# Patient Record
Sex: FEMALE | NOT HISPANIC OR LATINO | Employment: OTHER | URBAN - METROPOLITAN AREA
[De-identification: names, ages, dates, MRNs, and addresses within clinical notes are randomized per-mention and may not be internally consistent; named-entity substitution may affect disease eponyms.]

---

## 2017-07-10 ENCOUNTER — TELEPHONE (OUTPATIENT)
Dept: FAMILY MEDICINE CLINIC | Facility: CLINIC | Age: 77
End: 2017-07-10

## 2017-07-10 NOTE — TELEPHONE ENCOUNTER
That will be fine as long as she's not had a history of ulcers or GI bleeds.  Make sure she takes 81 mg aspirin with food once daily

## 2017-07-10 NOTE — TELEPHONE ENCOUNTER
[place] [facility] [place] [facility] [place] [facility] She said she is going to wear her compression stockings but, wanted to know if you thought it was a good idea  for her to take a asprin also?

## 2017-08-07 ENCOUNTER — TELEPHONE (OUTPATIENT)
Dept: FAMILY MEDICINE CLINIC | Facility: CLINIC | Age: 77
End: 2017-08-07

## 2017-08-07 RX ORDER — CIPROFLOXACIN 250 MG/1
250 TABLET, FILM COATED ORAL 2 TIMES DAILY
Qty: 14 TABLET | Refills: 0 | Status: SHIPPED | OUTPATIENT
Start: 2017-08-07 | End: 2017-09-14

## 2017-09-13 ENCOUNTER — TELEPHONE (OUTPATIENT)
Dept: FAMILY MEDICINE CLINIC | Facility: CLINIC | Age: 77
End: 2017-09-13

## 2017-09-14 ENCOUNTER — DOCUMENTATION (OUTPATIENT)
Dept: FAMILY MEDICINE CLINIC | Facility: CLINIC | Age: 77
End: 2017-09-14

## 2017-09-14 ENCOUNTER — TELEPHONE (OUTPATIENT)
Dept: FAMILY MEDICINE CLINIC | Facility: CLINIC | Age: 77
End: 2017-09-14

## 2017-09-14 ENCOUNTER — APPOINTMENT (OUTPATIENT)
Dept: GENERAL RADIOLOGY | Facility: HOSPITAL | Age: 77
End: 2017-09-14

## 2017-09-14 ENCOUNTER — OFFICE VISIT (OUTPATIENT)
Dept: FAMILY MEDICINE CLINIC | Facility: CLINIC | Age: 77
End: 2017-09-14

## 2017-09-14 ENCOUNTER — HOSPITAL ENCOUNTER (OUTPATIENT)
Facility: HOSPITAL | Age: 77
Setting detail: OBSERVATION
Discharge: HOME | End: 2017-09-17
Attending: EMERGENCY MEDICINE | Admitting: HOSPITALIST

## 2017-09-14 ENCOUNTER — APPOINTMENT (OUTPATIENT)
Dept: CT IMAGING | Facility: HOSPITAL | Age: 77
End: 2017-09-14

## 2017-09-14 VITALS
BODY MASS INDEX: 27.2 KG/M2 | HEART RATE: 80 BPM | RESPIRATION RATE: 16 BRPM | WEIGHT: 159.3 LBS | TEMPERATURE: 97.9 F | DIASTOLIC BLOOD PRESSURE: 64 MMHG | OXYGEN SATURATION: 98 % | HEIGHT: 64 IN | SYSTOLIC BLOOD PRESSURE: 142 MMHG

## 2017-09-14 DIAGNOSIS — R10.9 STOMACH PAIN: Primary | ICD-10-CM

## 2017-09-14 DIAGNOSIS — R10.9 FLANK PAIN: Primary | ICD-10-CM

## 2017-09-14 DIAGNOSIS — I10 HYPERTENSION, ESSENTIAL: ICD-10-CM

## 2017-09-14 DIAGNOSIS — N30.00 ACUTE CYSTITIS WITHOUT HEMATURIA: Primary | ICD-10-CM

## 2017-09-14 DIAGNOSIS — D72.825 BANDEMIA: ICD-10-CM

## 2017-09-14 DIAGNOSIS — R82.81 PYURIA: ICD-10-CM

## 2017-09-14 DIAGNOSIS — M62.81 MUSCLE WEAKNESS (GENERALIZED): ICD-10-CM

## 2017-09-14 DIAGNOSIS — E55.9 VITAMIN D DEFICIENCY: ICD-10-CM

## 2017-09-14 DIAGNOSIS — Z00.00 HEALTH CARE MAINTENANCE: ICD-10-CM

## 2017-09-14 DIAGNOSIS — E78.00 PURE HYPERCHOLESTEROLEMIA: ICD-10-CM

## 2017-09-14 DIAGNOSIS — R40.4 TRANSIENT ALTERATION OF AWARENESS: Primary | ICD-10-CM

## 2017-09-14 PROBLEM — R41.82 ALTERED MENTAL STATUS: Status: ACTIVE | Noted: 2017-09-14

## 2017-09-14 LAB
25(OH)D3+25(OH)D2 SERPL-MCNC: 27.6 NG/ML (ref 30–100)
ABO GROUP BLD: NORMAL
ALBUMIN SERPL-MCNC: 4.1 G/DL (ref 3.5–5.2)
ALBUMIN/GLOB SERPL: 1.6 G/DL
ALP SERPL-CCNC: 56 U/L (ref 39–117)
ALT SERPL-CCNC: 17 U/L (ref 1–33)
APTT PPP: 29.2 SECONDS (ref 22.7–35.4)
AST SERPL-CCNC: 17 U/L (ref 1–32)
BACTERIA UR QL AUTO: ABNORMAL /HPF
BASOPHILS # BLD AUTO: 0.02 10*3/MM3 (ref 0–0.2)
BASOPHILS NFR BLD AUTO: 0.1 % (ref 0–1.5)
BILIRUB BLD-MCNC: ABNORMAL MG/DL
BILIRUB SERPL-MCNC: 0.6 MG/DL (ref 0.1–1.2)
BILIRUB UR QL STRIP: NEGATIVE
BLD GP AB SCN SERPL QL: NEGATIVE
BUN SERPL-MCNC: 34 MG/DL (ref 8–23)
BUN/CREAT SERPL: 30.9 (ref 7–25)
CALCIUM SERPL-MCNC: 10 MG/DL (ref 8.6–10.5)
CHLORIDE SERPL-SCNC: 99 MMOL/L (ref 98–107)
CHOLEST SERPL-MCNC: 146 MG/DL (ref 0–200)
CLARITY UR: ABNORMAL
CLARITY, POC: ABNORMAL
CLUMPED PLATELETS: PRESENT
CO2 SERPL-SCNC: 24.3 MMOL/L (ref 22–29)
COLOR UR: ABNORMAL
COLOR UR: YELLOW
CREAT SERPL-MCNC: 1.1 MG/DL (ref 0.57–1)
D-LACTATE SERPL-SCNC: 1.5 MMOL/L (ref 0.5–2)
DEPRECATED RDW RBC AUTO: 42.3 FL (ref 37–54)
DIFFERENTIAL COMMENT: NORMAL
EOSINOPHIL # BLD AUTO: 0 10*3/MM3 (ref 0–0.7)
EOSINOPHIL NFR BLD AUTO: 0 % (ref 0.3–6.2)
ERYTHROCYTE [DISTWIDTH] IN BLOOD BY AUTOMATED COUNT: 13.6 % (ref 11.7–13)
ERYTHROCYTE [DISTWIDTH] IN BLOOD BY AUTOMATED COUNT: 14 % (ref 11.7–13)
GLOBULIN SER CALC-MCNC: 2.5 GM/DL
GLUCOSE BLDC GLUCOMTR-MCNC: 111 MG/DL (ref 70–130)
GLUCOSE SERPL-MCNC: 82 MG/DL (ref 65–99)
GLUCOSE UR STRIP-MCNC: NEGATIVE MG/DL
GLUCOSE UR STRIP-MCNC: NEGATIVE MG/DL
HCT VFR BLD AUTO: 38.1 % (ref 35.6–45.5)
HCT VFR BLD AUTO: 41.7 % (ref 35.6–45.5)
HDLC SERPL-MCNC: 52 MG/DL (ref 40–60)
HGB BLD-MCNC: 12.7 G/DL (ref 11.9–15.5)
HGB BLD-MCNC: 14 G/DL (ref 11.9–15.5)
HGB UR QL STRIP.AUTO: ABNORMAL
HOLD SPECIMEN: NORMAL
HYALINE CASTS UR QL AUTO: ABNORMAL /LPF
IMM GRANULOCYTES # BLD: 0.43 10*3/MM3 (ref 0–0.03)
IMM GRANULOCYTES NFR BLD: 1.2 % (ref 0–0.5)
INR PPP: 1.24 (ref 0.9–1.1)
KETONES UR QL STRIP: ABNORMAL
KETONES UR QL: NEGATIVE
LDLC SERPL CALC-MCNC: 65 MG/DL (ref 0–100)
LEUKOCYTE EST, POC: ABNORMAL
LEUKOCYTE ESTERASE UR QL STRIP.AUTO: ABNORMAL
LYMPHOCYTES # BLD AUTO: 1.24 10*3/MM3 (ref 0.9–4.8)
LYMPHOCYTES # BLD MANUAL: 0.32 10*3/MM3 (ref 0.9–4.8)
LYMPHOCYTES NFR BLD AUTO: 3.5 % (ref 19.6–45.3)
LYMPHOCYTES NFR BLD MANUAL: 1 % (ref 19.6–45.3)
LYMPHOCYTES NFR BLD MANUAL: 5 % (ref 5–12)
MCH RBC QN AUTO: 28.5 PG (ref 26.9–32)
MCH RBC QN AUTO: 28.8 PG (ref 26.9–32)
MCHC RBC AUTO-ENTMCNC: 33.3 G/DL (ref 32.4–36.3)
MCHC RBC AUTO-ENTMCNC: 33.6 G/DL (ref 32.4–36.3)
MCV RBC AUTO: 84.8 FL (ref 80.5–98.2)
MCV RBC AUTO: 86.4 FL (ref 80.5–98.2)
METAMYELOCYTES NFR BLD MANUAL: 5 % (ref 0–0)
MONOCYTES # BLD AUTO: 1.61 10*3/MM3 (ref 0.2–1.2)
MONOCYTES # BLD AUTO: 1.73 10*3/MM3 (ref 0.2–1.2)
MONOCYTES NFR BLD AUTO: 5 % (ref 5–12)
NEUTROPHILS # BLD AUTO: 28.62 10*3/MM3 (ref 1.9–8.1)
NEUTROPHILS # BLD AUTO: 31.52 10*3/MM3 (ref 1.9–8.1)
NEUTROPHILS NFR BLD AUTO: 90.2 % (ref 42.7–76)
NEUTROPHILS NFR BLD MANUAL: 89 % (ref 42.7–76)
NEUTS VAC BLD QL SMEAR: ABNORMAL
NITRITE UR QL STRIP: NEGATIVE
NITRITE UR-MCNC: NEGATIVE MG/ML
PH UR STRIP.AUTO: 6 [PH] (ref 5–8)
PH UR: 5 [PH] (ref 5–8)
PLATELET # BLD AUTO: 146 10*3/MM3 (ref 140–500)
PLATELET # BLD AUTO: 147 10*3/MM3 (ref 140–500)
PLATELET BLD QL SMEAR: NORMAL
PMV BLD AUTO: 11.1 FL (ref 6–12)
POTASSIUM SERPL-SCNC: 4.4 MMOL/L (ref 3.5–5.2)
PROT SERPL-MCNC: 6.6 G/DL (ref 6–8.5)
PROT UR QL STRIP: ABNORMAL
PROT UR STRIP-MCNC: ABNORMAL MG/DL
PROTHROMBIN TIME: 15.2 SECONDS (ref 11.7–14.2)
RBC # BLD AUTO: 4.41 10*6/MM3 (ref 3.9–5.2)
RBC # BLD AUTO: 4.92 10*6/MM3 (ref 3.9–5.2)
RBC # UR STRIP: ABNORMAL /UL
RBC # UR: ABNORMAL /HPF
RBC MORPH BLD: NORMAL
RBC MORPH BLD: NORMAL
REF LAB TEST METHOD: ABNORMAL
RH BLD: POSITIVE
SCAN SLIDE: NORMAL
SODIUM SERPL-SCNC: 139 MMOL/L (ref 136–145)
SP GR UR STRIP: 1.02 (ref 1–1.03)
SP GR UR: 1.02 (ref 1–1.03)
SQUAMOUS #/AREA URNS HPF: ABNORMAL /HPF
T4 FREE SERPL-MCNC: 1.59 NG/DL (ref 0.93–1.7)
TRIGL SERPL-MCNC: 143 MG/DL (ref 0–150)
TROPONIN T SERPL-MCNC: <0.01 NG/ML (ref 0–0.03)
TSH SERPL DL<=0.005 MIU/L-ACNC: 4.05 MIU/ML (ref 0.27–4.2)
UROBILINOGEN UR QL STRIP: ABNORMAL
UROBILINOGEN UR QL: NORMAL
VLDLC SERPL CALC-MCNC: 28.6 MG/DL (ref 5–40)
WBC # BLD AUTO: 34.94 10*3/MM3 (ref 4.5–10.7)
WBC NRBC COR # BLD: 32.16 10*3/MM3 (ref 4.5–10.7)
WBC UR QL AUTO: ABNORMAL /HPF
WHOLE BLOOD HOLD SPECIMEN: NORMAL
WHOLE BLOOD HOLD SPECIMEN: NORMAL

## 2017-09-14 PROCEDURE — 85007 BL SMEAR W/DIFF WBC COUNT: CPT | Performed by: NURSE PRACTITIONER

## 2017-09-14 PROCEDURE — 0042T HC CT CEREBRAL PERFUSION W/WO CONTRAST: CPT

## 2017-09-14 PROCEDURE — 81003 URINALYSIS AUTO W/O SCOPE: CPT | Performed by: INTERNAL MEDICINE

## 2017-09-14 PROCEDURE — 70496 CT ANGIOGRAPHY HEAD: CPT

## 2017-09-14 PROCEDURE — 81001 URINALYSIS AUTO W/SCOPE: CPT | Performed by: NURSE PRACTITIONER

## 2017-09-14 PROCEDURE — 84484 ASSAY OF TROPONIN QUANT: CPT

## 2017-09-14 PROCEDURE — 85730 THROMBOPLASTIN TIME PARTIAL: CPT

## 2017-09-14 PROCEDURE — 87040 BLOOD CULTURE FOR BACTERIA: CPT | Performed by: EMERGENCY MEDICINE

## 2017-09-14 PROCEDURE — G0378 HOSPITAL OBSERVATION PER HR: HCPCS

## 2017-09-14 PROCEDURE — 85610 PROTHROMBIN TIME: CPT

## 2017-09-14 PROCEDURE — 70498 CT ANGIOGRAPHY NECK: CPT

## 2017-09-14 PROCEDURE — 0 IOPAMIDOL PER 1 ML: Performed by: EMERGENCY MEDICINE

## 2017-09-14 PROCEDURE — 85025 COMPLETE CBC W/AUTO DIFF WBC: CPT | Performed by: NURSE PRACTITIONER

## 2017-09-14 PROCEDURE — 86850 RBC ANTIBODY SCREEN: CPT

## 2017-09-14 PROCEDURE — 87086 URINE CULTURE/COLONY COUNT: CPT | Performed by: NURSE PRACTITIONER

## 2017-09-14 PROCEDURE — 93005 ELECTROCARDIOGRAM TRACING: CPT

## 2017-09-14 PROCEDURE — 83605 ASSAY OF LACTIC ACID: CPT | Performed by: EMERGENCY MEDICINE

## 2017-09-14 PROCEDURE — 99214 OFFICE O/P EST MOD 30 MIN: CPT | Performed by: INTERNAL MEDICINE

## 2017-09-14 PROCEDURE — 99284 EMERGENCY DEPT VISIT MOD MDM: CPT

## 2017-09-14 PROCEDURE — 86901 BLOOD TYPING SEROLOGIC RH(D): CPT

## 2017-09-14 PROCEDURE — 71010 HC CHEST PA OR AP: CPT

## 2017-09-14 PROCEDURE — 82962 GLUCOSE BLOOD TEST: CPT

## 2017-09-14 PROCEDURE — 25010000002 CEFTRIAXONE PER 250 MG: Performed by: EMERGENCY MEDICINE

## 2017-09-14 PROCEDURE — 96365 THER/PROPH/DIAG IV INF INIT: CPT

## 2017-09-14 PROCEDURE — 86900 BLOOD TYPING SEROLOGIC ABO: CPT

## 2017-09-14 PROCEDURE — 93010 ELECTROCARDIOGRAM REPORT: CPT | Performed by: INTERNAL MEDICINE

## 2017-09-14 RX ORDER — ATORVASTATIN CALCIUM 80 MG/1
80 TABLET, FILM COATED ORAL NIGHTLY
Status: DISCONTINUED | OUTPATIENT
Start: 2017-09-15 | End: 2017-09-17 | Stop reason: HOSPADM

## 2017-09-14 RX ORDER — ACETAMINOPHEN 650 MG/1
650 SUPPOSITORY RECTAL EVERY 4 HOURS PRN
Status: DISCONTINUED | OUTPATIENT
Start: 2017-09-14 | End: 2017-09-17 | Stop reason: HOSPADM

## 2017-09-14 RX ORDER — SODIUM CHLORIDE 0.9 % (FLUSH) 0.9 %
10 SYRINGE (ML) INJECTION AS NEEDED
Status: DISCONTINUED | OUTPATIENT
Start: 2017-09-14 | End: 2017-09-14

## 2017-09-14 RX ORDER — SODIUM CHLORIDE 0.9 % (FLUSH) 0.9 %
1-10 SYRINGE (ML) INJECTION AS NEEDED
Status: DISCONTINUED | OUTPATIENT
Start: 2017-09-14 | End: 2017-09-17 | Stop reason: HOSPADM

## 2017-09-14 RX ORDER — SODIUM CHLORIDE 0.9 % (FLUSH) 0.9 %
10 SYRINGE (ML) INJECTION AS NEEDED
Status: DISCONTINUED | OUTPATIENT
Start: 2017-09-14 | End: 2017-09-17 | Stop reason: HOSPADM

## 2017-09-14 RX ORDER — ONDANSETRON 2 MG/ML
4 INJECTION INTRAMUSCULAR; INTRAVENOUS EVERY 6 HOURS PRN
Status: DISCONTINUED | OUTPATIENT
Start: 2017-09-14 | End: 2017-09-17 | Stop reason: HOSPADM

## 2017-09-14 RX ORDER — SODIUM CHLORIDE 9 MG/ML
100 INJECTION, SOLUTION INTRAVENOUS CONTINUOUS
Status: DISCONTINUED | OUTPATIENT
Start: 2017-09-15 | End: 2017-09-16

## 2017-09-14 RX ORDER — ACETAMINOPHEN 325 MG/1
650 TABLET ORAL EVERY 4 HOURS PRN
Status: DISCONTINUED | OUTPATIENT
Start: 2017-09-14 | End: 2017-09-17 | Stop reason: HOSPADM

## 2017-09-14 RX ORDER — ASPIRIN 300 MG/1
300 SUPPOSITORY RECTAL DAILY
Status: DISCONTINUED | OUTPATIENT
Start: 2017-09-15 | End: 2017-09-17 | Stop reason: HOSPADM

## 2017-09-14 RX ORDER — CEFTRIAXONE SODIUM 1 G/50ML
1 INJECTION, SOLUTION INTRAVENOUS ONCE
Status: COMPLETED | OUTPATIENT
Start: 2017-09-14 | End: 2017-09-14

## 2017-09-14 RX ORDER — CEFTRIAXONE SODIUM 1 G/50ML
1 INJECTION, SOLUTION INTRAVENOUS EVERY 24 HOURS
Status: DISCONTINUED | OUTPATIENT
Start: 2017-09-15 | End: 2017-09-17 | Stop reason: HOSPADM

## 2017-09-14 RX ORDER — CIPROFLOXACIN 250 MG/1
250 TABLET, FILM COATED ORAL 2 TIMES DAILY
Qty: 14 TABLET | Refills: 0 | Status: SHIPPED | OUTPATIENT
Start: 2017-09-14 | End: 2017-09-27

## 2017-09-14 RX ORDER — ASPIRIN 325 MG
325 TABLET ORAL DAILY
Status: DISCONTINUED | OUTPATIENT
Start: 2017-09-15 | End: 2017-09-17 | Stop reason: HOSPADM

## 2017-09-14 RX ADMIN — CEFTRIAXONE SODIUM 1 G: 1 INJECTION, SOLUTION INTRAVENOUS at 21:37

## 2017-09-14 RX ADMIN — IOPAMIDOL 150 ML: 755 INJECTION, SOLUTION INTRAVENOUS at 20:31

## 2017-09-14 NOTE — ED PROVIDER NOTES
"  EMERGENCY DEPARTMENT ENCOUNTER    CHIEF COMPLAINT  Chief Complaint: altered mental status  History given by: EMS  History limited by: patient's altered mental status  Room Number: 25/25  PMD: Charbel Morrison MD      HPI:  History obtained from EMS. Pt is a 77 y.o. female who is alert and oriented x4 at baseline. Today, about 1.5 hours ago, when pt's friends went to pick her up at her home to take her to [employer], they noticed that she was confused and had abnormal speech (\"was not making any sense\"). While pt was in [facility], her altered mental status continued and so friends called EMS. EMS is unsure of when pt was last known well and reports that pt has been moving all her extremities. Pt is unable to provide any further history secondary to altered mental status. Unknown Past Medical History.     Duration: unknown- pt's friends noticed sx about 1.5 hours ago  Timing: constant  Location: unknown  Radiation: unknown  Quality: confused  Intensity/Severity: unknown  Progression: unknown  Associated Symptoms: confusion, abnormal speech (\"not making any sense\")  Aggravating Factors: unknown  Alleviating Factors: unknown  Previous Episodes: unknown  Treatment before arrival: unknown    PAST MEDICAL HISTORY  Active Ambulatory Problems     Diagnosis Date Noted   • Arthritis of knee, degenerative 02/24/2016   • Arthritis 02/24/2016   • HLD (hyperlipidemia) 02/24/2016   • Avitaminosis D 02/24/2016   • Hyperlipidemia 03/01/2016   • Hypertension, essential 03/01/2016   • Health care maintenance 03/01/2016   • Right knee pain 03/01/2016   • Knee pain, right 03/30/2016   • Hx of total knee arthroplasty 03/30/2016   • Seasonal allergies 05/02/2016   • Eustachian tube dysfunction 05/02/2016   • Acute cystitis without hematuria 09/14/2017   • Vitamin D deficiency 09/14/2017     Resolved Ambulatory Problems     Diagnosis Date Noted   • No Resolved Ambulatory Problems     Past Medical History:   Diagnosis Date   • Arthritis    • Cough  " "  • Diarrhea    • Hx of migraine headaches    • Hyperlipidemia    • Irregular heart beat    • Osteoarthritis    • Vitamin D deficiency        PAST SURGICAL HISTORY  Past Surgical History:   Procedure Laterality Date   • COLONOSCOPY     • REPLACEMENT TOTAL KNEE Right    • WISDOM TOOTH EXTRACTION         FAMILY HISTORY  Family History   Problem Relation Age of Onset   • Heart disease Mother    • No Known Problems Father    • Cancer Sister        SOCIAL HISTORY  Social History     Social History   • Marital status: Single     Spouse name: N/A   • Number of children: N/A   • Years of education: N/A     Occupational History   • Not on file.     Social History Main Topics   • Smoking status: Never Smoker   • Smokeless tobacco: Never Used   • Alcohol use No   • Drug use: No   • Sexual activity: Defer     Other Topics Concern   • Not on file     Social History Narrative         ALLERGIES  Penicillins and Sulfa antibiotics    REVIEW OF SYSTEMS  Review of Systems   Unable to perform ROS: Mental status change   Neurological: Positive for speech difficulty (\"not making any sense\").   Psychiatric/Behavioral: Positive for confusion.       PHYSICAL EXAM  ED Triage Vitals   Temp Heart Rate Resp BP SpO2   09/14/17 2230 09/14/17 2046 09/14/17 2011 09/14/17 2011 09/14/17 2011   97.6 °F (36.4 °C) 90 16 132/66 97 % WNL       Physical Exam   Constitutional: No distress.   HENT:   Head: Normocephalic and atraumatic.   Mouth/Throat: Mucous membranes are normal.   Eyes: EOM are normal. Pupils are equal, round, and reactive to light. No scleral icterus.   Neck: Normal range of motion. Neck supple.   Cardiovascular: Normal rate, regular rhythm and normal heart sounds.    Pulmonary/Chest: Effort normal and breath sounds normal. No respiratory distress.   Abdominal: Soft. There is no tenderness. There is no rebound and no guarding.   Musculoskeletal: She exhibits no edema.   Neurological: She is alert. She has normal sensation. She is " disoriented (disoriented to time). She displays abnormal speech (moderate expressive aphasia). She displays facial symmetry. She shows no pronator drift.   Tongue midline, able to move all extremities, has difficulty with left finger to nose and left heel to shin, speech is slow, no dysarthria, no visual field deficit, follows simple commands, appears confused, sensation intact to all extremities, see NIHSS for further details   Skin: Skin is warm and dry.   Nursing note and vitals reviewed.      LAB RESULTS  Recent Results (from the past 24 hour(s))   Lipid Panel    Collection Time: 09/14/17  9:29 AM   Result Value Ref Range    Total Cholesterol 146 0 - 200 mg/dL    Triglycerides 143 0 - 150 mg/dL    HDL Cholesterol 52 40 - 60 mg/dL    VLDL Cholesterol 28.6 5 - 40 mg/dL    LDL Cholesterol  65 0 - 100 mg/dL   CBC & Differential    Collection Time: 09/14/17  9:29 AM   Result Value Ref Range    WBC 34.94 (C) 4.50 - 10.70 10*3/mm3    RBC 4.41 3.90 - 5.20 10*6/mm3    Hemoglobin 12.7 11.9 - 15.5 g/dL    Hematocrit 38.1 35.6 - 45.5 %    MCV 86.4 80.5 - 98.2 fL    MCH 28.8 26.9 - 32.0 pg    MCHC 33.3 32.4 - 36.3 g/dL    RDW 14.0 (H) 11.7 - 13.0 %    Platelets 147 140 - 500 10*3/mm3    Neutrophil Rel % 90.2 (H) 42.7 - 76.0 %    Lymphocyte Rel % 3.5 (L) 19.6 - 45.3 %    Monocyte Rel % 5.0 5.0 - 12.0 %    Eosinophil Rel % 0.0 (L) 0.3 - 6.2 %    Basophil Rel % 0.1 0.0 - 1.5 %    Neutrophils Absolute 31.52 (H) 1.90 - 8.10 10*3/mm3    Lymphocytes Absolute 1.24 0.90 - 4.80 10*3/mm3    Monocytes Absolute 1.73 (H) 0.20 - 1.20 10*3/mm3    Eosinophils Absolute 0.00 0.00 - 0.70 10*3/mm3    Basophils Absolute 0.02 0.00 - 0.20 10*3/mm3    Immature Granulocyte Rel % 1.2 (H) 0.0 - 0.5 %    Immature Grans Absolute 0.43 (H) 0.00 - 0.03 10*3/mm3   Comprehensive Metabolic Panel    Collection Time: 09/14/17  9:29 AM   Result Value Ref Range    Glucose 82 65 - 99 mg/dL    BUN 34 (H) 8 - 23 mg/dL    Creatinine 1.10 (H) 0.57 - 1.00 mg/dL    eGFR  Non  Am 48 (L) >60 mL/min/1.73    eGFR African Am 58 (L) >60 mL/min/1.73    BUN/Creatinine Ratio 30.9 (H) 7.0 - 25.0    Sodium 139 136 - 145 mmol/L    Potassium 4.4 3.5 - 5.2 mmol/L    Chloride 99 98 - 107 mmol/L    Total CO2 24.3 22.0 - 29.0 mmol/L    Calcium 10.0 8.6 - 10.5 mg/dL    Total Protein 6.6 6.0 - 8.5 g/dL    Albumin 4.10 3.50 - 5.20 g/dL    Globulin 2.5 gm/dL    A/G Ratio 1.6 g/dL    Total Bilirubin 0.6 0.1 - 1.2 mg/dL    Alkaline Phosphatase 56 39 - 117 U/L    AST (SGOT) 17 1 - 32 U/L    ALT (SGPT) 17 1 - 33 U/L   TSH+Free T4    Collection Time: 09/14/17  9:29 AM   Result Value Ref Range    TSH 4.050 0.270 - 4.200 mIU/mL    Free T4 1.59 0.93 - 1.70 ng/dL   Manual Differential    Collection Time: 09/14/17  9:29 AM   Result Value Ref Range    Differential Comment Comment     Comment Comment     Plt Comment Comment    POCT urinalysis dipstick, automated    Collection Time: 09/14/17  9:33 AM   Result Value Ref Range    Color Dark Yellow Yellow, Straw, Dark Yellow, Amanda    Clarity, UA Cloudy (A) Clear    Glucose, UA Negative Negative, 1000 mg/dL (3+) mg/dL    Bilirubin Small (1+) (A) Negative    Ketones, UA Negative Negative    Specific Gravity  1.020 1.005 - 1.030    Blood, UA Moderate (A) Negative    pH, Urine 5.0 5.0 - 8.0    Protein,  mg/dL (A) Negative mg/dL    Urobilinogen, UA Normal Normal    Leukocytes Small (1+) (A) Negative    Nitrite, UA Negative Negative   Vitamin D 25 Hydroxy    Collection Time: 09/14/17  9:37 AM   Result Value Ref Range    25 Hydroxy, Vitamin D 27.6 (L) 30.0 - 100.0 ng/mL   POC Glucose Fingerstick    Collection Time: 09/14/17  8:00 PM   Result Value Ref Range    Glucose 111 70 - 130 mg/dL   Protime-INR    Collection Time: 09/14/17  8:18 PM   Result Value Ref Range    Protime 15.2 (H) 11.7 - 14.2 Seconds    INR 1.24 (H) 0.90 - 1.10   aPTT    Collection Time: 09/14/17  8:18 PM   Result Value Ref Range    PTT 29.2 22.7 - 35.4 seconds   Lavender Top    Collection  Time: 09/14/17  8:19 PM   Result Value Ref Range    Extra Tube hold for add-on    Gold Top - SST    Collection Time: 09/14/17  8:19 PM   Result Value Ref Range    Extra Tube Hold for add-ons.    CBC Auto Differential    Collection Time: 09/14/17  8:19 PM   Result Value Ref Range    WBC 32.16 (C) 4.50 - 10.70 10*3/mm3    RBC 4.92 3.90 - 5.20 10*6/mm3    Hemoglobin 14.0 11.9 - 15.5 g/dL    Hematocrit 41.7 35.6 - 45.5 %    MCV 84.8 80.5 - 98.2 fL    MCH 28.5 26.9 - 32.0 pg    MCHC 33.6 32.4 - 36.3 g/dL    RDW 13.6 (H) 11.7 - 13.0 %    RDW-SD 42.3 37.0 - 54.0 fl    MPV 11.1 6.0 - 12.0 fL    Platelets 146 140 - 500 10*3/mm3   Troponin    Collection Time: 09/14/17  8:19 PM   Result Value Ref Range    Troponin T <0.010 0.000 - 0.030 ng/mL   Light Blue Top    Collection Time: 09/14/17  8:19 PM   Result Value Ref Range    Extra Tube hold for add-on    Scan Slide    Collection Time: 09/14/17  8:19 PM   Result Value Ref Range    Scan Slide     Manual Differential    Collection Time: 09/14/17  8:19 PM   Result Value Ref Range    Neutrophil % 89.0 (H) 42.7 - 76.0 %    Lymphocyte % 1.0 (L) 19.6 - 45.3 %    Monocyte % 5.0 5.0 - 12.0 %    Metamyelocyte % 5.0 (H) 0.0 - 0.0 %    Neutrophils Absolute 28.62 (H) 1.90 - 8.10 10*3/mm3    Lymphocytes Absolute 0.32 (L) 0.90 - 4.80 10*3/mm3    Monocytes Absolute 1.61 (H) 0.20 - 1.20 10*3/mm3    RBC Morphology Normal Normal    Vacuolated Neutrophils Mod/2+ None Seen    Clumped Platelets Present None Seen   Type & Screen    Collection Time: 09/14/17  9:12 PM   Result Value Ref Range    ABO Type A     RH type Positive     Antibody Screen Negative    Lactic Acid, Plasma    Collection Time: 09/14/17  9:12 PM   Result Value Ref Range    Lactate 1.5 0.5 - 2.0 mmol/L   Urinalysis With / Culture If Indicated    Collection Time: 09/14/17  9:13 PM   Result Value Ref Range    Color, UA Yellow Yellow, Straw    Appearance, UA Cloudy (A) Clear    pH, UA 6.0 5.0 - 8.0    Specific Gravity, UA 1.019 1.005 -  1.030    Glucose, UA Negative Negative    Ketones, UA 15 mg/dL (1+) (A) Negative    Bilirubin, UA Negative Negative    Blood, UA Trace (A) Negative    Protein, UA 30 mg/dL (1+) (A) Negative    Leuk Esterase, UA Moderate (2+) (A) Negative    Nitrite, UA Negative Negative    Urobilinogen, UA 0.2 E.U./dL 0.2 - 1.0 E.U./dL   Urinalysis, Microscopic Only    Collection Time: 09/14/17  9:13 PM   Result Value Ref Range    RBC, UA 3-5 (A) None Seen, 0-2 /HPF    WBC, UA 21-30 (A) None Seen, 0-2 /HPF    Bacteria, UA None Seen None Seen /HPF    Squamous Epithelial Cells, UA 0-2 None Seen, 0-2 /HPF    Hyaline Casts, UA 3-6 None Seen /LPF    Methodology Automated Microscopy        I ordered the above labs and reviewed the results      RADIOLOGY            XR Chest 1 View (Final result) Result time: 09/14/17 21:26:29     Final result by Mark Powers MD (09/14/17 21:26:29)     Impression:        No active disease by portable imaging.     This report was finalized on 9/14/2017 9:26 PM by Mark Powers MD.        Narrative:     PORTABLE CHEST X-RAY     CLINICAL HISTORY: Acute Stroke Protocol (onset < 12 hrs)     COMPARISON:  03/09/2013.     FINDINGS:  Single portable view of the chest obtained.  There are  various overlying monitor leads/artifacts in place. The lungs are well  expanded and clear where they can be visualized. There are some  scattered fibrotic changes which are felt to be stable and chronic.  Cardiac size is within normal limits.  Vascularity is normal considering  technique.  No pleural fluid is demonstrated by portable imaging.                          CT Cerebral Perfusion With & Without Contrast (Final result) Result time: 09/14/17 21:03:53     Final result by Jeffry Mantilla MD (09/14/17 21:03:53)     Impression:           1. No perfusion abnormality to suggest acute or threatened infarct. If  further evaluation is indicated, MRI could be considered.  2. No arterial cutoff or hemodynamically significant  focal stenosis in  the cervical carotid or vertebral arteries, or in the arteries at the  base of the brain.  3. No acute hemorrhage or hydrocephalus. No enhancing lesion of brain.  4. Thyroid nodules.                    Discussed by telephone with Dr. Taylor at time of interpretation,  2030 and 2050, and Dr. Jacome at 2055, 09/14/2017.     This report was finalized on 9/14/2017 9:03 PM by Dr. Jeffry Mantilla MD.        Narrative:     CT ANGIOGRAM HEAD W WO CONTRAST-, CT CEREBRAL PERFUSION W WO CONTRAST-,  CT ANGIOGRAM NECK W WO CONTRAST-     CT ANGIOGRAM HEAD AND NECK AND CT PERFUSION STUDY     CLINICAL HISTORY: Headache, difficulty speaking.     Radiation dose reduction techniques were utilized, including automated  exposure control and exposure modulation based on body size. CT scan of  the head was obtained with 3 mm axial images without the use  of IV contrast. The patient underwent a CT  perfusion study with a dynamic bolus of IV contrast. Standard perfusion  maps were constructed. The patient then underwent a CT angiogram of the  head and neck with 1 mm axial images following the administration of IV  contrast. Sagittal, coronal, and 3-dimensional reconstructed images were  obtained.  Percent stenosis was assessed in accordance with NASCET  criteria.     FINDINGS:     NONCONTRAST HEAD CT: On the noncontrast head CT, gray-white matter  differentiation is preserved, and no acute hemorrhage or hydrocephalus  is identified. Minimal partial opacification of inferior left mastoid  air cells.           CT PERFUSION STUDY:  No CBF (under 30%) deficit or perfusion abnormality  is demonstrated where the T MAX is greater than 6 seconds. The  calculated mismatch volume was 0 cc.     CTA HEAD and neck: The CT angiogram of the head and neck demonstrates no  hemodynamically significant focal stenosis, aneurysm, or dissection in  the cervical carotid or vertebral arteries, or in the arteries at the  base of the  brain. Developmentally hypoplastic left A1 segment is noted.     No enhancing lesion of the brain postcontrast images.     Multilevel cervical spondyloarthropathy is present, predominantly at the  lower cervical levels.     Thyroid nodules bilaterally, right more than left, follow-up evaluation  thyroid ultrasound is suggested. Aortic calcification is seen.                   I ordered the above noted radiological studies and reviewed the images on the PACS system.  Spoke with Dr. Mantilla (radiologist) regarding CT scan results        EKG    EKG was interpreted by Dr. Parikh. See Dr Parikh's note for EKG interpretation.       MEDICAL RECORD REVIEW  Earlier today, at PMD's office, WBC count was 34.94. In 2014, WBC count was 7.91.       PROCEDURES    Performed NIHSS at 2007  Interval: baseline (at presentation)  1a. Level Of Consciousness: 0-->Alert: keenly responsive  1b. LOC Questions: 0-->Answers both questions correctly  1c. LOC Commands: 0-->Performs both tasks correctly  2. Best Gaze: 0-->Normal  3. Visual: 0-->No visual loss  4. Facial Palsy: 0-->Normal symmetrical movements  5a. Motor Arm, Left: 0-->No drift: limb holds 90 (or 45) degrees for full 10 secs  5b. Motor Arm, Right: 0-->No drift: limb holds 90 (or 45) degrees for full 10 secs  6a. Motor Leg, Left: 0-->No drift: leg holds 30 degree position for full 5 secs  6b. Motor Leg, Right: 0-->No drift: leg holds 30 degree position for full 5 secs  7. Limb Ataxia: 2-->Present in two limbs  8. Sensory: 0-->Normal: no sensory loss  9. Best Language: 1-->Mild-to-moderate aphasia: some obvious loss of fluency or facility of comprehension, without significant limitation on ideas expressed or form of expression. Reduction of speech and/or comprehension, however, makes conversation. . . (see row details)  10. Dysarthria: 0-->Normal  11. Extinction and Inattention (formerly Neglect): 0-->No abnormality    Total (NIH Stroke Scale): 3      PROGRESS AND CONSULTS  8:08  PM- Reviewed pt's history and workup with Dr. Parikh.  At bedside evaluation, they agree with the plan of care.  8:09 PM- NIHSS score is 3. Paged Team D.   8:11 PM- Dr Parikh discussed case with Dr Lepe (stroke neurologist) who agrees with plan to obtain CT head, CTA head, CTA neck, and CT cerebral perfusion. See Dr Parikh's note for further details.   8:14 PM- Reviewed records. Pt visited Dr Morrison (PMD) in the office today for acute cystitis. At the time, UA was concerning for UTI and WBC count was elevated at 34.94. At about 1600 today, staff from Dr Morirson' office called pt with these results and instructed her to go to the ED for further evaluation. Per staff note, pt told staff that she would ask her neighbor to take her to the ED. Based on this documentation, there is a possibility that pt was last known well at about 1600 today.   8:17 PM- Ordered blood work, EKG, troponin, PT with INR, UA, CT head, CTA head, CTA neck, and CT cerebral perfusion for further evaluation.   8:58 PM- WBC count is 32.16. Ordered lactic acid and blood cultures for further evaluation.   9:29 PM- Ordered rocephin for UTI. Sent call out to [facility] for admission.  9:40 PM- TPA and intervention are not indicated. NIHSS score is 3. Exact time of sx onset is unknown. CT head, CT cerebral perfusion, CTA head, and CTA neck all show unremarkable findings.   9:45 PM- Dr Parikh discussed case with Dr Cantu ([facility] hospitalist)  Dr Parikh reviewed history, exam, results and treatments. He also discussed concerns and plan of care. Dr Cantu accepts pt to be admitted to telemetry.  9:54 PM- Family is now at pt's bedside. Rechecked pt. O2 sat is 97% on room air. BP is 152/80. HR is in 80s. She is resting comfortably and is in no acute distress. Discussed with family about all pertinent results including elevated WBC count of 32.16 and UTI indicated on UA. Informed them that CT head, CT cerebral perfusion, CTA head, and CTA neck findings are  unremarkable. Discussed pt admission for further care, further evaluation, and observation. Family verbalizes understanding and agrees with plan. Pt is ready for admission.      ADMISSION    Discussed treatment plan and reason for admission with pt/family and admitting physician.  Pt/family voiced understanding of the plan for admission for further testing/treatment as needed.      DIAGNOSIS  Final diagnoses:   Transient alteration of awareness   Bandemia   Pyuria           COURSE & MEDICAL DECISION MAKING  Pertinent Labs and Imaging studies that were ordered and reviewed are noted above.  Results were reviewed/discussed with the patient's family and they were also made aware of online assess.   Pt's family also made aware that some labs, such as cultures, will not be resulted during ER visit and follow up with PMD is necessary.     MEDICATIONS GIVEN IN ER  Medications   sodium chloride 0.9 % flush 10 mL (not administered)   iopamidol (ISOVUE-370) 76 % injection 150 mL (150 mL Intravenous Given 9/14/17 2031)   cefTRIAXone (ROCEPHIN) IVPB 1 g (0 g Intravenous Stopped 9/14/17 2220)       /64  Pulse 86  Temp 97.6 °F (36.4 °C) (Oral)   Resp 16  SpO2 99%      I personally reviewed the past medical history, past surgical history, social history, family history, current medications and allergies as they appear in this chart.  The scribe's note accurately reflects the work and decisions made by me.     Documentation assistance provided by lily Osborn for MORIAH Pacheco on 9/14/2017 at 10:32 PM. Information recorded by the scribe was done at my direction and has been verified and validated by me.     Catrachita Osborn  09/15/17 0024       MICHELE Ortega  09/15/17 1507

## 2017-09-14 NOTE — TELEPHONE ENCOUNTER
GRANT RAMOS spoke to Ms. Kimberlee Urrutia and told her that she needed to go to the ER and seek treatment immediately. I asked her if she had anyone to drive her because I would call the ambulance for her if she did not have anyone. She states her neighbor would be off in a hour and she would get them to take her. She assured me she would go to the ER [facility].

## 2017-09-14 NOTE — PROGRESS NOTES
"Subjective   Patient ID: Kimberlee Urrutia is a 77 y.o. female presents with   Chief Complaint   Patient presents with   • Sore since getting her flu shot on the 12th all across the f       HPI - This patient presents today for complaints of body aches and fatigue on the 12th she got a flu shot and start feeling bad after that.  We did a urinalysis on her and it's, suspicious.  She has no cough shortness of breath or fever diarrhea she did vomit one time yesterday but that has not reoccurred.    Assessment plan    Likely UTI we'll culture the urine start her empirically on Cipro 250 twice daily for 5 days we'll get a CBC CMP have her follow-up on Monday if not feeling better.    Hypertension controlled without medicine    Hypercholesterolemia fasting lipid profile    Fatigue fatigue and health care maintenance- labs    Vitamin D deficiency check a vitamin D level.    Allergies   Allergen Reactions   • Penicillins    • Sulfa Antibiotics Rash       The following portions of the patient's history were reviewed and updated as appropriate: allergies, current medications, past family history, past medical history, past social history, past surgical history and problem list.      Review of Systems   Constitutional: Positive for fatigue.   HENT: Negative.    Eyes: Negative.    Respiratory: Negative.    Cardiovascular: Negative.    Gastrointestinal: Negative.    Endocrine: Negative.    Genitourinary: Negative.    Musculoskeletal: Negative.    Skin: Negative.    Allergic/Immunologic: Negative.    Neurological: Negative.    Hematological: Negative.    Psychiatric/Behavioral: Negative.        Objective     Vitals:    09/14/17 0848   BP: 142/64   Pulse: 80   Resp: 16   Temp: 97.9 °F (36.6 °C)   TempSrc: Oral   SpO2: 98%   Weight: 159 lb 4.8 oz (72.3 kg)   Height: 64\" (162.6 cm)         Physical Exam   Constitutional: She is oriented to person, place, and time. She appears well-developed and well-nourished. No distress.   She is " acting tired but not confused   HENT:   Head: Normocephalic and atraumatic.   Eyes: Conjunctivae and EOM are normal. Pupils are equal, round, and reactive to light. Right eye exhibits no discharge. Left eye exhibits no discharge. No scleral icterus.   Neck: Normal range of motion. Neck supple. No tracheal deviation present. No thyromegaly present.   Cardiovascular: Normal rate, regular rhythm, normal heart sounds and normal pulses.  Exam reveals no gallop.    No murmur heard.  Pulmonary/Chest: Effort normal and breath sounds normal. No respiratory distress. She has no wheezes. She has no rales.   Abdominal: Soft. Bowel sounds are normal. She exhibits no distension and no mass. There is no tenderness. There is no rebound and no guarding. No hernia.   Mild left sided flank pain to percussion   Musculoskeletal: Normal range of motion.   Neurological: She is alert and oriented to person, place, and time.   Skin: Skin is warm and dry. No rash noted. No erythema. No pallor.   Psychiatric: She has a normal mood and affect. Her behavior is normal. Judgment and thought content normal.   Nursing note and vitals reviewed.        Kimberlee was seen today for sore since getting her flu shot on the 12th all across the f.    Diagnoses and all orders for this visit:    Acute cystitis without hematuria  -     Lipid Panel  -     CBC & Differential  -     Comprehensive Metabolic Panel  -     TSH+Free T4    Pure hypercholesterolemia  -     Lipid Panel  -     CBC & Differential  -     Comprehensive Metabolic Panel  -     TSH+Free T4    Hypertension, essential  -     Lipid Panel  -     CBC & Differential  -     Comprehensive Metabolic Panel  -     TSH+Free T4    Health care maintenance  -     Lipid Panel  -     CBC & Differential  -     Comprehensive Metabolic Panel  -     TSH+Free T4    Vitamin D deficiency  -     Vitamin D 25 Hydroxy    Other orders  -     ciprofloxacin (CIPRO) 250 MG tablet; Take 1 tablet by mouth 2 (Two) Times a  Day.        Call or return to clinic prn if these symptoms worsen or fail to improve as anticipated.

## 2017-09-14 NOTE — PROGRESS NOTES
On call note. I received notification from lab that pts WBC is 34k. Dr Morrison is treating her for a UTI. I called patient. No answer. Left VM for her to call our office.

## 2017-09-15 ENCOUNTER — APPOINTMENT (OUTPATIENT)
Dept: MRI IMAGING | Facility: HOSPITAL | Age: 77
End: 2017-09-15
Attending: HOSPITALIST

## 2017-09-15 ENCOUNTER — APPOINTMENT (OUTPATIENT)
Dept: CT IMAGING | Facility: HOSPITAL | Age: 77
End: 2017-09-15
Attending: HOSPITALIST

## 2017-09-15 LAB
ALBUMIN SERPL-MCNC: 3 G/DL (ref 3.5–5.2)
ALBUMIN/GLOB SERPL: 1 G/DL
ALP SERPL-CCNC: 51 U/L (ref 39–117)
ALT SERPL W P-5'-P-CCNC: 13 U/L (ref 1–33)
ANION GAP SERPL CALCULATED.3IONS-SCNC: 14.2 MMOL/L
AST SERPL-CCNC: 11 U/L (ref 1–32)
BASOPHILS # BLD AUTO: 0.01 10*3/MM3 (ref 0–0.2)
BASOPHILS NFR BLD AUTO: 0 % (ref 0–1.5)
BILIRUB SERPL-MCNC: 0.3 MG/DL (ref 0.1–1.2)
BUN BLD-MCNC: 20 MG/DL (ref 8–23)
BUN/CREAT SERPL: 33.9 (ref 7–25)
CALCIUM SPEC-SCNC: 9.9 MG/DL (ref 8.6–10.5)
CHLORIDE SERPL-SCNC: 108 MMOL/L (ref 98–107)
CHOLEST SERPL-MCNC: 119 MG/DL (ref 0–200)
CO2 SERPL-SCNC: 19.8 MMOL/L (ref 22–29)
CREAT BLD-MCNC: 0.59 MG/DL (ref 0.57–1)
DEPRECATED RDW RBC AUTO: 42.8 FL (ref 37–54)
EOSINOPHIL # BLD AUTO: 0 10*3/MM3 (ref 0–0.7)
EOSINOPHIL NFR BLD AUTO: 0 % (ref 0.3–6.2)
ERYTHROCYTE [DISTWIDTH] IN BLOOD BY AUTOMATED COUNT: 13.6 % (ref 11.7–13)
GFR SERPL CREATININE-BSD FRML MDRD: 99 ML/MIN/1.73
GLOBULIN UR ELPH-MCNC: 3 GM/DL
GLUCOSE BLD-MCNC: 89 MG/DL (ref 65–99)
GLUCOSE BLDC GLUCOMTR-MCNC: 75 MG/DL (ref 70–130)
GLUCOSE BLDC GLUCOMTR-MCNC: 90 MG/DL (ref 70–130)
HBA1C MFR BLD: 5.35 % (ref 4.8–5.6)
HCT VFR BLD AUTO: 35.5 % (ref 35.6–45.5)
HDLC SERPL-MCNC: 30 MG/DL (ref 40–60)
HGB BLD-MCNC: 12 G/DL (ref 11.9–15.5)
IMM GRANULOCYTES # BLD: 0.39 10*3/MM3 (ref 0–0.03)
IMM GRANULOCYTES NFR BLD: 1.3 % (ref 0–0.5)
LDLC SERPL CALC-MCNC: 69 MG/DL (ref 0–100)
LDLC/HDLC SERPL: 2.3 {RATIO}
LYMPHOCYTES # BLD AUTO: 1.45 10*3/MM3 (ref 0.9–4.8)
LYMPHOCYTES NFR BLD AUTO: 5 % (ref 19.6–45.3)
MCH RBC QN AUTO: 29.1 PG (ref 26.9–32)
MCHC RBC AUTO-ENTMCNC: 33.8 G/DL (ref 32.4–36.3)
MCV RBC AUTO: 86.2 FL (ref 80.5–98.2)
MONOCYTES # BLD AUTO: 1.68 10*3/MM3 (ref 0.2–1.2)
MONOCYTES NFR BLD AUTO: 5.8 % (ref 5–12)
NEUTROPHILS # BLD AUTO: 25.56 10*3/MM3 (ref 1.9–8.1)
NEUTROPHILS NFR BLD AUTO: 87.9 % (ref 42.7–76)
PLATELET # BLD AUTO: 142 10*3/MM3 (ref 140–500)
PMV BLD AUTO: 11.2 FL (ref 6–12)
POTASSIUM BLD-SCNC: 3.9 MMOL/L (ref 3.5–5.2)
PROT SERPL-MCNC: 6 G/DL (ref 6–8.5)
RBC # BLD AUTO: 4.12 10*6/MM3 (ref 3.9–5.2)
SODIUM BLD-SCNC: 142 MMOL/L (ref 136–145)
TRIGL SERPL-MCNC: 100 MG/DL (ref 0–150)
TSH SERPL DL<=0.05 MIU/L-ACNC: 1.44 MIU/ML (ref 0.27–4.2)
VIT B12 BLD-MCNC: 712 PG/ML (ref 211–946)
VLDLC SERPL-MCNC: 20 MG/DL (ref 5–40)
WBC NRBC COR # BLD: 29.09 10*3/MM3 (ref 4.5–10.7)

## 2017-09-15 PROCEDURE — 97535 SELF CARE MNGMENT TRAINING: CPT

## 2017-09-15 PROCEDURE — G8988 SELF CARE GOAL STATUS: HCPCS

## 2017-09-15 PROCEDURE — 70551 MRI BRAIN STEM W/O DYE: CPT

## 2017-09-15 PROCEDURE — G0378 HOSPITAL OBSERVATION PER HR: HCPCS

## 2017-09-15 PROCEDURE — 82962 GLUCOSE BLOOD TEST: CPT

## 2017-09-15 PROCEDURE — G8987 SELF CARE CURRENT STATUS: HCPCS

## 2017-09-15 PROCEDURE — G8996 SWALLOW CURRENT STATUS: HCPCS

## 2017-09-15 PROCEDURE — G8997 SWALLOW GOAL STATUS: HCPCS

## 2017-09-15 PROCEDURE — 85025 COMPLETE CBC W/AUTO DIFF WBC: CPT | Performed by: HOSPITALIST

## 2017-09-15 PROCEDURE — 25010000002 CEFTRIAXONE PER 250 MG: Performed by: HOSPITALIST

## 2017-09-15 PROCEDURE — 82607 VITAMIN B-12: CPT | Performed by: HOSPITALIST

## 2017-09-15 PROCEDURE — 80061 LIPID PANEL: CPT | Performed by: HOSPITALIST

## 2017-09-15 PROCEDURE — 97162 PT EVAL MOD COMPLEX 30 MIN: CPT

## 2017-09-15 PROCEDURE — 96361 HYDRATE IV INFUSION ADD-ON: CPT

## 2017-09-15 PROCEDURE — G8989 SELF CARE D/C STATUS: HCPCS

## 2017-09-15 PROCEDURE — G8998 SWALLOW D/C STATUS: HCPCS

## 2017-09-15 PROCEDURE — G8979 MOBILITY GOAL STATUS: HCPCS

## 2017-09-15 PROCEDURE — 97110 THERAPEUTIC EXERCISES: CPT

## 2017-09-15 PROCEDURE — 80053 COMPREHEN METABOLIC PANEL: CPT | Performed by: HOSPITALIST

## 2017-09-15 PROCEDURE — 99204 OFFICE O/P NEW MOD 45 MIN: CPT | Performed by: PSYCHIATRY & NEUROLOGY

## 2017-09-15 PROCEDURE — 83036 HEMOGLOBIN GLYCOSYLATED A1C: CPT | Performed by: HOSPITALIST

## 2017-09-15 PROCEDURE — 97165 OT EVAL LOW COMPLEX 30 MIN: CPT

## 2017-09-15 PROCEDURE — 92610 EVALUATE SWALLOWING FUNCTION: CPT

## 2017-09-15 PROCEDURE — 74176 CT ABD & PELVIS W/O CONTRAST: CPT

## 2017-09-15 PROCEDURE — G8978 MOBILITY CURRENT STATUS: HCPCS

## 2017-09-15 PROCEDURE — 84443 ASSAY THYROID STIM HORMONE: CPT | Performed by: HOSPITALIST

## 2017-09-15 RX ADMIN — CEFTRIAXONE SODIUM 1 G: 1 INJECTION, SOLUTION INTRAVENOUS at 23:34

## 2017-09-15 RX ADMIN — ACETAMINOPHEN 650 MG: 325 TABLET ORAL at 10:20

## 2017-09-15 RX ADMIN — SODIUM CHLORIDE 100 ML/HR: 9 INJECTION, SOLUTION INTRAVENOUS at 01:07

## 2017-09-15 RX ADMIN — ATORVASTATIN CALCIUM 80 MG: 80 TABLET, FILM COATED ORAL at 20:46

## 2017-09-15 RX ADMIN — SODIUM CHLORIDE 100 ML/HR: 9 INJECTION, SOLUTION INTRAVENOUS at 14:30

## 2017-09-15 RX ADMIN — ACETAMINOPHEN 650 MG: 325 TABLET ORAL at 17:04

## 2017-09-15 RX ADMIN — ASPIRIN 325 MG: 325 TABLET ORAL at 10:20

## 2017-09-15 NOTE — SIGNIFICANT NOTE
09/15/17 1052   Rehab Treatment   Discipline occupational therapist   Rehab Evaluation   Evaluation Not Performed (attempt OT eval but pt unable to remain awake. Noted admitted overnight and then testing.  Will follow up later today or next service date to accurately assess function.  Discuss with RN who states negative for CVA.   )   Recommendation   OT - Next Appointment 09/16/17

## 2017-09-15 NOTE — PROGRESS NOTES
" LOS: 1 day     Name: Kimberlee Urrutia  Age: 77 y.o.  Sex: female  :  1940  MRN: [id number]         Primary Care Physician: Charbel Morrison MD    Subjective   Subjective  Feels tired and worn out this morning.  Denies confusion or weakness.  She is oriented ×3.  She denies chest pain, shortness of breath, abdominal pain.  No diarrhea or dysuria.    Objective   Vital Signs  Temp:  [97.6 °F (36.4 °C)-99 °F (37.2 °C)] 99 °F (37.2 °C)  Heart Rate:  [78-90] 78  Resp:  [16-20] 18  BP: (103-152)/(57-80) 128/57  Body mass index is 26.74 kg/(m^2).    Objective:  General Appearance:  Comfortable and in no acute distress (Weak and fatigued appearing.).    Vital signs: (most recent): Blood pressure 128/57, pulse 78, temperature 99 °F (37.2 °C), temperature source Oral, resp. rate 18, height 64\" (162.6 cm), weight 155 lb 12.8 oz (70.7 kg), SpO2 94 %.    Lungs:  Normal respiratory rate and normal effort.    Heart: Normal rate.  Regular rhythm.    Abdomen: Abdomen is soft.  Bowel sounds are normal.   There is no abdominal tenderness.     Extremities: There is no local swelling or dependent edema.    Neurological: Patient is alert and oriented to person, place and time.    Skin:  Warm and dry.              Results Review:       I reviewed the patient's new clinical results.      Results from last 7 days  Lab Units 09/15/17  0513 09/14/17  2019 17  0929   WBC 10*3/mm3 29.09* 32.16* 34.94*   HEMOGLOBIN g/dL 12.0 14.0 12.7   PLATELETS 10*3/mm3 142 146 147       Results from last 7 days  Lab Units 09/15/17  0513 09/14/17  0929   SODIUM mmol/L 142 139   POTASSIUM mmol/L 3.9 4.4   CHLORIDE mmol/L 108* 99   CO2 mmol/L 19.8*  --    TOTAL CO2, ARTERIAL mmol/L  --  24.3   BUN mg/dL 20 34*   CREATININE mg/dL 0.59 1.10*   CALCIUM mg/dL 9.9 10.0   GLUCOSE mg/dL 89  --        Results from last 7 days  Lab Units 17   INR  1.24*             Scheduled Meds:     aspirin 325 mg Oral Daily   Or      aspirin 300 mg Rectal Daily "   atorvastatin 80 mg Oral Nightly   ceftriaxone 1 g Intravenous Q24H     PRN Meds:   •  acetaminophen **OR** acetaminophen  •  acetaminophen  •  ondansetron  •  sodium chloride  •  sodium chloride  Continuous Infusions:    sodium chloride 100 mL/hr Last Rate: 100 mL/hr (09/15/17 0107)       Assessment/Plan   Principal Problem:    Altered mental status  Active Problems:    Arthritis    HLD (hyperlipidemia)    Hyperlipidemia    Hypertension, essential    Acute cystitis without hematuria    Pyuria    Bandemia    Transient alteration of awareness      Assessment & Plan    - Continue Rocephin and await urine culture.  CT scan of the abdomen and pelvis is unrevealing for any acute process that could be contributing to her elevated white blood cell count.  Leukocytosis is slightly improved today.  Continue to monitor.  Blood and urine cultures are pending  - Confusion appears to be resolved this morning.  MRI of the brain is pending along with neurology evaluation.  She was placed on aspirin and high-dose statin upon admission for the possibility of stroke or TIA  - Continue IV fluids today.  - She denies weakness this morning but will have therapy services see her.    Shabbir Archuleta MD  [facility]  09/15/17  9:02 AM

## 2017-09-15 NOTE — THERAPY DISCHARGE NOTE
Acute Care - Speech Language Pathology Initial Eval/Discharge  [facility]     Patient Name: Kimberlee Urrutia  : 1940  MRN: [id number]  Today's Date: 9/15/2017  Onset of Illness/Injury or Date of Surgery Date: 17            Admit Date: 2017     SPEECH-LANGUAGE PATHOLOGY EVALUATION - SWALLOW  Subjective: The patient was seen on this date for a Clinical Swallow evaluation.  Patient was alert and cooperative.    The patient's history is significant for transient alteration of awareness.   Objective: Textures given included thin liquid, puree consistency and regular consistency.  Assessment: Difficulties were noted with none of the above consistencies.  SLP Findings:  Patient presents with functional swallow, without esophageal component.   Recommendations: Diet Textures: thin liquid, regular consistency food.  Medications should be taken whole with thin liquids.   Recommended Strategies: Upright for PO and small bites and sips. Oral care before breakfast, after all meals and PRN.  Dysphagia therapy is not recommended. Rationale: Swallow WFL.    Visit Dx:    ICD-10-CM ICD-9-CM   1. Transient alteration of awareness R40.4 780.02   2. Bandemia D72.825 288.66   3. Pyuria N39.0 791.9   4. Muscle weakness (generalized) M62.81 728.87     Patient Active Problem List   Diagnosis   • Arthritis of knee, degenerative   • Arthritis   • HLD (hyperlipidemia)   • Avitaminosis D   • Hyperlipidemia   • Hypertension, essential   • Health care maintenance   • Right knee pain   • Knee pain, right   • Hx of total knee arthroplasty   • Seasonal allergies   • Eustachian tube dysfunction   • Acute cystitis without hematuria   • Vitamin D deficiency   • Altered mental status   • Pyuria   • Bandemia   • Transient alteration of awareness     Past Medical History:   Diagnosis Date   • Arthritis    • Cough    • Diarrhea    • Hx of migraine headaches    • Hyperlipidemia    • Irregular heart beat    • Osteoarthritis    • Vitamin  D deficiency      Past Surgical History:   Procedure Laterality Date   • COLONOSCOPY     • REPLACEMENT TOTAL KNEE Right    • WISDOM TOOTH EXTRACTION              EDUCATION  The patient has been educated in the following areas:   Dysphagia (Swallowing Impairment).    SLP Recommendation and Plan              Anticipated Discharge Disposition: (P) home                           IP SLP Goals       09/15/17 0915          Safely Consume Diet    Safely Consume Diet- SLP, Date Established (P)  09/15/17  -HR      Safely Consume Diet- SLP, Time to Achieve (P)  by discharge  -HR      Safely Consume Diet- SLP, Outcome (P)  goal met  -HR        User Key  (r) = Recorded By, (t) = Taken By, (c) = Cosigned By    Initials Name Provider Type    HR Yessy Croft Speech Therapy Student Speech Therapy Student             SLP Outcome Measures (last 72 hours)      SLP Outcome Measures       09/15/17 0915          SLP Outcome Measures    Outcome Measure Used? (P)  Adult NOMS  -HR      FCM Scores    FCM Chosen (P)  Swallowing  -HR      Swallowing FCM Score (P)  7  -HR        User Key  (r) = Recorded By, (t) = Taken By, (c) = Cosigned By    Initials Name Effective Dates    HR Jackelyn Mays Therapy Student 09/06/17 -           Time Calculation:         Time Calculation- SLP       09/15/17 0915          Time Calculation- SLP    SLP Start Time (P)  0915  -HR      SLP Stop Time (P)  1015  -HR      SLP Time Calculation (min) (P)  60 min  -HR        User Key  (r) = Recorded By, (t) = Taken By, (c) = Cosigned By    Initials Name Provider Type    HR Yessy Croft Speech Therapy Student Speech Therapy Student          Therapy Charges for Today     Code Description Service Date Service Provider Modifiers Qty    79605255917  ST EVAL ORAL PHARYNG SWALLOW 4 9/15/2017 Yessy Croft Speech Therapy Student GN 1             ADULT NOMS (last 72 hours)      Adult NOMS       09/15/17 0915                FCM Scores    FCM Chosen (P)  Swallowing  -HR         Swallowing FCM Score (P)  7  -HR          User Key  (r) = Recorded By, (t) = Taken By, (c) = Cosigned By    Initials Name Effective Dates    HR Yessy Croft Speech Therapy Student 09/06/17 -              SLP Discharge Summary  Anticipated Discharge Disposition: (P) home    Yessy Croft Speech Therapy Student  9/15/2017

## 2017-09-15 NOTE — NURSING NOTE
Referral received per [facility] TIA/Stroke order set.  Noted OT and PT anticipate patient will be able to d/c home.  Will sign off re: inpatient acute rehab.  Thank you--Alicia Lanier,  Rehab Adm Nurse

## 2017-09-15 NOTE — PROGRESS NOTES
On call note. Second attempt to contact patient with regards to high WBC... From CBC that Dr Morrison ordered today. No answer. Left message for her to go to ER if feeling very ill. Then I logged into computer to see that patient already at ER.

## 2017-09-15 NOTE — THERAPY EVALUATION
"Acute Care - Physical Therapy Initial Evaluation  [facility]     Patient Name: Kimberlee Urrutia  : 1940  MRN: [id number]  Today's Date: 9/15/2017   Onset of Illness/Injury or Date of Surgery Date: 17  Date of Referral to PT: 17  Referring Physician: Mauro Bejarano      Admit Date: 2017     Visit Dx:    ICD-10-CM ICD-9-CM   1. Transient alteration of awareness R40.4 780.02   2. Bandemia D72.825 288.66   3. Pyuria N39.0 791.9   4. Muscle weakness (generalized) M62.81 728.87     Patient Active Problem List   Diagnosis   • Arthritis of knee, degenerative   • Arthritis   • HLD (hyperlipidemia)   • Avitaminosis D   • Hyperlipidemia   • Hypertension, essential   • Health care maintenance   • Right knee pain   • Knee pain, right   • Hx of total knee arthroplasty   • Seasonal allergies   • Eustachian tube dysfunction   • Acute cystitis without hematuria   • Vitamin D deficiency   • Altered mental status   • Pyuria   • Bandemia   • Transient alteration of awareness     Past Medical History:   Diagnosis Date   • Arthritis    • Cough    • Diarrhea    • Hx of migraine headaches    • Hyperlipidemia    • Irregular heart beat    • Osteoarthritis    • Vitamin D deficiency      Past Surgical History:   Procedure Laterality Date   • COLONOSCOPY     • REPLACEMENT TOTAL KNEE Right    • WISDOM TOOTH EXTRACTION            PT ASSESSMENT (last 72 hours)      PT Evaluation       09/15/17 1011 09/15/17 0013    Rehab Evaluation    Document Type evaluation  -MS     Subjective Information agree to therapy;complains of;weakness;fatigue;pain  -MS     Patient Effort, Rehab Treatment adequate  -MS     Symptoms Noted Comment Pt. reports feeling \"very tired\" with a Headache (reporting 7/10 pain rating) this AM.  -MS     General Information    Onset of Illness/Injury or Date of Surgery Date 17  -MS     Referring Physician Mauro Bejarano  -MS     Pertinent History Of Current Problem Pt. admitted with AMS; UTI;  " Possible toxic metabolic encephalopathy  -MS     Precautions/Limitations fall precautions   Exit alarm  -MS     Prior Level of Function independent:  -MS     Equipment Currently Used at Home none  -MS none  -MH    Plans/Goals Discussed With patient;agreed upon  -MS     Risks Reviewed patient:  -MS     Benefits Reviewed patient:  -MS     Barriers to Rehab none identified  -MS     Living Environment    Lives With  alone  -    Living Arrangements  house  -    Home Accessibility  no concerns;stairs within home  -    Number of Stairs Within Home  12  -    Stair Railings at Home  inside, present at both sides  -    Type of Financial/Environmental Concern  none  -MH    Transportation Available  car  -    Clinical Impression    Date of Referral to PT 09/14/17  -MS     Criteria for Skilled Therapeutic Interventions Met treatment indicated  -MS     Rehab Potential good, to achieve stated therapy goals  -MS     Pain Assessment    Pain Assessment 0-10  -MS     Pain Score 7  -MS     Post Pain Score 7  -MS     Pain Type Acute pain  -MS     Pain Location Head  -MS     Pain Intervention(s) Repositioned;Rest   Nursing aware of pt.'s headache pain  -MS     Cognitive Assessment/Intervention    Current Cognitive/Communication Assessment functional  -MS     Follows Commands/Answers Questions 100% of the time;able to follow single-step instructions;needs cueing;needs increased time  -MS     Personal Safety Interventions fall prevention program maintained;gait belt;nonskid shoes/slippers when out of bed;supervised activity  -MS     ROM (Range of Motion)    General ROM no range of motion deficits identified  -MS     MMT (Manual Muscle Testing)    General MMT Assessment --   BUE/LE MMT (3+/5)  -MS     Bed Mobility, Assessment/Treatment    Bed Mob, Supine to Sit, [place] minimum assist (75% patient effort)  -MS     Bed Mob, Sit to Supine, [place] minimum assist (75% patient effort)  -MS     Transfer Assessment/Treatment     Transfers, Sit-Stand [place] minimum assist (75% patient effort);hand held assist  -MS     Transfers, Stand-Sit [place] minimum assist (75% patient effort);hand held assist  -MS     Gait Assessment/Treatment    Gait, [place] Level contact guard assist  -MS     Gait, Distance (Feet) 120  -MS     Gait, Gait Pattern Analysis swing-through gait  -MS     Gait, Gait Deviations arely decreased;forward flexed posture;narrow base  -MS     Gait, Safety Issues balance decreased during turns;step length decreased  -MS     Gait, Comment Decreased BUE arm swing and pelvic rotation.  Narrow LYNN with cues to increase LYNN.  -MS     Positioning and Restraints    Pre-Treatment Position in bed  -MS     Post Treatment Position bed  -MS     In Bed notified nsg;supine;call light within reach;encouraged to call for assist;exit alarm on;with family/caregiver   All lines intact.  -MS       User Key  (r) = Recorded By, (t) = Taken By, (c) = Cosigned By    Initials Name Provider Type    MS Mark Grier, PT Physical Therapist    SATISH Wing RN Registered Nurse          Physical Therapy Education     Title: PT OT SLP Therapies (Active)     Topic: Physical Therapy (Active)     Point: Mobility training (Done)    Learning Progress Summary    Learner Readiness Method Response Comment Documented by Status   Patient Acceptance KJ COLLADO NR  MS 09/15/17 1016 Done               Point: Body mechanics (Done)    Learning Progress Summary    Learner Readiness Method Response Comment Documented by Status   Patient Acceptance KJ COLLADO NR  MS 09/15/17 1016 Done               Point: Precautions (Done)    Learning Progress Summary    Learner Readiness Method Response Comment Documented by Status   Patient Acceptance KJ COLLADO NR  MS 09/15/17 1016 Done                      User Key     Initials Effective Dates Name Provider Type [facility] 12/01/15 -  Mark Grier PT Physical Therapist PT                PT Recommendation and  Plan  Anticipated Discharge Disposition: home with assist, home with home health  Planned Therapy Interventions: balance training, bed mobility training, gait training, home exercise program, patient/family education, postural re-education, strengthening, transfer training  PT Frequency: daily  Plan of Care Review  Plan Of Care Reviewed With: patient  Outcome Summary/Follow up Plan: Pt. will benefit from skilled inpt. P.T. to address her functional deficits and to assist pt. in regaining her independence with functional mobility.          IP PT Goals       09/15/17 1016          Bed Mobility PT LTG    Bed Mobility PT LTG, Date Established 09/15/17  -MS      Bed Mobility PT LTG, Time to Achieve 5 days  -MS      Bed Mobility PT LTG, Activity Type all bed mobility  -MS      Bed Mobility PT LTG, [place] Level independent  -MS      Transfer Training PT LTG    Transfer Training PT LTG, Date Established 09/15/17  -MS      Transfer Training PT LTG, Time to Achieve 5 days  -MS      Transfer Training PT LTG, Activity Type all transfers  -MS      Transfer Training PT LTG, [place] Level independent  -MS      Gait Training PT LTG    Gait Training Goal PT LTG, Date Established 09/15/17  -MS      Gait Training Goal PT LTG, Time to Achieve 5 days  -MS      Gait Training Goal PT LTG, [place] Level independent  -MS      Gait Training Goal PT LTG, Distance to Achieve 350 feet  -MS        User Key  (r) = Recorded By, (t) = Taken By, (c) = Cosigned By    Initials Name Provider Type    MS Flores ALONDRA Marvel, PT Physical Therapist                Outcome Measures       09/15/17 1000          How much help from another person do you currently need...    Turning from your back to your side while in flat bed without using bedrails? 3  -MS      Moving from lying on back to sitting on the side of a flat bed without bedrails? 3  -MS      Moving to and from a bed to a chair (including a wheelchair)? 3  -MS      Standing up from a  chair using your arms (e.g., wheelchair, bedside chair)? 3  -MS      Climbing 3-5 steps with a railing? 3  -MS      To walk in hospital room? 3  -MS      AM-PAC 6 Clicks Score 18  -MS      Functional Assessment    Outcome Measure Options AM-PAC 6 Clicks Basic Mobility (PT)  -MS        User Key  (r) = Recorded By, (t) = Taken By, (c) = Cosigned By    Initials Name Provider Type    MS Mark Grier, PT Physical Therapist           Time Calculation:         PT Charges       09/15/17 1017          Time Calculation    Start Time 0928  -MS      Stop Time 0942  -MS      Time Calculation (min) 14 min  -MS      PT Received On 09/15/17  -MS      PT - Next Appointment 09/16/17  -MS      PT Goal Re-Cert Due Date 09/20/17  -MS        User Key  (r) = Recorded By, (t) = Taken By, (c) = Cosigned By    Initials Name Provider Type    MS Mark Grier, PT Physical Therapist          Therapy Charges for Today     Code Description Service Date Service Provider Modifiers Qty    56595153383 HC PT EVAL MOD COMPLEXITY 2 9/15/2017 Mark Grier, PT GP 1    77209294481 HC PT THER PROC EA 15 MIN 9/15/2017 Mark Grier, PT GP 1          PT G-Codes  Outcome Measure Options: AM-PAC 6 Clicks Basic Mobility (PT)      Mark Grier, PT  9/15/2017

## 2017-09-15 NOTE — CONSULTS
Patient Identification:  NAME:  Kimberlee Urrutia  Age:  77 y.o.   Sex:  female   :  1940   MRN:  [id number]       Chief complaint: Does not have one, reason for consult confusion    History of present illness:  This patient is a 77-year-old right-handed white female with past history of migraine headaches hyperlipidemia osteoarthritis diarrhea cough arthritis who comes into the hospital after she had an episode he yesterday lasting about 1-1/2 hours she is amnestic for this event she tells me that people say she repeated herself over and over but I do not run into any one who can guarantee that.  It should be noted that she is amnestic for it but seemed to come around last night in the hospital better remembers things and then developed a headache whole head token location dull in quality context patient had a previous history of migraine headaches she has had a CT of her head and CTAs that are all within normal limits by my independent eyeball review  I have reviewed the MRI of the brain with and independent eyeball review and it really looks normal to me there is a questionable area of tiny hemosiderin deposit but clearly nothing acute in this patient's MRI.    Past medical history:  Past Medical History:   Diagnosis Date   • Arthritis    • Cough    • Diarrhea    • Hx of migraine headaches    • Hyperlipidemia    • Irregular heart beat    • Osteoarthritis    • Vitamin D deficiency        Past surgical history:  Past Surgical History:   Procedure Laterality Date   • COLONOSCOPY     • REPLACEMENT TOTAL KNEE Right    • WISDOM TOOTH EXTRACTION         Allergies:  Penicillins and Sulfa antibiotics    Home medications:  Prescriptions Prior to Admission   Medication Sig Dispense Refill Last Dose   • ciprofloxacin (CIPRO) 250 MG tablet Take 1 tablet by mouth 2 (Two) Times a Day. 14 tablet 0    • fluticasone (FLONASE) 50 MCG/ACT nasal spray USE 2 SPRAYS IN EACH NOSTRIL ONCE DAILY 1 bottle 5 Not Taking   •  loratadine (CLARITIN) 10 MG tablet Take  by mouth daily.   Not Taking   • Multiple Vitamins-Minerals (MULTI COMPLETE PO) Take  by mouth daily.   Not Taking        Hospital medications:    aspirin 325 mg Oral Daily   Or      aspirin 300 mg Rectal Daily   atorvastatin 80 mg Oral Nightly   ceftriaxone 1 g Intravenous Q24H       sodium chloride 100 mL/hr Last Rate: 100 mL/hr (09/15/17 1430)     •  acetaminophen **OR** acetaminophen  •  acetaminophen  •  ondansetron  •  sodium chloride  •  sodium chloride    Family history:  Family History   Problem Relation Age of Onset   • Heart disease Mother    • No Known Problems Father    • Cancer Sister        Social history:  Social History   Substance Use Topics   • Smoking status: Never Smoker   • Smokeless tobacco: Never Used   • Alcohol use No       Review of systems:    She remembers getting a headache last night but is amnestic for really what happened yesterday.  She denies hair eyes ears nose throat skin bone joint  lymphatic hematologic or oncologic complaints no neck pain chest pain abdominal pain bowel bladder incontinence fever chills or rash    Objective:  Vitals Ranges:   Temp:  [97.6 °F (36.4 °C)-99 °F (37.2 °C)] 98.1 °F (36.7 °C)  Heart Rate:  [71-90] 71  Resp:  [16-20] 18  BP: (103-152)/(57-80) 114/60      Physical Exam:He is awake alert and oriented ×3 she is a little amnestic for some of the things that went on yesterday fund of knowledge therefore okay are recent remote memory fair except for the event and of knowledge otherwise is okay attention span and concentration good language function normal well-developed well-nourished in no distress visual acuity normal at 3 feet pupils 1-1/2 minimal reaction normal disc retinas visual fields extraocular movements full without nystagmus nasolabial folds palate tongue symmetrical normal hearing facial sensation head turning shoulder shrug motor not the best effort but 5 minus out of 5 times four extremities no  atrophy fasciculations rigidity bradykinesia resting tremor reflexes trace throughout symmetrical toes downgoing bilaterally sensation normal light touch base both arms and both legs coordination normal in the upper externally station and gait was not tested heart regular without murmur neck supple without bruits    Results review:   I reviewed the patient's new clinical results.    Data review:  Lab Results (last 24 hours)     Procedure Component Value Units Date/Time    POC Glucose Fingerstick [[id number]]  (Normal) Collected:  09/14/17 2000    Specimen:  Blood Updated:  09/14/17 2013     Glucose 111 mg/dL     Narrative:       Meter: [id number] : [id number] Jena Rothman    Protime-INR [[id number]]  (Abnormal) Collected:  09/14/17 2018    Specimen:  Blood Updated:  09/14/17 2037     Protime 15.2 (H) Seconds      INR 1.24 (H)    aPTT [[id number]]  (Normal) Collected:  09/14/17 2018    Specimen:  Blood Updated:  09/14/17 2037     PTT 29.2 seconds     CBC & Differential [[id number]] Collected:  09/14/17 2019    Specimen:  Blood Updated:  09/14/17 2042    Narrative:       The following orders were created for panel order CBC & Differential.  Procedure                               Abnormality         Status                     ---------                               -----------         ------                     Manual Differential[[id number]]          Abnormal            Final result               Scan Slide[[id number]]                                       Final result               CBC Auto Differential[[id number]]        Abnormal            Final result                 Please view results for these tests on the individual orders.    CBC Auto Differential [[id number]]  (Abnormal) Collected:  09/14/17 2019    Specimen:  Blood Updated:  09/14/17 2042     WBC 32.16 (C) 10*3/mm3      RBC 4.92 10*6/mm3      Hemoglobin 14.0 g/dL      Hematocrit 41.7 %      MCV 84.8 fL      MCH 28.5 pg      MCHC 33.6 g/dL      RDW 13.6 (H)  %      RDW-SD 42.3 fl      MPV 11.1 fL      Platelets 146 10*3/mm3     Troponin [[id number]]  (Normal) Collected:  09/14/17 2019    Specimen:  Blood Updated:  09/14/17 2042     Troponin T <0.010 ng/mL     Narrative:       Troponin T Reference Ranges:  Less than 0.03 ng/mL:    Negative for AMI  0.03 to 0.09 ng/mL:      Indeterminant for AMI  Greater than 0.09 ng/mL: Positive for AMI    Scan Slide [[id number]] Collected:  09/14/17 2019    Specimen:  Blood Updated:  09/14/17 2042     Scan Slide --      See Manual Differential Results       Manual Differential [[id number]]  (Abnormal) Collected:  09/14/17 2019    Specimen:  Blood Updated:  09/14/17 2042     Neutrophil % 89.0 (H) %       3+ bands noted        Lymphocyte % 1.0 (L) %      Monocyte % 5.0 %      Metamyelocyte % 5.0 (H) %      Neutrophils Absolute 28.62 (H) 10*3/mm3      Lymphocytes Absolute 0.32 (L) 10*3/mm3      Monocytes Absolute 1.61 (H) 10*3/mm3      RBC Morphology Normal     Vacuolated Neutrophils Mod/2+     Clumped Platelets Present    Urinalysis With / Culture If Indicated [[id number]]  (Abnormal) Collected:  09/14/17 2113    Specimen:  Urine from Urine, Catheter Updated:  09/14/17 2129     Color, UA Yellow     Appearance, UA Cloudy (A)     pH, UA 6.0     Specific Gravity, UA 1.019     Glucose, UA Negative     Ketones, UA 15 mg/dL (1+) (A)     Bilirubin, UA Negative     Blood, UA Trace (A)     Protein, UA 30 mg/dL (1+) (A)     Leuk Esterase, UA Moderate (2+) (A)     Nitrite, UA Negative     Urobilinogen, UA 0.2 E.U./dL    Urinalysis, Microscopic Only [[id number]]  (Abnormal) Collected:  09/14/17 2113    Specimen:  Urine from Urine, Catheter Updated:  09/14/17 2133     RBC, UA 3-5 (A) /HPF      WBC, UA 21-30 (A) /HPF      Bacteria, UA None Seen /HPF      Squamous Epithelial Cells, UA 0-2 /HPF      Hyaline Casts, UA 3-6 /LPF      Methodology Automated Microscopy    Lactic Acid, Plasma [[id number]]  (Normal) Collected:  09/14/17 2112    Specimen:  Blood  Updated:  09/14/17 2139     Lactate 1.5 mmol/L     Lavender Top [[id number]] Collected:  09/14/17 2019    Specimen:  Blood Updated:  09/14/17 2201     Extra Tube hold for add-on      Auto resulted       Gold Top - SST [[id number]] Collected:  09/14/17 2019    Specimen:  Blood Updated:  09/14/17 2201     Extra Tube Hold for add-ons.      Auto resulted.       Light Blue Top [[id number]] Collected:  09/14/17 2019    Specimen:  Blood Updated:  09/14/17 2201     Extra Tube hold for add-on      Auto resulted       [place] Draw [[id number]] Collected:  09/14/17 2019    Specimen:  Blood Updated:  09/14/17 2253    Narrative:       The following orders were created for panel order [place] Draw.  Procedure                               Abnormality         Status                     ---------                               -----------         ------                     Light Blue Top[[id number]]                                                              Green Top (Gel)[[id number]]                                                             Lavender Top[[id number]]                                     Final result               Gold Top - SST[[id number]]                                   Final result                 Please view results for these tests on the individual orders.    [place] Draw [[id number]] Collected:  09/14/17 2019    Specimen:  Blood Updated:  09/14/17 2253    Narrative:       The following orders were created for panel order [place] Draw.  Procedure                               Abnormality         Status                     ---------                               -----------         ------                     Light Blue Top[[id number]]                                   Final result               Green Top (Gel)[[id number]]                                                             Lavender Top[[id number]]                                                                Gold Top - SST[[id number]]                                                                 Please view results for these tests on the individual orders.    POC Glucose Fingerstick [[id number]]  (Normal) Collected:  09/15/17 0021    Specimen:  Blood Updated:  09/15/17 0024     Glucose 90 mg/dL     Narrative:       Meter: [id number] : [id number] [employer]    POC Glucose Fingerstick [[id number]]  (Normal) Collected:  09/15/17 0535    Specimen:  Blood Updated:  09/15/17 0537     Glucose 75 mg/dL     Narrative:       Meter: [id number] : [id number] [employer]    Hemoglobin A1c [[id number]]  (Normal) Collected:  09/15/17 0513    Specimen:  Blood Updated:  09/15/17 0545     Hemoglobin A1C 5.35 %     Narrative:       Hemoglobin A1C Ranges:    Increased Risk for Diabetes  5.7% to 6.4%  Diabetes                     >= 6.5%  Diabetic Goal                < 7.0%    CBC Auto Differential [[id number]]  (Abnormal) Collected:  09/15/17 0513    Specimen:  Blood Updated:  09/15/17 0556     WBC 29.09 (H) 10*3/mm3      RBC 4.12 10*6/mm3      Hemoglobin 12.0 g/dL      Hematocrit 35.5 (L) %      MCV 86.2 fL      MCH 29.1 pg      MCHC 33.8 g/dL      RDW 13.6 (H) %      RDW-SD 42.8 fl      MPV 11.2 fL      Platelets 142 10*3/mm3      Neutrophil % 87.9 (H) %      Lymphocyte % 5.0 (L) %      Monocyte % 5.8 %      Eosinophil % 0.0 (L) %      Basophil % 0.0 %      Immature Grans % 1.3 (H) %      Neutrophils, Absolute 25.56 (H) 10*3/mm3      Lymphocytes, Absolute 1.45 10*3/mm3      Monocytes, Absolute 1.68 (H) 10*3/mm3      Eosinophils, Absolute 0.00 10*3/mm3      Basophils, Absolute 0.01 10*3/mm3      Immature Grans, Absolute 0.39 (H) 10*3/mm3     CBC & Differential [[id number]] Collected:  09/15/17 0513    Specimen:  Blood Updated:  09/15/17 0556    Narrative:       The following orders were created for panel order CBC & Differential.  Procedure                               Abnormality         Status                     ---------                               -----------         ------                      Scan Slide[[id number]]                                                                  CBC Auto Differential[[id number]]        Abnormal            Final result                 Please view results for these tests on the individual orders.    Comprehensive Metabolic Panel [[id number]]  (Abnormal) Collected:  09/15/17 0513    Specimen:  Blood Updated:  09/15/17 0603     Glucose 89 mg/dL      BUN 20 mg/dL      Creatinine 0.59 mg/dL      Sodium 142 mmol/L      Potassium 3.9 mmol/L      Chloride 108 (H) mmol/L      CO2 19.8 (L) mmol/L      Calcium 9.9 mg/dL      Total Protein 6.0 g/dL      Albumin 3.00 (L) g/dL      ALT (SGPT) 13 U/L      AST (SGOT) 11 U/L      Alkaline Phosphatase 51 U/L      Total Bilirubin 0.3 mg/dL      eGFR Non African Amer 99 mL/min/1.73      Globulin 3.0 gm/dL      A/G Ratio 1.0 g/dL      BUN/Creatinine Ratio 33.9 (H)     Anion Gap 14.2 mmol/L     Narrative:       The MDRD GFR formula is only valid for adults with stable renal function between ages 18 and 70.    Lipid Panel [[id number]]  (Abnormal) Collected:  09/15/17 0513    Specimen:  Blood Updated:  09/15/17 0603     Total Cholesterol 119 mg/dL      Triglycerides 100 mg/dL      HDL Cholesterol 30 (L) mg/dL      LDL Cholesterol  69 mg/dL      VLDL Cholesterol 20 mg/dL      LDL/HDL Ratio 2.30    Narrative:       Cholesterol Reference Ranges  (U.S. Department of Health and Human Services ATP III Classifications)    Desirable          <200 mg/dL  Borderline High    200-239 mg/dL  High Risk          >240 mg/dL      Triglyceride Reference Ranges  (U.S. Department of Health and Human Services ATP III Classifications)    Normal           <150 mg/dL  Borderline High  150-199 mg/dL  High             200-499 mg/dL  Very High        >500 mg/dL    HDL Reference Ranges  (U.S. Department of Health and Human Services ATP III Classifcations)    Low     <40 mg/dl (major risk factor for CHD)  High    >60 mg/dl ('negative' risk factor for  CHD)        LDL Reference Ranges  (U.S. Department of Health and Human Services ATP III Classifcations)    Optimal          <100 mg/dL  Near Optimal     100-129 mg/dL  Borderline High  130-159 mg/dL  High             160-189 mg/dL  Very High        >189 mg/dL    TSH [[id number]]  (Normal) Collected:  09/15/17 0513    Specimen:  Blood Updated:  09/15/17 0612     TSH 1.440 mIU/mL     Vitamin B12 [[id number]]  (Normal) Collected:  09/15/17 0513    Specimen:  Blood Updated:  09/15/17 0621     Vitamin B-12 712 pg/mL     Urine Culture [[id number]]  (Normal) Collected:  09/14/17 2113    Specimen:  Urine from Urine, Catheter Updated:  09/15/17 0951     Urine Culture Culture in progress    Blood Culture [[id number]]  (Normal) Collected:  09/14/17 2111    Specimen:  Blood from Arm, Left Updated:  09/15/17 1001     Blood Culture No growth at less than 24 hours    Blood Culture [[id number]]  (Normal) Collected:  09/14/17 2110    Specimen:  Blood from Arm, Right Updated:  09/15/17 1001     Blood Culture No growth at less than 24 hours           Imaging:  Imaging Results (last 24 hours)     Procedure Component Value Units Date/Time    CT Cerebral Perfusion With & Without Contrast [[id number]] Collected:  09/14/17 2044     Updated:  09/14/17 2106    Narrative:       CT ANGIOGRAM HEAD W WO CONTRAST-, CT CEREBRAL PERFUSION W WO CONTRAST-,  CT ANGIOGRAM NECK W WO CONTRAST-     CT ANGIOGRAM HEAD AND NECK AND CT PERFUSION STUDY     CLINICAL HISTORY: Headache, difficulty speaking.     Radiation dose reduction techniques were utilized, including automated  exposure control and exposure modulation based on body size. CT scan of  the head was obtained with 3 mm axial images without the use  of IV contrast. The patient underwent a CT  perfusion study with a dynamic bolus of IV contrast. Standard perfusion  maps were constructed. The patient then underwent a CT angiogram of the  head and neck with 1 mm axial images following the administration of  IV  contrast. Sagittal, coronal, and 3-dimensional reconstructed images were  obtained.  Percent stenosis was assessed in accordance with NASCET  criteria.     FINDINGS:     NONCONTRAST HEAD CT: On the noncontrast head CT, gray-white matter  differentiation is preserved, and no acute hemorrhage or hydrocephalus  is identified. Minimal partial opacification of inferior left mastoid  air cells.           CT PERFUSION STUDY:  No CBF (under 30%) deficit or perfusion abnormality  is demonstrated where the T MAX is greater than 6 seconds. The  calculated mismatch volume was 0 cc.     CTA HEAD and neck: The CT angiogram of the head and neck demonstrates no  hemodynamically significant focal stenosis, aneurysm, or dissection in  the cervical carotid or vertebral arteries, or in the arteries at the  base of the brain. Developmentally hypoplastic left A1 segment is noted.     No enhancing lesion of the brain postcontrast images.     Multilevel cervical spondyloarthropathy is present, predominantly at the  lower cervical levels.     Thyroid nodules bilaterally, right more than left, follow-up evaluation  thyroid ultrasound is suggested. Aortic calcification is seen.       Impression:             1. No perfusion abnormality to suggest acute or threatened infarct. If  further evaluation is indicated, MRI could be considered.  2. No arterial cutoff or hemodynamically significant focal stenosis in  the cervical carotid or vertebral arteries, or in the arteries at the  base of the brain.  3. No acute hemorrhage or hydrocephalus. No enhancing lesion of brain.  4. Thyroid nodules.                    Discussed by telephone with Dr. Taylor at time of interpretation,  2030 and 2050, and Dr. Jacome at 2055, 09/14/2017.     This report was finalized on 9/14/2017 9:03 PM by Dr. Jeffry Mantilla MD.       CT Angiogram Neck With & Without Contrast [[id number]] Collected:  09/14/17 2044     Updated:  09/14/17 2106    Narrative:        CT ANGIOGRAM HEAD W WO CONTRAST-, CT CEREBRAL PERFUSION W WO CONTRAST-,  CT ANGIOGRAM NECK W WO CONTRAST-     CT ANGIOGRAM HEAD AND NECK AND CT PERFUSION STUDY     CLINICAL HISTORY: Headache, difficulty speaking.     Radiation dose reduction techniques were utilized, including automated  exposure control and exposure modulation based on body size. CT scan of  the head was obtained with 3 mm axial images without the use  of IV contrast. The patient underwent a CT  perfusion study with a dynamic bolus of IV contrast. Standard perfusion  maps were constructed. The patient then underwent a CT angiogram of the  head and neck with 1 mm axial images following the administration of IV  contrast. Sagittal, coronal, and 3-dimensional reconstructed images were  obtained.  Percent stenosis was assessed in accordance with NASCET  criteria.     FINDINGS:     NONCONTRAST HEAD CT: On the noncontrast head CT, gray-white matter  differentiation is preserved, and no acute hemorrhage or hydrocephalus  is identified. Minimal partial opacification of inferior left mastoid  air cells.           CT PERFUSION STUDY:  No CBF (under 30%) deficit or perfusion abnormality  is demonstrated where the T MAX is greater than 6 seconds. The  calculated mismatch volume was 0 cc.     CTA HEAD and neck: The CT angiogram of the head and neck demonstrates no  hemodynamically significant focal stenosis, aneurysm, or dissection in  the cervical carotid or vertebral arteries, or in the arteries at the  base of the brain. Developmentally hypoplastic left A1 segment is noted.     No enhancing lesion of the brain postcontrast images.     Multilevel cervical spondyloarthropathy is present, predominantly at the  lower cervical levels.     Thyroid nodules bilaterally, right more than left, follow-up evaluation  thyroid ultrasound is suggested. Aortic calcification is seen.       Impression:             1. No perfusion abnormality to suggest acute or threatened  infarct. If  further evaluation is indicated, MRI could be considered.  2. No arterial cutoff or hemodynamically significant focal stenosis in  the cervical carotid or vertebral arteries, or in the arteries at the  base of the brain.  3. No acute hemorrhage or hydrocephalus. No enhancing lesion of brain.  4. Thyroid nodules.                    Discussed by telephone with Dr. Taylor at time of interpretation,  2030 and 2050, and Dr. Jacome at 2055, 09/14/2017.     This report was finalized on 9/14/2017 9:03 PM by Dr. Jeffry Mantilla MD.       CT Angiogram Head With & Without Contrast [[id number]] Collected:  09/14/17 2044     Updated:  09/14/17 2106    Narrative:       CT ANGIOGRAM HEAD W WO CONTRAST-, CT CEREBRAL PERFUSION W WO CONTRAST-,  CT ANGIOGRAM NECK W WO CONTRAST-     CT ANGIOGRAM HEAD AND NECK AND CT PERFUSION STUDY     CLINICAL HISTORY: Headache, difficulty speaking.     Radiation dose reduction techniques were utilized, including automated  exposure control and exposure modulation based on body size. CT scan of  the head was obtained with 3 mm axial images without the use  of IV contrast. The patient underwent a CT  perfusion study with a dynamic bolus of IV contrast. Standard perfusion  maps were constructed. The patient then underwent a CT angiogram of the  head and neck with 1 mm axial images following the administration of IV  contrast. Sagittal, coronal, and 3-dimensional reconstructed images were  obtained.  Percent stenosis was assessed in accordance with NASCET  criteria.     FINDINGS:     NONCONTRAST HEAD CT: On the noncontrast head CT, gray-white matter  differentiation is preserved, and no acute hemorrhage or hydrocephalus  is identified. Minimal partial opacification of inferior left mastoid  air cells.           CT PERFUSION STUDY:  No CBF (under 30%) deficit or perfusion abnormality  is demonstrated where the T MAX is greater than 6 seconds. The  calculated mismatch volume was 0  cc.     CTA HEAD and neck: The CT angiogram of the head and neck demonstrates no  hemodynamically significant focal stenosis, aneurysm, or dissection in  the cervical carotid or vertebral arteries, or in the arteries at the  base of the brain. Developmentally hypoplastic left A1 segment is noted.     No enhancing lesion of the brain postcontrast images.     Multilevel cervical spondyloarthropathy is present, predominantly at the  lower cervical levels.     Thyroid nodules bilaterally, right more than left, follow-up evaluation  thyroid ultrasound is suggested. Aortic calcification is seen.       Impression:             1. No perfusion abnormality to suggest acute or threatened infarct. If  further evaluation is indicated, MRI could be considered.  2. No arterial cutoff or hemodynamically significant focal stenosis in  the cervical carotid or vertebral arteries, or in the arteries at the  base of the brain.  3. No acute hemorrhage or hydrocephalus. No enhancing lesion of brain.  4. Thyroid nodules.                    Discussed by telephone with Dr. Taylor at time of interpretation,  2030 and 2050, and Dr. Jacome at 2055, 09/14/2017.     This report was finalized on 9/14/2017 9:03 PM by Dr. Jeffry Mantilla MD.       XR Chest 1 View [[id number]] Collected:  09/14/17 2125     Updated:  09/14/17 2129    Narrative:       PORTABLE CHEST X-RAY     CLINICAL HISTORY: Acute Stroke Protocol (onset < 12 hrs)     COMPARISON:  03/09/2013.     FINDINGS:  Single portable view of the chest obtained.  There are  various overlying monitor leads/artifacts in place. The lungs are well  expanded and clear where they can be visualized. There are some  scattered fibrotic changes which are felt to be stable and chronic.  Cardiac size is within normal limits.  Vascularity is normal considering  technique.  No pleural fluid is demonstrated by portable imaging.               Impression:          No active disease by portable  imaging.     This report was finalized on 9/14/2017 9:26 PM by Mark Powers MD.       MRI Brain Without Contrast [[id number]] Collected:  09/15/17 0906     Updated:  09/15/17 0906    Narrative:       MRI EXAMINATION OF THE BRAIN WITHOUT CONTRAST     HISTORY: Stroke, headache, confusion.     COMPARISON: CT angiogram 09/14/2017.     FINDINGS:     The lack of contrast reduces the sensitivity of the study.     There is no evidence of restricted diffusion to suggest acute  infarction. The brain ventricles are symmetrical. There is a mild degree  of increased signal intensity involving the white matter of the cerebral  hemispheres bilaterally on the axial T2 FLAIR sequence, nonspecific but  suggesting mild small vessel ischemic disease. There is no evidence of  mass or of mass effect on this noncontrasted MRI examination of the  brain. The gradient echo T2 sequence demonstrates a punctate area of  signal loss suggesting hemosiderin deposition related to the head of the  caudate nucleus on the right and there is T2 FLAIR hyperintensity  involving the head of the caudate nucleus suggesting a remote vascular  insult.     There is a small volume of fluid present within the mastoid air cells on  the left.       Impression:       The lack of contrast reduces the sensitivity of the study.  Mild small vessel ischemic disease. No evidence of acute infarction.  Small focus of hemosiderin deposition suggesting a punctate area of  remote hemorrhage involving the head of the caudate nucleus on the right  with adjacent T2 FLAIR hyperintensity involving the head of the caudate  nucleus, nonspecific but likely representing gliosis. Should prior MRI  examinations of the brain be made available for comparison, I would be  more than happy to compare the studies and to generate a supplemental  report.       CT Abdomen Pelvis Stone Protocol [[id number]] Collected:  09/15/17 0837     Updated:  09/15/17 1210    Narrative:       CT ABDOMEN AND  PELVIS WITHOUT CONTRAST     HISTORY: UTI and back pain.     TECHNIQUE: Axial CT images of the abdomen and pelvis were obtained  without administration of intravenous contrast. The patient was not  given oral contrast. Coronal and sagittal reformats were obtained.     COMPARISON: None.     FINDINGS: Contrast is seen within the bilateral renal collecting systems  and the urinary bladder secondary to the contrast enhanced CT exam  performed yesterday. Bilateral adrenal glands are normal. No definite  renal calculi or hydronephrosis is present. There is a partly exophytic  cyst at the superior pole of the left kidney measuring 2.8 cm. A small  amount of contrast is seen within the right lower pole cortex medially  which may be related to increased permeability. Bilateral ureters  demonstrate normal caliber.     The liver, spleen and pancreas are normal. Mild vicarious excretion of  contrast is seen within the gallbladder. The small and large bowel loops  demonstrate normal caliber. Diverticulosis is present. The appendix is  normal. The uterus is anteverted and mildly heterogeneous. No abnormal  adnexal mass is seen. No pathological retroperitoneal adenopathy.  Left  lung base atelectasis is seen.       Impression:       1. Contrast within the renal collecting systems from the CT performed  yesterday. No renal calculi or hydronephrosis is seen.  2. Colonic diverticulosis.     This report was finalized on 9/15/2017 12:07 PM by Dr. Nancy Hussein MD.                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  I do not believe this patient suffered a stroke TIA or seizure syndrome.      William García MD  09/15/17  2:37 PM

## 2017-09-15 NOTE — H&P
HISTORY AND PHYSICAL   [facility]        Patient Identification:  Name: Kimberlee Urrutia  Age: 77 y.o.  Sex: female  :  1940  MRN: [id number]                     Primary Care Physician: Charbel Morrison MD    Chief Complaint:  Weakness and confusion    History of Present Illness:         The patient is 69-year-old Afro-American female with history of arthritis, migraine headaches, hyperlipidemia, irregular heartbeats, and osteoarthritis who is admitted with history of having some confusion after she returned from her trip to [place].  She had a headache and some back pain.  She gone to her doctor and was noted to have elevated white count and urinalysis showing changes consistent with UTI.  She had had some nausea and vomiting.  The patient was brought to ER with possible concern of stroke with her mental status change.  CT angios of head was negative and patient did have changes of UTI and elevated white count around 30,000.  She was started on IV antibiotics and admitted for further evaluation.  She's not had any fever but has had some chills and sweats.  She's been very weak but did not fall down.    Past Medical History:  Past Medical History:   Diagnosis Date   • Arthritis    • Cough    • Diarrhea    • Hx of migraine headaches    • Hyperlipidemia    • Irregular heart beat    • Osteoarthritis    • Vitamin D deficiency      Past Surgical History:  Past Surgical History:   Procedure Laterality Date   • COLONOSCOPY     • REPLACEMENT TOTAL KNEE Right    • WISDOM TOOTH EXTRACTION        Home Meds:  Prescriptions Prior to Admission   Medication Sig Dispense Refill Last Dose   • ciprofloxacin (CIPRO) 250 MG tablet Take 1 tablet by mouth 2 (Two) Times a Day. 14 tablet 0    • fluticasone (FLONASE) 50 MCG/ACT nasal spray USE 2 SPRAYS IN EACH NOSTRIL ONCE DAILY 1 bottle 5 Not Taking   • loratadine (CLARITIN) 10 MG tablet Take  by mouth daily.   Not Taking   • Multiple Vitamins-Minerals (MULTI COMPLETE  "PO) Take  by mouth daily.   Not Taking       Allergies:  Allergies   Allergen Reactions   • Penicillins    • Sulfa Antibiotics Rash     Immunizations:  Immunization History   Administered Date(s) Administered   • Influenza, Quadrivalent 2016, 2017     Social History:   Social History     Social History Narrative     Social History     Social History   • Marital status: Single     Spouse name: N/A   • Number of children: N/A   • Years of education: N/A     Occupational History   • Not on file.     Social History Main Topics   • Smoking status: Never Smoker   • Smokeless tobacco: Never Used   • Alcohol use No   • Drug use: No   • Sexual activity: Defer     Other Topics Concern   • Not on file     Social History Narrative       Family History:  Family History   Problem Relation Age of Onset   • Heart disease Mother    • No Known Problems Father    • Cancer Sister         Review of Systems  See history of present illness and past medical history.  Patient denies  dizziness, syncope, falls, trauma, change in vision, change in hearing, change in taste, changes in weight, changes in appetite, focal weakness, numbness, or paresthesia.  Patient denies chest pain, palpitations, dyspnea, orthopnea, PND, cough, sinus pressure, rhinorrhea, epistaxis, hemoptysis, hematemesis, diarrhea, constipation or hematchezia.  Denies cold or heat intolerance, polydipsia, polyuria, polyphagia. Denies hematuria, pyuria, dysuria, hesitancy, frequency or urgency.   Denies fever,    Remainder of ROS is negative.    Objective:  tMax 24 hrs: Temp (24hrs), Av.9 °F (36.6 °C), Min:97.6 °F (36.4 °C), Max:98 °F (36.7 °C)    Vitals Ranges:   Temp:  [97.6 °F (36.4 °C)-98 °F (36.7 °C)] 98 °F (36.7 °C)  Heart Rate:  [80-90] 84  Resp:  [16-18] 18  BP: (103-152)/(64-80) 121/69      Exam:  /69 (BP Location: Right arm, Patient Position: Lying)  Pulse 84  Temp 98 °F (36.7 °C) (Oral)   Resp 18  Ht 64\" (162.6 cm)  Wt 155 lb 12.8 oz (70.7 " kg)  SpO2 97%  BMI 26.74 kg/m2    General Appearance:    Alert, cooperative, no distress, appears stated age, confused , confused    Head:    Normocephalic, without obvious abnormality, atraumatic   Eyes:    PERRL, conjunctiva/corneas clear, EOM's intact, both eyes   Ears:    Normal external ear canals, both ears   Nose:   Nares normal, septum midline, mucosa normal, no drainage    or sinus tenderness   Throat:   Lips, mucosa, and tongue normal   Neck:   Supple, symmetrical, trachea midline, no adenopathy;     thyroid:  no enlargement/tenderness/nodules; no carotid    bruit or JVD   Back:     Symmetric, no curvature, ROM normal, no CVA tenderness   Lungs:     Clear to auscultation bilaterally, respirations unlabored   Chest Wall:    No tenderness or deformity    Heart:    Regular rate and rhythm, S1 and S2 normal, no murmur, rub   or gallop   Abdomen:     Soft, non-tender, bowel sounds active all four quadrants,     no masses, no hepatomegaly, no splenomegaly   Extremities:   Extremities normal, atraumatic, no cyanosis or edema   Pulses:   2+ and symmetric all extremities   Skin:   Skin color, texture, turgor normal, no rashes or lesions   Lymph nodes:   Cervical, supraclavicular, and axillary nodes normal   Neurologic:   CNII-XII intact, normal strength, sensation intact throughout, confused       .    Data Review:  Lab Results (last 72 hours)     Procedure Component Value Units Date/Time    POC Glucose Fingerstick [[id number]]  (Normal) Collected:  09/14/17 2000    Specimen:  Blood Updated:  09/14/17 2013     Glucose 111 mg/dL     Narrative:       Meter: [id number] : [id number] Jena Rothman    Protime-INR [[id number]]  (Abnormal) Collected:  09/14/17 2018    Specimen:  Blood Updated:  09/14/17 2037     Protime 15.2 (H) Seconds      INR 1.24 (H)    aPTT [[id number]]  (Normal) Collected:  09/14/17 2018    Specimen:  Blood Updated:  09/14/17 2037     PTT 29.2 seconds     CBC & Differential [[id number]]  Collected:  09/14/17 2019    Specimen:  Blood Updated:  09/14/17 2042    Narrative:       The following orders were created for panel order CBC & Differential.  Procedure                               Abnormality         Status                     ---------                               -----------         ------                     Manual Differential[[id number]]          Abnormal            Final result               Scan Slide[[id number]]                                       Final result               CBC Auto Differential[[id number]]        Abnormal            Final result                 Please view results for these tests on the individual orders.    CBC Auto Differential [[id number]]  (Abnormal) Collected:  09/14/17 2019    Specimen:  Blood Updated:  09/14/17 2042     WBC 32.16 (C) 10*3/mm3      RBC 4.92 10*6/mm3      Hemoglobin 14.0 g/dL      Hematocrit 41.7 %      MCV 84.8 fL      MCH 28.5 pg      MCHC 33.6 g/dL      RDW 13.6 (H) %      RDW-SD 42.3 fl      MPV 11.1 fL      Platelets 146 10*3/mm3     Troponin [[id number]]  (Normal) Collected:  09/14/17 2019    Specimen:  Blood Updated:  09/14/17 2042     Troponin T <0.010 ng/mL     Narrative:       Troponin T Reference Ranges:  Less than 0.03 ng/mL:    Negative for AMI  0.03 to 0.09 ng/mL:      Indeterminant for AMI  Greater than 0.09 ng/mL: Positive for AMI    Scan Slide [[id number]] Collected:  09/14/17 2019    Specimen:  Blood Updated:  09/14/17 2042     Scan Slide --      See Manual Differential Results       Manual Differential [[id number]]  (Abnormal) Collected:  09/14/17 2019    Specimen:  Blood Updated:  09/14/17 2042     Neutrophil % 89.0 (H) %       3+ bands noted        Lymphocyte % 1.0 (L) %      Monocyte % 5.0 %      Metamyelocyte % 5.0 (H) %      Neutrophils Absolute 28.62 (H) 10*3/mm3      Lymphocytes Absolute 0.32 (L) 10*3/mm3      Monocytes Absolute 1.61 (H) 10*3/mm3      RBC Morphology Normal     Vacuolated Neutrophils Mod/2+     Clumped  Platelets Present    Blood Culture [[id number]] Collected:  09/14/17 2110    Specimen:  Blood from Arm, Right Updated:  09/14/17 2118    Blood Culture [[id number]] Collected:  09/14/17 2111    Specimen:  Blood from Arm, Left Updated:  09/14/17 2118    Urine Culture [[id number]] Collected:  09/14/17 2113    Specimen:  Urine from Urine, Catheter Updated:  09/14/17 2125    Urinalysis With / Culture If Indicated [[id number]]  (Abnormal) Collected:  09/14/17 2113    Specimen:  Urine from Urine, Catheter Updated:  09/14/17 2129     Color, UA Yellow     Appearance, UA Cloudy (A)     pH, UA 6.0     Specific Gravity, UA 1.019     Glucose, UA Negative     Ketones, UA 15 mg/dL (1+) (A)     Bilirubin, UA Negative     Blood, UA Trace (A)     Protein, UA 30 mg/dL (1+) (A)     Leuk Esterase, UA Moderate (2+) (A)     Nitrite, UA Negative     Urobilinogen, UA 0.2 E.U./dL    Urinalysis, Microscopic Only [[id number]]  (Abnormal) Collected:  09/14/17 2113    Specimen:  Urine from Urine, Catheter Updated:  09/14/17 2133     RBC, UA 3-5 (A) /HPF      WBC, UA 21-30 (A) /HPF      Bacteria, UA None Seen /HPF      Squamous Epithelial Cells, UA 0-2 /HPF      Hyaline Casts, UA 3-6 /LPF      Methodology Automated Microscopy    Lactic Acid, Plasma [[id number]]  (Normal) Collected:  09/14/17 2112    Specimen:  Blood Updated:  09/14/17 2139     Lactate 1.5 mmol/L     Lavender Top [[id number]] Collected:  09/14/17 2019    Specimen:  Blood Updated:  09/14/17 2201     Extra Tube hold for add-on      Auto resulted       Gold Top - SST [[id number]] Collected:  09/14/17 2019    Specimen:  Blood Updated:  09/14/17 2201     Extra Tube Hold for add-ons.      Auto resulted.       Light Blue Top [[id number]] Collected:  09/14/17 2019    Specimen:  Blood Updated:  09/14/17 2201     Extra Tube hold for add-on      Auto resulted       [place] Draw [[id number]] Collected:  09/14/17 2019    Specimen:  Blood Updated:  09/14/17 2253    Narrative:       The following orders  were created for panel order [place] Draw.  Procedure                               Abnormality         Status                     ---------                               -----------         ------                     Light Blue Top[[id number]]                                                              Green Top (Gel)[[id number]]                                                             Lavender Top[[id number]]                                     Final result               Gold Top - SST[[id number]]                                   Final result                 Please view results for these tests on the individual orders.    [place] Draw [[id number]] Collected:  09/14/17 2019    Specimen:  Blood Updated:  09/14/17 2253    Narrative:       The following orders were created for panel order [place] Draw.  Procedure                               Abnormality         Status                     ---------                               -----------         ------                     Light Blue Top[[id number]]                                   Final result               Green Top (Gel)[[id number]]                                                             Lavender Top[[id number]]                                                                Gold Top - SST[[id number]]                                                                Please view results for these tests on the individual orders.            Results from last 7 days  Lab Units 09/14/17  0929   TSH mIU/mL 4.050   FREE T4 ng/dL 1.59          Imaging Results (all)     Procedure Component Value Units Date/Time    CT Cerebral Perfusion With & Without Contrast [[id number]] Collected:  09/14/17 2044     Updated:  09/14/17 2106    Narrative:       CT ANGIOGRAM HEAD W WO CONTRAST-, CT CEREBRAL PERFUSION W WO CONTRAST-,  CT ANGIOGRAM NECK W WO CONTRAST-     CT ANGIOGRAM HEAD AND NECK AND CT PERFUSION STUDY     CLINICAL HISTORY: Headache, difficulty speaking.     Radiation dose  reduction techniques were utilized, including automated  exposure control and exposure modulation based on body size. CT scan of  the head was obtained with 3 mm axial images without the use  of IV contrast. The patient underwent a CT  perfusion study with a dynamic bolus of IV contrast. Standard perfusion  maps were constructed. The patient then underwent a CT angiogram of the  head and neck with 1 mm axial images following the administration of IV  contrast. Sagittal, coronal, and 3-dimensional reconstructed images were  obtained.  Percent stenosis was assessed in accordance with NASCET  criteria.     FINDINGS:     NONCONTRAST HEAD CT: On the noncontrast head CT, gray-white matter  differentiation is preserved, and no acute hemorrhage or hydrocephalus  is identified. Minimal partial opacification of inferior left mastoid  air cells.           CT PERFUSION STUDY:  No CBF (under 30%) deficit or perfusion abnormality  is demonstrated where the T MAX is greater than 6 seconds. The  calculated mismatch volume was 0 cc.     CTA HEAD and neck: The CT angiogram of the head and neck demonstrates no  hemodynamically significant focal stenosis, aneurysm, or dissection in  the cervical carotid or vertebral arteries, or in the arteries at the  base of the brain. Developmentally hypoplastic left A1 segment is noted.     No enhancing lesion of the brain postcontrast images.     Multilevel cervical spondyloarthropathy is present, predominantly at the  lower cervical levels.     Thyroid nodules bilaterally, right more than left, follow-up evaluation  thyroid ultrasound is suggested. Aortic calcification is seen.       Impression:             1. No perfusion abnormality to suggest acute or threatened infarct. If  further evaluation is indicated, MRI could be considered.  2. No arterial cutoff or hemodynamically significant focal stenosis in  the cervical carotid or vertebral arteries, or in the arteries at the  base of the  brain.  3. No acute hemorrhage or hydrocephalus. No enhancing lesion of brain.  4. Thyroid nodules.                    Discussed by telephone with Dr. Taylor at time of interpretation,  2030 and 2050, and Dr. Jacome at 2055, 09/14/2017.     This report was finalized on 9/14/2017 9:03 PM by Dr. Jeffry Mantilla MD.       CT Angiogram Neck With & Without Contrast [[id number]] Collected:  09/14/17 2044     Updated:  09/14/17 2106    Narrative:       CT ANGIOGRAM HEAD W WO CONTRAST-, CT CEREBRAL PERFUSION W WO CONTRAST-,  CT ANGIOGRAM NECK W WO CONTRAST-     CT ANGIOGRAM HEAD AND NECK AND CT PERFUSION STUDY     CLINICAL HISTORY: Headache, difficulty speaking.     Radiation dose reduction techniques were utilized, including automated  exposure control and exposure modulation based on body size. CT scan of  the head was obtained with 3 mm axial images without the use  of IV contrast. The patient underwent a CT  perfusion study with a dynamic bolus of IV contrast. Standard perfusion  maps were constructed. The patient then underwent a CT angiogram of the  head and neck with 1 mm axial images following the administration of IV  contrast. Sagittal, coronal, and 3-dimensional reconstructed images were  obtained.  Percent stenosis was assessed in accordance with NASCET  criteria.     FINDINGS:     NONCONTRAST HEAD CT: On the noncontrast head CT, gray-white matter  differentiation is preserved, and no acute hemorrhage or hydrocephalus  is identified. Minimal partial opacification of inferior left mastoid  air cells.           CT PERFUSION STUDY:  No CBF (under 30%) deficit or perfusion abnormality  is demonstrated where the T MAX is greater than 6 seconds. The  calculated mismatch volume was 0 cc.     CTA HEAD and neck: The CT angiogram of the head and neck demonstrates no  hemodynamically significant focal stenosis, aneurysm, or dissection in  the cervical carotid or vertebral arteries, or in the arteries at  the  base of the brain. Developmentally hypoplastic left A1 segment is noted.     No enhancing lesion of the brain postcontrast images.     Multilevel cervical spondyloarthropathy is present, predominantly at the  lower cervical levels.     Thyroid nodules bilaterally, right more than left, follow-up evaluation  thyroid ultrasound is suggested. Aortic calcification is seen.       Impression:             1. No perfusion abnormality to suggest acute or threatened infarct. If  further evaluation is indicated, MRI could be considered.  2. No arterial cutoff or hemodynamically significant focal stenosis in  the cervical carotid or vertebral arteries, or in the arteries at the  base of the brain.  3. No acute hemorrhage or hydrocephalus. No enhancing lesion of brain.  4. Thyroid nodules.                    Discussed by telephone with Dr. Taylor at time of interpretation,  2030 and 2050, and Dr. Jacome at 2055, 09/14/2017.     This report was finalized on 9/14/2017 9:03 PM by Dr. Jeffry Mantilla MD.       CT Angiogram Head With & Without Contrast [[id number]] Collected:  09/14/17 2044     Updated:  09/14/17 2106    Narrative:       CT ANGIOGRAM HEAD W WO CONTRAST-, CT CEREBRAL PERFUSION W WO CONTRAST-,  CT ANGIOGRAM NECK W WO CONTRAST-     CT ANGIOGRAM HEAD AND NECK AND CT PERFUSION STUDY     CLINICAL HISTORY: Headache, difficulty speaking.     Radiation dose reduction techniques were utilized, including automated  exposure control and exposure modulation based on body size. CT scan of  the head was obtained with 3 mm axial images without the use  of IV contrast. The patient underwent a CT  perfusion study with a dynamic bolus of IV contrast. Standard perfusion  maps were constructed. The patient then underwent a CT angiogram of the  head and neck with 1 mm axial images following the administration of IV  contrast. Sagittal, coronal, and 3-dimensional reconstructed images were  obtained.  Percent stenosis was  assessed in accordance with NASCET  criteria.     FINDINGS:     NONCONTRAST HEAD CT: On the noncontrast head CT, gray-white matter  differentiation is preserved, and no acute hemorrhage or hydrocephalus  is identified. Minimal partial opacification of inferior left mastoid  air cells.           CT PERFUSION STUDY:  No CBF (under 30%) deficit or perfusion abnormality  is demonstrated where the T MAX is greater than 6 seconds. The  calculated mismatch volume was 0 cc.     CTA HEAD and neck: The CT angiogram of the head and neck demonstrates no  hemodynamically significant focal stenosis, aneurysm, or dissection in  the cervical carotid or vertebral arteries, or in the arteries at the  base of the brain. Developmentally hypoplastic left A1 segment is noted.     No enhancing lesion of the brain postcontrast images.     Multilevel cervical spondyloarthropathy is present, predominantly at the  lower cervical levels.     Thyroid nodules bilaterally, right more than left, follow-up evaluation  thyroid ultrasound is suggested. Aortic calcification is seen.       Impression:             1. No perfusion abnormality to suggest acute or threatened infarct. If  further evaluation is indicated, MRI could be considered.  2. No arterial cutoff or hemodynamically significant focal stenosis in  the cervical carotid or vertebral arteries, or in the arteries at the  base of the brain.  3. No acute hemorrhage or hydrocephalus. No enhancing lesion of brain.  4. Thyroid nodules.                    Discussed by telephone with Dr. Taylor at time of interpretation,  2030 and 2050, and Dr. Jacome at 2055, 09/14/2017.     This report was finalized on 9/14/2017 9:03 PM by Dr. Jeffry Mantilla MD.       XR Chest 1 View [[id number]] Collected:  09/14/17 2125     Updated:  09/14/17 2129    Narrative:       PORTABLE CHEST X-RAY     CLINICAL HISTORY: Acute Stroke Protocol (onset < 12 hrs)     COMPARISON:  03/09/2013.     FINDINGS:  Single  portable view of the chest obtained.  There are  various overlying monitor leads/artifacts in place. The lungs are well  expanded and clear where they can be visualized. There are some  scattered fibrotic changes which are felt to be stable and chronic.  Cardiac size is within normal limits.  Vascularity is normal considering  technique.  No pleural fluid is demonstrated by portable imaging.               Impression:          No active disease by portable imaging.     This report was finalized on 9/14/2017 9:26 PM by Mark Powers MD.           Patient Active Problem List   Diagnosis Code   • Arthritis of knee, degenerative M17.9   • Arthritis M19.90   • HLD (hyperlipidemia) E78.5   • Avitaminosis D E55.9   • Hyperlipidemia E78.5   • Hypertension, essential I10   • Health care maintenance Z00.00   • Right knee pain M25.561   • Knee pain, right M25.561   • Hx of total knee arthroplasty Z96.659   • Seasonal allergies J30.2   • Eustachian tube dysfunction H69.80   • Acute cystitis without hematuria N30.00   • Vitamin D deficiency E55.9   • Altered mental status R41.82   • Pyuria N39.0   • Bandemia D72.825   • Transient alteration of awareness R40.4       Assessment:  Principal Problem:    Altered mental status  Active Problems:    Arthritis    HLD (hyperlipidemia)    Hyperlipidemia    Hypertension, essential    Acute cystitis without hematuria    Pyuria    Bandemia    Transient alteration of awareness      Plan:  The patient's admitted to hospital and started on broad-spectrum IV antibiotics and some IV fluid for hydration.  Will last for neurology to evaluate her confusion and check MRI brain.  I suspect she just has toxic metabolic encephalopathy due to having urinary infection.  We'll also get CT scan of abdomen stone protocol to rule out kidney stones.    Mauro Cantu MD  9/15/2017  1:19 AM

## 2017-09-15 NOTE — THERAPY EVALUATION
Acute Care - Occupational Therapy Initial Evaluation  [facility]     Patient Name: Kimberlee Urrutia  : 1940  MRN: [id number]  Today's Date: 9/15/2017  Onset of Illness/Injury or Date of Surgery Date: 17  Date of Referral to OT: 17  Referring Physician: Dr aCntu    Admit Date: 2017       ICD-10-CM ICD-9-CM   1. Transient alteration of awareness R40.4 780.02   2. Bandemia D72.825 288.66   3. Pyuria N39.0 791.9   4. Muscle weakness (generalized) M62.81 728.87     Patient Active Problem List   Diagnosis   • Arthritis of knee, degenerative   • Arthritis   • HLD (hyperlipidemia)   • Avitaminosis D   • Hyperlipidemia   • Hypertension, essential   • Health care maintenance   • Right knee pain   • Knee pain, right   • Hx of total knee arthroplasty   • Seasonal allergies   • Eustachian tube dysfunction   • Acute cystitis without hematuria   • Vitamin D deficiency   • Altered mental status   • Pyuria   • Bandemia   • Transient alteration of awareness     Past Medical History:   Diagnosis Date   • Arthritis    • Cough    • Diarrhea    • Hx of migraine headaches    • Hyperlipidemia    • Irregular heart beat    • Osteoarthritis    • Vitamin D deficiency      Past Surgical History:   Procedure Laterality Date   • COLONOSCOPY     • REPLACEMENT TOTAL KNEE Right    • WISDOM TOOTH EXTRACTION            OT ASSESSMENT FLOWSHEET (last 72 hours)      OT Evaluation       09/15/17 1339 09/15/17 1146 09/15/17 1144 09/15/17 1052 09/15/17 1023    Rehab Evaluation    Document Type evaluation;therapy note (daily note)  -LE    evaluation  -SA (r) HR (t) SA (c)    Subjective Information agree to therapy  -LE    agree to therapy;complains of   complains of headache  -SA (r) HR (t) SA (c)    Evaluation Not Performed    --   attempt OT eval but pt unable to remain awake. Noted admitted overnight and then testing.  Will follow up later today or next service date to accurately assess function.  Discuss with RN who states  "negative for CVA.     -LE     Patient Effort, Rehab Treatment fair  -LE        Symptoms Noted During/After Treatment --   slow moving, decrease attention, \"oh there is a chair\"  -LE    none  -SA (r) HR (t) SA (c)    General Information    Patient Profile Review yes  -LE        Onset of Illness/Injury or Date of Surgery Date 09/14/17  -LE        Referring Physician Dr Cantu  -LE        General Observations supine in bed. asleep but wakens more easily then attempt this am for OT eval.  -LE        Pertinent History Of Current Problem called by MD to go to ER due to WBC, instead pt drove to [place], was confused and sent to ER  -LE        Precautions/Limitations fall precautions  -LE        Prior Level of Function independent:;driving;ADL's;transfer   recent trip to [place] for pleasure  -LE        Equipment Currently Used at Home none  -LE none  -DR       Plans/Goals Discussed With patient;agreed upon  -LE        Living Environment    Lives With alone  -LE alone  -DR       Living Arrangements  house  -DR       Home Accessibility  no concerns;stairs within home  -DR       Stair Railings at Home  inside, present at both sides  -DR       Type of Financial/Environmental Concern  none  -DR       Transportation Available  car;family or friend will provide  -DR       Living Environment Comment --   neighbors check on pt, bring food. son is [place] [employer]   -LE        Clinical Impression    Date of Referral to OT 09/14/17  -LE        OT Diagnosis need for assist with personal care  -LE        Impairments Found (describe specific impairments) arousal, attention, and cognition;gait, locomotion, and balance  -LE        Patient/Family Goals Statement return home  -LE        Criteria for Skilled Therapeutic Interventions Met treatment indicated;yes  -LE        Rehab Potential good, to achieve stated therapy goals  -LE        Therapy Frequency 3-5 times/wk  -LE        Anticipated Equipment Needs At Discharge --   pending progresss  -LE  "       Anticipated Discharge Disposition home with assist   son plans for assist as needed  -LE        Functional Level Prior    Ambulation   0-->independent  -DR      Transferring   0-->independent  -DR      Toileting   0-->independent  -DR      Bathing   0-->independent  -DR      Dressing   0-->independent  -DR      Eating   0-->independent  -DR      Communication   0-->understands/communicates without difficulty  -DR      Swallowing   0-->swallows foods/liquids without difficulty  -DR      Vital Signs    Pre SpO2 (%) 95  -LE        O2 Delivery Pre Treatment room air  -LE        O2 Delivery Intra Treatment room air  -LE        O2 Delivery Post Treatment room air  -LE        Pre Patient Position Supine  -LE        Intra Patient Position Standing  -LE        Post Patient Position Supine  -LE        Activity Duration --   slow moving, no increase in short of air  -LE        Pain Assessment    Pain Assessment No/denies pain  -LE    No/denies pain  -SA (r) HR (t) SA (c)    Cognitive Assessment/Intervention    Current Cognitive/Communication Assessment impaired  -LE    functional  -SA (r) HR (t) SA (c)    Orientation Status oriented x 4  -LE        Follows Commands/Answers Questions needs cueing;needs increased time  -LE        Personal Safety decreased insight to deficits  -LE        Personal Safety Interventions fall prevention program maintained;gait belt;nonskid shoes/slippers when out of bed  -LE        Additional Documentation --   decrease awareness of surroundings   -LE        ROM (Range of Motion)    General ROM no range of motion deficits identified   jr hair while seated on toilet (leans to left)  -LE        MMT (Manual Muscle Testing)    General MMT Assessment --   denies weakness  -LE        Bed Mobility, Assessment/Treatment    Bed Mobility, Assistive Device bed rails;head of bed elevated  -LE        Bed Mobility, Roll Left, [place] supervision required   increase time and effort.  cues to  reposition in bed  -LE        Bed Mobility, Scoot/Bridge, [place] supervision required  -LE        Bed Mob, Supine to Sit, [place] supervision required  -LE        Bed Mob, Sit to Supine, [place] contact guard assist   assist to reposition in bed  -LE        Transfer Assessment/Treatment    Transfers, Sit-Stand [place] contact guard assist  -LE        Transfers, Stand-Sit [place] contact guard assist  -LE        Toilet Transfer, [place] contact guard assist  -LE        Transfer, Comment while turning to sit on bed pt sits before fully turned around.  ed pt to turn fully to avoid missing EOB when sitting.  -LE        Functional Mobility    Functional Mobility- Ind. Level contact guard assist  -LE        Functional Mobility-Distance (Feet) 20  -LE        Functional Mobility- Safety Issues balance decreased during turns  -LE        Functional Mobility- Comment --   decrease awareness of surroundings   -LE        Lower Body Dressing Assessment/Training    LB Dressing Assess/Train, Clothing Type doffing:;donning:;slipper socks  -LE        LB Dressing Assess/Train, Position edge of bed;sitting  -LE        LB Dressing Assess/Train, [place] verbal cues required;set up required;supervision required   close SBA  -LE        LB Dressing Assess/Train, Impairments impaired balance  -LE        Toileting Assessment/Training    Toileting Assess/Train, Position sitting   leans & holds to Left while braiding hair sitting on toilet  -LE        Toileting Assess/Train, Comment pt jr hair while sitting on toilet  -LE        General Therapy Interventions    Planned Therapy Interventions activity intolerance;ADL retraining;balance training;transfer training  -LE        Positioning and Restraints    Pre-Treatment Position in bed  -LE        Post Treatment Position bed  -LE        In Bed notified nsg;supine;call light within reach;encouraged to call for assist;exit alarm on;SCD pump applied;heels  "elevated   pt on phone  -LE          09/15/17 1011 09/15/17 0013             Rehab Evaluation    Document Type evaluation  -MS        Subjective Information agree to therapy;complains of;weakness;fatigue;pain  -MS        Patient Effort, Rehab Treatment adequate  -MS        Symptoms Noted Comment Pt. reports feeling \"very tired\" with a Headache (reporting 7/10 pain rating) this AM.  -MS        General Information    Onset of Illness/Injury or Date of Surgery Date 09/14/17  -MS        Referring Physician Mauro Bejarano  -MS        Pertinent History Of Current Problem Pt. admitted with AMS; UTI;  Possible toxic metabolic encephalopathy  -MS        Precautions/Limitations fall precautions   Exit alarm  -MS        Prior Level of Function independent:  -MS        Equipment Currently Used at Home none  -MS none  -       Plans/Goals Discussed With patient;agreed upon  -MS        Risks Reviewed patient:  -MS        Benefits Reviewed patient:  -MS        Barriers to Rehab none identified  -MS        Living Environment    Lives With  alone  -       Living Arrangements  house  -       Home Accessibility  no concerns;stairs within home  -       Number of Stairs Within Home  12  -       Stair Railings at Home  inside, present at both sides  -       Type of Financial/Environmental Concern  none  -       Transportation Available  car  -       Functional Level Prior    Ambulation  0-->independent  -       Transferring  0-->independent  -       Toileting  0-->independent  -       Bathing  0-->independent  -       Dressing  0-->independent  -       Eating  0-->independent  -       Communication  0-->understands/communicates without difficulty  -       Swallowing  0-->swallows foods/liquids without difficulty  -       Prior Functional Level Comment  patient independent at home  -       Pain Assessment    Pain Assessment 0-10  -MS        Pain Score 7  -MS        Post Pain Score 7  -MS        Pain Type " Acute pain  -MS        Pain Location Head  -MS        Pain Intervention(s) Repositioned;Rest   Nursing aware of pt.'s headache pain  -MS        Cognitive Assessment/Intervention    Current Cognitive/Communication Assessment functional  -MS        Follows Commands/Answers Questions 100% of the time;able to follow single-step instructions;needs cueing;needs increased time  -MS        Personal Safety Interventions fall prevention program maintained;gait belt;nonskid shoes/slippers when out of bed;supervised activity  -MS        ROM (Range of Motion)    General ROM no range of motion deficits identified  -MS        MMT (Manual Muscle Testing)    General MMT Assessment --   BUE/LE MMT (3+/5)  -MS        Bed Mobility, Assessment/Treatment    Bed Mob, Supine to Sit, [place] minimum assist (75% patient effort)  -MS        Bed Mob, Sit to Supine, [place] minimum assist (75% patient effort)  -MS        Transfer Assessment/Treatment    Transfers, Sit-Stand [place] minimum assist (75% patient effort);hand held assist  -MS        Transfers, Stand-Sit [place] minimum assist (75% patient effort);hand held assist  -MS        Positioning and Restraints    Pre-Treatment Position in bed  -MS        Post Treatment Position bed  -MS        In Bed notified nsg;supine;call light within reach;encouraged to call for assist;exit alarm on;with family/caregiver   All lines intact.  -MS          User Key  (r) = Recorded By, (t) = Taken By, (c) = Cosigned By    Initials Name Effective Dates    SA Kavya Coreas, MS [facility]-SLP 04/13/15 -     TAMARA Hayes, OTR 04/13/15 -     MS Mark Grier, PT 12/01/15 -     DR Krissy Manzanares, RN 02/18/16 -     SATISH Wing RN 09/23/16 -     HR Yessy Croft, Speech Therapy Student 09/06/17 -            Occupational Therapy Education     Title: PT OT SLP Therapies (Active)     Topic: Occupational Therapy (Done)     Point: ADL training (Done)    Description: Instruct learner(s) on  proper safety adaptation and remediation techniques during self care or transfers.   Instruct in proper use of assistive devices.    Learning Progress Summary    Learner Readiness Method Response Comment Documented by Status   Patient Acceptance E VU Ed pt on hand placement and positoining for safety with xfers (pt sits before fully turned).  Ed on role of OT in acute care to help determine safety for d/c home. Ed to call for assist when getting up and show how to use call light. LE 09/15/17 1353 Done               Point: Body mechanics (Done)    Description: Instruct learner(s) on proper positioning and spine alignment during self-care, functional mobility activities and/or exercises.    Learning Progress Summary    Learner Readiness Method Response Comment Documented by Status   Patient Acceptance E VU Ed pt on hand placement and positoining for safety with xfers (pt sits before fully turned).  Ed on role of OT in acute care to help determine safety for d/c home. Ed to call for assist when getting up and show how to use call light. LE 09/15/17 1353 Done                      User Key     Initials Effective Dates Name Provider Type Discipline    LE 04/13/15 -  Angelita Hayes OTR Occupational Therapist OT                  OT Recommendation and Plan  Anticipated Equipment Needs At Discharge:  (pending progresss)  Anticipated Discharge Disposition: home with assist (son plans for assist as needed)  Planned Therapy Interventions: activity intolerance, ADL retraining, balance training, transfer training  Therapy Frequency: 3-5 times/wk  Plan of Care Review  Plan Of Care Reviewed With: patient  Outcome Summary/Follow up Plan: Pt presents with impaired cognition, processing, balance, activity tolerance and awareness of surroundings.  Pt may benefit from skilled OT to increase safety and independence.          OT Goals       09/15/17 2478          Transfer Training OT LTG    Transfer Training OT LTG, Date Established 09/15/17   -LE      Transfer Training OT LTG, Time to Achieve 1 wk  -LE      Transfer Training OT LTG, Activity Type bed to chair /chair to bed;sit to stand/stand to sit;toilet  -LE      Transfer Training OT LTG, [place] Level conditional independence  -LE      ADL OT LTG    ADL OT LTG, Date Established 09/15/17  -LE      ADL OT LTG, Time to Achieve 1 wk  -LE      ADL OT LTG, Activity Type ADL skills  -LE      ADL OT LTG, [place] Level modified independent  -LE      Functional Mobility OT LTG    Functional Mobility Goal OT LTG, Date Established 09/15/17  -LE      Functional Mobility Goal OT LTG, Time to Achieve 1 wk  -LE      Functional Mobility Goal OT LTG, [place] Level modified independent  -LE      Functional Mobility Goal OT LTG, Distance to Achieve to the sink;to the bathroom  -LE        User Key  (r) = Recorded By, (t) = Taken By, (c) = Cosigned By    Initials Name Provider Type    TAMARA Hayes OTCHERELLE Occupational Therapist                Outcome Measures       09/15/17 1357 09/15/17 1300 09/15/17 1000    How much help from another person do you currently need...    Turning from your back to your side while in flat bed without using bedrails?   3  -MS    Moving from lying on back to sitting on the side of a flat bed without bedrails?   3  -MS    Moving to and from a bed to a chair (including a wheelchair)?   3  -MS    Standing up from a chair using your arms (e.g., wheelchair, bedside chair)?   3  -MS    Climbing 3-5 steps with a railing?   3  -MS    To walk in hospital room?   3  -MS    AM-PAC 6 Clicks Score   18  -MS    How much help from another is currently needed...    Putting on and taking off regular lower body clothing? 3  -LE      Bathing (including washing, rinsing, and drying) 3  -LE      Toileting (which includes using toilet bed pan or urinal) 3  -LE      Putting on and taking off regular upper body clothing 3  -LE      Taking care of personal grooming (such as brushing teeth) 3  -LE       Eating meals 3  -LE      Score 18  -LE      Functional Assessment    Outcome Measure Options  AM-PAC 6 Clicks Daily Activity (OT)  -LE AM-PAC 6 Clicks Basic Mobility (PT)  -MS      User Key  (r) = Recorded By, (t) = Taken By, (c) = Cosigned By    Initials Name Provider Type    TAMARA Hayes OTR Occupational Therapist    MS Mark Grier, PT Physical Therapist          Time Calculation:   OT Start Time: 1314  OT Stop Time: 1337  OT Time Calculation (min): 23 min    Therapy Charges for Today     Code Description Service Date Service Provider Modifiers Qty    [id number]  OT EVAL LOW COMPLEXITY 2 9/15/2017 Angelita Hayes OTR GO 1    [id number]  OT SELF CARE/MGMT/TRAIN EA 15 MIN 9/15/2017 SEDA Bhatt GO 1               SEDA Bhatt  9/15/2017

## 2017-09-15 NOTE — PROGRESS NOTES
Discharge Planning Assessment  [facility]     Patient Name: Kimberlee Urrutia  MRN: [id number]  Today's Date: 9/15/2017    Admit Date: 9/14/2017          Discharge Needs Assessment       09/15/17 1146    Living Environment    Lives With alone    Living Arrangements house    Home Accessibility no concerns;stairs within home    Stair Railings at Home inside, present at both sides    Type of Financial/Environmental Concern none    Transportation Available car;family or friend will provide    Living Environment    Provides Primary Care For no one    Quality Of Family Relationships supportive    Able to Return to Prior Living Arrangements yes    Discharge Needs Assessment    Concerns To Be Addressed no discharge needs identified    Readmission Within The Last 30 Days no previous admission in last 30 days    Anticipated Changes Related to Illness inability to care for self    Equipment Currently Used at Home none    Discharge Disposition home or self-care            Discharge Plan       09/15/17 1147    Case Management/Social Work Plan    Patient/Family In Agreement With Plan yes    Additional Comments Patient drowsy and unable to carrry on conversation.  Called and spoke with son Ezra Urrutia [phone].  Son is a [employer]  and lives in [place], [place].  Son states that his mother lives alone and was very active and indep prior to admission.  Patient had just returned home from a trip to [place] and [place].  Son states plan is for mother to go home at discharge and he will arrange someone to be with her at home if needed.  Discussed subacute options but son declined at this time.  Will follow for d/c needs.      09/15/17 1144    Case Management/Social Work Plan    Plan Home with assist of son    Patient/Family In Agreement With Plan yes        Discharge Placement     No information found                Demographic Summary       09/15/17 1148    Referral Information    Admission Type inpatient    Arrived From admitted as  an inpatient    Referral Source admission list    Reason For Consult discharge planning    Primary Care Physician Information    Name Dr. Charbel Morrison            Functional Status       09/15/17 1144    Functional Status Current    Ambulation 2-->assistive person    Transferring 2-->assistive person    Toileting 2-->assistive person    Bathing 2-->assistive person    Dressing 2-->assistive person    Eating 2-->assistive person    Communication 2-->difficulty understanding (not related to language barrier)    Swallowing (if score 2 or more for any item, consult Rehab Services) 2-->difficulty swallowing liquids/foods    Change in Functional Status Since Onset of Current Illness/Injury yes    Functional Status Prior    Ambulation 0-->independent    Transferring 0-->independent    Toileting 0-->independent    Bathing 0-->independent    Dressing 0-->independent    Eating 0-->independent    Communication 0-->understands/communicates without difficulty    Swallowing 0-->swallows foods/liquids without difficulty    IADL    Medications independent    Meal Preparation independent    Housekeeping independent    Laundry independent    Shopping independent    Oral Care independent    Activity Tolerance    Current Activity Limitations none    Usual Activity Tolerance excellent    Current Activity Tolerance fair    Cognitive/Perceptual/Developmental    Current Mental Status/Cognitive Functioning unable to assess    Recent Changes in Mental Status/Cognitive Functioning unable to assess            Psychosocial     None            Abuse/Neglect     None            Legal     None            Substance Abuse     None            Patient Forms     None          Krissy Manzanares RN

## 2017-09-15 NOTE — ED PROVIDER NOTES
The patient presents complaining reported of AMS that was first noticed by the pt's friends at 1800 today. Pt cannot provide a time for the start of her symptoms. Pt's family states that the pt returned from a trip to [place] and [place] on the 2nd of this month, and states that she hasn't been feeling well since.      Physical Exam:   Lungs/cardiovascular: CTAB, RRR  Abdomen: soft, nontender  Back/extremities: moves extremities x4  Neuro: moderate expressive aphasia    History:   Pt had labs drawn at Dr. Morrison office this afternoon, with a WBC of 35 with 400 bands and 32 neutrophils.  Based on computer documentation, the pt's likely last known normal is at 1600 when she spoke with Dr. debbi Morrison the phone.   Pt's computer records show a Hx of CML. In 2014, the pt's WBC was 7.11.     Radiology:  Pt has a normal perfusion study and CT head. CXR is negative.     Labs:  WBC 32.16, Neutrophil 28.62    EKG           EKG time: 2044  Rhythm/Rate: sinus rhythm 91, frequent PACs  P waves and TX: normal   QRS, axis: normal    ST and T waves: nonspecific ST changes      Interpreted Contemporaneously by me, independently viewed  changed compared to prior 1/2/14  Rate is faster, more pronounced ST changes, particularly in lateral leads.    Progress and Consults:  2009  Team D called.   2011  Received a call from Dr. Lepe and discussed pt's case. Dr. Lepe suggested getting a CTA if the pt's kidney function allows.  2058  Ordered labs for further evaluation.   2129  Ordered consult to A and rocephin for potential infection.   2145  Received a call from Dr. Cantu and discussed pt's case. Dr. Cantu agreed to admit the pt.  2158  Discussed the pt's radiology results with no signs of stroke, and lab findings of an elevated WBC. Plan to admit the pt. Pt understands and agrees with the plan, and all questions were answered.     Plan:  Will admit the pt for further evaluation.     I supervised care provided by the midlevel  provider.  We have discussed this patient's history, physical exam, and treatment plan.  I have reviewed the note and personally saw and examined the patient and agree with the plan of care.    Documentation assistance provided by lily Rdz.  Information recorded by the lily was done at my direction and has been verified and validated by me.     Stevo Rdz  09/14/17 9533       Venkata Parikh MD  09/15/17 6049

## 2017-09-16 LAB
ANION GAP SERPL CALCULATED.3IONS-SCNC: 14.2 MMOL/L
BACTERIA SPEC AEROBE CULT: NO GROWTH
BASOPHILS # BLD AUTO: 0.01 10*3/MM3 (ref 0–0.2)
BASOPHILS NFR BLD AUTO: 0 % (ref 0–1.5)
BUN BLD-MCNC: 13 MG/DL (ref 8–23)
BUN/CREAT SERPL: 23.2 (ref 7–25)
CALCIUM SPEC-SCNC: 9.4 MG/DL (ref 8.6–10.5)
CHLORIDE SERPL-SCNC: 108 MMOL/L (ref 98–107)
CO2 SERPL-SCNC: 19.8 MMOL/L (ref 22–29)
CREAT BLD-MCNC: 0.56 MG/DL (ref 0.57–1)
DEPRECATED RDW RBC AUTO: 43.5 FL (ref 37–54)
EOSINOPHIL # BLD AUTO: 0 10*3/MM3 (ref 0–0.7)
EOSINOPHIL NFR BLD AUTO: 0 % (ref 0.3–6.2)
ERYTHROCYTE [DISTWIDTH] IN BLOOD BY AUTOMATED COUNT: 13.9 % (ref 11.7–13)
GFR SERPL CREATININE-BSD FRML MDRD: 105 ML/MIN/1.73
GLUCOSE BLD-MCNC: 81 MG/DL (ref 65–99)
HCT VFR BLD AUTO: 32.3 % (ref 35.6–45.5)
HGB BLD-MCNC: 10.9 G/DL (ref 11.9–15.5)
IMM GRANULOCYTES # BLD: 0.19 10*3/MM3 (ref 0–0.03)
IMM GRANULOCYTES NFR BLD: 0.9 % (ref 0–0.5)
LYMPHOCYTES # BLD AUTO: 1.63 10*3/MM3 (ref 0.9–4.8)
LYMPHOCYTES NFR BLD AUTO: 8 % (ref 19.6–45.3)
MCH RBC QN AUTO: 28.8 PG (ref 26.9–32)
MCHC RBC AUTO-ENTMCNC: 33.7 G/DL (ref 32.4–36.3)
MCV RBC AUTO: 85.4 FL (ref 80.5–98.2)
MONOCYTES # BLD AUTO: 1.1 10*3/MM3 (ref 0.2–1.2)
MONOCYTES NFR BLD AUTO: 5.4 % (ref 5–12)
NEUTROPHILS # BLD AUTO: 17.57 10*3/MM3 (ref 1.9–8.1)
NEUTROPHILS NFR BLD AUTO: 85.7 % (ref 42.7–76)
NRBC BLD MANUAL-RTO: 0 /100 WBC (ref 0–0)
PLATELET # BLD AUTO: 148 10*3/MM3 (ref 140–500)
PMV BLD AUTO: 11.4 FL (ref 6–12)
POTASSIUM BLD-SCNC: 3.8 MMOL/L (ref 3.5–5.2)
RBC # BLD AUTO: 3.78 10*6/MM3 (ref 3.9–5.2)
SODIUM BLD-SCNC: 142 MMOL/L (ref 136–145)
WBC NRBC COR # BLD: 20.5 10*3/MM3 (ref 4.5–10.7)

## 2017-09-16 PROCEDURE — 85025 COMPLETE CBC W/AUTO DIFF WBC: CPT | Performed by: INTERNAL MEDICINE

## 2017-09-16 PROCEDURE — G0378 HOSPITAL OBSERVATION PER HR: HCPCS

## 2017-09-16 PROCEDURE — 80048 BASIC METABOLIC PNL TOTAL CA: CPT | Performed by: INTERNAL MEDICINE

## 2017-09-16 PROCEDURE — 96366 THER/PROPH/DIAG IV INF ADDON: CPT

## 2017-09-16 PROCEDURE — 85007 BL SMEAR W/DIFF WBC COUNT: CPT | Performed by: INTERNAL MEDICINE

## 2017-09-16 PROCEDURE — 25010000002 CEFTRIAXONE PER 250 MG: Performed by: HOSPITALIST

## 2017-09-16 PROCEDURE — 97110 THERAPEUTIC EXERCISES: CPT

## 2017-09-16 PROCEDURE — 96361 HYDRATE IV INFUSION ADD-ON: CPT

## 2017-09-16 RX ADMIN — CEFTRIAXONE SODIUM 1 G: 1 INJECTION, SOLUTION INTRAVENOUS at 21:17

## 2017-09-16 RX ADMIN — ATORVASTATIN CALCIUM 80 MG: 80 TABLET, FILM COATED ORAL at 20:15

## 2017-09-16 RX ADMIN — SODIUM CHLORIDE 100 ML/HR: 9 INJECTION, SOLUTION INTRAVENOUS at 10:02

## 2017-09-16 RX ADMIN — ASPIRIN 325 MG: 325 TABLET ORAL at 08:17

## 2017-09-16 NOTE — PROGRESS NOTES
" LOS: 1 day     Name: Kimberlee Urrutia  Age: 77 y.o.  Sex: female  :  1940  MRN: [id number]         Primary Care Physician: Charbel Morrison MD    Subjective   Subjective  Feeling better today.  No specific complaints.  She denies chest pain, shortness breath, cough, fevers or chills, abdominal pain, nausea, vomiting, diarrhea, skin rashes or lesions.  She does not feel as if she had any bug bites during her recent trip to [facility] and [place].  She felt very well for the first 8 days after her return to the .    Objective   Vital Signs  Temp:  [98.1 °F (36.7 °C)-99.8 °F (37.7 °C)] 98.5 °F (36.9 °C)  Heart Rate:  [71-78] 78  Resp:  [16-18] 17  BP: (114-174)/(57-67) 164/63  Body mass index is 26.74 kg/(m^2).    Objective:  General Appearance:  Comfortable and in no acute distress.    Vital signs: (most recent): Blood pressure 164/63, pulse 78, temperature 98.5 °F (36.9 °C), temperature source Oral, resp. rate 17, height 64\" (162.6 cm), weight 155 lb 12.8 oz (70.7 kg), SpO2 95 %.    Lungs:  Normal respiratory rate and normal effort.    Heart: Normal rate.  Regular rhythm.    Abdomen: Abdomen is soft.  Bowel sounds are normal.   There is no abdominal tenderness.     Extremities: There is no local swelling or dependent edema.    Neurological: Patient is alert and oriented to person, place and time.  (Drowsiness and confusion have resolved.).    Skin:  Warm and dry.              Results Review:       I reviewed the patient's new clinical results.      Results from last 7 days  Lab Units 09/16/17  0449 09/15/17  0513 09/14/17  2019 17  0929   WBC 10*3/mm3 20.50* 29.09* 32.16* 34.94*   HEMOGLOBIN g/dL 10.9* 12.0 14.0 12.7   PLATELETS 10*3/mm3 148 142 146 147       Results from last 7 days  Lab Units 09/16/17  0449 09/15/17  0513 09/14/17  0929   SODIUM mmol/L 142 142 139   POTASSIUM mmol/L 3.8 3.9 4.4   CHLORIDE mmol/L 108* 108* 99   CO2 mmol/L 19.8* 19.8*  --    TOTAL CO2, ARTERIAL mmol/L  --   --  24.3   BUN " mg/dL 13 20 34*   CREATININE mg/dL 0.56* 0.59 1.10*   CALCIUM mg/dL 9.4 9.9 10.0   GLUCOSE mg/dL 81 89  --        Results from last 7 days  Lab Units 09/14/17 2018   INR  1.24*     Scheduled Meds:     aspirin 325 mg Oral Daily   Or      aspirin 300 mg Rectal Daily   atorvastatin 80 mg Oral Nightly   ceftriaxone 1 g Intravenous Q24H     PRN Meds:   •  acetaminophen **OR** acetaminophen  •  acetaminophen  •  ondansetron  •  sodium chloride  •  sodium chloride  Continuous Infusions:    sodium chloride 100 mL/hr Last Rate: 100 mL/hr (09/16/17 1002)       Assessment/Plan   Principal Problem:    Altered mental status  Active Problems:    Arthritis    HLD (hyperlipidemia)    Hyperlipidemia    Hypertension, essential    Acute cystitis without hematuria    Pyuria    Bandemia    Transient alteration of awareness      Assessment & Plan    - Her confusion, drowsiness, alteration of awareness has resolved at this point.  Discussed with Dr. García yesterday who felt that it may been secondary to migraine headache.  She denies headache, neck pain, neck stiffness at this time.  Her white blood cell count remains elevated but is down trending.  I do not have a good source for the leukocytosis.  Thought it might be urinary tract infection but urine culture is negative.  I will go ahead and complete treatment for UTI with a third dose of Rocephin today and then stop.  There are no other focal signs or symptoms and she is afebrile.  Still awaiting 48 hour blood culture results.  She has the recent travel to [place] and [place] but given the fact that she is showing great improvement I do not think we need to start looking for unusual pathogens.  She is otherwise much improved and I think as long as her white blood cell count continues to trend down tomorrow then she will be okay for discharge.    Shabbir Archuleta MD  [facility]  09/16/17  1:15 PM

## 2017-09-16 NOTE — THERAPY TREATMENT NOTE
Acute Care - Physical Therapy Treatment Note  [facility]     Patient Name: Kimberlee Urrutia  : 1940  MRN: [id number]  Today's Date: 2017  Onset of Illness/Injury or Date of Surgery Date: 17  Date of Referral to PT: 17  Referring Physician: Dr Cantu    Admit Date: 2017    Visit Dx:    ICD-10-CM ICD-9-CM   1. Transient alteration of awareness R40.4 780.02   2. Bandemia D72.825 288.66   3. Pyuria N39.0 791.9   4. Muscle weakness (generalized) M62.81 728.87     Patient Active Problem List   Diagnosis   • Arthritis of knee, degenerative   • Arthritis   • HLD (hyperlipidemia)   • Avitaminosis D   • Hyperlipidemia   • Hypertension, essential   • Health care maintenance   • Right knee pain   • Knee pain, right   • Hx of total knee arthroplasty   • Seasonal allergies   • Eustachian tube dysfunction   • Acute cystitis without hematuria   • Vitamin D deficiency   • Altered mental status   • Pyuria   • Bandemia   • Transient alteration of awareness               Adult Rehabilitation Note       17 1530          Rehab Assessment/Intervention    Discipline physical therapy assistant  -SD      Document Type therapy note (daily note)  -SD      Subjective Information agree to therapy  -SD      Patient Effort, Rehab Treatment good  -SD      Precautions/Limitations fall precautions  -SD      Recorded by [SD] Amaya Wallace PTA      Pain Assessment    Pain Assessment No/denies pain  -SD      Recorded by [SD] Amaya Wallace PTA      Cognitive Assessment/Intervention    Current Cognitive/Communication Assessment functional  -SD      Orientation Status oriented x 4  -SD      Follows Commands/Answers Questions 100% of the time  -SD      Personal Safety WNL/WFL  -SD      Personal Safety Interventions fall prevention program maintained;gait belt;nonskid shoes/slippers when out of bed  -SD      Recorded by [SD] Amaya Wallace PTA      Bed Mobility, Assessment/Treatment    Bed Mobility,  Assistive Device bed rails;head of bed elevated  -SD      Bed Mob, Supine to Sit, [place] supervision required  -SD      Bed Mob, Sit to Supine, [place] supervision required  -SD      Recorded by [SD] Amaya Wallace PTA      Transfer Assessment/Treatment    Transfers, Sit-Stand [place] contact guard assist  -SD      Transfers, Stand-Sit [place] stand by assist  -SD      Transfer, Impairments strength decreased  -SD      Recorded by [SD] Amaya Wallace PTA      Gait Assessment/Treatment    Gait, [place] Level contact guard assist  -SD      Gait, Distance (Feet) 200  -SD      Gait, Gait Deviations arely decreased;forward flexed posture;step length decreased  -SD      Gait, Safety Issues balance decreased during turns;step length decreased  -SD      Gait, Impairments strength decreased;impaired balance  -SD      Recorded by [SD] Amaya Wallace PTA      Positioning and Restraints    Pre-Treatment Position in bed  -SD      Post Treatment Position bed  -SD      In Bed supine;call light within reach;encouraged to call for assist  -SD      Recorded by [SD] Amaya Wallace PTA        User Key  (r) = Recorded By, (t) = Taken By, (c) = Cosigned By    Initials Name Effective Dates    SD Amaya Wallace PTA 01/27/16 -                 IP PT Goals       09/15/17 1016          Bed Mobility PT LTG    Bed Mobility PT LTG, Date Established 09/15/17  -MS      Bed Mobility PT LTG, Time to Achieve 5 days  -MS      Bed Mobility PT LTG, Activity Type all bed mobility  -MS      Bed Mobility PT LTG, [place] Level independent  -MS      Transfer Training PT LTG    Transfer Training PT LTG, Date Established 09/15/17  -MS      Transfer Training PT LTG, Time to Achieve 5 days  -MS      Transfer Training PT LTG, Activity Type all transfers  -MS      Transfer Training PT LTG, [place] Level independent  -MS      Gait Training PT LTG    Gait Training Goal PT LTG, Date Established 09/15/17   -MS      Gait Training Goal PT LTG, Time to Achieve 5 days  -MS      Gait Training Goal PT LTG, [place] Level independent  -MS      Gait Training Goal PT LTG, Distance to Achieve 350 feet  -MS        User Key  (r) = Recorded By, (t) = Taken By, (c) = Cosigned By    Initials Name Provider Type    MS Mark Grier, PT Physical Therapist          Physical Therapy Education     Title: PT OT SLP Therapies (Done)     Topic: Physical Therapy (Done)     Point: Mobility training (Done)    Learning Progress Summary    Learner Readiness Method Response Comment Documented by Status   Patient Acceptance E VU  SD 09/16/17 1602 Done    Acceptance E,D VU,NR  MS 09/15/17 1016 Done               Point: Home exercise program (Done)    Learning Progress Summary    Learner Readiness Method Response Comment Documented by Status   Patient Acceptance E VU  SD 09/16/17 1602 Done               Point: Body mechanics (Done)    Learning Progress Summary    Learner Readiness Method Response Comment Documented by Status   Patient Acceptance E VU  SD 09/16/17 1602 Done    Acceptance E,D VU,NR  MS 09/15/17 1016 Done               Point: Precautions (Done)    Learning Progress Summary    Learner Readiness Method Response Comment Documented by Status   Patient Acceptance E VU  SD 09/16/17 1602 Done    Acceptance E,D VU,NR  MS 09/15/17 1016 Done                      User Key     Initials Effective Dates Name Provider Type Discipline    MS 12/01/15 -  Mark Grier, PT Physical Therapist PT    SD 01/27/16 -  Amaya Wallace, PTA Technician PT                    PT Recommendation and Plan  Anticipated Discharge Disposition: home with assist, home with home health  Planned Therapy Interventions: balance training, bed mobility training, gait training, home exercise program, patient/family education, postural re-education, strengthening, transfer training  PT Frequency: daily  Plan of Care Review  Plan Of Care Reviewed With:  patient  Progress: improving  Outcome Summary/Follow up Plan: Pt tolerated treatment well w/ no c/o pain. Increased gait distance to 200ft w/out AD. Occasional unsteadiness noted when turning, but no LOBs. Pt would benefit from continued skilled PT to address functional mobility deficits.          Outcome Measures       09/16/17 1600 09/15/17 1357 09/15/17 1300    How much help from another person do you currently need...    Turning from your back to your side while in flat bed without using bedrails? 3  -SD      Moving from lying on back to sitting on the side of a flat bed without bedrails? 3  -SD      Moving to and from a bed to a chair (including a wheelchair)? 3  -SD      Standing up from a chair using your arms (e.g., wheelchair, bedside chair)? 3  -SD      Climbing 3-5 steps with a railing? 3  -SD      To walk in hospital room? 3  -SD      AM-PAC 6 Clicks Score 18  -SD      How much help from another is currently needed...    Putting on and taking off regular lower body clothing?  3  -LE     Bathing (including washing, rinsing, and drying)  3  -LE     Toileting (which includes using toilet bed pan or urinal)  3  -LE     Putting on and taking off regular upper body clothing  3  -LE     Taking care of personal grooming (such as brushing teeth)  3  -LE     Eating meals  3  -LE     Score  18  -LE     Functional Assessment    Outcome Measure Options AM-PAC 6 Clicks Basic Mobility (PT)  -SD  AM-PAC 6 Clicks Daily Activity (OT)  -LE      09/15/17 1000          How much help from another person do you currently need...    Turning from your back to your side while in flat bed without using bedrails? 3  -MS      Moving from lying on back to sitting on the side of a flat bed without bedrails? 3  -MS      Moving to and from a bed to a chair (including a wheelchair)? 3  -MS      Standing up from a chair using your arms (e.g., wheelchair, bedside chair)? 3  -MS      Climbing 3-5 steps with a railing? 3  -MS      To walk in  hospital room? 3  -MS      AM-PAC 6 Clicks Score 18  -MS      Functional Assessment    Outcome Measure Options AM-PAC 6 Clicks Basic Mobility (PT)  -MS        User Key  (r) = Recorded By, (t) = Taken By, (c) = Cosigned By    Initials Name Provider Type    TAMARA Hayes, OTR Occupational Therapist    MS Mark CANTU Marvel, PT Physical Therapist    JEOVANY Wallace PTA Technician           Time Calculation:         PT Charges       09/16/17 1606          Time Calculation    Start Time 1530  -SD      Stop Time 1545  -SD      Time Calculation (min) 15 min  -SD      PT Received On 09/16/17  -SD      PT - Next Appointment 09/17/17  -SD        User Key  (r) = Recorded By, (t) = Taken By, (c) = Cosigned By    Initials Name Provider Type    JEOVANY Wallace PTA Technician          Therapy Charges for Today     Code Description Service Date Service Provider Modifiers Qty    [id number] HC PT THER PROC EA 15 MIN 9/16/2017 Amaya Wallace PTA GP 1          PT G-Codes  Outcome Measure Options: AM-PAC 6 Clicks Basic Mobility (PT)    Amaya Wallace PTA  9/16/2017

## 2017-09-17 VITALS
BODY MASS INDEX: 26.6 KG/M2 | DIASTOLIC BLOOD PRESSURE: 77 MMHG | HEIGHT: 64 IN | TEMPERATURE: 98 F | RESPIRATION RATE: 14 BRPM | OXYGEN SATURATION: 98 % | SYSTOLIC BLOOD PRESSURE: 147 MMHG | WEIGHT: 155.8 LBS | HEART RATE: 72 BPM

## 2017-09-17 PROBLEM — D72.829 LEUKOCYTOSIS: Status: ACTIVE | Noted: 2017-09-17

## 2017-09-17 PROBLEM — D72.825 BANDEMIA: Status: RESOLVED | Noted: 2017-09-14 | Resolved: 2017-09-17

## 2017-09-17 PROBLEM — R41.82 ALTERED MENTAL STATUS: Status: RESOLVED | Noted: 2017-09-14 | Resolved: 2017-09-17

## 2017-09-17 PROBLEM — G43.909 MIGRAINE WITHOUT STATUS MIGRAINOSUS, NOT INTRACTABLE: Status: ACTIVE | Noted: 2017-09-17

## 2017-09-17 LAB
BACTERIA UR CULT: ABNORMAL
BACTERIA UR CULT: ABNORMAL
BASOPHILS # BLD AUTO: 0.01 10*3/MM3 (ref 0–0.2)
BASOPHILS NFR BLD AUTO: 0.1 % (ref 0–1.5)
DEPRECATED RDW RBC AUTO: 42.5 FL (ref 37–54)
EOSINOPHIL # BLD AUTO: 0 10*3/MM3 (ref 0–0.7)
EOSINOPHIL NFR BLD AUTO: 0 % (ref 0.3–6.2)
ERYTHROCYTE [DISTWIDTH] IN BLOOD BY AUTOMATED COUNT: 13.5 % (ref 11.7–13)
HCT VFR BLD AUTO: 34.8 % (ref 35.6–45.5)
HGB BLD-MCNC: 11.7 G/DL (ref 11.9–15.5)
IMM GRANULOCYTES # BLD: 0.18 10*3/MM3 (ref 0–0.03)
IMM GRANULOCYTES NFR BLD: 1.3 % (ref 0–0.5)
LYMPHOCYTES # BLD AUTO: 1.39 10*3/MM3 (ref 0.9–4.8)
LYMPHOCYTES NFR BLD AUTO: 9.8 % (ref 19.6–45.3)
MCH RBC QN AUTO: 28.8 PG (ref 26.9–32)
MCHC RBC AUTO-ENTMCNC: 33.6 G/DL (ref 32.4–36.3)
MCV RBC AUTO: 85.7 FL (ref 80.5–98.2)
MONOCYTES # BLD AUTO: 1.04 10*3/MM3 (ref 0.2–1.2)
MONOCYTES NFR BLD AUTO: 7.4 % (ref 5–12)
NEUTROPHILS # BLD AUTO: 11.5 10*3/MM3 (ref 1.9–8.1)
NEUTROPHILS NFR BLD AUTO: 81.4 % (ref 42.7–76)
OTHER ANTIBIOTIC SUSC ISLT: ABNORMAL
PLATELET # BLD AUTO: 196 10*3/MM3 (ref 140–500)
PMV BLD AUTO: 11.2 FL (ref 6–12)
RBC # BLD AUTO: 4.06 10*6/MM3 (ref 3.9–5.2)
WBC NRBC COR # BLD: 14.12 10*3/MM3 (ref 4.5–10.7)

## 2017-09-17 PROCEDURE — G0378 HOSPITAL OBSERVATION PER HR: HCPCS

## 2017-09-17 PROCEDURE — 85025 COMPLETE CBC W/AUTO DIFF WBC: CPT | Performed by: INTERNAL MEDICINE

## 2017-09-17 RX ORDER — ACETAMINOPHEN 325 MG/1
650 TABLET ORAL EVERY 4 HOURS PRN
Start: 2017-09-17 | End: 2021-02-17 | Stop reason: SDDI

## 2017-09-17 RX ADMIN — ASPIRIN 325 MG: 325 TABLET ORAL at 09:28

## 2017-09-17 NOTE — SIGNIFICANT NOTE
09/17/17 1427   PT Discharge Summary   Anticipated Discharge Disposition home with home health   Reason for Discharge Discharge from facility

## 2017-09-17 NOTE — DISCHARGE SUMMARY
NAME: Kimberlee Urrutia ADMIT: 2017   : 1940  PCP: Charbel Morrison MD    MRN: [id number] LOS: 1 days   AGE/SEX: 77 y.o. female  ROOM: 83/1     Date of Admission:  2017  Date of Discharge:  2017    PCP: Charbel Morrison MD    CHIEF COMPLAINT  Neurologic Problem      DISCHARGE DIAGNOSIS  Active Hospital Problems (** Indicates Principal Problem)    Diagnosis Date Noted   • **Acute cystitis without hematuria [N30.00] 2017   • Migraine without status migrainosus, not intractable [G43.909] 2017   • Leukocytosis [D72.829] 2017   • Pyuria [N39.0] 2017   • Transient alteration of awareness [R40.4] 2017   • Hyperlipidemia [E78.5] 2016   • Hypertension, essential [I10] 2016   • Arthritis [M19.90] 2016   • HLD (hyperlipidemia) [E78.5] 2016      Resolved Hospital Problems    Diagnosis Date Noted Date Resolved   • Altered mental status [R41.82] 2017   • Bandemia [D72.825] 2017       SECONDARY DIAGNOSES  Past Medical History:   Diagnosis Date   • Arthritis    • Cough    • Diarrhea    • Hx of migraine headaches    • Hyperlipidemia    • Irregular heart beat    • Osteoarthritis    • Vitamin D deficiency        CONSULTS   Dr. García- Neurology    HOSPITAL COURSE  Patient is a 77 y.o. female with history of Migraine headaches and arthritis. She had recently returned from a triop to [place]. She had seen her PCP and was diagnosed with UTI and given RX for cipro. On lab workup she did have elevated wbc count to 34k with possible bands. She also had some confusion and transient alteration of awareness. No neck stiffness.     She was admitted and started on IV ceftriaxone. Her urine cx grew out Ecoli. She had improvement in her WBC count and no positive blood cultures. CT abdomen negative for stones or other complicating features. No fevers in the hospital. She feels well and wishes to be discharged. She will finish the Rx of  Cipro that she was given prior to admission and follow up with Charbel Morrison MD in 1-2 weeks to repeat labs and make sure no current symptoms.     Neurology Dr. 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 Her MRI was negative for stroke and he did not feel this was secondary to seizure.     DIAGNOSTICS    CBC Auto Differential [[id number]] (Abnormal) Collected: 09/17/17 0556       Lab Status: Final result Specimen: Blood Updated: 09/17/17 0656        WBC 14.12 (H) 10*3/mm3         RBC 4.06 10*6/mm3         Hemoglobin 11.7 (L) g/dL         Hematocrit 34.8 (L) %         MCV 85.7 fL         MCH 28.8 pg         MCHC 33.6 g/dL         RDW 13.5 (H) %         RDW-SD 42.5 fl         MPV 11.2 fL         Platelets 196 10*3/mm3         Neutrophil % 81.4 (H) %         Lymphocyte % 9.8 (L) %         Monocyte % 7.4 %         Eosinophil % 0.0 (L) %         Basophil % 0.1 %         Immature Grans % 1.3 (H) %         Neutrophils, Absolute 11.50 (H) 10*3/mm3         Lymphocytes, Absolute 1.39 10*3/mm3         Monocytes, Absolute 1.04 10*3/mm3         Eosinophils, Absolute 0.00 10*3/mm3         Basophils, Absolute 0.01 10*3/mm3         Immature Grans, Absolute 0.18 (H) 10*3/mm3        Basic Metabolic Panel [[id number]] (Abnormal) Collected: 09/16/17 0449       Lab Status: Final result Specimen: Blood Updated: 09/16/17 0627        Glucose 81 mg/dL         BUN 13 mg/dL         Creatinine 0.56 (L) mg/dL         Sodium 142 mmol/L         Potassium 3.8 mmol/L         Chloride 108 (H) mmol/L         CO2 19.8 (L) mmol/L         Calcium 9.4 mg/dL         eGFR Non African Amer 105 mL/min/1.73         BUN/Creatinine Ratio 23.2        Anion Gap 14.2 mmol/L         TSH [[id number]] (Normal) Collected: 09/15/17 0513       Lab Status: Final result Specimen: Blood Updated: 09/15/17 0612        TSH 1.440 mIU/mL        Vitamin B12 [[id number]] (Normal) Collected: 09/15/17 0513       Lab Status: Final result Specimen: Blood  Updated: 09/15/17 0621        Vitamin B-12 712 pg/mL        Hemoglobin A1c [[id number]] (Normal) Collected: 09/15/17 0513       Lab Status: Final result Specimen: Blood Updated: 09/15/17 0545        Hemoglobin A1C 5.35 %        Narrative:         Hemoglobin A1C Ranges:    Increased Risk for Diabetes  5.7% to 6.4%  Diabetes                     >= 6.5%  Diabetic Goal                < 7.0%       Lipid Panel [[id number]] (Abnormal) Collected: 09/15/17 0513       Lab Status: Final result Specimen: Blood Updated: 09/15/17 0603        Total Cholesterol 119 mg/dL         Triglycerides 100 mg/dL         HDL Cholesterol 30 (L) mg/dL         LDL Cholesterol  69 mg/dL         VLDL Cholesterol 20 mg/dL         LDL/HDL Ratio 2.30        CT Abdomen Pelvis Stone Protocol [[id number]] Not Reviewed        Order Status: Completed Collected: 09/15/17 0837        Updated: 09/15/17 1210       Narrative:         CT ABDOMEN AND PELVIS WITHOUT CONTRAST     HISTORY: UTI and back pain.     TECHNIQUE: Axial CT images of the abdomen and pelvis were obtained  without administration of intravenous contrast. The patient was not  given oral contrast. Coronal and sagittal reformats were obtained.     COMPARISON: None.     FINDINGS: Contrast is seen within the bilateral renal collecting systems  and the urinary bladder secondary to the contrast enhanced CT exam  performed yesterday. Bilateral adrenal glands are normal. No definite  renal calculi or hydronephrosis is present. There is a partly exophytic  cyst at the superior pole of the left kidney measuring 2.8 cm. A small  amount of contrast is seen within the right lower pole cortex medially  which may be related to increased permeability. Bilateral ureters  demonstrate normal caliber.     The liver, spleen and pancreas are normal. Mild vicarious excretion of  contrast is seen within the gallbladder. The small and large bowel loops  demonstrate normal caliber. Diverticulosis is present. The appendix  is  normal. The uterus is anteverted and mildly heterogeneous. No abnormal  adnexal mass is seen. No pathological retroperitoneal adenopathy.  Left  lung base atelectasis is seen.          Impression:         1. Contrast within the renal collecting systems from the CT performed  yesterday. No renal calculi or hydronephrosis is seen.  2. Colonic diverticulosis.     This report was finalized on 9/15/2017 12:07 PM by Dr. Nancy Hussein MD.          MRI Brain Without Contrast [[id number]] Not Reviewed       Order Status: Completed Collected: 09/15/17 0906        Updated: 09/15/17 1655       Narrative:         MRI EXAMINATION OF THE BRAIN WITHOUT CONTRAST     HISTORY: Stroke, headache, confusion.     COMPARISON: CT angiogram 09/14/2017.     FINDINGS:     The lack of contrast reduces the sensitivity of the study.     There is no evidence of restricted diffusion to suggest acute  infarction. The brain ventricles are symmetrical. There is a mild degree  of increased signal intensity involving the white matter of the cerebral  hemispheres bilaterally on the axial T2 FLAIR sequence, nonspecific but  suggesting mild small vessel ischemic disease. There is no evidence of  mass or of mass effect on this noncontrasted MRI examination of the  brain. The gradient echo T2 sequence demonstrates a punctate area of  signal loss suggesting hemosiderin deposition related to the head of the  caudate nucleus on the right and there is T2 FLAIR hyperintensity  involving the head of the caudate nucleus suggesting a remote vascular  insult.     There is a small volume of fluid present within the mastoid air cells on  the left.          Impression:         The lack of contrast reduces the sensitivity of the study.  Mild small vessel ischemic disease. No evidence of acute infarction.  Small focus of hemosiderin deposition suggesting a punctate area of  remote hemorrhage involving the head of the caudate nucleus on the right  with adjacent T2  FLAIR hyperintensity involving the head of the caudate  nucleus, nonspecific but likely representing gliosis. Should prior MRI  examinations of the brain be made available for comparison, I would be  more than happy to compare the studies and to generate a supplemental  report.     This report was finalized on 9/15/2017 4:52 PM by Dr. Josue Kuo MD.          XR Chest 1 View [[id number]] Not Reviewed       Order Status: Completed Collected: 09/14/17 2125        Updated: 09/14/17 2129       Narrative:         PORTABLE CHEST X-RAY     CLINICAL HISTORY: Acute Stroke Protocol (onset < 12 hrs)     COMPARISON:  03/09/2013.     FINDINGS:  Single portable view of the chest obtained.  There are  various overlying monitor leads/artifacts in place. The lungs are well  expanded and clear where they can be visualized. There are some  scattered fibrotic changes which are felt to be stable and chronic.  Cardiac size is within normal limits.  Vascularity is normal considering  technique.  No pleural fluid is demonstrated by portable imaging.                  Impression:            No active disease by portable imaging.     This report was finalized on 9/14/2017 9:26 PM by Mark Powers MD.          CT Cerebral Perfusion With & Without Contrast [[id number]] Not Reviewed       Order Status: Completed Collected: 09/14/17 2044        Updated: 09/14/17 2106       Narrative:         CT ANGIOGRAM HEAD W WO CONTRAST-, CT CEREBRAL PERFUSION W WO CONTRAST-,  CT ANGIOGRAM NECK W WO CONTRAST-     CT ANGIOGRAM HEAD AND NECK AND CT PERFUSION STUDY     CLINICAL HISTORY: Headache, difficulty speaking.     Radiation dose reduction techniques were utilized, including automated  exposure control and exposure modulation based on body size. CT scan of  the head was obtained with 3 mm axial images without the use  of IV contrast. The patient underwent a CT  perfusion study with a dynamic bolus of IV contrast. Standard perfusion  maps were constructed.  The patient then underwent a CT angiogram of the  head and neck with 1 mm axial images following the administration of IV  contrast. Sagittal, coronal, and 3-dimensional reconstructed images were  obtained.  Percent stenosis was assessed in accordance with NASCET  criteria.     FINDINGS:     NONCONTRAST HEAD CT: On the noncontrast head CT, gray-white matter  differentiation is preserved, and no acute hemorrhage or hydrocephalus  is identified. Minimal partial opacification of inferior left mastoid  air cells.           CT PERFUSION STUDY:  No CBF (under 30%) deficit or perfusion abnormality  is demonstrated where the T MAX is greater than 6 seconds. The  calculated mismatch volume was 0 cc.     CTA HEAD and neck: The CT angiogram of the head and neck demonstrates no  hemodynamically significant focal stenosis, aneurysm, or dissection in  the cervical carotid or vertebral arteries, or in the arteries at the  base of the brain. Developmentally hypoplastic left A1 segment is noted.     No enhancing lesion of the brain postcontrast images.     Multilevel cervical spondyloarthropathy is present, predominantly at the  lower cervical levels.     Thyroid nodules bilaterally, right more than left, follow-up evaluation  thyroid ultrasound is suggested. Aortic calcification is seen.          Impression:               1. No perfusion abnormality to suggest acute or threatened infarct. If  further evaluation is indicated, MRI could be considered.  2. No arterial cutoff or hemodynamically significant focal stenosis in  the cervical carotid or vertebral arteries, or in the arteries at the  base of the brain.  3. No acute hemorrhage or hydrocephalus. No enhancing lesion of brain.  4. Thyroid nodules.                     PHYSICAL EXAM  Objective     Alert  Oriented x 3  No resp distress  Slight vesicles on lips  No neck stiffness  Wants to leave    CONDITION ON DISCHARGE  Stable.      DISCHARGE DISPOSITION   Home or Self  Care      DISCHARGE MEDICATIONS   Kimberlee Urrutia   Home Medication Instructions FRANCI:[id number]    Printed on:09/17/17 2559   Medication Information                      acetaminophen (TYLENOL) 325 MG tablet  Take 2 tablets by mouth Every 4 (Four) Hours As Needed for Mild Pain  or Fever (Temperature greater than or equal to 37 C).             ciprofloxacin (CIPRO) 250 MG tablet  Take 1 tablet by mouth 2 (Two) Times a Day.             fluticasone (FLONASE) 50 MCG/ACT nasal spray  USE 2 SPRAYS IN EACH NOSTRIL ONCE DAILY             loratadine (CLARITIN) 10 MG tablet  Take  by mouth daily.             Multiple Vitamins-Minerals (MULTI COMPLETE PO)  Take  by mouth daily.                Future Appointments  Date Time Provider Department Center   9/18/2017 12:45 PM Charbel Morrison MD [facility] None     Additional Instructions for the Follow-ups that You Need to Schedule     Discharge Follow-up with PCP    As directed    Follow Up Details:  1-2 weeks             Follow-up Information     Follow up with Charbel Morrison MD .    Specialty:  Internal Medicine    Why:  1-2 weeks    Contact information:    [address]  [address] [address] [phone]            TEST  RESULTS PENDING AT DISCHARGE   Order Current Status    Blood Culture Preliminary result    Blood Culture Preliminary result      No growth to date       Dre Colunga MD  [facility]  09/17/17  1:08 PM      It was a pleasure taking care of this patient while in the hospital.

## 2017-09-18 ENCOUNTER — TELEPHONE (OUTPATIENT)
Dept: FAMILY MEDICINE CLINIC | Facility: CLINIC | Age: 77
End: 2017-09-18

## 2017-09-18 NOTE — PROGRESS NOTES
Case Management Discharge Note    Final Note: Home    Discharge Placement     No information found        Other: Other    Discharge Codes: 01  Discharge to home

## 2017-09-19 LAB
BACTERIA SPEC AEROBE CULT: NORMAL
BACTERIA SPEC AEROBE CULT: NORMAL

## 2017-09-20 ENCOUNTER — TELEPHONE (OUTPATIENT)
Dept: FAMILY MEDICINE CLINIC | Facility: CLINIC | Age: 77
End: 2017-09-20

## 2017-09-20 NOTE — TELEPHONE ENCOUNTER
Kimberlee said that she is having a bad fever blister outbreak and wants to see if you will call in some valtrex for her?

## 2017-09-21 RX ORDER — VALACYCLOVIR HYDROCHLORIDE 1 G/1
1000 TABLET, FILM COATED ORAL 2 TIMES DAILY
Qty: 10 TABLET | Refills: 0 | Status: SHIPPED | OUTPATIENT
Start: 2017-09-21 | End: 2018-08-07

## 2017-09-27 ENCOUNTER — OFFICE VISIT (OUTPATIENT)
Dept: FAMILY MEDICINE CLINIC | Facility: CLINIC | Age: 77
End: 2017-09-27

## 2017-09-27 VITALS
TEMPERATURE: 98.2 F | HEIGHT: 64 IN | WEIGHT: 159 LBS | SYSTOLIC BLOOD PRESSURE: 116 MMHG | RESPIRATION RATE: 16 BRPM | OXYGEN SATURATION: 98 % | DIASTOLIC BLOOD PRESSURE: 68 MMHG | HEART RATE: 58 BPM | BODY MASS INDEX: 27.14 KG/M2

## 2017-09-27 DIAGNOSIS — N30.00 ACUTE CYSTITIS WITHOUT HEMATURIA: ICD-10-CM

## 2017-09-27 DIAGNOSIS — Z00.00 HEALTH CARE MAINTENANCE: Primary | ICD-10-CM

## 2017-09-27 DIAGNOSIS — R39.9 UTI SYMPTOMS: Primary | ICD-10-CM

## 2017-09-27 DIAGNOSIS — E55.9 VITAMIN D DEFICIENCY: ICD-10-CM

## 2017-09-27 DIAGNOSIS — E78.00 PURE HYPERCHOLESTEROLEMIA: ICD-10-CM

## 2017-09-27 LAB
BILIRUB BLD-MCNC: NEGATIVE MG/DL
CLARITY, POC: CLEAR
COLOR UR: YELLOW
GLUCOSE UR STRIP-MCNC: NEGATIVE MG/DL
KETONES UR QL: NEGATIVE
LEUKOCYTE EST, POC: NEGATIVE
NITRITE UR-MCNC: NEGATIVE MG/ML
PH UR: 7 [PH] (ref 5–8)
PROT UR STRIP-MCNC: NEGATIVE MG/DL
RBC # UR STRIP: NEGATIVE /UL
SP GR UR: 1.02 (ref 1–1.03)
UROBILINOGEN UR QL: NORMAL

## 2017-09-27 PROCEDURE — G0438 PPPS, INITIAL VISIT: HCPCS | Performed by: INTERNAL MEDICINE

## 2017-09-27 PROCEDURE — 81003 URINALYSIS AUTO W/O SCOPE: CPT | Performed by: INTERNAL MEDICINE

## 2017-09-27 PROCEDURE — 99213 OFFICE O/P EST LOW 20 MIN: CPT | Performed by: INTERNAL MEDICINE

## 2017-09-27 RX ORDER — TRAZODONE HYDROCHLORIDE 50 MG/1
50 TABLET ORAL NIGHTLY
Qty: 30 TABLET | Refills: 5 | Status: SHIPPED | OUTPATIENT
Start: 2017-09-27 | End: 2018-05-03

## 2017-09-27 NOTE — PROGRESS NOTES
QUICK REFERENCE INFORMATION:  The ABCs of the Annual Wellness Visit    Initial Medicare Wellness Visit    HEALTH RISK ASSESSMENT    1940    Recent Hospitalizations:  Recently treated at the following:  [facility].        Current Medical Providers:  Patient Care Team:  Charbel Morrison MD as PCP - General (Internal Medicine)        Smoking Status:  History   Smoking Status   • Never Smoker   Smokeless Tobacco   • Never Used       Alcohol Consumption:  History   Alcohol Use No       Depression Screen:   PHQ-2/PHQ-9 Depression Screening 9/27/2017   Little interest or pleasure in doing things 0   Feeling down, depressed, or hopeless 0   Trouble falling or staying asleep, or sleeping too much -   Feeling tired or having little energy -   Poor appetite or overeating -   Feeling bad about yourself - or that you are a failure or have let yourself or your family down -   Trouble concentrating on things, such as reading the newspaper or watching television -   Moving or speaking so slowly that other people could have noticed. Or the opposite - being so fidgety or restless that you have been moving around a lot more than usual -   Thoughts that you would be better off dead, or of hurting yourself in some way -   Total Score 0   If you checked off any problems, how difficult have these problems made it for you to do your work, take care of things at home, or get along with other people? -       Health Habits and Functional and Cognitive Screening:  Functional & Cognitive Status 9/27/2017   Do you have difficulty preparing food and eating? No   Do you have difficulty bathing yourself? No   Do you have difficulty getting dressed? No   Do you have difficulty using the toilet? No   Do you have difficulty moving around from place to place? No   In the past year have you fallen or experienced a near fall? No   Do you need help using the phone?  No   Are you deaf or do you have serious difficulty hearing?  No   Do you  need help with transportation? No   Do you need help shopping? No   Do you need help preparing meals?  No   Do you need help with housework?  No   Do you need help with laundry? No   Do you need help taking your medications? No   Do you need help managing money? No   Do you have difficulty concentrating, remembering or making decisions? Yes       Health Habits  Current Diet: Well Balanced Diet  Dental Exam: Up to date  Eye Exam: Up to date  Exercise (times per week): 5 times per week  Current Exercise Activities Include: Yard Work          Does the patient have evidence of cognitive impairment? No    Asiprin use counseling: Does not need ASA (and currently is not on it)      Recent Lab Results:    Visual Acuity:  No exam data present    Age-appropriate Screening Schedule:  Refer to the list below for future screening recommendations based on patient's age, sex and/or medical conditions. Orders for these recommended tests are listed in the plan section. The patient has been provided with a written plan.    Health Maintenance   Topic Date Due   • PNEUMOCOCCAL VACCINES (65+ LOW/MEDIUM RISK) (2 of 2 - PPSV23) 09/14/2018   • LIPID PANEL  09/15/2018   • MAMMOGRAM  09/14/2019   • TDAP/TD VACCINES (2 - Td) 09/14/2027   • INFLUENZA VACCINE  Completed   • ZOSTER VACCINE  Addressed        Subjective   History of Present Illness- This patient presents today for an adult wellness exam plus a follow-up for recent hospitalization she had a urinary tract infection and likely a kidney infection with dehydration.  The bacteria was Escherichia coli.  Overall she has improved on Zoloft or having headache or fever.  We reviewed her labs and her cholesterols under good control she's has slight vitamin D deficiency.  She is caught up on her routine screenings and vaccines.    Assessment plan    Health care maintenance-continue healthy lifestyle recommend staying active with healthy diet and exercise.    Hypercholesterolemia-controlled on  diet    Vitamin D deficiency 1000 units of vitamin D daily    Acute UTI resolved we rechecked her urine is clear.    Kimberlee Urrutia is a 77 y.o. female who presents for an Annual Wellness Visit.    The following portions of the patient's history were reviewed and updated as appropriate: allergies, current medications, past family history, past medical history, past social history, past surgical history and problem list.    Outpatient Medications Prior to Visit   Medication Sig Dispense Refill   • acetaminophen (TYLENOL) 325 MG tablet Take 2 tablets by mouth Every 4 (Four) Hours As Needed for Mild Pain  or Fever (Temperature greater than or equal to 37 C).     • fluticasone (FLONASE) 50 MCG/ACT nasal spray USE 2 SPRAYS IN EACH NOSTRIL ONCE DAILY 1 bottle 5   • loratadine (CLARITIN) 10 MG tablet Take  by mouth daily.     • Multiple Vitamins-Minerals (MULTI COMPLETE PO) Take  by mouth daily.     • valACYclovir (VALTREX) 1000 MG tablet Take 1 tablet by mouth 2 (Two) Times a Day. 10 tablet 0   • ciprofloxacin (CIPRO) 250 MG tablet Take 1 tablet by mouth 2 (Two) Times a Day. 14 tablet 0     No facility-administered medications prior to visit.        Patient Active Problem List   Diagnosis   • Arthritis   • HLD (hyperlipidemia)   • Health care maintenance   • Knee pain, right   • Hx of total knee arthroplasty   • Seasonal allergies   • Acute cystitis without hematuria   • Vitamin D deficiency   • Pyuria   • Transient alteration of awareness   • Migraine without status migrainosus, not intractable   • Leukocytosis       Advance Care Planning:  has an advance directive - a copy HAS NOT been provided. Have asked the patient to send this to us to add to record.    Identification of Risk Factors:  Risk factors include: weight .    Review of Systems   Constitutional: Positive for fatigue. Negative for fever.   HENT: Negative.    Eyes: Negative.    Respiratory: Negative.    Cardiovascular: Negative.    Gastrointestinal:  "Negative.    Endocrine: Negative.    Genitourinary: Negative.    Musculoskeletal: Negative.    Skin: Negative.    Allergic/Immunologic: Negative.    Neurological: Negative.    Hematological: Negative.    Psychiatric/Behavioral: Negative.        Compared to one year ago, the patient feels her physical health is worse.  Compared to one year ago, the patient feels her mental health is worse.    Objective     Physical Exam   Constitutional: She is oriented to person, place, and time. She appears well-developed and well-nourished. No distress.   HENT:   Head: Normocephalic and atraumatic.   Eyes: Conjunctivae and EOM are normal. Pupils are equal, round, and reactive to light. Right eye exhibits no discharge. Left eye exhibits no discharge. No scleral icterus.   Neck: Normal range of motion. Neck supple. No tracheal deviation present. No thyromegaly present.   Cardiovascular: Normal rate, regular rhythm, normal heart sounds and normal pulses.  Exam reveals no gallop.    No murmur heard.  Pulmonary/Chest: Effort normal and breath sounds normal. No respiratory distress. She has no wheezes. She has no rales.   Abdominal: Soft. Bowel sounds are normal. There is no tenderness.   Musculoskeletal: Normal range of motion.   Neurological: She is alert and oriented to person, place, and time.   Skin: Skin is warm and dry. No rash noted. No erythema. No pallor.   Psychiatric: She has a normal mood and affect. Her behavior is normal. Judgment and thought content normal.   Nursing note and vitals reviewed.      Vitals:    09/27/17 1121   BP: 116/68   Pulse: 58   Resp: 16   Temp: 98.2 °F (36.8 °C)   TempSrc: Oral   SpO2: 98%   Weight: 159 lb (72.1 kg)   Height: 64\" (162.6 cm)       Body mass index is 27.29 kg/(m^2).  Discussed the patient's BMI with her. The BMI is in the acceptable range.    Assessment/Plan   Patient Self-Management and Personalized Health Advice  The patient has been provided with information about: designing advance " directives and preventive services including:   · Advance directive.    Visit Diagnoses:    ICD-10-CM ICD-9-CM   1. Health care maintenance Z00.00 V70.0   2. Pure hypercholesterolemia E78.00 272.0   3. Vitamin D deficiency E55.9 268.9   4. Acute cystitis without hematuria N30.00 595.0       No orders of the defined types were placed in this encounter.      Outpatient Encounter Prescriptions as of 9/27/2017   Medication Sig Dispense Refill   • acetaminophen (TYLENOL) 325 MG tablet Take 2 tablets by mouth Every 4 (Four) Hours As Needed for Mild Pain  or Fever (Temperature greater than or equal to 37 C).     • fluticasone (FLONASE) 50 MCG/ACT nasal spray USE 2 SPRAYS IN EACH NOSTRIL ONCE DAILY 1 bottle 5   • loratadine (CLARITIN) 10 MG tablet Take  by mouth daily.     • Multiple Vitamins-Minerals (MULTI COMPLETE PO) Take  by mouth daily.     • valACYclovir (VALTREX) 1000 MG tablet Take 1 tablet by mouth 2 (Two) Times a Day. 10 tablet 0   • traZODone (DESYREL) 50 MG tablet Take 1 tablet by mouth Every Night. 30 tablet 5   • [DISCONTINUED] ciprofloxacin (CIPRO) 250 MG tablet Take 1 tablet by mouth 2 (Two) Times a Day. 14 tablet 0     No facility-administered encounter medications on file as of 9/27/2017.        Reviewed use of high risk medication in the elderly: no  Reviewed for potential of harmful drug interactions in the elderly: no    Follow Up:  No Follow-up on file.     An After Visit Summary and PPPS with all of these plans were given to the patient.

## 2017-10-23 ENCOUNTER — OFFICE VISIT (OUTPATIENT)
Dept: FAMILY MEDICINE CLINIC | Facility: CLINIC | Age: 77
End: 2017-10-23

## 2017-10-23 VITALS
HEIGHT: 64 IN | BODY MASS INDEX: 27.45 KG/M2 | TEMPERATURE: 98 F | WEIGHT: 160.8 LBS | SYSTOLIC BLOOD PRESSURE: 132 MMHG | OXYGEN SATURATION: 93 % | DIASTOLIC BLOOD PRESSURE: 80 MMHG | HEART RATE: 69 BPM | RESPIRATION RATE: 16 BRPM

## 2017-10-23 DIAGNOSIS — J02.9 SORE THROAT: Primary | ICD-10-CM

## 2017-10-23 DIAGNOSIS — J02.9 VIRAL PHARYNGITIS: Primary | ICD-10-CM

## 2017-10-23 LAB
EXPIRATION DATE: NORMAL
INTERNAL CONTROL: NORMAL
Lab: NORMAL
S PYO AG THROAT QL: NEGATIVE

## 2017-10-23 PROCEDURE — 99213 OFFICE O/P EST LOW 20 MIN: CPT | Performed by: INTERNAL MEDICINE

## 2017-10-23 PROCEDURE — 87880 STREP A ASSAY W/OPTIC: CPT | Performed by: INTERNAL MEDICINE

## 2017-10-23 NOTE — PROGRESS NOTES
"Subjective   Patient ID: Kimberlee Urrutia is a 77 y.o. female presents with   Chief Complaint   Patient presents with   • Sore Throat     with drainage since friday       HPI - This patient presents today with complaints of a sore throat that started over the weekend and is worse in the afternoon she's had no fever symptoms are moderate.  No dysphagia.  We did a strep screen was negative    Assessment plan    Viral pharyngitis recommended Tylenol and Cepacol if not getting better she is to let us know.  She agreed.    Allergies   Allergen Reactions   • Penicillins Other (See Comments)     Tolerated Ceftriaxone during 09/2017 admission    • Sulfa Antibiotics Rash       The following portions of the patient's history were reviewed and updated as appropriate: allergies, current medications, past family history, past medical history, past social history, past surgical history and problem list.      Review of Systems   Constitutional: Negative for fatigue and fever.   HENT: Positive for sore throat. Negative for congestion.    Respiratory: Negative.    Cardiovascular: Negative.        Objective     Vitals:    10/23/17 0927   BP: 132/80   Pulse: 69   Resp: 16   Temp: 98 °F (36.7 °C)   TempSrc: Oral   SpO2: 93%   Weight: 160 lb 12.8 oz (72.9 kg)   Height: 64\" (162.6 cm)         Physical Exam   Constitutional: She appears well-developed and well-nourished.   HENT:   Head: Normocephalic and atraumatic.   Soft palate slightly erythematous no exudate no enlarged tonsils no lymphadenopathy airway is normally patent   Psychiatric: She has a normal mood and affect. Her behavior is normal.   Nursing note and vitals reviewed.        Kimberlee was seen today for sore throat.    Diagnoses and all orders for this visit:    Viral pharyngitis        Call or return to clinic prn if these symptoms worsen or fail to improve as anticipated.  "

## 2018-04-24 ENCOUNTER — TRANSCRIBE ORDERS (OUTPATIENT)
Dept: ADMINISTRATIVE | Facility: HOSPITAL | Age: 78
End: 2018-04-24

## 2018-04-24 DIAGNOSIS — Z12.31 VISIT FOR SCREENING MAMMOGRAM: Primary | ICD-10-CM

## 2018-05-03 ENCOUNTER — OFFICE VISIT (OUTPATIENT)
Dept: FAMILY MEDICINE CLINIC | Facility: CLINIC | Age: 78
End: 2018-05-03

## 2018-05-03 VITALS
SYSTOLIC BLOOD PRESSURE: 120 MMHG | WEIGHT: 169.1 LBS | HEART RATE: 58 BPM | BODY MASS INDEX: 28.87 KG/M2 | OXYGEN SATURATION: 98 % | TEMPERATURE: 98.3 F | DIASTOLIC BLOOD PRESSURE: 80 MMHG | HEIGHT: 64 IN

## 2018-05-03 DIAGNOSIS — G89.29 CHRONIC PAIN OF RIGHT KNEE: Primary | ICD-10-CM

## 2018-05-03 DIAGNOSIS — Z00.00 HEALTH CARE MAINTENANCE: ICD-10-CM

## 2018-05-03 DIAGNOSIS — M25.561 CHRONIC PAIN OF RIGHT KNEE: Primary | ICD-10-CM

## 2018-05-03 PROCEDURE — 99213 OFFICE O/P EST LOW 20 MIN: CPT | Performed by: INTERNAL MEDICINE

## 2018-05-03 NOTE — PROGRESS NOTES
"Subjective   Patient ID: Kimberlee Urrutia is a 78 y.o. female presents with   Chief Complaint   Patient presents with   • Joint Pain     in knees has been going on for a while        HPI - This patient presents today for complaints of arthritis in general but in the knees as well.  In the past she took Relafen but it was making her blood pressure go up.  Blood pressures really good today.  I recommend she try to lose some weight and exercise.    Assessment plan    Chronic arthritic pain in the right knee-recommend trying a low-dose NSAID such as 200 mg of ibuprofen with food monitor blood pressure with this.  Also recommend exercise diet weight loss.    Health care maintenance lifestyle modification.    Allergies   Allergen Reactions   • Penicillins Other (See Comments)     Tolerated Ceftriaxone during 09/2017 admission    • Sulfa Antibiotics Rash       The following portions of the patient's history were reviewed and updated as appropriate: allergies, current medications, past family history, past medical history, past social history, past surgical history and problem list.      Review of Systems   Constitutional: Negative.    Musculoskeletal: Positive for arthralgias and gait problem.   Psychiatric/Behavioral: Negative.        Objective     Vitals:    05/03/18 0921   BP: 120/80   BP Location: Left arm   Patient Position: Sitting   Cuff Size: Adult   Pulse: 58   Temp: 98.3 °F (36.8 °C)   TempSrc: Oral   SpO2: 98%   Weight: 76.7 kg (169 lb 1.6 oz)   Height: 162.6 cm (64.02\")         Physical Exam   Constitutional: She is oriented to person, place, and time. She appears well-developed and well-nourished.   Musculoskeletal: She exhibits no edema, tenderness or deformity.   Neurological: She is alert and oriented to person, place, and time.   Nursing note and vitals reviewed.        Kimberlee was seen today for joint pain.    Diagnoses and all orders for this visit:    Chronic pain of right knee    Health care " maintenance        Call or return to clinic prn if these symptoms worsen or fail to improve as anticipated.

## 2018-05-04 ENCOUNTER — HOSPITAL ENCOUNTER (OUTPATIENT)
Dept: MAMMOGRAPHY | Facility: HOSPITAL | Age: 78
Discharge: HOME | End: 2018-05-04
Admitting: INTERNAL MEDICINE

## 2018-05-04 DIAGNOSIS — Z12.31 VISIT FOR SCREENING MAMMOGRAM: ICD-10-CM

## 2018-05-04 PROCEDURE — 77063 BREAST TOMOSYNTHESIS BI: CPT

## 2018-05-04 PROCEDURE — 77067 SCR MAMMO BI INCL CAD: CPT

## 2018-06-29 ENCOUNTER — OFFICE VISIT (OUTPATIENT)
Dept: RETAIL CLINIC | Facility: CLINIC | Age: 78
End: 2018-06-29

## 2018-06-29 VITALS
HEART RATE: 72 BPM | DIASTOLIC BLOOD PRESSURE: 78 MMHG | RESPIRATION RATE: 18 BRPM | TEMPERATURE: 97.8 F | SYSTOLIC BLOOD PRESSURE: 128 MMHG | OXYGEN SATURATION: 98 %

## 2018-06-29 DIAGNOSIS — N39.0 URINARY TRACT INFECTION WITH HEMATURIA, SITE UNSPECIFIED: Primary | ICD-10-CM

## 2018-06-29 DIAGNOSIS — R31.9 URINARY TRACT INFECTION WITH HEMATURIA, SITE UNSPECIFIED: Primary | ICD-10-CM

## 2018-06-29 LAB
BILIRUB BLD-MCNC: NEGATIVE MG/DL
CLARITY, POC: ABNORMAL
COLOR UR: YELLOW
GLUCOSE UR STRIP-MCNC: NEGATIVE MG/DL
KETONES UR QL: NEGATIVE
LEUKOCYTE EST, POC: ABNORMAL
NITRITE UR-MCNC: POSITIVE MG/ML
PH UR: 7 [PH] (ref 5–8)
PROT UR STRIP-MCNC: ABNORMAL MG/DL
RBC # UR STRIP: ABNORMAL /UL
SP GR UR: 1.02 (ref 1–1.03)
UROBILINOGEN UR QL: ABNORMAL

## 2018-06-29 PROCEDURE — 99213 OFFICE O/P EST LOW 20 MIN: CPT | Performed by: NURSE PRACTITIONER

## 2018-06-29 PROCEDURE — 81003 URINALYSIS AUTO W/O SCOPE: CPT | Performed by: NURSE PRACTITIONER

## 2018-06-29 RX ORDER — NITROFURANTOIN 25; 75 MG/1; MG/1
100 CAPSULE ORAL 2 TIMES DAILY
Qty: 10 CAPSULE | Refills: 0 | Status: SHIPPED | OUTPATIENT
Start: 2018-06-29 | End: 2018-08-07

## 2018-06-29 NOTE — PROGRESS NOTES
Subjective:     Kimberlee Urrutia is a 78 y.o.     Urinary Tract Infection    This is a new (had one uti in pst and id not even know she had it until she was hospitalized) problem. The current episode started yesterday. The quality of the pain is described as burning. Associated symptoms include frequency. Pertinent negatives include no discharge, flank pain, hematuria, hesitancy, nausea, urgency or vomiting. She has tried nothing for the symptoms. Recurrent UTIs: second one.         The following portions of the patient's history were reviewed and updated as appropriate: allergies, current medications, past family history, past medical history, past social history, past surgical history and problem list.      Review of Systems   Respiratory: Negative.    Cardiovascular: Negative.    Gastrointestinal: Negative for diarrhea, nausea and vomiting.   Genitourinary: Positive for frequency. Negative for flank pain, hematuria, hesitancy and urgency.         Objective:      Physical Exam   Cardiovascular: Normal rate, regular rhythm and normal heart sounds.    Pulmonary/Chest: Effort normal and breath sounds normal.   Abdominal: There is no tenderness.   Vitals reviewed.          Diagnoses and all orders for this visit:    Urinary tract infection with hematuria, site unspecified  -     POC Urinalysis Dipstick, Automated  -     Urine Culture - Urine, Urine, Clean Catch    Culture pending. Keep appointment with new PCP.

## 2018-07-04 LAB
BACTERIA UR CULT: ABNORMAL
BACTERIA UR CULT: ABNORMAL
OTHER ANTIBIOTIC SUSC ISLT: ABNORMAL

## 2018-07-06 ENCOUNTER — TELEPHONE (OUTPATIENT)
Dept: RETAIL CLINIC | Facility: CLINIC | Age: 78
End: 2018-07-06

## 2018-07-06 NOTE — TELEPHONE ENCOUNTER
Notified patient of final urinary culture results.  She states symptoms are gone and she has completed her medication.  Encouraged to follow up with PCP if any new symptoms or concerns.

## 2018-08-07 ENCOUNTER — OFFICE VISIT (OUTPATIENT)
Dept: FAMILY MEDICINE CLINIC | Facility: CLINIC | Age: 78
End: 2018-08-07

## 2018-08-07 VITALS
HEART RATE: 59 BPM | DIASTOLIC BLOOD PRESSURE: 62 MMHG | OXYGEN SATURATION: 96 % | SYSTOLIC BLOOD PRESSURE: 128 MMHG | RESPIRATION RATE: 16 BRPM | TEMPERATURE: 97.7 F | HEIGHT: 64 IN | BODY MASS INDEX: 28.85 KG/M2 | WEIGHT: 169 LBS

## 2018-08-07 DIAGNOSIS — R31.21 ASYMPTOMATIC MICROSCOPIC HEMATURIA: ICD-10-CM

## 2018-08-07 DIAGNOSIS — E04.1 THYROID NODULE: ICD-10-CM

## 2018-08-07 DIAGNOSIS — D72.829 LEUKOCYTOSIS, UNSPECIFIED TYPE: Primary | ICD-10-CM

## 2018-08-07 DIAGNOSIS — E55.9 VITAMIN D DEFICIENCY: ICD-10-CM

## 2018-08-07 DIAGNOSIS — Z78.0 POST-MENOPAUSAL: ICD-10-CM

## 2018-08-07 DIAGNOSIS — D64.9 LOW HEMOGLOBIN: ICD-10-CM

## 2018-08-07 PROBLEM — N30.00 ACUTE CYSTITIS WITHOUT HEMATURIA: Status: RESOLVED | Noted: 2017-09-14 | Resolved: 2018-08-07

## 2018-08-07 PROCEDURE — 99214 OFFICE O/P EST MOD 30 MIN: CPT | Performed by: PHYSICIAN ASSISTANT

## 2018-08-07 RX ORDER — DOXYCYCLINE HYCLATE 100 MG/1
100 CAPSULE ORAL 2 TIMES DAILY
Qty: 20 CAPSULE | Refills: 0 | Status: SHIPPED | OUTPATIENT
Start: 2018-08-07 | End: 2018-10-22 | Stop reason: SDUPTHER

## 2018-08-07 NOTE — PATIENT INSTRUCTIONS
Low glycemic index diet  Exercise 30 minutes most days of the week  Make sure you get results on any labs or tests we ordered today  We discussed medications and how to take them as prescribed  Sleep 6-8 hours each night if possible  If you have not signed up for [employer], please activate your code ASAP from your After Visit Summary today    LDL goal <100  LDL goal if heart disease <70  HDL goal >60  Triglyceride goal <150  BP goal =<130/80  Fasting glucose <100

## 2018-08-07 NOTE — PROGRESS NOTES
Subjective   Kimberlee Urrutia is a 78 y.o. female.     History of Present Illness   Kimberlee Urrutia 78 y.o. female who presents today for a new patient appointment.    she has a history of   Patient Active Problem List   Diagnosis   • Arthritis   • HLD (hyperlipidemia)   • Health care maintenance   • Knee pain, right   • Hx of total knee arthroplasty   • Seasonal allergies   • Vitamin D deficiency   • Pyuria   • Transient alteration of awareness   • Migraine without status migrainosus, not intractable   • Leukocytosis   • Viral pharyngitis   .  she is here to establish care I reviewed the PFSH recorded today by my MA/LPN staff.   she   She has been feeling well.  She had UTI end of June and see cx; I want to make sure no RBC in urine  Hx low Vit D  Lab Results   Component Value Date    WBC 14.12 (H) 09/17/2017    HGB 11.7 (L) 09/17/2017    HCT 34.8 (L) 09/17/2017    MCV 85.7 09/17/2017     09/17/2017     I want WBC  Lab Results   Component Value Date    CHOL 119 09/15/2017    CHLPL 146 09/14/2017    TRIG 100 09/15/2017    HDL 30 (L) 09/15/2017    LDL 69 09/15/2017     I will update all labs  Saw DR García Sept while inpatient     Neurology Dr. 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 Her MRI was negative for stroke and he did not feel this was secondary to seizure.    I need to have see Dr Kearns  1. No perfusion abnormality to suggest acute or threatened infarct. If  further evaluation is indicated, MRI could be considered.  2. No arterial cutoff or hemodynamically significant focal stenosis in  the cervical carotid or vertebral arteries, or in the arteries at the  base of the brain.  3. No acute hemorrhage or hydrocephalus. No enhancing lesion of brain.  4. Thyroid nodules.      ?had pneumonia vaccine?    I do want Shingrix    She does not want a statin    Kimberlee Urrutia 78 y.o. female presents for evaluation of possible cellulitis involving the lower leg insect bite.  This started 5 days ago. Lesions are described as red and is spreading into surrounding area, pustular with drainage and sore. Associated symptoms: sore and spreading; warm to touch. The symptoms were are gradual in onset. Patient has been treating this with Neosporin.   Patient denies: fever. Patient has not had contacts with similar symptoms.    Kimberlee Urrutia 78 y.o. female who presents today for routine follow up check and medication refills.  she has a history of   Patient Active Problem List   Diagnosis   • Arthritis   • HLD (hyperlipidemia)   • Health care maintenance   • Knee pain, right   • Hx of total knee arthroplasty   • Seasonal allergies   • Vitamin D deficiency   • Pyuria   • Transient alteration of awareness   • Migraine without status migrainosus, not intractable   • Leukocytosis   • Viral pharyngitis   .   she has overall felt well.  She has Seasonal allergies and is doing well on their medication PRN and Vitamin D deficiency and well controlled on medication and labs at goal >30.  she has been compliant with current medications have reviewed them.  The patient denies medication side effects.    Results for orders placed or performed in visit on 06/29/18   Urine Culture - Urine, Urine, Clean Catch   Result Value Ref Range    Urine Culture Final report (A)     Result 1 Escherichia coli (A)     Susceptibility Testing Comment    POC Urinalysis Dipstick, Automated   Result Value Ref Range    Color Yellow Yellow, Straw, Dark Yellow, Amanda    Clarity, UA Cloudy (A) Clear    Specific Gravity  1.020 1.005 - 1.030    pH, Urine 7.0 5.0 - 8.0    Leukocytes Large (3+) (A) Negative    Nitrite, UA Positive (A) Negative    Protein,  mg/dL (A) Negative mg/dL    Glucose, UA Negative Negative, 1000 mg/dL (3+) mg/dL    Ketones, UA Negative Negative    Urobilinogen, UA 1 E.U./dL  (A) Normal    Bilirubin Negative Negative    Blood, UA Large (A) Negative         The following portions of the patient's history  were reviewed and updated as appropriate: allergies, current medications, past family history, past medical history, past social history, past surgical history and problem list.    Review of Systems   Constitutional: Negative for activity change, appetite change and unexpected weight change.   HENT: Negative for nosebleeds and trouble swallowing.    Eyes: Negative for pain and visual disturbance.   Respiratory: Negative for chest tightness, shortness of breath and wheezing.    Cardiovascular: Negative for chest pain and palpitations.   Gastrointestinal: Negative for abdominal pain and blood in stool.   Endocrine: Negative.    Genitourinary: Negative for difficulty urinating and hematuria.   Musculoskeletal: Negative for joint swelling.   Skin: Positive for wound. Negative for color change and rash.   Allergic/Immunologic: Negative.    Neurological: Negative for syncope and speech difficulty.   Hematological: Negative for adenopathy.   Psychiatric/Behavioral: Negative for agitation and confusion.   All other systems reviewed and are negative.      Objective   Physical Exam   Constitutional: She is oriented to person, place, and time. She appears well-developed and well-nourished. No distress.   HENT:   Head: Normocephalic and atraumatic.   Eyes: Pupils are equal, round, and reactive to light. Conjunctivae and EOM are normal. Right eye exhibits no discharge. Left eye exhibits no discharge. No scleral icterus.   Neck: Normal range of motion. Neck supple. No tracheal deviation present. No thyromegaly present.   Cardiovascular: Normal rate, regular rhythm, normal heart sounds, intact distal pulses and normal pulses.  Exam reveals no gallop.    No murmur heard.  Pulmonary/Chest: Effort normal and breath sounds normal. No respiratory distress. She has no wheezes. She has no rales.   Musculoskeletal: Normal range of motion.   Neurological: She is alert and oriented to person, place, and time. She exhibits normal muscle  tone. Coordination normal.   Skin: Skin is warm. No rash noted. There is erythema. No pallor.   Left lateral mid distal leg with open pustule and center open and about 3mm and surrounding erythema and edema out 2cm   Psychiatric: She has a normal mood and affect. Her behavior is normal. Judgment and thought content normal.   Nursing note and vitals reviewed.      Assessment/Plan   Problems Addressed this Visit        Digestive    Vitamin D deficiency    Relevant Orders    Comprehensive metabolic panel    CBC and Differential    TSH    T4, Free    T3, Free    Vitamin B12    Folate    Vitamin D 25 Hydroxy    Urinalysis With Microscopic - Urine, Clean Catch    Iron Profile    Ferritin       Immune and Lymphatic    Leukocytosis - Primary    Relevant Orders    Comprehensive metabolic panel    CBC and Differential    TSH    T4, Free    T3, Free    Vitamin B12    Folate    Vitamin D 25 Hydroxy    Urinalysis With Microscopic - Urine, Clean Catch    Iron Profile    Ferritin      Other Visit Diagnoses     Asymptomatic microscopic hematuria        Relevant Orders    Comprehensive metabolic panel    CBC and Differential    TSH    T4, Free    T3, Free    Vitamin B12    Folate    Vitamin D 25 Hydroxy    Urinalysis With Microscopic - Urine, Clean Catch    Iron Profile    Ferritin    Thyroid nodule        Relevant Orders    Ambulatory Referral to ENT (Otolaryngology) (Completed)    Comprehensive metabolic panel    CBC and Differential    TSH    T4, Free    T3, Free    Vitamin B12    Folate    Vitamin D 25 Hydroxy    Urinalysis With Microscopic - Urine, Clean Catch    Iron Profile    Ferritin    Low hemoglobin        Relevant Orders    Comprehensive metabolic panel    CBC and Differential    TSH    T4, Free    T3, Free    Vitamin B12    Folate    Vitamin D 25 Hydroxy    Urinalysis With Microscopic - Urine, Clean Catch    Iron Profile    Ferritin    Post-menopausal        Relevant Orders    DEXA Bone Density Axial

## 2018-08-17 ENCOUNTER — TRANSCRIBE ORDERS (OUTPATIENT)
Dept: ADMINISTRATIVE | Facility: HOSPITAL | Age: 78
End: 2018-08-17

## 2018-08-17 DIAGNOSIS — E04.2 MULTINODULAR GOITER: Primary | ICD-10-CM

## 2018-08-22 ENCOUNTER — HOSPITAL ENCOUNTER (OUTPATIENT)
Dept: ULTRASOUND IMAGING | Facility: HOSPITAL | Age: 78
Discharge: HOME | End: 2018-08-22
Attending: OTOLARYNGOLOGY | Admitting: OTOLARYNGOLOGY

## 2018-08-22 ENCOUNTER — LAB (OUTPATIENT)
Dept: LAB | Facility: HOSPITAL | Age: 78
End: 2018-08-22
Attending: OTOLARYNGOLOGY

## 2018-08-22 ENCOUNTER — APPOINTMENT (OUTPATIENT)
Dept: LAB | Facility: HOSPITAL | Age: 78
End: 2018-08-22

## 2018-08-22 ENCOUNTER — TRANSCRIBE ORDERS (OUTPATIENT)
Dept: ADMINISTRATIVE | Facility: HOSPITAL | Age: 78
End: 2018-08-22

## 2018-08-22 DIAGNOSIS — E04.2 MULTINODULAR GOITER: ICD-10-CM

## 2018-08-22 DIAGNOSIS — E04.2 MULTINODULAR GOITER: Primary | ICD-10-CM

## 2018-08-22 LAB
25(OH)D3 SERPL-MCNC: 23.2 NG/ML (ref 30–100)
ALBUMIN SERPL-MCNC: 4.4 G/DL (ref 3.5–5.2)
ALBUMIN/GLOB SERPL: 1.9 G/DL
ALP SERPL-CCNC: 59 U/L (ref 39–117)
ALT SERPL W P-5'-P-CCNC: 27 U/L (ref 1–33)
ANION GAP SERPL CALCULATED.3IONS-SCNC: 7.8 MMOL/L
AST SERPL-CCNC: 19 U/L (ref 1–32)
BACTERIA UR QL AUTO: NORMAL /HPF
BASOPHILS # BLD AUTO: 0.01 10*3/MM3 (ref 0–0.2)
BASOPHILS NFR BLD AUTO: 0.2 % (ref 0–1.5)
BILIRUB SERPL-MCNC: 0.4 MG/DL (ref 0.1–1.2)
BILIRUB UR QL STRIP: NEGATIVE
BUN BLD-MCNC: 11 MG/DL (ref 8–23)
BUN/CREAT SERPL: 14.5 (ref 7–25)
CALCIUM SPEC-SCNC: 9.5 MG/DL (ref 8.6–10.5)
CHLORIDE SERPL-SCNC: 105 MMOL/L (ref 98–107)
CLARITY UR: CLEAR
CO2 SERPL-SCNC: 28.2 MMOL/L (ref 22–29)
COLOR UR: YELLOW
CREAT BLD-MCNC: 0.76 MG/DL (ref 0.57–1)
DEPRECATED RDW RBC AUTO: 44.6 FL (ref 37–54)
EOSINOPHIL # BLD AUTO: 0 10*3/MM3 (ref 0–0.7)
EOSINOPHIL NFR BLD AUTO: 0 % (ref 0.3–6.2)
ERYTHROCYTE [DISTWIDTH] IN BLOOD BY AUTOMATED COUNT: 13.8 % (ref 11.7–13)
FERRITIN SERPL-MCNC: 162.6 NG/ML (ref 13–150)
FOLATE SERPL-MCNC: 14.19 NG/ML (ref 4.78–24.2)
GFR SERPL CREATININE-BSD FRML MDRD: 73 ML/MIN/1.73
GLOBULIN UR ELPH-MCNC: 2.3 GM/DL
GLUCOSE BLD-MCNC: 95 MG/DL (ref 65–99)
GLUCOSE UR STRIP-MCNC: NEGATIVE MG/DL
HCT VFR BLD AUTO: 41 % (ref 35.6–45.5)
HGB BLD-MCNC: 13.5 G/DL (ref 11.9–15.5)
HGB UR QL STRIP.AUTO: NEGATIVE
HYALINE CASTS UR QL AUTO: NORMAL /LPF
IMM GRANULOCYTES # BLD: 0.01 10*3/MM3 (ref 0–0.03)
IMM GRANULOCYTES NFR BLD: 0.2 % (ref 0–0.5)
IRON 24H UR-MRATE: 71 MCG/DL (ref 37–145)
IRON SATN MFR SERPL: 23 % (ref 20–50)
KETONES UR QL STRIP: NEGATIVE
LEUKOCYTE ESTERASE UR QL STRIP.AUTO: NEGATIVE
LYMPHOCYTES # BLD AUTO: 1.98 10*3/MM3 (ref 0.9–4.8)
LYMPHOCYTES NFR BLD AUTO: 36.3 % (ref 19.6–45.3)
MCH RBC QN AUTO: 29.1 PG (ref 26.9–32)
MCHC RBC AUTO-ENTMCNC: 32.9 G/DL (ref 32.4–36.3)
MCV RBC AUTO: 88.4 FL (ref 80.5–98.2)
MONOCYTES # BLD AUTO: 0.47 10*3/MM3 (ref 0.2–1.2)
MONOCYTES NFR BLD AUTO: 8.6 % (ref 5–12)
NEUTROPHILS # BLD AUTO: 2.99 10*3/MM3 (ref 1.9–8.1)
NEUTROPHILS NFR BLD AUTO: 54.9 % (ref 42.7–76)
NITRITE UR QL STRIP: NEGATIVE
PH UR STRIP.AUTO: 7 [PH] (ref 5–8)
PLATELET # BLD AUTO: 225 10*3/MM3 (ref 140–500)
PMV BLD AUTO: 10.7 FL (ref 6–12)
POTASSIUM BLD-SCNC: 4.3 MMOL/L (ref 3.5–5.2)
PROT SERPL-MCNC: 6.7 G/DL (ref 6–8.5)
PROT UR QL STRIP: NEGATIVE
RBC # BLD AUTO: 4.64 10*6/MM3 (ref 3.9–5.2)
RBC # UR: NORMAL /HPF
REF LAB TEST METHOD: NORMAL
SODIUM BLD-SCNC: 141 MMOL/L (ref 136–145)
SP GR UR STRIP: 1.01 (ref 1–1.03)
SQUAMOUS #/AREA URNS HPF: NORMAL /HPF
T3FREE SERPL-MCNC: 2.82 PG/ML (ref 2–4.4)
T4 FREE SERPL-MCNC: 1.28 NG/DL (ref 0.93–1.7)
TIBC SERPL-MCNC: 308 MCG/DL
TRANSFERRIN SERPL-MCNC: 207 MG/DL (ref 200–360)
TSH SERPL DL<=0.05 MIU/L-ACNC: 3.2 MIU/ML (ref 0.27–4.2)
UROBILINOGEN UR QL STRIP: NORMAL
VIT B12 BLD-MCNC: 395 PG/ML (ref 211–946)
WBC NRBC COR # BLD: 5.45 10*3/MM3 (ref 4.5–10.7)
WBC UR QL AUTO: NORMAL /HPF

## 2018-08-22 PROCEDURE — 82306 VITAMIN D 25 HYDROXY: CPT | Performed by: PHYSICIAN ASSISTANT

## 2018-08-22 PROCEDURE — 84466 ASSAY OF TRANSFERRIN: CPT | Performed by: PHYSICIAN ASSISTANT

## 2018-08-22 PROCEDURE — 85025 COMPLETE CBC W/AUTO DIFF WBC: CPT | Performed by: PHYSICIAN ASSISTANT

## 2018-08-22 PROCEDURE — 84439 ASSAY OF FREE THYROXINE: CPT | Performed by: PHYSICIAN ASSISTANT

## 2018-08-22 PROCEDURE — 80053 COMPREHEN METABOLIC PANEL: CPT | Performed by: PHYSICIAN ASSISTANT

## 2018-08-22 PROCEDURE — 83540 ASSAY OF IRON: CPT | Performed by: PHYSICIAN ASSISTANT

## 2018-08-22 PROCEDURE — 84481 FREE ASSAY (FT-3): CPT | Performed by: PHYSICIAN ASSISTANT

## 2018-08-22 PROCEDURE — 82607 VITAMIN B-12: CPT | Performed by: PHYSICIAN ASSISTANT

## 2018-08-22 PROCEDURE — 76536 US EXAM OF HEAD AND NECK: CPT

## 2018-08-22 PROCEDURE — 84443 ASSAY THYROID STIM HORMONE: CPT

## 2018-08-22 PROCEDURE — 81001 URINALYSIS AUTO W/SCOPE: CPT | Performed by: PHYSICIAN ASSISTANT

## 2018-08-22 PROCEDURE — 82746 ASSAY OF FOLIC ACID SERUM: CPT | Performed by: PHYSICIAN ASSISTANT

## 2018-08-22 PROCEDURE — 36415 COLL VENOUS BLD VENIPUNCTURE: CPT | Performed by: PHYSICIAN ASSISTANT

## 2018-08-22 PROCEDURE — 82728 ASSAY OF FERRITIN: CPT | Performed by: PHYSICIAN ASSISTANT

## 2018-09-25 ENCOUNTER — HOSPITAL ENCOUNTER (OUTPATIENT)
Dept: BONE DENSITY | Facility: HOSPITAL | Age: 78
Discharge: HOME | End: 2018-09-25
Admitting: PHYSICIAN ASSISTANT

## 2018-09-25 DIAGNOSIS — Z78.0 POST-MENOPAUSAL: ICD-10-CM

## 2018-09-25 PROCEDURE — 77080 DXA BONE DENSITY AXIAL: CPT

## 2018-10-10 ENCOUNTER — OFFICE VISIT (OUTPATIENT)
Dept: FAMILY MEDICINE CLINIC | Facility: CLINIC | Age: 78
End: 2018-10-10

## 2018-10-10 VITALS
HEART RATE: 76 BPM | OXYGEN SATURATION: 96 % | HEIGHT: 64 IN | SYSTOLIC BLOOD PRESSURE: 128 MMHG | DIASTOLIC BLOOD PRESSURE: 70 MMHG | BODY MASS INDEX: 28.85 KG/M2 | WEIGHT: 169 LBS | TEMPERATURE: 98.4 F | RESPIRATION RATE: 16 BRPM

## 2018-10-10 DIAGNOSIS — R82.90 ABNORMAL URINE ODOR: ICD-10-CM

## 2018-10-10 DIAGNOSIS — R31.9 HEMATURIA, UNSPECIFIED TYPE: ICD-10-CM

## 2018-10-10 DIAGNOSIS — M85.89 OSTEOPENIA OF MULTIPLE SITES: Primary | ICD-10-CM

## 2018-10-10 PROBLEM — E04.2 MULTINODULAR THYROID: Status: ACTIVE | Noted: 2018-10-10

## 2018-10-10 LAB
BILIRUB BLD-MCNC: NEGATIVE MG/DL
CLARITY, POC: CLEAR
COLOR UR: YELLOW
GLUCOSE UR STRIP-MCNC: NEGATIVE MG/DL
KETONES UR QL: ABNORMAL
LEUKOCYTE EST, POC: ABNORMAL
NITRITE UR-MCNC: NEGATIVE MG/ML
PH UR: 5.5 [PH] (ref 5–8)
PROT UR STRIP-MCNC: NEGATIVE MG/DL
RBC # UR STRIP: ABNORMAL /UL
SP GR UR: 1.02 (ref 1–1.03)
UROBILINOGEN UR QL: NORMAL

## 2018-10-10 PROCEDURE — 99213 OFFICE O/P EST LOW 20 MIN: CPT | Performed by: PHYSICIAN ASSISTANT

## 2018-10-10 PROCEDURE — G0009 ADMIN PNEUMOCOCCAL VACCINE: HCPCS | Performed by: PHYSICIAN ASSISTANT

## 2018-10-10 PROCEDURE — 81003 URINALYSIS AUTO W/O SCOPE: CPT | Performed by: PHYSICIAN ASSISTANT

## 2018-10-10 PROCEDURE — G0439 PPPS, SUBSEQ VISIT: HCPCS | Performed by: PHYSICIAN ASSISTANT

## 2018-10-10 PROCEDURE — 90670 PCV13 VACCINE IM: CPT | Performed by: PHYSICIAN ASSISTANT

## 2018-10-10 PROCEDURE — 96160 PT-FOCUSED HLTH RISK ASSMT: CPT | Performed by: PHYSICIAN ASSISTANT

## 2018-10-10 RX ORDER — ALENDRONATE SODIUM 70 MG/1
70 TABLET ORAL
Qty: 5 TABLET | Refills: 12 | Status: SHIPPED | OUTPATIENT
Start: 2018-10-10 | End: 2020-08-10

## 2018-10-10 NOTE — PROGRESS NOTES
Subjective   Kimberlee Urrutia is a 78 y.o. female.     History of Present Illness   Kimberlee Urrutia 78 y.o. female who presents today for Osteopenia.  THis was compared to prior DEXA and significant decrease.  she has a history of   Patient Active Problem List   Diagnosis   • Arthritis   • HLD (hyperlipidemia)   • Health care maintenance   • Knee pain, right   • Hx of total knee arthroplasty   • Seasonal allergies   • Vitamin D deficiency   • Pyuria   • Transient alteration of awareness   • Migraine without status migrainosus, not intractable   • Leukocytosis   • Viral pharyngitis   • Multinodular thyroid   • Osteopenia of multiple sites   .      Est CC 71  Also Vit D was low  Labs show low Vitamin D levels.  I want you to add OTC Vitamin D 2,000 I.U. Daily.  I went over DEXA and want to treat; no jaw issues; No GERD; I will start Fosamax; watch GERD  Instructions on Fosamax use and side effects - particularly esophageal adverse events - are carefully reviewed with her. This drug must be taken upon arising for the day on an empty stomach, with a large 6-8 ounce glass of water; she must remain NPO in the upright position for at least 30 minutes afterwards and until after the first food of the day. If esophageal irritation is noted, she will stop the drug and call my office.  Urine is irritable; ? Odor  Get urine; will need cx and micro d/t POCT    The following portions of the patient's history were reviewed and updated as appropriate: allergies, current medications, past family history, past medical history, past social history, past surgical history and problem list.    Review of Systems   Constitutional: Negative for activity change, appetite change and unexpected weight change.   HENT: Negative for nosebleeds and trouble swallowing.    Eyes: Negative for pain and visual disturbance.   Respiratory: Negative for chest tightness, shortness of breath and wheezing.    Cardiovascular: Negative for chest pain and  palpitations.   Gastrointestinal: Negative for abdominal pain and blood in stool.   Endocrine: Negative.    Genitourinary: Negative for difficulty urinating and hematuria.   Musculoskeletal: Negative for joint swelling.   Skin: Negative for color change and rash.   Allergic/Immunologic: Negative.    Neurological: Negative for syncope and speech difficulty.   Hematological: Negative for adenopathy.   Psychiatric/Behavioral: Negative for agitation and confusion.   All other systems reviewed and are negative.      Objective   Physical Exam   Constitutional: She is oriented to person, place, and time. She appears well-developed and well-nourished. No distress.   HENT:   Head: Normocephalic and atraumatic.   Eyes: Pupils are equal, round, and reactive to light. Conjunctivae and EOM are normal. Right eye exhibits no discharge. Left eye exhibits no discharge. No scleral icterus.   Neck: Normal range of motion. Neck supple. No tracheal deviation present.   Cardiovascular: Normal rate, regular rhythm, normal heart sounds, intact distal pulses and normal pulses.  Exam reveals no gallop.    No murmur heard.  Pulmonary/Chest: Effort normal and breath sounds normal. No respiratory distress. She has no wheezes. She has no rales.   Musculoskeletal: Normal range of motion.   Neurological: She is alert and oriented to person, place, and time. She exhibits normal muscle tone. Coordination normal.   Skin: Skin is warm. No rash noted. No pallor.   Left lateral mid distal leg with open pustule and center open and about 3mm and surrounding erythema and edema out 2cm   Psychiatric: She has a normal mood and affect. Her behavior is normal. Judgment and thought content normal.   Nursing note and vitals reviewed.      Assessment/Plan   Problems Addressed this Visit        Musculoskeletal and Integument    Osteopenia of multiple sites - Primary      Other Visit Diagnoses     Abnormal urine odor        Relevant Orders    POC Urinalysis  Dipstick, Automated (Completed)    Hematuria, unspecified type        Relevant Orders    Urinalysis With Microscopic - Urine, Clean Catch    Urine Culture - Urine, Urine, Clean Catch

## 2018-10-10 NOTE — PROGRESS NOTES
QUICK REFERENCE INFORMATION:  The ABCs of the Annual Wellness Visit    Subsequent Medicare Wellness Visit    HEALTH RISK ASSESSMENT    1940    Recent Hospitalizations:  No hospitalization(s) within the last year..        Current Medical Providers:  Patient Care Team:  Eden Medina PA-C as PCP - General (Family Medicine)  Josue Wilson MD as Consulting Physician (Orthopedic Surgery)  Yash Wilson MD as Consulting Physician (Gastroenterology)  Avi Ferris MD (Ophthalmology)        Smoking Status:  History   Smoking Status   • Never Smoker   Smokeless Tobacco   • Never Used       Alcohol Consumption:  History   Alcohol Use No       Depression Screen:   PHQ-2/PHQ-9 Depression Screening 10/10/2018   Little interest or pleasure in doing things 0   Feeling down, depressed, or hopeless 0   Trouble falling or staying asleep, or sleeping too much -   Feeling tired or having little energy -   Poor appetite or overeating -   Feeling bad about yourself - or that you are a failure or have let yourself or your family down -   Trouble concentrating on things, such as reading the newspaper or watching television -   Moving or speaking so slowly that other people could have noticed. Or the opposite - being so fidgety or restless that you have been moving around a lot more than usual -   Thoughts that you would be better off dead, or of hurting yourself in some way -   Total Score 0   If you checked off any problems, how difficult have these problems made it for you to do your work, take care of things at home, or get along with other people? -       Health Habits and Functional and Cognitive Screening:  Functional & Cognitive Status 10/10/2018   Do you have difficulty preparing food and eating? No   Do you have difficulty bathing yourself, getting dressed or grooming yourself? No   Do you have difficulty using the toilet? No   Do you have difficulty moving around from place to place? No   Do you have trouble with  steps or getting out of a bed or a chair? No   In the past year have you fallen or experienced a near fall? Yes   Current Diet Low Fat Diet   Dental Exam Up to date   Eye Exam Up to date   Exercise (times per week) 3 times per week   Current Exercise Activities Include Yard Work   Do you need help using the phone?  No   Are you deaf or do you have serious difficulty hearing?  No   Do you need help with transportation? No   Do you need help shopping? No   Do you need help preparing meals?  No   Do you need help with housework?  No   Do you need help with laundry? No   Do you need help taking your medications? No   Do you need help managing money? No   Do you ever drive or ride in a car without wearing a seat belt? No   Have you felt unusual stress, anger or loneliness in the last month? No   Who do you live with? Alone   If you need help, do you have trouble finding someone available to you? No   Have you been bothered in the last four weeks by sexual problems? No   Do you have difficulty concentrating, remembering or making decisions? Yes           Does the patient have evidence of cognitive impairment? No    Aspirin use counseling: not on this      Recent Lab Results:  CMP:  Lab Results   Component Value Date    GLU 82 09/14/2017    BUN 11 08/22/2018    CREATININE 0.76 08/22/2018    EGFRIFNONA 73 08/22/2018    EGFRIFAFRI 58 (L) 09/14/2017    BCR 14.5 08/22/2018     08/22/2018    K 4.3 08/22/2018    CO2 28.2 08/22/2018    CALCIUM 9.5 08/22/2018    PROTENTOTREF 6.6 09/14/2017    ALBUMIN 4.40 08/22/2018    LABGLOBREF 2.5 09/14/2017    LABIL2 1.6 09/14/2017    BILITOT 0.4 08/22/2018    ALKPHOS 59 08/22/2018    AST 19 08/22/2018    ALT 27 08/22/2018     Lipid Panel:  Lab Results   Component Value Date    CHOL 119 09/15/2017    TRIG 100 09/15/2017    HDL 30 (L) 09/15/2017    VLDL 20 09/15/2017    LDLHDL 2.30 09/15/2017     HbA1c:  Lab Results   Component Value Date    HGBA1C 5.35 09/15/2017       Visual Acuity:  No  exam data present    Age-appropriate Screening Schedule:  Refer to the list below for future screening recommendations based on patient's age, sex and/or medical conditions. Orders for these recommended tests are listed in the plan section. The patient has been provided with a written plan.    Health Maintenance   Topic Date Due   • ZOSTER VACCINE (2 of 2) 11/09/2017   • PNEUMOCOCCAL VACCINES (65+ LOW/MEDIUM RISK) (2 of 2 - PPSV23) 09/14/2018   • LIPID PANEL  09/15/2018   • INFLUENZA VACCINE  11/10/2018 (Originally 8/1/2018)   • MAMMOGRAM  05/04/2020   • DXA SCAN  09/25/2020   • TDAP/TD VACCINES (2 - Td) 09/14/2027        Subjective   History of Present Illness    Kimberlee Urrutia is a 78 y.o. female who presents for an Subsequent Wellness Visit.    The following portions of the patient's history were reviewed and updated as appropriate: allergies, current medications, past family history, past medical history, past social history, past surgical history and problem list.    Outpatient Medications Prior to Visit   Medication Sig Dispense Refill   • acetaminophen (TYLENOL) 325 MG tablet Take 2 tablets by mouth Every 4 (Four) Hours As Needed for Mild Pain  or Fever (Temperature greater than or equal to 37 C).     • CBD (cannabidiol) oral oil Take  by mouth.     • doxycycline (VIBRAMYCIN) 100 MG capsule Take 1 capsule by mouth 2 (Two) Times a Day. For infection 7-10 days 20 capsule 0   • fluticasone (FLONASE) 50 MCG/ACT nasal spray USE 2 SPRAYS IN EACH NOSTRIL ONCE DAILY 1 bottle 5   • loratadine (CLARITIN) 10 MG tablet Take  by mouth daily.     • Multiple Vitamins-Minerals (MULTI COMPLETE PO) Take  by mouth daily.       No facility-administered medications prior to visit.        Patient Active Problem List   Diagnosis   • Arthritis   • HLD (hyperlipidemia)   • Health care maintenance   • Knee pain, right   • Hx of total knee arthroplasty   • Seasonal allergies   • Vitamin D deficiency   • Pyuria   • Transient  "alteration of awareness   • Migraine without status migrainosus, not intractable   • Leukocytosis   • Viral pharyngitis       Advance Care Planning:  has an advance directive - a copy HAS NOT been provided. Have asked the patient to send this to us to add to record.    Identification of Risk Factors:  Risk factors include: weight .    Review of Systems    Compared to one year ago, the patient feels her physical health is the same.  Compared to one year ago, the patient feels her mental health is the same.    Objective     Physical Exam    Vitals:    10/10/18 0922   BP: 128/70   BP Location: Left arm   Patient Position: Sitting   Cuff Size: Large Adult   Pulse: 76   Resp: 16   Temp: 98.4 °F (36.9 °C)   TempSrc: Oral   SpO2: 96%   Weight: 76.7 kg (169 lb)   Height: 162.6 cm (64.02\")   PainSc: 0-No pain       Patient's Body mass index is 28.99 kg/m². BMI is above normal parameters. Recommendations include: exercise counseling.      Assessment/Plan   Patient Self-Management and Personalized Health Advice  The patient has been provided with information about: exercise and weight management and preventive services including:   · Pneumococcal vaccine , Shingrix.    Visit Diagnoses:  No diagnosis found.    No orders of the defined types were placed in this encounter.      Outpatient Encounter Prescriptions as of 10/10/2018   Medication Sig Dispense Refill   • acetaminophen (TYLENOL) 325 MG tablet Take 2 tablets by mouth Every 4 (Four) Hours As Needed for Mild Pain  or Fever (Temperature greater than or equal to 37 C).     • CBD (cannabidiol) oral oil Take  by mouth.     • doxycycline (VIBRAMYCIN) 100 MG capsule Take 1 capsule by mouth 2 (Two) Times a Day. For infection 7-10 days 20 capsule 0   • fluticasone (FLONASE) 50 MCG/ACT nasal spray USE 2 SPRAYS IN EACH NOSTRIL ONCE DAILY 1 bottle 5   • loratadine (CLARITIN) 10 MG tablet Take  by mouth daily.     • Multiple Vitamins-Minerals (MULTI COMPLETE PO) Take  by mouth daily.  "      No facility-administered encounter medications on file as of 10/10/2018.        Reviewed use of high risk medication in the elderly: yes  Reviewed for potential of harmful drug interactions in the elderly: yes    Follow Up:  No Follow-up on file.     An After Visit Summary and PPPS with all of these plans were given to the patient.

## 2018-10-10 NOTE — PATIENT INSTRUCTIONS
Low glycemic index diet  Exercise 30 minutes most days of the week  Make sure you get results on any labs or tests we ordered today  We discussed medications and how to take them as prescribed  Sleep 6-8 hours each night if possible  If you have not signed up for [employer], please activate your code ASAP from your After Visit Summary today    LDL goal <100  LDL goal if heart disease <70  HDL goal >60  Triglyceride goal <150  BP goal =<130/80  Fasting glucose <100    Instructions on Fosamax use and side effects - particularly esophageal adverse events - are carefully reviewed with her. This drug must be taken upon arising for the day on an empty stomach, with a large 6-8 ounce glass of water; she must remain NPO in the upright position for at least 30 minutes afterwards and until after the first food of the day. If esophageal irritation is noted, she will stop the drug and call my office.        Fall Prevention in the Home  Falls can cause injuries and can affect people from all age groups. There are many simple things that you can do to make your home safe and to help prevent falls.  What can I do on the outside of my home?  · Regularly repair the edges of walkways and driveways and fix any cracks.  · Remove high doorway thresholds.  · Trim any shrubbery on the main path into your home.  · Use bright outdoor lighting.  · Clear walkways of debris and clutter, including tools and rocks.  · Regularly check that handrails are securely fastened and in good repair. Both sides of any steps should have handrails.  · Install guardrails along the edges of any raised decks or porches.  · Have leaves, snow, and ice cleared regularly.  · Use sand or salt on walkways during winter months.  · In the garage, clean up any spills right away, including grease or oil spills.  What can I do in the bathroom?  · Use night lights.  · Install grab bars by the toilet and in the tub and shower. Do not use towel bars as grab bars.  · Use  non-skid mats or decals on the floor of the tub or shower.  · If you need to sit down while you are in the shower, use a plastic, non-slip stool.  · Keep the floor dry. Immediately clean up any water that spills on the floor.  · Remove soap buildup in the tub or shower on a regular basis.  · Attach bath mats securely with double-sided non-slip rug tape.  · Remove throw rugs and other tripping hazards from the floor.  What can I do in the bedroom?  · Use night lights.  · Make sure that a bedside light is easy to reach.  · Do not use oversized bedding that drapes onto the floor.  · Have a firm chair that has side arms to use for getting dressed.  · Remove throw rugs and other tripping hazards from the floor.  What can I do in the kitchen?  · Clean up any spills right away.  · Avoid walking on wet floors.  · Place frequently used items in easy-to-reach places.  · If you need to reach for something above you, use a sturdy step stool that has a grab bar.  · Keep electrical cables out of the way.  · Do not use floor polish or wax that makes floors slippery. If you have to use wax, make sure that it is non-skid floor wax.  · Remove throw rugs and other tripping hazards from the floor.  What can I do in the stairways?  · Do not leave any items on the stairs.  · Make sure that there are handrails on both sides of the stairs. Fix handrails that are broken or loose. Make sure that handrails are as long as the stairways.  · Check any carpeting to make sure that it is firmly attached to the stairs. Fix any carpet that is loose or worn.  · Avoid having throw rugs at the top or bottom of stairways, or secure the rugs with carpet tape to prevent them from moving.  · Make sure that you have a light switch at the top of the stairs and the bottom of the stairs. If you do not have them, have them installed.  What are some other fall prevention tips?  · Wear closed-toe shoes that fit well and support your feet. Wear shoes that have  rubber soles or low heels.  · When you use a stepladder, make sure that it is completely opened and that the sides are firmly locked. Have someone hold the ladder while you are using it. Do not climb a closed stepladder.  · Add color or contrast paint or tape to grab bars and handrails in your home. Place contrasting color strips on the first and last steps.  · Use mobility aids as needed, such as canes, walkers, scooters, and crutches.  · Turn on lights if it is dark. Replace any light bulbs that burn out.  · Set up furniture so that there are clear paths. Keep the furniture in the same spot.  · Fix any uneven floor surfaces.  · Choose a carpet design that does not hide the edge of steps of a stairway.  · Be aware of any and all pets.  · Review your medicines with your healthcare provider. Some medicines can cause dizziness or changes in blood pressure, which increase your risk of falling.  Talk with your health care provider about other ways that you can decrease your risk of falls. This may include working with a physical therapist or  to improve your strength, balance, and endurance.  This information is not intended to replace advice given to you by your health care provider. Make sure you discuss any questions you have with your health care provider.  Document Released: 12/08/2003 Document Revised: 05/16/2017 Document Reviewed: 01/22/2016  Elsevier Interactive Patient Education © 2018 [employer]      Fall Prevention in the Home  Falls can cause injuries. They can happen to people of all ages. There are many things you can do to make your home safe and to help prevent falls.  What can I do on the outside of my home?  · Regularly fix the edges of walkways and driveways and fix any cracks.  · Remove anything that might make you trip as you walk through a door, such as a raised step or threshold.  · Trim any bushes or trees on the path to your home.  · Use bright outdoor lighting.  · Clear any walking  paths of anything that might make someone trip, such as rocks or tools.  · Regularly check to see if handrails are loose or broken. Make sure that both sides of any steps have handrails.  · Any raised decks and porches should have guardrails on the edges.  · Have any leaves, snow, or ice cleared regularly.  · Use sand or salt on walking paths during winter.  · Clean up any spills in your garage right away. This includes oil or grease spills.  What can I do in the bathroom?  · Use night lights.  · Install grab bars by the toilet and in the tub and shower. Do not use towel bars as grab bars.  · Use non-skid mats or decals in the tub or shower.  · If you need to sit down in the shower, use a plastic, non-slip stool.  · Keep the floor dry. Clean up any water that spills on the floor as soon as it happens.  · Remove soap buildup in the tub or shower regularly.  · Attach bath mats securely with double-sided non-slip rug tape.  · Do not have throw rugs and other things on the floor that can make you trip.  What can I do in the bedroom?  · Use night lights.  · Make sure that you have a light by your bed that is easy to reach.  · Do not use any sheets or blankets that are too big for your bed. They should not hang down onto the floor.  · Have a firm chair that has side arms. You can use this for support while you get dressed.  · Do not have throw rugs and other things on the floor that can make you trip.  What can I do in the kitchen?  · Clean up any spills right away.  · Avoid walking on wet floors.  · Keep items that you use a lot in easy-to-reach places.  · If you need to reach something above you, use a strong step stool that has a grab bar.  · Keep electrical cords out of the way.  · Do not use floor polish or wax that makes floors slippery. If you must use wax, use non-skid floor wax.  · Do not have throw rugs and other things on the floor that can make you trip.  What can I do with my stairs?  · Do not leave any items  on the stairs.  · Make sure that there are handrails on both sides of the stairs and use them. Fix handrails that are broken or loose. Make sure that handrails are as long as the stairways.  · Check any carpeting to make sure that it is firmly attached to the stairs. Fix any carpet that is loose or worn.  · Avoid having throw rugs at the top or bottom of the stairs. If you do have throw rugs, attach them to the floor with carpet tape.  · Make sure that you have a light switch at the top of the stairs and the bottom of the stairs. If you do not have them, ask someone to add them for you.  What else can I do to help prevent falls?  · Wear shoes that:  ? Do not have high heels.  ? Have rubber bottoms.  ? Are comfortable and fit you well.  ? Are closed at the toe. Do not wear sandals.  · If you use a stepladder:  ? Make sure that it is fully opened. Do not climb a closed stepladder.  ? Make sure that both sides of the stepladder are locked into place.  ? Ask someone to hold it for you, if possible.  · Clearly kaitlin and make sure that you can see:  ? Any grab bars or handrails.  ? First and last steps.  ? Where the edge of each step is.  · Use tools that help you move around (mobility aids) if they are needed. These include:  ? Canes.  ? Walkers.  ? Scooters.  ? Crutches.  · Turn on the lights when you go into a dark area. Replace any light bulbs as soon as they burn out.  · Set up your furniture so you have a clear path. Avoid moving your furniture around.  · If any of your floors are uneven, fix them.  · If there are any pets around you, be aware of where they are.  · Review your medicines with your doctor. Some medicines can make you feel dizzy. This can increase your chance of falling.  Ask your doctor what other things that you can do to help prevent falls.  This information is not intended to replace advice given to you by your health care provider. Make sure you discuss any questions you have with your health care  provider.  Document Released: 10/14/2010 Document Revised: 05/25/2017 Document Reviewed: 01/22/2016  [employer] Interactive Patient Education © 2018 [employer]

## 2018-10-14 LAB
APPEARANCE UR: CLEAR
BACTERIA #/AREA URNS HPF: ABNORMAL /[HPF]
BACTERIA UR CULT: ABNORMAL
BACTERIA UR CULT: ABNORMAL
BILIRUB UR QL STRIP: NEGATIVE
COLOR UR: YELLOW
CRYSTALS URNS MICRO: ABNORMAL
EPI CELLS #/AREA URNS HPF: ABNORMAL /HPF
GLUCOSE UR QL: NEGATIVE
HGB UR QL STRIP: NEGATIVE
KETONES UR QL STRIP: (no result)
LEUKOCYTE ESTERASE UR QL STRIP: (no result)
MICRO URNS: (no result)
MUCOUS THREADS URNS QL MICRO: PRESENT
NITRITE UR QL STRIP: NEGATIVE
OTHER ANTIBIOTIC SUSC ISLT: ABNORMAL
PH UR STRIP: 5.5 [PH] (ref 5–7.5)
PROT UR QL STRIP: NEGATIVE
RBC #/AREA URNS HPF: ABNORMAL /HPF
SP GR UR: 1.03 (ref 1–1.03)
UNIDENT CRYS URNS QL MICRO: PRESENT
UROBILINOGEN UR STRIP-MCNC: 0.2 MG/DL (ref 0.2–1)
WBC #/AREA URNS HPF: ABNORMAL /HPF

## 2018-10-22 ENCOUNTER — TELEPHONE (OUTPATIENT)
Dept: FAMILY MEDICINE CLINIC | Facility: CLINIC | Age: 78
End: 2018-10-22

## 2018-10-22 DIAGNOSIS — E55.9 VITAMIN D DEFICIENCY: Primary | ICD-10-CM

## 2018-10-22 DIAGNOSIS — N30.00 ACUTE CYSTITIS WITHOUT HEMATURIA: ICD-10-CM

## 2018-10-22 RX ORDER — DOXYCYCLINE HYCLATE 100 MG/1
100 CAPSULE ORAL 2 TIMES DAILY
Qty: 20 CAPSULE | Refills: 0 | Status: SHIPPED | OUTPATIENT
Start: 2018-10-22 | End: 2019-07-31

## 2019-03-13 ENCOUNTER — OFFICE VISIT (OUTPATIENT)
Dept: RETAIL CLINIC | Facility: CLINIC | Age: 79
End: 2019-03-13

## 2019-03-13 VITALS
SYSTOLIC BLOOD PRESSURE: 120 MMHG | HEART RATE: 62 BPM | DIASTOLIC BLOOD PRESSURE: 77 MMHG | OXYGEN SATURATION: 99 % | TEMPERATURE: 97.9 F

## 2019-03-13 DIAGNOSIS — N39.0 URINARY TRACT INFECTION WITHOUT HEMATURIA, SITE UNSPECIFIED: Primary | ICD-10-CM

## 2019-03-13 LAB
BILIRUB BLD-MCNC: ABNORMAL MG/DL
CLARITY, POC: ABNORMAL
COLOR UR: ABNORMAL
GLUCOSE UR STRIP-MCNC: NEGATIVE MG/DL
KETONES UR QL: ABNORMAL
LEUKOCYTE EST, POC: ABNORMAL
NITRITE UR-MCNC: NEGATIVE MG/ML
PH UR: 6.5 [PH] (ref 5–8)
PROT UR STRIP-MCNC: ABNORMAL MG/DL
RBC # UR STRIP: ABNORMAL /UL
SP GR UR: 1.02 (ref 1–1.03)
UROBILINOGEN UR QL: ABNORMAL

## 2019-03-13 PROCEDURE — 81003 URINALYSIS AUTO W/O SCOPE: CPT | Performed by: NURSE PRACTITIONER

## 2019-03-13 PROCEDURE — 99213 OFFICE O/P EST LOW 20 MIN: CPT | Performed by: NURSE PRACTITIONER

## 2019-03-13 RX ORDER — NITROFURANTOIN 25; 75 MG/1; MG/1
100 CAPSULE ORAL 2 TIMES DAILY
Qty: 10 CAPSULE | Refills: 0 | Status: SHIPPED | OUTPATIENT
Start: 2019-03-13 | End: 2019-07-31

## 2019-03-13 NOTE — PROGRESS NOTES
Subjective:     Kimberlee Urrutia is a 78 y.o.     Urinary Tract Infection    The current episode started today. Associated symptoms include flank pain. Pertinent negatives include no chills, frequency, hematuria or nausea. She has tried nothing for the symptoms.         The following portions of the patient's history were reviewed and updated as appropriate: allergies, current medications, past family history, past medical history, past social history, past surgical history and problem list.      Review of Systems   Constitutional: Negative for chills.   Respiratory: Negative.    Cardiovascular: Negative.    Gastrointestinal: Negative for nausea.   Genitourinary: Positive for flank pain. Negative for dysuria, frequency and hematuria.         Objective:      Physical Exam   Cardiovascular: Normal rate, regular rhythm, S1 normal, S2 normal and normal heart sounds.   Pulmonary/Chest: Effort normal and breath sounds normal.   Abdominal: Soft. Normal appearance. There is no tenderness.   Lymphadenopathy:     She has no cervical adenopathy.   Vitals reviewed.          Kimberlee was seen today for urinary tract infection.    Diagnoses and all orders for this visit:    Urinary tract infection without hematuria, site unspecified  -     POC Urinalysis Dipstick, Automated  -     Urine Culture - Urine, Urine, Clean Catch    Other orders  -     nitrofurantoin, macrocrystal-monohydrate, (MACROBID) 100 MG capsule; Take 1 capsule by mouth 2 (Two) Times a Day.    Culture pending

## 2019-03-13 NOTE — PATIENT INSTRUCTIONS
Urinary Tract Infection, Adult  A urinary tract infection (UTI) is an infection of any part of the urinary tract, which includes the kidneys, ureters, bladder, and urethra. These organs make, store, and get rid of urine in the body. UTI can be a bladder infection (cystitis) or kidney infection (pyelonephritis).  What are the causes?  This infection may be caused by fungi, viruses, or bacteria. Bacteria are the most common cause of UTIs. This condition can also be caused by repeated incomplete emptying of the bladder during urination.  What increases the risk?  This condition is more likely to develop if:  · You ignore your need to urinate or hold urine for long periods of time.  · You do not empty your bladder completely during urination.  · You wipe back to front after urinating or having a bowel movement, if you are female.  · You are uncircumcised, if you are male.  · You are constipated.  · You have a urinary catheter that stays in place (indwelling).  · You have a weak defense (immune) system.  · You have a medical condition that affects your bowels, kidneys, or bladder.  · You have diabetes.  · You take antibiotic medicines frequently or for long periods of time, and the antibiotics no longer work well against certain types of infections (antibiotic resistance).  · You take medicines that irritate your urinary tract.  · You are exposed to chemicals that irritate your urinary tract.  · You are female.    What are the signs or symptoms?  Symptoms of this condition include:  · Fever.  · Frequent urination or passing small amounts of urine frequently.  · Needing to urinate urgently.  · Pain or burning with urination.  · Urine that smells bad or unusual.  · Cloudy urine.  · Pain in the lower abdomen or back.  · Trouble urinating.  · Blood in the urine.  · Vomiting or being less hungry than normal.  · Diarrhea or abdominal pain.  · Vaginal discharge, if you are female.    How is this diagnosed?  This condition is  diagnosed with a medical history and physical exam. You will also need to provide a urine sample to test your urine. Other tests may be done, including:  · Blood tests.  · Sexually transmitted disease (STD) testing.    If you have had more than one UTI, a cystoscopy or imaging studies may be done to determine the cause of the infections.  How is this treated?  Treatment for this condition often includes a combination of two or more of the following:  · Antibiotic medicine.  · Other medicines to treat less common causes of UTI.  · Over-the-counter medicines to treat pain.  · Drinking enough water to stay hydrated.    Follow these instructions at home:  · Take over-the-counter and prescription medicines only as told by your health care provider.  · If you were prescribed an antibiotic, take it as told by your health care provider. Do not stop taking the antibiotic even if you start to feel better.  · Avoid alcohol, caffeine, tea, and carbonated beverages. They can irritate your bladder.  · Drink enough fluid to keep your urine clear or pale yellow.  · Keep all follow-up visits as told by your health care provider. This is important.  · Make sure to:  ? Empty your bladder often and completely. Do not hold urine for long periods of time.  ? Empty your bladder before and after sex.  ? Wipe from front to back after a bowel movement if you are female. Use each tissue one time when you wipe.  Contact a health care provider if:  · You have back pain.  · You have a fever.  · You feel nauseous or vomit.  · Your symptoms do not get better after 3 days.  · Your symptoms go away and then return.  Get help right away if:  · You have severe back pain or lower abdominal pain.  · You are vomiting and cannot keep down any medicines or water.  This information is not intended to replace advice given to you by your health care provider. Make sure you discuss any questions you have with your health care provider.  Document Released:  09/27/2006 Document Revised: 06/12/2018 Document Reviewed: 11/07/2016  Elsevier Interactive Patient Education © 2019 [employer] Inc.

## 2019-03-17 LAB
BACTERIA UR CULT: ABNORMAL
BACTERIA UR CULT: ABNORMAL
OTHER ANTIBIOTIC SUSC ISLT: ABNORMAL

## 2019-03-19 ENCOUNTER — DOCUMENTATION (OUTPATIENT)
Dept: RETAIL CLINIC | Facility: CLINIC | Age: 79
End: 2019-03-19

## 2019-03-19 NOTE — PROGRESS NOTES
Called patient. Reviewed urine culture. Ecoli sensitive to macrobid. Pt reports she is feeling much better. Understands if symptoms return she can f/u with PCP or return to clinic.

## 2019-05-13 ENCOUNTER — OFFICE VISIT (OUTPATIENT)
Dept: RETAIL CLINIC | Facility: CLINIC | Age: 79
End: 2019-05-13

## 2019-05-13 VITALS
OXYGEN SATURATION: 98 % | SYSTOLIC BLOOD PRESSURE: 118 MMHG | RESPIRATION RATE: 18 BRPM | DIASTOLIC BLOOD PRESSURE: 78 MMHG | HEART RATE: 59 BPM | TEMPERATURE: 97.7 F

## 2019-05-13 DIAGNOSIS — M54.9 BACK PAIN, UNSPECIFIED BACK LOCATION, UNSPECIFIED BACK PAIN LATERALITY, UNSPECIFIED CHRONICITY: Primary | ICD-10-CM

## 2019-05-13 LAB
BILIRUB BLD-MCNC: NEGATIVE MG/DL
CLARITY, POC: CLEAR
COLOR UR: YELLOW
GLUCOSE UR STRIP-MCNC: NEGATIVE MG/DL
KETONES UR QL: NEGATIVE
LEUKOCYTE EST, POC: NEGATIVE
NITRITE UR-MCNC: NEGATIVE MG/ML
PH UR: 6 [PH] (ref 5–8)
PROT UR STRIP-MCNC: NEGATIVE MG/DL
RBC # UR STRIP: ABNORMAL /UL
SP GR UR: 1.02 (ref 1–1.03)
UROBILINOGEN UR QL: ABNORMAL

## 2019-05-13 PROCEDURE — 81003 URINALYSIS AUTO W/O SCOPE: CPT | Performed by: NURSE PRACTITIONER

## 2019-05-13 PROCEDURE — 99213 OFFICE O/P EST LOW 20 MIN: CPT | Performed by: NURSE PRACTITIONER

## 2019-05-13 NOTE — PATIENT INSTRUCTIONS
Acute Back Pain, Adult  Acute back pain is sudden and usually short-lived. It is often caused by an injury to the muscles and tissues in the back. The injury may result from:  · A muscle or ligament getting overstretched or torn (strained). Ligaments are tissues that connect bones to each other. Lifting something improperly can cause a back strain.  · Wear and tear (degeneration) of the spinal disks. Spinal disks are circular tissue that provides cushioning between the bones of the spine (vertebrae).  · Twisting motions, such as while playing sports or doing yard work.  · A hit to the back.  · Arthritis.    You may have a physical exam, lab tests, and imaging tests to find the cause of your pain. Acute back pain usually goes away with rest and home care.  Follow these instructions at home:  Managing pain, stiffness, and swelling  · Take over-the-counter and prescription medicines only as told by your health care provider.  · Your health care provider may recommend applying ice during the first 24-48 hours after your pain starts. To do this:  ? Put ice in a plastic bag.  ? Place a towel between your skin and the bag.  ? Leave the ice on for 20 minutes, 2-3 times a day.  · If directed, apply heat to the affected area as often as told by your health care provider. Use the heat source that your health care provider recommends, such as a moist heat pack or a heating pad.  ? Place a towel between your skin and the heat source.  ? Leave the heat on for 20-30 minutes.  ? Remove the heat if your skin turns bright red. This is especially important if you are unable to feel pain, heat, or cold. You have a greater risk of getting burned.  Activity  · Do not stay in bed. Staying in bed for more than 1-2 days can delay your recovery.  · Sit up and stand up straight. Avoid leaning forward when you sit, or hunching over when you stand.  ? If you work at a desk, sit close to it so you do not need to lean over. Keep your chin tucked  "in. Keep your neck drawn back, and keep your elbows bent at a right angle. Your arms should look like the letter \"L.\"  ? Sit high and close to the steering wheel when you drive. Add lower back (lumbar) support to your car seat, if needed.  · Take short walks on even surfaces as soon as you are able. Try to increase the length of time you walk each day.  · Do not sit, drive, or  one place for more than 30 minutes at a time. Sitting or standing for long periods of time can put stress on your back.  · Do not drive or use heavy machinery while taking prescription pain medicine.  · Use proper lifting techniques. When you bend and lift, use positions that put less stress on your back:  ? Bend your knees.  ? Keep the load close to your body.  ? Avoid twisting.  · Exercise regularly as told by your health care provider. Exercising helps your back heal faster and helps prevent back injuries by keeping muscles strong and flexible.  · Work with a physical therapist to make a safe exercise program, as recommended by your health care provider. Do any exercises as told by your physical therapist.  Lifestyle  · Maintain a healthy weight. Extra weight puts stress on your back and makes it difficult to have good posture.  · Avoid activities or situations that make you feel anxious or stressed. Stress and anxiety increase muscle tension and can make back pain worse. Learn ways to manage anxiety and stress, such as through exercise.  General instructions  · Sleep on a firm mattress in a comfortable position. Try lying on your side with your knees slightly bent. If you lie on your back, put a pillow under your knees.  · Follow your treatment plan as told by your health care provider. This may include:  ? Cognitive or behavioral therapy.  ? Acupuncture or massage therapy.  ? Meditation or yoga.  Contact a health care provider if:  · You have pain that is not relieved with rest or medicine.  · You have increasing pain going down " into your legs or buttocks.  · Your pain does not improve after 2 weeks.  · You have pain at night.  · You lose weight without trying.  · You have a fever or chills.  Get help right away if:  · You develop new bowel or bladder control problems.  · You have unusual weakness or numbness in your arms or legs.  · You develop nausea or vomiting.  · You develop abdominal pain.  · You feel faint.  Summary  · Acute back pain is sudden and usually short-lived.  · Use proper lifting techniques. When you bend and lift, use positions that put less stress on your back.  · Take over-the-counter and prescription medicines and apply heat or ice as directed by your health care provider.  This information is not intended to replace advice given to you by your health care provider. Make sure you discuss any questions you have with your health care provider.  Document Released: 12/18/2006 Document Revised: 08/01/2018 Document Reviewed: 08/01/2018  [employer] Interactive Patient Education © 2019 [employer]

## 2019-05-13 NOTE — PROGRESS NOTES
Subjective:     Kimberlee Urrutia is a 79 y.o.     Urinary Tract Infection    Episode onset: started one week ago, unsure if uti or hurt back cutting grass. There has been no fever. Associated symptoms include flank pain. Pertinent negatives include no frequency, hematuria, hesitancy, nausea or urgency. She has tried nothing for the symptoms.         The following portions of the patient's history were reviewed and updated as appropriate: allergies, current medications, past family history, past medical history, past social history, past surgical history and problem list.      Review of Systems   Respiratory: Negative.    Cardiovascular: Negative.    Gastrointestinal: Negative for nausea.   Genitourinary: Positive for flank pain. Negative for dysuria, frequency, hematuria, hesitancy, urgency and vaginal discharge.   Musculoskeletal: Positive for back pain. Negative for gait problem.         Objective:      Physical Exam   Constitutional: She appears well-developed and well-nourished.   Cardiovascular: Normal rate, regular rhythm, S1 normal, S2 normal and normal heart sounds.   Pulmonary/Chest: Effort normal and breath sounds normal.   Abdominal: Soft. Normal appearance. There is no tenderness.   Musculoskeletal:        Lumbar back: She exhibits tenderness (mild intermittent).   Vitals reviewed.          Diagnoses and all orders for this visit:    Back pain, unspecified back location, unspecified back pain laterality, unspecified chronicity  -     POC Urinalysis Dipstick, Automated  -     Urine Culture - Urine, Urine, Clean Catch

## 2019-05-15 LAB
BACTERIA UR CULT: NORMAL
BACTERIA UR CULT: NORMAL

## 2019-05-23 ENCOUNTER — TRANSCRIBE ORDERS (OUTPATIENT)
Dept: ADMINISTRATIVE | Facility: HOSPITAL | Age: 79
End: 2019-05-23

## 2019-05-23 DIAGNOSIS — Z12.31 SCREENING MAMMOGRAM, ENCOUNTER FOR: Primary | ICD-10-CM

## 2019-06-18 ENCOUNTER — HOSPITAL ENCOUNTER (OUTPATIENT)
Dept: MAMMOGRAPHY | Facility: HOSPITAL | Age: 79
Discharge: HOME | End: 2019-06-18
Admitting: PHYSICIAN ASSISTANT

## 2019-06-18 DIAGNOSIS — Z12.31 SCREENING MAMMOGRAM, ENCOUNTER FOR: ICD-10-CM

## 2019-06-18 PROCEDURE — 77063 BREAST TOMOSYNTHESIS BI: CPT

## 2019-06-18 PROCEDURE — 77067 SCR MAMMO BI INCL CAD: CPT

## 2019-07-31 ENCOUNTER — OFFICE VISIT (OUTPATIENT)
Dept: ORTHOPEDIC SURGERY | Facility: CLINIC | Age: 79
End: 2019-07-31

## 2019-07-31 VITALS — HEIGHT: 64 IN | WEIGHT: 163 LBS | BODY MASS INDEX: 27.83 KG/M2 | TEMPERATURE: 97.4 F

## 2019-07-31 DIAGNOSIS — M19.012 PRIMARY OSTEOARTHRITIS OF LEFT SHOULDER: Primary | ICD-10-CM

## 2019-07-31 PROCEDURE — 73030 X-RAY EXAM OF SHOULDER: CPT | Performed by: NURSE PRACTITIONER

## 2019-07-31 PROCEDURE — 99213 OFFICE O/P EST LOW 20 MIN: CPT | Performed by: NURSE PRACTITIONER

## 2019-07-31 NOTE — PROGRESS NOTES
Patient: Kimberlee Urrutia    YOB: 1940    Medical Record Number: [id number]    Chief Complaints:  Left shoulder pain    History of Present Illness:     79 y.o. female patient who presents for evaluation of left shoulder pain.  Onset of pain began 3 months ago.  Denies recent injury or precipitating event, however she tells me she fell at home in January or February landing on her left shoulder.  She adamantly denies any bruising or pain after this incident.  Describes her pain as moderate to severe, intermittent, aching.  Pain is worse with reaching out to the side, lifting her 6-month old great grandson, and quick movements.  She has tried icy hot, heat, and CBD oil without relief.  She denies associated symptoms.    Allergies:   Allergies   Allergen Reactions   • Penicillins Other (See Comments)     Tolerated Ceftriaxone during 09/2017 admission    • Sulfa Antibiotics Rash     Home Medications:    Current Outpatient Medications:   •  fluticasone (FLONASE) 50 MCG/ACT nasal spray, USE 2 SPRAYS IN EACH NOSTRIL ONCE DAILY, Disp: 1 bottle, Rfl: 5  •  loratadine (CLARITIN) 10 MG tablet, Take  by mouth daily., Disp: , Rfl:   •  acetaminophen (TYLENOL) 325 MG tablet, Take 2 tablets by mouth Every 4 (Four) Hours As Needed for Mild Pain  or Fever (Temperature greater than or equal to 37 C)., Disp: , Rfl:   •  alendronate (FOSAMAX) 70 MG tablet, Take 1 tablet by mouth Every 7 (Seven) Days. For Osteoporosis with full glass water; sit upright for 30 minutes, Disp: 5 tablet, Rfl: 12  •  CBD (cannabidiol) oral oil, Take  by mouth., Disp: , Rfl:   •  Multiple Vitamins-Minerals (MULTI COMPLETE PO), Take  by mouth daily., Disp: , Rfl:     Past Medical History:   Diagnosis Date   • Arthritis    • Cough    • Diarrhea    • Hx of migraine headaches    • Hyperlipidemia    • Irregular heart beat    • Osteoarthritis    • Vitamin D deficiency        Past Surgical History:   Procedure Laterality Date   • COLONOSCOPY     •  "REPLACEMENT TOTAL KNEE Right    • TUBAL ABDOMINAL LIGATION     • WISDOM TOOTH EXTRACTION         Social History     Occupational History   • Not on file   Tobacco Use   • Smoking status: Never Smoker   • Smokeless tobacco: Never Used   Substance and Sexual Activity   • Alcohol use: No   • Drug use: No   • Sexual activity: Defer      Social History     Social History Narrative   • Not on file       Family History   Problem Relation Age of Onset   • Heart disease Mother    • No Known Problems Father    • Cancer Sister    • Cancer Son        Review of Systems:      Constitutional: Denies fever, shaking or chills   Eyes: Denies change in visual acuity   HEENT: Denies nasal congestion or sore throat   Respiratory: Denies cough or shortness of breath   Cardiovascular: Denies chest pain or edema  Endocrine: Denies tremors, palpitations, intolerance of heat or cold, polyuria, polydipsia.  GI: Denies abdominal pain, nausea, vomiting, bloody stools or diarrhea  : Denies frequency, urgency, incontinence, retention, or nocturia.  Musculoskeletal: Denies numbness, tingling or loss of motor function   Integument: Denies rash, lesion or ulceration   Neurologic: Denies headache or focal weakness, deficits  Heme: Denies spontaneous or excessive bleeding, epistaxis, hematuria, melena, fatigue, enlarged or tender lymph nodes.      All other pertinent positives and negatives as noted above in HPI.    Physical Exam: 79 y.o. female  Vitals:    07/31/19 1025   Temp: 97.4 °F (36.3 °C)   Weight: 73.9 kg (163 lb)   Height: 162.6 cm (64\")       General:  Patient is awake and alert.  Appears in no acute distress or discomfort.    Psych:  Affect and demeanor are appropriate.    Eyes:  Conjunctiva and sclera appear grossly normal.  Eyes track well and EOM seem to be intact.    Ears:  No gross abnormalities.  Hearing adequate for the exam.    Cardiovascular:  Regular rate and rhythm.    Lungs:  Good chest expansion.  Breathing " unlabored.    Spine:  Neck appears grossly normal.  No palpable masses or adenopathy.  Good motion.  Spurling's maneuver is negative for any shoulder or arm symptoms.    Extremities:  Skin is benign.  No obvious gross abnormalities about left shoulder.  No palpable masses or adenopathy.  Moderate tenderness noted over anterior glenohumeral joint and rotator interval.  Motion is to 170° of forward elevation, 70° external rotation, internal rotation to T8.  No instability.  Rotator cuff strength seems to be well preserved, but the exam is limited due to patient discomfort with resisted active motion.  Good motor and sensory function in lower arm and hand.  Palpable pulses.         Radiology:  AP, scapular Y, and axillary views of the left shoulder are ordered by myself and reviewed to evaluate the patient's complaint.  No comparison films are immediately available.  The x-rays show mild glenohumeral and acromioclavicular joint arthritis with joint space narrowing and osteophyte formation.  The acromiohumeral interval measures normal.  Glenoid version appears normal.    Assessment/Plan:  Left shoulder osteoarthritis, possible rotator cuff tear    We discussed treatment options in detail including conservative treatment versus surgical options.  Regarding conservative treatment, we discussed appropriate activity modifications, anti-inflammatories, injections, and physical therapy.  I have recommended that we start with a conservative approach and the patient agrees.    The patient has acknowledged understanding of the information and elected to try oral Tylenol arthritis which she has at home and topical medications for now.  I offered to refer her to physical therapy, but she tells me she does not have time.  I have given her a prescription for a compound topical pain cream through [facility].  The risks of both medications were discussed.  She will follow up with me in 6 weeks.  If she is no better, she will  consider a corticosteroid injection at that time.        Shruthi Dobbs, APRN    07/31/2019    CC to Eden Medina PA-C

## 2019-08-05 ENCOUNTER — TRANSCRIBE ORDERS (OUTPATIENT)
Dept: ADMINISTRATIVE | Facility: HOSPITAL | Age: 79
End: 2019-08-05

## 2019-08-05 DIAGNOSIS — E04.9 NODULAR GOITER: Primary | ICD-10-CM

## 2019-09-09 ENCOUNTER — HOSPITAL ENCOUNTER (OUTPATIENT)
Dept: ULTRASOUND IMAGING | Facility: HOSPITAL | Age: 79
Discharge: HOME | End: 2019-09-09
Admitting: OTOLARYNGOLOGY

## 2019-09-09 DIAGNOSIS — E04.9 NODULAR GOITER: ICD-10-CM

## 2019-09-09 PROCEDURE — 76536 US EXAM OF HEAD AND NECK: CPT

## 2019-09-11 ENCOUNTER — OFFICE VISIT (OUTPATIENT)
Dept: ORTHOPEDIC SURGERY | Facility: CLINIC | Age: 79
End: 2019-09-11

## 2019-09-11 VITALS — TEMPERATURE: 97.2 F | BODY MASS INDEX: 27.35 KG/M2 | WEIGHT: 160.2 LBS | HEIGHT: 64 IN

## 2019-09-11 DIAGNOSIS — G89.29 CHRONIC LEFT SHOULDER PAIN: Primary | ICD-10-CM

## 2019-09-11 DIAGNOSIS — M25.512 CHRONIC LEFT SHOULDER PAIN: Primary | ICD-10-CM

## 2019-09-11 PROCEDURE — 20610 DRAIN/INJ JOINT/BURSA W/O US: CPT | Performed by: NURSE PRACTITIONER

## 2019-09-11 RX ORDER — METHYLPREDNISOLONE ACETATE 80 MG/ML
80 INJECTION, SUSPENSION INTRA-ARTICULAR; INTRALESIONAL; INTRAMUSCULAR; SOFT TISSUE
Status: COMPLETED | OUTPATIENT
Start: 2019-09-11 | End: 2019-09-11

## 2019-09-11 RX ADMIN — METHYLPREDNISOLONE ACETATE 80 MG: 80 INJECTION, SUSPENSION INTRA-ARTICULAR; INTRALESIONAL; INTRAMUSCULAR; SOFT TISSUE at 10:58

## 2019-09-11 NOTE — PROGRESS NOTES
Ms. Urrutia comes in today for follow-up.  Reports she continues to have mild to moderate, intermittent, aching.  Pain is worse with lifting and reaching out.  She tells me the compound topical pain cream prescription helped somewhat.  She would like to try an injection today as we discussed during her last visit.         The risks, benefits and alternatives were discussed and the patient consented.  Going forward, the patient will follow-up as needed.    Shruthi Dobbs, MICHELE    09/11/2019      Large Joint Arthrocentesis: L subacromial bursa  Date/Time: 9/11/2019 10:58 AM  Consent given by: patient  Site marked: site marked  Timeout: Immediately prior to procedure a time out was called to verify the correct patient, procedure, equipment, support staff and site/side marked as required   Supporting Documentation  Indications: pain and joint swelling   Procedure Details  Location: shoulder - L subacromial bursa  Preparation: Patient was prepped and draped in the usual sterile fashion  Needle gauge: 21g.  Approach: posterior  Medications administered: 80 mg methylPREDNISolone acetate 80 MG/ML; 2 mL lidocaine (cardiac)  Patient tolerance: patient tolerated the procedure well with no immediate complications

## 2019-11-06 ENCOUNTER — OFFICE VISIT (OUTPATIENT)
Dept: RETAIL CLINIC | Facility: CLINIC | Age: 79
End: 2019-11-06

## 2019-11-06 VITALS
OXYGEN SATURATION: 98 % | TEMPERATURE: 97.7 F | DIASTOLIC BLOOD PRESSURE: 72 MMHG | SYSTOLIC BLOOD PRESSURE: 158 MMHG | HEART RATE: 64 BPM

## 2019-11-06 DIAGNOSIS — R31.9 URINARY TRACT INFECTION WITH HEMATURIA, SITE UNSPECIFIED: Primary | ICD-10-CM

## 2019-11-06 DIAGNOSIS — N39.0 URINARY TRACT INFECTION WITH HEMATURIA, SITE UNSPECIFIED: Primary | ICD-10-CM

## 2019-11-06 LAB
BILIRUB BLD-MCNC: NEGATIVE MG/DL
CLARITY, POC: ABNORMAL
COLOR UR: ABNORMAL
GLUCOSE UR STRIP-MCNC: NEGATIVE MG/DL
KETONES UR QL: NEGATIVE
LEUKOCYTE EST, POC: ABNORMAL
NITRITE UR-MCNC: NEGATIVE MG/ML
PH UR: 6 [PH] (ref 5–8)
PROT UR STRIP-MCNC: ABNORMAL MG/DL
RBC # UR STRIP: ABNORMAL /UL
SP GR UR: 1.03 (ref 1–1.03)
UROBILINOGEN UR QL: ABNORMAL

## 2019-11-06 PROCEDURE — 81002 URINALYSIS NONAUTO W/O SCOPE: CPT | Performed by: NURSE PRACTITIONER

## 2019-11-06 PROCEDURE — 99213 OFFICE O/P EST LOW 20 MIN: CPT | Performed by: NURSE PRACTITIONER

## 2019-11-06 RX ORDER — NITROFURANTOIN 25; 75 MG/1; MG/1
100 CAPSULE ORAL 2 TIMES DAILY
Qty: 10 CAPSULE | Refills: 0 | Status: SHIPPED | OUTPATIENT
Start: 2019-11-06 | End: 2020-08-10

## 2019-11-06 NOTE — PATIENT INSTRUCTIONS
Urinary Tract Infection, Adult    A urinary tract infection (UTI) is an infection of any part of the urinary tract. The urinary tract includes the kidneys, ureters, bladder, and urethra. These organs make, store, and get rid of urine in the body.  Your health care provider may use other names to describe the infection. An upper UTI affects the ureters and kidneys (pyelonephritis). A lower UTI affects the bladder (cystitis) and urethra (urethritis).  What are the causes?  Most urinary tract infections are caused by bacteria in your genital area, around the entrance to your urinary tract (urethra). These bacteria grow and cause inflammation of your urinary tract.  What increases the risk?  You are more likely to develop this condition if:  · You have a urinary catheter that stays in place (indwelling).  · You are not able to control when you urinate or have a bowel movement (you have incontinence).  · You are female and you:  ? Use a spermicide or diaphragm for birth control.  ? Have low estrogen levels.  ? Are pregnant.  · You have certain genes that increase your risk (genetics).  · You are sexually active.  · You take antibiotic medicines.  · You have a condition that causes your flow of urine to slow down, such as:  ? An enlarged prostate, if you are male.  ? Blockage in your urethra (stricture).  ? A kidney stone.  ? A nerve condition that affects your bladder control (neurogenic bladder).  ? Not getting enough to drink, or not urinating often.  · You have certain medical conditions, such as:  ? Diabetes.  ? A weak disease-fighting system (immunesystem).  ? Sickle cell disease.  ? Gout.  ? Spinal cord injury.  What are the signs or symptoms?  Symptoms of this condition include:  · Needing to urinate right away (urgently).  · Frequent urination or passing small amounts of urine frequently.  · Pain or burning with urination.  · Blood in the urine.  · Urine that smells bad or unusual.  · Trouble urinating.  · Cloudy  urine.  · Vaginal discharge, if you are female.  · Pain in the abdomen or the lower back.  You may also have:  · Vomiting or a decreased appetite.  · Confusion.  · Irritability or tiredness.  · A fever.  · Diarrhea.  The first symptom in older adults may be confusion. In some cases, they may not have any symptoms until the infection has worsened.  How is this diagnosed?  This condition is diagnosed based on your medical history and a physical exam. You may also have other tests, including:  · Urine tests.  · Blood tests.  · Tests for sexually transmitted infections (STIs).  If you have had more than one UTI, a cystoscopy or imaging studies may be done to determine the cause of the infections.  How is this treated?  Treatment for this condition includes:  · Antibiotic medicine.  · Over-the-counter medicines to treat discomfort.  · Drinking enough water to stay hydrated.  If you have frequent infections or have other conditions such as a kidney stone, you may need to see a health care provider who specializes in the urinary tract (urologist).  In rare cases, urinary tract infections can cause sepsis. Sepsis is a life-threatening condition that occurs when the body responds to an infection. Sepsis is treated in the hospital with IV antibiotics, fluids, and other medicines.  Follow these instructions at home:    Medicines  · Take over-the-counter and prescription medicines only as told by your health care provider.  · If you were prescribed an antibiotic medicine, take it as told by your health care provider. Do not stop using the antibiotic even if you start to feel better.  General instructions  · Make sure you:  ? Empty your bladder often and completely. Do not hold urine for long periods of time.  ? Empty your bladder after sex.  ? Wipe from front to back after a bowel movement if you are female. Use each tissue one time when you wipe.  · Drink enough fluid to keep your urine pale yellow.  · Keep all follow-up  visits as told by your health care provider. This is important.  Contact a health care provider if:  · Your symptoms do not get better after 1-2 days.  · Your symptoms go away and then return.  Get help right away if you have:  · Severe pain in your back or your lower abdomen.  · A fever.  · Nausea or vomiting.  Summary  · A urinary tract infection (UTI) is an infection of any part of the urinary tract, which includes the kidneys, ureters, bladder, and urethra.  · Most urinary tract infections are caused by bacteria in your genital area, around the entrance to your urinary tract (urethra).  · Treatment for this condition often includes antibiotic medicines.  · If you were prescribed an antibiotic medicine, take it as told by your health care provider. Do not stop using the antibiotic even if you start to feel better.  · Keep all follow-up visits as told by your health care provider. This is important.  This information is not intended to replace advice given to you by your health care provider. Make sure you discuss any questions you have with your health care provider.  Document Released: 09/27/2006 Document Revised: 06/27/2019 Document Reviewed: 06/27/2019  [employer] Interactive Patient Education © 2019 [employer]

## 2019-11-06 NOTE — PROGRESS NOTES
Subjective:     Kimberlee Urrutia is a 79 y.o.     Urinary Tract Infection    Episode onset: back ache and [place]. Associated symptoms include flank pain, frequency and urgency. Pertinent negatives include no hematuria. She has tried nothing for the symptoms. Her past medical history is significant for recurrent UTIs.         The following portions of the patient's history were reviewed and updated as appropriate: allergies, current medications, past family history, past medical history, past social history, past surgical history and problem list.      Review of Systems   Constitutional: Negative for fatigue and fever.   Respiratory: Negative.    Cardiovascular: Negative.    Genitourinary: Positive for dysuria, flank pain, frequency and urgency. Negative for hematuria and vaginal discharge.         Objective:      Physical Exam   Constitutional: She appears well-developed.   Cardiovascular: Normal rate, regular rhythm, S1 normal, S2 normal and normal heart sounds.   Pulmonary/Chest: Effort normal and breath sounds normal.   Abdominal: Soft. Normal appearance. There is no tenderness.   Vitals reviewed.          Diagnoses and all orders for this visit:    Urinary tract infection with hematuria, site unspecified  -     POC Urinalysis Dipstick  -     Urine Culture - Urine, Urine, Catheter    Other orders  -     nitrofurantoin, macrocrystal-monohydrate, (MACROBID) 100 MG capsule; Take 1 capsule by mouth 2 (Two) Times a Day.    Culture pending

## 2019-11-09 LAB
BACTERIA UR CULT: ABNORMAL
BACTERIA UR CULT: ABNORMAL
OTHER ANTIBIOTIC SUSC ISLT: ABNORMAL

## 2019-11-10 ENCOUNTER — TELEPHONE (OUTPATIENT)
Dept: RETAIL CLINIC | Facility: CLINIC | Age: 79
End: 2019-11-10

## 2019-11-10 NOTE — TELEPHONE ENCOUNTER
Left message that urine culture showed she was on correct antitbiotic, if any questions or concerns please call clinic

## 2020-02-21 ENCOUNTER — TELEPHONE (OUTPATIENT)
Dept: ORTHOPEDIC SURGERY | Facility: CLINIC | Age: 80
End: 2020-02-21

## 2020-06-15 ENCOUNTER — TRANSCRIBE ORDERS (OUTPATIENT)
Dept: ADMINISTRATIVE | Facility: HOSPITAL | Age: 80
End: 2020-06-15

## 2020-06-15 DIAGNOSIS — Z12.31 SCREENING MAMMOGRAM, ENCOUNTER FOR: Primary | ICD-10-CM

## 2020-06-24 ENCOUNTER — HOSPITAL ENCOUNTER (OUTPATIENT)
Dept: MAMMOGRAPHY | Facility: HOSPITAL | Age: 80
Discharge: HOME | End: 2020-06-24
Admitting: PHYSICIAN ASSISTANT

## 2020-06-24 DIAGNOSIS — Z12.31 SCREENING MAMMOGRAM, ENCOUNTER FOR: ICD-10-CM

## 2020-06-24 PROCEDURE — 77067 SCR MAMMO BI INCL CAD: CPT

## 2020-06-24 PROCEDURE — 77063 BREAST TOMOSYNTHESIS BI: CPT

## 2020-07-10 ENCOUNTER — APPOINTMENT (OUTPATIENT)
Dept: MAMMOGRAPHY | Facility: HOSPITAL | Age: 80
End: 2020-07-10

## 2020-08-10 ENCOUNTER — OFFICE VISIT (OUTPATIENT)
Dept: FAMILY MEDICINE CLINIC | Facility: CLINIC | Age: 80
End: 2020-08-10

## 2020-08-10 VITALS
RESPIRATION RATE: 16 BRPM | HEIGHT: 64 IN | OXYGEN SATURATION: 97 % | BODY MASS INDEX: 28.51 KG/M2 | WEIGHT: 167 LBS | DIASTOLIC BLOOD PRESSURE: 78 MMHG | HEART RATE: 61 BPM | TEMPERATURE: 96.9 F | SYSTOLIC BLOOD PRESSURE: 118 MMHG

## 2020-08-10 DIAGNOSIS — M19.90 ARTHRITIS: ICD-10-CM

## 2020-08-10 DIAGNOSIS — M85.89 OSTEOPENIA OF MULTIPLE SITES: ICD-10-CM

## 2020-08-10 DIAGNOSIS — E55.9 VITAMIN D DEFICIENCY: ICD-10-CM

## 2020-08-10 DIAGNOSIS — E04.2 MULTINODULAR THYROID: Primary | ICD-10-CM

## 2020-08-10 DIAGNOSIS — Z78.0 POST-MENOPAUSAL: ICD-10-CM

## 2020-08-10 PROBLEM — Z12.11 COLON CANCER SCREENING: Status: ACTIVE | Noted: 2020-01-29

## 2020-08-10 PROCEDURE — G0439 PPPS, SUBSEQ VISIT: HCPCS | Performed by: PHYSICIAN ASSISTANT

## 2020-08-10 PROCEDURE — 96160 PT-FOCUSED HLTH RISK ASSMT: CPT | Performed by: PHYSICIAN ASSISTANT

## 2020-08-10 NOTE — PROGRESS NOTES
The ABCs of the Annual Wellness Visit  Subsequent Medicare Wellness Visit    Chief Complaint   Patient presents with   • Medicare Wellness-subsequent       Subjective   History of Present Illness:  Kimberlee Urrutia is a 80 y.o. female who presents for a Subsequent Medicare Wellness Visit.    HEALTH RISK ASSESSMENT    Recent Hospitalizations:  No hospitalization(s) within the last year.    Current Medical Providers:  Patient Care Team:  Eden Medina PA-C as PCP - General (Family Medicine)  Josue Wilson MD as Consulting Physician (Orthopedic Surgery)  Yash Wilson MD as Consulting Physician (Gastroenterology)  Avi Ferris MD (Ophthalmology)  Tommy French MD as Consulting Physician (Otolaryngology)    Smoking Status:  Social History     Tobacco Use   Smoking Status Former Smoker   Smokeless Tobacco Never Used       Alcohol Consumption:  Social History     Substance and Sexual Activity   Alcohol Use No       Depression Screen:   PHQ-2/PHQ-9 Depression Screening 8/10/2020   Little interest or pleasure in doing things 0   Feeling down, depressed, or hopeless 0   Trouble falling or staying asleep, or sleeping too much -   Feeling tired or having little energy -   Poor appetite or overeating -   Feeling bad about yourself - or that you are a failure or have let yourself or your family down -   Trouble concentrating on things, such as reading the newspaper or watching television -   Moving or speaking so slowly that other people could have noticed. Or the opposite - being so fidgety or restless that you have been moving around a lot more than usual -   Thoughts that you would be better off dead, or of hurting yourself in some way -   Total Score 0   If you checked off any problems, how difficult have these problems made it for you to do your work, take care of things at home, or get along with other people? -       Fall Risk Screen:  STEADI Fall Risk Assessment has not been completed.    Health Habits  and Functional and Cognitive Screening:  Functional & Cognitive Status 10/10/2018   Do you have difficulty preparing food and eating? No   Do you have difficulty bathing yourself, getting dressed or grooming yourself? No   Do you have difficulty using the toilet? No   Do you have difficulty moving around from place to place? No   Do you have trouble with steps or getting out of a bed or a chair? No   Current Diet Low Fat Diet   Dental Exam Up to date   Eye Exam Up to date   Exercise (times per week) 3 times per week   Current Exercise Activities Include Yard Work   Do you need help using the phone?  No   Are you deaf or do you have serious difficulty hearing?  No   Do you need help with transportation? No   Do you need help shopping? No   Do you need help preparing meals?  No   Do you need help with housework?  No   Do you need help with laundry? No   Do you need help taking your medications? No   Do you need help managing money? No   Do you ever drive or ride in a car without wearing a seat belt? No   Have you felt unusual stress, anger or loneliness in the last month? No   Who do you live with? Alone   If you need help, do you have trouble finding someone available to you? No   Have you been bothered in the last four weeks by sexual problems? No   Do you have difficulty concentrating, remembering or making decisions? Yes         Does the patient have evidence of cognitive impairment? No    Asprin use counseling:Does not need ASA (and currently is not on it)    Age-appropriate Screening Schedule:  Refer to the list below for future screening recommendations based on patient's age, sex and/or medical conditions. Orders for these recommended tests are listed in the plan section. The patient has been provided with a written plan.    Health Maintenance   Topic Date Due   • LIPID PANEL  09/15/2018   • INFLUENZA VACCINE  08/01/2020   • DXA SCAN  09/25/2020   • MAMMOGRAM  06/24/2022   • TDAP/TD VACCINES (2 - Td)  09/14/2027   • ZOSTER VACCINE  Addressed          The following portions of the patient's history were reviewed and updated as appropriate: allergies, current medications, past family history, past medical history, past social history, past surgical history and problem list.    Outpatient Medications Prior to Visit   Medication Sig Dispense Refill   • acetaminophen (TYLENOL) 325 MG tablet Take 2 tablets by mouth Every 4 (Four) Hours As Needed for Mild Pain  or Fever (Temperature greater than or equal to 37 C).     • fluticasone (FLONASE) 50 MCG/ACT nasal spray USE 2 SPRAYS IN EACH NOSTRIL ONCE DAILY 1 bottle 5   • loratadine (CLARITIN) 10 MG tablet Take  by mouth daily.     • Multiple Vitamins-Minerals (MULTI COMPLETE PO) Take  by mouth daily.     • alendronate (FOSAMAX) 70 MG tablet Take 1 tablet by mouth Every 7 (Seven) Days. For Osteoporosis with full glass water; sit upright for 30 minutes 5 tablet 12   • CBD (cannabidiol) oral oil Take  by mouth.     • nitrofurantoin, macrocrystal-monohydrate, (MACROBID) 100 MG capsule Take 1 capsule by mouth 2 (Two) Times a Day. 10 capsule 0     No facility-administered medications prior to visit.        Patient Active Problem List   Diagnosis   • Arthritis   • HLD (hyperlipidemia)   • Health care maintenance   • Knee pain, right   • Hx of total knee arthroplasty   • Seasonal allergies   • Vitamin D deficiency   • Pyuria   • Transient alteration of awareness   • Migraine without status migrainosus, not intractable   • Leukocytosis   • Viral pharyngitis   • Multinodular thyroid   • Osteopenia of multiple sites   • Colon cancer screening       Advanced Care Planning:  ACP discussion was held with the patient during this visit. Patient has an advance directive (not in EMR), copy requested.    Review of Systems    Compared to one year ago, the patient feels her physical health is the same.  Compared to one year ago, the patient feels her mental health is the same.    Reviewed  "chart for potential of high risk medication in the elderly: yes  Reviewed chart for potential of harmful drug interactions in the elderly:yes    Objective         Vitals:    08/10/20 0843   Resp: 16   Temp: 96.9 °F (36.1 °C)   TempSrc: Skin   Weight: 75.8 kg (167 lb)   Height: 162.6 cm (64\")   PainSc: 0-No pain       Body mass index is 28.67 kg/m².  Discussed the patient's BMI with her. The BMI is above average; BMI management plan is completed.    Physical Exam          Assessment/Plan   Medicare Risks and Personalized Health Plan  CMS Preventative Services Quick Reference  Osteoprorosis Risk    The above risks/problems have been discussed with the patient.  Pertinent information has been shared with the patient in the After Visit Summary.  Follow up plans and orders are seen below in the Assessment/Plan Section.    There are no diagnoses linked to this encounter.  Follow Up:  No follow-ups on file.     An After Visit Summary and PPPS were given to the patient.             "

## 2020-08-10 NOTE — PROGRESS NOTES
"The ABCs of the Annual Wellness Visit  Subsequent Medicare Wellness Visit    No chief complaint on file.      Subjective   History of Present Illness:  Kimberlee Urrutia is a 80 y.o. female who presents for a Subsequent Medicare Wellness Visit.    HEALTH RISK ASSESSMENT    Recent Hospitalizations:  {Hospitalization history:1438244406::\"No hospitalization(s) within the last year.\"}    Current Medical Providers:  Patient Care Team:  Eden Medina PA-C as PCP - General (Family Medicine)  Josue Wilson MD as Consulting Physician (Orthopedic Surgery)  Yash Wilson MD as Consulting Physician (Gastroenterology)  Avi Ferris MD (Ophthalmology)  Tommy French MD as Consulting Physician (Otolaryngology)    Smoking Status:  Social History     Tobacco Use   Smoking Status Former Smoker   Smokeless Tobacco Never Used       Alcohol Consumption:  Social History     Substance and Sexual Activity   Alcohol Use No       Depression Screen:   PHQ-2/PHQ-9 Depression Screening 10/10/2018   Little interest or pleasure in doing things 0   Feeling down, depressed, or hopeless 0   Trouble falling or staying asleep, or sleeping too much -   Feeling tired or having little energy -   Poor appetite or overeating -   Feeling bad about yourself - or that you are a failure or have let yourself or your family down -   Trouble concentrating on things, such as reading the newspaper or watching television -   Moving or speaking so slowly that other people could have noticed. Or the opposite - being so fidgety or restless that you have been moving around a lot more than usual -   Thoughts that you would be better off dead, or of hurting yourself in some way -   Total Score 0   If you checked off any problems, how difficult have these problems made it for you to do your work, take care of things at home, or get along with other people? -       Fall Risk Screen:  STEADI Fall Risk Assessment has not been completed.    Health Habits and " "Functional and Cognitive Screening:  Functional & Cognitive Status 10/10/2018   Do you have difficulty preparing food and eating? No   Do you have difficulty bathing yourself, getting dressed or grooming yourself? No   Do you have difficulty using the toilet? No   Do you have difficulty moving around from place to place? No   Do you have trouble with steps or getting out of a bed or a chair? No   Current Diet Low Fat Diet   Dental Exam Up to date   Eye Exam Up to date   Exercise (times per week) 3 times per week   Current Exercise Activities Include Yard Work   Do you need help using the phone?  No   Are you deaf or do you have serious difficulty hearing?  No   Do you need help with transportation? No   Do you need help shopping? No   Do you need help preparing meals?  No   Do you need help with housework?  No   Do you need help with laundry? No   Do you need help taking your medications? No   Do you need help managing money? No   Do you ever drive or ride in a car without wearing a seat belt? No   Have you felt unusual stress, anger or loneliness in the last month? No   Who do you live with? Alone   If you need help, do you have trouble finding someone available to you? No   Have you been bothered in the last four weeks by sexual problems? No   Do you have difficulty concentrating, remembering or making decisions? Yes         Does the patient have evidence of cognitive impairment? {Yes/No w/ pre-defaulted No:38671::\"No\"}    Asprin use counseling:{Aspirin :27981}    Age-appropriate Screening Schedule:  Refer to the list below for future screening recommendations based on patient's age, sex and/or medical conditions. Orders for these recommended tests are listed in the plan section. The patient has been provided with a written plan.    Health Maintenance   Topic Date Due   • LIPID PANEL  09/15/2018   • INFLUENZA VACCINE  08/01/2020   • DXA SCAN  09/25/2020   • MAMMOGRAM  06/24/2022   • TDAP/TD VACCINES (2 - " "Td) 09/14/2027   • ZOSTER VACCINE  Addressed          The following portions of the patient's history were reviewed and updated as appropriate: {history reviewed:20406::\"allergies\",\"current medications\",\"past family history\",\"past medical history\",\"past social history\",\"past surgical history\",\"problem list\"}.    Outpatient Medications Prior to Visit   Medication Sig Dispense Refill   • acetaminophen (TYLENOL) 325 MG tablet Take 2 tablets by mouth Every 4 (Four) Hours As Needed for Mild Pain  or Fever (Temperature greater than or equal to 37 C).     • alendronate (FOSAMAX) 70 MG tablet Take 1 tablet by mouth Every 7 (Seven) Days. For Osteoporosis with full glass water; sit upright for 30 minutes 5 tablet 12   • CBD (cannabidiol) oral oil Take  by mouth.     • fluticasone (FLONASE) 50 MCG/ACT nasal spray USE 2 SPRAYS IN EACH NOSTRIL ONCE DAILY 1 bottle 5   • loratadine (CLARITIN) 10 MG tablet Take  by mouth daily.     • Multiple Vitamins-Minerals (MULTI COMPLETE PO) Take  by mouth daily.     • nitrofurantoin, macrocrystal-monohydrate, (MACROBID) 100 MG capsule Take 1 capsule by mouth 2 (Two) Times a Day. 10 capsule 0     No facility-administered medications prior to visit.        Patient Active Problem List   Diagnosis   • Arthritis   • HLD (hyperlipidemia)   • Health care maintenance   • Knee pain, right   • Hx of total knee arthroplasty   • Seasonal allergies   • Vitamin D deficiency   • Pyuria   • Transient alteration of awareness   • Migraine without status migrainosus, not intractable   • Leukocytosis   • Viral pharyngitis   • Multinodular thyroid   • Osteopenia of multiple sites   • Colon cancer screening       Advanced Care Planning:  {Advanced Directive Status:49367}    Review of Systems    Compared to one year ago, the patient feels her physical health is {better worse same:09292}.  Compared to one year ago, the patient feels her mental health is {better worse same:24745}.    Reviewed chart for potential of " "high risk medication in the elderly: {Response;Yes/No/NA:[id number]::\"yes\"}  Reviewed chart for potential of harmful drug interactions in the elderly:{Response;Yes/No/NA:[id number]::\"yes\"}    Objective         Vitals:    08/10/20 0843   Height: 162.6 cm (64\")       Body mass index is 27.5 kg/m².  Discussed the patient's BMI with her. The BMI {BMI plan ([facility] measure 421):24960}.    Physical Exam          Assessment/Plan   Medicare Risks and Personalized Health Plan  CMS Preventative Services Quick Reference  {Medicare Wellness Risk Factors and Personalized Health Plan:45987}    The above risks/problems have been discussed with the patient.  Pertinent information has been shared with the patient in the After Visit Summary.  Follow up plans and orders are seen below in the Assessment/Plan Section.    There are no diagnoses linked to this encounter.  Follow Up:  No follow-ups on file.     An After Visit Summary and PPPS were given to the patient.             "

## 2020-08-10 NOTE — PROGRESS NOTES
"Luisa Urrutia is a 80 y.o. female.     History of Present Illness    Since the last visit, she has overall felt well.  She has Vitamin D deficiency and will update labs for continued management. . /78 (BP Location: Right arm, Patient Position: Sitting, Cuff Size: Adult)   Pulse 61   Temp 96.9 °F (36.1 °C) (Skin)   Resp 16   Ht 162.6 cm (64\")   Wt 75.8 kg (167 lb)   LMP  (LMP Unknown)   SpO2 97%   BMI 28.67 kg/m²     Results for orders placed or performed in visit on 11/06/19   Urine Culture - Urine, Urine, Catheter   Result Value Ref Range    Urine Culture Final report (A)     Result 1 Escherichia coli (A)     Susceptibility Testing Comment    POC Urinalysis Dipstick   Result Value Ref Range    Color Dark Yellow Yellow, Straw, Dark Yellow, Amanda    Clarity, UA Cloudy (A) Clear    Glucose, UA Negative Negative, 1000 mg/dL (3+) mg/dL    Bilirubin Negative Negative    Ketones, UA Negative Negative    Specific Gravity  1.030 1.005 - 1.030    Blood, UA Small (A) Negative    pH, Urine 6.0 5.0 - 8.0    Protein, POC Trace (A) Negative mg/dL    Urobilinogen, UA 1 E.U./dL  (A) Normal    Leukocytes Trace (A) Negative    Nitrite, UA Negative Negative     ;i  Sees Dr French for thyroid---she is due for thyroid u/s and she declines. She has been to ENT and had neg bx  She is due next DEXA----she was on Fosamax d/t last DEXA was 13% worse in left hip---she stopped Rx after ran out.  Exercises and walks.  She is up to date on vaccines      Neurology Dr. 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 Her MRI was negative for stroke and he did not feel this was secondary to seizure.    I need to have see Dr Kearns  1. No perfusion abnormality to suggest acute or threatened infarct. If  further evaluation is indicated, MRI could be considered.  2. No arterial cutoff or hemodynamically significant focal stenosis in  the cervical carotid or vertebral arteries, or in the arteries " at the  base of the brain.  3. No acute hemorrhage or hydrocephalus. No enhancing lesion of brain.  4. Thyroid nodules.    She does not want a statin  Hx migraine    I will sign for her handicap permit  Lab Results   Component Value Date    WBC 5.45 08/22/2018    HGB 13.5 08/22/2018    HCT 41.0 08/22/2018    MCV 88.4 08/22/2018     08/22/2018   no recent migraine  WBC in range    The following portions of the patient's history were reviewed and updated as appropriate: allergies, current medications, past family history, past medical history, past social history, past surgical history and problem list.    Review of Systems   Constitutional: Negative for activity change, appetite change and unexpected weight change.   HENT: Negative for nosebleeds and trouble swallowing.    Eyes: Negative for pain and visual disturbance.   Respiratory: Negative for chest tightness, shortness of breath and wheezing.    Cardiovascular: Negative for chest pain and palpitations.   Gastrointestinal: Negative for abdominal pain and blood in stool.   Endocrine: Negative.    Genitourinary: Negative for difficulty urinating and hematuria.   Musculoskeletal: Negative for joint swelling.   Skin: Negative for color change and rash.   Allergic/Immunologic: Negative.    Neurological: Negative for syncope and speech difficulty.   Hematological: Negative for adenopathy.   Psychiatric/Behavioral: Negative for agitation and confusion.   All other systems reviewed and are negative.      Objective   Physical Exam   Constitutional: She is oriented to person, place, and time. She appears well-developed and well-nourished. No distress.   HENT:   Head: Normocephalic and atraumatic.   Eyes: Pupils are equal, round, and reactive to light. Conjunctivae and EOM are normal. Right eye exhibits no discharge. Left eye exhibits no discharge. No scleral icterus.   Neck: Normal range of motion. Neck supple. No tracheal deviation present.   Cardiovascular: Normal  rate, regular rhythm, normal heart sounds, intact distal pulses and normal pulses. Exam reveals no gallop.   No murmur heard.  Pulmonary/Chest: Effort normal and breath sounds normal. No respiratory distress. She has no wheezes. She has no rales.   Musculoskeletal: Normal range of motion.   Neurological: She is alert and oriented to person, place, and time. She exhibits normal muscle tone. Coordination normal.   Skin: Skin is warm. No rash noted. No pallor.   Left lateral mid distal leg with open pustule and center open and about 3mm and surrounding erythema and edema out 2cm   Psychiatric: She has a normal mood and affect. Her behavior is normal. Judgment and thought content normal.   Nursing note and vitals reviewed.      Assessment/Plan   Kimberlee was seen today for medicare wellness-subsequent.    Diagnoses and all orders for this visit:    Multinodular thyroid    Vitamin D deficiency    Arthritis    Osteopenia of multiple sites    Post-menopausal  -     DEXA Bone Density Axial; Future      No meds  She wants to wait on labs  DEXA in fall or winter  Declines restart Fosamax for now

## 2020-08-10 NOTE — PATIENT INSTRUCTIONS
Medicare Wellness  Personal Prevention Plan of Service     Date of Office Visit:  08/10/2020  Encounter Provider:  Eden Medina PA-C  Place of Service:  [facility]  Patient Name: Kimberlee Urrutia  :  1940    As part of the Medicare Wellness portion of your visit today, we are providing you with this personalized preventive plan of services (PPPS). This plan is based upon recommendations of the [place] Preventive Services Task Force (USPSTF) and the Advisory Committee on Immunization Practices (ACIP).    This lists the preventive care services that should be considered, and provides dates of when you are due. Items listed as completed are up-to-date and do not require any further intervention.    Health Maintenance   Topic Date Due   • LIPID PANEL  09/15/2018   • INFLUENZA VACCINE  2020   • DXA SCAN  2020   • MEDICARE ANNUAL WELLNESS  08/10/2021   • MAMMOGRAM  2022   • TDAP/TD VACCINES (2 - Td) 2027   • Pneumococcal Vaccine Once at 65 Years Old  Completed   • ZOSTER VACCINE  Addressed       Orders Placed This Encounter   Procedures   • DEXA Bone Density Axial     Standing Status:   Future     Standing Expiration Date:   2021     Order Specific Question:   Reason for Exam:     Answer:   screen PM; ask her when       Return in about 1 year (around 8/10/2021), or if symptoms worsen or fail to improve.          Fall Prevention in the Home, Adult  Falls can cause injuries and can affect people from all age groups. There are many simple things that you can do to make your home safe and to help prevent falls. Ask for help when making these changes, if needed.  What actions can I take to prevent falls?  General instructions  · Use good lighting in all rooms. Replace any light bulbs that burn out.  · Turn on lights if it is dark. Use night-lights.  · Place frequently used items in easy-to-reach places. Lower the shelves around your home if  necessary.  · Set up furniture so that there are clear paths around it. Avoid moving your furniture around.  · Remove throw rugs and other tripping hazards from the floor.  · Avoid walking on wet floors.  · Fix any uneven floor surfaces.  · Add color or contrast paint or tape to grab bars and handrails in your home. Place contrasting color strips on the first and last steps of stairways.  · When you use a stepladder, make sure that it is completely opened and that the sides are firmly locked. Have someone hold the ladder while you are using it. Do not climb a closed stepladder.  · Be aware of any and all pets.  What can I do in the bathroom?         · Keep the floor dry. Immediately clean up any water that spills onto the floor.  · Remove soap buildup in the tub or shower on a regular basis.  · Use non-skid mats or decals on the floor of the tub or shower.  · Attach bath mats securely with double-sided, non-slip rug tape.  · If you need to sit down while you are in the shower, use a plastic, non-slip stool.  · Install grab bars by the toilet and in the tub and shower. Do not use towel bars as grab bars.  What can I do in the bedroom?  · Make sure that a bedside light is easy to reach.  · Do not use oversized bedding that drapes onto the floor.  · Have a firm chair that has side arms to use for getting dressed.  What can I do in the kitchen?  · Clean up any spills right away.  · If you need to reach for something above you, use a sturdy step stool that has a grab bar.  · Keep electrical cables out of the way.  · Do not use floor polish or wax that makes floors slippery. If you must use wax, make sure that it is non-skid floor wax.  What can I do in the stairways?  · Do not leave any items on the stairs.  · Make sure that you have a light switch at the top of the stairs and the bottom of the stairs. Have them installed if you do not have them.  · Make sure that there are handrails on both sides of the stairs. Fix  handrails that are broken or loose. Make sure that handrails are as long as the stairways.  · Install non-slip stair treads on all stairs in your home.  · Avoid having throw rugs at the top or bottom of stairways, or secure the rugs with carpet tape to prevent them from moving.  · Choose a carpet design that does not hide the edge of steps on the stairway.  · Check any carpeting to make sure that it is firmly attached to the stairs. Fix any carpet that is loose or worn.  What can I do on the outside of my home?  · Use bright outdoor lighting.  · Regularly repair the edges of walkways and driveways and fix any cracks.  · Remove high doorway thresholds.  · Trim any shrubbery on the main path into your home.  · Regularly check that handrails are securely fastened and in good repair. Both sides of any steps should have handrails.  · Install guardrails along the edges of any raised decks or porches.  · Clear walkways of debris and clutter, including tools and rocks.  · Have leaves, snow, and ice cleared regularly.  · Use sand or salt on walkways during winter months.  · In the garage, clean up any spills right away, including grease or oil spills.  What other actions can I take?  · Wear closed-toe shoes that fit well and support your feet. Wear shoes that have rubber soles or low heels.  · Use mobility aids as needed, such as canes, walkers, scooters, and crutches.  · Review your medicines with your health care provider. Some medicines can cause dizziness or changes in blood pressure, which increase your risk of falling.  Talk with your health care provider about other ways that you can decrease your risk of falls. This may include working with a physical therapist or  to improve your strength, balance, and endurance.  Where to find more information  · Centers for Disease Control and PreventionJARED: https://www.cdc.gov  · National [employer]ber on Aging: https://xw6byot.mandy.nih.gov  Contact a health care provider  if:  · You are afraid of falling at home.  · You feel weak, drowsy, or dizzy at home.  · You fall at home.  Summary  · There are many simple things that you can do to make your home safe and to help prevent falls.  · Ways to make your home safe include removing tripping hazards and installing grab bars in the bathroom.  · Ask for help when making these changes in your home.  This information is not intended to replace advice given to you by your health care provider. Make sure you discuss any questions you have with your health care provider.  Document Released: 12/08/2003 Document Revised: 11/30/2018 Document Reviewed: 08/02/2018  Elsevier Patient Education © 2020 Elsevier Inc.

## 2020-09-02 ENCOUNTER — OFFICE VISIT (OUTPATIENT)
Dept: FAMILY MEDICINE CLINIC | Facility: CLINIC | Age: 80
End: 2020-09-02

## 2020-09-02 VITALS
DIASTOLIC BLOOD PRESSURE: 72 MMHG | RESPIRATION RATE: 16 BRPM | HEIGHT: 64 IN | BODY MASS INDEX: 28 KG/M2 | TEMPERATURE: 96.9 F | OXYGEN SATURATION: 97 % | SYSTOLIC BLOOD PRESSURE: 118 MMHG | WEIGHT: 164 LBS | HEART RATE: 71 BPM

## 2020-09-02 DIAGNOSIS — M54.50 LOW BACK PAIN WITH RADIATION: Primary | ICD-10-CM

## 2020-09-02 DIAGNOSIS — M81.0 AGE-RELATED OSTEOPOROSIS WITHOUT CURRENT PATHOLOGICAL FRACTURE: ICD-10-CM

## 2020-09-02 DIAGNOSIS — M19.90 ARTHRITIS: ICD-10-CM

## 2020-09-02 DIAGNOSIS — Z78.0 POST-MENOPAUSAL: ICD-10-CM

## 2020-09-02 DIAGNOSIS — E78.00 PURE HYPERCHOLESTEROLEMIA: ICD-10-CM

## 2020-09-02 DIAGNOSIS — E55.9 VITAMIN D DEFICIENCY: ICD-10-CM

## 2020-09-02 PROCEDURE — 72100 X-RAY EXAM L-S SPINE 2/3 VWS: CPT | Performed by: PHYSICIAN ASSISTANT

## 2020-09-02 PROCEDURE — 99213 OFFICE O/P EST LOW 20 MIN: CPT | Performed by: PHYSICIAN ASSISTANT

## 2020-09-02 NOTE — PROGRESS NOTES
Subjective   Kimberlee Urrutia is a 80 y.o. female.     History of Present Illness   Kimberlee Urrutia 80 y.o. female presents for evaluation and treatment of  low back pain. The patient first noted symptoms a few months ago. It was not related to none recalled by the patient.  The pain intensity is moderate, severe and intermittent , and is located left side, lumbar, sacroiliac, mid buttock, posterior thigh and left leg. The pain is described as aching, sharp and variable intensity and occurs intermittently. The symptoms are not progressive. Symptoms are exacerbated by first getting up and better as day goes on. Factors which relieve the pain include heat, ice and sitting, rest. Other associated symptoms include aching pain in the left leg, denies pain radiating down right leg, denies motor loss, denies weakness in legs, denies loss of muscle tone, denies loss of bowel or bladder control and does have episodes N/T post thigh bilat at times. Previous history of symptoms: never. Treatment efforts have included heat, ice, back exercises and sitting , with and without relief. APAP PRN helps.   Onset April. Asked about seeing Dr Givens; need work up first.  Declines restart Fosamax for now    X-Ray  Interpretation report in house X-rays that I personally viewed    Relevant Clinical Issues/Diagnoses/Indications:  Lumbar pain left and radiates post left leg        Clinical Findings:  Mild scoliosis  retrolisthesis L5!!!;  DDD changes; no compression fx          Comparative Data:  none          Date of Previous X-ray:    Change on current X-ray:      The following portions of the patient's history were reviewed and updated as appropriate: allergies, current medications, past family history, past medical history, past social history, past surgical history and problem list.    Review of Systems   Constitutional: Negative for activity change, appetite change and unexpected weight change.   HENT: Negative for nosebleeds and  trouble swallowing.    Eyes: Negative for pain and visual disturbance.   Respiratory: Negative for chest tightness, shortness of breath and wheezing.    Cardiovascular: Negative for chest pain and palpitations.   Gastrointestinal: Negative for abdominal pain and blood in stool.   Endocrine: Negative.    Genitourinary: Negative for difficulty urinating and hematuria.   Musculoskeletal: Positive for back pain. Negative for joint swelling.   Skin: Negative for color change and rash.   Allergic/Immunologic: Negative.    Neurological: Negative for syncope and speech difficulty.   Hematological: Negative for adenopathy.   Psychiatric/Behavioral: Negative for agitation and confusion.   All other systems reviewed and are negative.      Objective   Physical Exam   Constitutional: She is oriented to person, place, and time. She appears well-developed and well-nourished. No distress.   HENT:   Head: Normocephalic.   Eyes: Pupils are equal, round, and reactive to light. Conjunctivae and EOM are normal.   Neck: Normal range of motion. Neck supple.   Cardiovascular:   No murmur heard.  Pulmonary/Chest: Effort normal.   Abdominal: Soft. There is no tenderness.   Musculoskeletal: Normal range of motion. She exhibits no edema, tenderness or deformity.   Motor and sensation intact  SLR to 90* bilateral  Dorsiflexion of great toes intact  No pain to palp  Does have pain deep in left lumbar area    No hip pain and no hip ROM pain   Neurological: She is alert and oriented to person, place, and time. She displays normal reflexes. No sensory deficit. She exhibits normal muscle tone. Coordination normal.   Skin: Skin is warm and dry. No rash noted. She is not diaphoretic.   Psychiatric: She has a normal mood and affect. Her behavior is normal. Judgment and thought content normal. Her affect is not inappropriate. She is not actively hallucinating. Cognition and memory are normal.   Nursing note and vitals reviewed.      Assessment/Plan    Kimberlee was seen today for back pain.    Diagnoses and all orders for this visit:    Low back pain with radiation  -     XR Spine Lumbar 2 or 3 View  -     Ambulatory Referral to Physical Therapy Evaluate and treat      Try PT  APAP PRN    Consider NSAID, but labs first to check renal---  Declines labs

## 2020-09-03 ENCOUNTER — TREATMENT (OUTPATIENT)
Dept: PHYSICAL THERAPY | Facility: CLINIC | Age: 80
End: 2020-09-03

## 2020-09-03 DIAGNOSIS — M54.42 LEFT-SIDED LOW BACK PAIN WITH LEFT-SIDED SCIATICA, UNSPECIFIED CHRONICITY: Primary | ICD-10-CM

## 2020-09-03 DIAGNOSIS — S39.012D STRAIN OF LUMBAR REGION, SUBSEQUENT ENCOUNTER: ICD-10-CM

## 2020-09-03 PROCEDURE — 97110 THERAPEUTIC EXERCISES: CPT | Performed by: PHYSICAL THERAPIST

## 2020-09-03 PROCEDURE — 97161 PT EVAL LOW COMPLEX 20 MIN: CPT | Performed by: PHYSICAL THERAPIST

## 2020-09-03 NOTE — PROGRESS NOTES
"Physical Therapy Initial Evaluation and Plan of Care    Patient: Kimberlee Urrutia   : 1940  Diagnosis/ICD-10 Code:  Left-sided low back pain with left-sided sciatica, unspecified chronicity [M54.42]  Referring practitioner: Eden Medina PA-C  PMHx Reviewed : 9/3/2020    Subjective Evaluation    History of Present Illness  Mechanism of injury: Pt presents to PT with LBP and radiating pain in (B) LE (into the (L) posterior thigh) and also notices (B) LE tingling.  The pain started with returning to cutting the grass.  \"The pain doesn't seem to be affected by the moving but it started about the same time I started to cut the grass.\"    Pt reports that it takes about 5 hrs total.      Pt denies any Hx of lumbar injury or back pain.      Occupation:  Retired - Medical techologist  Activities:  Yard Work, Run Errands.  PLOF: Independent  Medical Hx Reviewed.        Quality of life: excellent    Pain  Current pain ratin  At best pain ratin  At worst pain ratin  Location: (L) Side Lumbar, (L) posterior hip  Quality: sharp  Relieving factors: ice, heat and rest (Tylenol)  Exacerbated by: Unknown - Random.  Progression: no change    Social Support  Lives in: multiple-level home  Lives with: alone             Objective          Active Range of Motion     Lumbar   Flexion: 100 (%) degrees   Extension: 75 (%) degrees   Left lateral flexion: 100 (%) degrees   Right lateral flexion: 75 (%) degrees   Left rotation: 75 (%) degrees   Right rotation: 75 (%) degrees     Strength/Myotome Testing     Left Hip   Planes of Motion   Flexion: 4  External rotation: 5  Internal rotation: 4    Right Hip   Planes of Motion   Flexion: 5  External rotation: 5  Internal rotation: 5    Left Knee   Flexion: 5  Extension: 5    Right Knee   Flexion: 5  Extension: 5    Left Ankle/Foot   Dorsiflexion: 5  Eversion: 5  Great toe extension: 5    Right Ankle/Foot   Dorsiflexion: 5  Eversion: 5  Great toe extension: 5    Tests     Lumbar "     Left   Negative quadrant.     Right   Negative quadrant.           Assessment & Plan     Assessment  Impairments: abnormal or restricted ROM, activity intolerance, impaired physical strength, lacks appropriate home exercise program and pain with function  Assessment details: Pt presents to PT with symptoms consistent with Low Back Pain and (L) LE Radicular symptoms.  Pt would benefit from skilled PT intervention to address the deficits noted.     Prognosis: good  Functional Limitations: carrying objects, lifting, uncomfortable because of pain, moving in bed and unable to perform repetitive tasks  Goals  Plan Goals: SHORT TERM GOALS: Time for Goal Achievement: 4 weeks    1.  Patient to be compliant and progression of HEP                             2.  Pain level < 5/10 at worst with mentioned activities to improve function  3.  Increased thoracic, lumbar and SIJ mobility to allow for increased lumbar AROM with less pain.  4.  Increased lumbar AROM to by 25% in all planes to allow for increased ease with sit-stand transfers and functional activities    LONG TERM GOALS: Time for Goal Achievement: 16 visits  1.  Pt. to score < 16% on Back Index  2.  Pain level < 3/10 with all listed activities to return to normal.  3.  Lumbar AROM to WFL to allow for return to household & recreational activities w/o increased symptoms  4.  (B) LE and lower abdominal strength to 5/5 to allow for pushing, pulling and activities to occur without pain (driving, sitting, household  & yard work requirements)        Plan  Therapy options: will be seen for skilled physical therapy services  Planned modality interventions: cryotherapy, electrical stimulation/Russian stimulation, TENS, thermotherapy (hydrocollator packs) and ultrasound  Other planned modality interventions: Dry Needling  Planned therapy interventions: body mechanics training, flexibility, functional ROM exercises, home exercise program, manual therapy, neuromuscular  re-education, postural training, spinal/joint mobilization, stretching, strengthening, soft tissue mobilization and therapeutic activities  Frequency: 2x week  Duration in visits: 16  Treatment plan discussed with: patient        Manual Therapy:          mins  99506;  Therapeutic Exercise:     12     mins  82543;     Neuromuscular Marian:         mins  59298;    Therapeutic Activity:           mins  94641;     Gait Training:            mins  35083;     Ultrasound:           mins  89765;    Electrical Stimulation:          mins  24088 ( );  Dry Needling           mins self-pay  Traction           mins 92780  Canalith Repositioning         mins 78950      Timed Treatment:   12   mins   Total Treatment:     50   mins    PT SIGNATURE: Avi Emanuel PT   [facility] #[id number]    DATE TREATMENT INITIATED: 9/3/2020    Medicare Initial Certification   Certification Period: 12/2/2020  I certify that the therapy services are furnished while this patient is under my care.  The services outlined above are required by this patient, and will be reviewed every 90 days.     PHYSICIAN: Eden Medina, MAJO      DATE:     Please sign and return via fax to [phone]. Thank you, [facility].

## 2020-09-08 ENCOUNTER — TREATMENT (OUTPATIENT)
Dept: PHYSICAL THERAPY | Facility: CLINIC | Age: 80
End: 2020-09-08

## 2020-09-08 DIAGNOSIS — M54.42 LEFT-SIDED LOW BACK PAIN WITH LEFT-SIDED SCIATICA, UNSPECIFIED CHRONICITY: Primary | ICD-10-CM

## 2020-09-08 DIAGNOSIS — S39.012D STRAIN OF LUMBAR REGION, SUBSEQUENT ENCOUNTER: ICD-10-CM

## 2020-09-08 PROCEDURE — 97110 THERAPEUTIC EXERCISES: CPT | Performed by: PHYSICAL THERAPIST

## 2020-09-08 NOTE — PROGRESS NOTES
Physical Therapy Daily Progress Note  Visit # 2      Subjective   Kimberlee Urrutia reports:   Not noticing any pain in her back at the moment, feels like her overall pain is getting better, but not sure.  No issues with dressing/ADLs this morning.        Objective   See Exercise, Manual, and Modality Logs for complete treatment.   Added bridging to HEP, written instructions issued.       Assessment & Plan     Assessment  Assessment details: Progressed through today's session well with no increased pain or c/o during exercises.  Pt felt her HEP was sufficient at this time, did add one exercise as we will not have pt back into clinic for another week.            Progress per Plan of Care and Progress strengthening /stabilization /functional activity           Timed:         Manual Therapy:         mins  73617     Therapeutic Exercise:     48    mins  18337     Neuromuscular Marian:        mins  25678    Therapeutic Activity:          mins  29356     Gait Training:           mins  58556     Ultrasound:          mins  41719    Ionto                                   mins  12162  Self Care                            mins  22417    Un-Timed:  Electrical Stimulation:         mins 70516 ( )  Traction          mins 19867    Timed Treatment:   48   mins   Total Treatment:     48   mins    JOEY Mclean License #[id number]  Physical Therapist Assistant

## 2020-09-10 ENCOUNTER — TELEPHONE (OUTPATIENT)
Dept: FAMILY MEDICINE CLINIC | Facility: CLINIC | Age: 80
End: 2020-09-10

## 2020-09-10 NOTE — TELEPHONE ENCOUNTER
----- Message from Eden Medina PA-C sent at 9/3/2020  5:28 PM EDT -----  Significant Osteoarthritis and degenerative changes noted. Can refer to Dr Givens if wants to see him. Concern about osteoporosis and still suggest Rx like Fosamax or twice a year Prolia.  Can also see PT and see if helps pain

## 2020-09-16 ENCOUNTER — TELEPHONE (OUTPATIENT)
Dept: FAMILY MEDICINE CLINIC | Facility: CLINIC | Age: 80
End: 2020-09-16

## 2020-09-16 ENCOUNTER — TREATMENT (OUTPATIENT)
Dept: PHYSICAL THERAPY | Facility: CLINIC | Age: 80
End: 2020-09-16

## 2020-09-16 DIAGNOSIS — M54.42 LEFT-SIDED LOW BACK PAIN WITH LEFT-SIDED SCIATICA, UNSPECIFIED CHRONICITY: Primary | ICD-10-CM

## 2020-09-16 DIAGNOSIS — S39.012D STRAIN OF LUMBAR REGION, SUBSEQUENT ENCOUNTER: ICD-10-CM

## 2020-09-16 PROCEDURE — 97110 THERAPEUTIC EXERCISES: CPT | Performed by: PHYSICAL THERAPIST

## 2020-09-16 NOTE — PROGRESS NOTES
Physical Therapy Daily Progress Note  Visit: 3    Kimberlee Urrutia reports: I am feeling better today.  I still have pain in the mornings but less throughout the day.      Subjective     Objective   See Exercise, Manual, and Modality Logs for complete treatment.       Assessment & Plan     Assessment  Assessment details: The pt is progressing well with decreased lumbar pain.  Reviewed the HEP and TE in the clinic.      Plan  Plan details: Pt to bring in her HEP sheets to review upon next visit.          Manual Therapy:          mins  60712;  Therapeutic Exercise:     45     mins  57427;     Neuromuscular Marian:         mins  83013;    Therapeutic Activity:           mins  67766;     Gait Training:            mins  35223;     Ultrasound:           mins  25049;    Electrical Stimulation:          mins  83907 ( );  Dry Needling           mins self-pay  Traction           mins 48440  Canalith Repositioning         mins 04626      Timed Treatment:   45   mins   Total Treatment:     45   mins    Avi Emanuel PT  [place] License #: [id number]    Physical Therapist

## 2020-09-18 ENCOUNTER — TREATMENT (OUTPATIENT)
Dept: PHYSICAL THERAPY | Facility: CLINIC | Age: 80
End: 2020-09-18

## 2020-09-18 DIAGNOSIS — M54.42 LEFT-SIDED LOW BACK PAIN WITH LEFT-SIDED SCIATICA, UNSPECIFIED CHRONICITY: Primary | ICD-10-CM

## 2020-09-18 DIAGNOSIS — S39.012D STRAIN OF LUMBAR REGION, SUBSEQUENT ENCOUNTER: ICD-10-CM

## 2020-09-18 PROCEDURE — 97110 THERAPEUTIC EXERCISES: CPT | Performed by: PHYSICAL THERAPIST

## 2020-09-18 NOTE — PROGRESS NOTES
Physical Therapy Daily Progress Note  Visit: 4    Kimberlee Urrutia reports: I woke up this morning with increased back pain.  I didn't do anything that I am aware of that would irritate my back.  I have some questions about my HEP, I brought my HEP in to review.      Subjective     Objective   See Exercise, Manual, and Modality Logs for complete treatment.       Assessment & Plan     Assessment  Assessment details: Reviewed all HEP sheets, techniques and counts for clarity.   Tx was limited today because of the pt's lumbar soreness and irritation.  The pt matthew Rx well w/ MHP and exercises performed in the clinic.      Plan  Plan details: Progress ROM / strengthening / stabilization / functional activity as tolerated          Manual Therapy:          mins  52076;  Therapeutic Exercise:     45     mins  79174;     Neuromuscular Marian:         mins  77208;    Therapeutic Activity:           mins  53413;     Gait Training:            mins  48338;     Ultrasound:           mins  98417;    Electrical Stimulation:          mins  66443 ( );  Dry Needling           mins self-pay  Traction           mins 54193  Canalith Repositioning         mins 63770      Timed Treatment:   45   mins   Total Treatment:     45   mins    Avi Emanuel PT  [place] License #: [id number]    Physical Therapist

## 2020-09-19 PROBLEM — E83.52 SERUM CALCIUM ELEVATED: Status: ACTIVE | Noted: 2020-09-19

## 2020-09-19 LAB
25(OH)D3+25(OH)D2 SERPL-MCNC: 39.1 NG/ML (ref 30–100)
ALBUMIN SERPL-MCNC: 4.7 G/DL (ref 3.5–5.2)
ALBUMIN/GLOB SERPL: 2.5 G/DL
ALP SERPL-CCNC: 42 U/L (ref 39–117)
ALT SERPL-CCNC: 17 U/L (ref 1–33)
AST SERPL-CCNC: 19 U/L (ref 1–32)
BASOPHILS # BLD AUTO: 0.03 10*3/MM3 (ref 0–0.2)
BASOPHILS NFR BLD AUTO: 0.5 % (ref 0–1.5)
BILIRUB SERPL-MCNC: 0.5 MG/DL (ref 0–1.2)
BUN SERPL-MCNC: 13 MG/DL (ref 8–23)
BUN/CREAT SERPL: 14.6 (ref 7–25)
CALCIUM SERPL-MCNC: 10.9 MG/DL (ref 8.6–10.5)
CHLORIDE SERPL-SCNC: 95 MMOL/L (ref 98–107)
CHOLEST SERPL-MCNC: 218 MG/DL (ref 0–200)
CO2 SERPL-SCNC: 26.6 MMOL/L (ref 22–29)
CREAT SERPL-MCNC: 0.89 MG/DL (ref 0.57–1)
EOSINOPHIL # BLD AUTO: 0 10*3/MM3 (ref 0–0.4)
EOSINOPHIL NFR BLD AUTO: 0 % (ref 0.3–6.2)
ERYTHROCYTE [DISTWIDTH] IN BLOOD BY AUTOMATED COUNT: 13.4 % (ref 12.3–15.4)
FOLATE SERPL-MCNC: >20 NG/ML (ref 4.78–24.2)
GLOBULIN SER CALC-MCNC: 1.9 GM/DL
GLUCOSE SERPL-MCNC: 97 MG/DL (ref 65–99)
HCT VFR BLD AUTO: 41.1 % (ref 34–46.6)
HDLC SERPL-MCNC: 97 MG/DL (ref 40–60)
HGB BLD-MCNC: 13.7 G/DL (ref 12–15.9)
IMM GRANULOCYTES # BLD AUTO: 0.01 10*3/MM3 (ref 0–0.05)
IMM GRANULOCYTES NFR BLD AUTO: 0.2 % (ref 0–0.5)
LDLC SERPL CALC-MCNC: 109 MG/DL (ref 0–100)
LYMPHOCYTES # BLD AUTO: 1.99 10*3/MM3 (ref 0.7–3.1)
LYMPHOCYTES NFR BLD AUTO: 33.9 % (ref 19.6–45.3)
MCH RBC QN AUTO: 29.5 PG (ref 26.6–33)
MCHC RBC AUTO-ENTMCNC: 33.3 G/DL (ref 31.5–35.7)
MCV RBC AUTO: 88.6 FL (ref 79–97)
MONOCYTES # BLD AUTO: 0.54 10*3/MM3 (ref 0.1–0.9)
MONOCYTES NFR BLD AUTO: 9.2 % (ref 5–12)
NEUTROPHILS # BLD AUTO: 3.3 10*3/MM3 (ref 1.7–7)
NEUTROPHILS NFR BLD AUTO: 56.2 % (ref 42.7–76)
NRBC BLD AUTO-RTO: 0 /100 WBC (ref 0–0.2)
PLATELET # BLD AUTO: 247 10*3/MM3 (ref 140–450)
POTASSIUM SERPL-SCNC: 5 MMOL/L (ref 3.5–5.2)
PROT SERPL-MCNC: 6.6 G/DL (ref 6–8.5)
RBC # BLD AUTO: 4.64 10*6/MM3 (ref 3.77–5.28)
SODIUM SERPL-SCNC: 140 MMOL/L (ref 136–145)
T3FREE SERPL-MCNC: 2.5 PG/ML (ref 2–4.4)
T4 FREE SERPL-MCNC: 1.35 NG/DL (ref 0.93–1.7)
TRIGL SERPL-MCNC: 60 MG/DL (ref 0–150)
TSH SERPL DL<=0.005 MIU/L-ACNC: 1.97 UIU/ML (ref 0.27–4.2)
VIT B12 SERPL-MCNC: 790 PG/ML (ref 211–946)
VLDLC SERPL CALC-MCNC: 12 MG/DL
WBC # BLD AUTO: 5.87 10*3/MM3 (ref 3.4–10.8)

## 2020-09-19 RX ORDER — ALENDRONATE SODIUM 70 MG/1
70 TABLET ORAL
Qty: 5 TABLET | Refills: 12 | Status: SHIPPED | OUTPATIENT
Start: 2020-09-19 | End: 2020-12-24 | Stop reason: SDUPTHER

## 2020-09-21 ENCOUNTER — TELEPHONE (OUTPATIENT)
Dept: FAMILY MEDICINE CLINIC | Facility: CLINIC | Age: 80
End: 2020-09-21

## 2020-09-21 ENCOUNTER — TREATMENT (OUTPATIENT)
Dept: PHYSICAL THERAPY | Facility: CLINIC | Age: 80
End: 2020-09-21

## 2020-09-21 DIAGNOSIS — S39.012D STRAIN OF LUMBAR REGION, SUBSEQUENT ENCOUNTER: ICD-10-CM

## 2020-09-21 DIAGNOSIS — M54.42 LEFT-SIDED LOW BACK PAIN WITH LEFT-SIDED SCIATICA, UNSPECIFIED CHRONICITY: Primary | ICD-10-CM

## 2020-09-21 PROCEDURE — 97110 THERAPEUTIC EXERCISES: CPT | Performed by: PHYSICAL THERAPIST

## 2020-09-21 NOTE — PROGRESS NOTES
Physical Therapy Daily Progress Note  Visit: 5    Kimberlee Urrutia reports: I am feeling better overall, but I can't tell any difference today verses my last visit.      Subjective     Objective   See Exercise, Manual, and Modality Logs for complete treatment.       Assessment & Plan     Assessment  Assessment details: The pt matthew Rx well, but reported that she was done for the day when we got finished with her current exercises.  Discussed the pt's status of progress.      Plan  Plan details: Progress ROM / strengthening / stabilization / functional activity as tolerated          Manual Therapy:          mins  16449;  Therapeutic Exercise:     45     mins  28294;     Neuromuscular Marian:         mins  61957;    Therapeutic Activity:           mins  97597;     Gait Training:            mins  60627;     Ultrasound:           mins  59556;    Electrical Stimulation:          mins  31815 ( );  Dry Needling           mins self-pay  Traction           mins 70020  Canalith Repositioning         mins 02296      Timed Treatment:   45   mins   Total Treatment:     45   mins    KALEY Knowles License #: [id number]    Physical Therapist

## 2020-09-21 NOTE — TELEPHONE ENCOUNTER
----- Message from Eden Medina PA-C sent at 9/19/2020 11:44 AM EDT -----  Est CC 56 and thus able to start Fosamax for Osteoporosis.  Want to make sure you are not having any jaw bone issues?  Lipids slightly elevated but HDL amazing.  Assume you do not want to start a statin?  Vit D fine. Calcium likely elevated due to Ch-.  I still need to suggest follow up labs to do in next few weeks:  Ionized Ca+, intact PTH, and phosphorus.  I will make order for labs and send Rx Fosamax.

## 2020-09-24 ENCOUNTER — TREATMENT (OUTPATIENT)
Dept: PHYSICAL THERAPY | Facility: CLINIC | Age: 80
End: 2020-09-24

## 2020-09-24 DIAGNOSIS — M54.42 LEFT-SIDED LOW BACK PAIN WITH LEFT-SIDED SCIATICA, UNSPECIFIED CHRONICITY: Primary | ICD-10-CM

## 2020-09-24 DIAGNOSIS — S39.012D STRAIN OF LUMBAR REGION, SUBSEQUENT ENCOUNTER: ICD-10-CM

## 2020-09-24 PROCEDURE — 97110 THERAPEUTIC EXERCISES: CPT | Performed by: PHYSICAL THERAPIST

## 2020-09-24 NOTE — PROGRESS NOTES
Physical Therapy Daily Progress Note  Visit: 6    Kimberlee Urrutia reports:  I was able to pull up weeds in my yard and paint 1/2 of my deck over the last couple of days and my back was able to tolerate it.      Subjective     Objective   See Exercise, Manual, and Modality Logs for complete treatment.       Assessment & Plan     Assessment  Assessment details: The pt matthew Rx better then her previous 2 visits.  She also has been able to do more physical activity in the yard at home without issue.  Able to return to all of previous exercises, plan on progressing next visit.    Plan  Plan details: Progress ROM / strengthening / stabilization / functional activity as tolerated          Manual Therapy:          mins  73484;  Therapeutic Exercise:     53     mins  20344;     Neuromuscular Marian:         mins  70698;    Therapeutic Activity:           mins  85461;     Gait Training:            mins  56868;     Ultrasound:           mins  14157;    Electrical Stimulation:          mins  65232 ( );  Dry Needling           mins self-pay  Traction           mins 52214  Canalith Repositioning         mins 52866      Timed Treatment:   53   mins   Total Treatment:     53   mins    Avi Emanuel PT  [place] License #: [id number]    Physical Therapist

## 2020-09-28 ENCOUNTER — TREATMENT (OUTPATIENT)
Dept: PHYSICAL THERAPY | Facility: CLINIC | Age: 80
End: 2020-09-28

## 2020-09-28 DIAGNOSIS — M54.42 LEFT-SIDED LOW BACK PAIN WITH LEFT-SIDED SCIATICA, UNSPECIFIED CHRONICITY: Primary | ICD-10-CM

## 2020-09-28 DIAGNOSIS — S39.012D STRAIN OF LUMBAR REGION, SUBSEQUENT ENCOUNTER: ICD-10-CM

## 2020-09-28 PROCEDURE — 97110 THERAPEUTIC EXERCISES: CPT | Performed by: PHYSICAL THERAPIST

## 2020-09-28 NOTE — PROGRESS NOTES
Physical Therapy Daily Progress Note  Visit: 7    Kimberleeliam Urrutia reports: The pain has become more focused to one spot in my left low back now.  I am not cringing when I get up now.  I pulled up violets in my yard w/o pain.      Subjective     Objective   See Exercise, Manual, and Modality Logs for complete treatment.       Assessment & Plan     Assessment  Assessment details: The pt has been more active lately by report w/o lumbar pain.  We are continue to work restore lumbar strength and stability to return to PLOF.        Manual Therapy:          mins  91257;  Therapeutic Exercise:     55     mins  22618;     Neuromuscular Marian:         mins  58851;    Therapeutic Activity:           mins  49015;     Gait Training:            mins  01554;     Ultrasound:           mins  00130;    Electrical Stimulation:          mins  18656 ( );  Dry Needling           mins self-pay  Traction           mins 19725  Canalith Repositioning         mins 10178      Timed Treatment:   55   mins   Total Treatment:     55   mins    Avi Emanuel PT  [place] License #: [id number]    Physical Therapist

## 2020-10-01 ENCOUNTER — TREATMENT (OUTPATIENT)
Dept: PHYSICAL THERAPY | Facility: CLINIC | Age: 80
End: 2020-10-01

## 2020-10-01 DIAGNOSIS — S39.012D STRAIN OF LUMBAR REGION, SUBSEQUENT ENCOUNTER: ICD-10-CM

## 2020-10-01 DIAGNOSIS — M54.42 LEFT-SIDED LOW BACK PAIN WITH LEFT-SIDED SCIATICA, UNSPECIFIED CHRONICITY: Primary | ICD-10-CM

## 2020-10-01 PROCEDURE — 97110 THERAPEUTIC EXERCISES: CPT | Performed by: PHYSICAL THERAPIST

## 2020-10-01 NOTE — PROGRESS NOTES
Physical Therapy Daily Progress Note  Visit: 8    Kimberlee Urrutia reports: I am feeling about the same as last visit with some pain in my back.  Still able to do more in my yard without issue.      Subjective     Objective   See Exercise, Manual, and Modality Logs for complete treatment.       Assessment & Plan     Assessment  Assessment details: Kimberlee has continued to improve with her lumbar strength and functional ability to return life activities.      Plan  Plan details: Continue for 2 more visits before reassessment for DC.        Manual Therapy:          mins  79159;  Therapeutic Exercise:     55     mins  13427;     Neuromuscular Marian:         mins  01113;    Therapeutic Activity:           mins  77591;     Gait Training:            mins  09432;     Ultrasound:           mins  45343;    Electrical Stimulation:          mins  77382 ( );  Dry Needling           mins self-pay  Traction           mins 32316  Canalith Repositioning         mins 76920      Timed Treatment:   55   mins   Total Treatment:     55   mins    KALEY Knowles License #: [id number]    Physical Therapist

## 2020-10-05 ENCOUNTER — TREATMENT (OUTPATIENT)
Dept: PHYSICAL THERAPY | Facility: CLINIC | Age: 80
End: 2020-10-05

## 2020-10-05 DIAGNOSIS — M54.42 LEFT-SIDED LOW BACK PAIN WITH LEFT-SIDED SCIATICA, UNSPECIFIED CHRONICITY: Primary | ICD-10-CM

## 2020-10-05 DIAGNOSIS — S39.012D STRAIN OF LUMBAR REGION, SUBSEQUENT ENCOUNTER: ICD-10-CM

## 2020-10-05 PROCEDURE — 97110 THERAPEUTIC EXERCISES: CPT | Performed by: PHYSICAL THERAPIST

## 2020-10-05 NOTE — PROGRESS NOTES
Physical Therapy Daily Progress Note / Discharge Note    Referring practitioner: Eden Medina PA-C  Date of Initial Visit: Episode Type: THERAPY  Noted: 9/3/2020  Patient seen for 9 sessions.  ______________________________________________________________    Kimberlee Urrutia reports: I am doing much better overall with my back pain. I have been working in my yard and can do what I want.   I did take a Mobic yesterday and today and that seems to help a lot.  I did not sleep last night, because I am trying not to take my sleeping pills.      Subjective Evaluation    Pain  At worst pain ratin  Location: Lumbar  Exacerbated by: mornings.       Back Index Score: 0% perceived disability    Objective          Active Range of Motion     Lumbar   Flexion: 100 (%) degrees   Extension: 100 (%) degrees   Left lateral flexion: 100 (%) degrees   Right lateral flexion: 100 (%) degrees   Left rotation: 100 (%) degrees   Right rotation: 75 (%) degrees     Strength/Myotome Testing     Left Hip   Planes of Motion   Flexion: 4+  External rotation: 5  Internal rotation: 5    Right Hip   Planes of Motion   Flexion: 5  External rotation: 5  Internal rotation: 5    Left Knee   Flexion: 5  Extension: 5    Right Knee   Flexion: 5  Extension: 5    Left Ankle/Foot   Dorsiflexion: 5  Eversion: 5  Great toe extension: 5    Right Ankle/Foot   Dorsiflexion: 5  Eversion: 5  Great toe extension: 5      See Exercise, Manual, and Modality Logs for complete treatment.     Goals  Plan Goals: SHORT TERM GOALS: Time for Goal Achievement: 4 weeks    1.  Patient to be compliant and progression of HEP - MET                            2.  Pain level < 5/10 at worst with mentioned activities to improve function. - MET  3.  Increased thoracic, lumbar and SIJ mobility to allow for increased lumbar AROM with less pain. - MET  4.  Increased lumbar AROM to by 25% in all planes to allow for increased ease with sit-stand transfers and functional activities. -  MET    LONG TERM GOALS: Time for Goal Achievement: 16 visits  1.  Pt. to score < 16% on Back Index - MET  2.  Pain level < 3/10 with all listed activities to return to normal. - MET  3.  Lumbar AROM to WFL to allow for return to household & recreational activities w/o increased symptoms. - MET  4.  (B) LE and lower abdominal strength to 5/5 to allow for pushing, pulling and activities to occur without pain (driving, sitting, household  & yard work requirements) - MET    Assessment & Plan     Assessment  Assessment details: Kimberlee has met all set goals at this time.  She has been able to return to yard work and house work without issue of back pain.  Pt agreed with assessment and reports ready to continue on with her HEP.  Skilled care is no longer warranted at this time.      Plan  Plan details: DC to HEP.  Pt instructed to call with questions or issues related to their injury.                Manual Therapy:          mins  53963;  Therapeutic Exercise:     55     mins  02584;     Neuromuscular Marian:         mins  55331;    Therapeutic Activity:           mins  43668;     Gait Training:            mins  31815;     Ultrasound:           mins  23256;    Electrical Stimulation:          mins  45459 ( );  Dry Needling           mins self-pay  Traction           mins 05962  Canalith Repositioning         mins 65145      Timed Treatment:   55   mins   Total Treatment:     55   mins    Avi Emanuel PT  [place] License #: [id number]    Physical Therapist

## 2020-10-06 ENCOUNTER — HOSPITAL ENCOUNTER (OUTPATIENT)
Dept: BONE DENSITY | Facility: HOSPITAL | Age: 80
Discharge: HOME | End: 2020-10-06
Admitting: PHYSICIAN ASSISTANT

## 2020-10-06 DIAGNOSIS — Z78.0 POST-MENOPAUSAL: ICD-10-CM

## 2020-10-06 PROCEDURE — 77080 DXA BONE DENSITY AXIAL: CPT

## 2020-12-24 ENCOUNTER — OFFICE VISIT (OUTPATIENT)
Dept: FAMILY MEDICINE CLINIC | Facility: CLINIC | Age: 80
End: 2020-12-24

## 2020-12-24 VITALS
HEART RATE: 66 BPM | TEMPERATURE: 97.1 F | OXYGEN SATURATION: 98 % | RESPIRATION RATE: 16 BRPM | BODY MASS INDEX: 26.98 KG/M2 | SYSTOLIC BLOOD PRESSURE: 110 MMHG | HEIGHT: 64 IN | WEIGHT: 158 LBS | DIASTOLIC BLOOD PRESSURE: 50 MMHG

## 2020-12-24 DIAGNOSIS — M85.89 OSTEOPENIA OF MULTIPLE SITES: Primary | ICD-10-CM

## 2020-12-24 DIAGNOSIS — E83.52 SERUM CALCIUM ELEVATED: ICD-10-CM

## 2020-12-24 DIAGNOSIS — Z01.30 BLOOD PRESSURE CHECK: ICD-10-CM

## 2020-12-24 PROCEDURE — 99213 OFFICE O/P EST LOW 20 MIN: CPT | Performed by: PHYSICIAN ASSISTANT

## 2020-12-24 RX ORDER — ALENDRONATE SODIUM 70 MG/1
70 TABLET ORAL
Qty: 5 TABLET | Refills: 12 | Status: SHIPPED | OUTPATIENT
Start: 2020-12-24 | End: 2021-02-17 | Stop reason: SDDI

## 2020-12-24 NOTE — PATIENT INSTRUCTIONS
Alendronate tablets  What is this medicine?  ALENDRONATE (a ANDRES droe all) slows calcium loss from bones. It helps to make normal healthy bone and to slow bone loss in people with Paget's disease and osteoporosis. It may be used in others at risk for bone loss.  This medicine may be used for other purposes; ask your health care provider or pharmacist if you have questions.  COMMON BRAND NAME(S): Fosamax  What should I tell my health care provider before I take this medicine?  They need to know if you have any of these conditions:  · dental disease  · esophagus, stomach, or intestine problems, like acid reflux or GERD  · kidney disease  · low blood calcium  · low vitamin D  · problems sitting or standing 30 minutes  · trouble swallowing  · an unusual or allergic reaction to alendronate, other medicines, foods, dyes, or preservatives  · pregnant or trying to get pregnant  · breast-feeding  How should I use this medicine?  You must take this medicine exactly as directed or you will lower the amount of the medicine you absorb into your body or you may cause yourself harm. Take this medicine by mouth first thing in the morning, after you are up for the day. Do not eat or drink anything before you take your medicine. Swallow the tablet with a full glass (6 to 8 fluid ounces) of plain water. Do not take this medicine with any other drink. Do not chew or crush the tablet. After taking this medicine, do not eat breakfast, drink, or take any medicines or vitamins for at least 30 minutes. Sit or stand up for at least 30 minutes after you take this medicine; do not lie down. Do not take your medicine more often than directed.  Talk to your pediatrician regarding the use of this medicine in children. Special care may be needed.  Overdosage: If you think you have taken too much of this medicine contact a poison control center or emergency room at once.  NOTE: This medicine is only for you. Do not share this medicine with  others.  What if I miss a dose?  If you miss a dose, do not take it later in the day. Continue your normal schedule starting the next morning. Do not take double or extra doses.  What may interact with this medicine?  · aluminum hydroxide  · antacids  · aspirin  · calcium supplements  · drugs for inflammation like ibuprofen, naproxen, and others  · iron supplements  · magnesium supplements  · vitamins with minerals  This list may not describe all possible interactions. Give your health care provider a list of all the medicines, herbs, non-prescription drugs, or dietary supplements you use. Also tell them if you smoke, drink alcohol, or use illegal drugs. Some items may interact with your medicine.  What should I watch for while using this medicine?  Visit your doctor or health care professional for regular checks ups. It may be some time before you see benefit from this medicine. Do not stop taking your medicine except on your doctor's advice. Your doctor or health care professional may order blood tests and other tests to see how you are doing.  You should make sure you get enough calcium and vitamin D while you are taking this medicine, unless your doctor tells you not to. Discuss the foods you eat and the vitamins you take with your health care professional.  Some people who take this medicine have severe bone, joint, and/or muscle pain. This medicine may also increase your risk for a broken thigh bone. Tell your doctor right away if you have pain in your upper leg or groin. Tell your doctor if you have any pain that does not go away or that gets worse.  This medicine can make you more sensitive to the sun. If you get a rash while taking this medicine, sunlight may cause the rash to get worse. Keep out of the sun. If you cannot avoid being in the sun, wear protective clothing and use sunscreen. Do not use sun lamps or tanning beds/booths.  What side effects may I notice from receiving this medicine?  Side effects  that you should report to your doctor or health care professional as soon as possible:  · allergic reactions like skin rash, itching or hives, swelling of the face, lips, or tongue  · black or tarry stools  · bone, muscle or joint pain  · changes in vision  · chest pain  · heartburn or stomach pain  · jaw pain, especially after dental work  · pain or trouble when swallowing  · redness, blistering, peeling or loosening of the skin, including inside the mouth  Side effects that usually do not require medical attention (report to your doctor or health care professional if they continue or are bothersome):  · changes in taste  · diarrhea or constipation  · eye pain or itching  · headache  · nausea or vomiting  · stomach gas or fullness  This list may not describe all possible side effects. Call your doctor for medical advice about side effects. You may report side effects to FDA at 5-243-FDA-2400.  Where should I keep my medicine?  Keep out of the reach of children.  Store at room temperature of 15 and 30 degrees C (59 and 86 degrees F). Throw away any unused medicine after the expiration date.  NOTE: This sheet is a summary. It may not cover all possible information. If you have questions about this medicine, talk to your doctor, pharmacist, or health care provider.  © 2020 [employer]vier/Gold Standard (2012-06-15 08:56:09)

## 2020-12-24 NOTE — PROGRESS NOTES
"Subjective   Kimberlee Urrutia is a 80 y.o. female.     History of Present Illness   Kimberlee Urrutia 80 y.o. female /50   Pulse 66   Temp 97.1 °F (36.2 °C)   Resp 16   Ht 162.6 cm (64\")   Wt 71.7 kg (158 lb)   LMP  (LMP Unknown)   SpO2 98%   BMI 27.12 kg/m²  who presents today for possible elevated bp.  She feels like bp is up or she is anxious. Not having h/a's. She wanted bp checked. I still need her to get f/u on Ca+ and restart Fosamax for Osteopenia.   she has a history of   Patient Active Problem List   Diagnosis   • Arthritis   • HLD (hyperlipidemia)   • Health care maintenance   • Knee pain, right   • Hx of total knee arthroplasty   • Seasonal allergies   • Vitamin D deficiency   • Pyuria   • Transient alteration of awareness   • Migraine without status migrainosus, not intractable   • Leukocytosis   • Viral pharyngitis   • Multinodular thyroid   • Osteopenia of multiple sites   • Colon cancer screening   • Serum calcium elevated   .    She does not want statin  She feels just a little anxious and declines need for Rx. Overall doing well, no chest pain or SOA; eating and drinking fine. Just check bp. No confusion, not depressed.  The following portions of the patient's history were reviewed and updated as appropriate: allergies, current medications, past family history, past medical history, past social history, past surgical history and problem list.    Review of Systems   Constitutional: Negative for activity change, appetite change, fatigue, fever and unexpected weight change.   HENT: Negative for nosebleeds and trouble swallowing.    Eyes: Negative for pain and visual disturbance.   Respiratory: Negative for chest tightness, shortness of breath and wheezing.    Cardiovascular: Negative for chest pain and palpitations.   Gastrointestinal: Negative for abdominal pain and blood in stool.   Endocrine: Negative.    Genitourinary: Negative for difficulty urinating and hematuria. "   Musculoskeletal: Negative for joint swelling.   Skin: Negative for color change and rash.   Allergic/Immunologic: Negative.    Neurological: Negative for syncope and speech difficulty.   Hematological: Negative for adenopathy.   Psychiatric/Behavioral: Negative for agitation, confusion, dysphoric mood and sleep disturbance.   All other systems reviewed and are negative.      Objective   Physical Exam  Vitals signs and nursing note reviewed.   Constitutional:       General: She is not in acute distress.     Appearance: Normal appearance. She is well-developed. She is not ill-appearing, toxic-appearing or diaphoretic.   HENT:      Head: Normocephalic.      Right Ear: External ear normal.      Left Ear: External ear normal.      Nose: Nose normal.      Mouth/Throat:      Pharynx: Oropharynx is clear.   Eyes:      General: No scleral icterus.     Conjunctiva/sclera: Conjunctivae normal.      Pupils: Pupils are equal, round, and reactive to light.   Neck:      Musculoskeletal: Normal range of motion and neck supple.      Thyroid: No thyromegaly.   Cardiovascular:      Rate and Rhythm: Normal rate and regular rhythm.      Pulses: Normal pulses.      Heart sounds: Normal heart sounds. No murmur.   Pulmonary:      Effort: Pulmonary effort is normal. No respiratory distress.      Breath sounds: Normal breath sounds. No rhonchi or rales.   Musculoskeletal: Normal range of motion.         General: No deformity.      Right lower leg: No edema.      Left lower leg: No edema.   Skin:     General: Skin is warm and dry.      Coloration: Skin is not jaundiced.      Findings: No rash.   Neurological:      General: No focal deficit present.      Mental Status: She is alert and oriented to person, place, and time. Mental status is at baseline.      Motor: No weakness.      Coordination: Coordination normal.      Gait: Gait normal.   Psychiatric:         Mood and Affect: Mood normal.         Behavior: Behavior normal.         Thought  Content: Thought content normal.         Judgment: Judgment normal.         Assessment/Plan   Diagnoses and all orders for this visit:    1. Osteopenia of multiple sites (Primary)    2. Serum calcium elevated    3. Blood pressure check    Other orders  -     alendronate (FOSAMAX) 70 MG tablet; Take 1 tablet by mouth Every 7 (Seven) Days. For Osteoporosis with full glass water; sit upright for 30 minutes  Dispense: 5 tablet; Refill: 12        Cont Fosamax  Need f/u labs still for Ca

## 2021-02-17 ENCOUNTER — APPOINTMENT (OUTPATIENT)
Dept: CT IMAGING | Facility: HOSPITAL | Age: 81
End: 2021-02-17

## 2021-02-17 ENCOUNTER — TELEPHONE (OUTPATIENT)
Dept: FAMILY MEDICINE CLINIC | Facility: CLINIC | Age: 81
End: 2021-02-17

## 2021-02-17 ENCOUNTER — APPOINTMENT (OUTPATIENT)
Dept: GENERAL RADIOLOGY | Facility: HOSPITAL | Age: 81
End: 2021-02-17

## 2021-02-17 ENCOUNTER — OFFICE VISIT (OUTPATIENT)
Dept: FAMILY MEDICINE CLINIC | Facility: CLINIC | Age: 81
End: 2021-02-17

## 2021-02-17 ENCOUNTER — HOSPITAL ENCOUNTER (INPATIENT)
Facility: HOSPITAL | Age: 81
LOS: 9 days | Discharge: NURSING, REHAB OR LONG-TERM CARE | End: 2021-02-26
Attending: EMERGENCY MEDICINE | Admitting: HOSPITALIST

## 2021-02-17 DIAGNOSIS — Z20.822 SUSPECTED COVID-19 VIRUS INFECTION: ICD-10-CM

## 2021-02-17 DIAGNOSIS — M54.50 ACUTE BILATERAL LOW BACK PAIN WITHOUT SCIATICA: ICD-10-CM

## 2021-02-17 DIAGNOSIS — R79.89 ELEVATED PROCALCITONIN: ICD-10-CM

## 2021-02-17 DIAGNOSIS — R53.1 GENERALIZED WEAKNESS: ICD-10-CM

## 2021-02-17 DIAGNOSIS — M54.50 ACUTE BILATERAL LOW BACK PAIN WITHOUT SCIATICA: Primary | ICD-10-CM

## 2021-02-17 DIAGNOSIS — R50.9 FEVER, UNSPECIFIED FEVER CAUSE: Primary | ICD-10-CM

## 2021-02-17 DIAGNOSIS — N20.1 LEFT URETERAL STONE: ICD-10-CM

## 2021-02-17 PROBLEM — R65.20 SEPSIS WITH ACUTE ORGAN DYSFUNCTION (HCC): Status: ACTIVE | Noted: 2021-02-17

## 2021-02-17 PROBLEM — N17.9 AKI (ACUTE KIDNEY INJURY) (HCC): Status: ACTIVE | Noted: 2021-02-17

## 2021-02-17 PROBLEM — A41.9 SEPSIS WITH ACUTE ORGAN DYSFUNCTION: Status: ACTIVE | Noted: 2021-02-17

## 2021-02-17 LAB
ACANTHOCYTES BLD QL SMEAR: ABNORMAL
ALBUMIN SERPL-MCNC: 3.4 G/DL (ref 3.5–5.2)
ALBUMIN/GLOB SERPL: 1 G/DL
ALP SERPL-CCNC: 90 U/L (ref 39–117)
ALT SERPL W P-5'-P-CCNC: 15 U/L (ref 1–33)
ANION GAP SERPL CALCULATED.3IONS-SCNC: 12.7 MMOL/L (ref 5–15)
AST SERPL-CCNC: 16 U/L (ref 1–32)
B PARAPERT DNA SPEC QL NAA+PROBE: NOT DETECTED
B PERT DNA SPEC QL NAA+PROBE: NOT DETECTED
BACTERIA UR QL AUTO: ABNORMAL /HPF
BILIRUB SERPL-MCNC: 0.8 MG/DL (ref 0–1.2)
BILIRUB UR QL STRIP: NEGATIVE
BUN SERPL-MCNC: 48 MG/DL (ref 8–23)
BUN/CREAT SERPL: 38.1 (ref 7–25)
C PNEUM DNA NPH QL NAA+NON-PROBE: NOT DETECTED
CALCIUM SPEC-SCNC: 9.9 MG/DL (ref 8.6–10.5)
CHLORIDE SERPL-SCNC: 102 MMOL/L (ref 98–107)
CLARITY UR: CLEAR
CO2 SERPL-SCNC: 22.3 MMOL/L (ref 22–29)
COLOR UR: YELLOW
CREAT SERPL-MCNC: 1.26 MG/DL (ref 0.57–1)
CRP SERPL-MCNC: 26.07 MG/DL (ref 0–0.5)
D-LACTATE SERPL-SCNC: 1.4 MMOL/L (ref 0.5–2)
DEPRECATED RDW RBC AUTO: 40.4 FL (ref 37–54)
ERYTHROCYTE [DISTWIDTH] IN BLOOD BY AUTOMATED COUNT: 13.2 % (ref 12.3–15.4)
ERYTHROCYTE [SEDIMENTATION RATE] IN BLOOD: 23 MM/HR (ref 0–30)
FLUAV SUBTYP SPEC NAA+PROBE: NOT DETECTED
FLUBV RNA ISLT QL NAA+PROBE: NOT DETECTED
GFR SERPL CREATININE-BSD FRML MDRD: 41 ML/MIN/1.73
GFR SERPL CREATININE-BSD FRML MDRD: 49 ML/MIN/1.73
GLOBULIN UR ELPH-MCNC: 3.3 GM/DL
GLUCOSE SERPL-MCNC: 99 MG/DL (ref 65–99)
GLUCOSE UR STRIP-MCNC: NEGATIVE MG/DL
HADV DNA SPEC NAA+PROBE: NOT DETECTED
HCOV 229E RNA SPEC QL NAA+PROBE: NOT DETECTED
HCOV HKU1 RNA SPEC QL NAA+PROBE: NOT DETECTED
HCOV NL63 RNA SPEC QL NAA+PROBE: NOT DETECTED
HCOV OC43 RNA SPEC QL NAA+PROBE: NOT DETECTED
HCT VFR BLD AUTO: 36.6 % (ref 34–46.6)
HGB BLD-MCNC: 12.9 G/DL (ref 12–15.9)
HGB UR QL STRIP.AUTO: ABNORMAL
HMPV RNA NPH QL NAA+NON-PROBE: NOT DETECTED
HPIV1 RNA SPEC QL NAA+PROBE: NOT DETECTED
HPIV2 RNA SPEC QL NAA+PROBE: NOT DETECTED
HPIV3 RNA NPH QL NAA+PROBE: NOT DETECTED
HPIV4 P GENE NPH QL NAA+PROBE: NOT DETECTED
HYALINE CASTS UR QL AUTO: ABNORMAL /LPF
KETONES UR QL STRIP: ABNORMAL
LEUKOCYTE ESTERASE UR QL STRIP.AUTO: ABNORMAL
LYMPHOCYTES # BLD MANUAL: 0 10*3/MM3 (ref 0.7–3.1)
LYMPHOCYTES NFR BLD MANUAL: 0 % (ref 19.6–45.3)
LYMPHOCYTES NFR BLD MANUAL: 1 % (ref 5–12)
M PNEUMO IGG SER IA-ACNC: NOT DETECTED
MCH RBC QN AUTO: 29.7 PG (ref 26.6–33)
MCHC RBC AUTO-ENTMCNC: 35.2 G/DL (ref 31.5–35.7)
MCV RBC AUTO: 84.3 FL (ref 79–97)
MONOCYTES # BLD AUTO: 0.25 10*3/MM3 (ref 0.1–0.9)
NEUTROPHILS # BLD AUTO: 25.21 10*3/MM3 (ref 1.7–7)
NEUTROPHILS NFR BLD MANUAL: 99 % (ref 42.7–76)
NITRITE UR QL STRIP: NEGATIVE
PH UR STRIP.AUTO: 5.5 [PH] (ref 5–8)
PLAT MORPH BLD: NORMAL
PLATELET # BLD AUTO: 98 10*3/MM3 (ref 140–450)
PMV BLD AUTO: 11.4 FL (ref 6–12)
POIKILOCYTOSIS BLD QL SMEAR: ABNORMAL
POTASSIUM SERPL-SCNC: 3.8 MMOL/L (ref 3.5–5.2)
PROCALCITONIN SERPL-MCNC: 53.99 NG/ML (ref 0–0.25)
PROT SERPL-MCNC: 6.7 G/DL (ref 6–8.5)
PROT UR QL STRIP: ABNORMAL
RBC # BLD AUTO: 4.34 10*6/MM3 (ref 3.77–5.28)
RBC # UR: ABNORMAL /HPF
REF LAB TEST METHOD: ABNORMAL
RHINOVIRUS RNA SPEC NAA+PROBE: NOT DETECTED
RSV RNA NPH QL NAA+NON-PROBE: NOT DETECTED
SARS-COV-2 RNA NPH QL NAA+NON-PROBE: NOT DETECTED
SODIUM SERPL-SCNC: 137 MMOL/L (ref 136–145)
SP GR UR STRIP: 1.02 (ref 1–1.03)
SPHEROCYTES BLD QL SMEAR: ABNORMAL
SQUAMOUS #/AREA URNS HPF: ABNORMAL /HPF
UROBILINOGEN UR QL STRIP: ABNORMAL
WBC # BLD AUTO: 25.46 10*3/MM3 (ref 3.4–10.8)
WBC MORPH BLD: NORMAL
WBC UR QL AUTO: ABNORMAL /HPF

## 2021-02-17 PROCEDURE — 87186 SC STD MICRODIL/AGAR DIL: CPT | Performed by: HOSPITALIST

## 2021-02-17 PROCEDURE — 87077 CULTURE AEROBIC IDENTIFY: CPT | Performed by: HOSPITALIST

## 2021-02-17 PROCEDURE — 25010000002 CEFTRIAXONE PER 250 MG: Performed by: EMERGENCY MEDICINE

## 2021-02-17 PROCEDURE — 25010000002 VANCOMYCIN: Performed by: EMERGENCY MEDICINE

## 2021-02-17 PROCEDURE — 71045 X-RAY EXAM CHEST 1 VIEW: CPT

## 2021-02-17 PROCEDURE — 25010000002 HYDROMORPHONE PER 4 MG: Performed by: EMERGENCY MEDICINE

## 2021-02-17 PROCEDURE — 87086 URINE CULTURE/COLONY COUNT: CPT | Performed by: HOSPITALIST

## 2021-02-17 PROCEDURE — 99442 PR PHYS/QHP TELEPHONE EVALUATION 11-20 MIN: CPT | Performed by: PHYSICIAN ASSISTANT

## 2021-02-17 PROCEDURE — 80053 COMPREHEN METABOLIC PANEL: CPT | Performed by: EMERGENCY MEDICINE

## 2021-02-17 PROCEDURE — 0202U NFCT DS 22 TRGT SARS-COV-2: CPT | Performed by: EMERGENCY MEDICINE

## 2021-02-17 PROCEDURE — 84145 PROCALCITONIN (PCT): CPT | Performed by: EMERGENCY MEDICINE

## 2021-02-17 PROCEDURE — 74176 CT ABD & PELVIS W/O CONTRAST: CPT

## 2021-02-17 PROCEDURE — 85025 COMPLETE CBC W/AUTO DIFF WBC: CPT | Performed by: EMERGENCY MEDICINE

## 2021-02-17 PROCEDURE — 83605 ASSAY OF LACTIC ACID: CPT | Performed by: EMERGENCY MEDICINE

## 2021-02-17 PROCEDURE — 86140 C-REACTIVE PROTEIN: CPT | Performed by: EMERGENCY MEDICINE

## 2021-02-17 PROCEDURE — 85007 BL SMEAR W/DIFF WBC COUNT: CPT | Performed by: EMERGENCY MEDICINE

## 2021-02-17 PROCEDURE — 87186 SC STD MICRODIL/AGAR DIL: CPT | Performed by: EMERGENCY MEDICINE

## 2021-02-17 PROCEDURE — 85652 RBC SED RATE AUTOMATED: CPT | Performed by: EMERGENCY MEDICINE

## 2021-02-17 PROCEDURE — 36415 COLL VENOUS BLD VENIPUNCTURE: CPT

## 2021-02-17 PROCEDURE — 81001 URINALYSIS AUTO W/SCOPE: CPT | Performed by: EMERGENCY MEDICINE

## 2021-02-17 PROCEDURE — 87040 BLOOD CULTURE FOR BACTERIA: CPT | Performed by: EMERGENCY MEDICINE

## 2021-02-17 PROCEDURE — 99284 EMERGENCY DEPT VISIT MOD MDM: CPT

## 2021-02-17 PROCEDURE — 72110 X-RAY EXAM L-2 SPINE 4/>VWS: CPT

## 2021-02-17 PROCEDURE — 25010000002 ONDANSETRON PER 1 MG: Performed by: EMERGENCY MEDICINE

## 2021-02-17 RX ORDER — SODIUM CHLORIDE 0.9 % (FLUSH) 0.9 %
10 SYRINGE (ML) INJECTION AS NEEDED
Status: DISCONTINUED | OUTPATIENT
Start: 2021-02-17 | End: 2021-02-26 | Stop reason: HOSPADM

## 2021-02-17 RX ORDER — ONDANSETRON 2 MG/ML
4 INJECTION INTRAMUSCULAR; INTRAVENOUS EVERY 6 HOURS PRN
Status: DISCONTINUED | OUTPATIENT
Start: 2021-02-17 | End: 2021-02-26 | Stop reason: HOSPADM

## 2021-02-17 RX ORDER — CEFTRIAXONE SODIUM 1 G/50ML
1 INJECTION, SOLUTION INTRAVENOUS EVERY 24 HOURS
Status: DISCONTINUED | OUTPATIENT
Start: 2021-02-18 | End: 2021-02-17

## 2021-02-17 RX ORDER — SODIUM CHLORIDE 0.9 % (FLUSH) 0.9 %
10 SYRINGE (ML) INJECTION EVERY 12 HOURS SCHEDULED
Status: DISCONTINUED | OUTPATIENT
Start: 2021-02-17 | End: 2021-02-17

## 2021-02-17 RX ORDER — ONDANSETRON 2 MG/ML
4 INJECTION INTRAMUSCULAR; INTRAVENOUS ONCE
Status: COMPLETED | OUTPATIENT
Start: 2021-02-17 | End: 2021-02-17

## 2021-02-17 RX ORDER — CEFTRIAXONE SODIUM 2 G/50ML
2 INJECTION, SOLUTION INTRAVENOUS EVERY 24 HOURS
Status: DISCONTINUED | OUTPATIENT
Start: 2021-02-18 | End: 2021-02-19

## 2021-02-17 RX ORDER — HYDROCODONE BITARTRATE AND ACETAMINOPHEN 5; 325 MG/1; MG/1
1 TABLET ORAL EVERY 6 HOURS PRN
Status: DISCONTINUED | OUTPATIENT
Start: 2021-02-17 | End: 2021-02-19

## 2021-02-17 RX ORDER — ACETAMINOPHEN 650 MG/1
650 SUPPOSITORY RECTAL EVERY 4 HOURS PRN
Status: DISCONTINUED | OUTPATIENT
Start: 2021-02-17 | End: 2021-02-17

## 2021-02-17 RX ORDER — ACETAMINOPHEN 325 MG/1
650 TABLET ORAL EVERY 4 HOURS PRN
Status: DISCONTINUED | OUTPATIENT
Start: 2021-02-17 | End: 2021-02-26 | Stop reason: HOSPADM

## 2021-02-17 RX ORDER — HYDROMORPHONE HYDROCHLORIDE 1 MG/ML
0.5 INJECTION, SOLUTION INTRAMUSCULAR; INTRAVENOUS; SUBCUTANEOUS ONCE
Status: COMPLETED | OUTPATIENT
Start: 2021-02-17 | End: 2021-02-17

## 2021-02-17 RX ORDER — ACETAMINOPHEN 160 MG/5ML
650 SOLUTION ORAL EVERY 4 HOURS PRN
Status: DISCONTINUED | OUTPATIENT
Start: 2021-02-17 | End: 2021-02-17

## 2021-02-17 RX ORDER — MORPHINE SULFATE 2 MG/ML
2 INJECTION, SOLUTION INTRAMUSCULAR; INTRAVENOUS
Status: DISCONTINUED | OUTPATIENT
Start: 2021-02-17 | End: 2021-02-21

## 2021-02-17 RX ORDER — SODIUM CHLORIDE 9 MG/ML
125 INJECTION, SOLUTION INTRAVENOUS CONTINUOUS
Status: DISCONTINUED | OUTPATIENT
Start: 2021-02-17 | End: 2021-02-19

## 2021-02-17 RX ORDER — SODIUM CHLORIDE 0.9 % (FLUSH) 0.9 %
10 SYRINGE (ML) INJECTION AS NEEDED
Status: DISCONTINUED | OUTPATIENT
Start: 2021-02-17 | End: 2021-02-17

## 2021-02-17 RX ORDER — CEFTRIAXONE SODIUM 1 G/50ML
1 INJECTION, SOLUTION INTRAVENOUS EVERY 24 HOURS
Status: COMPLETED | OUTPATIENT
Start: 2021-02-17 | End: 2021-02-18

## 2021-02-17 RX ORDER — CEFTRIAXONE SODIUM 1 G/50ML
1 INJECTION, SOLUTION INTRAVENOUS ONCE
Status: COMPLETED | OUTPATIENT
Start: 2021-02-17 | End: 2021-02-17

## 2021-02-17 RX ADMIN — HYDROMORPHONE HYDROCHLORIDE 0.5 MG: 1 INJECTION, SOLUTION INTRAMUSCULAR; INTRAVENOUS; SUBCUTANEOUS at 19:18

## 2021-02-17 RX ADMIN — VANCOMYCIN HYDROCHLORIDE 1500 MG: 10 INJECTION, POWDER, LYOPHILIZED, FOR SOLUTION INTRAVENOUS at 23:02

## 2021-02-17 RX ADMIN — CEFTRIAXONE SODIUM 1 G: 1 INJECTION, SOLUTION INTRAVENOUS at 20:05

## 2021-02-17 RX ADMIN — ONDANSETRON 4 MG: 2 INJECTION INTRAMUSCULAR; INTRAVENOUS at 19:18

## 2021-02-17 RX ADMIN — SODIUM CHLORIDE 1000 ML: 9 INJECTION, SOLUTION INTRAVENOUS at 20:04

## 2021-02-17 NOTE — TELEPHONE ENCOUNTER
Diony, the patients son called in to let Eden know the patient had the covid test done and it was negative. He wants to know what the next steps are for the patient?    Best call back # [phone]

## 2021-02-17 NOTE — PROGRESS NOTES
Subjective   Kimberlee Urrutia is a 80 y.o. female.     History of Present Illness   Kimberlee Urrutia 80 y.o. female LMP  (LMP Unknown)  who presents today for weak, fever, back pain, loss taste, loss smell, not much cough; had one COVID vaccine; --2-5-21  she has a history of   Patient Active Problem List   Diagnosis   • Arthritis   • HLD (hyperlipidemia)   • Health care maintenance   • Knee pain, right   • Hx of total knee arthroplasty   • Seasonal allergies   • Vitamin D deficiency   • Pyuria   • Transient alteration of awareness   • Migraine without status migrainosus, not intractable   • Leukocytosis   • Viral pharyngitis   • Multinodular thyroid   • Osteopenia of multiple sites   • Colon cancer screening   • Serum calcium elevated   .      bp 125/63;  Pulse 103-104  O2 97;  Temp 99.8%  Pain level 5-6 with lumbar pain; no voiding C/o's and did ask  She is on C 1,000 BID----Zinc 15mg; she is on Vit D   Onset 2 days ago  Want her to take at least 50mg Zinc  Her last EGFR was 74  No HTN  BMI Readings from Last 1 Encounters:   12/24/20 27.12 kg/m²     O2 just dropped to 93%    The following portions of the patient's history were reviewed and updated as appropriate: allergies, current medications, past family history, past medical history, past social history, past surgical history and problem list.    Review of Systems   Constitutional: Positive for activity change, appetite change, chills and fever.   HENT: Negative for congestion.    Respiratory: Positive for cough. Negative for shortness of breath and wheezing.    Gastrointestinal: Negative for diarrhea.   Genitourinary: Negative for dysuria, frequency and urgency.   Neurological: Positive for weakness. Negative for headaches.       Objective   Physical Exam  Pulmonary:      Breath sounds: No stridor.   Neurological:      Mental Status: She is alert and oriented to person, place, and time.   Psychiatric:         Mood and Affect: Mood normal.         Behavior:  Behavior normal.         Thought Content: Thought content normal.         Judgment: Judgment normal.         Assessment/Plan   Diagnoses and all orders for this visit:    1. Fever, unspecified fever cause (Primary)    2. Suspected COVID-19 virus infection    3. Acute bilateral low back pain without sciatica  Comments:  ?UTI?        Talked about IMASK and use Ivermectin  Also talked about BAM and is monoclonal antibody. Her son on phone with us. If + want her to consider these options. Would also suggest ASA 325mg for 1 mo if +. Plan to get rapid test today at [facility] and call to let me know if + and what wants to do.  Her age is 80  Phone time 20 minutes    Had vaccine on 2-5-21; symptoms onset 1-15-21---could still have COVID    Note that her COVID test was negative---rapid test at [facility] today.  I just called son back and will send her to ER;  She is just getting worse---back pain worse and still fever; O2 sats dropped to 93%; She just does not complain and her pain is worse. ? If she could have UTI???? No voiding C/o's.  Need to check her urine. ? Pneumonia with her back pain and fever?  Considered starting Doxy.  Better to just go to ER and get work up.  She is 79 y/o. I called son and he will take to ER now

## 2021-02-17 NOTE — PATIENT INSTRUCTIONS

## 2021-02-18 ENCOUNTER — APPOINTMENT (OUTPATIENT)
Dept: MRI IMAGING | Facility: HOSPITAL | Age: 81
End: 2021-02-18

## 2021-02-18 PROBLEM — N17.9 SEPSIS DUE TO ESCHERICHIA COLI WITH ACUTE RENAL FAILURE WITHOUT SEPTIC SHOCK (HCC): Status: ACTIVE | Noted: 2021-02-18

## 2021-02-18 PROBLEM — R65.20 SEPSIS DUE TO ESCHERICHIA COLI WITH ACUTE RENAL FAILURE WITHOUT SEPTIC SHOCK (HCC): Status: ACTIVE | Noted: 2021-02-18

## 2021-02-18 PROBLEM — D69.6 THROMBOCYTOPENIA, ACQUIRED: Status: ACTIVE | Noted: 2021-02-18

## 2021-02-18 PROBLEM — A41.51 SEPSIS DUE TO ESCHERICHIA COLI WITH ACUTE RENAL FAILURE WITHOUT SEPTIC SHOCK (HCC): Status: ACTIVE | Noted: 2021-02-18

## 2021-02-18 LAB
ANION GAP SERPL CALCULATED.3IONS-SCNC: 13.7 MMOL/L (ref 5–15)
BUN SERPL-MCNC: 36 MG/DL (ref 8–23)
BUN/CREAT SERPL: 37.9 (ref 7–25)
CALCIUM SPEC-SCNC: 9 MG/DL (ref 8.6–10.5)
CHLORIDE SERPL-SCNC: 107 MMOL/L (ref 98–107)
CO2 SERPL-SCNC: 18.3 MMOL/L (ref 22–29)
CREAT SERPL-MCNC: 0.95 MG/DL (ref 0.57–1)
D-LACTATE SERPL-SCNC: 1.9 MMOL/L (ref 0.5–2)
DEPRECATED RDW RBC AUTO: 41.7 FL (ref 37–54)
ERYTHROCYTE [DISTWIDTH] IN BLOOD BY AUTOMATED COUNT: 13.2 % (ref 12.3–15.4)
GFR SERPL CREATININE-BSD FRML MDRD: 56 ML/MIN/1.73
GFR SERPL CREATININE-BSD FRML MDRD: 68 ML/MIN/1.73
GLUCOSE SERPL-MCNC: 74 MG/DL (ref 65–99)
HCT VFR BLD AUTO: 37.7 % (ref 34–46.6)
HGB BLD-MCNC: 12.9 G/DL (ref 12–15.9)
MCH RBC QN AUTO: 29.5 PG (ref 26.6–33)
MCHC RBC AUTO-ENTMCNC: 34.2 G/DL (ref 31.5–35.7)
MCV RBC AUTO: 86.1 FL (ref 79–97)
PLATELET # BLD AUTO: 85 10*3/MM3 (ref 140–450)
PMV BLD AUTO: 11.5 FL (ref 6–12)
POTASSIUM SERPL-SCNC: 3.8 MMOL/L (ref 3.5–5.2)
PROCALCITONIN SERPL-MCNC: 30.22 NG/ML (ref 0–0.25)
RBC # BLD AUTO: 4.38 10*6/MM3 (ref 3.77–5.28)
SODIUM SERPL-SCNC: 139 MMOL/L (ref 136–145)
VANCOMYCIN SERPL-MCNC: 8.4 MCG/ML (ref 5–40)
WBC # BLD AUTO: 23.53 10*3/MM3 (ref 3.4–10.8)

## 2021-02-18 PROCEDURE — 25010000002 CEFTRIAXONE PER 250 MG: Performed by: HOSPITALIST

## 2021-02-18 PROCEDURE — 25010000002 MORPHINE PER 10 MG: Performed by: NURSE PRACTITIONER

## 2021-02-18 PROCEDURE — 0 GADOBENATE DIMEGLUMINE 529 MG/ML SOLUTION: Performed by: HOSPITALIST

## 2021-02-18 PROCEDURE — 72158 MRI LUMBAR SPINE W/O & W/DYE: CPT

## 2021-02-18 PROCEDURE — 99223 1ST HOSP IP/OBS HIGH 75: CPT | Performed by: INTERNAL MEDICINE

## 2021-02-18 PROCEDURE — 85027 COMPLETE CBC AUTOMATED: CPT | Performed by: NURSE PRACTITIONER

## 2021-02-18 PROCEDURE — 25010000003 CEFTRIAXONE PER 250 MG: Performed by: HOSPITALIST

## 2021-02-18 PROCEDURE — A9577 INJ MULTIHANCE: HCPCS | Performed by: HOSPITALIST

## 2021-02-18 PROCEDURE — 84145 PROCALCITONIN (PCT): CPT | Performed by: HOSPITALIST

## 2021-02-18 PROCEDURE — 80048 BASIC METABOLIC PNL TOTAL CA: CPT | Performed by: NURSE PRACTITIONER

## 2021-02-18 PROCEDURE — 83605 ASSAY OF LACTIC ACID: CPT | Performed by: HOSPITALIST

## 2021-02-18 PROCEDURE — 80202 ASSAY OF VANCOMYCIN: CPT | Performed by: NURSE PRACTITIONER

## 2021-02-18 RX ORDER — VALACYCLOVIR HYDROCHLORIDE 500 MG/1
1000 TABLET, FILM COATED ORAL EVERY 12 HOURS SCHEDULED
Status: COMPLETED | OUTPATIENT
Start: 2021-02-18 | End: 2021-02-25

## 2021-02-18 RX ADMIN — SODIUM CHLORIDE 125 ML/HR: 9 INJECTION, SOLUTION INTRAVENOUS at 23:08

## 2021-02-18 RX ADMIN — HYDROCODONE BITARTRATE AND ACETAMINOPHEN 1 TABLET: 5; 325 TABLET ORAL at 15:52

## 2021-02-18 RX ADMIN — MORPHINE SULFATE 2 MG: 2 INJECTION, SOLUTION INTRAMUSCULAR; INTRAVENOUS at 20:43

## 2021-02-18 RX ADMIN — CEFTRIAXONE SODIUM 1 G: 1 INJECTION, SOLUTION INTRAVENOUS at 01:19

## 2021-02-18 RX ADMIN — HYDROCODONE BITARTRATE AND ACETAMINOPHEN 1 TABLET: 5; 325 TABLET ORAL at 23:07

## 2021-02-18 RX ADMIN — CEFTRIAXONE SODIUM 2 G: 2 INJECTION, SOLUTION INTRAVENOUS at 20:43

## 2021-02-18 RX ADMIN — SODIUM CHLORIDE 125 ML/HR: 9 INJECTION, SOLUTION INTRAVENOUS at 01:19

## 2021-02-18 RX ADMIN — VALACYCLOVIR 1000 MG: 500 TABLET, FILM COATED ORAL at 20:42

## 2021-02-18 RX ADMIN — GADOBENATE DIMEGLUMINE 15 ML: 529 INJECTION, SOLUTION INTRAVENOUS at 14:01

## 2021-02-19 PROBLEM — B00.2 RECURRENT ORAL HERPES SIMPLEX: Status: ACTIVE | Noted: 2021-02-19

## 2021-02-19 PROBLEM — N17.9 AKI (ACUTE KIDNEY INJURY) (HCC): Status: RESOLVED | Noted: 2021-02-17 | Resolved: 2021-02-19

## 2021-02-19 LAB
ALBUMIN SERPL-MCNC: 2.5 G/DL (ref 3.5–5.2)
ALBUMIN/GLOB SERPL: 0.9 G/DL
ALP SERPL-CCNC: 116 U/L (ref 39–117)
ALT SERPL W P-5'-P-CCNC: 13 U/L (ref 1–33)
ANION GAP SERPL CALCULATED.3IONS-SCNC: 7.6 MMOL/L (ref 5–15)
AST SERPL-CCNC: 17 U/L (ref 1–32)
BACTERIA SPEC AEROBE CULT: ABNORMAL
BILIRUB SERPL-MCNC: 0.7 MG/DL (ref 0–1.2)
BUN SERPL-MCNC: 25 MG/DL (ref 8–23)
BUN/CREAT SERPL: 31.3 (ref 7–25)
CALCIUM SPEC-SCNC: 9.2 MG/DL (ref 8.6–10.5)
CHLORIDE SERPL-SCNC: 114 MMOL/L (ref 98–107)
CO2 SERPL-SCNC: 18.4 MMOL/L (ref 22–29)
CREAT SERPL-MCNC: 0.8 MG/DL (ref 0.57–1)
DEPRECATED RDW RBC AUTO: 41.8 FL (ref 37–54)
ERYTHROCYTE [DISTWIDTH] IN BLOOD BY AUTOMATED COUNT: 13.7 % (ref 12.3–15.4)
GFR SERPL CREATININE-BSD FRML MDRD: 69 ML/MIN/1.73
GFR SERPL CREATININE-BSD FRML MDRD: 83 ML/MIN/1.73
GLOBULIN UR ELPH-MCNC: 2.8 GM/DL
GLUCOSE SERPL-MCNC: 88 MG/DL (ref 65–99)
GRAM STN SPEC: ABNORMAL
HCT VFR BLD AUTO: 35 % (ref 34–46.6)
HGB BLD-MCNC: 11.8 G/DL (ref 12–15.9)
ISOLATED FROM: ABNORMAL
MCH RBC QN AUTO: 28.4 PG (ref 26.6–33)
MCHC RBC AUTO-ENTMCNC: 33.7 G/DL (ref 31.5–35.7)
MCV RBC AUTO: 84.3 FL (ref 79–97)
PLATELET # BLD AUTO: 75 10*3/MM3 (ref 140–450)
PMV BLD AUTO: 11.8 FL (ref 6–12)
POTASSIUM SERPL-SCNC: 3.6 MMOL/L (ref 3.5–5.2)
PROT SERPL-MCNC: 5.3 G/DL (ref 6–8.5)
RBC # BLD AUTO: 4.15 10*6/MM3 (ref 3.77–5.28)
SODIUM SERPL-SCNC: 140 MMOL/L (ref 136–145)
WBC # BLD AUTO: 13.19 10*3/MM3 (ref 3.4–10.8)

## 2021-02-19 PROCEDURE — 97162 PT EVAL MOD COMPLEX 30 MIN: CPT

## 2021-02-19 PROCEDURE — 25010000002 MORPHINE PER 10 MG: Performed by: NURSE PRACTITIONER

## 2021-02-19 PROCEDURE — 87040 BLOOD CULTURE FOR BACTERIA: CPT | Performed by: INTERNAL MEDICINE

## 2021-02-19 PROCEDURE — 85027 COMPLETE CBC AUTOMATED: CPT | Performed by: INTERNAL MEDICINE

## 2021-02-19 PROCEDURE — 80053 COMPREHEN METABOLIC PANEL: CPT | Performed by: INTERNAL MEDICINE

## 2021-02-19 PROCEDURE — 99232 SBSQ HOSP IP/OBS MODERATE 35: CPT | Performed by: INTERNAL MEDICINE

## 2021-02-19 PROCEDURE — 25010000002 KETOROLAC TROMETHAMINE PER 15 MG: Performed by: HOSPITALIST

## 2021-02-19 PROCEDURE — 97110 THERAPEUTIC EXERCISES: CPT

## 2021-02-19 RX ORDER — HYDROCODONE BITARTRATE AND ACETAMINOPHEN 5; 325 MG/1; MG/1
1 TABLET ORAL EVERY 4 HOURS PRN
Status: DISCONTINUED | OUTPATIENT
Start: 2021-02-19 | End: 2021-02-26 | Stop reason: HOSPADM

## 2021-02-19 RX ORDER — LEVOFLOXACIN 750 MG/1
750 TABLET ORAL EVERY 24 HOURS
Status: COMPLETED | OUTPATIENT
Start: 2021-02-19 | End: 2021-02-23

## 2021-02-19 RX ORDER — KETOROLAC TROMETHAMINE 15 MG/ML
15 INJECTION, SOLUTION INTRAMUSCULAR; INTRAVENOUS EVERY 6 HOURS
Status: COMPLETED | OUTPATIENT
Start: 2021-02-19 | End: 2021-02-20

## 2021-02-19 RX ADMIN — KETOROLAC TROMETHAMINE 15 MG: 15 INJECTION, SOLUTION INTRAMUSCULAR; INTRAVENOUS at 18:45

## 2021-02-19 RX ADMIN — MORPHINE SULFATE 2 MG: 2 INJECTION, SOLUTION INTRAMUSCULAR; INTRAVENOUS at 04:26

## 2021-02-19 RX ADMIN — MORPHINE SULFATE 2 MG: 2 INJECTION, SOLUTION INTRAMUSCULAR; INTRAVENOUS at 00:51

## 2021-02-19 RX ADMIN — HYDROCODONE BITARTRATE AND ACETAMINOPHEN 1 TABLET: 5; 325 TABLET ORAL at 08:54

## 2021-02-19 RX ADMIN — VALACYCLOVIR 1000 MG: 500 TABLET, FILM COATED ORAL at 20:55

## 2021-02-19 RX ADMIN — VALACYCLOVIR 1000 MG: 500 TABLET, FILM COATED ORAL at 08:54

## 2021-02-19 RX ADMIN — KETOROLAC TROMETHAMINE 15 MG: 15 INJECTION, SOLUTION INTRAMUSCULAR; INTRAVENOUS at 12:57

## 2021-02-19 RX ADMIN — LEVOFLOXACIN 750 MG: 750 TABLET, FILM COATED ORAL at 18:45

## 2021-02-20 PROBLEM — G93.41 METABOLIC ENCEPHALOPATHY: Status: ACTIVE | Noted: 2021-02-20

## 2021-02-20 PROBLEM — M48.061 LUMBAR SPINAL STENOSIS: Status: ACTIVE | Noted: 2021-02-20

## 2021-02-20 LAB
AMMONIA BLD-SCNC: 27 UMOL/L (ref 11–51)
ANION GAP SERPL CALCULATED.3IONS-SCNC: 5.8 MMOL/L (ref 5–15)
ARTERIAL PATENCY WRIST A: POSITIVE
ATMOSPHERIC PRESS: 759.3 MMHG
BASE EXCESS BLDA CALC-SCNC: -2.9 MMOL/L (ref 0–2)
BDY SITE: ABNORMAL
BUN SERPL-MCNC: 30 MG/DL (ref 8–23)
BUN/CREAT SERPL: 39 (ref 7–25)
CALCIUM SPEC-SCNC: 8.9 MG/DL (ref 8.6–10.5)
CHLORIDE SERPL-SCNC: 112 MMOL/L (ref 98–107)
CO2 SERPL-SCNC: 19.2 MMOL/L (ref 22–29)
CREAT SERPL-MCNC: 0.77 MG/DL (ref 0.57–1)
DEPRECATED RDW RBC AUTO: 41.2 FL (ref 37–54)
ERYTHROCYTE [DISTWIDTH] IN BLOOD BY AUTOMATED COUNT: 13.5 % (ref 12.3–15.4)
GFR SERPL CREATININE-BSD FRML MDRD: 72 ML/MIN/1.73
GFR SERPL CREATININE-BSD FRML MDRD: 87 ML/MIN/1.73
GLUCOSE SERPL-MCNC: 72 MG/DL (ref 65–99)
HCO3 BLDA-SCNC: 20.3 MMOL/L (ref 22–28)
HCT VFR BLD AUTO: 33.1 % (ref 34–46.6)
HGB BLD-MCNC: 11.5 G/DL (ref 12–15.9)
MCH RBC QN AUTO: 29.3 PG (ref 26.6–33)
MCHC RBC AUTO-ENTMCNC: 34.7 G/DL (ref 31.5–35.7)
MCV RBC AUTO: 84.2 FL (ref 79–97)
MODALITY: ABNORMAL
PCO2 BLDA: 29.6 MM HG (ref 35–45)
PH BLDA: 7.44 PH UNITS (ref 7.35–7.45)
PLATELET # BLD AUTO: 71 10*3/MM3 (ref 140–450)
PMV BLD AUTO: 12.1 FL (ref 6–12)
PO2 BLDA: 72.9 MM HG (ref 80–100)
POTASSIUM SERPL-SCNC: 3.8 MMOL/L (ref 3.5–5.2)
RBC # BLD AUTO: 3.93 10*6/MM3 (ref 3.77–5.28)
SAO2 % BLDCOA: 95.3 % (ref 92–99)
SODIUM SERPL-SCNC: 137 MMOL/L (ref 136–145)
TOTAL RATE: 16 BREATHS/MINUTE
WBC # BLD AUTO: 12.55 10*3/MM3 (ref 3.4–10.8)

## 2021-02-20 PROCEDURE — 36600 WITHDRAWAL OF ARTERIAL BLOOD: CPT

## 2021-02-20 PROCEDURE — 97110 THERAPEUTIC EXERCISES: CPT

## 2021-02-20 PROCEDURE — 82140 ASSAY OF AMMONIA: CPT | Performed by: HOSPITALIST

## 2021-02-20 PROCEDURE — 80048 BASIC METABOLIC PNL TOTAL CA: CPT | Performed by: HOSPITALIST

## 2021-02-20 PROCEDURE — 85027 COMPLETE CBC AUTOMATED: CPT | Performed by: HOSPITALIST

## 2021-02-20 PROCEDURE — 82803 BLOOD GASES ANY COMBINATION: CPT

## 2021-02-20 PROCEDURE — 25010000002 KETOROLAC TROMETHAMINE PER 15 MG: Performed by: HOSPITALIST

## 2021-02-20 RX ORDER — LIDOCAINE 50 MG/G
2 PATCH TOPICAL
Status: DISCONTINUED | OUTPATIENT
Start: 2021-02-20 | End: 2021-02-26 | Stop reason: HOSPADM

## 2021-02-20 RX ADMIN — LIDOCAINE 2 PATCH: 50 PATCH TOPICAL at 13:51

## 2021-02-20 RX ADMIN — VALACYCLOVIR 1000 MG: 500 TABLET, FILM COATED ORAL at 09:55

## 2021-02-20 RX ADMIN — LEVOFLOXACIN 750 MG: 750 TABLET, FILM COATED ORAL at 13:54

## 2021-02-20 RX ADMIN — VALACYCLOVIR 1000 MG: 500 TABLET, FILM COATED ORAL at 21:30

## 2021-02-20 RX ADMIN — ACETAMINOPHEN 650 MG: 325 TABLET, FILM COATED ORAL at 10:16

## 2021-02-20 RX ADMIN — KETOROLAC TROMETHAMINE 15 MG: 15 INJECTION, SOLUTION INTRAMUSCULAR; INTRAVENOUS at 00:27

## 2021-02-21 ENCOUNTER — APPOINTMENT (OUTPATIENT)
Dept: CT IMAGING | Facility: HOSPITAL | Age: 81
End: 2021-02-21

## 2021-02-21 LAB
DEPRECATED RDW RBC AUTO: 40.5 FL (ref 37–54)
ERYTHROCYTE [DISTWIDTH] IN BLOOD BY AUTOMATED COUNT: 13.4 % (ref 12.3–15.4)
HCT VFR BLD AUTO: 35.4 % (ref 34–46.6)
HGB BLD-MCNC: 12.2 G/DL (ref 12–15.9)
MCH RBC QN AUTO: 28.8 PG (ref 26.6–33)
MCHC RBC AUTO-ENTMCNC: 34.5 G/DL (ref 31.5–35.7)
MCV RBC AUTO: 83.7 FL (ref 79–97)
PLATELET # BLD AUTO: 100 10*3/MM3 (ref 140–450)
PMV BLD AUTO: 11 FL (ref 6–12)
RBC # BLD AUTO: 4.23 10*6/MM3 (ref 3.77–5.28)
WBC # BLD AUTO: 18.16 10*3/MM3 (ref 3.4–10.8)

## 2021-02-21 PROCEDURE — 70450 CT HEAD/BRAIN W/O DYE: CPT

## 2021-02-21 PROCEDURE — 85027 COMPLETE CBC AUTOMATED: CPT | Performed by: HOSPITALIST

## 2021-02-21 RX ADMIN — HYDROCODONE BITARTRATE AND ACETAMINOPHEN 1 TABLET: 5; 325 TABLET ORAL at 21:36

## 2021-02-21 RX ADMIN — LIDOCAINE 2 PATCH: 50 PATCH TOPICAL at 08:19

## 2021-02-21 RX ADMIN — ACETAMINOPHEN 650 MG: 325 TABLET, FILM COATED ORAL at 15:40

## 2021-02-21 RX ADMIN — ACETAMINOPHEN 650 MG: 325 TABLET, FILM COATED ORAL at 08:19

## 2021-02-21 RX ADMIN — LEVOFLOXACIN 750 MG: 750 TABLET, FILM COATED ORAL at 12:28

## 2021-02-21 RX ADMIN — VALACYCLOVIR 1000 MG: 500 TABLET, FILM COATED ORAL at 21:36

## 2021-02-21 RX ADMIN — VALACYCLOVIR 1000 MG: 500 TABLET, FILM COATED ORAL at 08:19

## 2021-02-22 ENCOUNTER — APPOINTMENT (OUTPATIENT)
Dept: CT IMAGING | Facility: HOSPITAL | Age: 81
End: 2021-02-22

## 2021-02-22 PROBLEM — D69.6 THROMBOCYTOPENIA, ACQUIRED: Status: RESOLVED | Noted: 2021-02-18 | Resolved: 2021-02-22

## 2021-02-22 LAB
ANION GAP SERPL CALCULATED.3IONS-SCNC: 7.3 MMOL/L (ref 5–15)
BUN SERPL-MCNC: 16 MG/DL (ref 8–23)
BUN/CREAT SERPL: 28.1 (ref 7–25)
CALCIUM SPEC-SCNC: 9.3 MG/DL (ref 8.6–10.5)
CHLORIDE SERPL-SCNC: 112 MMOL/L (ref 98–107)
CO2 SERPL-SCNC: 21.7 MMOL/L (ref 22–29)
CREAT SERPL-MCNC: 0.57 MG/DL (ref 0.57–1)
CRP SERPL-MCNC: 12.9 MG/DL (ref 0–0.5)
DEPRECATED RDW RBC AUTO: 40.5 FL (ref 37–54)
ERYTHROCYTE [DISTWIDTH] IN BLOOD BY AUTOMATED COUNT: 13.3 % (ref 12.3–15.4)
GFR SERPL CREATININE-BSD FRML MDRD: 102 ML/MIN/1.73
GFR SERPL CREATININE-BSD FRML MDRD: 123 ML/MIN/1.73
GLUCOSE SERPL-MCNC: 103 MG/DL (ref 65–99)
HCT VFR BLD AUTO: 34.8 % (ref 34–46.6)
HGB BLD-MCNC: 11.9 G/DL (ref 12–15.9)
MCH RBC QN AUTO: 29.1 PG (ref 26.6–33)
MCHC RBC AUTO-ENTMCNC: 34.2 G/DL (ref 31.5–35.7)
MCV RBC AUTO: 85.1 FL (ref 79–97)
PLATELET # BLD AUTO: 146 10*3/MM3 (ref 140–450)
PMV BLD AUTO: 11.5 FL (ref 6–12)
POTASSIUM SERPL-SCNC: 3.8 MMOL/L (ref 3.5–5.2)
PROCALCITONIN SERPL-MCNC: 5.42 NG/ML (ref 0–0.25)
RBC # BLD AUTO: 4.09 10*6/MM3 (ref 3.77–5.28)
SODIUM SERPL-SCNC: 141 MMOL/L (ref 136–145)
WBC # BLD AUTO: 19.8 10*3/MM3 (ref 3.4–10.8)

## 2021-02-22 PROCEDURE — 97535 SELF CARE MNGMENT TRAINING: CPT

## 2021-02-22 PROCEDURE — 84145 PROCALCITONIN (PCT): CPT | Performed by: HOSPITALIST

## 2021-02-22 PROCEDURE — 25010000002 IOPAMIDOL 61 % SOLUTION: Performed by: HOSPITALIST

## 2021-02-22 PROCEDURE — 97530 THERAPEUTIC ACTIVITIES: CPT

## 2021-02-22 PROCEDURE — 74177 CT ABD & PELVIS W/CONTRAST: CPT

## 2021-02-22 PROCEDURE — 86140 C-REACTIVE PROTEIN: CPT | Performed by: HOSPITALIST

## 2021-02-22 PROCEDURE — 80048 BASIC METABOLIC PNL TOTAL CA: CPT | Performed by: HOSPITALIST

## 2021-02-22 PROCEDURE — 85027 COMPLETE CBC AUTOMATED: CPT | Performed by: HOSPITALIST

## 2021-02-22 PROCEDURE — 25010000002 ONDANSETRON PER 1 MG: Performed by: NURSE PRACTITIONER

## 2021-02-22 PROCEDURE — 97110 THERAPEUTIC EXERCISES: CPT

## 2021-02-22 PROCEDURE — 97166 OT EVAL MOD COMPLEX 45 MIN: CPT

## 2021-02-22 PROCEDURE — 99221 1ST HOSP IP/OBS SF/LOW 40: CPT | Performed by: NURSE PRACTITIONER

## 2021-02-22 RX ORDER — SODIUM CHLORIDE 9 MG/ML
75 INJECTION, SOLUTION INTRAVENOUS CONTINUOUS
Status: DISCONTINUED | OUTPATIENT
Start: 2021-02-22 | End: 2021-02-26 | Stop reason: HOSPADM

## 2021-02-22 RX ADMIN — HYDROCODONE BITARTRATE AND ACETAMINOPHEN 1 TABLET: 5; 325 TABLET ORAL at 17:02

## 2021-02-22 RX ADMIN — ONDANSETRON 4 MG: 2 INJECTION INTRAMUSCULAR; INTRAVENOUS at 11:51

## 2021-02-22 RX ADMIN — VALACYCLOVIR 1000 MG: 500 TABLET, FILM COATED ORAL at 21:09

## 2021-02-22 RX ADMIN — HYDROCODONE BITARTRATE AND ACETAMINOPHEN 1 TABLET: 5; 325 TABLET ORAL at 21:09

## 2021-02-22 RX ADMIN — ONDANSETRON 4 MG: 2 INJECTION INTRAMUSCULAR; INTRAVENOUS at 21:09

## 2021-02-22 RX ADMIN — LIDOCAINE 2 PATCH: 50 PATCH TOPICAL at 09:15

## 2021-02-22 RX ADMIN — HYDROCODONE BITARTRATE AND ACETAMINOPHEN 1 TABLET: 5; 325 TABLET ORAL at 07:32

## 2021-02-22 RX ADMIN — LEVOFLOXACIN 750 MG: 750 TABLET, FILM COATED ORAL at 11:50

## 2021-02-22 RX ADMIN — VALACYCLOVIR 1000 MG: 500 TABLET, FILM COATED ORAL at 09:16

## 2021-02-22 RX ADMIN — IOPAMIDOL 85 ML: 612 INJECTION, SOLUTION INTRAVENOUS at 14:42

## 2021-02-22 RX ADMIN — SODIUM CHLORIDE 75 ML/HR: 9 INJECTION, SOLUTION INTRAVENOUS at 10:54

## 2021-02-22 RX ADMIN — HYDROCODONE BITARTRATE AND ACETAMINOPHEN 1 TABLET: 5; 325 TABLET ORAL at 11:50

## 2021-02-23 ENCOUNTER — APPOINTMENT (OUTPATIENT)
Dept: GENERAL RADIOLOGY | Facility: HOSPITAL | Age: 81
End: 2021-02-23

## 2021-02-23 ENCOUNTER — ANESTHESIA (OUTPATIENT)
Dept: PERIOP | Facility: HOSPITAL | Age: 81
End: 2021-02-23

## 2021-02-23 ENCOUNTER — ANESTHESIA EVENT (OUTPATIENT)
Dept: PERIOP | Facility: HOSPITAL | Age: 81
End: 2021-02-23

## 2021-02-23 LAB
ANION GAP SERPL CALCULATED.3IONS-SCNC: 6.3 MMOL/L (ref 5–15)
BUN SERPL-MCNC: 11 MG/DL (ref 8–23)
BUN/CREAT SERPL: 18 (ref 7–25)
CALCIUM SPEC-SCNC: 9.3 MG/DL (ref 8.6–10.5)
CHLORIDE SERPL-SCNC: 110 MMOL/L (ref 98–107)
CO2 SERPL-SCNC: 23.7 MMOL/L (ref 22–29)
CREAT SERPL-MCNC: 0.61 MG/DL (ref 0.57–1)
DEPRECATED RDW RBC AUTO: 43 FL (ref 37–54)
ERYTHROCYTE [DISTWIDTH] IN BLOOD BY AUTOMATED COUNT: 13.5 % (ref 12.3–15.4)
GFR SERPL CREATININE-BSD FRML MDRD: 114 ML/MIN/1.73
GFR SERPL CREATININE-BSD FRML MDRD: 94 ML/MIN/1.73
GLUCOSE SERPL-MCNC: 106 MG/DL (ref 65–99)
HCT VFR BLD AUTO: 32.3 % (ref 34–46.6)
HGB BLD-MCNC: 10.7 G/DL (ref 12–15.9)
MCH RBC QN AUTO: 28.8 PG (ref 26.6–33)
MCHC RBC AUTO-ENTMCNC: 33.1 G/DL (ref 31.5–35.7)
MCV RBC AUTO: 86.8 FL (ref 79–97)
PLATELET # BLD AUTO: 189 10*3/MM3 (ref 140–450)
PMV BLD AUTO: 10.9 FL (ref 6–12)
POTASSIUM SERPL-SCNC: 3.7 MMOL/L (ref 3.5–5.2)
RBC # BLD AUTO: 3.72 10*6/MM3 (ref 3.77–5.28)
SARS-COV-2 ORF1AB RESP QL NAA+PROBE: NOT DETECTED
SODIUM SERPL-SCNC: 140 MMOL/L (ref 136–145)
WBC # BLD AUTO: 16.88 10*3/MM3 (ref 3.4–10.8)

## 2021-02-23 PROCEDURE — C2617 STENT, NON-COR, TEM W/O DEL: HCPCS | Performed by: UROLOGY

## 2021-02-23 PROCEDURE — 0TC78ZZ EXTIRPATION OF MATTER FROM LEFT URETER, VIA NATURAL OR ARTIFICIAL OPENING ENDOSCOPIC: ICD-10-PCS | Performed by: UROLOGY

## 2021-02-23 PROCEDURE — 74018 RADEX ABDOMEN 1 VIEW: CPT

## 2021-02-23 PROCEDURE — 25010000002 PROPOFOL 10 MG/ML EMULSION: Performed by: NURSE ANESTHETIST, CERTIFIED REGISTERED

## 2021-02-23 PROCEDURE — 76000 FLUOROSCOPY <1 HR PHYS/QHP: CPT

## 2021-02-23 PROCEDURE — 82365 CALCULUS SPECTROSCOPY: CPT | Performed by: UROLOGY

## 2021-02-23 PROCEDURE — 25010000002 FENTANYL CITRATE (PF) 100 MCG/2ML SOLUTION: Performed by: NURSE ANESTHETIST, CERTIFIED REGISTERED

## 2021-02-23 PROCEDURE — 85027 COMPLETE CBC AUTOMATED: CPT | Performed by: HOSPITALIST

## 2021-02-23 PROCEDURE — 25010000002 FENTANYL CITRATE (PF) 100 MCG/2ML SOLUTION: Performed by: ANESTHESIOLOGY

## 2021-02-23 PROCEDURE — 99231 SBSQ HOSP IP/OBS SF/LOW 25: CPT | Performed by: NURSE PRACTITIONER

## 2021-02-23 PROCEDURE — 25010000002 PHENYLEPHRINE PER 1 ML: Performed by: NURSE ANESTHETIST, CERTIFIED REGISTERED

## 2021-02-23 PROCEDURE — 25010000002 SUCCINYLCHOLINE PER 20 MG: Performed by: NURSE ANESTHETIST, CERTIFIED REGISTERED

## 2021-02-23 PROCEDURE — 25010000002 DEXAMETHASONE PER 1 MG: Performed by: NURSE ANESTHETIST, CERTIFIED REGISTERED

## 2021-02-23 PROCEDURE — C1769 GUIDE WIRE: HCPCS | Performed by: UROLOGY

## 2021-02-23 PROCEDURE — 0T778DZ DILATION OF LEFT URETER WITH INTRALUMINAL DEVICE, VIA NATURAL OR ARTIFICIAL OPENING ENDOSCOPIC: ICD-10-PCS | Performed by: UROLOGY

## 2021-02-23 PROCEDURE — 97110 THERAPEUTIC EXERCISES: CPT | Performed by: OCCUPATIONAL THERAPIST

## 2021-02-23 PROCEDURE — 80048 BASIC METABOLIC PNL TOTAL CA: CPT | Performed by: HOSPITALIST

## 2021-02-23 PROCEDURE — U0004 COV-19 TEST NON-CDC HGH THRU: HCPCS | Performed by: UROLOGY

## 2021-02-23 PROCEDURE — 25010000002 ONDANSETRON PER 1 MG: Performed by: NURSE PRACTITIONER

## 2021-02-23 DEVICE — URETERAL STENT
Type: IMPLANTABLE DEVICE | Site: URETER | Status: FUNCTIONAL
Brand: CONTOUR™

## 2021-02-23 RX ORDER — EPHEDRINE SULFATE 50 MG/ML
5 INJECTION, SOLUTION INTRAVENOUS ONCE AS NEEDED
Status: CANCELLED | OUTPATIENT
Start: 2021-02-23

## 2021-02-23 RX ORDER — SUCCINYLCHOLINE CHLORIDE 20 MG/ML
INJECTION INTRAMUSCULAR; INTRAVENOUS AS NEEDED
Status: DISCONTINUED | OUTPATIENT
Start: 2021-02-23 | End: 2021-02-23 | Stop reason: SURG

## 2021-02-23 RX ORDER — MAGNESIUM HYDROXIDE 1200 MG/15ML
LIQUID ORAL AS NEEDED
Status: DISCONTINUED | OUTPATIENT
Start: 2021-02-23 | End: 2021-02-23 | Stop reason: HOSPADM

## 2021-02-23 RX ORDER — HYDROCODONE BITARTRATE AND ACETAMINOPHEN 7.5; 325 MG/1; MG/1
1 TABLET ORAL ONCE AS NEEDED
Status: CANCELLED | OUTPATIENT
Start: 2021-02-23

## 2021-02-23 RX ORDER — NALOXONE HCL 0.4 MG/ML
0.2 VIAL (ML) INJECTION AS NEEDED
Status: CANCELLED | OUTPATIENT
Start: 2021-02-23

## 2021-02-23 RX ORDER — PROMETHAZINE HYDROCHLORIDE 25 MG/1
25 TABLET ORAL ONCE AS NEEDED
Status: CANCELLED | OUTPATIENT
Start: 2021-02-23

## 2021-02-23 RX ORDER — SODIUM CHLORIDE 0.9 % (FLUSH) 0.9 %
3 SYRINGE (ML) INJECTION EVERY 12 HOURS SCHEDULED
Status: DISCONTINUED | OUTPATIENT
Start: 2021-02-23 | End: 2021-02-23 | Stop reason: HOSPADM

## 2021-02-23 RX ORDER — HYDROMORPHONE HYDROCHLORIDE 1 MG/ML
0.5 INJECTION, SOLUTION INTRAMUSCULAR; INTRAVENOUS; SUBCUTANEOUS
Status: CANCELLED | OUTPATIENT
Start: 2021-02-23

## 2021-02-23 RX ORDER — MIDAZOLAM HYDROCHLORIDE 1 MG/ML
0.5 INJECTION INTRAMUSCULAR; INTRAVENOUS
Status: DISCONTINUED | OUTPATIENT
Start: 2021-02-23 | End: 2021-02-23 | Stop reason: HOSPADM

## 2021-02-23 RX ORDER — FAMOTIDINE 10 MG/ML
20 INJECTION, SOLUTION INTRAVENOUS ONCE
Status: COMPLETED | OUTPATIENT
Start: 2021-02-23 | End: 2021-02-23

## 2021-02-23 RX ORDER — DIPHENHYDRAMINE HCL 25 MG
25 CAPSULE ORAL
Status: CANCELLED | OUTPATIENT
Start: 2021-02-23

## 2021-02-23 RX ORDER — LIDOCAINE HYDROCHLORIDE 20 MG/ML
INJECTION, SOLUTION INFILTRATION; PERINEURAL AS NEEDED
Status: DISCONTINUED | OUTPATIENT
Start: 2021-02-23 | End: 2021-02-23 | Stop reason: SURG

## 2021-02-23 RX ORDER — PROMETHAZINE HYDROCHLORIDE 25 MG/1
25 SUPPOSITORY RECTAL ONCE AS NEEDED
Status: CANCELLED | OUTPATIENT
Start: 2021-02-23

## 2021-02-23 RX ORDER — SODIUM CHLORIDE 0.9 % (FLUSH) 0.9 %
3-10 SYRINGE (ML) INJECTION AS NEEDED
Status: DISCONTINUED | OUTPATIENT
Start: 2021-02-23 | End: 2021-02-23 | Stop reason: HOSPADM

## 2021-02-23 RX ORDER — ONDANSETRON 2 MG/ML
4 INJECTION INTRAMUSCULAR; INTRAVENOUS ONCE AS NEEDED
Status: CANCELLED | OUTPATIENT
Start: 2021-02-23

## 2021-02-23 RX ORDER — DEXAMETHASONE SODIUM PHOSPHATE 10 MG/ML
INJECTION INTRAMUSCULAR; INTRAVENOUS AS NEEDED
Status: DISCONTINUED | OUTPATIENT
Start: 2021-02-23 | End: 2021-02-23 | Stop reason: SURG

## 2021-02-23 RX ORDER — DIPHENHYDRAMINE HYDROCHLORIDE 50 MG/ML
12.5 INJECTION INTRAMUSCULAR; INTRAVENOUS
Status: CANCELLED | OUTPATIENT
Start: 2021-02-23

## 2021-02-23 RX ORDER — MIDAZOLAM HYDROCHLORIDE 1 MG/ML
1 INJECTION INTRAMUSCULAR; INTRAVENOUS
Status: DISCONTINUED | OUTPATIENT
Start: 2021-02-23 | End: 2021-02-23 | Stop reason: HOSPADM

## 2021-02-23 RX ORDER — OXYCODONE AND ACETAMINOPHEN 7.5; 325 MG/1; MG/1
1 TABLET ORAL ONCE AS NEEDED
Status: CANCELLED | OUTPATIENT
Start: 2021-02-23

## 2021-02-23 RX ORDER — SODIUM CHLORIDE, SODIUM LACTATE, POTASSIUM CHLORIDE, CALCIUM CHLORIDE 600; 310; 30; 20 MG/100ML; MG/100ML; MG/100ML; MG/100ML
9 INJECTION, SOLUTION INTRAVENOUS CONTINUOUS
Status: DISCONTINUED | OUTPATIENT
Start: 2021-02-23 | End: 2021-02-26 | Stop reason: HOSPADM

## 2021-02-23 RX ORDER — LIDOCAINE HYDROCHLORIDE 10 MG/ML
0.5 INJECTION, SOLUTION EPIDURAL; INFILTRATION; INTRACAUDAL; PERINEURAL ONCE AS NEEDED
Status: DISCONTINUED | OUTPATIENT
Start: 2021-02-23 | End: 2021-02-23 | Stop reason: HOSPADM

## 2021-02-23 RX ORDER — FENTANYL CITRATE 50 UG/ML
50 INJECTION, SOLUTION INTRAMUSCULAR; INTRAVENOUS
Status: DISCONTINUED | OUTPATIENT
Start: 2021-02-23 | End: 2021-02-23 | Stop reason: HOSPADM

## 2021-02-23 RX ORDER — FENTANYL CITRATE 50 UG/ML
INJECTION, SOLUTION INTRAMUSCULAR; INTRAVENOUS AS NEEDED
Status: DISCONTINUED | OUTPATIENT
Start: 2021-02-23 | End: 2021-02-23 | Stop reason: SURG

## 2021-02-23 RX ORDER — FLUMAZENIL 0.1 MG/ML
0.2 INJECTION INTRAVENOUS AS NEEDED
Status: CANCELLED | OUTPATIENT
Start: 2021-02-23

## 2021-02-23 RX ORDER — PROPOFOL 10 MG/ML
VIAL (ML) INTRAVENOUS AS NEEDED
Status: DISCONTINUED | OUTPATIENT
Start: 2021-02-23 | End: 2021-02-23 | Stop reason: SURG

## 2021-02-23 RX ORDER — FENTANYL CITRATE 50 UG/ML
25 INJECTION, SOLUTION INTRAMUSCULAR; INTRAVENOUS
Status: CANCELLED | OUTPATIENT
Start: 2021-02-23

## 2021-02-23 RX ORDER — LABETALOL HYDROCHLORIDE 5 MG/ML
5 INJECTION, SOLUTION INTRAVENOUS
Status: CANCELLED | OUTPATIENT
Start: 2021-02-23

## 2021-02-23 RX ADMIN — HYDROCODONE BITARTRATE AND ACETAMINOPHEN 1 TABLET: 5; 325 TABLET ORAL at 22:14

## 2021-02-23 RX ADMIN — VALACYCLOVIR 1000 MG: 500 TABLET, FILM COATED ORAL at 21:03

## 2021-02-23 RX ADMIN — LIDOCAINE 2 PATCH: 50 PATCH TOPICAL at 09:15

## 2021-02-23 RX ADMIN — DEXAMETHASONE SODIUM PHOSPHATE 10 MG: 10 INJECTION INTRAMUSCULAR; INTRAVENOUS at 15:20

## 2021-02-23 RX ADMIN — HYDROCODONE BITARTRATE AND ACETAMINOPHEN 1 TABLET: 5; 325 TABLET ORAL at 01:15

## 2021-02-23 RX ADMIN — PHENYLEPHRINE HYDROCHLORIDE 100 MCG: 10 INJECTION INTRAVENOUS at 15:17

## 2021-02-23 RX ADMIN — LIDOCAINE HYDROCHLORIDE 60 MG: 20 INJECTION, SOLUTION INFILTRATION; PERINEURAL at 15:07

## 2021-02-23 RX ADMIN — HYDROCODONE BITARTRATE AND ACETAMINOPHEN 1 TABLET: 5; 325 TABLET ORAL at 10:33

## 2021-02-23 RX ADMIN — HYDROCODONE BITARTRATE AND ACETAMINOPHEN 1 TABLET: 5; 325 TABLET ORAL at 06:30

## 2021-02-23 RX ADMIN — FENTANYL CITRATE 25 MCG: 50 INJECTION INTRAMUSCULAR; INTRAVENOUS at 15:04

## 2021-02-23 RX ADMIN — LEVOFLOXACIN 750 MG: 750 TABLET, FILM COATED ORAL at 13:10

## 2021-02-23 RX ADMIN — PROPOFOL 120 MG: 10 INJECTION, EMULSION INTRAVENOUS at 15:07

## 2021-02-23 RX ADMIN — SODIUM CHLORIDE, POTASSIUM CHLORIDE, SODIUM LACTATE AND CALCIUM CHLORIDE 9 ML/HR: 600; 310; 30; 20 INJECTION, SOLUTION INTRAVENOUS at 14:40

## 2021-02-23 RX ADMIN — VALACYCLOVIR 1000 MG: 500 TABLET, FILM COATED ORAL at 09:17

## 2021-02-23 RX ADMIN — PHENYLEPHRINE HYDROCHLORIDE 100 MCG: 10 INJECTION INTRAVENOUS at 15:30

## 2021-02-23 RX ADMIN — FENTANYL CITRATE 25 MCG: 50 INJECTION INTRAMUSCULAR; INTRAVENOUS at 15:16

## 2021-02-23 RX ADMIN — ONDANSETRON 4 MG: 2 INJECTION INTRAMUSCULAR; INTRAVENOUS at 09:15

## 2021-02-23 RX ADMIN — SUCCINYLCHOLINE CHLORIDE 100 MG: 20 INJECTION, SOLUTION INTRAMUSCULAR; INTRAVENOUS; PARENTERAL at 15:07

## 2021-02-23 RX ADMIN — FAMOTIDINE 20 MG: 10 INJECTION INTRAVENOUS at 14:24

## 2021-02-23 RX ADMIN — FENTANYL CITRATE 50 MCG: 50 INJECTION, SOLUTION INTRAMUSCULAR; INTRAVENOUS at 14:24

## 2021-02-23 RX ADMIN — SODIUM CHLORIDE, POTASSIUM CHLORIDE, SODIUM LACTATE AND CALCIUM CHLORIDE: 600; 310; 30; 20 INJECTION, SOLUTION INTRAVENOUS at 15:02

## 2021-02-23 RX ADMIN — SODIUM CHLORIDE 75 ML/HR: 9 INJECTION, SOLUTION INTRAVENOUS at 17:29

## 2021-02-23 RX ADMIN — FENTANYL CITRATE 50 MCG: 50 INJECTION INTRAMUSCULAR; INTRAVENOUS at 15:50

## 2021-02-23 NOTE — ANESTHESIA POSTPROCEDURE EVALUATION
"Patient: Kimberlee Urrutia    Procedure Summary     Date: 02/23/21 Room / Location: [facility] OR 01 / [facility]    Anesthesia Start: 1457 Anesthesia Stop: 1551    Procedure: LT.URETEROSCOPY LASER LITHOTRIPSY WITH STENT  FOR STONE (Left ) Diagnosis:     Surgeon: Arnaud Lu MD Provider: Joseph Tena MD    Anesthesia Type: general ASA Status: 3 - Emergent          Anesthesia Type: general    Vitals  Vitals Value Taken Time   /75 02/23/21 1700   Temp 37.3 °C (99.1 °F) 02/23/21 1550   Pulse 75 02/23/21 1703   Resp 16 02/23/21 1700   SpO2 100 % 02/23/21 1703   Vitals shown include unvalidated device data.        Post Anesthesia Care and Evaluation    Patient location during evaluation: bedside  Patient participation: complete - patient participated  Level of consciousness: awake  Pain score: 2  Pain management: adequate  Airway patency: patent  Anesthetic complications: No anesthetic complications  PONV Status: none  Cardiovascular status: acceptable  Respiratory status: acceptable  Hydration status: acceptable    Comments: /75   Pulse 73   Temp 37.3 °C (99.1 °F) (Oral)   Resp 16   Ht 163.8 cm (64.5\")   Wt 77.6 kg (171 lb 1.2 oz)   LMP  (LMP Unknown)   SpO2 100%   BMI 28.91 kg/m²         "

## 2021-02-23 NOTE — ANESTHESIA PREPROCEDURE EVALUATION
Anesthesia Evaluation     Patient summary reviewed and Nursing notes reviewed                Airway   Mallampati: II  TM distance: >3 FB  Neck ROM: full  No difficulty expected  Dental      Pulmonary    (+) a smoker Former,   Cardiovascular     ECG reviewed  Rhythm: irregular  Rate: normal    (+) dysrhythmias PAC, hyperlipidemia,       Neuro/Psych- negative ROS  GI/Hepatic/Renal/Endo    (+)   renal disease stones,     Musculoskeletal (-) negative ROS    Abdominal    Substance History - negative use     OB/GYN negative ob/gyn ROS         Other                        Anesthesia Plan    ASA 3 - emergent     general   (NPO after MN)  intravenous induction     Anesthetic plan, all risks, benefits, and alternatives have been provided, discussed and informed consent has been obtained with: patient.

## 2021-02-23 NOTE — ANESTHESIA PROCEDURE NOTES
Airway  Urgency: elective    Date/Time: 2/23/2021 3:09 PM    General Information and Staff    Patient location during procedure: OR  Anesthesiologist: Joseph Tena MD  CRNA: Daniela Swanson CRNA    Indications and Patient Condition  Indications for airway management: airway protection    Preoxygenated: yes  Mask difficulty assessment: 1 - vent by mask    Final Airway Details  Final airway type: endotracheal airway      Successful airway: ETT  Cuffed: yes   Successful intubation technique: direct laryngoscopy  Endotracheal tube insertion site: oral  Blade: Wilda  Blade size: 3  ETT size (mm): 7.0  Cormack-Lehane Classification: grade I - full view of glottis  Placement verified by: chest auscultation and capnometry   Measured from: lips  ETT/EBT  to lips (cm): 21  Number of attempts at approach: 1  Assessment: lips, teeth, and gum same as pre-op and atraumatic intubation

## 2021-02-24 ENCOUNTER — TELEPHONE (OUTPATIENT)
Dept: FAMILY MEDICINE CLINIC | Facility: CLINIC | Age: 81
End: 2021-02-24

## 2021-02-24 LAB
ANION GAP SERPL CALCULATED.3IONS-SCNC: 7.1 MMOL/L (ref 5–15)
BACTERIA SPEC AEROBE CULT: NORMAL
BACTERIA SPEC AEROBE CULT: NORMAL
BUN SERPL-MCNC: 17 MG/DL (ref 8–23)
BUN/CREAT SERPL: 28.3 (ref 7–25)
CALCIUM SPEC-SCNC: 9.2 MG/DL (ref 8.6–10.5)
CHLORIDE SERPL-SCNC: 109 MMOL/L (ref 98–107)
CO2 SERPL-SCNC: 23.9 MMOL/L (ref 22–29)
CREAT SERPL-MCNC: 0.6 MG/DL (ref 0.57–1)
DEPRECATED RDW RBC AUTO: 40.6 FL (ref 37–54)
ERYTHROCYTE [DISTWIDTH] IN BLOOD BY AUTOMATED COUNT: 13.2 % (ref 12.3–15.4)
GFR SERPL CREATININE-BSD FRML MDRD: 116 ML/MIN/1.73
GFR SERPL CREATININE-BSD FRML MDRD: 96 ML/MIN/1.73
GLUCOSE SERPL-MCNC: 111 MG/DL (ref 65–99)
HCT VFR BLD AUTO: 33 % (ref 34–46.6)
HGB BLD-MCNC: 11 G/DL (ref 12–15.9)
MCH RBC QN AUTO: 28.4 PG (ref 26.6–33)
MCHC RBC AUTO-ENTMCNC: 33.3 G/DL (ref 31.5–35.7)
MCV RBC AUTO: 85.1 FL (ref 79–97)
PLATELET # BLD AUTO: 268 10*3/MM3 (ref 140–450)
PMV BLD AUTO: 10.8 FL (ref 6–12)
POTASSIUM SERPL-SCNC: 4 MMOL/L (ref 3.5–5.2)
RBC # BLD AUTO: 3.88 10*6/MM3 (ref 3.77–5.28)
SODIUM SERPL-SCNC: 140 MMOL/L (ref 136–145)
WBC # BLD AUTO: 13.54 10*3/MM3 (ref 3.4–10.8)

## 2021-02-24 PROCEDURE — 97530 THERAPEUTIC ACTIVITIES: CPT

## 2021-02-24 PROCEDURE — 97110 THERAPEUTIC EXERCISES: CPT

## 2021-02-24 PROCEDURE — 80048 BASIC METABOLIC PNL TOTAL CA: CPT | Performed by: HOSPITALIST

## 2021-02-24 PROCEDURE — 85027 COMPLETE CBC AUTOMATED: CPT | Performed by: HOSPITALIST

## 2021-02-24 RX ADMIN — HYDROCODONE BITARTRATE AND ACETAMINOPHEN 1 TABLET: 5; 325 TABLET ORAL at 12:44

## 2021-02-24 RX ADMIN — VALACYCLOVIR 1000 MG: 500 TABLET, FILM COATED ORAL at 21:18

## 2021-02-24 RX ADMIN — HYDROCODONE BITARTRATE AND ACETAMINOPHEN 1 TABLET: 5; 325 TABLET ORAL at 18:19

## 2021-02-24 RX ADMIN — VALACYCLOVIR 1000 MG: 500 TABLET, FILM COATED ORAL at 08:07

## 2021-02-24 RX ADMIN — SODIUM CHLORIDE 75 ML/HR: 9 INJECTION, SOLUTION INTRAVENOUS at 05:16

## 2021-02-24 RX ADMIN — ACETAMINOPHEN 650 MG: 325 TABLET, FILM COATED ORAL at 15:33

## 2021-02-24 RX ADMIN — LIDOCAINE 2 PATCH: 50 PATCH TOPICAL at 08:07

## 2021-02-24 NOTE — TELEPHONE ENCOUNTER
[facility] IS NEEDING A VERBAL ORDER FOR NURSING FOR THIS PATIENT SHE HAS CALLED TWO OTHER TIMES THIS WEEK AND LEFT VOICEMAIL AND HER CALLS HAVE NOT BEEN RETURNED.   [phone]  [facility]

## 2021-02-25 LAB
ANION GAP SERPL CALCULATED.3IONS-SCNC: 7.6 MMOL/L (ref 5–15)
BUN SERPL-MCNC: 18 MG/DL (ref 8–23)
BUN/CREAT SERPL: 30 (ref 7–25)
CALCIUM SPEC-SCNC: 8.9 MG/DL (ref 8.6–10.5)
CHLORIDE SERPL-SCNC: 108 MMOL/L (ref 98–107)
CO2 SERPL-SCNC: 25.4 MMOL/L (ref 22–29)
CREAT SERPL-MCNC: 0.6 MG/DL (ref 0.57–1)
DEPRECATED RDW RBC AUTO: 42.5 FL (ref 37–54)
ERYTHROCYTE [DISTWIDTH] IN BLOOD BY AUTOMATED COUNT: 13.4 % (ref 12.3–15.4)
GFR SERPL CREATININE-BSD FRML MDRD: 116 ML/MIN/1.73
GFR SERPL CREATININE-BSD FRML MDRD: 96 ML/MIN/1.73
GLUCOSE SERPL-MCNC: 76 MG/DL (ref 65–99)
HCT VFR BLD AUTO: 31.1 % (ref 34–46.6)
HGB BLD-MCNC: 10.5 G/DL (ref 12–15.9)
MCH RBC QN AUTO: 29.5 PG (ref 26.6–33)
MCHC RBC AUTO-ENTMCNC: 33.8 G/DL (ref 31.5–35.7)
MCV RBC AUTO: 87.4 FL (ref 79–97)
PLATELET # BLD AUTO: 313 10*3/MM3 (ref 140–450)
PMV BLD AUTO: 10.5 FL (ref 6–12)
POTASSIUM SERPL-SCNC: 3.4 MMOL/L (ref 3.5–5.2)
RBC # BLD AUTO: 3.56 10*6/MM3 (ref 3.77–5.28)
SODIUM SERPL-SCNC: 141 MMOL/L (ref 136–145)
WBC # BLD AUTO: 15.15 10*3/MM3 (ref 3.4–10.8)

## 2021-02-25 PROCEDURE — 97530 THERAPEUTIC ACTIVITIES: CPT

## 2021-02-25 PROCEDURE — 97535 SELF CARE MNGMENT TRAINING: CPT

## 2021-02-25 PROCEDURE — 80048 BASIC METABOLIC PNL TOTAL CA: CPT | Performed by: STUDENT IN AN ORGANIZED HEALTH CARE EDUCATION/TRAINING PROGRAM

## 2021-02-25 PROCEDURE — 85027 COMPLETE CBC AUTOMATED: CPT | Performed by: STUDENT IN AN ORGANIZED HEALTH CARE EDUCATION/TRAINING PROGRAM

## 2021-02-25 PROCEDURE — 99231 SBSQ HOSP IP/OBS SF/LOW 25: CPT | Performed by: NURSE PRACTITIONER

## 2021-02-25 PROCEDURE — 97110 THERAPEUTIC EXERCISES: CPT

## 2021-02-25 RX ORDER — POTASSIUM CHLORIDE 750 MG/1
40 TABLET, FILM COATED, EXTENDED RELEASE ORAL ONCE
Status: COMPLETED | OUTPATIENT
Start: 2021-02-25 | End: 2021-02-25

## 2021-02-25 RX ADMIN — ACETAMINOPHEN 650 MG: 325 TABLET, FILM COATED ORAL at 20:42

## 2021-02-25 RX ADMIN — ACETAMINOPHEN 650 MG: 325 TABLET, FILM COATED ORAL at 05:56

## 2021-02-25 RX ADMIN — SODIUM CHLORIDE 75 ML/HR: 9 INJECTION, SOLUTION INTRAVENOUS at 19:57

## 2021-02-25 RX ADMIN — SODIUM CHLORIDE 75 ML/HR: 9 INJECTION, SOLUTION INTRAVENOUS at 04:14

## 2021-02-25 RX ADMIN — VALACYCLOVIR 1000 MG: 500 TABLET, FILM COATED ORAL at 08:08

## 2021-02-25 RX ADMIN — LIDOCAINE 2 PATCH: 50 PATCH TOPICAL at 08:08

## 2021-02-25 RX ADMIN — HYDROCODONE BITARTRATE AND ACETAMINOPHEN 1 TABLET: 5; 325 TABLET ORAL at 03:26

## 2021-02-25 RX ADMIN — HYDROCODONE BITARTRATE AND ACETAMINOPHEN 1 TABLET: 5; 325 TABLET ORAL at 13:31

## 2021-02-25 RX ADMIN — POTASSIUM CHLORIDE 40 MEQ: 750 TABLET, EXTENDED RELEASE ORAL at 13:23

## 2021-02-25 RX ADMIN — HYDROCODONE BITARTRATE AND ACETAMINOPHEN 1 TABLET: 5; 325 TABLET ORAL at 23:03

## 2021-02-26 VITALS
DIASTOLIC BLOOD PRESSURE: 85 MMHG | OXYGEN SATURATION: 94 % | HEART RATE: 84 BPM | TEMPERATURE: 98.3 F | SYSTOLIC BLOOD PRESSURE: 162 MMHG | WEIGHT: 171.08 LBS | HEIGHT: 65 IN | RESPIRATION RATE: 18 BRPM | BODY MASS INDEX: 28.5 KG/M2

## 2021-02-26 LAB
ANION GAP SERPL CALCULATED.3IONS-SCNC: 5.1 MMOL/L (ref 5–15)
BUN SERPL-MCNC: 10 MG/DL (ref 8–23)
BUN/CREAT SERPL: 22.2 (ref 7–25)
CALCIUM SPEC-SCNC: 8.7 MG/DL (ref 8.6–10.5)
CHLORIDE SERPL-SCNC: 110 MMOL/L (ref 98–107)
CO2 SERPL-SCNC: 25.9 MMOL/L (ref 22–29)
CREAT SERPL-MCNC: 0.45 MG/DL (ref 0.57–1)
DEPRECATED RDW RBC AUTO: 41.5 FL (ref 37–54)
ERYTHROCYTE [DISTWIDTH] IN BLOOD BY AUTOMATED COUNT: 13.1 % (ref 12.3–15.4)
GFR SERPL CREATININE-BSD FRML MDRD: 134 ML/MIN/1.73
GFR SERPL CREATININE-BSD FRML MDRD: >150 ML/MIN/1.73
GLUCOSE SERPL-MCNC: 84 MG/DL (ref 65–99)
HCT VFR BLD AUTO: 29.4 % (ref 34–46.6)
HGB BLD-MCNC: 9.6 G/DL (ref 12–15.9)
MCH RBC QN AUTO: 28.6 PG (ref 26.6–33)
MCHC RBC AUTO-ENTMCNC: 32.7 G/DL (ref 31.5–35.7)
MCV RBC AUTO: 87.5 FL (ref 79–97)
PLATELET # BLD AUTO: 323 10*3/MM3 (ref 140–450)
PMV BLD AUTO: 10.8 FL (ref 6–12)
POTASSIUM SERPL-SCNC: 3.7 MMOL/L (ref 3.5–5.2)
RBC # BLD AUTO: 3.36 10*6/MM3 (ref 3.77–5.28)
SODIUM SERPL-SCNC: 141 MMOL/L (ref 136–145)
WBC # BLD AUTO: 11.28 10*3/MM3 (ref 3.4–10.8)

## 2021-02-26 PROCEDURE — 97110 THERAPEUTIC EXERCISES: CPT

## 2021-02-26 PROCEDURE — 85027 COMPLETE CBC AUTOMATED: CPT | Performed by: STUDENT IN AN ORGANIZED HEALTH CARE EDUCATION/TRAINING PROGRAM

## 2021-02-26 PROCEDURE — 80048 BASIC METABOLIC PNL TOTAL CA: CPT | Performed by: STUDENT IN AN ORGANIZED HEALTH CARE EDUCATION/TRAINING PROGRAM

## 2021-02-26 RX ADMIN — HYDROCODONE BITARTRATE AND ACETAMINOPHEN 1 TABLET: 5; 325 TABLET ORAL at 04:27

## 2021-02-26 RX ADMIN — LIDOCAINE 2 PATCH: 50 PATCH TOPICAL at 08:40

## 2021-02-26 RX ADMIN — SODIUM CHLORIDE 75 ML/HR: 9 INJECTION, SOLUTION INTRAVENOUS at 06:45

## 2021-02-27 LAB
CALCIUM OXALATE DIHYDRATE MFR STONE IR: 5 %
COLOR STONE: NORMAL
COM MFR STONE: 90 %
COMPN STONE: NORMAL
HYDROXYAPATITE 24H ENGDIFF UR: 5 %
LABORATORY COMMENT REPORT: NORMAL
Lab: NORMAL
Lab: NORMAL
PHOTO: NORMAL
SIZE STONE: NORMAL MM
SPEC SOURCE SUBJ: NORMAL
WT STONE: 28 MG

## 2021-03-01 NOTE — PROGRESS NOTES
Case Management Discharge Note      Final Note: Pt was dc'd to a skilled bed @ [facility].         Selected Continued Care - Discharged on 2/26/2021 Admission date: 2/17/2021 - Discharge disposition: Skilled Nursing Facility (DC - External)    Destination Coordination complete    Service Provider Selected Services Address Phone Fax Patient Preferred    [facility]  Skilled Nursing [facility] [facility] [address] [phone] --          Durable Medical Equipment    No services have been selected for the patient.              Dialysis/Infusion    No services have been selected for the patient.              Home Medical Care    No services have been selected for the patient.              Therapy    No services have been selected for the patient.              Community Resources    No services have been selected for the patient.                  Transportation Services  W/C Van: [facility]    Final Discharge Disposition Code: 03 - skilled nursing facility (SNF)

## 2021-03-02 DIAGNOSIS — Z23 IMMUNIZATION DUE: ICD-10-CM

## 2021-03-09 ENCOUNTER — OFFICE VISIT (OUTPATIENT)
Dept: NEUROSURGERY | Facility: CLINIC | Age: 81
End: 2021-03-09

## 2021-03-09 VITALS
DIASTOLIC BLOOD PRESSURE: 81 MMHG | HEIGHT: 65 IN | TEMPERATURE: 97.7 F | BODY MASS INDEX: 28.5 KG/M2 | HEART RATE: 78 BPM | SYSTOLIC BLOOD PRESSURE: 135 MMHG | WEIGHT: 171.07 LBS

## 2021-03-09 DIAGNOSIS — M54.50 ACUTE BILATERAL LOW BACK PAIN WITHOUT SCIATICA: Primary | ICD-10-CM

## 2021-03-09 PROCEDURE — 99213 OFFICE O/P EST LOW 20 MIN: CPT | Performed by: NEUROLOGICAL SURGERY

## 2021-03-09 NOTE — PROGRESS NOTES
"Subjective   Patient ID: Kimberlee Urrutia is a 80 y.o. female is here today for follow-up for LBP with a new MRI Lumbar that was ordered on 02.17.2021.    Today patient is having low back pain. Patient denies N/T, bowel/bladder incontinence.      Patient, Provider, and MA are all wearing a mask in our office today.     History of Present Illness    This patient is feeling a lot better than she was when she was in the hospital.  She has not very little back pain.  She says that when she walks a long distance she does get some increased pain in her back but if she sits down or rests it goes away.  She has pretty much felt better since her UTI was cleared up.    The following portions of the patient's history were reviewed and updated as appropriate: allergies, current medications, past family history, past medical history, past social history, past surgical history and problem list.    Review of Systems   Constitutional: Positive for activity change. Negative for chills and fever.   HENT: Negative for congestion.    Genitourinary: Negative for difficulty urinating.   Musculoskeletal: Positive for back pain. Negative for neck pain and neck stiffness.   Neurological: Positive for weakness and numbness.       I have reviewed the review of systems as documented by my MA.      Objective     Vitals:    03/09/21 1354   BP: 135/81   Cuff Size: Adult   Pulse: 78   Temp: 97.7 °F (36.5 °C)   Weight: 77.6 kg (171 lb 1.2 oz)   Height: 163.8 cm (64.5\")     Body mass index is 28.91 kg/m².      Physical Exam  Neurological:      Mental Status: She is alert and oriented to person, place, and time.       Neurologic Exam     Mental Status   Oriented to person, place, and time.           Assessment/Plan   Independent Review of Radiographic Studies:      I personally reviewed the images from the following studies.    I reviewed her MRI of the lumbar spine done on 18 February.  This does show a grade 1 spondylolisthesis of L4 on L5 with " resultant stenosis.  The other levels mostly look okay.    Medical Decision Making:      I told the patient and her son that her spinal stenosis and spondylolisthesis do not seem to be all that severe at this point.  Consequently I would tend to just hold off on doing anything at all.  If her back gets a lot worse she can certainly call.  We will order a UA and a culture just to be sure her urinary tract infection is completely cleared up.  We will see her back on an as-needed basis.  We did put on the order that the PCP should be called with the results of the UA and culture.    Diagnoses and all orders for this visit:    1. Acute bilateral low back pain without sciatica (Primary)  -     Urinalysis With Culture If Indicated - Urine, Clean Catch; Future      Return if symptoms worsen or fail to improve.

## 2021-03-15 ENCOUNTER — OFFICE VISIT (OUTPATIENT)
Dept: FAMILY MEDICINE CLINIC | Facility: CLINIC | Age: 81
End: 2021-03-15

## 2021-03-15 VITALS
HEIGHT: 65 IN | WEIGHT: 171 LBS | HEART RATE: 78 BPM | BODY MASS INDEX: 28.49 KG/M2 | RESPIRATION RATE: 16 BRPM | SYSTOLIC BLOOD PRESSURE: 143 MMHG | DIASTOLIC BLOOD PRESSURE: 74 MMHG | TEMPERATURE: 97.1 F | OXYGEN SATURATION: 96 %

## 2021-03-15 DIAGNOSIS — N34.2 INFECTIVE URETHRITIS: Primary | ICD-10-CM

## 2021-03-15 LAB
BILIRUB BLD-MCNC: NEGATIVE MG/DL
CLARITY, POC: CLEAR
COLOR UR: YELLOW
GLUCOSE UR STRIP-MCNC: NEGATIVE MG/DL
KETONES UR QL: NEGATIVE
LEUKOCYTE EST, POC: ABNORMAL
NITRITE UR-MCNC: POSITIVE MG/ML
PH UR: 6.5 [PH] (ref 5–8)
PROT UR STRIP-MCNC: ABNORMAL MG/DL
RBC # UR STRIP: ABNORMAL /UL
SP GR UR: 1.02 (ref 1–1.03)
UROBILINOGEN UR QL: NORMAL

## 2021-03-15 PROCEDURE — 81003 URINALYSIS AUTO W/O SCOPE: CPT | Performed by: FAMILY MEDICINE

## 2021-03-15 PROCEDURE — 99213 OFFICE O/P EST LOW 20 MIN: CPT | Performed by: FAMILY MEDICINE

## 2021-03-15 NOTE — PROGRESS NOTES
Subjective   Chief Complaint:   Chief Complaint   Patient presents with   • UTI symptoms         History of Present Illness seen   With  uti symptoms  Labs ordered  Cbc, u/a with micro etc  On 3/15  Temp 97.1  Ordered urine c&s  See her allergies to pcn and allergies. Will await labs. I did not see labs until  3/17  I will talk to bj torres in am to make sure she got meds,  Urine grew out e coli 100,000        Past Medical History:   Diagnosis Date   • Allergic    • Arthritis    • Cough    • Diarrhea    • Hx of migraine headaches    • Hyperlipidemia    • Irregular heart beat    • Osteoarthritis    • Renal stones 02/2021   • Vitamin D deficiency         Kimberlee Urrutia 80 y.o. female who presents today for Medical Management of UTI symptoms.    ICD-10-CM ICD-9-CM   1. Infective urethritis  N34.2 597.80        she has a problem list of   Patient Active Problem List   Diagnosis   • Arthritis   • HLD (hyperlipidemia)   • Health care maintenance   • Knee pain, right   • Hx of total knee arthroplasty   • Seasonal allergies   • Vitamin D deficiency   • Pyuria   • Transient alteration of awareness   • Migraine without status migrainosus, not intractable   • Leukocytosis   • Viral pharyngitis   • Multinodular thyroid   • Osteopenia of multiple sites   • Colon cancer screening   • Serum calcium elevated   • Acute bilateral low back pain without sciatica   • Elevated procalcitonin   • Sepsis with acute organ dysfunction (CMS/HCC)   • Sepsis due to Escherichia coli with acute renal failure without septic shock (CMS/HCC)   • Recurrent oral herpes simplex   • Lumbar spinal stenosis   • Metabolic encephalopathy   • Renal stones   .    she has been compliant with   Current Outpatient Medications:   •  Acetaminophen (ACETAMIN PO), Take  by mouth., Disp: , Rfl:   •  loratadine (CLARITIN) 10 MG tablet, Take  by mouth daily., Disp: , Rfl:   •  Multiple Vitamins-Minerals (MULTI COMPLETE PO), Take  by mouth daily., Disp: , Rfl: .  she  "denies medication side effects.        /74   Pulse 78   Temp 97.1 °F (36.2 °C)   Resp 16   Ht 163.8 cm (64.5\")   Wt 77.6 kg (171 lb)   LMP  (LMP Unknown)   SpO2 96%   BMI 28.90 kg/m²     Results for orders placed or performed in visit on 03/15/21   Comprehensive metabolic panel    Specimen: Blood   Result Value Ref Range    Glucose 80 65 - 99 mg/dL    BUN 9 8 - 27 mg/dL    Creatinine 0.81 0.57 - 1.00 mg/dL    eGFR Non African Am 68 >59 mL/min/1.73    eGFR African Am 79 >59 mL/min/1.73    BUN/Creatinine Ratio 11 (L) 12 - 28    Sodium 142 134 - 144 mmol/L    Potassium 4.6 3.5 - 5.2 mmol/L    Chloride 102 96 - 106 mmol/L    Total CO2 20 20 - 29 mmol/L    Calcium 10.6 (H) 8.7 - 10.3 mg/dL    Total Protein 6.9 6.0 - 8.5 g/dL    Albumin 3.5 (L) 3.6 - 4.6 g/dL    Globulin 3.4 1.5 - 4.5 g/dL    A/G Ratio 1.0 (L) 1.2 - 2.2    Total Bilirubin 0.3 0.0 - 1.2 mg/dL    Alkaline Phosphatase 73 39 - 117 IU/L    AST (SGOT) 26 0 - 40 IU/L    ALT (SGPT) 10 0 - 32 IU/L   POCT urinalysis dipstick, automated    Specimen: Urine   Result Value Ref Range    Color Yellow Yellow, Straw, Dark Yellow, Amanda    Clarity, UA Clear Clear    Specific Gravity  1.020 1.005 - 1.030    pH, Urine 6.5 5.0 - 8.0    Leukocytes Large (3+) (A) Negative    Nitrite, UA Positive (A) Negative    Protein, POC 30 mg/dL (A) Negative mg/dL    Glucose, UA Negative Negative, 1000 mg/dL (3+) mg/dL    Ketones, UA Negative Negative    Urobilinogen, UA Normal Normal    Bilirubin Negative Negative    Blood, UA Trace (A) Negative   CBC w AUTO Differential    Specimen: Blood   Result Value Ref Range    WBC 11.3 (H) 3.4 - 10.8 x10E3/uL    RBC 3.85 3.77 - 5.28 x10E6/uL    Hemoglobin 11.1 11.1 - 15.9 g/dL    Hematocrit 33.0 (L) 34.0 - 46.6 %    MCV 86 79 - 97 fL    MCH 28.8 26.6 - 33.0 pg    MCHC 33.6 31.5 - 35.7 g/dL    RDW 13.0 11.7 - 15.4 %    Platelets 425 150 - 450 x10E3/uL    Neutrophil Rel % 76 Not Estab. %    Lymphocyte Rel % 15 Not Estab. %    Monocyte Rel " % 8 Not Estab. %    Eosinophil Rel % 0 Not Estab. %    Basophil Rel % 1 Not Estab. %    Neutrophils Absolute 8.7 (H) 1.4 - 7.0 x10E3/uL    Lymphocytes Absolute 1.7 0.7 - 3.1 x10E3/uL    Monocytes Absolute 0.9 0.1 - 0.9 x10E3/uL    Eosinophils Absolute 0.0 0.0 - 0.4 x10E3/uL    Basophils Absolute 0.1 0.0 - 0.2 x10E3/uL    Immature Granulocyte Rel % 0 Not Estab. %    Immature Grans Absolute 0.0 0.0 - 0.1 x10E3/uL       The following portions of the patient's history were reviewed and updated as appropriate: allergies, current medications, past family history, past medical history, past social history, past surgical history and problem list.      she has a history of   Patient Active Problem List   Diagnosis   • Arthritis   • HLD (hyperlipidemia)   • Health care maintenance   • Knee pain, right   • Hx of total knee arthroplasty   • Seasonal allergies   • Vitamin D deficiency   • Pyuria   • Transient alteration of awareness   • Migraine without status migrainosus, not intractable   • Leukocytosis   • Viral pharyngitis   • Multinodular thyroid   • Osteopenia of multiple sites   • Colon cancer screening   • Serum calcium elevated   • Acute bilateral low back pain without sciatica   • Elevated procalcitonin   • Sepsis with acute organ dysfunction (CMS/HCC)   • Sepsis due to Escherichia coli with acute renal failure without septic shock (CMS/HCC)   • Recurrent oral herpes simplex   • Lumbar spinal stenosis   • Metabolic encephalopathy   • Renal stones       Review of Systems   Constitutional: Negative for chills and fever.   HENT: Negative for congestion, sinus pressure and sinus pain.    Eyes: Negative for visual disturbance.   Respiratory: Negative for shortness of breath.    Cardiovascular: Negative for chest pain.   Gastrointestinal: Negative for abdominal pain.   Genitourinary: Positive for dysuria. Negative for difficulty urinating and flank pain.   Musculoskeletal: Negative for back pain.   Neurological: Negative for  dizziness.   Psychiatric/Behavioral: Negative for confusion.       Objective   Physical Exam  Eyes:      Pupils: Pupils are equal, round, and reactive to light.   Cardiovascular:      Rate and Rhythm: Normal rate.   Pulmonary:      Breath sounds: Normal breath sounds.   Abdominal:      General: Bowel sounds are normal.      Palpations: Abdomen is soft.      Tenderness: There is no abdominal tenderness.   Musculoskeletal:      Right lower leg: No edema.      Left lower leg: No edema.   Skin:     Findings: No rash.   Neurological:      General: No focal deficit present.      Mental Status: She is alert.   Psychiatric:         Mood and Affect: Mood normal.         Assessment/Plan   Diagnoses and all orders for this visit:    1. Infective urethritis (Primary)  -     Urinalysis With Microscopic - Urine, Clean Catch  -     Urine Culture - Urine, Urine, Clean Catch  -     Comprehensive metabolic panel  -     CBC w AUTO Differential  -     POCT urinalysis dipstick, automated            Watching for lab results     U/a       Will talk to bj torres in am

## 2021-03-16 LAB
ALBUMIN SERPL-MCNC: 3.5 G/DL (ref 3.6–4.6)
ALBUMIN/GLOB SERPL: 1 {RATIO} (ref 1.2–2.2)
ALP SERPL-CCNC: 73 IU/L (ref 39–117)
ALT SERPL-CCNC: 10 IU/L (ref 0–32)
AST SERPL-CCNC: 26 IU/L (ref 0–40)
BASOPHILS # BLD AUTO: 0.1 X10E3/UL (ref 0–0.2)
BASOPHILS NFR BLD AUTO: 1 %
BILIRUB SERPL-MCNC: 0.3 MG/DL (ref 0–1.2)
BUN SERPL-MCNC: 9 MG/DL (ref 8–27)
BUN/CREAT SERPL: 11 (ref 12–28)
CALCIUM SERPL-MCNC: 10.6 MG/DL (ref 8.7–10.3)
CHLORIDE SERPL-SCNC: 102 MMOL/L (ref 96–106)
CO2 SERPL-SCNC: 20 MMOL/L (ref 20–29)
CREAT SERPL-MCNC: 0.81 MG/DL (ref 0.57–1)
EOSINOPHIL # BLD AUTO: 0 X10E3/UL (ref 0–0.4)
EOSINOPHIL NFR BLD AUTO: 0 %
ERYTHROCYTE [DISTWIDTH] IN BLOOD BY AUTOMATED COUNT: 13 % (ref 11.7–15.4)
GLOBULIN SER CALC-MCNC: 3.4 G/DL (ref 1.5–4.5)
GLUCOSE SERPL-MCNC: 80 MG/DL (ref 65–99)
HCT VFR BLD AUTO: 33 % (ref 34–46.6)
HGB BLD-MCNC: 11.1 G/DL (ref 11.1–15.9)
IMM GRANULOCYTES # BLD AUTO: 0 X10E3/UL (ref 0–0.1)
IMM GRANULOCYTES NFR BLD AUTO: 0 %
LYMPHOCYTES # BLD AUTO: 1.7 X10E3/UL (ref 0.7–3.1)
LYMPHOCYTES NFR BLD AUTO: 15 %
MCH RBC QN AUTO: 28.8 PG (ref 26.6–33)
MCHC RBC AUTO-ENTMCNC: 33.6 G/DL (ref 31.5–35.7)
MCV RBC AUTO: 86 FL (ref 79–97)
MONOCYTES # BLD AUTO: 0.9 X10E3/UL (ref 0.1–0.9)
MONOCYTES NFR BLD AUTO: 8 %
NEUTROPHILS # BLD AUTO: 8.7 X10E3/UL (ref 1.4–7)
NEUTROPHILS NFR BLD AUTO: 76 %
PLATELET # BLD AUTO: 425 X10E3/UL (ref 150–450)
POTASSIUM SERPL-SCNC: 4.6 MMOL/L (ref 3.5–5.2)
PROT SERPL-MCNC: 6.9 G/DL (ref 6–8.5)
RBC # BLD AUTO: 3.85 X10E6/UL (ref 3.77–5.28)
SODIUM SERPL-SCNC: 142 MMOL/L (ref 134–144)
WBC # BLD AUTO: 11.3 X10E3/UL (ref 3.4–10.8)

## 2021-03-17 ENCOUNTER — TELEPHONE (OUTPATIENT)
Dept: FAMILY MEDICINE CLINIC | Facility: CLINIC | Age: 81
End: 2021-03-17

## 2021-03-17 NOTE — TELEPHONE ENCOUNTER
Latrice( [phone]) with [facility] calling requesting Nursing and PT for pt.  Wanting to know if that is ok?  Please advise.

## 2021-03-18 LAB
APPEARANCE UR: ABNORMAL
BACTERIA #/AREA URNS HPF: ABNORMAL /HPF
BACTERIA UR CULT: ABNORMAL
BACTERIA UR CULT: ABNORMAL
BILIRUB UR QL STRIP: NEGATIVE
COLOR UR: YELLOW
CRYSTALS URNS MICRO: ABNORMAL
EPI CELLS #/AREA URNS HPF: ABNORMAL /HPF
GLUCOSE UR QL: NEGATIVE
HGB UR QL STRIP: ABNORMAL
KETONES UR QL STRIP: NEGATIVE
LEUKOCYTE ESTERASE UR QL STRIP: ABNORMAL
NITRITE UR QL STRIP: POSITIVE
OTHER ANTIBIOTIC SUSC ISLT: ABNORMAL
PH UR STRIP: 6.5 [PH] (ref 5–8)
PROT UR QL STRIP: ABNORMAL
RBC #/AREA URNS HPF: ABNORMAL /HPF
SP GR UR: 1.01 (ref 1–1.03)
UROBILINOGEN UR STRIP-MCNC: ABNORMAL MG/DL
WBC #/AREA URNS HPF: ABNORMAL /HPF

## 2021-03-19 PROCEDURE — 99213 OFFICE O/P EST LOW 20 MIN: CPT | Performed by: FAMILY MEDICINE

## 2021-03-23 ENCOUNTER — OFFICE VISIT (OUTPATIENT)
Dept: FAMILY MEDICINE CLINIC | Facility: CLINIC | Age: 81
End: 2021-03-23

## 2021-03-23 VITALS
TEMPERATURE: 97.5 F | BODY MASS INDEX: 24.92 KG/M2 | SYSTOLIC BLOOD PRESSURE: 121 MMHG | WEIGHT: 146 LBS | DIASTOLIC BLOOD PRESSURE: 61 MMHG | HEIGHT: 64 IN | RESPIRATION RATE: 16 BRPM | HEART RATE: 78 BPM | OXYGEN SATURATION: 98 %

## 2021-03-23 DIAGNOSIS — N34.2 INFECTIVE URETHRITIS: ICD-10-CM

## 2021-03-23 DIAGNOSIS — N39.0 E. COLI UTI: Primary | ICD-10-CM

## 2021-03-23 DIAGNOSIS — B96.20 E. COLI UTI: Primary | ICD-10-CM

## 2021-03-23 DIAGNOSIS — E34.9 ELEVATED CALCITONIN LEVEL: ICD-10-CM

## 2021-03-23 DIAGNOSIS — M54.50 ACUTE BILATERAL LOW BACK PAIN WITHOUT SCIATICA: ICD-10-CM

## 2021-03-23 PROBLEM — Z12.11 COLON CANCER SCREENING: Status: ACTIVE | Noted: 2020-01-29

## 2021-03-23 PROCEDURE — 99214 OFFICE O/P EST MOD 30 MIN: CPT | Performed by: PHYSICIAN ASSISTANT

## 2021-03-23 RX ORDER — MELATONIN
1000 DAILY
COMMUNITY

## 2021-03-23 RX ORDER — DOXYCYCLINE HYCLATE 100 MG/1
100 CAPSULE ORAL 2 TIMES DAILY
Qty: 20 CAPSULE | Refills: 0 | Status: SHIPPED | OUTPATIENT
Start: 2021-03-23 | End: 2021-04-07

## 2021-03-23 RX ORDER — NAPROXEN 500 MG/1
500 TABLET ORAL 2 TIMES DAILY WITH MEALS
Qty: 60 TABLET | Refills: 2 | Status: SHIPPED | OUTPATIENT
Start: 2021-03-23 | End: 2021-07-22 | Stop reason: SDDI

## 2021-03-23 NOTE — PROGRESS NOTES
Transitional Care Follow Up Visit  Subjective     Kimberlee Urrutia is a 80 y.o. female who presents for a transitional care management visit.    Within 48 business hours after discharge our office contacted her via telephone to coordinate her care and needs.      I reviewed and discussed the details of that call along with the discharge summary, hospital problems, inpatient lab results, inpatient diagnostic studies, and consultation reports with Kimberlee.     Current outpatient and discharge medications have been reconciled for the patient.  Reviewed by: Eden Medina PA-C      No flowsheet data found.  Risk for Readmission (LACE) No data recorded    History of Present Illness   Course During Hospital Stay:  2-17 to 2-26-21    She is still having back ---stiffness----not pain;  Did have MRI lumbar spine last mo at [facility].  As part of work up.  Did have renal stone -----stent removed and f/u in April;  She did have lithotripsy Dr Lu.          Still a little STM off per son.   Had WBC 3-15-21 and still slightly elevated.  I want to repeat CBC and get urine studies.     I see labs were done last week and does have UTI 3-15-21.  I will put her on Doxy---cx was sensitive to Tetracycline.  Will do 10 days and then f/u labs after finish Gato      Has seen neurosurgery for f/u----Dr Luna did not want to do anything further.    She was inpt for UTI--sepsis  She was treated with Vancomycin and Rocephin----then had levaquin---was seen by infectious disease  Son wants G6Pd  Her weight is down-----was not eating when inpatient  Weight stable for now.    NSAID----Naprosyn has helped last few days. ---note her last renal labs were in range.     She is having trouble sleeping ----chronic; try Melatonin; consider Trazodone.    The following portions of the patient's history were reviewed and updated as appropriate: allergies, current medications, past family history, past medical history, past social history, past surgical  history and problem list.    Review of Systems   Constitutional: Negative for activity change, appetite change and unexpected weight change.   HENT: Negative for nosebleeds and trouble swallowing.    Eyes: Negative for pain and visual disturbance.   Respiratory: Negative for chest tightness, shortness of breath and wheezing.    Cardiovascular: Negative for chest pain and palpitations.   Gastrointestinal: Negative for abdominal pain and blood in stool.   Endocrine: Negative.    Genitourinary: Negative for difficulty urinating and hematuria.   Musculoskeletal: Positive for back pain. Negative for joint swelling.   Skin: Negative for color change and rash.   Allergic/Immunologic: Negative.    Neurological: Negative for syncope and speech difficulty.   Hematological: Negative for adenopathy.   Psychiatric/Behavioral: Negative for agitation and confusion.   All other systems reviewed and are negative.      Objective   Physical Exam  Vitals and nursing note reviewed.   Constitutional:       General: She is not in acute distress.     Appearance: She is well-developed. She is not ill-appearing or toxic-appearing.   HENT:      Head: Normocephalic.      Right Ear: External ear normal.      Left Ear: External ear normal.      Nose: Nose normal.      Mouth/Throat:      Pharynx: Oropharynx is clear.   Eyes:      General: No scleral icterus.     Conjunctiva/sclera: Conjunctivae normal.      Pupils: Pupils are equal, round, and reactive to light.   Neck:      Thyroid: No thyromegaly.   Cardiovascular:      Rate and Rhythm: Normal rate and regular rhythm.      Heart sounds: Normal heart sounds. No murmur heard.     Pulmonary:      Effort: Pulmonary effort is normal. No respiratory distress.      Breath sounds: Normal breath sounds.   Abdominal:      Palpations: There is no mass.      Tenderness: There is no abdominal tenderness.   Musculoskeletal:         General: No tenderness or deformity. Normal range of motion.      Cervical  back: Normal range of motion and neck supple.   Skin:     General: Skin is warm and dry.      Coloration: Skin is not jaundiced.      Findings: No rash.   Neurological:      General: No focal deficit present.      Mental Status: She is alert and oriented to person, place, and time. Mental status is at baseline.   Psychiatric:         Mood and Affect: Mood normal.         Behavior: Behavior normal.         Thought Content: Thought content normal.         Judgment: Judgment normal.         Assessment/Plan   Diagnoses and all orders for this visit:    1. E. coli UTI (Primary)  -     Comprehensive Metabolic Panel  -     CBC & Differential  -     Urinalysis With Microscopic - Urine, Clean Catch  -     Glucose 6 Phosphate Dehydrogenase    2. Infective urethritis    3. Acute bilateral low back pain without sciatica    Other orders  -     doxycycline (VIBRAMYCIN) 100 MG capsule; Take 1 capsule by mouth 2 (Two) Times a Day. For infection  Dispense: 20 capsule; Refill: 0    need her to treat UTI---3-15-21 did show infx  Start Doxy--plan 10 days  Ok Naprosyn 500mg BID----PRN pain---stop if GI upset

## 2021-03-23 NOTE — PATIENT INSTRUCTIONS
Urinary Tract Infection, Adult    A urinary tract infection (UTI) is an infection of any part of the urinary tract. The urinary tract includes the kidneys, ureters, bladder, and urethra. These organs make, store, and get rid of urine in the body.  Your health care provider may use other names to describe the infection. An upper UTI affects the ureters and kidneys (pyelonephritis). A lower UTI affects the bladder (cystitis) and urethra (urethritis).  What are the causes?  Most urinary tract infections are caused by bacteria in your genital area, around the entrance to your urinary tract (urethra). These bacteria grow and cause inflammation of your urinary tract.  What increases the risk?  You are more likely to develop this condition if:  · You have a urinary catheter that stays in place (indwelling).  · You are not able to control when you urinate or have a bowel movement (you have incontinence).  · You are female and you:  ? Use a spermicide or diaphragm for birth control.  ? Have low estrogen levels.  ? Are pregnant.  · You have certain genes that increase your risk (genetics).  · You are sexually active.  · You take antibiotic medicines.  · You have a condition that causes your flow of urine to slow down, such as:  ? An enlarged prostate, if you are male.  ? Blockage in your urethra (stricture).  ? A kidney stone.  ? A nerve condition that affects your bladder control (neurogenic bladder).  ? Not getting enough to drink, or not urinating often.  · You have certain medical conditions, such as:  ? Diabetes.  ? A weak disease-fighting system (immunesystem).  ? Sickle cell disease.  ? Gout.  ? Spinal cord injury.  What are the signs or symptoms?  Symptoms of this condition include:  · Needing to urinate right away (urgently).  · Frequent urination or passing small amounts of urine frequently.  · Pain or burning with urination.  · Blood in the urine.  · Urine that smells bad or unusual.  · Trouble urinating.  · Cloudy  urine.  · Vaginal discharge, if you are female.  · Pain in the abdomen or the lower back.  You may also have:  · Vomiting or a decreased appetite.  · Confusion.  · Irritability or tiredness.  · A fever.  · Diarrhea.  The first symptom in older adults may be confusion. In some cases, they may not have any symptoms until the infection has worsened.  How is this diagnosed?  This condition is diagnosed based on your medical history and a physical exam. You may also have other tests, including:  · Urine tests.  · Blood tests.  · Tests for sexually transmitted infections (STIs).  If you have had more than one UTI, a cystoscopy or imaging studies may be done to determine the cause of the infections.  How is this treated?  Treatment for this condition includes:  · Antibiotic medicine.  · Over-the-counter medicines to treat discomfort.  · Drinking enough water to stay hydrated.  If you have frequent infections or have other conditions such as a kidney stone, you may need to see a health care provider who specializes in the urinary tract (urologist).  In rare cases, urinary tract infections can cause sepsis. Sepsis is a life-threatening condition that occurs when the body responds to an infection. Sepsis is treated in the hospital with IV antibiotics, fluids, and other medicines.  Follow these instructions at home:    Medicines  · Take over-the-counter and prescription medicines only as told by your health care provider.  · If you were prescribed an antibiotic medicine, take it as told by your health care provider. Do not stop using the antibiotic even if you start to feel better.  General instructions  · Make sure you:  ? Empty your bladder often and completely. Do not hold urine for long periods of time.  ? Empty your bladder after sex.  ? Wipe from front to back after a bowel movement if you are female. Use each tissue one time when you wipe.  · Drink enough fluid to keep your urine pale yellow.  · Keep all follow-up  visits as told by your health care provider. This is important.  Contact a health care provider if:  · Your symptoms do not get better after 1-2 days.  · Your symptoms go away and then return.  Get help right away if you have:  · Severe pain in your back or your lower abdomen.  · A fever.  · Nausea or vomiting.  Summary  · A urinary tract infection (UTI) is an infection of any part of the urinary tract, which includes the kidneys, ureters, bladder, and urethra.  · Most urinary tract infections are caused by bacteria in your genital area, around the entrance to your urinary tract (urethra).  · Treatment for this condition often includes antibiotic medicines.  · If you were prescribed an antibiotic medicine, take it as told by your health care provider. Do not stop using the antibiotic even if you start to feel better.  · Keep all follow-up visits as told by your health care provider. This is important.  This information is not intended to replace advice given to you by your health care provider. Make sure you discuss any questions you have with your health care provider.  Document Revised: 12/05/2019 Document Reviewed: 06/27/2019  [employer] Patient Education © 2021 [employer]

## 2021-04-01 ENCOUNTER — OUTSIDE FACILITY SERVICE (OUTPATIENT)
Dept: FAMILY MEDICINE CLINIC | Facility: CLINIC | Age: 81
End: 2021-04-01

## 2021-04-01 PROCEDURE — OUTSIDEPOS PR OUTSIDE POS PLACEHOLDER: Performed by: FAMILY MEDICINE

## 2021-04-02 LAB
ALBUMIN SERPL-MCNC: 3.9 G/DL (ref 3.5–5.2)
ALBUMIN/GLOB SERPL: 1.5 G/DL
ALP SERPL-CCNC: 87 U/L (ref 39–117)
ALT SERPL-CCNC: 10 U/L (ref 1–33)
APPEARANCE UR: ABNORMAL
AST SERPL-CCNC: 14 U/L (ref 1–32)
BACTERIA #/AREA URNS HPF: ABNORMAL /HPF
BASOPHILS # BLD AUTO: 0.09 10*3/MM3 (ref 0–0.2)
BASOPHILS NFR BLD AUTO: 1.1 % (ref 0–1.5)
BILIRUB SERPL-MCNC: 0.4 MG/DL (ref 0–1.2)
BILIRUB UR QL STRIP: NEGATIVE
BUN SERPL-MCNC: 13 MG/DL (ref 8–23)
BUN/CREAT SERPL: 19.4 (ref 7–25)
CALCIUM SERPL-MCNC: 10.7 MG/DL (ref 8.6–10.5)
CASTS URNS MICRO: ABNORMAL
CHLORIDE SERPL-SCNC: 104 MMOL/L (ref 98–107)
CO2 SERPL-SCNC: 27.1 MMOL/L (ref 22–29)
COLOR UR: YELLOW
CREAT SERPL-MCNC: 0.67 MG/DL (ref 0.57–1)
CRYSTALS URNS MICRO: ABNORMAL
EOSINOPHIL # BLD AUTO: 0 10*3/MM3 (ref 0–0.4)
EOSINOPHIL NFR BLD AUTO: 0 % (ref 0.3–6.2)
EPI CELLS #/AREA URNS HPF: ABNORMAL /HPF
ERYTHROCYTE [DISTWIDTH] IN BLOOD BY AUTOMATED COUNT: 13.8 % (ref 12.3–15.4)
G6PD BLD QN: 261 U/10E12 RBC (ref 127–427)
GLOBULIN SER CALC-MCNC: 2.6 GM/DL
GLUCOSE SERPL-MCNC: 85 MG/DL (ref 65–99)
GLUCOSE UR QL: NEGATIVE
HCT VFR BLD AUTO: 34.3 % (ref 34–46.6)
HGB BLD-MCNC: 11.3 G/DL (ref 12–15.9)
HGB UR QL STRIP: NEGATIVE
IMM GRANULOCYTES # BLD AUTO: 0.03 10*3/MM3 (ref 0–0.05)
IMM GRANULOCYTES NFR BLD AUTO: 0.4 % (ref 0–0.5)
KETONES UR QL STRIP: NEGATIVE
LEUKOCYTE ESTERASE UR QL STRIP: ABNORMAL
LYMPHOCYTES # BLD AUTO: 1.94 10*3/MM3 (ref 0.7–3.1)
LYMPHOCYTES NFR BLD AUTO: 23.2 % (ref 19.6–45.3)
MCH RBC QN AUTO: 28.7 PG (ref 26.6–33)
MCHC RBC AUTO-ENTMCNC: 32.9 G/DL (ref 31.5–35.7)
MCV RBC AUTO: 87.1 FL (ref 79–97)
MONOCYTES # BLD AUTO: 0.63 10*3/MM3 (ref 0.1–0.9)
MONOCYTES NFR BLD AUTO: 7.5 % (ref 5–12)
NEUTROPHILS # BLD AUTO: 5.68 10*3/MM3 (ref 1.7–7)
NEUTROPHILS NFR BLD AUTO: 67.8 % (ref 42.7–76)
NITRITE UR QL STRIP: NEGATIVE
NRBC BLD AUTO-RTO: 0 /100 WBC (ref 0–0.2)
PH UR STRIP: 6.5 [PH] (ref 5–8)
PLATELET # BLD AUTO: 353 10*3/MM3 (ref 140–450)
POTASSIUM SERPL-SCNC: 4.7 MMOL/L (ref 3.5–5.2)
PROT SERPL-MCNC: 6.5 G/DL (ref 6–8.5)
PROT UR QL STRIP: NEGATIVE
RBC # BLD AUTO: 3.94 10*6/MM3 (ref 3.77–5.28)
RBC #/AREA URNS HPF: ABNORMAL /HPF
SODIUM SERPL-SCNC: 138 MMOL/L (ref 136–145)
SP GR UR: 1.02 (ref 1–1.03)
UROBILINOGEN UR STRIP-MCNC: ABNORMAL MG/DL
WBC # BLD AUTO: 8.37 10*3/MM3 (ref 3.4–10.8)
WBC #/AREA URNS HPF: ABNORMAL /HPF

## 2021-04-07 ENCOUNTER — OFFICE VISIT (OUTPATIENT)
Dept: FAMILY MEDICINE CLINIC | Facility: CLINIC | Age: 81
End: 2021-04-07

## 2021-04-07 VITALS
DIASTOLIC BLOOD PRESSURE: 70 MMHG | WEIGHT: 142.8 LBS | HEART RATE: 79 BPM | OXYGEN SATURATION: 98 % | SYSTOLIC BLOOD PRESSURE: 130 MMHG | BODY MASS INDEX: 24.38 KG/M2 | HEIGHT: 64 IN | TEMPERATURE: 97.5 F | RESPIRATION RATE: 16 BRPM

## 2021-04-07 DIAGNOSIS — E83.52 SERUM CALCIUM ELEVATED: ICD-10-CM

## 2021-04-07 DIAGNOSIS — Z79.1 NSAID LONG-TERM USE: ICD-10-CM

## 2021-04-07 DIAGNOSIS — M54.32 SCIATICA, LEFT SIDE: Primary | ICD-10-CM

## 2021-04-07 DIAGNOSIS — M54.50 LUMBAR PAIN: ICD-10-CM

## 2021-04-07 PROCEDURE — 99213 OFFICE O/P EST LOW 20 MIN: CPT | Performed by: PHYSICIAN ASSISTANT

## 2021-04-07 RX ORDER — ALENDRONATE SODIUM 70 MG/1
TABLET ORAL
Status: ON HOLD | COMMUNITY
Start: 2021-03-18 | End: 2021-08-02

## 2021-04-07 NOTE — PATIENT INSTRUCTIONS

## 2021-04-07 NOTE — PROGRESS NOTES
"Subjective   Kimberlee Urrutia is a 81 y.o. female.     History of Present Illness       Watching Ca+ level---I will get f/u labs; and ordered  F/u urine neg RBC and protein   Kimberlee Urrutia 81 y.o. female /70   Pulse 79   Temp 97.5 °F (36.4 °C)   Resp 16   Ht 163.8 cm (64.49\")   Wt 64.8 kg (142 lb 12.8 oz)   LMP  (LMP Unknown)   SpO2 98%   BMI 24.14 kg/m²  who presents today for f/u labs----watching WBC and neutrophils----now in normal range.  Urine micro neg; finished Doxy for infx and had + cx. Back pain resolved.   she has a history of   Patient Active Problem List   Diagnosis   • Arthritis   • HLD (hyperlipidemia)   • Health care maintenance   • Knee pain, right   • Hx of total knee arthroplasty   • Seasonal allergies   • Vitamin D deficiency   • Pyuria   • Transient alteration of awareness   • Migraine without status migrainosus, not intractable   • Leukocytosis   • Viral pharyngitis   • Multinodular thyroid   • Osteopenia of multiple sites   • Colon cancer screening   • Serum calcium elevated   • Acute bilateral low back pain without sciatica   • Elevated procalcitonin   • Sepsis with acute organ dysfunction (CMS/HCC)   • Sepsis due to Escherichia coli with acute renal failure without septic shock (CMS/HCC)   • Recurrent oral herpes simplex   • Lumbar spinal stenosis   • Metabolic encephalopathy   • Renal stones   • Colon cancer screening   .    Lab Results   Component Value Date    GLUCOSE 84 02/26/2021    BUN 13 04/01/2021    CREATININE 0.67 04/01/2021    EGFRIFNONA 84 04/01/2021    EGFRIFAFRI 102 04/01/2021    BCR 19.4 04/01/2021    K 4.7 04/01/2021    CO2 27.1 04/01/2021    CALCIUM 10.7 (H) 04/01/2021    PROTENTOTREF 6.5 04/01/2021    ALBUMIN 3.90 04/01/2021    LABIL2 1.5 04/01/2021    AST 14 04/01/2021    ALT 10 04/01/2021       Having sciatic pain left    The following portions of the patient's history were reviewed and updated as appropriate: allergies, current medications, past family " history, past medical history, past social history, past surgical history and problem list.    Review of Systems   Constitutional: Negative for activity change, appetite change and unexpected weight change.   HENT: Negative for nosebleeds and trouble swallowing.    Eyes: Negative for pain and visual disturbance.   Respiratory: Negative for chest tightness, shortness of breath and wheezing.    Cardiovascular: Negative for chest pain and palpitations.   Gastrointestinal: Negative for abdominal pain and blood in stool.   Endocrine: Negative.    Genitourinary: Negative for difficulty urinating and hematuria.   Musculoskeletal: Negative for joint swelling.   Skin: Negative for color change and rash.   Allergic/Immunologic: Negative.    Neurological: Negative for syncope and speech difficulty.   Hematological: Negative for adenopathy.   Psychiatric/Behavioral: Negative for agitation and confusion.   All other systems reviewed and are negative.      Objective   Physical Exam  Vitals and nursing note reviewed.   Constitutional:       General: She is not in acute distress.     Appearance: She is well-developed. She is not ill-appearing or toxic-appearing.   HENT:      Head: Normocephalic.      Right Ear: External ear normal.      Left Ear: External ear normal.      Nose: Nose normal.      Mouth/Throat:      Pharynx: Oropharynx is clear.   Eyes:      General: No scleral icterus.     Conjunctiva/sclera: Conjunctivae normal.      Pupils: Pupils are equal, round, and reactive to light.   Neck:      Thyroid: No thyromegaly.   Cardiovascular:      Rate and Rhythm: Normal rate and regular rhythm.      Heart sounds: Normal heart sounds. No murmur heard.     Pulmonary:      Effort: Pulmonary effort is normal. No respiratory distress.      Breath sounds: Normal breath sounds.   Musculoskeletal:         General: No deformity. Normal range of motion.      Cervical back: Normal range of motion and neck supple.   Skin:     General: Skin  is warm and dry.      Findings: No rash.   Neurological:      General: No focal deficit present.      Mental Status: She is alert and oriented to person, place, and time. Mental status is at baseline.   Psychiatric:         Mood and Affect: Mood normal.         Behavior: Behavior normal.         Thought Content: Thought content normal.         Judgment: Judgment normal.         Assessment/Plan   Diagnoses and all orders for this visit:    1. Sciatica, left side (Primary)  -     Ambulatory Referral to Physical Therapy Evaluate and treat  -     CBC & Differential  -     Basic Metabolic Panel  -     PTH, Intact  -     Calcium, Ionized    2. Serum calcium elevated  -     CBC & Differential  -     Basic Metabolic Panel  -     PTH, Intact  -     Calcium, Ionized    3. NSAID long-term use  -     CBC & Differential  -     Basic Metabolic Panel  -     PTH, Intact  -     Calcium, Ionized    sciatica--refer PT  Do get f/u labs on elevated Ca+----also on NSAID--watch renal and CBC  Long term use of NSAIDs can lead to heart disease and strokes, as well as GI bleeding/ulcers, and cause kidney disease. Advise labs to monitor renal functions to be done at least every 6 months.      5-13-21 attending PT; pain getting worse per PT; refer to pain management;  Dr Luna saw her 3/9/2021 and his note does state that if she gets worse with pain that he would see her back on a as needed basis.  I will arrange this if patient wants to see Dr. Luna

## 2021-04-27 ENCOUNTER — TREATMENT (OUTPATIENT)
Dept: PHYSICAL THERAPY | Facility: CLINIC | Age: 81
End: 2021-04-27

## 2021-04-27 DIAGNOSIS — R29.898 WEAKNESS OF BOTH LEGS: ICD-10-CM

## 2021-04-27 DIAGNOSIS — M54.42 LEFT-SIDED LOW BACK PAIN WITH LEFT-SIDED SCIATICA, UNSPECIFIED CHRONICITY: Primary | ICD-10-CM

## 2021-04-27 DIAGNOSIS — S39.012D STRAIN OF LUMBAR REGION, SUBSEQUENT ENCOUNTER: ICD-10-CM

## 2021-04-27 DIAGNOSIS — R41.3 MEMORY DEFICIT: ICD-10-CM

## 2021-04-27 PROCEDURE — 97530 THERAPEUTIC ACTIVITIES: CPT | Performed by: PHYSICAL THERAPIST

## 2021-04-27 PROCEDURE — 97162 PT EVAL MOD COMPLEX 30 MIN: CPT | Performed by: PHYSICAL THERAPIST

## 2021-04-27 PROCEDURE — 97110 THERAPEUTIC EXERCISES: CPT | Performed by: PHYSICAL THERAPIST

## 2021-04-27 NOTE — PROGRESS NOTES
Physical Therapy Initial Evaluation and Plan of Care    Patient: Kimberlee Urrutia   : 1940  Diagnosis/ICD-10 Code:  Left-sided low back pain with left-sided sciatica, unspecified chronicity [M54.42]  Referring practitioner: Eden Medina PA-C  PMx Reviewed : 2021    Subjective Evaluation    History of Present Illness  Mechanism of injury: Pt presents to PT with (L) buttock pain that     In 2021 had pain in the lumbar causing pain with walking.  Had a kidney stone found and they removed the stone & placed a stent in for several weeks.      Also found out the pt a UTI with a E. Coli involvement.  Pt's care giver says that she likely pick it up on a trip to [email or web address] 3 yrs ago.       Went to a chiropractor for 4-6x with no improvements.  Discontinued 2 weeks ago.        The pt is accompanied niece today because of memory issues.  Lopez performing some exercises her son gave her too do.        Occupation:  Retired - Medical techologist  Activities:  Sedentary Life Style  PLOF: Independent  Medical Hx Reviewed.      Quality of life: fair    Pain  Current pain ratin  At best pain ratin  At worst pain ratin  Location: (L) Buttock             Objective          Active Range of Motion     Additional Active Range of Motion Details  Lumbar AROM (% of normal)  Flexion: 100%  Extension: 50%  Lateral (L): 75%  Lateral (R): 75%  Rotation (L): 75%  Rotation (R): 7%        Strength/Myotome Testing     Left Hip   Planes of Motion   Flexion: 4  External rotation: 4  Internal rotation: 4+    Right Hip   Planes of Motion   Flexion: 4  External rotation: 4+  Internal rotation: 5    Left Knee   Flexion: 4+  Extension: 5    Right Knee   Flexion: 4+  Extension: 5    Left Ankle/Foot   Dorsiflexion: 4  Eversion: 5  Great toe extension: 5    Right Ankle/Foot   Dorsiflexion: 4  Eversion: 5  Great toe extension: 4+    Ambulation   Weight-Bearing Status   Assistive device used: two-wheeled walker    Additional  Weight-Bearing Status Details  Using the RWX outside the house, on a bad day.            Assessment & Plan     Assessment  Impairments: abnormal or restricted ROM, activity intolerance, impaired physical strength, lacks appropriate home exercise program and pain with function  Assessment details: Pt presents to PT with symptoms consistent with (L) Radicular Symptoms.  Pt would benefit from skilled PT intervention to address the deficits noted.        There seems to be a issue with memory, reported by niharriet that has been present since February.  Found transient alteration of awareness documented in Dr. Aguilar's notes.      The pt is living with her niece and her son at different times, because they don't want her alone.  Her niece lives in [place], [place].  So attendance to PT may be limited secondary.  Her son is a  and is in [place] for weeks at a time.  Will have the niece schedule appropriately.    Barriers to therapy: Memory issues  Prognosis: fair  Functional Limitations: carrying objects, lifting, sleeping, walking, uncomfortable because of pain, moving in bed, sitting and unable to perform repetitive tasks  Goals  Plan Goals: SHORT TERM GOALS: Time for Goal Achievement: 4 weeks    1.  Patient to be compliant and progression of HEP                             2.  Pain level < 6/10 at worst with mentioned activities to improve function  3.  Increased thoracic, lumbar and SIJ mobility to allow for increased lumbar AROM with less pain.  4.  Increased lumbar AROM to by 25% in all planes to allow for increased ease with sit-stand transfers and functional activities    LONG TERM GOALS: Time for Goal Achievement: 2 months  1.  Pt. to score <14% on Back Index  2.  Pain level < 4/10 with all listed activities to return to normal.  3.  Lumbar AROM to WFL to allow for return to household & recreational activities w/o increased symptoms  4.  (B) LE and lower abdominal strength to 5/5 to allow for pushing,  pulling and activities to occur without pain (driving, sitting, household & exercise requirements)        Plan  Therapy options: will be seen for skilled physical therapy services  Planned modality interventions: cryotherapy, electrical stimulation/Russian stimulation, TENS, thermotherapy (hydrocollator packs), ultrasound and dry needling  Planned therapy interventions: body mechanics training, flexibility, functional ROM exercises, home exercise program, manual therapy, neuromuscular re-education, postural training, spinal/joint mobilization, stretching, strengthening, soft tissue mobilization, therapeutic activities and gait training  Frequency: 2x week  Duration in visits: 16  Treatment plan discussed with: patient, caregiver and family        Manual Therapy:          mins  84742;  Therapeutic Exercise:     12     mins  91683;     Neuromuscular Marian:         mins  90993;    Therapeutic Activity:      13     mins  18300;     Gait Training:            mins  33780;     Ultrasound:           mins  97851;    Electrical Stimulation:          mins  99053 ( );  Dry Needling           mins self-pay  Traction           mins 31756  Canalith Repositioning         mins 15480      Timed Treatment:   25   mins   Total Treatment:     60   mins    PT SIGNATURE: Avi Emanuel PT   [facility] #[id number]    DATE TREATMENT INITIATED: 4/27/2021    Medicare Initial Certification    Certification Period: 7/26/2021  I certify that the therapy services are furnished while this patient is under my care.  The services outlined above are required by this patient, and will be reviewed every 90 days.     PHYSICIAN: Eden Medina PA-C      DATE:     Please sign and return via fax to [phone]. Thank you, [facility].

## 2021-05-07 ENCOUNTER — TREATMENT (OUTPATIENT)
Dept: PHYSICAL THERAPY | Facility: CLINIC | Age: 81
End: 2021-05-07

## 2021-05-07 DIAGNOSIS — R41.3 MEMORY DEFICIT: ICD-10-CM

## 2021-05-07 DIAGNOSIS — S39.012D STRAIN OF LUMBAR REGION, SUBSEQUENT ENCOUNTER: ICD-10-CM

## 2021-05-07 DIAGNOSIS — M54.42 LEFT-SIDED LOW BACK PAIN WITH LEFT-SIDED SCIATICA, UNSPECIFIED CHRONICITY: Primary | ICD-10-CM

## 2021-05-07 DIAGNOSIS — R29.898 WEAKNESS OF BOTH LEGS: ICD-10-CM

## 2021-05-07 PROCEDURE — 97110 THERAPEUTIC EXERCISES: CPT | Performed by: PHYSICAL THERAPIST

## 2021-05-07 PROCEDURE — 97530 THERAPEUTIC ACTIVITIES: CPT | Performed by: PHYSICAL THERAPIST

## 2021-05-07 NOTE — PROGRESS NOTES
Physical Therapy Daily Progress Note  Visit: 2    Kimberlee Urrutia reports: no real change in my symptoms.  We just drove in from [place].      Subjective     Objective   See Exercise, Manual, and Modality Logs for complete treatment.       Assessment & Plan     Assessment  Assessment details: The pt doesn't seem to have progressed much, but tolerated the exercises well performed in the clinic.  Will continue to progress steadily when in the clinic.      Plan  Plan details: Progress ROM / strengthening / stabilization / functional activity as tolerated          Manual Therapy:          mins  09020;  Therapeutic Exercise:     25     mins  45805;     Neuromuscular Marian:         mins  96090;    Therapeutic Activity:      15     mins  55642;     Gait Training:            mins  98254;     Ultrasound:           mins  46914;    Electrical Stimulation:          mins  47645 ( );  Dry Needling           mins self-pay  Traction           mins 95900  Canalith Repositioning         mins 22028      Timed Treatment:   40   mins   Total Treatment:     40   mins    Avi Emanuel PT  [place] License #: [id number]    Physical Therapist

## 2021-05-11 ENCOUNTER — TREATMENT (OUTPATIENT)
Dept: PHYSICAL THERAPY | Facility: CLINIC | Age: 81
End: 2021-05-11

## 2021-05-11 DIAGNOSIS — R29.898 WEAKNESS OF BOTH LEGS: ICD-10-CM

## 2021-05-11 DIAGNOSIS — R41.3 MEMORY DEFICIT: ICD-10-CM

## 2021-05-11 DIAGNOSIS — S39.012D STRAIN OF LUMBAR REGION, SUBSEQUENT ENCOUNTER: ICD-10-CM

## 2021-05-11 DIAGNOSIS — M54.42 LEFT-SIDED LOW BACK PAIN WITH LEFT-SIDED SCIATICA, UNSPECIFIED CHRONICITY: Primary | ICD-10-CM

## 2021-05-11 PROCEDURE — 97530 THERAPEUTIC ACTIVITIES: CPT | Performed by: PHYSICAL THERAPIST

## 2021-05-11 PROCEDURE — 97110 THERAPEUTIC EXERCISES: CPT | Performed by: PHYSICAL THERAPIST

## 2021-05-11 NOTE — PROGRESS NOTES
Physical Therapy Daily Progress Note  Visit: 3    Kimberlee Urrutia reports: I am doing ok.  I am having pain in the (R) leg today.        Subjective     Objective   See Exercise, Manual, and Modality Logs for complete treatment.       Assessment & Plan     Assessment  Assessment details: The pt seems to be hurting worse today.  I am concerned, because she seems more dependent on the RWX for amb and is amb.      Pt son brought her today to PT and attended the visit with her.  He seems eager to help w/ doing exercises.      Plan  Plan details: Progress ROM / strengthening / stabilization / functional activity as tolerated          Manual Therapy:          mins  33618;  Therapeutic Exercise:     25     mins  53495;     Neuromuscular Marian:         mins  78700;    Therapeutic Activity:      15     mins  40331;     Gait Training:            mins  14149;     Ultrasound:           mins  31832;    Electrical Stimulation:          mins  23853 ( );  Dry Needling           mins self-pay  Traction           mins 82703  Canalith Repositioning         mins 83698      Timed Treatment:   40   mins   Total Treatment:     40   mins    Avi Emanuel PT  [place] License #: [id number]    Physical Therapist

## 2021-05-13 ENCOUNTER — TREATMENT (OUTPATIENT)
Dept: PHYSICAL THERAPY | Facility: CLINIC | Age: 81
End: 2021-05-13

## 2021-05-13 DIAGNOSIS — R41.3 MEMORY DEFICIT: ICD-10-CM

## 2021-05-13 DIAGNOSIS — R29.898 WEAKNESS OF BOTH LEGS: ICD-10-CM

## 2021-05-13 DIAGNOSIS — M54.42 LEFT-SIDED LOW BACK PAIN WITH LEFT-SIDED SCIATICA, UNSPECIFIED CHRONICITY: Primary | ICD-10-CM

## 2021-05-13 DIAGNOSIS — S39.012D STRAIN OF LUMBAR REGION, SUBSEQUENT ENCOUNTER: ICD-10-CM

## 2021-05-13 PROCEDURE — 97110 THERAPEUTIC EXERCISES: CPT | Performed by: PHYSICAL THERAPIST

## 2021-05-13 PROCEDURE — 97530 THERAPEUTIC ACTIVITIES: CPT | Performed by: PHYSICAL THERAPIST

## 2021-05-13 NOTE — PROGRESS NOTES
Physical Therapy Daily Progress Note  Visit: 4    Kimberlee Urrutia reports: I am hurting today.  I hurt in both legs.      Subjective     Objective   See Exercise, Manual, and Modality Logs for complete treatment.       Assessment & Plan     Assessment  Assessment details: Kimberlee presents to PT today with worsening symptoms.  Her pain seems worse today with pain w/ transfer, pain with laying supine and the tolerance of the exercises was poor today.  The pt's pain is now in (B) legs.      When questioned the pt reports that she has not been doing any of the HEP and her son requested a copy of the HEP sheets again.  Review with the pt about the benefit of the HEP to help her back health and treat her symptoms.      I am concerned by the pt's worsening radicular symptoms of pain and poor tolerance for PT.  Pt may be a good canidate for a pain management consult or possibly neurosurgery consult for evaluation.        Plan  Plan details: Will reach out to Eden Medina PA-C her PCP about the current situation.  The pt to keep her schedule PT visits to see if we can progress and help reduce pain.          Manual Therapy:          mins  00410;  Therapeutic Exercise:     25     mins  40051;     Neuromuscular Marian:         mins  64489;    Therapeutic Activity:      15     mins  39507;     Gait Training:            mins  14142;     Ultrasound:           mins  41451;    Electrical Stimulation:          mins  99980 ( );  Dry Needling           mins self-pay  Traction           mins 13466  Canalith Repositioning         mins 46696      Timed Treatment:   40   mins   Total Treatment:     40   mins    Avi Emanuel PT  [place] License #: [id number]    Physical Therapist

## 2021-05-14 ENCOUNTER — TELEPHONE (OUTPATIENT)
Dept: FAMILY MEDICINE CLINIC | Facility: CLINIC | Age: 81
End: 2021-05-14

## 2021-05-14 NOTE — TELEPHONE ENCOUNTER
I was just updated from Avi Emanuel PT about her pain and the progression and want to refer her to pain management.  I could also refer her back to the neurosurgeon if she wants to revisit with

## 2021-05-14 NOTE — TELEPHONE ENCOUNTER
Pt was informed.  Pt states she is wanting to wait on Neuro. until after she see's pain management.  Thanks

## 2021-05-20 ENCOUNTER — TREATMENT (OUTPATIENT)
Dept: PHYSICAL THERAPY | Facility: CLINIC | Age: 81
End: 2021-05-20

## 2021-05-20 DIAGNOSIS — M54.42 LEFT-SIDED LOW BACK PAIN WITH LEFT-SIDED SCIATICA, UNSPECIFIED CHRONICITY: Primary | ICD-10-CM

## 2021-05-20 DIAGNOSIS — R41.3 MEMORY DEFICIT: ICD-10-CM

## 2021-05-20 DIAGNOSIS — S39.012D STRAIN OF LUMBAR REGION, SUBSEQUENT ENCOUNTER: ICD-10-CM

## 2021-05-20 DIAGNOSIS — R29.898 WEAKNESS OF BOTH LEGS: ICD-10-CM

## 2021-05-20 PROCEDURE — 97110 THERAPEUTIC EXERCISES: CPT | Performed by: PHYSICAL THERAPIST

## 2021-05-20 PROCEDURE — 97530 THERAPEUTIC ACTIVITIES: CPT | Performed by: PHYSICAL THERAPIST

## 2021-05-25 ENCOUNTER — TREATMENT (OUTPATIENT)
Dept: PHYSICAL THERAPY | Facility: CLINIC | Age: 81
End: 2021-05-25

## 2021-05-25 DIAGNOSIS — M54.42 LEFT-SIDED LOW BACK PAIN WITH LEFT-SIDED SCIATICA, UNSPECIFIED CHRONICITY: Primary | ICD-10-CM

## 2021-05-25 DIAGNOSIS — S39.012D STRAIN OF LUMBAR REGION, SUBSEQUENT ENCOUNTER: ICD-10-CM

## 2021-05-25 DIAGNOSIS — R41.3 MEMORY DEFICIT: ICD-10-CM

## 2021-05-25 DIAGNOSIS — R29.898 WEAKNESS OF BOTH LEGS: ICD-10-CM

## 2021-05-25 PROCEDURE — 97530 THERAPEUTIC ACTIVITIES: CPT | Performed by: PHYSICAL THERAPIST

## 2021-05-25 PROCEDURE — 97110 THERAPEUTIC EXERCISES: CPT | Performed by: PHYSICAL THERAPIST

## 2021-05-25 NOTE — PROGRESS NOTES
Physical Therapy Daily Progress Note  Visit: 6    Kimberlee Urrutia reports: My pain is worse since I have gone off the tylenol.  Went off the tylenol because increased BP (per son's report).      Subjective     Objective   See Exercise, Manual, and Modality Logs for complete treatment.       Assessment & Plan     Assessment  Assessment details: The pt did not matthew Tx well today.  The pt was limited with care today because of the pt's pain.  Will wait on further PT care until after pain management.  Reviewed with the pt and her son about symptoms and plan to wait on further PT visits until after pain management, but the pt to continue her HEP.      Plan  Plan details: Wait on further care until after the pain management visit, to see if we they have a suggestion to help the pain to help matthew light exercise and PT.          Manual Therapy:          mins  64245;  Therapeutic Exercise:     20     mins  19935;     Neuromuscular Marian:         mins  33663;    Therapeutic Activity:      20     mins  00317;     Gait Training:            mins  83202;     Ultrasound:           mins  46249;    Electrical Stimulation:          mins  09571 ( );  Dry Needling           mins self-pay  Traction           mins 14039  Canalith Repositioning         mins 82051      Timed Treatment:   40   mins   Total Treatment:     40   mins    Avi Emanuel, PT  [place] License #: [id number]    Physical Therapist

## 2021-05-26 NOTE — PROGRESS NOTES
The patient has a pain history of the following:  Lumbar stenosis  Lumbar spondylolisthesis     Previous interventions that the patient has received include:   None    Pain medications include:  Acetaminophen  Naproxen    Other conservative modalities which the patient reports using include:  Physical Therapy: yes - did not help   Chiropractor: yes - did not help   Massage Therapy: no  TENS: no  Neck or back surgery: no  Past pain management: no  Heat  Ice    Past Significant Surgical History:  Right knee replacement 2015    HPI:     CHIEF COMPLAINT: Back Pain     Kimberlee Urrutia is a 81 y.o. female referred here by Eden Medina PA-C. Kimberlee Urrutia presents to the office for evaluation and treatment of Back Pain      Onset:  1 year ago  Inciting Event:  None  Location:  Low back   Pain: Pain described as aching shooting and stabbing. Located in the low back and does not radiate into the lower extremity.  Severity:  Pain rated as a 7 /10.  Apportions pain as 100%  back pain and 0% extremity pain.  Symptoms have been constant.  Exacerbation:  Nothing in particular.   Alleviation:  Heating pad helps.  Associated Symptoms:   She denies any new onset of bowel or bladder weakness, significant leg weakness or saddle anesthesia. Denies balance problems or lower extremity incoordination.  Ambulates: With a walker  Limitations: This pain limits the patient from mowing the yard and being active like she used to be.        PEG Assessment   What number best describes your pain on average in the past week?8  What number best describes how, during the past week, pain has interfered with your enjoyment of life?7  What number best describes how, during the past week, pain has interfered with your general activity?  7        Current Outpatient Medications:   •  cholecalciferol (VITAMIN D3) 25 MCG (1000 UT) tablet, Take 1,000 Units by mouth Daily., Disp: , Rfl:   •  loratadine (CLARITIN) 10 MG tablet, Take  by mouth daily., Disp: ,  Rfl:   •  Multiple Vitamins-Minerals (MULTI COMPLETE PO), Take  by mouth daily., Disp: , Rfl:   •  Acetaminophen (ACETAMIN PO), Take  by mouth., Disp: , Rfl:   •  alendronate (FOSAMAX) 70 MG tablet, , Disp: , Rfl:   •  naproxen (Naprosyn) 500 MG tablet, Take 1 tablet by mouth 2 (Two) Times a Day With Meals. With food for back pain---stop if GI upset, Disp: 60 tablet, Rfl: 2    The following portions of the patient's history were reviewed and updated as appropriate: allergies, current medications, past family history, past medical history, past social history, past surgical history and problem list.      REVIEW OF PERTINENT MEDICAL DATA  2/18/21 MRI LUMBAR SPINE WITH AND WITHOUT CONTRAST     HISTORY: Low back pain, evaluate for spinal abscess.     COMPARISON: None.     FINDINGS: Note is made to transitional anatomy with partial  lumbarization of S1. Careful correlation prior to any intervention is  required given the presence of transitional anatomy.     The conus is slightly low-lying beyond the level of L2. The caudal  aspect of the spinal cord appears unremarkable.     There is moderate loss of disc height, disc desiccation and mild to  moderate endplate degenerative changes at L5-S1. There is an  anterolisthesis of L4 upon L5 estimated to be 5 mm.     L1-L2: Mild facet degenerative disease is present bilaterally.     L2-L3: Mild facet degenerative disease is present bilaterally.     L3-L4: Mild facet degenerative disease is present bilaterally.     L4-L5: There is severe central canal stenosis and lateral recess  narrowing bilaterally secondary to facet and ligamentum flavum  hypertrophy, the anterolisthesis of L4 upon L5 and broad-based disc  bulge. There is unroofing of the disc which also extends cephalad  slightly, terminating posterior to the inferior endplate of L4 at the  midline. Mild neural foraminal compromise is present bilaterally  secondary to extension of a disc osteophyte complex into the  "neural  foramen.     L5-S1: Mild facet degenerative disease is present bilaterally. Mild  neural foraminal compromise is present bilaterally secondary to  extension of a disc osteophyte complex into the neural foramen.     After contrast administration there was enhancement of the facets at  L4-L5 consistent with degenerative disease.     IMPRESSION:  No evidence of discitis, osteomyelitis or of epidural  abscess. Multilevel degenerative disease is noted as described in detail  above most prominent of which is at L4-L5 where there is severe canal  stenosis and lateral recess narrowing bilaterally secondary to posterior  element degenerative disease, anterolisthesis and a broad-based disc  osteophyte complex. There is no evidence of focal herniation. See above.     This report was finalized on 2/18/2021 3:07 PM by Dr. Josue Kuo M.D.    4/1/21 Platelets 353 (10*3), Creatinine 0.67      Review of Systems   Constitutional: Positive for activity change (decreased). Negative for chills, fatigue and fever.   HENT: Negative for congestion.    Eyes: Negative for visual disturbance.   Respiratory: Negative for chest tightness and shortness of breath.    Cardiovascular: Negative for chest pain.   Gastrointestinal: Negative for abdominal pain, constipation and diarrhea.   Genitourinary: Negative for difficulty urinating, dyspareunia and dysuria.   Musculoskeletal: Positive for back pain.   Neurological: Negative for dizziness, weakness, light-headedness, numbness and headaches.   Psychiatric/Behavioral: Positive for agitation and sleep disturbance. The patient is nervous/anxious.      I have reviewed and confirmed the accuracy of the ROS as documented by the MA/LPN/RN Nu Partida MD      Vitals:    05/27/21 0859   BP: 159/73   Pulse: 68   Resp: 16   Temp: 96.4 °F (35.8 °C)   SpO2: 96%   Weight: 62.3 kg (137 lb 6.4 oz)   Height: 163.8 cm (64.5\")   PainSc:   7   PainLoc: Buttocks         Objective   Physical " Exam  Vitals reviewed.   Constitutional:       General: She is not in acute distress.  Pulmonary:      Effort: Pulmonary effort is normal. No respiratory distress.   Musculoskeletal:      Comments: Ambulation: With a walker  Lumbar Exam:  Appearance: Scoliotic curve absent and scarring absent  Palpated over lumbosacral paravertebral regions and transverse processes with negative tenderness appreciated, Bilateral.   Painful under left sacroiliac joint/left buttock.  Trochanteric bursa are not tender, Bilateral.  Straight leg raise is negative radiculopathy, Bilateral.  Slump test is negative  radiculopathy, Bilateral.  Facet loading is negative for pain, Bilateral.  Paraspinal/adjacent lumbar musculature are not tender to palpation, Bilateral.  Mal Cristin's test is positive sacroiliac pain, Bilateral.   Skin:     General: Skin is warm and dry.   Neurological:      General: No focal deficit present.      Mental Status: She is alert.   Psychiatric:         Mood and Affect: Mood normal.         Thought Content: Thought content normal.         Assessment/Plan   Diagnoses and all orders for this visit:    1. Sacroiliac joint pain (Primary)  -     Case Request    2. Lumbar stenosis without neurogenic claudication    3. Chronic left-sided low back pain without sciatica        - Pertinent labs reviewed.   - Pertinent imaging reviewed.   - Will schedule for left sacroiliac joint injection. Risks discussed including but not limited to bleeding, bruising, infection, damage to surrounding structures, headache, and rare things such as being paralyzed, seizure, stroke, heart attack and death.  The risk of steroid medications include but are not limited to immunosuppression, which can increase the risk of denys an infectious disease as well as decrease the immune response to a vaccine.    - Explained that because her pain is the same whether seated, standing, walking etc, I would like to perform a diagnostic/therapeutic  sacroiliac joint injection.  If no results, may consider treating her for lumbar stenosis  - Kimberlee Urrutia reports a pain score of 8.  Given her pain assessment as noted, treatment options were discussed and the following options were decided upon as a follow-up plan to address the patient's pain: steroid injections.  - Briefly discussed medications, however we will hold on prescriptions at this time.   - History also obtained from her niece during the visit today.     --- Follow-up for left sacroiliac joint injection and office visit 4-6 weeks following.            ALYSSA REPORT    As the clinician, I personally reviewed the ALYSSA from 5/27/21 while the patient was in the office today.    While examining this patient, I wore protective equipment including a mask, eye shield and gloves.  I washed my hands before and after this patient encounter.  The patient wore a mask throughout the visit as well.     Nu Partida MD  Pain Management

## 2021-05-27 ENCOUNTER — OFFICE VISIT (OUTPATIENT)
Dept: PAIN MEDICINE | Facility: CLINIC | Age: 81
End: 2021-05-27

## 2021-05-27 VITALS
WEIGHT: 137.4 LBS | HEIGHT: 65 IN | RESPIRATION RATE: 16 BRPM | DIASTOLIC BLOOD PRESSURE: 73 MMHG | OXYGEN SATURATION: 96 % | TEMPERATURE: 96.4 F | HEART RATE: 68 BPM | SYSTOLIC BLOOD PRESSURE: 159 MMHG | BODY MASS INDEX: 22.89 KG/M2

## 2021-05-27 DIAGNOSIS — G89.29 CHRONIC LEFT-SIDED LOW BACK PAIN WITHOUT SCIATICA: ICD-10-CM

## 2021-05-27 DIAGNOSIS — M54.50 CHRONIC LEFT-SIDED LOW BACK PAIN WITHOUT SCIATICA: ICD-10-CM

## 2021-05-27 DIAGNOSIS — M53.3 SACROILIAC JOINT PAIN: Primary | ICD-10-CM

## 2021-05-27 DIAGNOSIS — M48.061 LUMBAR STENOSIS WITHOUT NEUROGENIC CLAUDICATION: ICD-10-CM

## 2021-05-27 PROCEDURE — 99204 OFFICE O/P NEW MOD 45 MIN: CPT | Performed by: ANESTHESIOLOGY

## 2021-05-27 NOTE — PATIENT INSTRUCTIONS
What to expect if we're setting up an injection/procedure    - I have placed the order today, we'll start speaking to your insurance for authorization (this can sometimes take a few weeks).   - Once we have approval from your insurance, we'll call you to get your procedure scheduled.   - LIGHT Intravenous (IV) sedation is offered for some procedures: you will NOT be put to sleep.  If you plan to have sedation, do not eat or drink anything on the day of your injection.   - If we're using steroid in your injection, it should be at least 2 weeks before or after your COVID vaccine(s).      Sacroiliac Joint Injection    A sacroiliac (SI) joint injection is a procedure to inject a numbing medicine (anesthetic block)--and sometimes a strong anti-inflammatory medicine (steroid)--into the SI joint. The SI joint is the joint between two bones of the pelvis called the sacrum and the ilium. The sacrum is the bone at the base of the spine. The ilium is the large bone that forms the hip.  You may need this procedure if you have pain because of an inflamed or diseased SI joint. Various conditions can cause pain in the SI joint, including rheumatoid arthritis, gout, psoriatic arthritis, infection, or injury. SI joint pain is a common cause of low back pain. It may also cause pain in your buttock or leg.  SI joint injection may be done to:  · Find out if an anesthetic block relieves pain. This can confirm that the SI joint is the cause of pain (diagnostic use).  · Treat a painful SI joint with steroids, anesthetic medicine, or both (therapeutic use).  Tell a health care provider about:  · Any allergies you have.  · All medicines you are taking, including vitamins, herbs, eye drops, creams, and over-the-counter medicines.  · Any problems you or family members have had with anesthetic medicines.  · Any blood disorders you have.  · Any surgeries you have had.  · Any medical conditions you have.  · Whether you are pregnant or may be  pregnant.  What are the risks?  Generally, this is a safe procedure. However, problems may occur, including:  · Infection.  · Bleeding.  · Nerve injury.  · Temporary increase in pain.  · Headache.  · Failure of the procedure to relieve pain.  · Bruising or soreness at the joint, in deep tissues, or at the injection site.  · Allergic reactions to medicines or dyes.  · Side effects from the steroid medicine. These may include facial flushing, increased appetite, diarrhea, and increased blood sugar.  What happens before the procedure?  · You may have a physical exam.  · You may have imaging tests, such as an X-ray, CT scan, or MRI.  · Ask your health care provider about:  ? Changing or stopping your regular medicines. This is especially important if you are taking diabetes medicines or blood thinners.  ? Taking medicines such as aspirin and ibuprofen. These medicines can thin your blood. Do not take these medicines unless your health care provider tells you to take them.  ? Taking over-the-counter medicines, vitamins, herbs, and supplements.  · Plan to have someone take you home from the hospital or clinic.  What happens during the procedure?  · To lower your risk of infection:  ? Your health care team will wash or sanitize their hands.  ? Your skin will be washed with a germ-killing (antiseptic) solution.  · You may be given one or more of the following:  ? A medicine to help you relax (sedative).  ? A medicine to numb the area (local anesthetic). Your health care provider will inject a local anesthetic into the skin above your SI joint.  · You will be placed in the proper position on a procedure table to give the health care team the best access to your SI joint.  · An X-ray machine that produces moving X-ray images (fluoroscopy) will be placed above the procedure table.  · A long, thin needle will be inserted through your skin and down to your SI joint.  · The position of the needle will be checked with fluoroscopy  imaging.  · An X-ray dye (contrast media) will be injected to make sure the needle enters the joint space. You may be asked if you feel any pain.  · Long-acting anesthetic medicine will be injected. Long-acting steroid medicine may also be injected.  · The needle will be removed, and a bandage will be placed over the injection site.  The procedure may vary among health care providers and hospitals.  What happens after the procedure?  · Your blood pressure, heart rate, breathing rate, and blood oxygen level will be monitored until the medicines you were given have worn off.  · If dye was used, you will be told to drink plenty of water to wash (flush) the dye out of your body.  · You may be asked if you have pain relief from the injection.  · You will likely be able to go home shortly after the procedure.  · Your health care provider will give you instructions for taking care of yourself after the procedure. These may include instructions for doing physical therapy exercises.  · Do not drive for 24 hours if you were given a sedative during the procedure.  Summary  · A sacroiliac (SI) joint injection is an injection of a numbing medicine (anesthetic block)--and sometimes a strong anti-inflammatory medicine (steroid)--into the SI joint.  · You will be awake during the procedure, but the injection area will be made numb.  · If you were given a medicine to help you relax (sedative during the procedure, do not drive for at least 24 hours.  This information is not intended to replace advice given to you by your health care provider. Make sure you discuss any questions you have with your health care provider.  Document Revised: 11/30/2018 Document Reviewed: 09/24/2018  [employer] Patient Education © 2021 [employer]

## 2021-06-07 ENCOUNTER — TRANSCRIBE ORDERS (OUTPATIENT)
Dept: SURGERY | Facility: SURGERY CENTER | Age: 81
End: 2021-06-07

## 2021-06-07 DIAGNOSIS — M53.3 DISORDER OF SACRUM: Primary | ICD-10-CM

## 2021-06-14 ENCOUNTER — TRANSCRIBE ORDERS (OUTPATIENT)
Dept: ADMINISTRATIVE | Facility: HOSPITAL | Age: 81
End: 2021-06-14

## 2021-06-14 DIAGNOSIS — Z12.31 VISIT FOR SCREENING MAMMOGRAM: Primary | ICD-10-CM

## 2021-06-15 ENCOUNTER — PREP FOR SURGERY (OUTPATIENT)
Dept: SURGERY | Facility: SURGERY CENTER | Age: 81
End: 2021-06-15

## 2021-06-15 DIAGNOSIS — M53.3 SACROILIAC JOINT PAIN: Primary | ICD-10-CM

## 2021-06-15 RX ORDER — SODIUM CHLORIDE 0.9 % (FLUSH) 0.9 %
10 SYRINGE (ML) INJECTION AS NEEDED
Status: CANCELLED | OUTPATIENT
Start: 2021-06-15

## 2021-06-15 RX ORDER — SODIUM CHLORIDE 0.9 % (FLUSH) 0.9 %
10 SYRINGE (ML) INJECTION EVERY 12 HOURS SCHEDULED
Status: CANCELLED | OUTPATIENT
Start: 2021-06-15

## 2021-06-15 NOTE — DISCHARGE INSTRUCTIONS
[facility] - Post-procedure Instructions          --  While there are no absolute restrictions, it is recommended that you do not perform strenuous activity today. In the morning, you may resume your level of activity as before your block.    --  If you have a band-aid at your injection site, please remove it later today. Observe the area for any redness, swelling, pus-like drainage, or a temperature over 101°. If any of these symptoms occur, please call your doctor at [phone]. If after office hours, leave a message and the on-call provider will return your call.    --  Ice may be applied to your injection site. It is recommended you avoid direct heat (heating pad; hot tub) for 1-2 days.    --  Call [facility] at [phone] if you experience persistent headache, persistent bleeding from the injection site, or severe pain not relieved by heat or oral medication.    --  Do not make important decisions today.    --  Due to the effects of the block and/or the I.V. Sedation, DO NOT drive or operate hazardous machinery for 12 hours.    --  Do not drink alcohol for 12 hours.    -- You may return to work tomorrow, or as directed by your referring doctor.    --  Occasionally you may notice a slight increase in your pain after the procedure. This should start to improve within the next 24-48 hours. Radiofrequency ablation procedure pain may last 3-4 weeks.    --  It may take as long as 3-4 days before you notice a gradual improvement in your pain and/or other symptoms.    -- You may continue to take your prescribed pain medication as needed.    --  Some normal possible side effects of steroid use could include fluid retention, increased blood sugar, dull headache, increased sweating, increased appetite, mood swings and flushing.    --  Diabetics are recommended to watch their blood glucose level closely for 24-48 hours after the injection.    --  Must stay in PACU for 20 min upon arrival and  prove no leg weakness before being discharged.    --  IN THE EVENT OF A LIFE THREATENING EMERGENCY, (CHEST PAIN, BREATHING DIFFICULTIES, PARALYSIS…) YOU SHOULD GO TO YOUR NEAREST EMERGENCY ROOM.    --  You should be contacted by our office within 2-3 days to schedule follow up or next appointment date.  If not contacted within 7 days, please call the office at [phone]

## 2021-06-16 ENCOUNTER — HOSPITAL ENCOUNTER (OUTPATIENT)
Facility: SURGERY CENTER | Age: 81
Setting detail: HOSPITAL OUTPATIENT SURGERY
Discharge: HOME | End: 2021-06-16
Attending: ANESTHESIOLOGY | Admitting: ANESTHESIOLOGY

## 2021-06-16 ENCOUNTER — HOSPITAL ENCOUNTER (OUTPATIENT)
Dept: GENERAL RADIOLOGY | Facility: SURGERY CENTER | Age: 81
Setting detail: HOSPITAL OUTPATIENT SURGERY
End: 2021-06-16

## 2021-06-16 VITALS
HEIGHT: 64 IN | TEMPERATURE: 97.7 F | OXYGEN SATURATION: 97 % | BODY MASS INDEX: 22.71 KG/M2 | RESPIRATION RATE: 16 BRPM | HEART RATE: 64 BPM | SYSTOLIC BLOOD PRESSURE: 142 MMHG | DIASTOLIC BLOOD PRESSURE: 83 MMHG | WEIGHT: 133 LBS

## 2021-06-16 DIAGNOSIS — M53.3 DISORDER OF SACRUM: ICD-10-CM

## 2021-06-16 PROCEDURE — 27096 INJECT SACROILIAC JOINT: CPT | Performed by: ANESTHESIOLOGY

## 2021-06-16 PROCEDURE — G0260 INJ FOR SACROILIAC JT ANESTH: HCPCS | Performed by: ANESTHESIOLOGY

## 2021-06-16 PROCEDURE — 3E0U3BZ INTRODUCTION OF ANESTHETIC AGENT INTO JOINTS, PERCUTANEOUS APPROACH: ICD-10-PCS | Performed by: ANESTHESIOLOGY

## 2021-06-16 PROCEDURE — 0 IOHEXOL 300 MG/ML SOLUTION 10 ML VIAL: Performed by: ANESTHESIOLOGY

## 2021-06-16 PROCEDURE — 25010000002 DEXAMETHASONE PER 1 MG: Performed by: ANESTHESIOLOGY

## 2021-06-16 PROCEDURE — 3E0U33Z INTRODUCTION OF ANTI-INFLAMMATORY INTO JOINTS, PERCUTANEOUS APPROACH: ICD-10-PCS | Performed by: ANESTHESIOLOGY

## 2021-06-16 NOTE — OP NOTE
Left Sacroiliac Joint Injection  [facility]      PREOPERATIVE DIAGNOSIS:   Sacroiliac joint dysfunction    POSTOPERATIVE DIAGNOSIS:  Sacroiliac joint dysfunction    PROCEDURE:  Sacroiliac Joint Injection, with fluoroscopic guidance [facility] [id number] -LT    PRE-PROCEDURE DISCUSSION WITH PATIENT:    Risks and complications were discussed with the patient prior to starting the procedure and informed consent was obtained.  We discussed various topics including but not limited to bleeding, infection, injury, postprocedural site soreness, painful flareup, worsening of clinical picture, paralysis, coma, and death.     SURGEON:  Nu Partida MD    REASON FOR PROCEDURE:    Patient has pain consistent with SI pathology on history and physical exam. Tender to palpation over the affected SI joint.    SEDATION:  Patient declined administration of moderate sedation    ANESTHETIC AGENT:  Lidocaine 1%  STEROID AGENT:  15mg Dexamethasone    DESCRIPTON OF PROCEDURE:  After obtaining informed consent, IV access was not obtained in the preoperative area.  The patient was transported to the operative suite and placed in the prone position with a pillow under the pelvic area. EKG, blood pressure, and pulse oximeter were monitored. The lumbosacral area was prepped with Chloraprep and draped in a sterile fashion. Under fluoroscopic guidance the inferior most portion of the Left SI joint was identified. The overlying skin and subcutaneous tissue was anesthetized with 1% lidocaine. A 22-gauge spinal needle was then advanced under fluoroscopic guidance in a coaxial view into the joint space.  After negative aspiration, 0.5 mL of Omnipaque was injected, and after seeing appropriate spread into the joint a volume of 3ml of the above medication was injected.    Needle was removed intact.      Vital signs remained stable.  The onset of analgesia was noted.      ESTIMATED BLOOD LOSS:  minimal  SPECIMENS:  None  COMPLICATIONS:  No  complications were noted. and There was no indication of vascular uptake on live injection of contrast dye.    TOLERANCE & DISCHARGE CONDITION:    The patient tolerated the procedure well.  The patient was transported to the recovery area without difficulties.  The patient was discharged to home under the care of family in stable and satisfactory condition.    PLAN OF CARE:  1. The patient was given our standard instruction sheet and will resume all medications as per the medication reconciliation sheet.  2. The patient will Return to clinic 4-6 wks.  3. The patient is instructed to keep an account of pain relief after the procedure.

## 2021-07-13 NOTE — PROGRESS NOTES
"The patient has a pain history of the following:  Lumbar stenosis  Lumbar spondylolisthesis   Sacroiliac joint pain     Previous interventions that the patient has received include:   Left sacroiliac joint injection 6/16/21     Pain medications include:  Acetaminophen  Naproxen     Other conservative modalities which the patient reports using include:  Physical Therapy: yes - did not help   Chiropractor: yes - did not help   Massage Therapy: no  TENS: no  Neck or back surgery: no  Past pain management: no  Heat  Ice     Past Significant Surgical History:  Right knee replacement 2015    HPI:     CHIEF COMPLAINT Back Pain  F/U back pain-SACROILIAC INJECTION left-  Patient states that she cant determine whether the procedure improved hr pain or not.     Subjective   Kimberlee Urrutia is a 81 y.o. female  who presents to the office for follow-up of procedure.  She completed a Left sacroiliac joint injection 6/16/21 for management of left sided low back pain. Patient reports significant relief from the procedure for her low back pain, however she hasn't noticed a lot of functional improvement in her walking - she wants to be able to walk faster but is unable to do so.  She has been able to stop using her walker since the injection.    At her previous visit, \"Explained that because her pain is the same whether seated, standing, walking etc, I would like to perform a diagnostic/therapeutic sacroiliac joint injection.  If no results, may consider treating her for lumbar stenosis\"    Today she says her pain isn't bothering her.  She's frustrated that she has to walk slowly.  She feels her gait is slow because of her balance.  She says it is difficult to determine whether her legs feel heavy.         PEG Assessment   What number best describes your pain on average in the past week?4  What number best describes how, during the past week, pain has interfered with your enjoyment of life?7  What number best describes how, during " the past week, pain has interfered with your general activity?  7    REVIEW OF PERTINENT MEDICAL DATA      2/18/21 MRI LUMBAR SPINE WITH AND WITHOUT CONTRAST     HISTORY: Low back pain, evaluate for spinal abscess.     COMPARISON: None.     FINDINGS: Note is made to transitional anatomy with partial  lumbarization of S1. Careful correlation prior to any intervention is  required given the presence of transitional anatomy.     The conus is slightly low-lying beyond the level of L2. The caudal  aspect of the spinal cord appears unremarkable.     There is moderate loss of disc height, disc desiccation and mild to  moderate endplate degenerative changes at L5-S1. There is an  anterolisthesis of L4 upon L5 estimated to be 5 mm.     L1-L2: Mild facet degenerative disease is present bilaterally.     L2-L3: Mild facet degenerative disease is present bilaterally.     L3-L4: Mild facet degenerative disease is present bilaterally.     L4-L5: There is severe central canal stenosis and lateral recess  narrowing bilaterally secondary to facet and ligamentum flavum  hypertrophy, the anterolisthesis of L4 upon L5 and broad-based disc  bulge. There is unroofing of the disc which also extends cephalad  slightly, terminating posterior to the inferior endplate of L4 at the  midline. Mild neural foraminal compromise is present bilaterally  secondary to extension of a disc osteophyte complex into the neural  foramen.     L5-S1: Mild facet degenerative disease is present bilaterally. Mild  neural foraminal compromise is present bilaterally secondary to  extension of a disc osteophyte complex into the neural foramen.     After contrast administration there was enhancement of the facets at  L4-L5 consistent with degenerative disease.     IMPRESSION:  No evidence of discitis, osteomyelitis or of epidural  abscess. Multilevel degenerative disease is noted as described in detail  above most prominent of which is at L4-L5 where there is severe  "canal  stenosis and lateral recess narrowing bilaterally secondary to posterior  element degenerative disease, anterolisthesis and a broad-based disc  osteophyte complex. There is no evidence of focal herniation. See above.     This report was finalized on 2/18/2021 3:07 PM by Dr. Josue Kuo M.D.    The following portions of the patient's history were reviewed and updated as appropriate: allergies, current medications, past family history, past medical history, past social history, past surgical history and problem list.    Review of Systems   Constitutional: Negative for activity change, chills, fatigue and fever.   HENT: Negative for congestion.    Eyes: Negative for visual disturbance.   Respiratory: Negative for chest tightness and shortness of breath.    Cardiovascular: Negative for chest pain.   Gastrointestinal: Negative for abdominal pain, constipation and diarrhea.   Genitourinary: Negative for difficulty urinating, dyspareunia and dysuria.   Musculoskeletal: Positive for back pain.   Neurological: Negative for dizziness, weakness, light-headedness, numbness and headaches.   Psychiatric/Behavioral: Negative for agitation and sleep disturbance. The patient is not nervous/anxious.        Vitals:    07/14/21 0923   BP: 146/80   Pulse: 70   Resp: 16   Temp: 96.4 °F (35.8 °C)   SpO2: 97%   Weight: 63.8 kg (140 lb 9.6 oz)   Height: 162.6 cm (64\")   PainSc:   5   PainLoc: Back         Objective   Physical Exam  Vitals reviewed.   Constitutional:       General: She is not in acute distress.  Pulmonary:      Effort: Pulmonary effort is normal. No respiratory distress.   Musculoskeletal:      Comments: Ambulation: Without assistive device  Lumbar Exam:  Appearance: Scoliotic curve absent and scarring absent  Palpated over lumbosacral paravertebral regions and transverse processes with negative tenderness appreciated, Bilateral.   Sacroiliac joints are not tender, Bilateral.  Trochanteric bursa are not tender, " Bilateral.    Skin:     General: Skin is warm and dry.   Neurological:      General: No focal deficit present.      Mental Status: She is alert.      Deep Tendon Reflexes:      Reflex Scores:       Patellar reflexes are 0 on the right side and 0 on the left side.     Comments: Negative clonus bilaterally   Psychiatric:         Mood and Affect: Mood normal.         Thought Content: Thought content normal.             Assessment/Plan   Diagnoses and all orders for this visit:    1. Lumbar stenosis without neurogenic claudication (Primary)  -     Case Request  -     Ambulatory Referral to Physical Therapy Evaluate and treat (Chronic low back pain, lumbar stenosis, slow, unsteady gait)        - Will schedule for L5-S1 Epidural steroid injection.  Risks discussed including but not limited to bleeding, bruising, infection, damage to surrounding structures, headache, and rare things such as being paralyzed, seizure, stroke, heart attack and death.  The risk of steroid medications include but are not limited to immunosuppression, which can increase the risk of denys an infectious disease as well as decrease the immune response to a vaccine.    - Referral placed to physical therapy to help improve gait/stability.     --- Follow-up for L5-S1 Epidural steroid injection and office visit 4-6 weeks following.                 ALYSSA REPORT    As the clinician, I personally reviewed the ALYSSA from 7/14/21 while the patient was in the office today.    While examining this patient, I wore protective equipment including a mask, eye sheild and gloves.  I washed my hands before and after this patient encounter.  The patient wore a mask throughout the visit as well.     Nu Partida MD  Pain Management   Accession #: [id number] Accession #: [id number]

## 2021-07-14 ENCOUNTER — OFFICE VISIT (OUTPATIENT)
Dept: PAIN MEDICINE | Facility: CLINIC | Age: 81
End: 2021-07-14

## 2021-07-14 VITALS
OXYGEN SATURATION: 97 % | SYSTOLIC BLOOD PRESSURE: 146 MMHG | BODY MASS INDEX: 24.01 KG/M2 | DIASTOLIC BLOOD PRESSURE: 80 MMHG | RESPIRATION RATE: 16 BRPM | TEMPERATURE: 96.4 F | WEIGHT: 140.6 LBS | HEIGHT: 64 IN | HEART RATE: 70 BPM

## 2021-07-14 DIAGNOSIS — M48.061 LUMBAR STENOSIS WITHOUT NEUROGENIC CLAUDICATION: Primary | ICD-10-CM

## 2021-07-14 PROCEDURE — 99214 OFFICE O/P EST MOD 30 MIN: CPT | Performed by: ANESTHESIOLOGY

## 2021-07-14 NOTE — PATIENT INSTRUCTIONS
What to expect if we're setting up an injection/procedure    - I have placed the order today, we'll start speaking to your insurance for authorization (this can sometimes take a few weeks).   - You should be scheduled for your procedure before you leave the office.  If you were not, please call our office to schedule.   - A COVID test is no longer required for procedures (except spinal cord stimulator procedures).   - LIGHT Intravenous (IV) sedation is offered for some procedures: you will NOT be put to sleep.  If you plan to have sedation, do not eat or drink anything on the day of your injection.   - If we're using steroid in your injection, it should be scheduled at least 2 weeks before or after your COVID vaccine(s).  - Most procedures require having someone drive you.  Please make sure you arrange a  unless told otherwise.       Epidural Steroid Injection    An epidural steroid injection is a shot of steroid medicine and numbing medicine that is given into the space between the spinal cord and the bones of the back (epidural space). The shot helps relieve pain caused by an irritated or swollen nerve root.  The amount of pain relief you get from the injection depends on what is causing the nerve to be swollen and irritated, and how long your pain lasts. You are more likely to benefit from this injection if your pain is strong and comes on suddenly rather than if you have had long-term (chronic) pain.  Tell a health care provider about:  · Any allergies you have.  · All medicines you are taking, including vitamins, herbs, eye drops, creams, and over-the-counter medicines.  · Any problems you or family members have had with anesthetic medicines.  · Any blood disorders you have.  · Any surgeries you have had.  · Any medical conditions you have.  · Whether you are pregnant or may be pregnant.  What are the risks?  Generally, this is a safe procedure. However, problems may occur,  including:  · Headache.  · Bleeding.  · Infection.  · Allergic reaction to medicines.  · Nerve damage.  What happens before the procedure?  Staying hydrated  Follow instructions from your health care provider about hydration, which may include:  · Up to 2 hours before the procedure - you may continue to drink clear liquids, such as water, clear fruit juice, black coffee, and plain tea.  Eating and drinking restrictions  Follow instructions from your health care provider about eating and drinking, which may include:  · 8 hours before the procedure - stop eating heavy meals or foods, such as meat, fried foods, or fatty foods.  · 6 hours before the procedure - stop eating light meals or foods, such as toast or cereal.  · 6 hours before the procedure - stop drinking milk or drinks that contain milk.  · 2 hours before the procedure - stop drinking clear liquids.  Medicines  · You may be given medicines to lower anxiety.  · Ask your health care provider about:  ? Changing or stopping your regular medicines. This is especially important if you are taking diabetes medicines or blood thinners.  ? Taking medicines such as aspirin and ibuprofen. These medicines can thin your blood. Do not take these medicines unless your health care provider tells you to take them.  ? Taking over-the-counter medicines, vitamins, herbs, and supplements.  · Ask your health care provider what steps will be taken to prevent infection.  General instructions  · Plan to have someone take you home from the hospital or clinic.  · If you will be going home right after the procedure, plan to have someone with you for 24 hours.  What happens during the procedure?  · An IV will be inserted into one of your veins.  · You will be given one or more of the following:  ? A medicine to help you relax (sedative).  ? A medicine to numb the area (local anesthetic).  · You will be asked to lie on your abdomen or sit.  · The injection site will be cleaned.  · A  needle will be inserted through your skin into the epidural space. This may cause you some discomfort. An X-ray machine will be used to guide the needle as close as possible to the affected nerve.  · A steroid medicine and a local anesthetic will be injected into the epidural space.  · The needle and IV will be removed.  · A bandage (dressing) will be put over the injection site.  The procedure may vary among health care providers and hospitals.  What can I expect after the procedure?  Follow these instructions at home:  Injection site care  · You may remove the bandage (dressing) after 24 hours.  · Check your injection site every day for signs of infection. Check for:  ? Redness, swelling, or pain.  ? Fluid or blood.  ? Warmth.  ? Pus or a bad smell.  Managing pain, stiffness, and swelling  · For 24 hours after the procedure:  ? Avoid using heat on the injection site.  ? Do not take baths, swim, or use a hot tub until your health care provider approves. Ask your health care provider if you may take a shower. You may only be allowed to take sponge baths.  · If directed, put ice on the injection site. To do this:  ? Put ice in a plastic bag.  ? Place a towel between your skin and the bag.  ? Leave the ice on for 20 minutes, 2-3 times a day.    Activity  · Do not drive for 24 hours if you were given a sedative during your procedure.  · Return to your normal activities as told by your health care provider. Ask your health care provider what activities are safe for you.  General instructions  · Your blood pressure, heart rate, breathing rate, and blood oxygen level will be monitored until you leave the hospital or clinic.  · Your arm or leg may feel weak or numb for a few hours.  · The injection site may feel sore.  · Take over-the-counter and prescription medicines only as told by your health care provider.  · Drink enough fluid to keep your urine pale yellow.  · Keep all follow-up visits as told by your health care  provider. This is important.  Contact a health care provider if:  · You have any of these signs of infection:  ? Redness, swelling, or pain around your injection site.  ? Fluid or blood coming from your injection site.  ? Warmth coming from your injection site.  ? Pus or a bad smell coming from your injection site.  ? A fever.  · You continue to have pain and soreness around the injection site, even after taking over-the-counter pain medicine.  · You have severe, sudden, or lasting nausea or vomiting.  Get help right away if:  · You have severe pain at the injection site that is not relieved by medicines.  · You develop a severe headache or a stiff neck.  · You become sensitive to light.  · You have any new numbness or weakness in your legs or arms.  · You lose control of your bladder or bowel movements.  · You have trouble breathing.  Summary  · An epidural steroid injection is a shot of steroid medicine and numbing medicine that is given into the epidural space.  · The shot helps relieve pain caused by an irritated or swollen nerve root.  · You are more likely to benefit from this injection if your pain is strong and comes on suddenly rather than if you have had chronic pain.  This information is not intended to replace advice given to you by your health care provider. Make sure you discuss any questions you have with your health care provider.  Document Revised: 06/29/2020 Document Reviewed: 06/29/2020  Elsevier Patient Education © 2021 [employer]

## 2021-07-15 ENCOUNTER — TELEPHONE (OUTPATIENT)
Dept: FAMILY MEDICINE CLINIC | Facility: CLINIC | Age: 81
End: 2021-07-15

## 2021-07-15 ENCOUNTER — TRANSCRIBE ORDERS (OUTPATIENT)
Dept: SURGERY | Facility: SURGERY CENTER | Age: 81
End: 2021-07-15

## 2021-07-15 DIAGNOSIS — Z41.9 SURGERY, ELECTIVE: Primary | ICD-10-CM

## 2021-07-15 PROBLEM — M48.061 LUMBAR STENOSIS WITHOUT NEUROGENIC CLAUDICATION: Status: ACTIVE | Noted: 2021-07-15

## 2021-07-15 NOTE — TELEPHONE ENCOUNTER
Caller: PADILLA TA    Relationship: Emergency Contact    Best call back number: [phone]     What is the best time to reach you: ANY    Who are you requesting to speak with (clinical staff, provider,  specific staff member): NICOL MAY    What was the call regarding: MOCA ASSESSMENT FOR PATIENT    Do you require a callback: YES

## 2021-07-20 ENCOUNTER — HOSPITAL ENCOUNTER (OUTPATIENT)
Dept: MAMMOGRAPHY | Facility: HOSPITAL | Age: 81
Discharge: HOME | End: 2021-07-20
Admitting: PHYSICIAN ASSISTANT

## 2021-07-20 DIAGNOSIS — Z12.31 VISIT FOR SCREENING MAMMOGRAM: ICD-10-CM

## 2021-07-20 PROCEDURE — 77063 BREAST TOMOSYNTHESIS BI: CPT

## 2021-07-20 PROCEDURE — 77067 SCR MAMMO BI INCL CAD: CPT

## 2021-07-22 ENCOUNTER — OFFICE VISIT (OUTPATIENT)
Dept: FAMILY MEDICINE CLINIC | Facility: CLINIC | Age: 81
End: 2021-07-22

## 2021-07-22 VITALS
OXYGEN SATURATION: 97 % | RESPIRATION RATE: 16 BRPM | TEMPERATURE: 97.5 F | BODY MASS INDEX: 24.34 KG/M2 | SYSTOLIC BLOOD PRESSURE: 121 MMHG | HEIGHT: 64 IN | DIASTOLIC BLOOD PRESSURE: 60 MMHG | HEART RATE: 72 BPM | WEIGHT: 142.6 LBS

## 2021-07-22 DIAGNOSIS — E55.9 VITAMIN D DEFICIENCY: ICD-10-CM

## 2021-07-22 DIAGNOSIS — I73.9 SMALL VESSEL DISEASE (HCC): ICD-10-CM

## 2021-07-22 DIAGNOSIS — E83.52 SERUM CALCIUM ELEVATED: ICD-10-CM

## 2021-07-22 DIAGNOSIS — R41.3 MEMORY CHANGES: Primary | ICD-10-CM

## 2021-07-22 DIAGNOSIS — G47.9 TROUBLE IN SLEEPING: ICD-10-CM

## 2021-07-22 DIAGNOSIS — E78.2 HYPERLIPIDEMIA, MIXED: ICD-10-CM

## 2021-07-22 DIAGNOSIS — E78.2 MIXED HYPERLIPIDEMIA: ICD-10-CM

## 2021-07-22 PROCEDURE — 99214 OFFICE O/P EST MOD 30 MIN: CPT | Performed by: PHYSICIAN ASSISTANT

## 2021-07-22 NOTE — PATIENT INSTRUCTIONS
Fat and Cholesterol Restricted Eating Plan  Eating a diet that limits fat and cholesterol may help lower your risk for heart disease and other conditions. Your body needs fat and cholesterol for basic functions, but eating too much of these things can be harmful to your health.  Your health care provider may order lab tests to check your blood fat (lipid) and cholesterol levels. This helps your health care provider understand your risk for certain conditions and whether you need to make diet changes. Work with your health care provider or dietitian to make an eating plan that is right for you.  Your plan includes:  · Limit your fat intake to ______% or less of your total calories a day.  · Limit your saturated fat intake to ______% or less of your total calories a day.  · Limit the amount of cholesterol in your diet to less than _________mg a day.  · Eat ___________ g of fiber a day.  What are tips for following this plan?  General guidelines    · If you are overweight, work with your health care provider to lose weight safely. Losing just 5-10% of your body weight can improve your overall health and help prevent diseases such as diabetes and heart disease.  · Avoid:  ? Foods with added sugar.  ? Fried foods.  ? Foods that contain partially hydrogenated oils, including stick margarine, some tub margarines, cookies, crackers, and other baked goods.  · Limit alcohol intake to no more than 1 drink a day for nonpregnant women and 2 drinks a day for men. One drink equals 12 oz of beer, 5 oz of wine, or 1½ oz of hard liquor.  Reading food labels  · Check food labels for:  ? Trans fats, partially hydrogenated oils, or high amounts of saturated fat. Avoid foods that contain saturated fat and trans fat.  ? The amount of cholesterol in each serving. Try to eat no more than 200 mg of cholesterol each day.  ? The amount of fiber in each serving. Try to eat at least 20-30 g of fiber each day.  · Choose foods with healthy fats,  "such as:  ? Monounsaturated and polyunsaturated fats. These include olive and canola oil, flaxseeds, walnuts, almonds, and seeds.  ? Omega-3 fats. These are found in foods such as salmon, mackerel, sardines, tuna, flaxseed oil, and ground flaxseeds.  · Choose grain products that have whole grains. Look for the word \"whole\" as the first word in the ingredient list.  Cooking  · Cook foods using methods other than frying. Baking, boiling, grilling, and broiling are some healthy options.  · Eat more home-cooked food and less restaurant, buffet, and fast food.  · Avoid cooking using saturated fats.  ? Animal sources of saturated fats include meats, butter, and cream.  ? Plant sources of saturated fats include palm oil, palm kernel oil, and coconut oil.  Meal planning    · At meals, imagine dividing your plate into fourths:  ? Fill one-half of your plate with vegetables and green salads.  ? Fill one-fourth of your plate with whole grains.  ? Fill one-fourth of your plate with lean protein foods.  · Eat fish that is high in omega-3 fats at least two times a week.  · Eat more foods that contain fiber, such as whole grains, beans, apples, broccoli, carrots, peas, and barley. These foods help promote healthy cholesterol levels in the blood.  Recommended foods  Grains  · Whole grains, such as whole wheat or whole grain breads, crackers, cereals, and pasta. Unsweetened oatmeal, bulgur, barley, quinoa, or brown rice. Corn or whole wheat flour tortillas.  Vegetables  · Fresh or frozen vegetables (raw, steamed, roasted, or grilled). Green salads.  Fruits  · All fresh, canned (in natural juice), or frozen fruits.  Meats and other protein foods  · Ground beef (85% or leaner), grass-fed beef, or beef trimmed of fat. Skinless chicken or turkey. Ground chicken or turkey. Pork trimmed of fat. All fish and seafood. Egg whites. Dried beans, peas, or lentils. Unsalted nuts or seeds. Unsalted canned beans. Natural nut butters without added " sugar and oil.  Dairy  · Low-fat or nonfat dairy products, such as skim or 1% milk, 2% or reduced-fat cheeses, low-fat and fat-free ricotta or cottage cheese, or plain low-fat and nonfat yogurt.  Fats and oils  · Tub margarine without trans fats. Light or reduced-fat mayonnaise and salad dressings. Avocado. Olive, canola, sesame, or safflower oils.  The items listed above may not be a complete list of recommended foods or beverages. Contact your dietitian for more options.  Foods to avoid  Grains  · White bread. White pasta. White rice. Cornbread. Bagels, pastries, and croissants. Crackers and snack foods that contain trans fat and hydrogenated oils.  Vegetables  · Vegetables cooked in cheese, cream, or butter sauce. Fried vegetables.  Fruits  · Canned fruit in heavy syrup. Fruit in cream or butter sauce. Fried fruit.  Meats and other protein foods  · Fatty cuts of meat. Ribs, chicken wings, العلي, sausage, bologna, salami, chitterlings, fatback, hot dogs, bratwurst, and packaged lunch meats. Liver and organ meats. Whole eggs and egg yolks. Chicken and turkey with skin. Fried meat.  Dairy  · Whole or 2% milk, cream, half-and-half, and cream cheese. Whole milk cheeses. Whole-fat or sweetened yogurt. Full-fat cheeses. Nondairy creamers and whipped toppings. Processed cheese, cheese spreads, and cheese curds.  Beverages  · Alcohol. Sugar-sweetened drinks such as sodas, lemonade, and fruit drinks.  Fats and oils  · Butter, stick margarine, lard, shortening, ghee, or العلي fat. Coconut, palm kernel, and palm oils.  Sweets and desserts  · Corn syrup, sugars, honey, and molasses. Candy. Jam and jelly. Syrup. Sweetened cereals. Cookies, pies, cakes, donuts, muffins, and ice cream.  The items listed above may not be a complete list of foods and beverages to avoid. Contact your dietitian for more information.  Summary  · Your body needs fat and cholesterol for basic functions. However, eating too much of these things can be  harmful to your health.  · Work with your health care provider and dietitian to follow a diet low in fat and cholesterol. Doing this may help lower your risk for heart disease and other conditions.  · Choose healthy fats, such as monounsaturated and polyunsaturated fats, and foods high in omega-3 fatty acids.  · Eat fiber-rich foods, such as whole grains, beans, peas, fruits, and vegetables.  · Limit or avoid alcohol, fried foods, and foods high in saturated fats, partially hydrogenated oils, and sugar.  This information is not intended to replace advice given to you by your health care provider. Make sure you discuss any questions you have with your health care provider.  Document Revised: 11/30/2018 Document Reviewed: 09/04/2018  Elsevier Patient Education © 2021 [employer]

## 2021-07-22 NOTE — PROGRESS NOTES
"Subjective   Kimberlee Urrutia is a 81 y.o. female.     History of Present Illness     Kimberlee Urrutia 81 y.o. female /60 (BP Location: Left arm, Patient Position: Sitting, Cuff Size: Adult)   Pulse 72   Temp 97.5 °F (36.4 °C)   Resp 16   Ht 162.6 cm (64.02\")   Wt 64.7 kg (142 lb 9.6 oz)   LMP  (LMP Unknown)   SpO2 97%   BMI 24.47 kg/m²  who presents today for some confusion at times. She is losing things more. She is taking sleep aids-----she is on Diphenhydramine and melatonin. She did see a spot on the wall that was moving.   she has a history of   Patient Active Problem List   Diagnosis   • Arthritis   • HLD (hyperlipidemia)   • Health care maintenance   • Knee pain, right   • Hx of total knee arthroplasty   • Seasonal allergies   • Vitamin D deficiency   • Pyuria   • Transient alteration of awareness   • Migraine without status migrainosus, not intractable   • Leukocytosis   • Viral pharyngitis   • Multinodular thyroid   • Osteopenia of multiple sites   • Colon cancer screening   • Serum calcium elevated   • Acute bilateral low back pain without sciatica   • Elevated procalcitonin   • Sepsis with acute organ dysfunction (CMS/HCC)   • Sepsis due to Escherichia coli with acute renal failure without septic shock (CMS/HCC)   • Recurrent oral herpes simplex   • Lumbar spinal stenosis   • Metabolic encephalopathy   • Renal stones   • Colon cancer screening   • Sacroiliac joint pain   • Lumbar stenosis without neurogenic claudication   .        She is seeing pain management for back pain. Having epidurals.  See notes 7-14-21 Dr Partida did place referral for PT    Some memory changes; loses things    Last MOCA score 28 was 2019       Since the last visit, she has overall felt fairly well.  She has Hyperlipidemia and working on this with diet and exercise and Vitamin D deficiency and will update labs for continued management.  she has been compliant with current medications have reviewed them.  The patient " "denies medication side effects.  Will refill medications. /60 (BP Location: Left arm, Patient Position: Sitting, Cuff Size: Adult)   Pulse 72   Temp 97.5 °F (36.4 °C)   Resp 16   Ht 162.6 cm (64.02\")   Wt 64.7 kg (142 lb 9.6 oz)   LMP  (LMP Unknown)   SpO2 97%   BMI 24.47 kg/m²     Results for orders placed or performed in visit on 03/23/21   Comprehensive Metabolic Panel    Specimen: Blood   Result Value Ref Range    Glucose 85 65 - 99 mg/dL    BUN 13 8 - 23 mg/dL    Creatinine 0.67 0.57 - 1.00 mg/dL    eGFR Non African Am 84 >60 mL/min/1.73    eGFR African Am 102 >60 mL/min/1.73    BUN/Creatinine Ratio 19.4 7.0 - 25.0    Sodium 138 136 - 145 mmol/L    Potassium 4.7 3.5 - 5.2 mmol/L    Chloride 104 98 - 107 mmol/L    Total CO2 27.1 22.0 - 29.0 mmol/L    Calcium 10.7 (H) 8.6 - 10.5 mg/dL    Total Protein 6.5 6.0 - 8.5 g/dL    Albumin 3.90 3.50 - 5.20 g/dL    Globulin 2.6 gm/dL    A/G Ratio 1.5 g/dL    Total Bilirubin 0.4 0.0 - 1.2 mg/dL    Alkaline Phosphatase 87 39 - 117 U/L    AST (SGOT) 14 1 - 32 U/L    ALT (SGPT) 10 1 - 33 U/L   Glucose 6 Phosphate Dehydrogenase    Specimen: Blood   Result Value Ref Range    G6PD, Quantitative 261 127 - 427 U/10E12 RBC   Microscopic Examination -   Result Value Ref Range    WBC, UA 3-5 (A) /HPF    RBC, UA Comment /HPF    Epithelial Cells (non renal) 3-6 (A) /HPF    Cast Type Comment     Crystal Type Comment     Bacteria, UA Trace (A) None Seen /HPF   CBC & Differential    Specimen: Blood   Result Value Ref Range    WBC 8.37 3.40 - 10.80 10*3/mm3    RBC 3.94 3.77 - 5.28 10*6/mm3    Hemoglobin 11.3 (L) 12.0 - 15.9 g/dL    Hematocrit 34.3 34.0 - 46.6 %    MCV 87.1 79.0 - 97.0 fL    MCH 28.7 26.6 - 33.0 pg    MCHC 32.9 31.5 - 35.7 g/dL    RDW 13.8 12.3 - 15.4 %    Platelets 353 140 - 450 10*3/mm3    Neutrophil Rel % 67.8 42.7 - 76.0 %    Lymphocyte Rel % 23.2 19.6 - 45.3 %    Monocyte Rel % 7.5 5.0 - 12.0 %    Eosinophil Rel % 0.0 (L) 0.3 - 6.2 %    Basophil Rel % 1.1 " 0.0 - 1.5 %    Neutrophils Absolute 5.68 1.70 - 7.00 10*3/mm3    Lymphocytes Absolute 1.94 0.70 - 3.10 10*3/mm3    Monocytes Absolute 0.63 0.10 - 0.90 10*3/mm3    Eosinophils Absolute 0.00 0.00 - 0.40 10*3/mm3    Basophils Absolute 0.09 0.00 - 0.20 10*3/mm3    Immature Granulocyte Rel % 0.4 0.0 - 0.5 %    Immature Grans Absolute 0.03 0.00 - 0.05 10*3/mm3    nRBC 0.0 0.0 - 0.2 /100 WBC   Urinalysis With Microscopic - Urine, Clean Catch    Specimen: Urine, Clean Catch   Result Value Ref Range    Specific Gravity, UA 1.020 1.005 - 1.030    pH, UA 6.5 5.0 - 8.0    Color, UA Yellow     Appearance, UA Cloudy (A) Clear    Leukocytes, UA Trace (A) Negative    Protein Negative Negative    Glucose, UA Negative Negative    Ketones Negative Negative    Blood, UA Negative Negative    Bilirubin, UA Negative Negative    Urobilinogen, UA Comment     Nitrite, UA Negative Negative         The following portions of the patient's history were reviewed and updated as appropriate: allergies, current medications, past family history, past medical history, past social history, past surgical history and problem list.    Review of Systems    Objective   Physical Exam    Assessment/Plan   Diagnoses and all orders for this visit:    1. Memory changes (Primary)  -     Comprehensive metabolic panel  -     Lipid panel  -     CBC and Differential  -     TSH  -     Vitamin B12  -     Folate  -     Vitamin D 25 Hydroxy  -     T4, Free  -     T3, Free  -     PTH, Intact  -     Calcium, Ionized  -     Ambulatory Referral to Psychology  -     MRI Brain With & Without Contrast; Future    2. Trouble in sleeping  -     Comprehensive metabolic panel  -     Lipid panel  -     CBC and Differential  -     TSH  -     Vitamin B12  -     Folate  -     Vitamin D 25 Hydroxy  -     T4, Free  -     T3, Free  -     PTH, Intact  -     Calcium, Ionized  -     Ambulatory Referral to Psychology  -     MRI Brain With & Without Contrast; Future    3. Vitamin D  deficiency  -     Comprehensive metabolic panel  -     Lipid panel  -     CBC and Differential  -     TSH  -     Vitamin B12  -     Folate  -     Vitamin D 25 Hydroxy  -     T4, Free  -     T3, Free  -     PTH, Intact  -     Calcium, Ionized  -     Ambulatory Referral to Psychology  -     MRI Brain With & Without Contrast; Future    4. Small vessel disease (CMS/HCC)  -     Comprehensive metabolic panel  -     Lipid panel  -     CBC and Differential  -     TSH  -     Vitamin B12  -     Folate  -     Vitamin D 25 Hydroxy  -     T4, Free  -     T3, Free  -     PTH, Intact  -     Calcium, Ionized  -     Ambulatory Referral to Psychology  -     MRI Brain With & Without Contrast; Future    5. Hyperlipidemia, mixed  -     Comprehensive metabolic panel  -     Lipid panel  -     CBC and Differential  -     TSH  -     Vitamin B12  -     Folate  -     Vitamin D 25 Hydroxy  -     T4, Free  -     T3, Free  -     PTH, Intact  -     Calcium, Ionized  -     Ambulatory Referral to Psychology  -     MRI Brain With & Without Contrast; Future    6. Serum calcium elevated  -     Comprehensive metabolic panel  -     Lipid panel  -     CBC and Differential  -     TSH  -     Vitamin B12  -     Folate  -     Vitamin D 25 Hydroxy  -     T4, Free  -     T3, Free  -     PTH, Intact  -     Calcium, Ionized  -     Ambulatory Referral to Psychology  -     MRI Brain With & Without Contrast; Future    7. Mixed hyperlipidemia  -     Comprehensive metabolic panel  -     Lipid panel  -     CBC and Differential  -     TSH  -     Vitamin B12  -     Folate  -     Vitamin D 25 Hydroxy  -     T4, Free  -     T3, Free  -     PTH, Intact  -     Calcium, Ionized  -     Ambulatory Referral to Psychology  -     MRI Brain With & Without Contrast; Future    Plan, Kimberlee Urrutia, was seen today.  she was seen for Hyperlipidemia and will continue current medication and Vitamin D deficiency and will update labs .  MRI brain with and without contrast---CT head did  show small vessel disease; memory changes---also get neuropsych eval  F/u pain doc  Labs today  Ok PT

## 2021-07-23 LAB
25(OH)D3+25(OH)D2 SERPL-MCNC: 32.7 NG/ML (ref 30–100)
ALBUMIN SERPL-MCNC: 4.2 G/DL (ref 3.5–5.2)
ALBUMIN/GLOB SERPL: 2 G/DL
ALP SERPL-CCNC: 58 U/L (ref 39–117)
ALT SERPL-CCNC: 9 U/L (ref 1–33)
AST SERPL-CCNC: 16 U/L (ref 1–32)
BASOPHILS # BLD AUTO: 0.05 10*3/MM3 (ref 0–0.2)
BASOPHILS NFR BLD AUTO: 0.8 % (ref 0–1.5)
BILIRUB SERPL-MCNC: 0.3 MG/DL (ref 0–1.2)
BUN SERPL-MCNC: 16 MG/DL (ref 8–23)
BUN/CREAT SERPL: 19 (ref 7–25)
CA-I SERPL ISE-MCNC: 6.1 MG/DL (ref 4.5–5.6)
CALCIUM SERPL-MCNC: 10.5 MG/DL (ref 8.6–10.5)
CHLORIDE SERPL-SCNC: 108 MMOL/L (ref 98–107)
CHOLEST SERPL-MCNC: 224 MG/DL (ref 0–200)
CO2 SERPL-SCNC: 27.2 MMOL/L (ref 22–29)
CREAT SERPL-MCNC: 0.84 MG/DL (ref 0.57–1)
EOSINOPHIL # BLD AUTO: 0 10*3/MM3 (ref 0–0.4)
EOSINOPHIL NFR BLD AUTO: 0 % (ref 0.3–6.2)
ERYTHROCYTE [DISTWIDTH] IN BLOOD BY AUTOMATED COUNT: 12.8 % (ref 12.3–15.4)
FOLATE SERPL-MCNC: 13.4 NG/ML (ref 4.78–24.2)
GLOBULIN SER CALC-MCNC: 2.1 GM/DL
GLUCOSE SERPL-MCNC: 89 MG/DL (ref 65–99)
HCT VFR BLD AUTO: 38.2 % (ref 34–46.6)
HDLC SERPL-MCNC: 82 MG/DL (ref 40–60)
HGB BLD-MCNC: 12.7 G/DL (ref 12–15.9)
IMM GRANULOCYTES # BLD AUTO: 0.02 10*3/MM3 (ref 0–0.05)
IMM GRANULOCYTES NFR BLD AUTO: 0.3 % (ref 0–0.5)
LDLC SERPL CALC-MCNC: 127 MG/DL (ref 0–100)
LYMPHOCYTES # BLD AUTO: 1.58 10*3/MM3 (ref 0.7–3.1)
LYMPHOCYTES NFR BLD AUTO: 24.1 % (ref 19.6–45.3)
MCH RBC QN AUTO: 28.8 PG (ref 26.6–33)
MCHC RBC AUTO-ENTMCNC: 33.2 G/DL (ref 31.5–35.7)
MCV RBC AUTO: 86.6 FL (ref 79–97)
MONOCYTES # BLD AUTO: 0.49 10*3/MM3 (ref 0.1–0.9)
MONOCYTES NFR BLD AUTO: 7.5 % (ref 5–12)
NEUTROPHILS # BLD AUTO: 4.41 10*3/MM3 (ref 1.7–7)
NEUTROPHILS NFR BLD AUTO: 67.3 % (ref 42.7–76)
NRBC BLD AUTO-RTO: 0 /100 WBC (ref 0–0.2)
PLATELET # BLD AUTO: 264 10*3/MM3 (ref 140–450)
POTASSIUM SERPL-SCNC: 4.8 MMOL/L (ref 3.5–5.2)
PROT SERPL-MCNC: 6.3 G/DL (ref 6–8.5)
PTH-INTACT SERPL-MCNC: 49 PG/ML (ref 15–65)
RBC # BLD AUTO: 4.41 10*6/MM3 (ref 3.77–5.28)
SODIUM SERPL-SCNC: 143 MMOL/L (ref 136–145)
T3FREE SERPL-MCNC: 2.7 PG/ML (ref 2–4.4)
T4 FREE SERPL-MCNC: 1.35 NG/DL (ref 0.93–1.7)
TRIGL SERPL-MCNC: 89 MG/DL (ref 0–150)
TSH SERPL DL<=0.005 MIU/L-ACNC: 1.57 UIU/ML (ref 0.27–4.2)
VIT B12 SERPL-MCNC: 358 PG/ML (ref 211–946)
VLDLC SERPL CALC-MCNC: 15 MG/DL (ref 5–40)
WBC # BLD AUTO: 6.55 10*3/MM3 (ref 3.4–10.8)

## 2021-08-02 ENCOUNTER — HOSPITAL ENCOUNTER (OUTPATIENT)
Dept: GENERAL RADIOLOGY | Facility: SURGERY CENTER | Age: 81
Setting detail: HOSPITAL OUTPATIENT SURGERY
End: 2021-08-02

## 2021-08-02 ENCOUNTER — HOSPITAL ENCOUNTER (OUTPATIENT)
Facility: SURGERY CENTER | Age: 81
Setting detail: HOSPITAL OUTPATIENT SURGERY
Discharge: HOME | End: 2021-08-02
Attending: ANESTHESIOLOGY | Admitting: ANESTHESIOLOGY

## 2021-08-02 VITALS
BODY MASS INDEX: 23.32 KG/M2 | HEART RATE: 57 BPM | OXYGEN SATURATION: 98 % | RESPIRATION RATE: 18 BRPM | HEIGHT: 65 IN | TEMPERATURE: 97.8 F | DIASTOLIC BLOOD PRESSURE: 75 MMHG | WEIGHT: 140 LBS | SYSTOLIC BLOOD PRESSURE: 149 MMHG

## 2021-08-02 DIAGNOSIS — Z41.9 SURGERY, ELECTIVE: ICD-10-CM

## 2021-08-02 PROCEDURE — 0 IOHEXOL 300 MG/ML SOLUTION 10 ML VIAL: Performed by: ANESTHESIOLOGY

## 2021-08-02 PROCEDURE — 62323 NJX INTERLAMINAR LMBR/SAC: CPT | Performed by: ANESTHESIOLOGY

## 2021-08-02 PROCEDURE — 3E0R3BZ INTRODUCTION OF ANESTHETIC AGENT INTO SPINAL CANAL, PERCUTANEOUS APPROACH: ICD-10-PCS | Performed by: ANESTHESIOLOGY

## 2021-08-02 PROCEDURE — B01BZZZ FLUOROSCOPY OF SPINAL CORD: ICD-10-PCS | Performed by: ANESTHESIOLOGY

## 2021-08-02 PROCEDURE — 3E0R33Z INTRODUCTION OF ANTI-INFLAMMATORY INTO SPINAL CANAL, PERCUTANEOUS APPROACH: ICD-10-PCS | Performed by: ANESTHESIOLOGY

## 2021-08-02 PROCEDURE — 25010000002 DEXAMETHASONE SODIUM PHOSPHATE 100 MG/10ML SOLUTION 10 ML VIAL: Performed by: ANESTHESIOLOGY

## 2021-08-02 RX ORDER — SODIUM CHLORIDE 0.9 % (FLUSH) 0.9 %
10 SYRINGE (ML) INJECTION AS NEEDED
Status: DISCONTINUED | OUTPATIENT
Start: 2021-08-02 | End: 2021-08-02 | Stop reason: HOSPADM

## 2021-08-02 RX ORDER — SODIUM CHLORIDE 0.9 % (FLUSH) 0.9 %
10 SYRINGE (ML) INJECTION EVERY 12 HOURS SCHEDULED
Status: DISCONTINUED | OUTPATIENT
Start: 2021-08-02 | End: 2021-08-02 | Stop reason: HOSPADM

## 2021-08-02 NOTE — OP NOTE
L5/S1 Interlaminar Lumbar Epidural Steroid Injection   [facility]    PREOPERATIVE DIAGNOSIS:   Lumbar Spinal Stenosis without Neurogenic Claudication  POSTOPERATIVE DIAGNOSIS:  Same as preop diagnosis    PROCEDURE:   Lumbar Epidural Steroid Injection, Therapeutic Interlaminar Injection, with epidurogram, at  L5/S1 level    PRE-PROCEDURE DISCUSSION WITH PATIENT:    Risks and complications were discussed with the patient prior to starting the procedure and informed consent was obtained.  We discussed various topics including but not limited to bleeding, infection, injury, paralysis, nerve injury, dural puncture, coma, death, worsening of clinical picture, lack of pain relief, and postprocedural soreness.    SURGEON:  Nu Partida MD    REASON FOR PROCEDURE:    Stenotic area is noted, and is likely contributing to this chronic &/or recurrent pain.     SEDATION:  Patient declined administration of moderate sedation    ANESTHETIC:  NONE  STEROID:   15mg dexamethasone    DESCRIPTON OF PROCEDURE:    After obtaining informed consent, I.V. was not started in the preop area.   The patient was taken to the operating room and placed in the prone position.  EKG, blood pressure, and pulse oximeter were monitored throughout, and sedation was provided as needed by the RN under my guidance. All pressure points were well padded.  The lumbar spine area was prepped with Chloraprep and draped in a sterile fashion.      AP fluoroscopic image was used to visualize the L5/S1 interspace.  The skin and subcutaneous tissue over the area was anesthetized with 1% Lidocaine.  An 18-Gauge Tuohy needle was then advanced through the anesthetized skin tract under fluoroscopic guidance in a coaxial view using a loss of resistance technique.  Lateral fluoroscopy was used to verify appropriate needle depth.  Once the needle tip was felt to be in the posterior epidural space, aspiration was noted to be negative for blood or CSF.   A volume of 2mL of Omnipaque 180 was then injected under live fluoroscopy in an AP view which produced good epidural spread with no evidence of loculation, vascular run-off or intrathecal spread.  Subsequently, a total volume of 5mL consisting of 15mg of dexamethasone and normal saline was injected without resistance.  The needle was removed intact.     ESTIMATED BLOOD LOSS:  <5 mL  SPECIMENS:  None    COMPLICATIONS:     There was no indication of vascular uptake on live injection of contrast dye., There was no indication of intrathecal uptake on live injection of contrast dye. and There was indication that a dural puncture occurred.    TOLERANCE & DISCHARGE CONDITION:    The patient tolerated the procedure well.  The patient was transported to the recovery area without difficulties.  The patient was discharged to home under the care of family in stable and satisfactory condition.    PLAN OF CARE:  1. The patient was given our standard instruction sheet.  2. The patient will Return to clinic 3-4 wks  3. The patient will resume all medications as per the medication reconciliation sheet.

## 2021-08-02 NOTE — DISCHARGE INSTRUCTIONS
[facility] - Post-procedure Instructions          --  While there are no absolute restrictions, it is recommended that you do not perform strenuous activity today. In the morning, you may resume your level of activity as before your block.    --  If you have a band-aid at your injection site, please remove it later today. Observe the area for any redness, swelling, pus-like drainage, or a temperature over 101°. If any of these symptoms occur, please call your doctor at [phone]. If after office hours, leave a message and the on-call provider will return your call.    --  Ice may be applied to your injection site. It is recommended you avoid direct heat (heating pad; hot tub) for 1-2 days.    --  Call [facility] at [phone] if you experience persistent headache, persistent bleeding from the injection site, or severe pain not relieved by heat or oral medication.    --  Do not make important decisions today.    --  Due to the effects of the block and/or the I.V. Sedation, DO NOT drive or operate hazardous machinery for 12 hours.  Local anesthetics may cause numbness after procedure and precautions must be taken with regards to operating equipment as well as with walking, even if ambulating with assistance of another person or with an assistive device.    --  Do not drink alcohol for 12 hours.    -- You may return to work tomorrow, or as directed by your referring doctor.    --  Occasionally you may notice a slight increase in your pain after the procedure. This should start to improve within the next 24-48 hours. Radiofrequency ablation procedure pain may last 3-4 weeks.    --  It may take as long as 3-4 days before you notice a gradual improvement in your pain and/or other symptoms.    -- You may continue to take your prescribed pain medication as needed.    --  Some normal possible side effects of steroid use could include fluid retention, increased blood sugar, dull headache,  increased sweating, increased appetite, mood swings and flushing.    --  Diabetics are recommended to watch their blood glucose level closely for 24-48 hours after the injection.    --  Must stay in PACU for 20 min upon arrival and prove no leg weakness before being discharged.    --  IN THE EVENT OF A LIFE THREATENING EMERGENCY, (CHEST PAIN, BREATHING DIFFICULTIES, PARALYSIS…) YOU SHOULD GO TO YOUR NEAREST EMERGENCY ROOM.    --  You should be contacted by our office within 2-3 days to schedule follow up or next appointment date.  If not contacted within 7 days, please call the office at [phone]    There is a chance you may develop a headache after your procedure today.  I recommend lying flat, drinking lots of fluids - water and caffeine, and avoiding bearing down (like for a bowel movement) to help decrease the headache.

## 2021-08-03 ENCOUNTER — TELEPHONE (OUTPATIENT)
Dept: FAMILY MEDICINE CLINIC | Facility: CLINIC | Age: 81
End: 2021-08-03

## 2021-08-05 ENCOUNTER — APPOINTMENT (OUTPATIENT)
Dept: GENERAL RADIOLOGY | Facility: SURGERY CENTER | Age: 81
End: 2021-08-05

## 2021-08-06 ENCOUNTER — TELEPHONE (OUTPATIENT)
Dept: PAIN MEDICINE | Facility: CLINIC | Age: 81
End: 2021-08-06

## 2021-08-06 NOTE — TELEPHONE ENCOUNTER
Patient states that after the procedure she has started having pain in her bilateral buttocks and her lower back. She has used a pain cream and that didn't help. Please advise.

## 2021-08-06 NOTE — TELEPHONE ENCOUNTER
Please confirm no red flags.  Make sure using ice, can try heat, tylenol, naproxen for a few days until it calms down.

## 2021-08-09 ENCOUNTER — TELEPHONE (OUTPATIENT)
Dept: PAIN MEDICINE | Facility: CLINIC | Age: 81
End: 2021-08-09

## 2021-08-11 ENCOUNTER — HOSPITAL ENCOUNTER (OUTPATIENT)
Dept: MRI IMAGING | Facility: HOSPITAL | Age: 81
Discharge: HOME | End: 2021-08-11
Admitting: PHYSICIAN ASSISTANT

## 2021-08-11 DIAGNOSIS — E78.2 HYPERLIPIDEMIA, MIXED: ICD-10-CM

## 2021-08-11 DIAGNOSIS — R41.3 MEMORY CHANGES: ICD-10-CM

## 2021-08-11 DIAGNOSIS — E55.9 VITAMIN D DEFICIENCY: ICD-10-CM

## 2021-08-11 DIAGNOSIS — E83.52 SERUM CALCIUM ELEVATED: ICD-10-CM

## 2021-08-11 DIAGNOSIS — G47.9 TROUBLE IN SLEEPING: ICD-10-CM

## 2021-08-11 DIAGNOSIS — I73.9 SMALL VESSEL DISEASE (HCC): ICD-10-CM

## 2021-08-11 DIAGNOSIS — E78.2 MIXED HYPERLIPIDEMIA: ICD-10-CM

## 2021-08-11 PROCEDURE — A9577 INJ MULTIHANCE: HCPCS | Performed by: PHYSICIAN ASSISTANT

## 2021-08-11 PROCEDURE — 0 GADOBENATE DIMEGLUMINE 529 MG/ML SOLUTION: Performed by: PHYSICIAN ASSISTANT

## 2021-08-11 PROCEDURE — 70553 MRI BRAIN STEM W/O & W/DYE: CPT

## 2021-08-11 RX ADMIN — GADOBENATE DIMEGLUMINE 13 ML: 529 INJECTION, SOLUTION INTRAVENOUS at 12:18

## 2021-08-11 NOTE — TELEPHONE ENCOUNTER
Caller: ABDIRASHID TA    Relationship to patient: SELF    Best call back number:     Patient is needing: THE PATIENT STATES SHE CALLED LAST WEEK ABOUT HER BILATERAL BUTTOCKS AND LOWER BACK PAIN AFTER PROCEDURE. SHE STATES THAT SHE HAS TRIED ICE AND HEAT AND HAS HAD NO RELIEF. PLEASE CALL THE PATIENT BACK AT THE NUMBER ABOVE.          
I'm not sure what to recommend over the phone.  She may need to come in for evaluation.  Can you set her up with an NP soon?
Left a message to return our call. 
Please see
Diet, NPO:   Except Medications (11-18-20 @ 08:49) [Active] and pt. on PN receiving nutrition support .

## 2021-08-13 ENCOUNTER — TELEPHONE (OUTPATIENT)
Dept: FAMILY MEDICINE CLINIC | Facility: CLINIC | Age: 81
End: 2021-08-13

## 2021-08-13 NOTE — TELEPHONE ENCOUNTER
Caller: Kimberlee Urrutia    Relationship: Self    Best call back number: [phone]     What is the best time to reach you: ANYTIME    Who are you requesting to speak with (clinical staff, provider,  specific staff member): PROVIDER    Do you know the name of the person who called:     What was the call regarding: PATIENT STATED IT WAS IN REGARDS TO SEEING A SPECIALIST     Do you require a callback: YES

## 2021-08-17 ENCOUNTER — TELEPHONE (OUTPATIENT)
Dept: FAMILY MEDICINE CLINIC | Facility: CLINIC | Age: 81
End: 2021-08-17

## 2021-08-17 NOTE — TELEPHONE ENCOUNTER
Caller: PADILLA TA    Relationship: Emergency Contact    Best call back number: [phone]       What was the call regarding: PATIENTS SON CALLED AND REQUESTED A CALL BACK FROM [facility]. SON STATED PATIENT WAS GIVEN RESULTS FROM MRI, BUT IS CONFUSED ON WHAT IT MEANT. SON WOULD LIKE A CALL TO CLARIFY WHAT WAS FOUND AND WHAT THE NEXT STEP WOULD BE     Do you require a callback: YES

## 2021-08-18 ENCOUNTER — TELEPHONE (OUTPATIENT)
Dept: FAMILY MEDICINE CLINIC | Facility: CLINIC | Age: 81
End: 2021-08-18

## 2021-08-18 NOTE — TELEPHONE ENCOUNTER
Caller: PADILLA TA    Relationship: Emergency Contact    Best call back number: [phone]     What is the best time to reach you: ANY TIME    Who are you requesting to speak with (clinical staff, provider,  specific staff member): CLINICAL     What was the call regarding: PATIENT'S SON CALLED TO CHECK ON THE STATUS OF HER PSYCH EVALUATION REFERRAL.    PLEASE CALL PATIENT'S SON BACK WITH STATUS.

## 2021-08-25 NOTE — TELEPHONE ENCOUNTER
PATIENT'S SON CALLED STATING THAT HE SPOKE WITH [facility] AND THEY DO NOT HAVE A REFERRAL FROM [facility].     REQUESTED REFERRAL AND INSURANCE INFORMATION BE SENT AGAIN.    PLEASE ADVISE  [phone]

## 2021-08-30 ENCOUNTER — OFFICE VISIT (OUTPATIENT)
Dept: PAIN MEDICINE | Facility: CLINIC | Age: 81
End: 2021-08-30

## 2021-08-30 VITALS
HEART RATE: 68 BPM | SYSTOLIC BLOOD PRESSURE: 146 MMHG | TEMPERATURE: 97.7 F | HEIGHT: 65 IN | WEIGHT: 146.2 LBS | RESPIRATION RATE: 16 BRPM | DIASTOLIC BLOOD PRESSURE: 71 MMHG | OXYGEN SATURATION: 98 % | BODY MASS INDEX: 24.36 KG/M2

## 2021-08-30 DIAGNOSIS — M53.3 SACROILIAC JOINT PAIN: ICD-10-CM

## 2021-08-30 DIAGNOSIS — M48.061 LUMBAR STENOSIS WITHOUT NEUROGENIC CLAUDICATION: Primary | ICD-10-CM

## 2021-08-30 PROCEDURE — 99213 OFFICE O/P EST LOW 20 MIN: CPT | Performed by: NURSE PRACTITIONER

## 2021-08-30 NOTE — PROGRESS NOTES
"CHIEF COMPLAINT  F/U procedure .-  LUMBAR EPIDURAL 1ST VISIT 5-1.- for back pain .  Pt states after the procedure her pain worsened, states she did not have any pain in the area until receiving the epidural      Subjective   Kimberlee Urrutia is a 81 y.o. female  who presents to the office for follow- up of procedure.  She completed a LESI L5-S1   on  8-2-21 performed by Dr. NESBITT for management of LOW BACK PAIN. Patient reports NO relief from the procedure.  Admits her activity is improved.  Also she is no longer using walker, which she was prior to SI joint injection.  Is upset because she can't straighten up as much as she would like.     She dates all her back pain and issues back to when she was in hospital for prolonged stay in February. Also had to go to rehab afterwards. Has been walking bent over since that time. However, after SI joint injection, she could walk straighter.  She denies today have any issues with her legs.  She does admit that overall, she is improved since first injection.    Was ordered PT on 7-14-21. She reports no memory of being referred to PT. She has a pending appointment with this scheduled 9-1-21. She reports not having made this appointment and no idea about this.     Complains of pain in her low back and buttocks. Her pain is mainly in her tailbone and can radiate to her bilateral buttocks. Today her pain is 4/10VAS.  Describes her pain as intermittent aching and throbbing. Pain increases with walking, standing, activity; pain decreases with rest and sitting. Has not been taking OTC tylenol or ibuprofen. Asked about pain medication. ADL's by self. Denies any bowel or bladder changes.     \"I don't understand any of this.\" She is upset she is not scheduled PT with [facility] but rather [facility]. Initially she says she won't go see someone else. Reviewed going through PT and work on strengthening and core muscle engagement.  She did finally agree to go.    Patient remained masked " during entire encounter. No cough present. I donned a mask and eye protection throughout entire visit. Prior to donning mask and eye protection, hand hygiene was performed, as well as when it was doffed.  I was closer than 6 feet, but not for an extended period of time. No obvious exposure to any bodily fluids.    Back Pain  This is a chronic problem. The current episode started more than 1 month ago. The problem occurs intermittently. The problem has been gradually improving since onset. The pain is present in the gluteal, sacro-iliac and lumbar spine. The quality of the pain is described as aching. Radiates to: bilateral buttocks. The pain is at a severity of 4/10. The pain is moderate. The pain is worse during the day. The symptoms are aggravated by bending, sitting, standing and twisting. Pertinent negatives include no abdominal pain, chest pain, dysuria, fever, headaches, numbness or weakness.      The patient has a pain history of the following:  Lumbar stenosis  Lumbar spondylolisthesis   Sacroiliac joint pain     Previous interventions that the patient has received include:   LESI L5-S1 8-2-21  Left sacroiliac joint injection 6/16/21-- significant back pain improvement-- legs still feel heavy.-- reports she did not need walker after this procedure.      Pain medications include:  Acetaminophen  Naproxen     Other conservative modalities which the patient reports using include:  Physical Therapy: yes - did not help   Chiropractor: yes - did not help   Massage Therapy: no  TENS: no  Neck or back surgery: no  Past pain management: no  Heat  Ice     Past Significant Surgical History:  Right knee replacement 2015    Narrative & Impression   MRI LUMBAR SPINE WITH AND WITHOUT CONTRAST     HISTORY: Low back pain, evaluate for spinal abscess.     COMPARISON: None.     FINDINGS: Note is made to transitional anatomy with partial  lumbarization of S1. Careful correlation prior to any intervention is  required given the  presence of transitional anatomy.     The conus is slightly low-lying beyond the level of L2. The caudal  aspect of the spinal cord appears unremarkable.     There is moderate loss of disc height, disc desiccation and mild to  moderate endplate degenerative changes at L5-S1. There is an  anterolisthesis of L4 upon L5 estimated to be 5 mm.     L1-L2: Mild facet degenerative disease is present bilaterally.     L2-L3: Mild facet degenerative disease is present bilaterally.     L3-L4: Mild facet degenerative disease is present bilaterally.     L4-L5: There is severe central canal stenosis and lateral recess  narrowing bilaterally secondary to facet and ligamentum flavum  hypertrophy, the anterolisthesis of L4 upon L5 and broad-based disc  bulge. There is unroofing of the disc which also extends cephalad  slightly, terminating posterior to the inferior endplate of L4 at the  midline. Mild neural foraminal compromise is present bilaterally  secondary to extension of a disc osteophyte complex into the neural  foramen.     L5-S1: Mild facet degenerative disease is present bilaterally. Mild  neural foraminal compromise is present bilaterally secondary to  extension of a disc osteophyte complex into the neural foramen.     After contrast administration there was enhancement of the facets at  L4-L5 consistent with degenerative disease.     IMPRESSION:  No evidence of discitis, osteomyelitis or of epidural  abscess. Multilevel degenerative disease is noted as described in detail  above most prominent of which is at L4-L5 where there is severe canal  stenosis and lateral recess narrowing bilaterally secondary to posterior  element degenerative disease, anterolisthesis and a broad-based disc  osteophyte complex. There is no evidence of focal herniation. See above.     This report was finalized on 2/18/2021 3:07 PM by Dr. Josue Kuo M.D.            PEG Assessment   What number best describes your pain on average in the past  "week?3  What number best describes how, during the past week, pain has interfered with your enjoyment of life?3  What number best describes how, during the past week, pain has interfered with your general activity?  3      The following portions of the patient's history were reviewed and updated as appropriate: allergies, current medications, past family history, past medical history, past social history, past surgical history and problem list.    Review of Systems   Constitutional: Negative for appetite change, fatigue and fever.   HENT: Negative for congestion.    Eyes: Negative for visual disturbance.   Respiratory: Negative for cough and chest tightness.    Cardiovascular: Negative for chest pain.   Gastrointestinal: Negative for abdominal pain, constipation and diarrhea.   Genitourinary: Negative for difficulty urinating and dysuria.   Musculoskeletal: Positive for back pain.   Neurological: Negative for dizziness, weakness, light-headedness, numbness and headaches.   Psychiatric/Behavioral: Positive for sleep disturbance. Negative for agitation and suicidal ideas. The patient is not nervous/anxious.      I have reviewed and confirmed the accuracy of the ROS as documented by the MA/LPN/RN MICHELE Davis       Vitals:    08/30/21 0932   BP: 146/71   Pulse: 68   Resp: 16   Temp: 97.7 °F (36.5 °C)   SpO2: 98%   Weight: 66.3 kg (146 lb 3.2 oz)   Height: 165.1 cm (65\")   PainSc:   4   PainLoc: Back         Objective   Physical Exam  Constitutional:       Appearance: She is well-developed.   HENT:      Head: Normocephalic and atraumatic.   Musculoskeletal:      Lumbar back: Tenderness present. No bony tenderness (negative lumbar facet tenderness). Decreased range of motion. Negative right straight leg raise test and negative left straight leg raise test.      Comments: Moderate tenderness to palpation of coccyx.  Negative SI joint tenderness   Neurological:      Mental Status: She is alert.      Gait: Gait " abnormal.   Psychiatric:         Speech: Speech normal.         Cognition and Memory: Memory is impaired.         Assessment/Plan   Diagnoses and all orders for this visit:    1. Lumbar stenosis without neurogenic claudication (Primary)    2. Sacroiliac joint pain      --- OTC Tylenol-- discussed with patient  --- Continue with PT as planned. Given PT number by  for patient to contact to straighten out issues.  --- Could consider caudal epidural, but reviewed with patient to try OTC Tylenol and PT as less aggressive methods at this [facility].  --- Follow-up 8- weeks with DR DELICIA SHAH REPORT    As the clinician, I personally reviewed the ALYSSA from 8-30-21 while the patient was in the office today.      I spent 27 minutes caring for patient on this date of service. This time includes time spent by me in the following activities:reviewing tests, obtaining and/or reviewing a separately obtained history, performing a medically appropriate examination and/or evaluation , counseling and educating the patient/family/caregiver, ordering medications, tests, or procedures and documenting information in the medical record         Dictated utilizing Dragon dictation.

## 2021-09-14 ENCOUNTER — TELEPHONE (OUTPATIENT)
Dept: FAMILY MEDICINE CLINIC | Facility: CLINIC | Age: 81
End: 2021-09-14

## 2021-09-14 DIAGNOSIS — R90.89 ABNORMAL BRAIN MRI: Primary | ICD-10-CM

## 2021-09-14 DIAGNOSIS — Z86.79 HISTORY OF SUBARACHNOID HEMORRHAGE: ICD-10-CM

## 2021-09-14 NOTE — TELEPHONE ENCOUNTER
Pt stopped by the office and is wanting to be referred to the neurosurgeon about MRI. . Please place referral.-Eden CHAVEZ

## 2021-09-21 DIAGNOSIS — M48.061 LUMBAR STENOSIS WITHOUT NEUROGENIC CLAUDICATION: Primary | ICD-10-CM

## 2021-09-21 DIAGNOSIS — G89.29 CHRONIC LEFT-SIDED LOW BACK PAIN WITHOUT SCIATICA: ICD-10-CM

## 2021-09-21 DIAGNOSIS — M53.3 SACROILIAC JOINT PAIN: ICD-10-CM

## 2021-09-21 DIAGNOSIS — M54.50 CHRONIC LEFT-SIDED LOW BACK PAIN WITHOUT SCIATICA: ICD-10-CM

## 2021-09-22 ENCOUNTER — TREATMENT (OUTPATIENT)
Dept: PHYSICAL THERAPY | Facility: CLINIC | Age: 81
End: 2021-09-22

## 2021-09-22 DIAGNOSIS — M53.3 SACROILIAC JOINT PAIN: ICD-10-CM

## 2021-09-22 DIAGNOSIS — M54.50 CHRONIC LEFT-SIDED LOW BACK PAIN WITHOUT SCIATICA: ICD-10-CM

## 2021-09-22 DIAGNOSIS — G89.29 CHRONIC LEFT-SIDED LOW BACK PAIN WITHOUT SCIATICA: ICD-10-CM

## 2021-09-22 DIAGNOSIS — M48.061 LUMBAR STENOSIS WITHOUT NEUROGENIC CLAUDICATION: Primary | ICD-10-CM

## 2021-09-22 PROCEDURE — 97162 PT EVAL MOD COMPLEX 30 MIN: CPT | Performed by: PHYSICAL THERAPIST

## 2021-09-22 PROCEDURE — 97530 THERAPEUTIC ACTIVITIES: CPT | Performed by: PHYSICAL THERAPIST

## 2021-09-22 PROCEDURE — 97110 THERAPEUTIC EXERCISES: CPT | Performed by: PHYSICAL THERAPIST

## 2021-09-22 NOTE — PROGRESS NOTES
"Physical Therapy Initial Evaluation and Plan of Care    Patient: Kimberlee Urrutia   : 1940  Diagnosis/ICD-10 Code:  Lumbar stenosis without neurogenic claudication [M48.061]  Referring practitioner: Nu Partida MD  PMx Reviewed : 2021    Subjective Evaluation    History of Present Illness  Mechanism of injury: The pt returns to PT after a a 2 shots in her low back.  The 1st injection on 21 in the SI Injection.  The reports that her pain was gone but reported stiffness.  She was increased her step count over the next month from 800 to about 1500.  She reports that she felt better until her next injection, which was a lumbar epidural.  After her 2nd injection, when the anaesthesia wore off she had a new pain in her spine and (B) buttock.  The buttock pain has eased up.      Pt requested to return to PT.      The pt is taking Tylenol Arthritidis PRN;  Pending on how much she has to move.  Will take it before a lot of activity.      The pt was able to stop using the RWX 2 days after her 1st injection.  Pt has stopped using the cane or RWX in the house or community.      Occupation:  Retired - Medical techologist  Activities:  Sedentary Life Style  PLOF: Independent  Medical Hx Reviewed.    Quality of life: fair    Pain  Current pain ratin  At best pain ratin  At worst pain ratin  Location: Pt reports \"end of her spine\"; Cocceyx  Quality: dull ache  Relieving factors: heat and medications  Aggravating factors: standing, ambulation and prolonged positioning  Progression: improved (Better since 1st injection; 2nd injection is calming down slightly)    Social Support  Lives in: multiple-level home  Lives with: alone             Objective          Active Range of Motion     Lumbar   Flexion: 75 (%) degrees with pain  Extension: 75 (%) degrees   Left lateral flexion: 100 (%) degrees   Right lateral flexion: 75 (%) degrees   Left rotation: 50 (%) degrees   Right rotation: 75 (%) degrees "     Strength/Myotome Testing     Left Hip   Planes of Motion   Flexion: 5  External rotation: 4+  Internal rotation: 5    Right Hip   Planes of Motion   Flexion: 4+  External rotation: 4  Internal rotation: 5    Left Knee   Flexion: 5  Extension: 5    Right Knee   Flexion: 5  Extension: 5    Left Ankle/Foot   Dorsiflexion: 5  Eversion: 5  Great toe extension: 5    Right Ankle/Foot   Dorsiflexion: 5  Eversion: 5  Great toe extension: 5    Ambulation   Weight-Bearing Status   Assistive device used: none    Additional Weight-Bearing Status Details  No Hx of falls            Assessment & Plan     Assessment  Impairments: abnormal or restricted ROM, activity intolerance, impaired physical strength, lacks appropriate home exercise program and pain with function  Assessment details: Pt presents to PT with symptoms consistent with Lumbar Stenosis.  Pt would benefit from skilled PT intervention to address the deficits noted.     Barriers to therapy: Chronic Hx of LBP  Prognosis: fair  Functional Limitations: carrying objects, lifting, sleeping, walking, uncomfortable because of pain, moving in bed, sitting, standing, stooping and unable to perform repetitive tasks  Goals  Plan Goals: SHORT TERM GOALS: Time for Goal Achievement: 4 weeks   1.  Patient to be compliant and progression of HEP                             2.  Pain level < 5/10 at worst with mentioned activities to improve function  3.  Increased thoracic, lumbar and SIJ mobility to allow for increased lumbar AROM with less pain.  4.  Increased lumbar AROM to by 25% in all planes to allow for increased ease with sit-stand transfers and functional activities    LONG TERM GOALS: Time for Goal Achievement: 8 weeks  1.  Pt. to score < 50 % on Back Index  2.  Pain level < 4/10 with all listed activities to return to normal.  3.  Lumbar AROM to WFL to allow for return to household & recreational activities w/o increased symptoms  4.  (B) LE and lower abdominal strength to  5/5 to allow for pushing, pulling and activities to occur without pain (driving, sitting, household  & exercise requirements)        Plan  Therapy options: will be seen for skilled physical therapy services  Planned modality interventions: cryotherapy, electrical stimulation/Russian stimulation, TENS, thermotherapy (hydrocollator packs), ultrasound and dry needling  Planned therapy interventions: body mechanics training, flexibility, functional ROM exercises, home exercise program, manual therapy, neuromuscular re-education, postural training, spinal/joint mobilization, stretching, strengthening, soft tissue mobilization, therapeutic activities and joint mobilization  Frequency: 2x week  Duration in weeks: 8  Treatment plan discussed with: patient        Manual Therapy:          mins  29459;  Therapeutic Exercise:     12     mins  93518;     Neuromuscular Marian:         mins  76623;    Therapeutic Activity:      15     mins  58750;     Gait Training:            mins  16560;     Ultrasound:           mins  12365;    Electrical Stimulation:          mins  68641 ( );  Dry Needling           mins self-pay  Traction           mins 83309  Canalith Repositioning         mins 35120      Timed Treatment:   27   mins   Total Treatment:     65   mins    PT SIGNATURE: [facility] Emanuel PT   [place] Lic #[id number]    DATE TREATMENT INITIATED: 9/22/2021    Medicare Initial Certification   Certification Period: 12/21/2021  I certify that the therapy services are furnished while this patient is under my care.  The services outlined above are required by this patient, and will be reviewed every 90 days.     PHYSICIAN: Nu Partida MD      DATE:     Please sign and return via fax to [phone]. Thank you, [facility].

## 2021-10-06 ENCOUNTER — TREATMENT (OUTPATIENT)
Dept: PHYSICAL THERAPY | Facility: CLINIC | Age: 81
End: 2021-10-06

## 2021-10-06 DIAGNOSIS — M48.061 LUMBAR STENOSIS WITHOUT NEUROGENIC CLAUDICATION: Primary | ICD-10-CM

## 2021-10-06 DIAGNOSIS — G89.29 CHRONIC LEFT-SIDED LOW BACK PAIN WITHOUT SCIATICA: ICD-10-CM

## 2021-10-06 DIAGNOSIS — M53.3 SACROILIAC JOINT PAIN: ICD-10-CM

## 2021-10-06 DIAGNOSIS — M54.50 CHRONIC LEFT-SIDED LOW BACK PAIN WITHOUT SCIATICA: ICD-10-CM

## 2021-10-06 PROCEDURE — 97530 THERAPEUTIC ACTIVITIES: CPT | Performed by: PHYSICAL THERAPIST

## 2021-10-06 PROCEDURE — 97110 THERAPEUTIC EXERCISES: CPT | Performed by: PHYSICAL THERAPIST
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
## 2021-10-11 ENCOUNTER — TREATMENT (OUTPATIENT)
Dept: PHYSICAL THERAPY | Facility: CLINIC | Age: 81
End: 2021-10-11

## 2021-10-11 PROCEDURE — 97530 THERAPEUTIC ACTIVITIES: CPT | Performed by: PHYSICAL THERAPIST

## 2021-10-11 PROCEDURE — 97110 THERAPEUTIC EXERCISES: CPT | Performed by: PHYSICAL THERAPIST

## 2021-10-11 NOTE — PROGRESS NOTES
Physical Therapy Daily Progress Note  Visit: 3    Kimberlee Urrutia reports:  Doing well overall today, no specific areas of pain noted at onset of today's visit.  (The pt is accompanied by her son today)    Subjective     Objective   See Exercise, Manual, and Modality Logs for complete treatment.     Assessment & Plan     Assessment  Assessment details:   Tolerated continued progression through TE/TA program well today, no increased pain reported during or after exercises. Pt reports still not consistent with HEP.        Plan  Plan details: Progress ROM / strengthening / stabilization / functional activity as tolerated          Manual Therapy:          mins  59786;  Therapeutic Exercise:     30     mins  24794;     Neuromuscular Marian:         mins  51012;    Therapeutic Activity:      10     mins  72113;     Gait Training:            mins  17846;     Ultrasound:           mins  63304;    Electrical Stimulation:          mins  11912 ( );  Dry Needling           mins self-pay  Traction           mins 97723  Canalith Repositioning         mins 69164      Timed Treatment:   40   mins   Total Treatment:     40   mins    JOEY Mclean License #[id number]  Physical Therapist Assistant

## 2021-10-13 ENCOUNTER — TREATMENT (OUTPATIENT)
Dept: PHYSICAL THERAPY | Facility: CLINIC | Age: 81
End: 2021-10-13

## 2021-10-13 DIAGNOSIS — M54.50 CHRONIC LEFT-SIDED LOW BACK PAIN WITHOUT SCIATICA: ICD-10-CM

## 2021-10-13 DIAGNOSIS — M48.061 LUMBAR STENOSIS WITHOUT NEUROGENIC CLAUDICATION: Primary | ICD-10-CM

## 2021-10-13 DIAGNOSIS — G89.29 CHRONIC LEFT-SIDED LOW BACK PAIN WITHOUT SCIATICA: ICD-10-CM

## 2021-10-13 DIAGNOSIS — M53.3 SACROILIAC JOINT PAIN: ICD-10-CM

## 2021-10-13 PROCEDURE — 97110 THERAPEUTIC EXERCISES: CPT | Performed by: PHYSICAL THERAPIST

## 2021-10-13 PROCEDURE — 97530 THERAPEUTIC ACTIVITIES: CPT | Performed by: PHYSICAL THERAPIST

## 2021-10-13 NOTE — PROGRESS NOTES
"Physical Therapy Daily Progress Note  Visit: 4    Kimberlee Urrutia reports: I am not seeing any change in my symptoms, I don't any benefit from PT.   I am upset because my son has made me live with him, in [place] and has control of my money.      When asked: \"I have not been doing any of my HEP.\"    Subjective     Objective   See Exercise, Manual, and Modality Logs for complete treatment.       Assessment & Plan     Assessment  Assessment details: The pt matthew Tx well with progression of the exercises.  Reviewed with the pt that she has to do her HEP if she expects to do any results, otherwise with 1-2x wk PT visits, she should expect little to no results.  The pt shrugged her shoulders, looking away, in response to that statement.  The pt's daughter in law was present.  Gave the pt a new T-Band and 4th copy of her HEP for reference at home.      If pt continues to be none compliant with her HEP and minimal attendance with discharge pt from my care, if pt is unwilling to do her part.  I will be patient with the pt because of her recent change with declining mental.      Plan  Plan details: Continue to review HEP and continue PT as appropriate.          Manual Therapy:          mins  51281;  Therapeutic Exercise:     40     mins  28268;     Neuromuscular Marian:         mins  63928;    Therapeutic Activity:      15     mins  40199;     Gait Training:            mins  27407;     Ultrasound:           mins  86757;    Electrical Stimulation:          mins  97080 ( );  Dry Needling           mins self-pay  Traction           mins 12778  Canalith Repositioning         mins 29367      Timed Treatment:   55   mins   Total Treatment:     55   mins    Avi Emanuel, PT  [place] License #: [id number]    Physical Therapist      "

## 2021-10-14 ENCOUNTER — TELEPHONE (OUTPATIENT)
Dept: FAMILY MEDICINE CLINIC | Facility: CLINIC | Age: 81
End: 2021-10-14

## 2021-10-14 NOTE — TELEPHONE ENCOUNTER
PATIENT STATES: SON WOULD LIKE TO HAVE A CALL BACK TO TALK ABOUT HIS MOTHER PLEASE ADVISE      PATIENT CAN BE REACHED ON: [phone]

## 2021-10-14 NOTE — TELEPHONE ENCOUNTER
I talked to son. He got guardianship of his mom on Monday. He wants to schedule an appt to talk about medications and other things

## 2021-10-18 ENCOUNTER — TREATMENT (OUTPATIENT)
Dept: PHYSICAL THERAPY | Facility: CLINIC | Age: 81
End: 2021-10-18

## 2021-10-18 DIAGNOSIS — M53.3 SACROILIAC JOINT PAIN: ICD-10-CM

## 2021-10-18 DIAGNOSIS — M48.061 LUMBAR STENOSIS WITHOUT NEUROGENIC CLAUDICATION: Primary | ICD-10-CM

## 2021-10-18 DIAGNOSIS — G89.29 CHRONIC LEFT-SIDED LOW BACK PAIN WITHOUT SCIATICA: ICD-10-CM

## 2021-10-18 DIAGNOSIS — M54.50 CHRONIC LEFT-SIDED LOW BACK PAIN WITHOUT SCIATICA: ICD-10-CM

## 2021-10-18 PROCEDURE — 97530 THERAPEUTIC ACTIVITIES: CPT | Performed by: PHYSICAL THERAPIST

## 2021-10-18 PROCEDURE — 97110 THERAPEUTIC EXERCISES: CPT | Performed by: PHYSICAL THERAPIST

## 2021-10-18 NOTE — PROGRESS NOTES
Physical Therapy Daily Progress Note  Visit: 5    Kimberlee Urrutia reports: I feel a little better than I did on my last visit.  I have been trying to do my HEP more.      Pt attends PT visits today with her son present.        Subjective     Objective   See Exercise, Manual, and Modality Logs for complete treatment.       Assessment & Plan     Assessment  Assessment details: The pt matthew all exercise today with no issue of pain reported.  Pt was much more quiet today than previous visits, seems to be the trend when her son brings and stays with her in PT.      Plan  Plan details: Progress ROM / strengthening / stabilization / functional activity as tolerated          Manual Therapy:          mins  66627;  Therapeutic Exercise:     40     mins  60239;     Neuromuscular Marian:         mins  83244;    Therapeutic Activity:      15     mins  51761;     Gait Training:            mins  54069;     Ultrasound:           mins  50100;    Electrical Stimulation:          mins  54161 ( );  Dry Needling           mins self-pay  Traction           mins 83047  Canalith Repositioning         mins 60064      Timed Treatment:   55   mins   Total Treatment:     55   mins    Avi Emanuel PT  [place] License #: [id number]    Physical Therapist

## 2021-10-19 ENCOUNTER — OFFICE VISIT (OUTPATIENT)
Dept: FAMILY MEDICINE CLINIC | Facility: CLINIC | Age: 81
End: 2021-10-19

## 2021-10-19 VITALS
SYSTOLIC BLOOD PRESSURE: 125 MMHG | HEIGHT: 65 IN | BODY MASS INDEX: 24.66 KG/M2 | HEART RATE: 68 BPM | OXYGEN SATURATION: 99 % | RESPIRATION RATE: 16 BRPM | DIASTOLIC BLOOD PRESSURE: 60 MMHG | TEMPERATURE: 97.5 F | WEIGHT: 148 LBS

## 2021-10-19 DIAGNOSIS — Z23 NEED FOR INFLUENZA VACCINATION: ICD-10-CM

## 2021-10-19 DIAGNOSIS — F03.91 DEMENTIA WITH BEHAVIORAL DISTURBANCE, UNSPECIFIED DEMENTIA TYPE: Primary | ICD-10-CM

## 2021-10-19 PROCEDURE — 99213 OFFICE O/P EST LOW 20 MIN: CPT | Performed by: PHYSICIAN ASSISTANT

## 2021-10-19 PROCEDURE — G0008 ADMIN INFLUENZA VIRUS VAC: HCPCS | Performed by: PHYSICIAN ASSISTANT

## 2021-10-19 PROCEDURE — 90662 IIV NO PRSV INCREASED AG IM: CPT | Performed by: PHYSICIAN ASSISTANT

## 2021-10-19 RX ORDER — ALENDRONATE SODIUM 70 MG/1
70 TABLET ORAL
COMMUNITY
End: 2022-02-10
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
## 2021-10-20 ENCOUNTER — TREATMENT (OUTPATIENT)
Dept: PHYSICAL THERAPY | Facility: CLINIC | Age: 81
End: 2021-10-20

## 2021-10-20 DIAGNOSIS — M48.061 LUMBAR STENOSIS WITHOUT NEUROGENIC CLAUDICATION: Primary | ICD-10-CM

## 2021-10-20 DIAGNOSIS — M53.3 SACROILIAC JOINT PAIN: ICD-10-CM

## 2021-10-20 PROCEDURE — 97110 THERAPEUTIC EXERCISES: CPT | Performed by: PHYSICAL THERAPIST

## 2021-10-20 PROCEDURE — 97530 THERAPEUTIC ACTIVITIES: CPT | Performed by: PHYSICAL THERAPIST

## 2021-10-20 NOTE — PROGRESS NOTES
Physical Therapy Daily Progress Note  Visit: 6    Kimberlee Urrutia reports: I am doing a little better.      Pt attended PT today w/ her son.  Going to Pain Management on 27th.      Subjective     Objective   See Exercise, Manual, and Modality Logs for complete treatment.       Assessment & Plan     Assessment  Assessment details: The pt demos improving matthew for exercise today vs previous visits.  Minimal progression today.      Plan  Plan details: Progress ROM / strengthening / stabilization / functional activity as tolerated          Manual Therapy:          mins  49935;  Therapeutic Exercise:     40     mins  15221;     Neuromuscular Marian:         mins  34166;    Therapeutic Activity:      15     mins  52342;     Gait Training:            mins  40718;     Ultrasound:           mins  35196;    Electrical Stimulation:          mins  10534 ( );  Dry Needling           mins self-pay  Traction           mins 80551  Canalith Repositioning         mins 16903      Timed Treatment:   55   mins   Total Treatment:     55   mins    Avi Emanuel PT  [place] License #: [id number]    Physical Therapist

## 2021-10-22 NOTE — PROGRESS NOTES
The patient has a pain history of the following:  Lumbar stenosis  Lumbar spondylolisthesis   Sacroiliac joint pain     Previous interventions that the patient has received include:   L5-S1 Epidural steroid injection 8/2/21  Left sacroiliac joint injection 6/16/21     Pain medications include:  Acetaminophen  Naproxen     Other conservative modalities which the patient reports using include:  Physical Therapy: yes - did not help   Chiropractor: yes - did not help   Massage Therapy: no  TENS: no  Neck or back surgery: no  Past pain management: no  Heat  Ice     Past Significant Surgical History:  Right knee replacement 2015    HPI:     CHIEF COMPLAINT Back Pain  F/U back pain- patient states that her pain has remained the same since her last visit.     Subjective   Kimberlee Urrutia is a 81 y.o. female  who presents for follow-up.  She has a history of chronic low back pain and difficulty walking.  She was upset because she cannot straighten up as much as she wants for her posture.    At her previous appointment, we recommended tylenol prn pain and continue PT as planned.  May consider caudal Epidural steroid injection.      She continues to have pain in the low back and slow walking.  She describes it as a stiffness in her low back (she points to her sacrum) and heaviness/slowness in her legs.  She had great results from the sacroiliac joint injection earlier this year but had worsening pain after the epidural injection, which makes her hesitant to move forward with any injections.  Recently she's been attending physical therapy and her son is exercising/stretching her at home and using an inversion table.  He asks about a natural remedy for inflammation and an anti-inflammatory diet.     Her son is with her today who is now her guardian.  He states that she's in the process of being worked up for dementia.  He's concerned about medications interfering with her memory.      They also ask whether she should continue  physical therapy.  Her son is happy that she can do weighted activities at PT such as leg-press to help with her osteopenia as well as some exercises for her cardiac health.  He is also grateful that her physical therapist can help guide her home exercises.  It sounds like Ms. Urrutia would prefer to do only home exercises.         PEG Assessment   What number best describes your pain on average in the past week?4  What number best describes how, during the past week, pain has interfered with your enjoyment of life?5  What number best describes how, during the past week, pain has interfered with your general activity?  5    REVIEW OF PERTINENT MEDICAL DATA  2/18/21 MRI LUMBAR SPINE WITH AND WITHOUT CONTRAST     HISTORY: Low back pain, evaluate for spinal abscess.     COMPARISON: None.     FINDINGS: Note is made to transitional anatomy with partial  lumbarization of S1. Careful correlation prior to any intervention is  required given the presence of transitional anatomy.     The conus is slightly low-lying beyond the level of L2. The caudal  aspect of the spinal cord appears unremarkable.     There is moderate loss of disc height, disc desiccation and mild to  moderate endplate degenerative changes at L5-S1. There is an  anterolisthesis of L4 upon L5 estimated to be 5 mm.     L1-L2: Mild facet degenerative disease is present bilaterally.     L2-L3: Mild facet degenerative disease is present bilaterally.     L3-L4: Mild facet degenerative disease is present bilaterally.     L4-L5: There is severe central canal stenosis and lateral recess  narrowing bilaterally secondary to facet and ligamentum flavum  hypertrophy, the anterolisthesis of L4 upon L5 and broad-based disc  bulge. There is unroofing of the disc which also extends cephalad  slightly, terminating posterior to the inferior endplate of L4 at the  midline. Mild neural foraminal compromise is present bilaterally  secondary to extension of a disc osteophyte complex  into the neural  foramen.     L5-S1: Mild facet degenerative disease is present bilaterally. Mild  neural foraminal compromise is present bilaterally secondary to  extension of a disc osteophyte complex into the neural foramen.     After contrast administration there was enhancement of the facets at  L4-L5 consistent with degenerative disease.     IMPRESSION:  No evidence of discitis, osteomyelitis or of epidural  abscess. Multilevel degenerative disease is noted as described in detail  above most prominent of which is at L4-L5 where there is severe canal  stenosis and lateral recess narrowing bilaterally secondary to posterior  element degenerative disease, anterolisthesis and a broad-based disc  osteophyte complex. There is no evidence of focal herniation. See above.     This report was finalized on 2/18/2021 3:07 PM by Dr. Josue Kuo M.D.    The following portions of the patient's history were reviewed and updated as appropriate: allergies, current medications, past family history, past medical history, past social history, past surgical history and problem list.    Review of Systems   Constitutional: Positive for activity change (decreased). Negative for chills, fatigue and fever.   HENT: Negative for congestion.    Eyes: Negative for visual disturbance.   Respiratory: Negative for chest tightness and shortness of breath.    Cardiovascular: Negative for chest pain.   Gastrointestinal: Negative for abdominal pain, constipation and diarrhea.   Genitourinary: Negative for difficulty urinating, dyspareunia and dysuria.   Musculoskeletal: Positive for back pain.   Neurological: Positive for light-headedness. Negative for dizziness, weakness, numbness and headaches.   Psychiatric/Behavioral: Positive for agitation. Negative for self-injury, sleep disturbance and suicidal ideas. The patient is not nervous/anxious.      I have reviewed and confirmed the accuracy of the ROS as documented by the MA/PONCHON/CIPRIANO YOUNGBLOOD  "MD Helga    Vitals:    10/26/21 0941   BP: 147/71   Pulse: 63   Temp: 96.2 °F (35.7 °C)   SpO2: 97%   Weight: 67.9 kg (149 lb 9.6 oz)   Height: 165.1 cm (65\")   PainSc:   4   PainLoc: Back         Objective   Physical Exam  Vitals reviewed.   Constitutional:       General: She is not in acute distress.  Pulmonary:      Effort: Pulmonary effort is normal. No respiratory distress.   Musculoskeletal:      Comments: Ambulation: Without assistive device  Lumbar Exam:  Appearance: Scoliotic curve absent and scarring absent  Palpated over lumbosacral paravertebral regions and transverse processes with negative tenderness appreciated, Bilateral.   Sacroiliac joints are not tender, Bilateral.  Trochanteric bursa are not tender, Bilateral.  Facet loading is negative for pain, Bilateral.   Mal Cristin's test is negative sacroiliac pain, Bilateral.   Skin:     General: Skin is warm and dry.   Neurological:      Mental Status: She is alert.   Psychiatric:         Mood and Affect: Mood normal.         Thought Content: Thought content normal.             Assessment/Plan   Diagnoses and all orders for this visit:    1. Chronic left-sided low back pain without sciatica (Primary)  -     meloxicam (MOBIC) 7.5 MG tablet; Take 1 tablet by mouth Daily As Needed for Moderate Pain .  Dispense: 30 tablet; Refill: 2    2. Lumbar stenosis without neurogenic claudication  -     meloxicam (MOBIC) 7.5 MG tablet; Take 1 tablet by mouth Daily As Needed for Moderate Pain .  Dispense: 30 tablet; Refill: 2    3. Sacroiliac joint pain  -     meloxicam (MOBIC) 7.5 MG tablet; Take 1 tablet by mouth Daily As Needed for Moderate Pain .  Dispense: 30 tablet; Refill: 2          - On exam today, she has no signs of sacroiliac joint pain.   - Explained if we were to try another injection, my focus would still be for her lumbar stenosis, only we would do a caudal Epidural steroid injection approach.  She would like to think about it before moving forward " "with this injection.   - Since she describes her pain as a \"stiffness,\"  I think it is reasonable to try a low dose meloxicam as needed for pain.  Explained to not take this in combination with any other anti-inflammatory medications/supplements.  Discussed medication with the patient.  Included in this discussion was the potential for side effects and adverse events.  Patient verbalized understanding and wished to proceed.  Prescription will be sent to pharmacy.   - As for physical therapy, I think it is reasonable to allow Ms. Urrutia to take a break from the PT visits as long as she does not decline.  It is also reasonable to have fewer sessions to allow guidance for home therapy.  I will defer the final decision to Avi Emanuel PT since he has seen her progress.       --- Follow-up prn             While examining this patient, I wore protective equipment including a mask, eye shield and gloves.  I washed my hands before and after this patient encounter.  The patient wore a mask throughout the visit as well.     Nu Partida MD  Pain Management  "

## 2021-10-26 ENCOUNTER — OFFICE VISIT (OUTPATIENT)
Dept: PAIN MEDICINE | Facility: CLINIC | Age: 81
End: 2021-10-26

## 2021-10-26 VITALS
HEIGHT: 65 IN | TEMPERATURE: 96.2 F | OXYGEN SATURATION: 97 % | HEART RATE: 63 BPM | DIASTOLIC BLOOD PRESSURE: 71 MMHG | BODY MASS INDEX: 24.93 KG/M2 | WEIGHT: 149.6 LBS | SYSTOLIC BLOOD PRESSURE: 147 MMHG

## 2021-10-26 DIAGNOSIS — G89.29 CHRONIC LEFT-SIDED LOW BACK PAIN WITHOUT SCIATICA: Primary | ICD-10-CM

## 2021-10-26 DIAGNOSIS — M53.3 SACROILIAC JOINT PAIN: ICD-10-CM

## 2021-10-26 DIAGNOSIS — M54.50 CHRONIC LEFT-SIDED LOW BACK PAIN WITHOUT SCIATICA: Primary | ICD-10-CM

## 2021-10-26 DIAGNOSIS — M48.061 LUMBAR STENOSIS WITHOUT NEUROGENIC CLAUDICATION: ICD-10-CM

## 2021-10-26 PROCEDURE — 99214 OFFICE O/P EST MOD 30 MIN: CPT | Performed by: ANESTHESIOLOGY

## 2021-10-26 RX ORDER — MELOXICAM 7.5 MG/1
7.5 TABLET ORAL DAILY PRN
Qty: 30 TABLET | Refills: 2 | Status: SHIPPED | OUTPATIENT
Start: 2021-10-26 | End: 2022-01-25

## 2021-10-27 ENCOUNTER — TREATMENT (OUTPATIENT)
Dept: PHYSICAL THERAPY | Facility: CLINIC | Age: 81
End: 2021-10-27

## 2021-10-27 DIAGNOSIS — G89.29 CHRONIC LEFT-SIDED LOW BACK PAIN WITHOUT SCIATICA: ICD-10-CM

## 2021-10-27 DIAGNOSIS — M48.061 LUMBAR STENOSIS WITHOUT NEUROGENIC CLAUDICATION: Primary | ICD-10-CM

## 2021-10-27 DIAGNOSIS — M54.50 CHRONIC LEFT-SIDED LOW BACK PAIN WITHOUT SCIATICA: ICD-10-CM

## 2021-10-27 DIAGNOSIS — M53.3 SACROILIAC JOINT PAIN: ICD-10-CM

## 2021-10-27 PROCEDURE — 97110 THERAPEUTIC EXERCISES: CPT | Performed by: PHYSICAL THERAPIST

## 2021-10-27 PROCEDURE — 97530 THERAPEUTIC ACTIVITIES: CPT | Performed by: PHYSICAL THERAPIST

## 2021-11-15 ENCOUNTER — TREATMENT (OUTPATIENT)
Dept: PHYSICAL THERAPY | Facility: CLINIC | Age: 81
End: 2021-11-15

## 2021-11-15 DIAGNOSIS — M48.061 LUMBAR STENOSIS WITHOUT NEUROGENIC CLAUDICATION: Primary | ICD-10-CM

## 2021-11-15 DIAGNOSIS — M53.3 SACROILIAC JOINT PAIN: ICD-10-CM

## 2021-11-15 PROCEDURE — 97530 THERAPEUTIC ACTIVITIES: CPT | Performed by: PHYSICAL THERAPIST

## 2021-11-15 PROCEDURE — 97110 THERAPEUTIC EXERCISES: CPT | Performed by: PHYSICAL THERAPIST

## 2021-11-15 NOTE — PROGRESS NOTES
"Physical Therapy Daily Progress Note  Visit: 8    Kimberlee Urrutia reports:  Feeling \"OK\" today, no new c/o at onset of visit.      Subjective     Objective   See Exercise, Manual, and Modality Logs for complete treatment.     Assessment & Plan     Assessment  Assessment details:   Tolerated continued progression through therapeutic exercise/activity program well today, no increased pain reported during or after exercises.     Plan  Plan details: Progress ROM / strengthening / stabilization / functional activity as tolerated for 1x week, per the pt's request.          Manual Therapy:          mins  76007;  Therapeutic Exercise:     30     mins  49208;     Neuromuscular Marian:         mins  73614;    Therapeutic Activity:      15     mins  28646;     Gait Training:            mins  62660;     Ultrasound:           mins  81535;    Electrical Stimulation:          mins  68715 ( );  Dry Needling           mins self-pay  Traction           mins 29701  Canalith Repositioning         mins 54939    Timed Treatment:   45   mins   Total Treatment:     55   mins      JOEY Mclean License #[id number]  Physical Therapist Assistant  "

## 2021-12-20 DIAGNOSIS — I73.9 SMALL VESSEL DISEASE (HCC): Primary | ICD-10-CM

## 2022-01-22 DIAGNOSIS — M54.50 CHRONIC LEFT-SIDED LOW BACK PAIN WITHOUT SCIATICA: ICD-10-CM

## 2022-01-22 DIAGNOSIS — M53.3 SACROILIAC JOINT PAIN: ICD-10-CM

## 2022-01-22 DIAGNOSIS — M48.061 LUMBAR STENOSIS WITHOUT NEUROGENIC CLAUDICATION: ICD-10-CM

## 2022-01-22 DIAGNOSIS — G89.29 CHRONIC LEFT-SIDED LOW BACK PAIN WITHOUT SCIATICA: ICD-10-CM

## 2022-01-25 RX ORDER — MELOXICAM 7.5 MG/1
7.5 TABLET ORAL DAILY PRN
Qty: 30 TABLET | Refills: 2 | Status: SHIPPED | OUTPATIENT
Start: 2022-01-25

## 2022-02-10 RX ORDER — ALENDRONATE SODIUM 70 MG/1
TABLET ORAL
Qty: 4 TABLET | Refills: 11 | Status: SHIPPED | OUTPATIENT
Start: 2022-02-10

## 2022-02-21 ENCOUNTER — OFFICE VISIT (OUTPATIENT)
Dept: FAMILY MEDICINE CLINIC | Facility: CLINIC | Age: 82
End: 2022-02-21

## 2022-02-21 VITALS
BODY MASS INDEX: 25.16 KG/M2 | TEMPERATURE: 97.5 F | HEIGHT: 65 IN | RESPIRATION RATE: 16 BRPM | DIASTOLIC BLOOD PRESSURE: 60 MMHG | HEART RATE: 67 BPM | OXYGEN SATURATION: 98 % | SYSTOLIC BLOOD PRESSURE: 122 MMHG | WEIGHT: 151 LBS

## 2022-02-21 DIAGNOSIS — R68.2 DRY MOUTH: Primary | ICD-10-CM

## 2022-02-21 PROBLEM — M53.3 SACROILIAC JOINT PAIN: Status: ACTIVE | Noted: 2021-06-07

## 2022-02-21 PROBLEM — E04.2 MULTINODULAR GOITER: Status: ACTIVE | Noted: 2018-10-10

## 2022-02-21 PROBLEM — M48.061 SPINAL STENOSIS OF LUMBAR REGION: Status: ACTIVE | Noted: 2021-02-20

## 2022-02-21 PROBLEM — M85.80 OSTEOPENIA: Status: ACTIVE | Noted: 2018-10-10

## 2022-02-21 PROBLEM — N20.0 RENAL STONE: Status: ACTIVE | Noted: 2022-01-20

## 2022-02-21 PROBLEM — E83.52 HYPERCALCEMIA: Status: ACTIVE | Noted: 2020-09-19

## 2022-02-21 PROCEDURE — 1159F MED LIST DOCD IN RCRD: CPT | Performed by: PHYSICIAN ASSISTANT

## 2022-02-21 PROCEDURE — 99213 OFFICE O/P EST LOW 20 MIN: CPT | Performed by: PHYSICIAN ASSISTANT

## 2022-02-21 PROCEDURE — G0439 PPPS, SUBSEQ VISIT: HCPCS | Performed by: PHYSICIAN ASSISTANT

## 2022-02-21 PROCEDURE — 1170F FXNL STATUS ASSESSED: CPT | Performed by: PHYSICIAN ASSISTANT

## 2022-02-21 PROCEDURE — 96160 PT-FOCUSED HLTH RISK ASSMT: CPT | Performed by: PHYSICIAN ASSISTANT

## 2022-02-21 NOTE — PROGRESS NOTES
The ABCs of the Annual Wellness Visit  Subsequent Medicare Wellness Visit    Chief Complaint   Patient presents with   • Dry Mouth      Subjective    History of Present Illness:  Kimberlee Urrutia is a 81 y.o. female who presents for a Subsequent Medicare Wellness Visit.    The following portions of the patient's history were reviewed and   updated as appropriate: allergies, current medications, past family history, past medical history, past social history, past surgical history and problem list.    Compared to one year ago, the patient feels her physical   health is the same.    Compared to one year ago, the patient feels her mental   health is the same.    Recent Hospitalizations:  She was not admitted to the hospital during the last year.       Current Medical Providers:  Patient Care Team:  Eden Medina PA-C as PCP - General (Family Medicine)  Josue Wilson MD as Consulting Physician (Orthopedic Surgery)  Yash Wilson MD as Consulting Physician (Gastroenterology)  Avi Ferris MD (Ophthalmology)  Tommy French MD (Inactive) as Consulting Physician (Otolaryngology)  Arnaud Lu MD as Consulting Physician (Urology)    Outpatient Medications Prior to Visit   Medication Sig Dispense Refill   • alendronate (FOSAMAX) 70 MG tablet TAKE 1 TABLET BY MOUTH EVERY 7 DAYS WITH FULL GLASS OF WATER, SIT UPRIGHT FOR 30 MINUTES 4 tablet 11   • cholecalciferol (VITAMIN D3) 25 MCG (1000 UT) tablet Take 1,000 Units by mouth Daily.     • loratadine (CLARITIN) 10 MG tablet Take  by mouth daily.     • meloxicam (MOBIC) 7.5 MG tablet TAKE 1 TABLET BY MOUTH DAILY AS NEEDED FOR MODERATE PAIN 30 tablet 2   • Multiple Vitamins-Minerals (MULTI COMPLETE PO) Take  by mouth daily.       No facility-administered medications prior to visit.       No opioid medication identified on active medication list. I have reviewed chart for other potential  high risk medication/s and harmful drug interactions in the  "elderly.          Aspirin is not on active medication list.  Aspirin use is not indicated based on review of current medical condition/s. Risk of harm outweighs potential benefits.  .    Patient Active Problem List   Diagnosis   • Arthritis   • HLD (hyperlipidemia)   • Health care maintenance   • Knee pain, right   • Hx of total knee arthroplasty   • Seasonal allergies   • Vitamin D deficiency   • Pyuria   • Transient alteration of awareness   • Migraine without status migrainosus, not intractable   • Leukocytosis   • Viral pharyngitis   • Multinodular thyroid   • Osteopenia of multiple sites   • Colon cancer screening   • Serum calcium elevated   • Acute low back pain   • Elevated procalcitonin   • Sepsis with acute organ dysfunction (HCC)   • Sepsis due to Escherichia coli with acute renal failure without septic shock (HCC)   • Recurrent oral herpes simplex   • Lumbar spinal stenosis   • Metabolic encephalopathy   • Renal stones   • Colon cancer screening   • Sacroiliac joint pain   • Lumbar stenosis without neurogenic claudication   • Renal stone   • Hyperlipidemia   • History of total knee arthroplasty   • Spinal stenosis of lumbar region   • Multinodular goiter   • Osteopenia   • Pain in right knee   • Sacroiliac joint pain   • Hypercalcemia     Advance Care Planning  Advance Directive is not on file.  ACP discussion was held with the patient during this visit. Patient has an advance directive (not in EMR), copy requested.          Objective    Vitals:    02/21/22 1303   BP: 122/60   BP Location: Left arm   Patient Position: Sitting   Cuff Size: Adult   Pulse: 67   Resp: 16   Temp: 97.5 °F (36.4 °C)   SpO2: 98%   Weight: 68.5 kg (151 lb)   Height: 165.1 cm (65\")     BMI Readings from Last 1 Encounters:   02/21/22 25.13 kg/m²   BMI is above normal parameters. Recommendations include: exercise counseling    Does the patient have evidence of cognitive impairment? Yes    Physical Exam            HEALTH RISK " ASSESSMENT    Smoking Status:  Social History     Tobacco Use   Smoking Status Former Smoker   • Quit date: 1/10/1985   • Years since quittin.1   Smokeless Tobacco Never Used     Alcohol Consumption:  Social History     Substance and Sexual Activity   Alcohol Use No     Fall Risk Screen:    JARED Fall Risk Assessment was completed, and patient is at LOW risk for falls.Assessment completed on:2022    Depression Screening:  PHQ-2/PHQ-9 Depression Screening 2022   Little interest or pleasure in doing things 0   Feeling down, depressed, or hopeless 0   Trouble falling or staying asleep, or sleeping too much 1   Feeling tired or having little energy 1   Poor appetite or overeating 0   Feeling bad about yourself - or that you are a failure or have let yourself or your family down 0   Trouble concentrating on things, such as reading the newspaper or watching television 1   Moving or speaking so slowly that other people could have noticed. Or the opposite - being so fidgety or restless that you have been moving around a lot more than usual 0   Thoughts that you would be better off dead, or of hurting yourself in some way 0   Total Score 3   If you checked off any problems, how difficult have these problems made it for you to do your work, take care of things at home, or get along with other people? Not difficult at all       Health Habits and Functional and Cognitive Screening:  Functional & Cognitive Status 2022   Do you have difficulty preparing food and eating? No   Do you have difficulty bathing yourself, getting dressed or grooming yourself? No   Do you have difficulty using the toilet? No   Do you have difficulty moving around from place to place? No   Do you have trouble with steps or getting out of a bed or a chair? No   Current Diet Well Balanced Diet   Dental Exam Up to date   Eye Exam Up to date   Exercise (times per week) 0 times per week   Current Exercises Include No Regular Exercise    Current Exercise Activities Include -   Do you need help using the phone?  No   Are you deaf or do you have serious difficulty hearing?  No   Do you need help with transportation? No   Do you need help shopping? No   Do you need help preparing meals?  No   Do you need help with housework?  No   Do you need help with laundry? No   Do you need help taking your medications? No   Do you need help managing money? No   Do you ever drive or ride in a car without wearing a seat belt? No   Have you felt unusual stress, anger or loneliness in the last month? No   Who do you live with? Child   If you need help, do you have trouble finding someone available to you? No   Have you been bothered in the last four weeks by sexual problems? No   Do you have difficulty concentrating, remembering or making decisions? Yes       Age-appropriate Screening Schedule:  Refer to the list below for future screening recommendations based on patient's age, sex and/or medical conditions. Orders for these recommended tests are listed in the plan section. The patient has been provided with a written plan.    Health Maintenance   Topic Date Due   • LIPID PANEL  07/22/2022   • DXA SCAN  10/06/2022   • MAMMOGRAM  07/20/2023   • TDAP/TD VACCINES (2 - Td or Tdap) 09/14/2027   • INFLUENZA VACCINE  Completed   • ZOSTER VACCINE  Addressed              Assessment/Plan   CMS Preventative Services Quick Reference  Risk Factors Identified During Encounter  ?vascular dementia;  sees DR Partida for lumbar pain  The above risks/problems have been discussed with the patient.  Follow up actions/plans if indicated are seen below in the Assessment/Plan Section.  Pertinent information has been shared with the patient in the After Visit Summary.    There are no diagnoses linked to this encounter.    Follow Up:   No follow-ups on file.     An After Visit Summary and PPPS were made available to the patient.

## 2022-02-21 NOTE — PROGRESS NOTES
"Subjective   Kimberlee Urrutia is a 81 y.o. female.     History of Present Illness   Kimberlee Urrutia 81 y.o. female /60 (BP Location: Left arm, Patient Position: Sitting, Cuff Size: Adult)   Pulse 67   Temp 97.5 °F (36.4 °C)   Resp 16   Ht 165.1 cm (65\")   Wt 68.5 kg (151 lb)   LMP  (LMP Unknown)   SpO2 98%   BMI 25.13 kg/m²  who presents today for dry mouth.  she has a history of   Patient Active Problem List   Diagnosis   • Arthritis   • HLD (hyperlipidemia)   • Health care maintenance   • Knee pain, right   • Hx of total knee arthroplasty   • Seasonal allergies   • Vitamin D deficiency   • Pyuria   • Transient alteration of awareness   • Migraine without status migrainosus, not intractable   • Leukocytosis   • Viral pharyngitis   • Multinodular thyroid   • Osteopenia of multiple sites   • Colon cancer screening   • Serum calcium elevated   • Acute low back pain   • Elevated procalcitonin   • Sepsis with acute organ dysfunction (HCC)   • Sepsis due to Escherichia coli with acute renal failure without septic shock (HCC)   • Recurrent oral herpes simplex   • Lumbar spinal stenosis   • Metabolic encephalopathy   • Renal stones   • Colon cancer screening   • Sacroiliac joint pain   • Lumbar stenosis without neurogenic claudication   • Renal stone   • Hyperlipidemia   • History of total knee arthroplasty   • Spinal stenosis of lumbar region   • Multinodular goiter   • Osteopenia   • Pain in right knee   • Sacroiliac joint pain   • Hypercalcemia   .    Not every day====just when wake up----dry mouth; not during day; no issue otherwise to swallow.  Eats fine.  She is on Mobic PRN and Melatonin  Seeing neuro----for vascular dementia----she is to start Aricept-- [facility]    Having dry mouth  She has 4 teeth left and no new cavities and has a upper denture and a partial lower denture.. No issue with her dentures fitting and no issues with eating it is just occasional dry mouth upon awakening and " drinking water resolves this.  No sore throat, no hoarseness, GERD;  No dysphagia    Has not started Aricept per [facility]  Has appt with cardio----concern of small vessel disease    The following portions of the patient's history were reviewed and updated as appropriate: allergies, current medications, past family history, past medical history, past social history, past surgical history and problem list.    Review of Systems   Constitutional: Negative for activity change, appetite change and unexpected weight change.   HENT: Negative for nosebleeds and trouble swallowing.    Eyes: Negative for pain and visual disturbance.   Respiratory: Negative for chest tightness, shortness of breath and wheezing.    Cardiovascular: Negative for chest pain and palpitations.   Gastrointestinal: Negative for abdominal pain and blood in stool.   Endocrine: Negative.    Genitourinary: Negative for difficulty urinating and hematuria.   Musculoskeletal: Negative for joint swelling.   Skin: Negative for color change and rash.   Allergic/Immunologic: Negative.    Neurological: Negative for syncope and speech difficulty.   Hematological: Negative for adenopathy.   Psychiatric/Behavioral: Negative for agitation and confusion.   All other systems reviewed and are negative.      Objective   Physical Exam  Vitals and nursing note reviewed.   Constitutional:       General: She is not in acute distress.     Appearance: Normal appearance. She is well-developed. She is not ill-appearing or toxic-appearing.   HENT:      Head: Normocephalic.      Right Ear: External ear normal.      Left Ear: External ear normal.      Nose: Nose normal.      Mouth/Throat:      Mouth: Mucous membranes are dry.      Pharynx: Oropharynx is clear.      Comments: Has saliva; tongue depressor sticks to buccal mucosa  Eyes:      General: No scleral icterus.     Conjunctiva/sclera: Conjunctivae normal.      Pupils: Pupils are equal, round, and reactive to light.   Neck:       Thyroid: No thyromegaly.   Cardiovascular:      Rate and Rhythm: Normal rate and regular rhythm.      Heart sounds: Normal heart sounds. No murmur heard.      Pulmonary:      Effort: Pulmonary effort is normal. No respiratory distress.      Breath sounds: Normal breath sounds.   Musculoskeletal:         General: No deformity. Normal range of motion.      Cervical back: Normal range of motion and neck supple.   Skin:     General: Skin is warm and dry.      Findings: No rash.   Neurological:      General: No focal deficit present.      Mental Status: She is alert and oriented to person, place, and time. Mental status is at baseline.   Psychiatric:         Behavior: Behavior normal.      Comments: Here with her son Diony josue is easily aggravated by him         Assessment/Plan   Diagnoses and all orders for this visit:    1. Dry mouth (Primary)      Drink water  Consider seeing ENT and talk to pharmacist if could be from Mobic or Melatonin?  Looked up and did not see polydipsia  Hold Mobic and Melatonin to see if contributes to AM dry mouth  Try topical saliva substitute ---can drink water  Can see ENT ----if worse---may need oral Rx  Can try humidifier to see if dry air is contributing  Already avoiding antihistamines and has not started on Aricept  Followed by pain management DR Partida for lumbar pain

## 2022-02-24 ENCOUNTER — OFFICE VISIT (OUTPATIENT)
Dept: CARDIOLOGY | Facility: CLINIC | Age: 82
End: 2022-02-24

## 2022-02-24 VITALS
HEIGHT: 65 IN | DIASTOLIC BLOOD PRESSURE: 74 MMHG | SYSTOLIC BLOOD PRESSURE: 128 MMHG | BODY MASS INDEX: 25.13 KG/M2 | HEART RATE: 58 BPM

## 2022-02-24 DIAGNOSIS — I73.9 SMALL VESSEL DISEASE: Primary | ICD-10-CM

## 2022-02-24 PROCEDURE — 93000 ELECTROCARDIOGRAM COMPLETE: CPT | Performed by: INTERNAL MEDICINE

## 2022-02-24 PROCEDURE — 99203 OFFICE O/P NEW LOW 30 MIN: CPT | Performed by: INTERNAL MEDICINE

## 2022-02-24 NOTE — PROGRESS NOTES
"      CARDIOLOGY    Juan F Varghese MD    ENCOUNTER DATE:  02/24/2022    Kimberlee Urrutia / 81 y.o. / female        CHIEF COMPLAINT / REASON FOR OFFICE VISIT     small vessel disease      HISTORY OF PRESENT ILLNESS       HPI  Kimberlee Urrutia is a 81 y.o. female who presents today for consultation.  Patient was found to have small vessel disease in her brain.  She is actually getting atrophy secondary to that.  There is also concerned about a subarachnoid hemorrhage in the past.  With these findings she was referred for a cardiac evaluation.  Patient memory actually seemed pretty good today to me.  Her son confirms it comes and goes.  It was interesting because her son actually tracks her activities and was able to show me that the other day patient went for a walk for 31 minutes sustained her heart rate at or above 100.  Patient had no signs or symptoms of any issues.      The following portions of the patient's history were reviewed and updated as appropriate: allergies, current medications, past family history, past medical history, past social history, past surgical history and problem list.      VITAL SIGNS     Visit Vitals  /74 (BP Location: Left arm)   Pulse 58   Ht 165.1 cm (65\")   LMP  (LMP Unknown)   BMI 25.13 kg/m²         Wt Readings from Last 3 Encounters:   02/21/22 68.5 kg (151 lb)   10/26/21 67.9 kg (149 lb 9.6 oz)   10/19/21 67.1 kg (148 lb)     Body mass index is 25.13 kg/m².      REVIEW OF SYSTEMS   Review of Systems   All other systems reviewed and are negative.          PHYSICAL EXAMINATION     Vitals reviewed.   Constitutional:       Appearance: Healthy appearance.   Pulmonary:      Breath sounds: Normal breath sounds.   Cardiovascular:      PMI at left midclavicular line. Normal rate. Regular rhythm. Normal S1. Normal S2.      Murmurs: There is no murmur.      No gallop. No click. No rub.   Pulses:     Intact distal pulses.   Edema:     Peripheral edema absent.   Neurological:      " Mental Status: Alert and oriented to person, place and time.           REVIEWED DATA       ECG 12 Lead    Date/Time: 2/24/2022 11:42 AM  Performed by: Juan F Varghese MD  Authorized by: Juan F Varghese MD   Comparison: compared with previous ECG from 9/14/2017  Comparison to previous ECG: Patient is nonspecific ST and T wave maladies appear less prominent than previous ECG  Rhythm: sinus rhythm  Other findings: non-specific ST-T wave changes    Clinical impression: non-specific ECG            Cardiac Procedures:  1.     Lipid Panel    Lipid Panel 7/22/21   Total Cholesterol 224 (A)   Triglycerides 89   HDL Cholesterol 82 (A)   VLDL Cholesterol 15   LDL Cholesterol  127 (A)   (A) Abnormal value       Comments are available for some flowsheets but are not being displayed.               ASSESSMENT & PLAN      Diagnosis Plan   1. Small vessel disease (HCC)           SUMMARY/DISCUSSION  Patient diagnosed with small vessel disease in her brain.  She referred for possible cardiac etiology.  As I explained to both her and her son the process occurs throughout her body.  I did an EKG today which actually looked better than her EKG back in 2017.  The physical exam I did not really appreciate a murmur in her physical exam from a cardiovascular standpoint seemed fairly normal for her age.  In light of that and the fact that she is able to walk sustained for 31 minutes that we have documented by a Fitbit this is the most important finding.  I do not think a further work-up at this point is necessary unless she develops symptoms.  Told the patient to come back on an as-needed basis.      MEDICATIONS         Discharge Medications          Accurate as of February 24, 2022 11:43 AM. If you have any questions, ask your nurse or doctor.            Continue These Medications      Instructions Start Date   alendronate 70 MG tablet  Commonly known as: FOSAMAX   TAKE 1 TABLET BY MOUTH EVERY 7 DAYS WITH FULL GLASS OF WATER, SIT  UPRIGHT FOR 30 MINUTES      cholecalciferol 25 MCG (1000 UT) tablet  Commonly known as: VITAMIN D3   1,000 Units, Oral, Daily      meloxicam 7.5 MG tablet  Commonly known as: MOBIC   7.5 mg, Oral, Daily PRN      MULTI COMPLETE PO   Oral, Daily                 **Dragon Disclaimer:   Much of this encounter note is an electronic transcription/translation of spoken language to printed text. The electronic translation of spoken language may permit erroneous, or at times, nonsensical words or phrases to be inadvertently transcribed. Although I have reviewed the note for such errors, some may still exist.

## 2022-03-23 NOTE — PROGRESS NOTES
"The patient has a pain history of the following:  Lumbar stenosis  Lumbar spondylolisthesis   Sacroiliac joint pain     Previous interventions that the patient has received include:   L5-S1 Epidural steroid injection 8/2/21  Left sacroiliac joint injection 6/16/21     Pain medications include:  Acetaminophen prn   Meloxicam prn    Previously: Naproxen      Other conservative modalities which the patient reports using include:  Physical Therapy: yes - did not help   Chiropractor: yes - did not help   Massage Therapy: no  TENS: no  Neck or back surgery: no  Past pain management: no  Heat  Ice     Past Significant Surgical History:  Right knee replacement 2015    HPI:     CHIEF COMPLAINT Back Pain  FOLLOW UP BACK PAIN .  Patient complains of lower back, PT exercises are no longer helpful. States she is getting no relief from meloxicam. Pain is located lower buttocks.     Subjective   Kimberlee Urrutia is a 81 y.o. female  who presents for follow-up.  She has a history of chronic low back pain and difficulty walking.  At her previous visit we discussed caudal Epidural steroid injection if she wants to try another injection to help with her pain.  I prescribed meloxicam for \"stiffness\" complaints.     She complains of increased back/buttock pain since her last visit.  She hasn't been able to walk as long as she was.  The pain is located in the center of her sacrum and does not radiate into her legs.  She denies any falls or injuries.  She's been using an inversion table and doing home PT exercises without much benefit.     As for her medications, she states she rotates tylenol and meloxicam - one days she'll take one and another day she'll take another.  She feels that she actually gets better relief from Tylenol.     Her son is with her today to help provide history.        PEG Assessment   What number best describes your pain on average in the past week?6  What number best describes how, during the past week, pain has " "interfered with your enjoyment of life?6  What number best describes how, during the past week, pain has interfered with your general activity?  6    REVIEW OF PERTINENT MEDICAL DATA  None     The following portions of the patient's history were reviewed and updated as appropriate: allergies, current medications, past family history, past medical history, past social history, past surgical history and problem list.    Review of Systems   Constitutional: Negative for activity change, fatigue and fever.   HENT: Negative for congestion.    Respiratory: Negative for cough and chest tightness.    Gastrointestinal: Negative for abdominal pain, constipation and diarrhea.   Genitourinary: Negative for difficulty urinating and dysuria.   Musculoskeletal: Positive for back pain.   Neurological: Negative for dizziness, weakness, light-headedness, numbness and headaches.   Psychiatric/Behavioral: Positive for sleep disturbance. Negative for agitation and suicidal ideas. The patient is not nervous/anxious.      I have reviewed and confirmed the accuracy of the ROS as documented by the MA/LPN/RN Nu Partida MD    Vitals:    03/24/22 0941   BP: 148/77   BP Location: Right arm   Patient Position: Sitting   Pulse: 68   Resp: 12   Temp: 97.7 °F (36.5 °C)   SpO2: 99%   Weight: 69.5 kg (153 lb 3.2 oz)   Height: 165.1 cm (65\")   PainSc:   5   PainLoc: Back         Objective   Physical Exam  Vitals reviewed.   Constitutional:       General: She is not in acute distress.  Pulmonary:      Effort: Pulmonary effort is normal. No respiratory distress.   Musculoskeletal:      Comments: Ambulation: Without assistive device   Lumbar Exam:  Appearance: Scoliotic curve absent and scarring absent  Palpated over lumbosacral paravertebral regions and transverse processes with negative tenderness appreciated, Bilateral.   Sacroiliac joints are not tender, Bilateral.  Trochanteric bursa are not tender, Bilateral.  Straight leg raise is negative " radiculopathy, Bilateral.  Facet loading is negative for pain, Bilateral.  Paraspinal/adjacent lumbar musculature are not tender to palpation, Bilateral.  Mal Cristin's test is negative sacroiliac pain, Bilateral.  Negative pain with palpation of the coccyx.   Skin:     General: Skin is warm and dry.   Neurological:      General: No focal deficit present.      Mental Status: She is alert.   Psychiatric:         Mood and Affect: Mood normal.         Thought Content: Thought content normal.             Assessment/Plan   Diagnoses and all orders for this visit:    1. Lumbar stenosis without neurogenic claudication (Primary)  -     Case Request          - Will schedule for caudal Epidural steroid injection.  Risks discussed including but not limited to bleeding, bruising, infection, damage to surrounding structures, headache, and rare things such as being paralyzed, seizure, stroke, heart attack and death.  The risk of steroid medications include but are not limited to immunosuppression, which can increase the risk of denys an infectious disease as well as decrease the immune response to a vaccine.    - Advised to discontinue meloxicam if it's not helping at all.   - Kimberlee Urrutia reports a pain score of 5.  Given her pain assessment as noted, treatment options were discussed and the following options were decided upon as a follow-up plan to address the patient's pain: steroid injections.      --- Follow-up for caudal Epidural steroid injection and office visit 4-6 weeks after.        While examining this patient, I wore protective equipment including a mask, eye shield and gloves.  I washed my hands before and after this patient encounter.  The patient wore a mask throughout the visit as well.     Nu Partida MD  Pain Management

## 2022-03-24 ENCOUNTER — PREP FOR SURGERY (OUTPATIENT)
Dept: SURGERY | Facility: SURGERY CENTER | Age: 82
End: 2022-03-24

## 2022-03-24 ENCOUNTER — OFFICE VISIT (OUTPATIENT)
Dept: PAIN MEDICINE | Facility: CLINIC | Age: 82
End: 2022-03-24

## 2022-03-24 ENCOUNTER — TRANSCRIBE ORDERS (OUTPATIENT)
Dept: SURGERY | Facility: SURGERY CENTER | Age: 82
End: 2022-03-24

## 2022-03-24 VITALS
HEIGHT: 65 IN | OXYGEN SATURATION: 99 % | TEMPERATURE: 97.7 F | BODY MASS INDEX: 25.52 KG/M2 | SYSTOLIC BLOOD PRESSURE: 148 MMHG | DIASTOLIC BLOOD PRESSURE: 77 MMHG | WEIGHT: 153.2 LBS | HEART RATE: 68 BPM | RESPIRATION RATE: 12 BRPM

## 2022-03-24 DIAGNOSIS — Z41.9 SURGERY, ELECTIVE: Primary | ICD-10-CM

## 2022-03-24 DIAGNOSIS — M48.061 LUMBAR STENOSIS WITHOUT NEUROGENIC CLAUDICATION: Primary | ICD-10-CM

## 2022-03-24 PROCEDURE — 99214 OFFICE O/P EST MOD 30 MIN: CPT | Performed by: ANESTHESIOLOGY

## 2022-03-24 RX ORDER — SODIUM CHLORIDE 0.9 % (FLUSH) 0.9 %
10 SYRINGE (ML) INJECTION EVERY 12 HOURS SCHEDULED
Status: CANCELLED | OUTPATIENT
Start: 2022-03-24

## 2022-03-24 RX ORDER — SODIUM CHLORIDE 0.9 % (FLUSH) 0.9 %
10 SYRINGE (ML) INJECTION AS NEEDED
Status: CANCELLED | OUTPATIENT
Start: 2022-03-24

## 2022-03-24 NOTE — PATIENT INSTRUCTIONS
What to expect if we're setting up an injection/procedure    - I have placed the order today, we'll start speaking to your insurance for authorization (this can sometimes take a few weeks).   - You should be scheduled for your procedure before you leave the office.  If you were not, please call our office to schedule.   - A COVID test may be required for your procedure.  We will let you know if this needs to be scheduled.  - LIGHT Intravenous (IV) sedation is offered for some procedures: you will NOT be put to sleep.  If you plan to have sedation, do not eat or drink anything on the day of your injection.   - Most procedures require having someone drive you.  Please make sure you arrange a  unless told otherwise.   - If you take a blood thinner and you were not instructed whether to continue or hold it, please contact us with any questions.

## 2022-03-31 ENCOUNTER — OFFICE VISIT (OUTPATIENT)
Dept: FAMILY MEDICINE CLINIC | Facility: CLINIC | Age: 82
End: 2022-03-31

## 2022-03-31 ENCOUNTER — TELEPHONE (OUTPATIENT)
Dept: FAMILY MEDICINE CLINIC | Facility: CLINIC | Age: 82
End: 2022-03-31

## 2022-03-31 VITALS
RESPIRATION RATE: 18 BRPM | SYSTOLIC BLOOD PRESSURE: 140 MMHG | TEMPERATURE: 97.5 F | HEIGHT: 65 IN | BODY MASS INDEX: 25.66 KG/M2 | HEART RATE: 62 BPM | WEIGHT: 154 LBS | OXYGEN SATURATION: 100 % | DIASTOLIC BLOOD PRESSURE: 70 MMHG

## 2022-03-31 DIAGNOSIS — N30.01 ACUTE CYSTITIS WITH HEMATURIA: ICD-10-CM

## 2022-03-31 DIAGNOSIS — N39.41 URGE INCONTINENCE OF URINE: ICD-10-CM

## 2022-03-31 LAB
BILIRUB BLD-MCNC: NEGATIVE MG/DL
CLARITY, POC: ABNORMAL
COLOR UR: YELLOW
EXPIRATION DATE: ABNORMAL
GLUCOSE UR STRIP-MCNC: NEGATIVE MG/DL
KETONES UR QL: NEGATIVE
LEUKOCYTE EST, POC: ABNORMAL
Lab: ABNORMAL
NITRITE UR-MCNC: NEGATIVE MG/ML
PH UR: 6 [PH] (ref 5–8)
PROT UR STRIP-MCNC: NEGATIVE MG/DL
RBC # UR STRIP: ABNORMAL /UL
SP GR UR: 1.02 (ref 1–1.03)
UROBILINOGEN UR QL: NORMAL

## 2022-03-31 PROCEDURE — 81003 URINALYSIS AUTO W/O SCOPE: CPT | Performed by: NURSE PRACTITIONER

## 2022-03-31 PROCEDURE — 99214 OFFICE O/P EST MOD 30 MIN: CPT | Performed by: NURSE PRACTITIONER

## 2022-03-31 RX ORDER — NITROFURANTOIN 25; 75 MG/1; MG/1
100 CAPSULE ORAL 2 TIMES DAILY
Qty: 10 CAPSULE | Refills: 0 | Status: SHIPPED | OUTPATIENT
Start: 2022-03-31 | End: 2022-04-05

## 2022-03-31 NOTE — TELEPHONE ENCOUNTER
l     Caller: Kimberlee Urrutia    Relationship: Self    Best call back number: [phone]     What medication are you requesting:     SOMETHING FOR A POTENTIAL UTI.  PATIENT IS GOING MORE FREQUENTLY, AND HER URINE IS A DIFFERENT COLOR.  THIS HAS BEEN GOING ON FOR A COUPLE OF DAYS.    THIS IS THE PHARMACY PATIENT USES:    [facility] #[id number] [address] AT [facility] [address] - [phone] [facility] [phone] FX      PLEASE CALL PATIENT AND ADVISE.

## 2022-03-31 NOTE — PROGRESS NOTES
"Chief Complaint  Urinary Tract Infection (2 days urgency)    Luisa Mohan presents to [facility]    82 year old female presented to the clinic c/o 2 day history of urgency , frequency and mild dysuria.  States she has had utis in the past, but none recently.  She has no history of pyelonephritis that she is aware of.  She denies any fever, no new back pain.  She does have chronic back pain, but no CVA tenderness.      B/p in office today is 140/70.  Denies any ha, no blurred vision, no dizziness, no N/V/D.    She has no other c/o    The following portions of the patient's history were reviewed and updated as appropriate: allergies, current medications, past family history, past medical history, past social history, past surgical history, and problem list    Review of Systems   Constitutional: Negative for chills, fatigue and fever.   Respiratory: Negative for cough and shortness of breath.    Cardiovascular: Negative for chest pain, palpitations and leg swelling.   Genitourinary: Positive for dysuria, frequency and urgency. Negative for difficulty urinating, flank pain and pelvic pain.   Neurological: Negative for dizziness and light-headedness.        Objective   Vital Signs:   Vitals:    03/31/22 1336   BP: 140/70   Pulse: 62   Resp: 18   Temp: 97.5 °F (36.4 °C)   SpO2: 100%   Weight: 69.9 kg (154 lb)   Height: 165 cm (64.96\")        Physical Exam  Constitutional:       General: She is not in acute distress.     Appearance: Normal appearance. She is normal weight. She is not ill-appearing.   HENT:      Head: Normocephalic.   Cardiovascular:      Rate and Rhythm: Normal rate and regular rhythm.      Pulses: Normal pulses.      Heart sounds: Normal heart sounds. No murmur heard.  Pulmonary:      Effort: Pulmonary effort is normal.      Breath sounds: Normal breath sounds.   Abdominal:      General: Abdomen is flat. Bowel sounds are normal. There is no distension.      " Palpations: Abdomen is soft. There is no mass.      Tenderness: There is no abdominal tenderness. There is no right CVA tenderness, left CVA tenderness, guarding or rebound.      Hernia: No hernia is present.   Skin:     General: Skin is warm.      Findings: No rash.   Neurological:      Mental Status: She is alert and oriented to person, place, and time.   Psychiatric:         Mood and Affect: Mood normal.         Behavior: Behavior normal.         Thought Content: Thought content normal.         Judgment: Judgment normal.          Result Review :     The following data was reviewed by: MICHELE Mcneill on 03/31/2022:      POCT urinalysis dipstick, automated (03/31/2022 13:53)  Trace leukocytes and trace blood  Urinalysis With Microscopic - Urine, Clean Catch (03/31/2022 13:52)-  pending  Urine Culture - Urine, Urine, Clean Catch (03/31/2022 13:52)-  pending       Assessment and Plan    Diagnoses and all orders for this visit:    1. Acute cystitis with hematuria    Return if symptoms worsen or fail to improve.     Patient was given instructions and counseling regarding her condition or for health maintenance advice. Please see specific information pulled into the AVS if appropriate.  Information regarding urinary tract infection for adults was added to the patient's after visit summary today.     -Will send prescription for macrobid to the patient's pharmacy  -Urinalysis dipstick was performed in office today.  Her urine specimen was positive for trace amount of leukocytes, trace amount of blood  -Will send urine specimen for microscopic urinalysis and urine culture.  -Take all medications as prescribed and until completed  -Drink plenty of fluids  -Monitor for temperature and take Tylenol as needed  -Seek immediate medical attention for high fever, chills, back pain, nausea and vomiting, or any other worsening signs or symptoms.  -The patient verbalized understanding of all instructions given today.      Other orders  -     nitrofurantoin, macrocrystal-monohydrate, (Macrobid) 100 MG capsule; Take 1 capsule by mouth 2 (Two) Times a Day for 5 days.  Dispense: 10 capsule; Refill: 0    2. Urge incontinence of urine  -     POCT urinalysis dipstick, automated  -     Urine Culture - Urine, Urine, Clean Catch  -     Urinalysis With Microscopic - Urine, Clean Catch            Follow Up   Return if symptoms worsen or fail to improve.  Patient was given instructions and counseling regarding her condition or for health maintenance advice. Please see specific information pulled into the AVS if appropriate.

## 2022-04-04 ENCOUNTER — HOSPITAL ENCOUNTER (OUTPATIENT)
Facility: SURGERY CENTER | Age: 82
Setting detail: HOSPITAL OUTPATIENT SURGERY
Discharge: HOME | End: 2022-04-04
Attending: ANESTHESIOLOGY | Admitting: ANESTHESIOLOGY

## 2022-04-04 ENCOUNTER — HOSPITAL ENCOUNTER (OUTPATIENT)
Dept: GENERAL RADIOLOGY | Facility: SURGERY CENTER | Age: 82
Setting detail: HOSPITAL OUTPATIENT SURGERY
End: 2022-04-04

## 2022-04-04 VITALS
SYSTOLIC BLOOD PRESSURE: 162 MMHG | HEIGHT: 64 IN | RESPIRATION RATE: 16 BRPM | DIASTOLIC BLOOD PRESSURE: 80 MMHG | TEMPERATURE: 97.2 F | WEIGHT: 153 LBS | HEART RATE: 58 BPM | BODY MASS INDEX: 26.12 KG/M2 | OXYGEN SATURATION: 97 %

## 2022-04-04 DIAGNOSIS — M48.061 LUMBAR STENOSIS WITHOUT NEUROGENIC CLAUDICATION: ICD-10-CM

## 2022-04-04 DIAGNOSIS — Z41.9 SURGERY, ELECTIVE: ICD-10-CM

## 2022-04-04 PROCEDURE — 76000 FLUOROSCOPY <1 HR PHYS/QHP: CPT

## 2022-04-04 PROCEDURE — 62323 NJX INTERLAMINAR LMBR/SAC: CPT | Performed by: ANESTHESIOLOGY

## 2022-04-04 PROCEDURE — 0 IOHEXOL 300 MG/ML SOLUTION 10 ML VIAL: Performed by: ANESTHESIOLOGY

## 2022-04-04 PROCEDURE — 25010000002 DEXAMETHASONE SODIUM PHOSPHATE 100 MG/10ML SOLUTION 10 ML VIAL: Performed by: ANESTHESIOLOGY

## 2022-04-04 PROCEDURE — 77002 NEEDLE LOCALIZATION BY XRAY: CPT

## 2022-04-04 RX ORDER — SODIUM CHLORIDE 0.9 % (FLUSH) 0.9 %
10 SYRINGE (ML) INJECTION AS NEEDED
Status: DISCONTINUED | OUTPATIENT
Start: 2022-04-04 | End: 2022-04-04 | Stop reason: HOSPADM

## 2022-04-04 RX ORDER — SODIUM CHLORIDE 0.9 % (FLUSH) 0.9 %
10 SYRINGE (ML) INJECTION EVERY 12 HOURS SCHEDULED
Status: DISCONTINUED | OUTPATIENT
Start: 2022-04-04 | End: 2022-04-04 | Stop reason: HOSPADM

## 2022-04-04 NOTE — OP NOTE
Caudal Epidural:  [facility]    PREOPERATIVE DIAGNOSIS: Lumbar Spinal Stenosis without Neurogenic Claudication    POSTOPERATIVE DIAGNOSIS: Same as preop diagnosis    PROCEDURE:    • Caudal Epidural Steroid Injection, Therapeutic, with fluoroscopic guidance    PRE-PROCEDURE DISCUSSION WITH PATIENT:    Risks and complications were discussed with the patient prior to starting the procedure and informed consent was obtained.  We discussed various topics including but not limited to bleeding & infection & injury & paralysis & coma & death & worsening of clinical picture & postprocedural soreness & lack of pain relief.    SURGEON:  Nu Partida MD    REASON FOR PROCEDURE: Diagnostic injection at this level is needed and Stenotic area is noted, and is likely contributing to this chronic &/or recurrent pain.     SEDATION:   Patient declined administration of moderate sedation    ANESTHETIC:  NONE  STEROID:     15mg dexamethasone  TOTAL VOLUME OF INJECTATE: 5 mL    DESCRIPTON OF PROCEDURE:  After obtaining informed consent, the patient taken to the operating room and was placed in the prone position.  An IV  was not  started in the preoperative area.  All pressure points were well padded.  EKG, blood pressure, and pulse oximetry were monitored.  All sedation that was administered was given by the RN under my direct supervision and guidance.      The lumbosacral area was prepped with Chloraprep and draped in a sterile fashion with a sterile drape.  Lateral fluoroscopy was used to identify the sacral hiatus.  The skin and subcutaneous tissue overlying the area was anesthetized with 1% Lidocaine. A 22 gauge spinal needle was then advanced percutaneously through the anesthestized skin tract under fluoroscopic guidance into the caudal epidural space.  After negative aspiration for blood or CSF, a volume of 0.5mL of Omnipaque 180 was injected under live fluoroscopy. This revealed good epidural spread, with  no evidence of loculation, vascular run-off, or intrathecal spread.  Subsequently, a volume of 5mL consisting of 15mg of dexamethasone  mixed with normal saline was injected without resistance.  The needle was removed intact.       ESTIMATED BLOOD LOSS:  minimal  SPECIMENS:  none    COMPLICATIONS:   No complications were noted., There was no indication of vascular uptake on live injection of contrast dye. and There was no indication of intrathecal uptake on live injection of contrast dye.    TOLERANCE & DISCHARGE CONDITION:    The patient tolerated the procedure well.  The patient was transported to the recovery area without difficulties.  The patient was discharged to home under the care of family in stable and satisfactory condition.    PLAN OF CARE:  1. The patient was given our standard instruction sheet.  2. The patient will Return to clinic 4-6 wks  3. The patient will resume all medications as per the medication reconciliation sheet.

## 2022-04-05 LAB
APPEARANCE UR: CLEAR
BACTERIA #/AREA URNS HPF: ABNORMAL /[HPF]
BACTERIA UR CULT: ABNORMAL
BILIRUB UR QL STRIP: NEGATIVE
CASTS URNS QL MICRO: ABNORMAL /LPF
COLOR UR: YELLOW
CRYSTALS URNS MICRO: ABNORMAL
EPI CELLS #/AREA URNS HPF: ABNORMAL /HPF (ref 0–10)
GLUCOSE UR QL STRIP: NEGATIVE
HGB UR QL STRIP: NEGATIVE
KETONES UR QL STRIP: NEGATIVE
LEUKOCYTE ESTERASE UR QL STRIP: ABNORMAL
MICRO URNS: ABNORMAL
NITRITE UR QL STRIP: NEGATIVE
OTHER ANTIBIOTIC SUSC ISLT: ABNORMAL
PH UR STRIP: 6 [PH] (ref 5–7.5)
PROT UR QL STRIP: NEGATIVE
RBC #/AREA URNS HPF: ABNORMAL /HPF (ref 0–2)
SP GR UR STRIP: 1.02 (ref 1–1.03)
UNIDENT CRYS URNS QL MICRO: PRESENT
UROBILINOGEN UR STRIP-MCNC: 0.2 MG/DL (ref 0.2–1)
WBC #/AREA URNS HPF: ABNORMAL /HPF (ref 0–5)

## 2022-04-06 ENCOUNTER — TELEPHONE (OUTPATIENT)
Dept: FAMILY MEDICINE CLINIC | Facility: CLINIC | Age: 82
End: 2022-04-06

## 2022-04-06 NOTE — TELEPHONE ENCOUNTER
Hub staff attempted to follow warm transfer process and was unsuccessful     Caller: Kimberlee Urrutia S    Relationship to patient: Self    Best call back number: [phone]    Patient is needing: PATIENT WAS RETURNING A MISSED CALL FROM THE OFFICE, SAYS THERE WAS NO MESSAGE BUT THAT IT MIGHT BE ABOUT HER RESULTS FROM HER URINE CULTURE. PLEASE CALL PATIENT BACK.

## 2022-04-29 ENCOUNTER — TELEPHONE (OUTPATIENT)
Dept: FAMILY MEDICINE CLINIC | Facility: CLINIC | Age: 82
End: 2022-04-29

## 2022-04-29 NOTE — TELEPHONE ENCOUNTER
Caller: Kimberlee Urrutia S    Relationship: Self    Best call back number: [phone] ([phone])    PATIENT HAS MEDICATION QUESTIONS ABOUT A SCRIPT THAT WAS PRESCRIBED BY ANOTHER DOCTOR.    SHE DOES NOT KNOW THE DOCTORS NAME WHO PRESCRIBED THIS BUT IT WAS DONE BACK IN September AND SHE HAS NOT BEEN TAKING IT AT ALL. SHE DIDN'T KNOW WHAT IT WAS FOR.     ITS: DONEPEZIL 5 MG.     SHE THINKS THE DOCTORS NAME THAT PRESCRIBED IT WAS A OLIVER DEMPSEY     PLEASE GIVE HER A CALLBACK

## 2022-04-29 NOTE — TELEPHONE ENCOUNTER
I returned pt call.  Pt was advised to contact the prescribing doctors office.  Pt states she understands and will contact them.  Thanks

## 2022-05-03 ENCOUNTER — OFFICE VISIT (OUTPATIENT)
Dept: PAIN MEDICINE | Facility: CLINIC | Age: 82
End: 2022-05-03

## 2022-05-03 VITALS
TEMPERATURE: 96.8 F | BODY MASS INDEX: 26.12 KG/M2 | OXYGEN SATURATION: 98 % | HEART RATE: 62 BPM | RESPIRATION RATE: 12 BRPM | DIASTOLIC BLOOD PRESSURE: 82 MMHG | WEIGHT: 153 LBS | HEIGHT: 64 IN | SYSTOLIC BLOOD PRESSURE: 164 MMHG

## 2022-05-03 DIAGNOSIS — M54.50 CHRONIC BILATERAL LOW BACK PAIN WITHOUT SCIATICA: ICD-10-CM

## 2022-05-03 DIAGNOSIS — G89.29 CHRONIC BILATERAL LOW BACK PAIN WITHOUT SCIATICA: ICD-10-CM

## 2022-05-03 DIAGNOSIS — M48.061 LUMBAR STENOSIS WITHOUT NEUROGENIC CLAUDICATION: Primary | ICD-10-CM

## 2022-05-03 DIAGNOSIS — M53.3 SACROILIAC JOINT PAIN: ICD-10-CM

## 2022-05-03 PROCEDURE — 99214 OFFICE O/P EST MOD 30 MIN: CPT | Performed by: NURSE PRACTITIONER

## 2022-05-03 NOTE — PROGRESS NOTES
CHIEF COMPLAINT  PROCEDURE FOLLOW UP CAUDAL EPIDURAL 04/04/2022  Patient reports 100% success in the area where she got injection  Subjective   Kimberlee Urrutia is a 82 y.o. female  who presents to the office for follow-up of procedure.  She presents with her son who is helpful in providing history.  She completed a caudal epidural   on  4/4/2022 performed by Dr. Partida for management of sacral pain. Patient reports 100% ONGOING relief from the procedure.     Her primary pain complaint today is right low back pain. She states that this pain is manageable with OTC tylenol and occasional use of Meloxicam. She denies any side effects with this medication.     Procedure list:  8/2/2021-L5-S1 LESI-no relief  6/16/2021-left SI joint injection-significant relief for low back pain    Patient remained masked during entire encounter. No cough present. I donned a mask and eye protection throughout entire visit. Prior to donning mask and eye protection, hand hygiene was performed, as well as when it was doffed.  I was closer than 6 feet, but not for an extended period of time. No obvious exposure to any bodily fluids.    Back Pain  This is a chronic problem. The current episode started more than 1 month ago. The problem occurs intermittently. Progression since onset: improved since last office visit. The pain is present in the gluteal, sacro-iliac and lumbar spine. The quality of the pain is described as aching. The pain is at a severity of 3/10. The pain is moderate. The pain is worse during the day. The symptoms are aggravated by bending, sitting, standing and twisting. Pertinent negatives include no abdominal pain, chest pain, dysuria, fever, headaches, numbness or weakness.      PEG Assessment   What number best describes your pain on average in the past week?4  What number best describes how, during the past week, pain has interfered with your enjoyment of life?4  What number best describes how, during the past week, pain  has interfered with your general activity? 4    Review of Pertinent Medical Data ---  Office visit from 3/24/2022 with Dr. Partida reviewed.  Patient has a history of back pain and states her PT exercises are no longer helping.  Her pain is located in her back/buttock and is affecting her ability to walk.  This pain is in the center of her sacrum and does not radiate into her legs.  She was prescribed meloxicam at a previous office visit.  She alternates this with Tylenol, and feels that she actually gets better relief from the Tylenol.  Discontinue meloxicam as it is not helping.  Proceed with caudal epidural steroid injection.    MRI LUMBAR SPINE WITH AND WITHOUT CONTRAST     HISTORY: Low back pain, evaluate for spinal abscess.     COMPARISON: None.     FINDINGS: Note is made to transitional anatomy with partial  lumbarization of S1. Careful correlation prior to any intervention is  required given the presence of transitional anatomy.     The conus is slightly low-lying beyond the level of L2. The caudal  aspect of the spinal cord appears unremarkable.     There is moderate loss of disc height, disc desiccation and mild to  moderate endplate degenerative changes at L5-S1. There is an  anterolisthesis of L4 upon L5 estimated to be 5 mm.     L1-L2: Mild facet degenerative disease is present bilaterally.     L2-L3: Mild facet degenerative disease is present bilaterally.     L3-L4: Mild facet degenerative disease is present bilaterally.     L4-L5: There is severe central canal stenosis and lateral recess  narrowing bilaterally secondary to facet and ligamentum flavum  hypertrophy, the anterolisthesis of L4 upon L5 and broad-based disc  bulge. There is unroofing of the disc which also extends cephalad  slightly, terminating posterior to the inferior endplate of L4 at the  midline. Mild neural foraminal compromise is present bilaterally  secondary to extension of a disc osteophyte complex into the neural  foramen.      L5-S1: Mild facet degenerative disease is present bilaterally. Mild  neural foraminal compromise is present bilaterally secondary to  extension of a disc osteophyte complex into the neural foramen.     After contrast administration there was enhancement of the facets at  L4-L5 consistent with degenerative disease.     IMPRESSION:  No evidence of discitis, osteomyelitis or of epidural  abscess. Multilevel degenerative disease is noted as described in detail  above most prominent of which is at L4-L5 where there is severe canal  stenosis and lateral recess narrowing bilaterally secondary to posterior  element degenerative disease, anterolisthesis and a broad-based disc  osteophyte complex. There is no evidence of focal herniation. See above.     This report was finalized on 2/18/2021 3:07 PM by Dr. Josue Kuo M.D.    The following portions of the patient's history were reviewed and updated as appropriate: allergies, current medications, past family history, past medical history, past social history, past surgical history and problem list.    Review of Systems   Constitutional: Negative for activity change, fatigue and fever.   HENT: Negative for congestion.    Eyes: Negative for visual disturbance.   Respiratory: Negative for cough and chest tightness.    Cardiovascular: Negative for chest pain.   Gastrointestinal: Negative for abdominal pain, constipation and diarrhea.   Genitourinary: Negative for difficulty urinating, dyspareunia and dysuria.   Musculoskeletal: Positive for back pain.   Neurological: Negative for dizziness, weakness, light-headedness, numbness and headaches.   Psychiatric/Behavioral: Positive for sleep disturbance. Negative for agitation and suicidal ideas. The patient is not nervous/anxious.      --  The aforementioned information the Chief Complaint section and above subjective data including any HPI data, and also the Review of Systems data, has been personally reviewed and  "affirmed.  --     Vitals:    05/03/22 1238   BP: 164/82   BP Location: Left arm   Patient Position: Sitting   Pulse: 62   Resp: 12   Temp: 96.8 °F (36 °C)   SpO2: 98%   Weight: 69.4 kg (153 lb)   Height: 162.6 cm (64\")   PainSc:   3   PainLoc: Back     Objective   Physical Exam  Vitals and nursing note reviewed.   Constitutional:       Appearance: Normal appearance. She is well-developed.   Eyes:      General: Lids are normal.   Cardiovascular:      Rate and Rhythm: Normal rate.   Pulmonary:      Effort: Pulmonary effort is normal.   Musculoskeletal:      Lumbar back: Tenderness present. No bony tenderness.   Neurological:      Mental Status: She is alert and oriented to person, place, and time.   Psychiatric:         Attention and Perception: Attention normal.         Mood and Affect: Mood normal.         Speech: Speech normal.         Behavior: Behavior normal.         Judgment: Judgment normal.       Assessment/Plan   Diagnoses and all orders for this visit:    1. Lumbar stenosis without neurogenic claudication (Primary)  -     Ambulatory Referral to Physical Therapy Evaluate and treat    2. Chronic bilateral low back pain without sciatica    3. Sacroiliac joint pain  -     Ambulatory Referral to Physical Therapy Evaluate and treat      --- Patient's son asks if resuming PT would be potentially helpful.  Discussed that she absolutely could resume PT with goal of strengthening her core, but that is up to her as her pain seems to be currently well controlled.  --- Son asks about use of tramadol or gabapentin for her mother's pain.  Discussed that as long as she is getting relief to her pain from OTC Tylenol and meloxicam I do not recommend introduction of controlled substances with, with a variety of risks.  --- Discussed that she can continue to use meloxicam as needed as she states that this is helpful at times.  --- Discussed that with regards to her caudal epidural this can be repeated every 3 months as " needed.  --- Follow-up if pain returns/worsens.      ALYSSA REPORT    As the clinician, I personally reviewed the ALYSSA from 5/3/2022 while the patient was in the office today.    Dictated utilizing Dragon dictation.      This document is intended for medical expert use only. Reading of this document by patients and/or patient's family without participating medical staff guidance may result in misinterpretation and unintended morbidity.   Any interpretation of such data is the responsibility of the patient and/or family member responsible for the patient in concert with their primary or specialist providers, not to be left for sources of online searches such as [employer], Who[employer] or similar queries. Relying on these approaches to knowledge may result in misinterpretation, misguided goals of care and even death should patients or family members try recommendations outside of the realm of professional medical care in a supervised way.

## 2022-07-28 ENCOUNTER — OFFICE VISIT (OUTPATIENT)
Dept: FAMILY MEDICINE CLINIC | Facility: CLINIC | Age: 82
End: 2022-07-28

## 2022-07-28 VITALS
WEIGHT: 154.2 LBS | HEART RATE: 60 BPM | TEMPERATURE: 97.5 F | BODY MASS INDEX: 26.32 KG/M2 | DIASTOLIC BLOOD PRESSURE: 67 MMHG | OXYGEN SATURATION: 98 % | RESPIRATION RATE: 16 BRPM | SYSTOLIC BLOOD PRESSURE: 132 MMHG | HEIGHT: 64 IN

## 2022-07-28 DIAGNOSIS — F01.A0 MILD VASCULAR NEUROCOGNITIVE DISORDER: Primary | ICD-10-CM

## 2022-07-28 DIAGNOSIS — R41.3 SHORT-TERM MEMORY LOSS: ICD-10-CM

## 2022-07-28 DIAGNOSIS — M48.061 SPINAL STENOSIS OF LUMBAR REGION WITHOUT NEUROGENIC CLAUDICATION: ICD-10-CM

## 2022-07-28 PROCEDURE — 99213 OFFICE O/P EST LOW 20 MIN: CPT | Performed by: PHYSICIAN ASSISTANT

## 2022-07-28 RX ORDER — DONEPEZIL HYDROCHLORIDE 10 MG/1
1 TABLET, FILM COATED ORAL DAILY
COMMUNITY
Start: 2022-06-23

## 2022-07-28 NOTE — PROGRESS NOTES
"Subjective   Kimberlee Urrutia is a 82 y.o. female.     History of Present Illness   Kimberlee Urrutia 82 y.o. female /67 (BP Location: Left arm, Patient Position: Sitting, Cuff Size: Adult)   Pulse 60   Temp 97.5 °F (36.4 °C)   Resp 16   Ht 162.6 cm (64.02\")   Wt 69.9 kg (154 lb 3.2 oz)   LMP  (LMP Unknown)   SpO2 98%   BMI 26.46 kg/m²  who presents today for letter with son Dane Urrutia, son, and POA.  Needs documentation filled out on a physician claim form Long term care--- noted on this form per evaluation from Dr. 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  Also noting her lumbar is stenosis without neurogenic claudication and did see pain management in May.  she has a history of   Patient Active Problem List   Diagnosis   • Arthritis   • HLD (hyperlipidemia)   • Health care maintenance   • Knee pain, right   • Hx of total knee arthroplasty   • Seasonal allergies   • Vitamin D deficiency   • Pyuria   • Transient alteration of awareness   • Migraine without status migrainosus, not intractable   • Leukocytosis   • Viral pharyngitis   • Multinodular thyroid   • Osteopenia of multiple sites   • Colon cancer screening   • Serum calcium elevated   • Acute low back pain   • Elevated procalcitonin   • Sepsis with acute organ dysfunction (HCC)   • Sepsis due to Escherichia coli with acute renal failure without septic shock (HCC)   • Recurrent oral herpes simplex   • Lumbar spinal stenosis   • Metabolic encephalopathy   • Renal stones   • Colon cancer screening   • Sacroiliac joint pain   • Lumbar stenosis without neurogenic claudication   • Renal stone   • Hyperlipidemia   • History of total knee arthroplasty   • Spinal stenosis of lumbar region   • Multinodular goiter   • Osteopenia   • Pain in right knee   • Sacroiliac joint pain   • Hypercalcemia   .    Form is asking for home health care to keep her in the home and assist with what ever needs that she " has    The following portions of the patient's history were reviewed and updated as appropriate: allergies, current medications, past family history, past medical history, past social history, past surgical history and problem list.    Review of Systems   Constitutional: Negative for activity change, appetite change and unexpected weight change.   HENT: Negative for nosebleeds and trouble swallowing.    Eyes: Negative for pain and visual disturbance.   Respiratory: Negative for chest tightness, shortness of breath and wheezing.    Cardiovascular: Negative for chest pain and palpitations.   Gastrointestinal: Negative for abdominal pain and blood in stool.   Endocrine: Negative.    Genitourinary: Negative for difficulty urinating and hematuria.   Musculoskeletal: Positive for back pain.   Skin: Negative for color change and rash.   Allergic/Immunologic: Negative.    Neurological: Negative for syncope and speech difficulty.   Hematological: Negative for adenopathy.   Psychiatric/Behavioral: Negative for agitation and confusion.   All other systems reviewed and are negative.      Objective   Physical Exam  Vitals and nursing note reviewed.   Constitutional:       General: She is not in acute distress.     Appearance: Normal appearance. She is well-developed. She is not ill-appearing or toxic-appearing.   HENT:      Head: Normocephalic.      Right Ear: External ear normal.      Left Ear: External ear normal.      Nose: Nose normal.      Mouth/Throat:      Pharynx: Oropharynx is clear.   Eyes:      General: No scleral icterus.     Conjunctiva/sclera: Conjunctivae normal.      Pupils: Pupils are equal, round, and reactive to light.   Neck:      Thyroid: No thyromegaly.   Cardiovascular:      Rate and Rhythm: Normal rate and regular rhythm.      Heart sounds: No murmur heard.  Pulmonary:      Effort: Pulmonary effort is normal.   Musculoskeletal:         General: No deformity. Normal range of motion.      Cervical back:  Normal range of motion and neck supple.   Skin:     General: Skin is warm and dry.      Findings: No rash.   Neurological:      General: No focal deficit present.      Mental Status: She is alert and oriented to person, place, and time. Mental status is at baseline.      Motor: No weakness.      Coordination: Coordination normal.      Gait: Gait normal.   Psychiatric:         Mood and Affect: Mood normal.         Behavior: Behavior normal.         Thought Content: Thought content normal.         Judgment: Judgment normal.      Comments: Here with son         Assessment & Plan   Diagnoses and all orders for this visit:    1. Mild vascular neurocognitive disorder (HCC) (Primary)    2. Short-term memory loss    3. Spinal stenosis of lumbar region without neurogenic claudication      Form filled out. Asking for in home care.  Can take to ENT for hearing check.

## 2022-10-04 DIAGNOSIS — E55.9 VITAMIN D DEFICIENCY: ICD-10-CM

## 2022-10-04 DIAGNOSIS — E78.2 MIXED HYPERLIPIDEMIA: Primary | ICD-10-CM

## 2022-10-04 DIAGNOSIS — M85.89 OSTEOPENIA OF MULTIPLE SITES: ICD-10-CM

## 2022-10-04 DIAGNOSIS — R41.3 MEMORY CHANGES: ICD-10-CM

## 2023-01-01 NOTE — PLAN OF CARE
Problem: Confusion, Acute (Adult)  Goal: Cognitive/Functional Impairments Minimized  Outcome: Ongoing (interventions implemented as appropriate)  Goal: Safety  Outcome: Ongoing (interventions implemented as appropriate)    Problem: Sepsis (Adult)  Goal: Signs and Symptoms of Listed Potential Problems Will be Absent or Manageable (Sepsis)  Outcome: Ongoing (interventions implemented as appropriate)      
Problem: Inpatient SLP  Goal: Dysphagia- Patient will safely consume diet as per recommendation with no signs/symptoms of aspiration  Outcome: Outcome(s) achieved Date Met:  09/15/17    09/15/17 0915   Safely Consume Diet   Safely Consume Diet- SLP, Date Established 09/15/17   Safely Consume Diet- SLP, Time to Achieve by discharge   Safely Consume Diet- SLP, Outcome goal met           
Problem: Patient Care Overview (Adult)  Goal: Plan of Care Review    09/15/17 1016   Coping/Psychosocial Response Interventions   Plan Of Care Reviewed With patient   Outcome Evaluation   Outcome Summary/Follow up Plan Pt. will benefit from skilled inpt. P.T. to address her functional deficits and to assist pt. in regaining her independence with functional mobility.         Problem: Inpatient Physical Therapy  Goal: Bed Mobility Goal LTG- PT    09/15/17 1016   Bed Mobility PT LTG   Bed Mobility PT LTG, Date Established 09/15/17   Bed Mobility PT LTG, Time to Achieve 5 days   Bed Mobility PT LTG, Activity Type all bed mobility   Bed Mobility PT LTG, [place] independent       Goal: Transfer Training Goal 1 LTG- PT    09/15/17 1016   Transfer Training PT LTG   Transfer Training PT LTG, Date Established 09/15/17   Transfer Training PT LTG, Time to Achieve 5 days   Transfer Training PT LTG, Activity Type all transfers   Transfer Training PT LTG, [place] independent       Goal: Gait Training Goal LTG- PT    09/15/17 1016   Gait Training PT LTG   Gait Training Goal PT LTG, Date Established 09/15/17   Gait Training Goal PT LTG, Time to Achieve 5 days   Gait Training Goal PT LTG, [place] independent   Gait Training Goal PT LTG, Distance to Achieve 350 feet           
Problem: Patient Care Overview (Adult)  Goal: Plan of Care Review  Outcome: Ongoing (interventions implemented as appropriate)    09/15/17 0402   Coping/Psychosocial Response Interventions   Plan Of Care Reviewed With patient   Patient Care Overview   Progress no change   Outcome Evaluation   Outcome Summary/Follow up Plan Patient's vital signs remain stable tonight. Patient complained of slight headache after ambulating to bathroom, but otherwise did not complain of pain. Patient is A&O at times, and confused at times. Patient's son Ezra at bedside around 2am, plan of care discussed with him and patient. Plan is for patient to continue IVABX and have MRI as well as CT scan this morning. Neuro consulted, patient will be seen today. Continue to monitor closely, continue with current plan of care.         Problem: Fall Risk (Adult)  Goal: Identify Related Risk Factors and Signs and Symptoms  Outcome: Outcome(s) achieved Date Met:  09/15/17  Goal: Absence of Falls  Outcome: Ongoing (interventions implemented as appropriate)    Problem: Confusion, Acute (Adult)  Goal: Identify Related Risk Factors and Signs and Symptoms  Outcome: Outcome(s) achieved Date Met:  09/15/17  Goal: Cognitive/Functional Impairments Minimized  Outcome: Ongoing (interventions implemented as appropriate)  Goal: Safety  Outcome: Ongoing (interventions implemented as appropriate)    Problem: Sepsis (Adult)  Goal: Signs and Symptoms of Listed Potential Problems Will be Absent or Manageable (Sepsis)  Outcome: Ongoing (interventions implemented as appropriate)      
Problem: Patient Care Overview (Adult)  Goal: Plan of Care Review  Outcome: Ongoing (interventions implemented as appropriate)    09/15/17 1355   Coping/Psychosocial Response Interventions   Plan Of Care Reviewed With patient   Outcome Evaluation   Outcome Summary/Follow up Plan Pt presents with impaired cognition, processing, balance, activity tolerance and awareness of surroundings. Pt may benefit from skilled OT to increase safety and independence.         Problem: Inpatient Occupational Therapy  Goal: Transfer Training Goal 1 LTG- OT  Outcome: Ongoing (interventions implemented as appropriate)    09/15/17 1355   Transfer Training OT LTG   Transfer Training OT LTG, Date Established 09/15/17   Transfer Training OT LTG, Time to Achieve 1 wk   Transfer Training OT LTG, Activity Type bed to chair /chair to bed;sit to stand/stand to sit;toilet   Transfer Training OT LTG, [place] conditional independence       Goal: ADL Goal LTG- OT  Outcome: Ongoing (interventions implemented as appropriate)    09/15/17 1355   ADL OT LTG   ADL OT LTG, Date Established 09/15/17   ADL OT LTG, Time to Achieve 1 wk   ADL OT LTG, Activity Type ADL skills   ADL OT LTG, [place] modified independent       Goal: Functional Mobility Goal LTG- OT  Outcome: Ongoing (interventions implemented as appropriate)    09/15/17 1355   Functional Mobility OT LTG   Functional Mobility Goal OT LTG, Date Established 09/15/17   Functional Mobility Goal OT LTG, Time to Achieve 1 wk   Functional Mobility Goal OT LTG, [place] modified independent   Functional Mobility Goal OT LTG, Distance to Achieve to the sink;to the bathroom           
Problem: Patient Care Overview (Adult)  Goal: Plan of Care Review  Outcome: Ongoing (interventions implemented as appropriate)    09/15/17 1442   Coping/Psychosocial Response Interventions   Plan Of Care Reviewed With patient   Patient Care Overview   Progress progress toward functional goals as expected   Outcome Evaluation   Outcome Summary/Follow up Plan Per Neurologist CVA was ruled out. Pt still confused but improving when compared to am. Regular diet, per speech. Ambulating with assist X1 to BA. Continue on antibiotics IV for UTI.          Problem: Fall Risk (Adult)  Goal: Absence of Falls  Outcome: Ongoing (interventions implemented as appropriate)    09/15/17 1442   Fall Risk (Adult)   Absence of Falls making progress toward outcome. Continue falls precautions, bed alarm, etc       09/15/17 1442   Fall Risk (Adult)   Absence of Falls making progress toward outcome             Problem: Confusion, Acute (Adult)  Goal: Cognitive/Functional Impairments Minimized  Outcome: Ongoing (interventions implemented as appropriate)    09/15/17 1442   Confusion, Acute (Adult)   Cognitive/Functional Impairments Minimized making progress toward outcome. Patient still confused but more oriented that in am.        09/15/17 1442   Confusion, Acute (Adult)   Cognitive/Functional Impairments Minimized making progress toward outcome           Goal: Safety  Outcome: Ongoing (interventions implemented as appropriate)    09/15/17 1442   Confusion, Acute (Adult)   Safety making progress toward outcome           
Problem: Patient Care Overview (Adult)  Goal: Plan of Care Review  Outcome: Ongoing (interventions implemented as appropriate)    09/16/17 0572   Coping/Psychosocial Response Interventions   Plan Of Care Reviewed With patient   Patient Care Overview   Progress improving   Outcome Evaluation   Outcome Summary/Follow up Plan patient rested quietly throughout the night. no c/o pain. aox4 with no confusion. ambulating and voiding w/out difficulty. vss, bp slightly elevated and low grade temp. will continue to monitor           
Problem: Patient Care Overview (Adult)  Goal: Plan of Care Review  Outcome: Ongoing (interventions implemented as appropriate)    09/16/17 1602   Coping/Psychosocial Response Interventions   Plan Of Care Reviewed With patient   Patient Care Overview   Progress improving   Outcome Evaluation   Outcome Summary/Follow up Plan Pt tolerated treatment well w/ no c/o pain. Increased gait distance to 200ft w/out AD. Occasional unsteadiness noted when turning, but no LOBs. Pt would benefit from continued skilled PT to address functional mobility deficits.           
Problem: Patient Care Overview (Adult)  Goal: Plan of Care Review  Outcome: Ongoing (interventions implemented as appropriate)    09/16/17 8086   Coping/Psychosocial Response Interventions   Plan Of Care Reviewed With patient;family   Patient Care Overview   Progress improving   Outcome Evaluation   Outcome Summary/Follow up Plan Stable through shift. No pain except when standing (chronic). Declined treatment for that. Continue to encourage movement. Possible d/c to home tomorrow.        Goal: Adult Individualization and Mutuality  Outcome: Ongoing (interventions implemented as appropriate)    Problem: Fall Risk (Adult)  Goal: Absence of Falls  Outcome: Ongoing (interventions implemented as appropriate)    Problem: Confusion, Acute (Adult)  Goal: Cognitive/Functional Impairments Minimized  Outcome: Ongoing (interventions implemented as appropriate)  Goal: Safety  Outcome: Ongoing (interventions implemented as appropriate)    Problem: Sepsis (Adult)  Goal: Signs and Symptoms of Listed Potential Problems Will be Absent or Manageable (Sepsis)  Outcome: Ongoing (interventions implemented as appropriate)      
Problem: Patient Care Overview (Adult)  Goal: Plan of Care Review  Outcome: Outcome(s) achieved Date Met:  09/17/17 09/17/17 5988   Coping/Psychosocial Response Interventions   Plan Of Care Reviewed With patient;family   Patient Care Overview   Progress improving   Outcome Evaluation   Outcome Summary/Follow up Plan Patient is stable and pleasant. Ready for d/c home.       Goal: Adult Individualization and Mutuality  Outcome: Outcome(s) achieved Date Met:  09/17/17    Problem: Fall Risk (Adult)  Goal: Absence of Falls  Outcome: Outcome(s) achieved Date Met:  09/17/17    Problem: Confusion, Acute (Adult)  Goal: Cognitive/Functional Impairments Minimized  Outcome: Outcome(s) achieved Date Met:  09/17/17  Goal: Safety  Outcome: Outcome(s) achieved Date Met:  09/17/17    Problem: Sepsis (Adult)  Goal: Signs and Symptoms of Listed Potential Problems Will be Absent or Manageable (Sepsis)  Outcome: Outcome(s) achieved Date Met:  09/17/17      
(2) well flexed

## 2023-01-10 LAB
25(OH)D3+25(OH)D2 SERPL-MCNC: 28.9 NG/ML (ref 30–100)
ALBUMIN SERPL-MCNC: 4.3 G/DL (ref 3.5–5.2)
ALBUMIN/GLOB SERPL: 1.8 G/DL
ALP SERPL-CCNC: 61 U/L (ref 39–117)
ALT SERPL-CCNC: 11 U/L (ref 1–33)
AST SERPL-CCNC: 14 U/L (ref 1–32)
BASOPHILS # BLD AUTO: 0.04 10*3/MM3 (ref 0–0.2)
BASOPHILS NFR BLD AUTO: 0.6 % (ref 0–1.5)
BILIRUB SERPL-MCNC: 0.5 MG/DL (ref 0–1.2)
BUN SERPL-MCNC: 18 MG/DL (ref 8–23)
BUN/CREAT SERPL: 21.7 (ref 7–25)
CALCIUM SERPL-MCNC: 10.6 MG/DL (ref 8.6–10.5)
CHLORIDE SERPL-SCNC: 108 MMOL/L (ref 98–107)
CHOLEST SERPL-MCNC: 216 MG/DL (ref 0–200)
CO2 SERPL-SCNC: 27.2 MMOL/L (ref 22–29)
CREAT SERPL-MCNC: 0.83 MG/DL (ref 0.57–1)
EGFRCR SERPLBLD CKD-EPI 2021: 70 ML/MIN/1.73
EOSINOPHIL # BLD AUTO: 0.14 10*3/MM3 (ref 0–0.4)
EOSINOPHIL NFR BLD AUTO: 2.2 % (ref 0.3–6.2)
ERYTHROCYTE [DISTWIDTH] IN BLOOD BY AUTOMATED COUNT: 13.1 % (ref 12.3–15.4)
FOLATE SERPL-MCNC: 11.9 NG/ML (ref 4.78–24.2)
GLOBULIN SER CALC-MCNC: 2.4 GM/DL
GLUCOSE SERPL-MCNC: 87 MG/DL (ref 65–99)
HCT VFR BLD AUTO: 40.8 % (ref 34–46.6)
HDLC SERPL-MCNC: 77 MG/DL (ref 40–60)
HGB BLD-MCNC: 13.7 G/DL (ref 12–15.9)
IMM GRANULOCYTES # BLD AUTO: 0.03 10*3/MM3 (ref 0–0.05)
IMM GRANULOCYTES NFR BLD AUTO: 0.5 % (ref 0–0.5)
LDLC SERPL CALC-MCNC: 127 MG/DL (ref 0–100)
LYMPHOCYTES # BLD AUTO: 1.76 10*3/MM3 (ref 0.7–3.1)
LYMPHOCYTES NFR BLD AUTO: 27.8 % (ref 19.6–45.3)
MCH RBC QN AUTO: 28.1 PG (ref 26.6–33)
MCHC RBC AUTO-ENTMCNC: 33.6 G/DL (ref 31.5–35.7)
MCV RBC AUTO: 83.8 FL (ref 79–97)
MONOCYTES # BLD AUTO: 0.44 10*3/MM3 (ref 0.1–0.9)
MONOCYTES NFR BLD AUTO: 7 % (ref 5–12)
NEUTROPHILS # BLD AUTO: 3.91 10*3/MM3 (ref 1.7–7)
NEUTROPHILS NFR BLD AUTO: 61.9 % (ref 42.7–76)
NRBC BLD AUTO-RTO: 0 /100 WBC (ref 0–0.2)
PLATELET # BLD AUTO: 277 10*3/MM3 (ref 140–450)
POTASSIUM SERPL-SCNC: 5 MMOL/L (ref 3.5–5.2)
PROT SERPL-MCNC: 6.7 G/DL (ref 6–8.5)
RBC # BLD AUTO: 4.87 10*6/MM3 (ref 3.77–5.28)
SODIUM SERPL-SCNC: 144 MMOL/L (ref 136–145)
T3FREE SERPL-MCNC: 2.4 PG/ML (ref 2–4.4)
T4 FREE SERPL-MCNC: 1.19 NG/DL (ref 0.93–1.7)
TRIGL SERPL-MCNC: 71 MG/DL (ref 0–150)
TSH SERPL DL<=0.005 MIU/L-ACNC: 2 UIU/ML (ref 0.27–4.2)
VIT B12 SERPL-MCNC: 410 PG/ML (ref 211–946)
VLDLC SERPL CALC-MCNC: 12 MG/DL (ref 5–40)
WBC # BLD AUTO: 6.32 10*3/MM3 (ref 3.4–10.8)

## 2023-01-11 LAB
SPECIMEN STATUS: NORMAL
WRITTEN AUTHORIZATION: NORMAL

## 2023-01-18 ENCOUNTER — OFFICE VISIT (OUTPATIENT)
Dept: FAMILY MEDICINE CLINIC | Facility: CLINIC | Age: 83
End: 2023-01-18
Payer: MEDICARE

## 2023-01-18 VITALS
WEIGHT: 161 LBS | TEMPERATURE: 97.5 F | RESPIRATION RATE: 16 BRPM | SYSTOLIC BLOOD PRESSURE: 121 MMHG | OXYGEN SATURATION: 97 % | HEART RATE: 66 BPM | HEIGHT: 64 IN | DIASTOLIC BLOOD PRESSURE: 80 MMHG | BODY MASS INDEX: 27.49 KG/M2

## 2023-01-18 DIAGNOSIS — E55.9 VITAMIN D DEFICIENCY: ICD-10-CM

## 2023-01-18 DIAGNOSIS — R79.89 ELEVATED PTHRP LEVEL: ICD-10-CM

## 2023-01-18 DIAGNOSIS — Z78.0 POST-MENOPAUSAL: ICD-10-CM

## 2023-01-18 DIAGNOSIS — E83.52 SERUM CALCIUM ELEVATED: ICD-10-CM

## 2023-01-18 DIAGNOSIS — E78.2 HYPERLIPIDEMIA, MIXED: ICD-10-CM

## 2023-01-18 DIAGNOSIS — M85.89 OSTEOPENIA OF MULTIPLE SITES: Primary | ICD-10-CM

## 2023-01-18 PROCEDURE — 99214 OFFICE O/P EST MOD 30 MIN: CPT | Performed by: PHYSICIAN ASSISTANT

## 2023-01-18 RX ORDER — ROSUVASTATIN CALCIUM 10 MG/1
10 TABLET, COATED ORAL DAILY
Qty: 90 TABLET | Refills: 1 | Status: SHIPPED | OUTPATIENT
Start: 2023-01-18

## 2023-01-18 NOTE — PROGRESS NOTES
Subjective   Kimberlee Urrutia is a 82 y.o. female.     History of Present Illness    Since the last visit, she has overall felt well.  She has Hyperlipidemia and will start medication plan for treatment.  I will order follow up labs and Vitamin D deficiency and will update labs for continued management.  she has been compliant with current medications have reviewed them.  The patient denies medication side effects.  Will refill medications. LMP  (LMP Unknown)     Results for orders placed or performed in visit on 10/04/22   Comprehensive metabolic panel    Specimen: Blood   Result Value Ref Range    Glucose 87 65 - 99 mg/dL    BUN 18 8 - 23 mg/dL    Creatinine 0.83 0.57 - 1.00 mg/dL    EGFR Result 70.0 >60.0 mL/min/1.73    BUN/Creatinine Ratio 21.7 7.0 - 25.0    Sodium 144 136 - 145 mmol/L    Potassium 5.0 3.5 - 5.2 mmol/L    Chloride 108 (H) 98 - 107 mmol/L    Total CO2 27.2 22.0 - 29.0 mmol/L    Calcium 10.6 (H) 8.6 - 10.5 mg/dL    Total Protein 6.7 6.0 - 8.5 g/dL    Albumin 4.3 3.5 - 5.2 g/dL    Globulin 2.4 gm/dL    A/G Ratio 1.8 g/dL    Total Bilirubin 0.5 0.0 - 1.2 mg/dL    Alkaline Phosphatase 61 39 - 117 U/L    AST (SGOT) 14 1 - 32 U/L    ALT (SGPT) 11 1 - 33 U/L   Lipid panel    Specimen: Blood   Result Value Ref Range    Total Cholesterol 216 (H) 0 - 200 mg/dL    Triglycerides 71 0 - 150 mg/dL    HDL Cholesterol 77 (H) 40 - 60 mg/dL    VLDL Cholesterol Alvin 12 5 - 40 mg/dL    LDL Chol Calc (NIH) 127 (H) 0 - 100 mg/dL   TSH    Specimen: Blood   Result Value Ref Range    TSH 2.000 0.270 - 4.200 uIU/mL   T3, Free    Specimen: Blood   Result Value Ref Range    T3, Free 2.4 2.0 - 4.4 pg/mL   T4, Free    Specimen: Blood   Result Value Ref Range    Free T4 1.19 0.93 - 1.70 ng/dL   Vitamin B12    Specimen: Blood   Result Value Ref Range    Vitamin B-12 410 211 - 946 pg/mL   Folate    Specimen: Blood   Result Value Ref Range    Folate 11.90 4.78 - 24.20 ng/mL   Vitamin D 25 Hydroxy    Specimen: Blood   Result Value  Ref Range    25 Hydroxy, Vitamin D 28.9 (L) 30.0 - 100.0 ng/ml   Specimen Status Report   Result Value Ref Range    Specimen Status Comment    Written Authorization   Result Value Ref Range    Written Authorization Comment    CBC and Differential    Specimen: Blood   Result Value Ref Range    WBC 6.32 3.40 - 10.80 10*3/mm3    RBC 4.87 3.77 - 5.28 10*6/mm3    Hemoglobin 13.7 12.0 - 15.9 g/dL    Hematocrit 40.8 34.0 - 46.6 %    MCV 83.8 79.0 - 97.0 fL    MCH 28.1 26.6 - 33.0 pg    MCHC 33.6 31.5 - 35.7 g/dL    RDW 13.1 12.3 - 15.4 %    Platelets 277 140 - 450 10*3/mm3    Neutrophil Rel % 61.9 42.7 - 76.0 %    Lymphocyte Rel % 27.8 19.6 - 45.3 %    Monocyte Rel % 7.0 5.0 - 12.0 %    Eosinophil Rel % 2.2 0.3 - 6.2 %    Basophil Rel % 0.6 0.0 - 1.5 %    Neutrophils Absolute 3.91 1.70 - 7.00 10*3/mm3    Lymphocytes Absolute 1.76 0.70 - 3.10 10*3/mm3    Monocytes Absolute 0.44 0.10 - 0.90 10*3/mm3    Eosinophils Absolute 0.14 0.00 - 0.40 10*3/mm3    Basophils Absolute 0.04 0.00 - 0.20 10*3/mm3    Immature Granulocyte Rel % 0.5 0.0 - 0.5 %    Immature Grans Absolute 0.03 0.00 - 0.05 10*3/mm3    nRBC 0.0 0.0 - 0.2 /100 WBC     Her chol risk score 26.34%    Patient has been referred to neurology for treatment of mild cognitive disorder her last visit was 1/4/2023.  I reviewed the progress note noting her diagnosis continues to be mild cognitive disorder and was referred to speech therapy for cognition, dementia, mild NOS--- she was continued on Aricept 10 mg daily and discussed adding Namenda in the future.  Last MoCA score was 24 MMSE..  Neuropsych evaluation with [facility] was completed on 11/22/2021 and patient was evaluated by Moshe Sharma Psy.D. his diagnosis was vascular dementia, mild  Since last visit patient has been under care of urologist Dr. Lu for management of renal calculi.  Required lithotripsy 11/9/2022.  She has small vessel disease per CT head-----    Saw cardio DR Varghese  6-29[birth year]59--neg  The following portions of the patient's history were reviewed and updated as appropriate: allergies, current medications, past family history, past medical history, past social history, past surgical history and problem list.    Review of Systems   Constitutional: Negative for activity change, appetite change and unexpected weight change.   HENT: Negative for nosebleeds and trouble swallowing.    Eyes: Negative for pain and visual disturbance.   Respiratory: Negative for chest tightness, shortness of breath and wheezing.    Cardiovascular: Negative for chest pain and palpitations.   Gastrointestinal: Negative for abdominal pain and blood in stool.   Endocrine: Negative.    Genitourinary: Negative for difficulty urinating and hematuria.   Skin: Negative for color change and rash.   Allergic/Immunologic: Negative.    Neurological: Negative for syncope and speech difficulty.   Hematological: Negative for adenopathy.   Psychiatric/Behavioral: Negative for agitation and confusion.   All other systems reviewed and are negative.      Objective   Physical Exam  Vitals and nursing note reviewed.   Constitutional:       General: She is not in acute distress.     Appearance: She is well-developed. She is not ill-appearing or toxic-appearing.   HENT:      Head: Normocephalic.      Right Ear: External ear normal.      Left Ear: External ear normal.      Nose: Nose normal.      Mouth/Throat:      Pharynx: Oropharynx is clear.   Eyes:      General: No scleral icterus.     Conjunctiva/sclera: Conjunctivae normal.      Pupils: Pupils are equal, round, and reactive to light.   Neck:      Thyroid: No thyromegaly.   Cardiovascular:      Rate and Rhythm: Normal rate and regular rhythm.      Heart sounds: Normal heart sounds. No murmur heard.  Pulmonary:      Effort: Pulmonary effort is normal. No respiratory distress.      Breath sounds: Normal breath sounds.   Abdominal:      Tenderness: There is no rebound.    Musculoskeletal:         General: No deformity. Normal range of motion.      Cervical back: Normal range of motion and neck supple.   Skin:     General: Skin is warm and dry.      Findings: No rash.   Neurological:      General: No focal deficit present.      Mental Status: She is alert and oriented to person, place, and time. Mental status is at baseline.   Psychiatric:         Mood and Affect: Mood normal.      Comments: She was cooperative today and answer my questions.  Pleasant         Assessment & Plan   Diagnoses and all orders for this visit:    1. Osteopenia of multiple sites (Primary)    2. Post-menopausal  -     DEXA Bone Density Axial; Future    3. Hyperlipidemia, mixed    4. Vitamin D deficiency    5. Serum calcium elevated  -     Basic Metabolic Panel  -     Calcium, Ionized  -     PTH, Intact  -     Phosphorus  -     Magnesium        Will stop Fosamax by Oct----5 yr max  DEXA due  Add Vit D 2,000 IU daily  Start Crestor for lipids--also last brain MRI mild to MODERATE small vessel disease---labs in 6 mos to f/u  Also get labs today for mildly elevated Ca and check PTH  Seeing neuro for mild dementia  Plan, Kimberlee Urrutia, was seen today.  she was seen for Hyperlipidemia with new medication plan and Vitamin D deficiency and will update labs .    I need to add additional documentation---  I will document after last my exam room--she was in her lab area labs drawn and was confused of why she was there and why she had a piece of paper with writing on it--- my MA came to explain that she is just see me in the office notes are my notes and exam findings.  She did not realize she had already given blood and was ready to leave.    Her POA son has been alerted to this

## 2023-01-19 LAB
BUN SERPL-MCNC: 16 MG/DL (ref 8–23)
BUN/CREAT SERPL: 19.8 (ref 7–25)
CA-I SERPL ISE-MCNC: 6.1 MG/DL (ref 4.5–5.6)
CALCIUM SERPL-MCNC: 10.4 MG/DL (ref 8.6–10.5)
CHLORIDE SERPL-SCNC: 106 MMOL/L (ref 98–107)
CO2 SERPL-SCNC: 26.7 MMOL/L (ref 22–29)
CREAT SERPL-MCNC: 0.81 MG/DL (ref 0.57–1)
EGFRCR SERPLBLD CKD-EPI 2021: 72.1 ML/MIN/1.73
GLUCOSE SERPL-MCNC: 90 MG/DL (ref 65–99)
MAGNESIUM SERPL-MCNC: 2.3 MG/DL (ref 1.6–2.4)
PHOSPHATE SERPL-MCNC: 2.9 MG/DL (ref 2.5–4.5)
POTASSIUM SERPL-SCNC: 4.5 MMOL/L (ref 3.5–5.2)
PTH-INTACT SERPL-MCNC: 57 PG/ML (ref 15–65)
SODIUM SERPL-SCNC: 141 MMOL/L (ref 136–145)

## 2023-04-05 ENCOUNTER — TELEPHONE (OUTPATIENT)
Dept: FAMILY MEDICINE CLINIC | Facility: CLINIC | Age: 83
End: 2023-04-05

## 2023-04-05 NOTE — TELEPHONE ENCOUNTER
Caller: [facility]- [facility]    Relationship:     Best call back number: [phone]    What was the call regarding: [facility] STATED THAT FORMS WERE FAXED ON Thursday OF LAST WEEK THAT ARE NEEDING TO BE SIGNED SO PATIENT CAN LIVE AT [facility]. [facility] IS CALLING TO CHECK ON THE STATUS OF THOSE FORMS. PLEASE ADVISE.     Do you require a callback: YES

## 2023-04-11 ENCOUNTER — OFFICE VISIT (OUTPATIENT)
Dept: PAIN MEDICINE | Facility: CLINIC | Age: 83
End: 2023-04-11
Payer: MEDICARE

## 2023-04-11 VITALS
BODY MASS INDEX: 28.24 KG/M2 | HEIGHT: 64 IN | OXYGEN SATURATION: 97 % | HEART RATE: 67 BPM | WEIGHT: 165.4 LBS | SYSTOLIC BLOOD PRESSURE: 147 MMHG | DIASTOLIC BLOOD PRESSURE: 88 MMHG | TEMPERATURE: 98.2 F

## 2023-04-11 DIAGNOSIS — M48.061 LUMBAR STENOSIS WITHOUT NEUROGENIC CLAUDICATION: ICD-10-CM

## 2023-04-11 DIAGNOSIS — M54.50 CHRONIC LEFT-SIDED LOW BACK PAIN WITHOUT SCIATICA: Primary | ICD-10-CM

## 2023-04-11 DIAGNOSIS — G89.29 CHRONIC LEFT-SIDED LOW BACK PAIN WITHOUT SCIATICA: Primary | ICD-10-CM

## 2023-04-11 PROCEDURE — 1125F AMNT PAIN NOTED PAIN PRSNT: CPT | Performed by: NURSE PRACTITIONER

## 2023-04-11 PROCEDURE — 99214 OFFICE O/P EST MOD 30 MIN: CPT | Performed by: NURSE PRACTITIONER

## 2023-04-11 PROCEDURE — 1160F RVW MEDS BY RX/DR IN RCRD: CPT | Performed by: NURSE PRACTITIONER

## 2023-04-11 PROCEDURE — 1159F MED LIST DOCD IN RCRD: CPT | Performed by: NURSE PRACTITIONER

## 2023-04-11 RX ORDER — MELOXICAM 7.5 MG/1
7.5 TABLET ORAL DAILY PRN
Qty: 30 TABLET | Refills: 2 | Status: SHIPPED | OUTPATIENT
Start: 2023-04-11

## 2023-04-11 NOTE — PROGRESS NOTES
CHIEF COMPLAINT    Follow up back pain - the pain has worsened since last visit. The pain is worse in the morning.    Subjective   Kimberlee Urrutia is a 83 y.o. female  who presents for follow-up.  She has a history of back pain.  Today her pain is 4/10VAS in severity. Her pain returned ~3-4 months ago. She presents with her son who is also her legal guardian who is helpful in providing information. She moved into [facility] yesterday.     Her primary pain complaint in her sacral region radiating into the buttocks. This is worse in the morning and improves as she is up and moving around.     She does manage to walk 1.5-2 miles 2-3/week. She has completed PT previously and does have PT exercises to complete at home.     Procedure list:  4/4/2022-caudal epidural-100% relief for many months.   8/2/2021-LESI L5/S1-no relief  6/16/2021-left SI joint injection-significant relief    Back Pain  This is a chronic problem. The current episode started more than 1 month ago. The problem occurs intermittently. The problem has been gradually worsening since onset. The pain is present in the gluteal, sacro-iliac and lumbar spine. The quality of the pain is described as aching. The pain is at a severity of 4/10. The pain is moderate. The pain is worse during the day. The symptoms are aggravated by bending, sitting, standing and twisting. Pertinent negatives include no abdominal pain, chest pain, dysuria, fever, headaches, numbness or weakness.      PEG Assessment   What number best describes your pain on average in the past week?0  What number best describes how, during the past week, pain has interfered with your enjoyment of life?0  What number best describes how, during the past week, pain has interfered with your general activity?  0    Review of Pertinent Medical Data ---  MRI LUMBAR SPINE WITH AND WITHOUT CONTRAST     HISTORY: Low back pain, evaluate for spinal abscess.     COMPARISON: None.     FINDINGS: Note is made to  transitional anatomy with partial  lumbarization of S1. Careful correlation prior to any intervention is  required given the presence of transitional anatomy.     The conus is slightly low-lying beyond the level of L2. The caudal  aspect of the spinal cord appears unremarkable.     There is moderate loss of disc height, disc desiccation and mild to  moderate endplate degenerative changes at L5-S1. There is an  anterolisthesis of L4 upon L5 estimated to be 5 mm.     L1-L2: Mild facet degenerative disease is present bilaterally.     L2-L3: Mild facet degenerative disease is present bilaterally.     L3-L4: Mild facet degenerative disease is present bilaterally.     L4-L5: There is severe central canal stenosis and lateral recess  narrowing bilaterally secondary to facet and ligamentum flavum  hypertrophy, the anterolisthesis of L4 upon L5 and broad-based disc  bulge. There is unroofing of the disc which also extends cephalad  slightly, terminating posterior to the inferior endplate of L4 at the  midline. Mild neural foraminal compromise is present bilaterally  secondary to extension of a disc osteophyte complex into the neural  foramen.     L5-S1: Mild facet degenerative disease is present bilaterally. Mild  neural foraminal compromise is present bilaterally secondary to  extension of a disc osteophyte complex into the neural foramen.     After contrast administration there was enhancement of the facets at  L4-L5 consistent with degenerative disease.     IMPRESSION:  No evidence of discitis, osteomyelitis or of epidural  abscess. Multilevel degenerative disease is noted as described in detail  above most prominent of which is at L4-L5 where there is severe canal  stenosis and lateral recess narrowing bilaterally secondary to posterior  element degenerative disease, anterolisthesis and a broad-based disc  osteophyte complex. There is no evidence of focal herniation. See above.     This report was finalized on 2/18/2021  "3:07 PM by Dr. Josue Kuo M.D.     The following portions of the patient's history were reviewed and updated as appropriate: allergies, current medications, past family history, past medical history, past social history, past surgical history and problem list.    Review of Systems   Constitutional: Negative for chills, fatigue and fever.   Respiratory: Negative for cough and shortness of breath.    Cardiovascular: Negative for chest pain.   Gastrointestinal: Negative for abdominal pain, constipation and diarrhea.   Genitourinary: Negative for difficulty urinating and dysuria.   Musculoskeletal: Positive for back pain.   Neurological: Negative for dizziness, weakness, numbness and headaches.   Psychiatric/Behavioral: Negative for sleep disturbance.     --  The aforementioned information the Chief Complaint section and above subjective data including any HPI data, and also the Review of Systems data, has been personally reviewed and affirmed.  --    Vitals:    04/11/23 1156   BP: 147/88   BP Location: Left arm   Patient Position: Sitting   Pulse: 67   Temp: 98.2 °F (36.8 °C)   TempSrc: Temporal   SpO2: 97%   Weight: 75 kg (165 lb 6.4 oz)   Height: 162.6 cm (64.02\")   PainSc:   4   PainLoc: Back     Objective   Physical Exam  Vitals and nursing note reviewed.   Constitutional:       Appearance: Normal appearance. She is well-developed.   Eyes:      General: Lids are normal.   Cardiovascular:      Rate and Rhythm: Normal rate.   Pulmonary:      Effort: Pulmonary effort is normal.   Musculoskeletal:      Lumbar back: Tenderness present. Decreased range of motion. Negative right straight leg raise test and negative left straight leg raise test.      Comments:   -Ramesh SI Compression  -Ramesh Cristin  -Ramesh Thigh Thrust  -Ramesh Gaenslen's   Neurological:      Mental Status: She is alert and oriented to person, place, and time.   Psychiatric:         Attention and Perception: Attention normal.         Mood and Affect: Mood " normal.         Speech: Speech normal.         Behavior: Behavior normal.         Judgment: Judgment normal.       Assessment & Plan   Diagnoses and all orders for this visit:    1. Chronic left-sided low back pain without sciatica  -     meloxicam (MOBIC) 7.5 MG tablet; Take 1 tablet by mouth Daily As Needed for Moderate Pain.  Dispense: 30 tablet; Refill: 2    2. Lumbar stenosis without neurogenic claudication  -     meloxicam (MOBIC) 7.5 MG tablet; Take 1 tablet by mouth Daily As Needed for Moderate Pain.  Dispense: 30 tablet; Refill: 2      --- Resume Meloxicam 7.5mg daily.  --- Consider repeating Caudal JUSTYN if pain does not improve.   Reviewed the procedure at length with the patient.  Included in the review was expectations, complications, risk and benefits.The procedure was described in detail and the risks, benefits and alternatives were discussed with the patient (including but not limited to: bleeding, infection, nerve damage, worsening of pain, inability to perform injection, paralysis, seizures, coma, no pain relief and death) who agreed to proceed.  Discussed the potential for sedation if warranted/wanted.  The procedure will plan to be performed at [facility] with fluoroscopic guidance(unless ultrasound is indicated) and could potentially have steroids and contrast dye used. Questions were answered and in a way the patient could understand.  Patient verbalized understanding and wishes to proceed.  This intervention will be ordered.  Discussed with patient that all procedures are part of a multimodal plan of care and include either formal PT or a home exercise program.  Patient has no evidence of coagulopathy or current infection.  --- Follow-up Caudal JUSTYN if pain does not improve or worsens     Dictated utilizing Dragon dictation.

## 2023-04-12 ENCOUNTER — TELEPHONE (OUTPATIENT)
Dept: FAMILY MEDICINE CLINIC | Facility: CLINIC | Age: 83
End: 2023-04-12
Payer: MEDICARE

## 2023-04-12 NOTE — TELEPHONE ENCOUNTER
This pt  son called, stating that [employer] sent some forms to be sign and fax back, he also stated that they sent out forms the second time and still have not received anything back. He said that they getting kind of ansi because they have not received the forms back. I ask Mr. Campbell to have them fax to  and I would try and get it sign for him. He think it getting to that 100 day kaitlin where she can only stay for that 100 days.

## 2023-04-14 NOTE — TELEPHONE ENCOUNTER
Called and spoke with patient's son, Dane, he stated that he will call back on Monday.  I told him that we do not have paperwork.  Stated that facility got into patient's Mychart with him and may have all of the info that they need.  He will call back on Monday if they need anything else

## 2023-06-13 ENCOUNTER — TELEPHONE (OUTPATIENT)
Dept: FAMILY MEDICINE CLINIC | Facility: CLINIC | Age: 83
End: 2023-06-13
Payer: MEDICARE

## 2023-06-13 ENCOUNTER — TELEPHONE (OUTPATIENT)
Dept: FAMILY MEDICINE CLINIC | Facility: CLINIC | Age: 83
End: 2023-06-13

## 2023-06-13 NOTE — TELEPHONE ENCOUNTER
Caller: PADILLA TA    Relationship: Emergency Contact    Best call back number: [phone]     Who are you requesting to speak with (clinical staff, provider,  specific staff member):  CLINICAL STAFF    What was the call regarding: PATIENT'S SON IS ASKING IF SOMEONE HAS KNOWLEDGE OF THE [facility]. PLEASE ADVISE.

## 2023-06-13 NOTE — TELEPHONE ENCOUNTER
Called and spoke with patient's son.  He will try to get more info and call back when he finds out

## 2023-06-13 NOTE — TELEPHONE ENCOUNTER
"Caller: PADILLA TA    Relationship: Emergency Contact    Best call back number: [phone]    What form or medical record are you requesting: WRITTEN PRESCRIPTION FOR [facility]    Who is requesting this form or medical record from you:     [facility] [address], [place] [id number]  PHONE NUMBER: [phone]      How would you like to receive the form or medical records (pick-up, mail, fax): FAX  If fax, what is the fax number:[phone]    Timeframe paperwork needed: ASAP    Additional notes: PATIENT'S SON STATES THAT ALL [facility] NEEDS IS A WRITTEN PRESCRIPTION FROM [employer] MAY BE FAXED OVER AND MARKED \" ATTENTION THIRD FLOOR\"  AND THEY WILL BE ABLE TO GET IT FILLED FOR PATIENT.        "

## 2023-08-07 ENCOUNTER — HOSPITAL ENCOUNTER (OUTPATIENT)
Dept: BONE DENSITY | Facility: HOSPITAL | Age: 83
Discharge: HOME | End: 2023-08-07
Admitting: PHYSICIAN ASSISTANT
Payer: MEDICARE

## 2023-08-07 DIAGNOSIS — Z78.0 POST-MENOPAUSAL: ICD-10-CM

## 2023-08-07 PROCEDURE — 77080 DXA BONE DENSITY AXIAL: CPT

## 2023-09-26 ENCOUNTER — OFFICE VISIT (OUTPATIENT)
Dept: FAMILY MEDICINE CLINIC | Facility: CLINIC | Age: 83
End: 2023-09-26
Payer: MEDICARE

## 2023-09-26 VITALS
DIASTOLIC BLOOD PRESSURE: 78 MMHG | HEIGHT: 64 IN | RESPIRATION RATE: 16 BRPM | WEIGHT: 178 LBS | SYSTOLIC BLOOD PRESSURE: 148 MMHG | OXYGEN SATURATION: 99 % | TEMPERATURE: 97.5 F | BODY MASS INDEX: 30.39 KG/M2 | HEART RATE: 67 BPM

## 2023-09-26 DIAGNOSIS — F06.70 MILD VASCULAR NEUROCOGNITIVE DISORDER: ICD-10-CM

## 2023-09-26 DIAGNOSIS — E55.9 VITAMIN D DEFICIENCY: ICD-10-CM

## 2023-09-26 DIAGNOSIS — I99.9 MILD VASCULAR NEUROCOGNITIVE DISORDER: ICD-10-CM

## 2023-09-26 DIAGNOSIS — E78.2 MIXED HYPERLIPIDEMIA: ICD-10-CM

## 2023-09-26 DIAGNOSIS — I10 PRIMARY HYPERTENSION: ICD-10-CM

## 2023-09-26 DIAGNOSIS — R73.01 IMPAIRED FASTING GLUCOSE: ICD-10-CM

## 2023-09-26 DIAGNOSIS — M85.89 OSTEOPENIA OF MULTIPLE SITES: Primary | ICD-10-CM

## 2023-09-26 PROBLEM — F02.80 DAT (DEMENTIA OF ALZHEIMER TYPE): Status: ACTIVE | Noted: 2023-09-26

## 2023-09-26 PROBLEM — G30.9 DAT (DEMENTIA OF ALZHEIMER TYPE): Status: ACTIVE | Noted: 2023-09-26

## 2023-09-26 PROCEDURE — 1160F RVW MEDS BY RX/DR IN RCRD: CPT | Performed by: PHYSICIAN ASSISTANT

## 2023-09-26 PROCEDURE — 3078F DIAST BP <80 MM HG: CPT | Performed by: PHYSICIAN ASSISTANT

## 2023-09-26 PROCEDURE — 3077F SYST BP >= 140 MM HG: CPT | Performed by: PHYSICIAN ASSISTANT

## 2023-09-26 PROCEDURE — 1159F MED LIST DOCD IN RCRD: CPT | Performed by: PHYSICIAN ASSISTANT

## 2023-09-26 PROCEDURE — 99214 OFFICE O/P EST MOD 30 MIN: CPT | Performed by: PHYSICIAN ASSISTANT

## 2023-09-26 RX ORDER — MEMANTINE HYDROCHLORIDE 5 MG/1
5 TABLET ORAL 2 TIMES DAILY
COMMUNITY

## 2023-09-26 RX ORDER — AMLODIPINE BESYLATE 2.5 MG/1
2.5 TABLET ORAL DAILY
Qty: 30 TABLET | Refills: 5 | Status: SHIPPED | OUTPATIENT
Start: 2023-09-26

## 2023-09-26 NOTE — PROGRESS NOTES
"Subjective   Kimberlee Urrutia is a 83 y.o. female.     History of Present Illness      Since the last visit, she has overall felt fairly well.  She has New diagnosis today of Primary Hypertension and will start medication, Vitamin D deficiency and will update labs for continued management, and mixed hyperlipidemia we will check labs to make sure LDL goals met .  she has been compliant with current medications have reviewed them.  The patient denies medication side effects.  Will refill medications. /78   Pulse 67   Temp 97.5 °F (36.4 °C)   Resp 16   Ht 162.6 cm (64.02\")   Wt 80.7 kg (178 lb)   LMP  (LMP Unknown)   SpO2 99%   BMI 30.53 kg/m²   No recent renal stones  Weight up 16 lbs---less exercise  Here with son  Mom in memory care  Results for orders placed or performed in visit on 01/18/23   Basic Metabolic Panel    Specimen: Blood   Result Value Ref Range    Glucose 90 65 - 99 mg/dL    BUN 16 8 - 23 mg/dL    Creatinine 0.81 0.57 - 1.00 mg/dL    EGFR Result 72.1 >60.0 mL/min/1.73    BUN/Creatinine Ratio 19.8 7.0 - 25.0    Sodium 141 136 - 145 mmol/L    Potassium 4.5 3.5 - 5.2 mmol/L    Chloride 106 98 - 107 mmol/L    Total CO2 26.7 22.0 - 29.0 mmol/L    Calcium 10.4 8.6 - 10.5 mg/dL   Calcium, Ionized    Specimen: Blood   Result Value Ref Range    Ionized Calcium 6.1 (H) 4.5 - 5.6 mg/dL   PTH, Intact    Specimen: Blood   Result Value Ref Range    PTH, Intact 57 15 - 65 pg/mL   Phosphorus    Specimen: Blood   Result Value Ref Range    Phosphorus 2.9 2.5 - 4.5 mg/dL   Magnesium    Specimen: Blood   Result Value Ref Range    Magnesium 2.3 1.6 - 2.4 mg/dL   Sees neuro for dementia;   MOCHA 16  Last LDL not at goal on Jan labs  Lives assisted living now  Here with son  Saw cardio 2-24-22--  1. Small vessel disease (HCC)          ECG 12 Lead     Date/Time: 2/24/2022 11:42 AM  Performed by: Juan F Varghese MD  Authorized by: Juan F Varghese MD   Comparison: compared with previous ECG from " 9/14/2017  Comparison to previous ECG: Patient is nonspecific ST and T wave maladies appear less prominent than previous ECG  Rhythm: sinus rhythm  Other findings: non-specific ST-T wave changes    Clinical impression: non-specific ECG     SUMMARY/DISCUSSION  Patient diagnosed with small vessel disease in her brain.  She referred for possible cardiac etiology.  As I explained to both her and her son the process occurs throughout her body.  I did an EKG today which actually looked better than her EKG back in 2017.  The physical exam I did not really appreciate a murmur in her physical exam from a cardiovascular standpoint seemed fairly normal for her age.  In light of that and the fact that she is able to walk sustained for 31 minutes that we have documented by a Fitbit this is the most important finding.  I do not think a further work-up at this point is necessary unless she develops symptoms.  Told the patient to come back on an as-needed basis  9-6-23-----173/83  1-4-23 128/78  US---thyroid---US---hx nodules---wait on f/u d/t advanced age  Narrative & Impression   BONE MINERAL DENSITOMETRY     HISTORY: Postmenopausal.     COMPARISON: 09/25/2018, 03/30/2008     TECHNIQUE: The T score compares the patient's bone mineral density with  the peak bone mass of young normal patients. Patients with T-scores  between 1.0 and 2.5 standard deviations below the mean are osteopenic.  Patients with T-scores greater than 2.5 standard deviation below the  mean are osteoporotic.     The Z score compares the patient's bone mineral density with sex and age  matched patients. Z score of -2.0 and lower is an indication of low  mineral density for the patient's age.     FINDINGS:   LUMBAR SPINE:  The BMD measured in the L1-L4 is 0.873 g/cm2 for a  T-score of -1.6 and a Z-score of 0.5.     LEFT HIP: The BMD for the total hip is 0.835 g/cm2 for a T score of   -0.9 and a Z score of 0.3.     RIGHT HIP:  The BMD for the femoral neck is 0.655  g/cm2 for a T score of  -1.7 and a Z score of -0.2.     IMPRESSION:  1. Osteopenia.  2. When compared to the prior exam 09/25/2018, there has been no  statistically significant change in bone density of either total hip.  3. The 10 year FRAX fracture risk for a major osteoporotic fracture is  6.6% and for a hip fracture is 1.7%.     This report was finalized on 10/6/2020 1:19 PM by Dr. Ki Roque M.D.     The following portions of the patient's history were reviewed and updated as appropriate: allergies, current medications, past family history, past medical history, past social history, past surgical history, and problem list.    Review of Systems   Constitutional:  Positive for unexpected weight gain. Negative for diaphoresis.   HENT:  Negative for nosebleeds and trouble swallowing.    Eyes:  Negative for blurred vision and visual disturbance.   Respiratory:  Negative for choking.    Gastrointestinal:  Negative for blood in stool.   Allergic/Immunologic: Negative for immunocompromised state.   Neurological:  Positive for memory problem. Negative for facial asymmetry and speech difficulty.   Psychiatric/Behavioral:  Negative for self-injury and suicidal ideas.      Objective   Physical Exam  Vitals and nursing note reviewed.   Constitutional:       General: She is not in acute distress.     Appearance: She is well-developed. She is obese. She is not ill-appearing or toxic-appearing.   HENT:      Head: Normocephalic.      Right Ear: External ear normal.      Left Ear: External ear normal.      Nose: Nose normal.      Mouth/Throat:      Pharynx: Oropharynx is clear.   Eyes:      General: No scleral icterus.     Conjunctiva/sclera: Conjunctivae normal.      Pupils: Pupils are equal, round, and reactive to light.   Neck:      Thyroid: No thyromegaly.      Vascular: No carotid bruit.   Cardiovascular:      Rate and Rhythm: Normal rate and regular rhythm.      Pulses: Normal pulses.      Heart sounds: Normal heart  sounds. No murmur heard.  Pulmonary:      Effort: Pulmonary effort is normal. No respiratory distress.      Breath sounds: Normal breath sounds. No rales.   Musculoskeletal:         General: No deformity. Normal range of motion.      Cervical back: Normal range of motion and neck supple.      Right lower leg: No edema.      Left lower leg: No edema.   Skin:     General: Skin is warm and dry.      Findings: No rash.   Neurological:      General: No focal deficit present.      Mental Status: She is alert and oriented to person, place, and time. Mental status is at baseline.   Psychiatric:         Mood and Affect: Mood normal.         Behavior: Behavior normal.         Thought Content: Thought content normal.         Judgment: Judgment normal.         Assessment & Plan   Diagnoses and all orders for this visit:    1. Osteopenia of multiple sites (Primary)    2. Mixed hyperlipidemia    3. Vitamin D deficiency    4. Mild vascular dementia with agitation        Update labs  Seeing neuro for dementia ---  Concerned with worsening of her DEXA scan and she is on Fosamax weekly she had bone density 8/7/2023 noting worsening on the Fosamax with T score right hip 10.8% decrease and left hip 16% decrease--- advise changed to Prolia and will set up at infusion center  Plan to continue Crestor we will update labs to make sure LDL cholesterol is at goal  Updating labs also for Prolia  Start low-dose amlodipine for hypertension follow-up blood pressure check 3 to 4 weeks and this can be done at the facility  Plan, Kimberleelio Urrutia, was seen today.  she was seen for Imparied fasting glucose and plan follow up labs, diet, and exercise, Hyperlipidemia and will continue current medication, and Vitamin D deficiency and will update labs .  We will ask her long-term care facility to check her blood pressure and record every 2 to 3 days and her son can send me a [employer] message on readings

## 2023-09-27 LAB
25(OH)D3+25(OH)D2 SERPL-MCNC: 28.2 NG/ML (ref 30–100)
ALBUMIN SERPL-MCNC: 4.4 G/DL (ref 3.7–4.7)
ALBUMIN/GLOB SERPL: 1.9 {RATIO} (ref 1.2–2.2)
ALP SERPL-CCNC: 62 IU/L (ref 44–121)
ALT SERPL-CCNC: 20 IU/L (ref 0–32)
AST SERPL-CCNC: 20 IU/L (ref 0–40)
BASOPHILS # BLD AUTO: 0 X10E3/UL (ref 0–0.2)
BASOPHILS NFR BLD AUTO: 0 %
BILIRUB SERPL-MCNC: 0.4 MG/DL (ref 0–1.2)
BUN SERPL-MCNC: 23 MG/DL (ref 8–27)
BUN/CREAT SERPL: 23 (ref 12–28)
CALCIUM SERPL-MCNC: 10.3 MG/DL (ref 8.7–10.3)
CHLORIDE SERPL-SCNC: 105 MMOL/L (ref 96–106)
CHOLEST SERPL-MCNC: 167 MG/DL (ref 100–199)
CO2 SERPL-SCNC: 22 MMOL/L (ref 20–29)
CREAT SERPL-MCNC: 1.02 MG/DL (ref 0.57–1)
EGFRCR SERPLBLD CKD-EPI 2021: 54 ML/MIN/1.73
EOSINOPHIL # BLD AUTO: 0 X10E3/UL (ref 0–0.4)
EOSINOPHIL NFR BLD AUTO: 0 %
ERYTHROCYTE [DISTWIDTH] IN BLOOD BY AUTOMATED COUNT: 13.2 % (ref 11.7–15.4)
FOLATE SERPL-MCNC: 16.4 NG/ML
GLOBULIN SER CALC-MCNC: 2.3 G/DL (ref 1.5–4.5)
GLUCOSE SERPL-MCNC: 82 MG/DL (ref 70–99)
GLUCOSE UR QL STRIP: NORMAL
HBA1C MFR BLD: 5.7 % (ref 4.8–5.6)
HCT VFR BLD AUTO: 41 % (ref 34–46.6)
HDLC SERPL-MCNC: 89 MG/DL
HGB BLD-MCNC: 13.2 G/DL (ref 11.1–15.9)
IMM GRANULOCYTES # BLD AUTO: 0 X10E3/UL (ref 0–0.1)
IMM GRANULOCYTES NFR BLD AUTO: 0 %
KETONES UR QL STRIP: NORMAL
LDLC SERPL CALC-MCNC: 63 MG/DL (ref 0–99)
LYMPHOCYTES # BLD AUTO: 1.8 X10E3/UL (ref 0.7–3.1)
LYMPHOCYTES NFR BLD AUTO: 24 %
MAGNESIUM SERPL-MCNC: 2.2 MG/DL (ref 1.6–2.3)
MCH RBC QN AUTO: 28.3 PG (ref 26.6–33)
MCHC RBC AUTO-ENTMCNC: 32.2 G/DL (ref 31.5–35.7)
MCV RBC AUTO: 88 FL (ref 79–97)
MONOCYTES # BLD AUTO: 0.7 X10E3/UL (ref 0.1–0.9)
MONOCYTES NFR BLD AUTO: 10 %
NEUTROPHILS # BLD AUTO: 4.7 X10E3/UL (ref 1.4–7)
NEUTROPHILS NFR BLD AUTO: 66 %
PH UR STRIP: NORMAL [PH]
PHOSPHATE SERPL-MCNC: 3.2 MG/DL (ref 3–4.3)
PLATELET # BLD AUTO: 226 X10E3/UL (ref 150–450)
POTASSIUM SERPL-SCNC: 4.6 MMOL/L (ref 3.5–5.2)
PROT SERPL-MCNC: 6.7 G/DL (ref 6–8.5)
PROT UR QL STRIP: NORMAL
RBC # BLD AUTO: 4.67 X10E6/UL (ref 3.77–5.28)
SODIUM SERPL-SCNC: 141 MMOL/L (ref 134–144)
SP GR UR STRIP: NORMAL
SPECIMEN STATUS: NORMAL
T3FREE SERPL-MCNC: 2.5 PG/ML (ref 2–4.4)
T4 FREE SERPL-MCNC: 1.22 NG/DL (ref 0.82–1.77)
TRIGL SERPL-MCNC: 81 MG/DL (ref 0–149)
TSH SERPL DL<=0.005 MIU/L-ACNC: 2.61 UIU/ML (ref 0.45–4.5)
VIT B12 SERPL-MCNC: 417 PG/ML (ref 232–1245)
VLDLC SERPL CALC-MCNC: 15 MG/DL (ref 5–40)
WBC # BLD AUTO: 7.2 X10E3/UL (ref 3.4–10.8)

## 2023-10-18 ENCOUNTER — INFUSION (OUTPATIENT)
Dept: ONCOLOGY | Facility: HOSPITAL | Age: 83
End: 2023-10-18
Payer: MEDICARE

## 2023-10-18 VITALS — TEMPERATURE: 98 F | HEIGHT: 63 IN | RESPIRATION RATE: 15 BRPM | BODY MASS INDEX: 32.04 KG/M2

## 2023-10-18 DIAGNOSIS — M85.89 OSTEOPENIA OF MULTIPLE SITES: Primary | ICD-10-CM

## 2023-10-18 PROCEDURE — 96372 THER/PROPH/DIAG INJ SC/IM: CPT

## 2023-10-18 PROCEDURE — 25010000002 DENOSUMAB 60 MG/ML SOLUTION PREFILLED SYRINGE: Performed by: PHYSICIAN ASSISTANT

## 2023-10-18 RX ADMIN — DENOSUMAB 60 MG: 60 INJECTION SUBCUTANEOUS at 11:28

## 2023-11-28 NOTE — PROGRESS NOTES
The patient has a pain history of the following:  Lumbar stenosis  Lumbar spondylolisthesis   Sacroiliac joint pain     Previous interventions that the patient has received include:   Caudal Epidural steroid injection 4/4/22 -100% relief for several months  L5-S1 Epidural steroid injection 8/2/21  Left sacroiliac joint injection 6/16/21     Pain medications include:  Acetaminophen prn   Meloxicam prn     Previously: Naproxen     Other conservative modalities which the patient reports using include:  Physical Therapy: yes - did not help   Chiropractor: yes - did not help   Massage Therapy: no  TENS: no  Neck or back surgery: no  Past pain management: no  Heat  Ice     Past Significant Surgical History:  Right knee replacement 2015    HPI:     CHIEF COMPLAINT Back Pain      Subjective   Kimberlee Urrutia is a 83 y.o. female  who presents for follow-up.  She has a history of chronic low back pain that radiates into her bilateral buttocks.  The pain is worse in the morning and improves as she is up moving around.  At her previous visit it was discussed with her caregiver/legal guardian (son) that if her pain returned we may consider repeating the caudal epidural steroid injection.    History of Present Illness  She presents today with decreased mobility and increased pain.  She's currently in physical therapy 3 days per week and they have recommended she seek help in control of her pain.  Her pain is located in the sacral region of her back.      Her son is with her today, who is helping provide history.         PEG Assessment   What number best describes your pain on average in the past week?6    REVIEW OF PERTINENT MEDICAL DATA  No new     The following portions of the patient's history were reviewed and updated as appropriate: allergies, current medications, past family history, past medical history, past social history, past surgical history, and problem list.    Review of Systems   Gastrointestinal:  Negative for  "constipation and diarrhea.   Genitourinary:  Negative for difficulty urinating.   Musculoskeletal:  Positive for back pain.   Neurological:  Negative for weakness and numbness.   Psychiatric/Behavioral:  Positive for sleep disturbance. Negative for suicidal ideas. The patient is not nervous/anxious.      I have reviewed and confirmed the accuracy of the ROS as documented by the MA/LPN/RN Nu Partida MD    Vitals:    11/30/23 0947   BP: 120/68   Pulse: 63   Resp: 18   Temp: 96.9 °F (36.1 °C)   SpO2: 95%   Weight: 81 kg (178 lb 9.6 oz)   Height: 158.8 cm (62.5\")   PainSc:   5   PainLoc: Back         Objective   Physical Exam  Constitutional:       General: She is not in acute distress.  Pulmonary:      Effort: Pulmonary effort is normal. No respiratory distress.   Musculoskeletal:      Comments: Negative tenderness to palpation of the bilateral sacroiliac joints.  Negative Mal Fabers test bilaterally.  Negative pain with internal rotation of the hips bilaterally.  Negative straight leg test bilaterally.  Negative slump test.    Skin:     General: Skin is warm and dry.   Neurological:      Mental Status: She is alert.   Psychiatric:         Mood and Affect: Mood normal.         Thought Content: Thought content normal.             Assessment & Plan   Diagnoses and all orders for this visit:    1. Chronic bilateral low back pain without sciatica (Primary)  -     lidocaine (LIDODERM) 5 %; Place patch over low back/sacral area.  Remove & Discard patch within 12 hours.  May use 4% over the counter patches in place of prescription 5%.  Dispense: 30 each; Refill: 11  -     Case Request    2. Lumbar stenosis without neurogenic claudication  -     lidocaine (LIDODERM) 5 %; Place patch over low back/sacral area.  Remove & Discard patch within 12 hours.  May use 4% over the counter patches in place of prescription 5%.  Dispense: 30 each; Refill: 11  -     Case Request          - Will schedule for caudal Epidural steroid " injection.  Risks include but not limited to bleeding, bruising, infection, damage to surrounding structures, headache, and rare things such as being paralyzed, seizure, stroke, heart attack and death.  The risk of steroid medications include but are not limited to immunosuppression, which can increase the risk of denys an infectious disease as well as decrease the immune response to a vaccine.    - While awaiting injection, she will try lidocaine patches at night.  She may continue to use heat during the day as long as she's not wearing a lidocaine patch.   - May consider compounded pain cream if she doesn't benefit from lidocaine patches.   - Kimberlee Urrutia reports a pain score of 5.  Given her pain assessment as noted, treatment options were discussed and the following options were decided upon as a follow-up plan to address the patient's pain: prescription for non-opiod analgesics and steroid injections.      --- Follow-up for caudal Epidural steroid injection and office visit 4-6 weeks following.               Nu Partida MD  Pain Management    EMR Dragon/Transcription disclaimer:   Much of this encounter note is an electronic transcription/translation of spoken language to printed text. The electronic translation of spoken language may permit erroneous, or at times, nonsensical words or phrases to be inadvertently transcribed; Although I have reviewed the note for such errors, some may still exist.

## 2023-11-30 ENCOUNTER — OFFICE VISIT (OUTPATIENT)
Dept: PAIN MEDICINE | Facility: CLINIC | Age: 83
End: 2023-11-30
Payer: MEDICARE

## 2023-11-30 VITALS
RESPIRATION RATE: 18 BRPM | HEART RATE: 63 BPM | HEIGHT: 63 IN | TEMPERATURE: 96.9 F | BODY MASS INDEX: 31.64 KG/M2 | DIASTOLIC BLOOD PRESSURE: 68 MMHG | OXYGEN SATURATION: 95 % | SYSTOLIC BLOOD PRESSURE: 120 MMHG | WEIGHT: 178.6 LBS

## 2023-11-30 DIAGNOSIS — M48.061 LUMBAR STENOSIS WITHOUT NEUROGENIC CLAUDICATION: ICD-10-CM

## 2023-11-30 DIAGNOSIS — M54.50 CHRONIC BILATERAL LOW BACK PAIN WITHOUT SCIATICA: Primary | ICD-10-CM

## 2023-11-30 DIAGNOSIS — G89.29 CHRONIC BILATERAL LOW BACK PAIN WITHOUT SCIATICA: Primary | ICD-10-CM

## 2023-11-30 RX ORDER — LIDOCAINE 50 MG/G
PATCH TOPICAL
Qty: 30 EACH | Refills: 11 | Status: SHIPPED | OUTPATIENT
Start: 2023-11-30

## 2023-11-30 RX ORDER — ROSUVASTATIN CALCIUM 20 MG/1
20 TABLET, COATED ORAL DAILY
COMMUNITY
Start: 2023-11-23

## 2023-11-30 RX ORDER — MEMANTINE HYDROCHLORIDE 10 MG/1
10 TABLET ORAL 2 TIMES DAILY
COMMUNITY
Start: 2023-10-28

## 2023-11-30 RX ORDER — MELOXICAM 15 MG/1
15 TABLET ORAL DAILY
COMMUNITY
Start: 2023-11-08

## 2023-11-30 NOTE — PATIENT INSTRUCTIONS
Try lidocaine patches at night.      May use heating pad prior to placement of a patch, but not while wearing patch.

## 2024-01-05 ENCOUNTER — TRANSCRIBE ORDERS (OUTPATIENT)
Dept: SURGERY | Facility: SURGERY CENTER | Age: 84
End: 2024-01-05
Payer: MEDICARE

## 2024-01-05 DIAGNOSIS — Z41.9 SURGERY, ELECTIVE: Primary | ICD-10-CM

## 2024-01-05 PROBLEM — M54.50 CHRONIC BILATERAL LOW BACK PAIN WITHOUT SCIATICA: Status: ACTIVE | Noted: 2023-11-30

## 2024-01-05 PROBLEM — G89.29 CHRONIC BILATERAL LOW BACK PAIN WITHOUT SCIATICA: Status: ACTIVE | Noted: 2023-11-30

## 2024-06-01 ENCOUNTER — HOSPITAL ENCOUNTER (INPATIENT)
Facility: HOSPITAL | Age: 84
LOS: 44 days | Discharge: NURSING, REHAB OR LONG-TERM CARE | End: 2024-07-16
Attending: EMERGENCY MEDICINE | Admitting: HOSPITALIST
Payer: MEDICARE

## 2024-06-01 ENCOUNTER — APPOINTMENT (OUTPATIENT)
Dept: CT IMAGING | Facility: HOSPITAL | Age: 84
End: 2024-06-01
Payer: MEDICARE

## 2024-06-01 DIAGNOSIS — R74.01 ELEVATED ALT MEASUREMENT: ICD-10-CM

## 2024-06-01 DIAGNOSIS — A41.9 SEVERE SEPSIS: ICD-10-CM

## 2024-06-01 DIAGNOSIS — R79.82 ELEVATED C-REACTIVE PROTEIN (CRP): ICD-10-CM

## 2024-06-01 DIAGNOSIS — N17.9 AKI (ACUTE KIDNEY INJURY): ICD-10-CM

## 2024-06-01 DIAGNOSIS — N20.1 RIGHT URETERAL STONE: Primary | ICD-10-CM

## 2024-06-01 DIAGNOSIS — R79.89 ELEVATED LACTIC ACID LEVEL: ICD-10-CM

## 2024-06-01 DIAGNOSIS — R65.20 SEVERE SEPSIS: ICD-10-CM

## 2024-06-01 DIAGNOSIS — M46.26 OSTEOMYELITIS OF LUMBAR SPINE: ICD-10-CM

## 2024-06-01 DIAGNOSIS — R74.01 ELEVATED AST (SGOT): ICD-10-CM

## 2024-06-01 DIAGNOSIS — N39.0 ACUTE UTI: ICD-10-CM

## 2024-06-01 LAB
ALBUMIN SERPL-MCNC: 3.9 G/DL (ref 3.5–5.2)
ALBUMIN/GLOB SERPL: 1.8 G/DL
ALP SERPL-CCNC: 79 U/L (ref 39–117)
ALT SERPL W P-5'-P-CCNC: 281 U/L (ref 1–33)
ANION GAP SERPL CALCULATED.3IONS-SCNC: 17.5 MMOL/L (ref 5–15)
AST SERPL-CCNC: 274 U/L (ref 1–32)
BASOPHILS # BLD MANUAL: 0 10*3/MM3 (ref 0–0.2)
BASOPHILS NFR BLD MANUAL: 0 % (ref 0–1.5)
BILIRUB SERPL-MCNC: 0.7 MG/DL (ref 0–1.2)
BUN SERPL-MCNC: 26 MG/DL (ref 8–23)
BUN/CREAT SERPL: 16.3 (ref 7–25)
CALCIUM SPEC-SCNC: 10.1 MG/DL (ref 8.6–10.5)
CHLORIDE SERPL-SCNC: 108 MMOL/L (ref 98–107)
CO2 SERPL-SCNC: 18.5 MMOL/L (ref 22–29)
CREAT SERPL-MCNC: 1.6 MG/DL (ref 0.57–1)
CRP SERPL-MCNC: 2.9 MG/DL (ref 0–0.5)
DEPRECATED RDW RBC AUTO: 41.8 FL (ref 37–54)
EGFRCR SERPLBLD CKD-EPI 2021: 31.5 ML/MIN/1.73
EOSINOPHIL # BLD MANUAL: 0 10*3/MM3 (ref 0–0.4)
EOSINOPHIL NFR BLD MANUAL: 0 % (ref 0.3–6.2)
ERYTHROCYTE [DISTWIDTH] IN BLOOD BY AUTOMATED COUNT: 12.8 % (ref 12.3–15.4)
ERYTHROCYTE [SEDIMENTATION RATE] IN BLOOD: 8 MM/HR (ref 0–30)
GLOBULIN UR ELPH-MCNC: 2.2 GM/DL
GLUCOSE SERPL-MCNC: 96 MG/DL (ref 65–99)
HCT VFR BLD AUTO: 41 % (ref 34–46.6)
HGB BLD-MCNC: 13.6 G/DL (ref 12–15.9)
LYMPHOCYTES # BLD MANUAL: 0 10*3/MM3 (ref 0.7–3.1)
LYMPHOCYTES NFR BLD MANUAL: 0 % (ref 5–12)
MCH RBC QN AUTO: 29.6 PG (ref 26.6–33)
MCHC RBC AUTO-ENTMCNC: 33.2 G/DL (ref 31.5–35.7)
MCV RBC AUTO: 89.1 FL (ref 79–97)
METAMYELOCYTES NFR BLD MANUAL: 5 % (ref 0–0)
MONOCYTES # BLD: 0 10*3/MM3 (ref 0.1–0.9)
MYELOCYTES NFR BLD MANUAL: 1 % (ref 0–0)
NEUTROPHILS # BLD AUTO: 16.36 10*3/MM3 (ref 1.7–7)
NEUTROPHILS NFR BLD MANUAL: 94 % (ref 42.7–76)
PLAT MORPH BLD: NORMAL
PLATELET # BLD AUTO: 140 10*3/MM3 (ref 140–450)
PMV BLD AUTO: 10.3 FL (ref 6–12)
POTASSIUM SERPL-SCNC: 3.5 MMOL/L (ref 3.5–5.2)
PROT SERPL-MCNC: 6.1 G/DL (ref 6–8.5)
RBC # BLD AUTO: 4.6 10*6/MM3 (ref 3.77–5.28)
RBC MORPH BLD: NORMAL
SODIUM SERPL-SCNC: 144 MMOL/L (ref 136–145)
VARIANT LYMPHS NFR BLD MANUAL: 0 % (ref 19.6–45.3)
WBC MORPH BLD: NORMAL
WBC NRBC COR # BLD AUTO: 17.4 10*3/MM3 (ref 3.4–10.8)

## 2024-06-01 PROCEDURE — 74176 CT ABD & PELVIS W/O CONTRAST: CPT

## 2024-06-01 PROCEDURE — 85025 COMPLETE CBC W/AUTO DIFF WBC: CPT | Performed by: NURSE PRACTITIONER

## 2024-06-01 PROCEDURE — 86140 C-REACTIVE PROTEIN: CPT | Performed by: NURSE PRACTITIONER

## 2024-06-01 PROCEDURE — 25010000002 ONDANSETRON PER 1 MG: Performed by: NURSE PRACTITIONER

## 2024-06-01 PROCEDURE — 85007 BL SMEAR W/DIFF WBC COUNT: CPT | Performed by: NURSE PRACTITIONER

## 2024-06-01 PROCEDURE — 99285 EMERGENCY DEPT VISIT HI MDM: CPT

## 2024-06-01 PROCEDURE — 25010000002 MORPHINE PER 10 MG: Performed by: NURSE PRACTITIONER

## 2024-06-01 PROCEDURE — 80053 COMPREHEN METABOLIC PANEL: CPT | Performed by: NURSE PRACTITIONER

## 2024-06-01 PROCEDURE — 85652 RBC SED RATE AUTOMATED: CPT | Performed by: NURSE PRACTITIONER

## 2024-06-01 PROCEDURE — 72131 CT LUMBAR SPINE W/O DYE: CPT

## 2024-06-01 PROCEDURE — 36415 COLL VENOUS BLD VENIPUNCTURE: CPT

## 2024-06-01 RX ORDER — MORPHINE SULFATE 2 MG/ML
4 INJECTION, SOLUTION INTRAMUSCULAR; INTRAVENOUS ONCE
Status: COMPLETED | OUTPATIENT
Start: 2024-06-01 | End: 2024-06-01

## 2024-06-01 RX ORDER — MORPHINE SULFATE 2 MG/ML
2 INJECTION, SOLUTION INTRAMUSCULAR; INTRAVENOUS ONCE
Status: COMPLETED | OUTPATIENT
Start: 2024-06-01 | End: 2024-06-01

## 2024-06-01 RX ORDER — VANCOMYCIN/0.9 % SOD CHLORIDE 1.5G/250ML
20 PLASTIC BAG, INJECTION (ML) INTRAVENOUS ONCE
Status: COMPLETED | OUTPATIENT
Start: 2024-06-01 | End: 2024-06-02

## 2024-06-01 RX ORDER — SODIUM CHLORIDE 0.9 % (FLUSH) 0.9 %
10 SYRINGE (ML) INJECTION AS NEEDED
Status: DISCONTINUED | OUTPATIENT
Start: 2024-06-01 | End: 2024-06-26

## 2024-06-01 RX ORDER — ONDANSETRON 2 MG/ML
4 INJECTION INTRAMUSCULAR; INTRAVENOUS ONCE
Status: COMPLETED | OUTPATIENT
Start: 2024-06-01 | End: 2024-06-01

## 2024-06-01 RX ADMIN — ONDANSETRON 4 MG: 2 INJECTION INTRAMUSCULAR; INTRAVENOUS at 22:03

## 2024-06-01 RX ADMIN — MORPHINE SULFATE 4 MG: 2 INJECTION, SOLUTION INTRAMUSCULAR; INTRAVENOUS at 23:39

## 2024-06-01 RX ADMIN — MORPHINE SULFATE 2 MG: 2 INJECTION, SOLUTION INTRAMUSCULAR; INTRAVENOUS at 22:03

## 2024-06-02 ENCOUNTER — APPOINTMENT (OUTPATIENT)
Dept: MRI IMAGING | Facility: HOSPITAL | Age: 84
End: 2024-06-02
Payer: MEDICARE

## 2024-06-02 ENCOUNTER — ANESTHESIA EVENT (OUTPATIENT)
Dept: PERIOP | Facility: HOSPITAL | Age: 84
End: 2024-06-02

## 2024-06-02 ENCOUNTER — ANESTHESIA (OUTPATIENT)
Dept: PERIOP | Facility: HOSPITAL | Age: 84
End: 2024-06-02

## 2024-06-02 ENCOUNTER — APPOINTMENT (OUTPATIENT)
Dept: GENERAL RADIOLOGY | Facility: HOSPITAL | Age: 84
End: 2024-06-02
Payer: MEDICARE

## 2024-06-02 PROBLEM — N20.1 RIGHT URETERAL STONE: Status: ACTIVE | Noted: 2024-06-02

## 2024-06-02 PROBLEM — E16.2 HYPOGLYCEMIA: Status: ACTIVE | Noted: 2024-06-02

## 2024-06-02 PROBLEM — R79.89 ABNORMAL LFTS: Status: ACTIVE | Noted: 2024-06-02

## 2024-06-02 PROBLEM — N39.0 ACUTE UTI (URINARY TRACT INFECTION): Status: ACTIVE | Noted: 2024-06-02

## 2024-06-02 PROBLEM — M46.46 DISCITIS OF LUMBAR REGION: Status: ACTIVE | Noted: 2024-06-02

## 2024-06-02 PROBLEM — R65.21 SHOCK, SEPTIC: Status: ACTIVE | Noted: 2021-02-17

## 2024-06-02 LAB
ALBUMIN SERPL-MCNC: 3 G/DL (ref 3.5–5.2)
ALBUMIN/GLOB SERPL: 1.8 G/DL
ALP SERPL-CCNC: 80 U/L (ref 39–117)
ALT SERPL W P-5'-P-CCNC: 140 U/L (ref 1–33)
ANION GAP SERPL CALCULATED.3IONS-SCNC: 12 MMOL/L (ref 5–15)
AST SERPL-CCNC: 84 U/L (ref 1–32)
BACTERIA BLD CULT: ABNORMAL
BACTERIA ID TEST ISLT QL CULT: ABNORMAL
BACTERIA UR QL AUTO: ABNORMAL /HPF
BASOPHILS # BLD MANUAL: 0 10*3/MM3 (ref 0–0.2)
BASOPHILS NFR BLD MANUAL: 0 % (ref 0–1.5)
BILIRUB SERPL-MCNC: 0.6 MG/DL (ref 0–1.2)
BILIRUB UR QL STRIP: NEGATIVE
BOTTLE TYPE: ABNORMAL
BUN SERPL-MCNC: 33 MG/DL (ref 8–23)
BUN/CREAT SERPL: 17.4 (ref 7–25)
CALCIUM SPEC-SCNC: 7.4 MG/DL (ref 8.6–10.5)
CHLORIDE SERPL-SCNC: 117 MMOL/L (ref 98–107)
CLARITY UR: ABNORMAL
CO2 SERPL-SCNC: 17 MMOL/L (ref 22–29)
COLOR UR: YELLOW
CREAT SERPL-MCNC: 1.9 MG/DL (ref 0.57–1)
D-LACTATE SERPL-SCNC: 10.2 MMOL/L (ref 0.5–2)
D-LACTATE SERPL-SCNC: 4 MMOL/L (ref 0.5–2)
D-LACTATE SERPL-SCNC: 5.6 MMOL/L (ref 0.5–2)
D-LACTATE SERPL-SCNC: 9 MMOL/L (ref 0.5–2)
DEPRECATED RDW RBC AUTO: 44.9 FL (ref 37–54)
DOHLE BOD BLD QL SMEAR: PRESENT
DOHLE BOD BLD QL SMEAR: PRESENT
EGFRCR SERPLBLD CKD-EPI 2021: 25.6 ML/MIN/1.73
EOSINOPHIL # BLD MANUAL: 0 10*3/MM3 (ref 0–0.4)
EOSINOPHIL NFR BLD MANUAL: 0 % (ref 0.3–6.2)
ERYTHROCYTE [DISTWIDTH] IN BLOOD BY AUTOMATED COUNT: 13.2 % (ref 12.3–15.4)
GLOBULIN UR ELPH-MCNC: 1.7 GM/DL
GLUCOSE BLDC GLUCOMTR-MCNC: 108 MG/DL (ref 70–130)
GLUCOSE BLDC GLUCOMTR-MCNC: 110 MG/DL (ref 70–130)
GLUCOSE BLDC GLUCOMTR-MCNC: 132 MG/DL (ref 70–130)
GLUCOSE BLDC GLUCOMTR-MCNC: 181 MG/DL (ref 70–130)
GLUCOSE BLDC GLUCOMTR-MCNC: 40 MG/DL (ref 70–130)
GLUCOSE BLDC GLUCOMTR-MCNC: 40 MG/DL (ref 70–130)
GLUCOSE BLDC GLUCOMTR-MCNC: 43 MG/DL (ref 70–130)
GLUCOSE BLDC GLUCOMTR-MCNC: 50 MG/DL (ref 70–130)
GLUCOSE BLDC GLUCOMTR-MCNC: 54 MG/DL (ref 70–130)
GLUCOSE BLDC GLUCOMTR-MCNC: 56 MG/DL (ref 70–130)
GLUCOSE BLDC GLUCOMTR-MCNC: 72 MG/DL (ref 70–130)
GLUCOSE BLDC GLUCOMTR-MCNC: 73 MG/DL (ref 70–130)
GLUCOSE BLDC GLUCOMTR-MCNC: 74 MG/DL (ref 70–130)
GLUCOSE BLDC GLUCOMTR-MCNC: 82 MG/DL (ref 70–130)
GLUCOSE BLDC GLUCOMTR-MCNC: 95 MG/DL (ref 70–130)
GLUCOSE SERPL-MCNC: 50 MG/DL (ref 65–99)
GLUCOSE UR STRIP-MCNC: NEGATIVE MG/DL
HCT VFR BLD AUTO: 34.3 % (ref 34–46.6)
HGB BLD-MCNC: 10.8 G/DL (ref 12–15.9)
HGB UR QL STRIP.AUTO: ABNORMAL
HYALINE CASTS UR QL AUTO: ABNORMAL /LPF
KETONES UR QL STRIP: NEGATIVE
LEUKOCYTE ESTERASE UR QL STRIP.AUTO: ABNORMAL
LYMPHOCYTES # BLD MANUAL: 0.55 10*3/MM3 (ref 0.7–3.1)
LYMPHOCYTES NFR BLD MANUAL: 4 % (ref 5–12)
MCH RBC QN AUTO: 29 PG (ref 26.6–33)
MCHC RBC AUTO-ENTMCNC: 31.5 G/DL (ref 31.5–35.7)
MCV RBC AUTO: 92 FL (ref 79–97)
METAMYELOCYTES NFR BLD MANUAL: 6 % (ref 0–0)
MONOCYTES # BLD: 0.74 10*3/MM3 (ref 0.1–0.9)
MYELOCYTES NFR BLD MANUAL: 5 % (ref 0–0)
NEUTROPHILS # BLD AUTO: 15.12 10*3/MM3 (ref 1.7–7)
NEUTROPHILS NFR BLD MANUAL: 82 % (ref 42.7–76)
NEUTS VAC BLD QL SMEAR: ABNORMAL
NEUTS VAC BLD QL SMEAR: NORMAL
NITRITE UR QL STRIP: NEGATIVE
NRBC SPEC MANUAL: 1 /100 WBC (ref 0–0.2)
PH UR STRIP.AUTO: 5.5 [PH] (ref 5–8)
PLAT MORPH BLD: NORMAL
PLATELET # BLD AUTO: 55 10*3/MM3 (ref 140–450)
PMV BLD AUTO: 11.4 FL (ref 6–12)
POTASSIUM SERPL-SCNC: 2.9 MMOL/L (ref 3.5–5.2)
PROCALCITONIN SERPL-MCNC: 208.8 NG/ML (ref 0–0.25)
PROT SERPL-MCNC: 4.7 G/DL (ref 6–8.5)
PROT UR QL STRIP: ABNORMAL
RBC # BLD AUTO: 3.73 10*6/MM3 (ref 3.77–5.28)
RBC # UR STRIP: ABNORMAL /HPF
RBC MORPH BLD: NORMAL
REF LAB TEST METHOD: ABNORMAL
SODIUM SERPL-SCNC: 146 MMOL/L (ref 136–145)
SP GR UR STRIP: 1.01 (ref 1–1.03)
SQUAMOUS #/AREA URNS HPF: ABNORMAL /HPF
UROBILINOGEN UR QL STRIP: ABNORMAL
VANCOMYCIN SERPL-MCNC: 9.7 MCG/ML (ref 5–40)
VARIANT LYMPHS NFR BLD MANUAL: 3 % (ref 19.6–45.3)
WBC # UR STRIP: ABNORMAL /HPF
WBC NRBC COR # BLD AUTO: 18.44 10*3/MM3 (ref 3.4–10.8)

## 2024-06-02 PROCEDURE — 25010000002 VANCOMYCIN 10 G RECONSTITUTED SOLUTION: Performed by: NURSE PRACTITIONER

## 2024-06-02 PROCEDURE — 25810000003 LACTATED RINGERS PER 1000 ML: Performed by: NURSE PRACTITIONER

## 2024-06-02 PROCEDURE — 25010000002 PHENTOLAMINE MESYLATE PER 5 MG: Performed by: HOSPITALIST

## 2024-06-02 PROCEDURE — C1769 GUIDE WIRE: HCPCS | Performed by: UROLOGY

## 2024-06-02 PROCEDURE — 85007 BL SMEAR W/DIFF WBC COUNT: CPT | Performed by: HOSPITALIST

## 2024-06-02 PROCEDURE — 99221 1ST HOSP IP/OBS SF/LOW 40: CPT | Performed by: NURSE PRACTITIONER

## 2024-06-02 PROCEDURE — 74018 RADEX ABDOMEN 1 VIEW: CPT

## 2024-06-02 PROCEDURE — 25010000002 HEPARIN (PORCINE) PER 1000 UNITS: Performed by: HOSPITALIST

## 2024-06-02 PROCEDURE — C2617 STENT, NON-COR, TEM W/O DEL: HCPCS | Performed by: UROLOGY

## 2024-06-02 PROCEDURE — 84145 PROCALCITONIN (PCT): CPT | Performed by: HOSPITALIST

## 2024-06-02 PROCEDURE — 83605 ASSAY OF LACTIC ACID: CPT | Performed by: NURSE PRACTITIONER

## 2024-06-02 PROCEDURE — 87186 SC STD MICRODIL/AGAR DIL: CPT | Performed by: NURSE PRACTITIONER

## 2024-06-02 PROCEDURE — 87040 BLOOD CULTURE FOR BACTERIA: CPT | Performed by: NURSE PRACTITIONER

## 2024-06-02 PROCEDURE — 25810000003 SODIUM CHLORIDE 0.9 % SOLUTION: Performed by: NURSE PRACTITIONER

## 2024-06-02 PROCEDURE — P9612 CATHETERIZE FOR URINE SPEC: HCPCS

## 2024-06-02 PROCEDURE — 25810000003 SEPSIS FLUID NS 0.9 % SOLUTION: Performed by: HOSPITALIST

## 2024-06-02 PROCEDURE — 74420 UROGRAPHY RTRGR +-KUB: CPT

## 2024-06-02 PROCEDURE — C1894 INTRO/SHEATH, NON-LASER: HCPCS

## 2024-06-02 PROCEDURE — 81001 URINALYSIS AUTO W/SCOPE: CPT | Performed by: NURSE PRACTITIONER

## 2024-06-02 PROCEDURE — 0 DEXTROSE 5 % SOLUTION 250 ML FLEX CONT: Performed by: NURSE ANESTHETIST, CERTIFIED REGISTERED

## 2024-06-02 PROCEDURE — 25010000002 PROPOFOL 200 MG/20ML EMULSION: Performed by: NURSE ANESTHETIST, CERTIFIED REGISTERED

## 2024-06-02 PROCEDURE — A9577 INJ MULTIHANCE: HCPCS | Performed by: HOSPITALIST

## 2024-06-02 PROCEDURE — 25810000003 LACTATED RINGERS PER 1000 ML: Performed by: ANESTHESIOLOGY

## 2024-06-02 PROCEDURE — 0T768DZ DILATION OF RIGHT URETER WITH INTRALUMINAL DEVICE, VIA NATURAL OR ARTIFICIAL OPENING ENDOSCOPIC: ICD-10-PCS | Performed by: UROLOGY

## 2024-06-02 PROCEDURE — 82948 REAGENT STRIP/BLOOD GLUCOSE: CPT

## 2024-06-02 PROCEDURE — 72158 MRI LUMBAR SPINE W/O & W/DYE: CPT

## 2024-06-02 PROCEDURE — 25010000002 MIDAZOLAM PER 1 MG: Performed by: HOSPITALIST

## 2024-06-02 PROCEDURE — 87086 URINE CULTURE/COLONY COUNT: CPT | Performed by: NURSE PRACTITIONER

## 2024-06-02 PROCEDURE — 0 GADOBENATE DIMEGLUMINE 529 MG/ML SOLUTION: Performed by: HOSPITALIST

## 2024-06-02 PROCEDURE — 25010000002 VASOPRESSIN 20 UNIT/ML SOLUTION: Performed by: NURSE ANESTHETIST, CERTIFIED REGISTERED

## 2024-06-02 PROCEDURE — 85025 COMPLETE CBC W/AUTO DIFF WBC: CPT | Performed by: HOSPITALIST

## 2024-06-02 PROCEDURE — 80202 ASSAY OF VANCOMYCIN: CPT | Performed by: NURSE PRACTITIONER

## 2024-06-02 PROCEDURE — 80053 COMPREHEN METABOLIC PANEL: CPT | Performed by: HOSPITALIST

## 2024-06-02 PROCEDURE — 25010000002 ERTAPENEM PER 500 MG: Performed by: HOSPITALIST

## 2024-06-02 PROCEDURE — 87154 CUL TYP ID BLD PTHGN 6+ TRGT: CPT | Performed by: NURSE PRACTITIONER

## 2024-06-02 PROCEDURE — 76000 FLUOROSCOPY <1 HR PHYS/QHP: CPT

## 2024-06-02 PROCEDURE — 25010000002 CEFTRIAXONE PER 250 MG: Performed by: NURSE PRACTITIONER

## 2024-06-02 PROCEDURE — 87088 URINE BACTERIA CULTURE: CPT | Performed by: NURSE PRACTITIONER

## 2024-06-02 DEVICE — URETERAL STENT
Type: IMPLANTABLE DEVICE | Site: URETER | Status: FUNCTIONAL
Brand: CONTOUR™

## 2024-06-02 RX ORDER — IBUPROFEN 600 MG/1
1 TABLET ORAL
Status: DISCONTINUED | OUTPATIENT
Start: 2024-06-02 | End: 2024-07-16 | Stop reason: HOSPADM

## 2024-06-02 RX ORDER — LORAZEPAM 1 MG/1
1 TABLET ORAL ONCE
Status: DISCONTINUED | OUTPATIENT
Start: 2024-06-02 | End: 2024-06-02

## 2024-06-02 RX ORDER — HYDROMORPHONE HYDROCHLORIDE 1 MG/ML
0.5 INJECTION, SOLUTION INTRAMUSCULAR; INTRAVENOUS; SUBCUTANEOUS
Status: DISCONTINUED | OUTPATIENT
Start: 2024-06-02 | End: 2024-06-03

## 2024-06-02 RX ORDER — DROPERIDOL 2.5 MG/ML
0.62 INJECTION, SOLUTION INTRAMUSCULAR; INTRAVENOUS
Status: DISCONTINUED | OUTPATIENT
Start: 2024-06-02 | End: 2024-06-02 | Stop reason: HOSPADM

## 2024-06-02 RX ORDER — ACETAMINOPHEN 650 MG/1
650 SUPPOSITORY RECTAL EVERY 4 HOURS PRN
Status: DISCONTINUED | OUTPATIENT
Start: 2024-06-02 | End: 2024-07-16 | Stop reason: HOSPADM

## 2024-06-02 RX ORDER — FENTANYL CITRATE 50 UG/ML
50 INJECTION, SOLUTION INTRAMUSCULAR; INTRAVENOUS ONCE AS NEEDED
Status: DISCONTINUED | OUTPATIENT
Start: 2024-06-02 | End: 2024-06-02 | Stop reason: HOSPADM

## 2024-06-02 RX ORDER — DEXTROSE MONOHYDRATE 25 G/50ML
50 INJECTION, SOLUTION INTRAVENOUS
Status: DISCONTINUED | OUTPATIENT
Start: 2024-06-02 | End: 2024-07-16 | Stop reason: HOSPADM

## 2024-06-02 RX ORDER — ACETAMINOPHEN 325 MG/1
650 TABLET ORAL EVERY 4 HOURS PRN
Status: DISCONTINUED | OUTPATIENT
Start: 2024-06-02 | End: 2024-06-03

## 2024-06-02 RX ORDER — FENTANYL CITRATE 50 UG/ML
25 INJECTION, SOLUTION INTRAMUSCULAR; INTRAVENOUS
Status: DISCONTINUED | OUTPATIENT
Start: 2024-06-02 | End: 2024-06-02 | Stop reason: HOSPADM

## 2024-06-02 RX ORDER — PHENYLEPHRINE HCL IN 0.9% NACL 1 MG/10 ML
SYRINGE (ML) INTRAVENOUS AS NEEDED
Status: DISCONTINUED | OUTPATIENT
Start: 2024-06-02 | End: 2024-06-02 | Stop reason: SURG

## 2024-06-02 RX ORDER — SODIUM CHLORIDE 0.9 % (FLUSH) 0.9 %
3 SYRINGE (ML) INJECTION EVERY 12 HOURS SCHEDULED
Status: DISCONTINUED | OUTPATIENT
Start: 2024-06-02 | End: 2024-06-02 | Stop reason: HOSPADM

## 2024-06-02 RX ORDER — ONDANSETRON 2 MG/ML
4 INJECTION INTRAMUSCULAR; INTRAVENOUS ONCE AS NEEDED
Status: DISCONTINUED | OUTPATIENT
Start: 2024-06-02 | End: 2024-06-02 | Stop reason: HOSPADM

## 2024-06-02 RX ORDER — SODIUM CHLORIDE 0.9 % (FLUSH) 0.9 %
10 SYRINGE (ML) INJECTION AS NEEDED
Status: DISCONTINUED | OUTPATIENT
Start: 2024-06-02 | End: 2024-06-04

## 2024-06-02 RX ORDER — PROMETHAZINE HYDROCHLORIDE 25 MG/1
25 TABLET ORAL ONCE AS NEEDED
Status: DISCONTINUED | OUTPATIENT
Start: 2024-06-02 | End: 2024-06-02 | Stop reason: HOSPADM

## 2024-06-02 RX ORDER — SODIUM CHLORIDE 0.9 % (FLUSH) 0.9 %
10 SYRINGE (ML) INJECTION EVERY 12 HOURS SCHEDULED
Status: DISCONTINUED | OUTPATIENT
Start: 2024-06-02 | End: 2024-06-04

## 2024-06-02 RX ORDER — SODIUM CHLORIDE, SODIUM LACTATE, POTASSIUM CHLORIDE, CALCIUM CHLORIDE 600; 310; 30; 20 MG/100ML; MG/100ML; MG/100ML; MG/100ML
9 INJECTION, SOLUTION INTRAVENOUS CONTINUOUS
Status: DISCONTINUED | OUTPATIENT
Start: 2024-06-02 | End: 2024-06-04

## 2024-06-02 RX ORDER — HEPARIN SODIUM 5000 [USP'U]/ML
5000 INJECTION, SOLUTION INTRAVENOUS; SUBCUTANEOUS EVERY 8 HOURS SCHEDULED
Status: DISCONTINUED | OUTPATIENT
Start: 2024-06-02 | End: 2024-06-04

## 2024-06-02 RX ORDER — FAMOTIDINE 10 MG/ML
20 INJECTION, SOLUTION INTRAVENOUS ONCE
Status: COMPLETED | OUTPATIENT
Start: 2024-06-02 | End: 2024-06-02

## 2024-06-02 RX ORDER — POLYETHYLENE GLYCOL 3350 17 G/17G
17 POWDER, FOR SOLUTION ORAL DAILY PRN
Status: DISCONTINUED | OUTPATIENT
Start: 2024-06-02 | End: 2024-06-16

## 2024-06-02 RX ORDER — NALOXONE HCL 0.4 MG/ML
0.4 VIAL (ML) INJECTION
Status: DISCONTINUED | OUTPATIENT
Start: 2024-06-02 | End: 2024-06-03

## 2024-06-02 RX ORDER — NICOTINE POLACRILEX 4 MG
15 LOZENGE BUCCAL
Status: DISCONTINUED | OUTPATIENT
Start: 2024-06-02 | End: 2024-07-16 | Stop reason: HOSPADM

## 2024-06-02 RX ORDER — HYDRALAZINE HYDROCHLORIDE 20 MG/ML
5 INJECTION INTRAMUSCULAR; INTRAVENOUS
Status: DISCONTINUED | OUTPATIENT
Start: 2024-06-02 | End: 2024-06-02 | Stop reason: HOSPADM

## 2024-06-02 RX ORDER — DEXTROSE MONOHYDRATE 100 MG/ML
50 INJECTION, SOLUTION INTRAVENOUS CONTINUOUS
Status: DISCONTINUED | OUTPATIENT
Start: 2024-06-02 | End: 2024-06-03

## 2024-06-02 RX ORDER — LIDOCAINE HYDROCHLORIDE 20 MG/ML
INJECTION, SOLUTION INFILTRATION; PERINEURAL AS NEEDED
Status: DISCONTINUED | OUTPATIENT
Start: 2024-06-02 | End: 2024-06-02 | Stop reason: SURG

## 2024-06-02 RX ORDER — BISACODYL 5 MG/1
5 TABLET, DELAYED RELEASE ORAL DAILY PRN
Status: DISCONTINUED | OUTPATIENT
Start: 2024-06-02 | End: 2024-06-03

## 2024-06-02 RX ORDER — IPRATROPIUM BROMIDE AND ALBUTEROL SULFATE 2.5; .5 MG/3ML; MG/3ML
3 SOLUTION RESPIRATORY (INHALATION) ONCE AS NEEDED
Status: DISCONTINUED | OUTPATIENT
Start: 2024-06-02 | End: 2024-06-02 | Stop reason: HOSPADM

## 2024-06-02 RX ORDER — ACETAMINOPHEN 650 MG/1
650 SUPPOSITORY RECTAL EVERY 4 HOURS PRN
Status: DISCONTINUED | OUTPATIENT
Start: 2024-06-02 | End: 2024-06-03

## 2024-06-02 RX ORDER — ACETAMINOPHEN 325 MG/1
650 TABLET ORAL EVERY 4 HOURS PRN
Status: DISCONTINUED | OUTPATIENT
Start: 2024-06-02 | End: 2024-07-16 | Stop reason: HOSPADM

## 2024-06-02 RX ORDER — AMOXICILLIN 250 MG
2 CAPSULE ORAL 2 TIMES DAILY
Status: DISCONTINUED | OUTPATIENT
Start: 2024-06-02 | End: 2024-06-26

## 2024-06-02 RX ORDER — LIDOCAINE HYDROCHLORIDE 20 MG/ML
JELLY TOPICAL AS NEEDED
Status: DISCONTINUED | OUTPATIENT
Start: 2024-06-02 | End: 2024-06-02 | Stop reason: HOSPADM

## 2024-06-02 RX ORDER — LABETALOL HYDROCHLORIDE 5 MG/ML
5 INJECTION, SOLUTION INTRAVENOUS
Status: DISCONTINUED | OUTPATIENT
Start: 2024-06-02 | End: 2024-06-02 | Stop reason: HOSPADM

## 2024-06-02 RX ORDER — DEXTROSE MONOHYDRATE 25 G/50ML
INJECTION, SOLUTION INTRAVENOUS
Status: COMPLETED
Start: 2024-06-02 | End: 2024-06-02

## 2024-06-02 RX ORDER — HYDROMORPHONE HYDROCHLORIDE 1 MG/ML
0.25 INJECTION, SOLUTION INTRAMUSCULAR; INTRAVENOUS; SUBCUTANEOUS
Status: DISCONTINUED | OUTPATIENT
Start: 2024-06-02 | End: 2024-06-02 | Stop reason: HOSPADM

## 2024-06-02 RX ORDER — POLYETHYLENE GLYCOL 3350 17 G/17G
17 POWDER, FOR SOLUTION ORAL DAILY PRN
Status: DISCONTINUED | OUTPATIENT
Start: 2024-06-02 | End: 2024-06-03

## 2024-06-02 RX ORDER — VASOPRESSIN 20 U/ML
INJECTION PARENTERAL AS NEEDED
Status: DISCONTINUED | OUTPATIENT
Start: 2024-06-02 | End: 2024-06-02 | Stop reason: SURG

## 2024-06-02 RX ORDER — ONDANSETRON 2 MG/ML
4 INJECTION INTRAMUSCULAR; INTRAVENOUS EVERY 6 HOURS PRN
Status: DISCONTINUED | OUTPATIENT
Start: 2024-06-02 | End: 2024-07-16 | Stop reason: HOSPADM

## 2024-06-02 RX ORDER — POTASSIUM CHLORIDE 750 MG/1
40 TABLET, FILM COATED, EXTENDED RELEASE ORAL ONCE
Status: COMPLETED | OUTPATIENT
Start: 2024-06-02 | End: 2024-06-02

## 2024-06-02 RX ORDER — NITROGLYCERIN 0.4 MG/1
0.4 TABLET SUBLINGUAL
Status: DISCONTINUED | OUTPATIENT
Start: 2024-06-02 | End: 2024-06-03

## 2024-06-02 RX ORDER — FLUMAZENIL 0.1 MG/ML
0.2 INJECTION INTRAVENOUS AS NEEDED
Status: DISCONTINUED | OUTPATIENT
Start: 2024-06-02 | End: 2024-06-02 | Stop reason: HOSPADM

## 2024-06-02 RX ORDER — PROMETHAZINE HYDROCHLORIDE 25 MG/1
25 SUPPOSITORY RECTAL ONCE AS NEEDED
Status: DISCONTINUED | OUTPATIENT
Start: 2024-06-02 | End: 2024-06-02 | Stop reason: HOSPADM

## 2024-06-02 RX ORDER — LIDOCAINE HYDROCHLORIDE 10 MG/ML
0.5 INJECTION, SOLUTION INFILTRATION; PERINEURAL ONCE AS NEEDED
Status: DISCONTINUED | OUTPATIENT
Start: 2024-06-02 | End: 2024-06-02 | Stop reason: HOSPADM

## 2024-06-02 RX ORDER — SODIUM CHLORIDE 0.9 % (FLUSH) 0.9 %
20 SYRINGE (ML) INJECTION AS NEEDED
Status: DISCONTINUED | OUTPATIENT
Start: 2024-06-02 | End: 2024-07-08

## 2024-06-02 RX ORDER — DEXTROSE MONOHYDRATE 25 G/50ML
25 INJECTION, SOLUTION INTRAVENOUS
Status: DISCONTINUED | OUTPATIENT
Start: 2024-06-02 | End: 2024-07-16 | Stop reason: HOSPADM

## 2024-06-02 RX ORDER — ACETAMINOPHEN 160 MG/5ML
650 SOLUTION ORAL EVERY 4 HOURS PRN
Status: DISCONTINUED | OUTPATIENT
Start: 2024-06-02 | End: 2024-06-03

## 2024-06-02 RX ORDER — ONDANSETRON 4 MG/1
4 TABLET, ORALLY DISINTEGRATING ORAL EVERY 6 HOURS PRN
Status: DISCONTINUED | OUTPATIENT
Start: 2024-06-02 | End: 2024-07-16 | Stop reason: HOSPADM

## 2024-06-02 RX ORDER — BISACODYL 10 MG
10 SUPPOSITORY, RECTAL RECTAL DAILY PRN
Status: DISCONTINUED | OUTPATIENT
Start: 2024-06-02 | End: 2024-06-03

## 2024-06-02 RX ORDER — NITROGLYCERIN 0.4 MG/1
0.4 TABLET SUBLINGUAL
Status: DISCONTINUED | OUTPATIENT
Start: 2024-06-02 | End: 2024-07-16 | Stop reason: HOSPADM

## 2024-06-02 RX ORDER — ACETAMINOPHEN 325 MG/1
650 TABLET ORAL EVERY 6 HOURS PRN
COMMUNITY
End: 2024-07-16 | Stop reason: HOSPADM

## 2024-06-02 RX ORDER — BISACODYL 10 MG
10 SUPPOSITORY, RECTAL RECTAL DAILY PRN
Status: DISCONTINUED | OUTPATIENT
Start: 2024-06-02 | End: 2024-06-16

## 2024-06-02 RX ORDER — AMOXICILLIN 250 MG
2 CAPSULE ORAL 2 TIMES DAILY PRN
Status: DISCONTINUED | OUTPATIENT
Start: 2024-06-02 | End: 2024-06-03

## 2024-06-02 RX ORDER — MEMANTINE HYDROCHLORIDE 10 MG/1
10 TABLET ORAL 2 TIMES DAILY
Status: DISCONTINUED | OUTPATIENT
Start: 2024-06-02 | End: 2024-07-16 | Stop reason: HOSPADM

## 2024-06-02 RX ORDER — SODIUM CHLORIDE, SODIUM LACTATE, POTASSIUM CHLORIDE, CALCIUM CHLORIDE 600; 310; 30; 20 MG/100ML; MG/100ML; MG/100ML; MG/100ML
125 INJECTION, SOLUTION INTRAVENOUS CONTINUOUS
Status: DISCONTINUED | OUTPATIENT
Start: 2024-06-02 | End: 2024-06-03

## 2024-06-02 RX ORDER — BISACODYL 5 MG/1
5 TABLET, DELAYED RELEASE ORAL DAILY PRN
Status: DISCONTINUED | OUTPATIENT
Start: 2024-06-02 | End: 2024-06-16

## 2024-06-02 RX ORDER — POTASSIUM CHLORIDE 750 MG/1
20 TABLET, FILM COATED, EXTENDED RELEASE ORAL ONCE
Qty: 2 TABLET | Refills: 0 | Status: COMPLETED | OUTPATIENT
Start: 2024-06-02 | End: 2024-06-02

## 2024-06-02 RX ORDER — DONEPEZIL HYDROCHLORIDE 10 MG/1
10 TABLET, FILM COATED ORAL DAILY
Status: DISCONTINUED | OUTPATIENT
Start: 2024-06-02 | End: 2024-07-16 | Stop reason: HOSPADM

## 2024-06-02 RX ORDER — ACETAMINOPHEN 325 MG/1
650 TABLET ORAL ONCE
Status: COMPLETED | OUTPATIENT
Start: 2024-06-02 | End: 2024-06-02

## 2024-06-02 RX ORDER — NOREPINEPHRINE BITARTRATE 0.03 MG/ML
.02-.3 INJECTION, SOLUTION INTRAVENOUS
Status: DISCONTINUED | OUTPATIENT
Start: 2024-06-02 | End: 2024-06-03

## 2024-06-02 RX ORDER — MIDAZOLAM HYDROCHLORIDE 1 MG/ML
1 INJECTION INTRAMUSCULAR; INTRAVENOUS ONCE
Status: COMPLETED | OUTPATIENT
Start: 2024-06-02 | End: 2024-06-02

## 2024-06-02 RX ORDER — SODIUM CHLORIDE 0.9 % (FLUSH) 0.9 %
3-10 SYRINGE (ML) INJECTION AS NEEDED
Status: DISCONTINUED | OUTPATIENT
Start: 2024-06-02 | End: 2024-06-02 | Stop reason: HOSPADM

## 2024-06-02 RX ORDER — NALOXONE HCL 0.4 MG/ML
0.2 VIAL (ML) INJECTION AS NEEDED
Status: DISCONTINUED | OUTPATIENT
Start: 2024-06-02 | End: 2024-06-02 | Stop reason: HOSPADM

## 2024-06-02 RX ORDER — EPHEDRINE SULFATE 50 MG/ML
5 INJECTION, SOLUTION INTRAVENOUS ONCE AS NEEDED
Status: DISCONTINUED | OUTPATIENT
Start: 2024-06-02 | End: 2024-06-02 | Stop reason: HOSPADM

## 2024-06-02 RX ORDER — ONDANSETRON 2 MG/ML
4 INJECTION INTRAMUSCULAR; INTRAVENOUS EVERY 6 HOURS PRN
Status: DISCONTINUED | OUTPATIENT
Start: 2024-06-02 | End: 2024-06-03

## 2024-06-02 RX ORDER — ALUMINA, MAGNESIA, AND SIMETHICONE 2400; 2400; 240 MG/30ML; MG/30ML; MG/30ML
15 SUSPENSION ORAL EVERY 6 HOURS PRN
Status: DISCONTINUED | OUTPATIENT
Start: 2024-06-02 | End: 2024-07-16 | Stop reason: HOSPADM

## 2024-06-02 RX ORDER — PROPOFOL 10 MG/ML
INJECTION, EMULSION INTRAVENOUS AS NEEDED
Status: DISCONTINUED | OUTPATIENT
Start: 2024-06-02 | End: 2024-06-02 | Stop reason: SURG

## 2024-06-02 RX ORDER — SODIUM CHLORIDE 0.9 % (FLUSH) 0.9 %
10 SYRINGE (ML) INJECTION AS NEEDED
Status: DISCONTINUED | OUTPATIENT
Start: 2024-06-02 | End: 2024-07-08

## 2024-06-02 RX ORDER — SODIUM CHLORIDE 9 MG/ML
40 INJECTION, SOLUTION INTRAVENOUS AS NEEDED
Status: DISCONTINUED | OUTPATIENT
Start: 2024-06-02 | End: 2024-06-11

## 2024-06-02 RX ORDER — DIPHENHYDRAMINE HYDROCHLORIDE 50 MG/ML
12.5 INJECTION INTRAMUSCULAR; INTRAVENOUS
Status: DISCONTINUED | OUTPATIENT
Start: 2024-06-02 | End: 2024-06-02 | Stop reason: HOSPADM

## 2024-06-02 RX ADMIN — DEXTROSE MONOHYDRATE 50 ML: 25 INJECTION, SOLUTION INTRAVENOUS at 15:19

## 2024-06-02 RX ADMIN — VASOPRESSIN 1 UNITS: 20 INJECTION INTRAVENOUS at 06:07

## 2024-06-02 RX ADMIN — ACETAMINOPHEN 325MG 650 MG: 325 TABLET ORAL at 04:16

## 2024-06-02 RX ADMIN — Medication 100 MCG: at 06:01

## 2024-06-02 RX ADMIN — CEFTRIAXONE 2000 MG: 2 INJECTION, POWDER, FOR SOLUTION INTRAMUSCULAR; INTRAVENOUS at 01:25

## 2024-06-02 RX ADMIN — LIDOCAINE HYDROCHLORIDE 40 MG: 20 INJECTION, SOLUTION INFILTRATION; PERINEURAL at 05:55

## 2024-06-02 RX ADMIN — MEMANTINE HYDROCHLORIDE 10 MG: 10 TABLET, FILM COATED ORAL at 21:41

## 2024-06-02 RX ADMIN — Medication 100 MCG: at 05:57

## 2024-06-02 RX ADMIN — PHENTOLAMINE MESYLATE 5 MG: 5 INJECTION INTRAMUSCULAR; INTRAVENOUS at 16:16

## 2024-06-02 RX ADMIN — Medication 200 MCG: at 05:55

## 2024-06-02 RX ADMIN — SODIUM CHLORIDE 1000 ML: 9 INJECTION, SOLUTION INTRAVENOUS at 03:10

## 2024-06-02 RX ADMIN — Medication 100 MCG: at 05:52

## 2024-06-02 RX ADMIN — Medication 10 ML: at 21:00

## 2024-06-02 RX ADMIN — GADOBENATE DIMEGLUMINE 17 ML: 529 INJECTION, SOLUTION INTRAVENOUS at 12:18

## 2024-06-02 RX ADMIN — DEXTROSE MONOHYDRATE 50 ML: 25 INJECTION, SOLUTION INTRAVENOUS at 11:09

## 2024-06-02 RX ADMIN — FAMOTIDINE 20 MG: 10 INJECTION INTRAVENOUS at 05:41

## 2024-06-02 RX ADMIN — DEXTROSE MONOHYDRATE 50 ML: 25 INJECTION, SOLUTION INTRAVENOUS at 13:45

## 2024-06-02 RX ADMIN — MIDAZOLAM 1 MG: 1 INJECTION INTRAMUSCULAR; INTRAVENOUS at 11:32

## 2024-06-02 RX ADMIN — SODIUM CHLORIDE, POTASSIUM CHLORIDE, SODIUM LACTATE AND CALCIUM CHLORIDE: 600; 310; 30; 20 INJECTION, SOLUTION INTRAVENOUS at 05:51

## 2024-06-02 RX ADMIN — VASOPRESSIN 1 UNITS: 20 INJECTION INTRAVENOUS at 06:00

## 2024-06-02 RX ADMIN — Medication 100 MCG: at 05:59

## 2024-06-02 RX ADMIN — POTASSIUM CHLORIDE 20 MEQ: 750 TABLET, EXTENDED RELEASE ORAL at 21:41

## 2024-06-02 RX ADMIN — PROPOFOL 50 MG: 10 INJECTION, EMULSION INTRAVENOUS at 05:55

## 2024-06-02 RX ADMIN — VASOPRESSIN 1 UNITS: 20 INJECTION INTRAVENOUS at 05:53

## 2024-06-02 RX ADMIN — POTASSIUM CHLORIDE 40 MEQ: 750 TABLET, EXTENDED RELEASE ORAL at 16:21

## 2024-06-02 RX ADMIN — DEXTROSE MONOHYDRATE 50 ML/HR: 100 INJECTION, SOLUTION INTRAVENOUS at 14:18

## 2024-06-02 RX ADMIN — SODIUM CHLORIDE, POTASSIUM CHLORIDE, SODIUM LACTATE AND CALCIUM CHLORIDE 9 ML/HR: 600; 310; 30; 20 INJECTION, SOLUTION INTRAVENOUS at 05:35

## 2024-06-02 RX ADMIN — HEPARIN SODIUM 5000 UNITS: 5000 INJECTION INTRAVENOUS; SUBCUTANEOUS at 14:00

## 2024-06-02 RX ADMIN — DEXTROSE MONOHYDRATE 50 ML: 25 INJECTION, SOLUTION INTRAVENOUS at 08:35

## 2024-06-02 RX ADMIN — VANCOMYCIN HYDROCHLORIDE 1500 MG: 10 INJECTION, POWDER, LYOPHILIZED, FOR SOLUTION INTRAVENOUS at 02:35

## 2024-06-02 RX ADMIN — DEXTROSE MONOHYDRATE 50 ML/HR: 100 INJECTION, SOLUTION INTRAVENOUS at 19:59

## 2024-06-02 RX ADMIN — SODIUM CHLORIDE 1929 ML: 9 INJECTION, SOLUTION INTRAVENOUS at 11:00

## 2024-06-02 RX ADMIN — Medication 100 MCG: at 05:53

## 2024-06-02 RX ADMIN — SENNOSIDES AND DOCUSATE SODIUM 2 TABLET: 50; 8.6 TABLET ORAL at 21:42

## 2024-06-02 RX ADMIN — ERTAPENEM SODIUM 500 MG: 1 INJECTION, POWDER, LYOPHILIZED, FOR SOLUTION INTRAMUSCULAR; INTRAVENOUS at 17:00

## 2024-06-02 RX ADMIN — NOREPINEPHRINE BITARTRATE 0.05 MCG/KG/MIN: 1 SOLUTION INTRAVENOUS at 05:52

## 2024-06-02 RX ADMIN — VASOPRESSIN 1 UNITS: 20 INJECTION INTRAVENOUS at 05:56

## 2024-06-02 NOTE — ANESTHESIA PREPROCEDURE EVALUATION
Anesthesia Evaluation     Patient summary reviewed   no history of anesthetic complications:   NPO Solid Status: Waived due to emergency  NPO Liquid Status: Waived due to emergency           Airway   Mallampati: II  TM distance: >3 FB  Neck ROM: full  No difficulty expected  Dental      Pulmonary     breath sounds clear to auscultation  (-) shortness of breath, recent URI, not a smokerSleep apnea: STOP BANG 3.  Cardiovascular   Exercise tolerance: good (4-7 METS)    Rhythm: regular  Rate: normal    (+) hypertension, dysrhythmias PAC, hyperlipidemia  (-) past MI, angina      Neuro/Psych  (+) headaches (Migraine hx), dementia (Alzheimer type, CAYLA)  (-) seizures, CVA  GI/Hepatic/Renal/Endo    (+) obesity, liver disease history of elevated LFT, renal disease (Cr 1.60; severe hydronephrosis, R)- stones and ARF, thyroid problem (MNG) Diabetes: HbA1c 5.7 (9/2023).    Musculoskeletal     (+) back pain, chronic pain  (-) neck stiffness  Abdominal    Substance History      OB/GYN          Other   arthritis,     (-) blood dyscrasia  ROS/Med Hx Other: Lactic Acid 10.2                Anesthesia Plan    ASA 3 - emergent     general   Rapid sequence  (I have reviewed the patient's history and chart with the patient, including all pertinent laboratory results and imaging. I have explained the risks of anesthesia including but not limited to dental damage, corneal abrasion, nerve injury, MI, stroke, aspiration, and death. Patient has agreed to proceed.  )  intravenous induction     Anesthetic plan, risks, benefits, and alternatives have been provided, discussed and informed consent has been obtained with: patient and healthcare power of .    Use of blood products discussed with patient .        CODE STATUS:    Code Status (Patient has no pulse and is not breathing): CPR (Attempt to Resuscitate)  Medical Interventions (Patient has pulse or is breathing): Full Support

## 2024-06-02 NOTE — PROGRESS NOTES
Patient confused, wants to leave to [facility]    Pulled out IV this am  BP 81/51    Soft abdomen  Rodriguez draining with orange urine

## 2024-06-02 NOTE — PROGRESS NOTES
"[facility] Clinical Pharmacy Services: Vancomycin Monitoring Note    Kimberlee Urrutia is a 84 y.o. female who is on day 1 of pharmacy to dose vancomycin for Bone and/or Joint Infection. L4/5 discitis/osteomyelitis     Current Vancomycin Dose:   intermittent  Updated Cultures and Sensitivities:     Results from last 7 days   Lab Units 06/02/24  1356   VANCOMYCIN RM mcg/mL 9.70     Vitals/Labs  Ht: 158.8 cm (62.5\"); Wt: 83.9 kg (184 lb 15.5 oz)   Temp Readings from Last 1 Encounters:   06/02/24 98.2 °F (36.8 °C) (Oral)     Estimated Creatinine Clearance: 22 mL/min (A) (by C-G formula based on SCr of 1.9 mg/dL (H)).     Results from last 7 days   Lab Units 06/02/24  1356 06/01/24  2113   CREATININE mg/dL 1.90* 1.60*   WBC 10*3/mm3  --  17.40*     Assessment/Plan  Creatinine increased this afternoon. Vancomycin random level at goal <20 mcg/ml for re-dosing.   Vancomycin Dose: continue intermittent dosing vancomycin-- give 750 mg IV x1 dose now to maintain therapeutic level.  Next Level Date and Time: Vanc Random with AM labs tomorrow to evaluate.  We will continue to monitor patient changes and renal function     Thank you for involving pharmacy in this patient's care. Please contact pharmacy with any questions or concerns.       Yvonne Painter, PharmD  Clinical Pharmacist          "

## 2024-06-02 NOTE — ED TRIAGE NOTES
"Received call from Tete at [facility] stating that they are sending pt in via EMS for \"severe low back pain that started around 1800\". Denies any falls or injuries   Pt given tylenol at facility, pt reports that it did not help   Hx of dementia AAOX2  "

## 2024-06-02 NOTE — CONSULTS
Patient Identification:  Kimberlee Urrutia  84 y.o.  female  1940  [id number]          LOS 0    Requesting physician:   Mark Alvarez*    Reason for Consultation:    Back pain, shock     History of Present Illness:   84-year-old female with a history of arthritis, hyperlipidemia, hypertension, chronic low back pain, dementia, multinodular thyroid, renal stones who presented with low back pain.     Patient underwent CT of the lumbar spine and found to have discitis/osteomyelitis of L4/L5 and also found to have hydronephrosis of the right ureter.  Underwent CT abdomen pelvis with moderate to severe right ureteral hydronephrosis and urology was consulted.  Patient was taken to the OR for cystoscopy and right ureteral stent placement.  Ultimately presented to the ICU due to shock requiring vasopressor support.     On evaluation of patient, states that she is doing well.  Does complain of back pain which overall remains unchanged.  No shortness of breath, cough, sputum production, chest pain, palpitations, nausea, vomiting.    Past Medical History:  Past Medical History:   Diagnosis Date    Allergic     Arthritis     Colon cancer screening 01/29/2020    Colon cancer screening 01/29/2020    Cough     Diarrhea     Hx of migraine headaches     Hyperlipidemia     Irregular heart beat     Osteoarthritis     Renal stones 02/2021    Vitamin D deficiency        Past Surgical History:  Past Surgical History:   Procedure Laterality Date    COLONOSCOPY      LUMBAR EPIDURAL INJECTION N/A 8/2/2021    Procedure: LUMBAR EPIDURAL 1ST VISIT 5-1;  Surgeon: Nu Partida MD;  Location: [facility] [place] MAIN OR;  Service: Pain Management;  Laterality: N/A;    REPLACEMENT TOTAL KNEE Right 01/2015    Josue Wilson MD    SACROILIAC JOINT INJECTION Left 6/16/2021    Procedure: SACROILIAC INJECTION left;  Surgeon: Nu Partida MD;  Location: [facility] [place] MAIN OR;  Service: Pain Management;  Laterality: Left;    TUBAL ABDOMINAL LIGATION       URETEROSCOPY LASER LITHOTRIPSY WITH STENT INSERTION Left 2021    Procedure: LT.URETEROSCOPY LASER LITHOTRIPSY WITH STENT  FOR STONE;  Surgeon: Arnaud Lu MD;  Location: [facility];  Service: Urology;  Laterality: Left;    WISDOM TOOTH EXTRACTION          Home Meds:  Medications Prior to Admission   Medication Sig Dispense Refill Last Dose    acetaminophen (TYLENOL) 325 MG tablet Take 2 tablets by mouth Every 6 (Six) Hours As Needed for Mild Pain.   2024 at 1800    amLODIPine (NORVASC) 2.5 MG tablet Take 1 tablet by mouth Daily. For BP 30 tablet 5 2024 at 0800    cholecalciferol (VITAMIN D3) 25 MCG (1000 UT) tablet Take 2 tablets by mouth Daily.   2024 at 0800    donepezil (ARICEPT) 10 MG tablet Take 1 tablet by mouth Daily.   2024 at 2200    meloxicam (MOBIC) 15 MG tablet Take 1 tablet by mouth Daily.   2024 at 0800    memantine (NAMENDA) 10 MG tablet Take 1 tablet by mouth 2 (Two) Times a Day.   2024 at 0800    Multiple Vitamins-Minerals (MULTI COMPLETE PO) Take  by mouth daily.   2024 at 0800    lidocaine (LIDODERM) 5 % Place patch over low back/sacral area.  Remove & Discard patch within 12 hours.  May use 4% over the counter patches in place of prescription 5%. 30 each 11 Unknown    rosuvastatin (CRESTOR) 20 MG tablet Take 1 tablet by mouth Daily.   2024 at 2200         Allergies:  Allergies   Allergen Reactions    Penicillins Other (See Comments) and Unknown (See Comments)     Tolerated Ceftriaxone during 2017 admission   Tolerated Ceftriaxone during 2017 admission   Tolerated Ceftriaxone during 2017 admission   Tolerated Ceftriaxone during 2017 admission     Sulfa Antibiotics Rash       Social History:   Social History     Socioeconomic History    Marital status:    Tobacco Use    Smoking status: Former     Current packs/day: 0.00     Types: Cigarettes     Start date: 1/10/1953     Quit date: 1/10/1985     Years since quittin.4  "   Smokeless tobacco: Never    Tobacco comments:     smoked 4-5 cigarettes daily   Vaping Use    Vaping status: Never Used   Substance and Sexual Activity    Alcohol use: No     Comment: caffeine use coffee 3 cups daily    Drug use: No    Sexual activity: Defer       Family History:  Family History   Problem Relation Age of Onset    Heart disease Mother     Sudden death Mother 48        MI    No Known Problems Father     Cancer Sister     Cancer Son        Review of Systems:  12 point review of systems performed and all else negative except as per HPI above.    Objective:    PHYSICAL EXAM:    /53   Pulse 109   Temp 98.3 °F (36.8 °C) (Oral)   Resp 20   Ht 158.8 cm (62.5\")   Wt 83.9 kg (184 lb 15.5 oz)   LMP  (LMP Unknown)   SpO2 97%   BMI 33.29 kg/m²  Body mass index is 33.29 kg/m². 97% 83.9 kg (184 lb 15.5 oz)    General: Alert, nontoxic, NAD  HEENT: NC/AT, EOMI, MMM  Neck: Supple, trachea midline  Cardiac: RRR, no murmur, gallops, rubs  Pulmonary: Clear to auscultation bilaterally, no adventitious breath sounds, normal respiratory effort  GI: Soft, non-tender, non-distended, normal bowel sounds  Extremities: Warm, well perfused, no LE edema  Skin: no visible rash  Neuro: CN II - XII grossly intact  Psychiatry: Normal mood and affect    Lab Review:   Results from last 7 days   Lab Units 06/01/24 2113   WBC 10*3/mm3 17.40*   HEMOGLOBIN g/dL 13.6   PLATELETS 10*3/mm3 140     Results from last 7 days   Lab Units 06/01/24 2113   SODIUM mmol/L 144   POTASSIUM mmol/L 3.5   CHLORIDE mmol/L 108*   CO2 mmol/L 18.5*   BUN mg/dL 26*   CREATININE mg/dL 1.60*   GLUCOSE mg/dL 96   CALCIUM mg/dL 10.1   Estimated Creatinine Clearance: 26.1 mL/min (A) (by C-G formula based on SCr of 1.6 mg/dL (H)).    Results from last 7 days   Lab Units 06/02/24  0430 06/02/24  0226 06/01/24 2113   AST (SGOT) U/L  --   --  274*   ALT (SGPT) U/L  --   --  281*   LACTATE mmol/L 9.0* 10.2*  --    CRP mg/dL  --   --  2.90*   PLATELETS " 10*3/mm3  --   --  140              Imaging reviewed  Chest imaging from this hospitalization reviewed.       Assessment / Recommendations:  Septic shock secondary to urinary source  Moderate to severe right hydronephrosis  Right ureteral stone status post cystoscopy and stent placement (6/2)  Hypoglycemia  Anion gap metabolic acidosis, lactic acidosis  Discitis, osteomyelitis of L4/L5  Acute kidney injury  Leukocytosis  History of hypertension  Hyperlipidemia  Dementia  Arthritis     - Patient presented with back pain and ultimately found to have L4/L5 discitis/osteomyelitis in addition to right-sided hydronephrosis for which she went to OR for cystoscopy and right ureteral stent placement by urology.  - Requiring vasopressor support with Levophed, wean as tolerated with MAP goal greater than 65  - Continue empiric antibiotics with vancomycin and ceftriaxone  -Neurosurgery consultation regarding discitis/osteomyelitis of L4/L5  -Continue donepezil and memantine for history of dementia  -Wean oxygen as tolerated for SpO2 goal greater than 90%  -Monitor blood glucose, episode of hypoglycemia improvement after dextrose administration  -Marked lactic acidosis 10 9, will repeat, likely in the setting of septic shock     GI prophylaxis: Not indicated  DVT prophylaxis: Heparin  Rodriguez catheter: Yes  Bowel regimen: Ordered  Nutrition: N.p.o.     Disposition: ICU due to critical state     Critical care: 40 minutes    Gabe Guevara MD  [facility], [facility]  Pulmonary and Critical Care Medicine, Interventional Pulmonology    6/2/2024  09:33 EDT    Parts of this note may be an electronic transcription/translation of spoken language to printed text using the Dragon dictation system.       Addendum  ========  Blood cultures growing gram-negative bacilli.  Patient has had multiple urinary tract infections in the past 5 years old positive for E. coli and susceptible to ceftriaxone.  Will continue ceftriaxone therapy at  this time and follow-up speciation and susceptibilities.  Received multiple doses of dextrose however remains hyperglycemic, will start on D10 drip.  IV with Levophed infiltrated, removed, ordering PICC for IV access.      Addendum  ========  Culture speciated to ESBL E. coli.  Antibiotics changed to ertapenem.

## 2024-06-02 NOTE — H&P
[place] [facility]  HISTORY AND PHYSICAL   [facility]        Patient Identification:  Name: Kimberlee Urrutia  Age: 84 y.o.  Sex: female  :  1940  MRN: [id number]                     Primary Care Physician: Meche Ji MD    Chief Complaint:    Back pain, shock    History of Present Illness:   84-year-old female with a history of arthritis, hyperlipidemia, hypertension, chronic low back pain, dementia, multinodular thyroid, renal stones who presented with low back pain.    Patient underwent CT of the lumbar spine and found to have discitis/osteomyelitis of L4/L5 and also found to have hydronephrosis of the right ureter.  Underwent CT abdomen pelvis with moderate to severe right ureteral hydronephrosis and urology was consulted.  Patient was taken to the OR for cystoscopy and right ureteral stent placement.  Ultimately presented to the ICU due to shock requiring vasopressor support.    On evaluation of patient, states that she is doing well.  Does complain of back pain which overall remains unchanged.  No shortness of breath, cough, sputum production, chest pain, palpitations, nausea, vomiting.      Past Medical History:  Past Medical History:   Diagnosis Date    Allergic     Arthritis     Colon cancer screening 2020    Colon cancer screening 2020    Cough     Diarrhea     Hx of migraine headaches     Hyperlipidemia     Irregular heart beat     Osteoarthritis     Renal stones 2021    Vitamin D deficiency      Past Surgical History:  Past Surgical History:   Procedure Laterality Date    COLONOSCOPY      LUMBAR EPIDURAL INJECTION N/A 2021    Procedure: LUMBAR EPIDURAL 1ST VISIT 5-1;  Surgeon: Nu Partida MD;  Location: [facility] MAIN OR;  Service: Pain Management;  Laterality: N/A;    REPLACEMENT TOTAL KNEE Right 2015    Josue Wilson MD    SACROILIAC JOINT INJECTION Left 2021    Procedure: SACROILIAC INJECTION left;  Surgeon: Nu Partida MD;   Location: [facility] MAIN OR;  Service: Pain Management;  Laterality: Left;    TUBAL ABDOMINAL LIGATION      URETEROSCOPY LASER LITHOTRIPSY WITH STENT INSERTION Left 2/23/2021    Procedure: LT.URETEROSCOPY LASER LITHOTRIPSY WITH STENT  FOR STONE;  Surgeon: Arnaud Lu MD;  Location: [facility] MAIN OR;  Service: Urology;  Laterality: Left;    WISDOM TOOTH EXTRACTION        Home Meds:  Medications Prior to Admission   Medication Sig Dispense Refill Last Dose    acetaminophen (TYLENOL) 325 MG tablet Take 2 tablets by mouth Every 6 (Six) Hours As Needed for Mild Pain.   6/1/2024 at 1800    amLODIPine (NORVASC) 2.5 MG tablet Take 1 tablet by mouth Daily. For BP 30 tablet 5 6/1/2024 at 0800    cholecalciferol (VITAMIN D3) 25 MCG (1000 UT) tablet Take 2 tablets by mouth Daily.   6/1/2024 at 0800    donepezil (ARICEPT) 10 MG tablet Take 1 tablet by mouth Daily.   5/31/2024 at 2200    meloxicam (MOBIC) 15 MG tablet Take 1 tablet by mouth Daily.   6/1/2024 at 0800    memantine (NAMENDA) 10 MG tablet Take 1 tablet by mouth 2 (Two) Times a Day.   6/1/2024 at 0800    Multiple Vitamins-Minerals (MULTI COMPLETE PO) Take  by mouth daily.   6/1/2024 at 0800    lidocaine (LIDODERM) 5 % Place patch over low back/sacral area.  Remove & Discard patch within 12 hours.  May use 4% over the counter patches in place of prescription 5%. 30 each 11 Unknown    rosuvastatin (CRESTOR) 20 MG tablet Take 1 tablet by mouth Daily.   5/31/2024 at 2200       Allergies:  Allergies   Allergen Reactions    Penicillins Other (See Comments) and Unknown (See Comments)     Tolerated Ceftriaxone during 09/2017 admission   Tolerated Ceftriaxone during 09/2017 admission   Tolerated Ceftriaxone during 09/2017 admission   Tolerated Ceftriaxone during 09/2017 admission     Sulfa Antibiotics Rash     Immunizations:  Immunization History   Administered Date(s) Administered    COVID-19 (PFIZER) BIVALENT 12+YRS 11/02/2022    COVID-19 (PFIZER) Purple Cap Monovalent  "2021, 2021, 2021, 2021, 2021, 2021, 2021, 2021    COVID-19 F23 (MODERNA) 12YRS+ (SPIKEVAX) 2023    FluMist 2-49yrs 2016, 2016, 2017, 2017    Fluzone High Dose =>65 Years (Vaxcare ONLY) 2010, 2018, 2018, 2019, 2019, 2020, 2020    Fluzone High-Dose 65+yrs 2020, 10/19/2021, 2022    Pneumococcal Conjugate 13-Valent (PCV13) 10/10/2018    Pneumococcal Polysaccharide (PPSV23) 2007, 2007, 2008, 2008    Shingrix 2019, 2019, 2019, 2019     Social History:   Social History     Social History Narrative    Not on file     Social History     Tobacco Use    Smoking status: Former     Current packs/day: 0.00     Types: Cigarettes     Start date: 1/10/1953     Quit date: 1/10/1985     Years since quittin.4    Smokeless tobacco: Never    Tobacco comments:     smoked 4-5 cigarettes daily   Substance Use Topics    Alcohol use: No     Comment: caffeine use coffee 3 cups daily     Family History:  Family History   Problem Relation Age of Onset    Heart disease Mother     Sudden death Mother 48        MI    No Known Problems Father     Cancer Sister     Cancer Son         Review of Systems  12 point review systems negative except as per HPI above.    Objective:  tMax 24 hrs: Temp (24hrs), Av.5 °F (37.5 °C), Min:98.3 °F (36.8 °C), Max:100.5 °F (38.1 °C)    Vitals Ranges:   Temp:  [98.3 °F (36.8 °C)-100.5 °F (38.1 °C)] 98.3 °F (36.8 °C)  Heart Rate:  [] 98  Resp:  [14-22] 20  BP: ()/(44-91) 65/53      Exam:  BP (!) 65/53 Comment: levophed increased  Pulse 98   Temp 98.3 °F (36.8 °C) (Oral)   Resp 20   Ht 158.8 cm (62.5\")   Wt 83.9 kg (184 lb 15.5 oz)   LMP  (LMP Unknown)   SpO2 97%   BMI 33.29 kg/m²     General: Alert, nontoxic, NAD  HEENT: NC/AT, EOMI, MMM  Neck: Supple, trachea midline  Cardiac: RRR, no murmur, gallops, " rubs  Pulmonary: Clear to auscultation bilaterally, no adventitious breath sounds, normal respiratory effort  GI: Soft, non-tender, non-distended, normal bowel sounds  Extremities: Warm, well perfused, no LE edema  Skin: no visible rash  Neuro: CN II - XII grossly intact  Psychiatry: Normal mood and affect    Data Review:    Labs:  Results from last 7 days   Lab Units 06/01/24  2113   WBC 10*3/mm3 17.40*   HEMOGLOBIN g/dL 13.6   PLATELETS 10*3/mm3 140     Results from last 7 days   Lab Units 06/01/24  2113   SODIUM mmol/L 144   POTASSIUM mmol/L 3.5   CHLORIDE mmol/L 108*   CO2 mmol/L 18.5*   BUN mg/dL 26*   CREATININE mg/dL 1.60*   GLUCOSE mg/dL 96   CALCIUM mg/dL 10.1   Estimated Creatinine Clearance: 26.1 mL/min (A) (by C-G formula based on SCr of 1.6 mg/dL (H)).    Results from last 7 days   Lab Units 06/02/24  0430 06/02/24  0226 06/01/24 2113   AST (SGOT) U/L  --   --  274*   ALT (SGPT) U/L  --   --  281*   LACTATE mmol/L 9.0* 10.2*  --    CRP mg/dL  --   --  2.90*   PLATELETS 10*3/mm3  --   --  140             Imaging:  Imaging from this hospitalization reviewed      Assessment / Plan:    Septic shock secondary to urinary source  Moderate to severe right hydronephrosis  Right ureteral stone status post cystoscopy and stent placement (6/2)  Hypoglycemia  Anion gap metabolic acidosis, lactic acidosis  Discitis, osteomyelitis of L4/L5  Acute kidney injury  Leukocytosis  History of hypertension  Hyperlipidemia  Dementia  Arthritis    - Patient presented with back pain and ultimately found to have L4/L5 discitis/osteomyelitis in addition to right-sided hydronephrosis for which she went to OR for cystoscopy and right ureteral stent placement by urology.  - Requiring vasopressor support with Levophed, wean as tolerated with MAP goal greater than 65  - Continue empiric antibiotics with vancomycin and ceftriaxone  -Neurosurgery consultation regarding discitis/osteomyelitis of L4/L5  -Continue donepezil and memantine  for history of dementia  -Wean oxygen as tolerated for SpO2 goal greater than 90%  -Monitor blood glucose, episode of hypoglycemia improvement after dextrose administration  -Marked lactic acidosis 10 9, will repeat, likely in the setting of septic shock    GI prophylaxis: Not indicated  DVT prophylaxis: Heparin  Rodriguez catheter: Yes  Bowel regimen: Ordered  Nutrition: N.p.o.    Disposition: ICU due to critical state    Critical care: 40 minutes    Gabe Guevara MD  [facility], [facility]  Pulmonary and Critical Care Medicine, Interventional Pulmonology    6/2/2024  08:55 EDT

## 2024-06-02 NOTE — ANESTHESIA PROCEDURE NOTES
Airway  Urgency: elective    Date/Time: 6/2/2024 5:56 AM  Airway not difficult    General Information and Staff    Patient location during procedure: OR  Anesthesiologist: Jose Prather DO  CRNA/CAA: Radha Banuelos CRNA    Indications and Patient Condition  Indications for airway management: airway protection    Preoxygenated: yes  Mask difficulty assessment: 0 - not attempted    Final Airway Details  Final airway type: supraglottic airway      Successful airway: unique  Size 4     Number of attempts at approach: 1  Assessment: lips, teeth, and gum same as pre-op and atraumatic intubation

## 2024-06-02 NOTE — CONSULTS
[facility] CONSULT      Patient Identification:  NAME:  Kimberlee Urrutia  Age:  84 y.o.   Sex:  female   :  1940   MRN:  [id number]     Chief complaint:  Back pain    History of present illness:      83 yo female with dementia who resides in NH sent to hospital ER for severe back pain that began last PM.  Tylenol did not relieve pain.  Presented to the ER.    In hospital:  -Tm 38.1, mild hypotension  -Lactate 10, repeat 9  -WBC - 17 K  -Creat - 1.6  -UA - mod scottie ase, neg nitrite, 21-50 WBCs and RBCs, 1+ bact (CATH)  -Blood and urine cx's pending    -CT - 3 mm right distal ureteral stone with severe hydro  -CT lumbar spine - suspected discitis/osteo at L4/5    BP slowly worsening over last 2 hours    Asked to see    Past medical history:  Past Medical History:   Diagnosis Date    Allergic     Arthritis     Colon cancer screening 2020    Colon cancer screening 2020    Cough     Diarrhea     Hx of migraine headaches     Hyperlipidemia     Irregular heart beat     Osteoarthritis     Renal stones 2021    Vitamin D deficiency        Past surgical history:  Past Surgical History:   Procedure Laterality Date    COLONOSCOPY      LUMBAR EPIDURAL INJECTION N/A 2021    Procedure: LUMBAR EPIDURAL 1ST VISIT 5-1;  Surgeon: uN Partida MD;  Location: [facility] MAIN OR;  Service: Pain Management;  Laterality: N/A;    REPLACEMENT TOTAL KNEE Right 2015    Josue Wilson MD    SACROILIAC JOINT INJECTION Left 2021    Procedure: SACROILIAC INJECTION left;  Surgeon: Nu Partida MD;  Location: [facility] [place] [facility] MAIN OR;  Service: Pain Management;  Laterality: Left;    TUBAL ABDOMINAL LIGATION      URETEROSCOPY LASER LITHOTRIPSY WITH STENT INSERTION Left 2021    Procedure: LT.URETEROSCOPY LASER LITHOTRIPSY WITH STENT  FOR STONE;  Surgeon: Arnaud Lu MD;  Location: [facility] MAIN OR;  Service: Urology;  Laterality: Left;    WISDOM TOOTH EXTRACTION         Allergies:  Penicillins and Sulfa  antibiotics    Home medications:  Medications Prior to Admission   Medication Sig Dispense Refill Last Dose    acetaminophen (TYLENOL) 325 MG tablet Take 2 tablets by mouth Every 6 (Six) Hours As Needed for Mild Pain.   6/1/2024 at 1800    amLODIPine (NORVASC) 2.5 MG tablet Take 1 tablet by mouth Daily. For BP 30 tablet 5 6/1/2024 at 0800    cholecalciferol (VITAMIN D3) 25 MCG (1000 UT) tablet Take 2 tablets by mouth Daily.   6/1/2024 at 0800    donepezil (ARICEPT) 10 MG tablet Take 1 tablet by mouth Daily.   5/31/2024 at 2200    meloxicam (MOBIC) 15 MG tablet Take 1 tablet by mouth Daily.   6/1/2024 at 0800    memantine (NAMENDA) 10 MG tablet Take 1 tablet by mouth 2 (Two) Times a Day.   6/1/2024 at 0800    Multiple Vitamins-Minerals (MULTI COMPLETE PO) Take  by mouth daily.   6/1/2024 at 0800    lidocaine (LIDODERM) 5 % Place patch over low back/sacral area.  Remove & Discard patch within 12 hours.  May use 4% over the counter patches in place of prescription 5%. 30 each 11 Unknown    rosuvastatin (CRESTOR) 20 MG tablet Take 1 tablet by mouth Daily.   5/31/2024 at 2200        Hospital medications:  cefTRIAXone, 2,000 mg, Intravenous, Q24H  famotidine, 20 mg, Intravenous, Once  [Transfer Hold] sodium chloride, 10 mL, Intravenous, Q12H  sodium chloride, 3 mL, Intravenous, Q12H  Vancomycin Pharmacy Intermittent/Pulse Dosing, , Does not apply, Daily      lactated ringers, 100 mL/hr  lactated ringers, 9 mL/hr  Pharmacy to dose vancomycin,         [Transfer Hold] acetaminophen **OR** [Transfer Hold] acetaminophen **OR** [Transfer Hold] acetaminophen    [Transfer Hold] senna-docusate sodium **AND** [Transfer Hold] polyethylene glycol **AND** [Transfer Hold] bisacodyl **AND** [Transfer Hold] bisacodyl    fentanyl    [Transfer Hold] HYDROmorphone **AND** [Transfer Hold] naloxone    lidocaine    [Transfer Hold] nitroglycerin    [Transfer Hold] ondansetron    Pharmacy to dose vancomycin    [COMPLETED] Insert Peripheral IV  **AND** [Transfer Hold] sodium chloride    [Transfer Hold] sodium chloride    sodium chloride    [Transfer Hold] sodium chloride    Family history:  Family History   Problem Relation Age of Onset    Heart disease Mother     Sudden death Mother 48        MI    No Known Problems Father     Cancer Sister     Cancer Son        Social history:  Social History     Tobacco Use    Smoking status: Former     Current packs/day: 0.00     Types: Cigarettes     Start date: 1/10/1953     Quit date: 1/10/1985     Years since quittin.4    Smokeless tobacco: Never    Tobacco comments:     smoked 4-5 cigarettes daily   Vaping Use    Vaping status: Never Used   Substance Use Topics    Alcohol use: No     Comment: caffeine use coffee 3 cups daily    Drug use: No       Review of systems:      Positive for:  nothing  Negative for:  chest pain, cough, sob, o/w neg    Objective:  TMax 24 hours:   Temp (24hrs), Av.9 °F (37.7 °C), Min:98.6 °F (37 °C), Max:100.5 °F (38.1 °C)      Vitals Ranges:   Temp:  [98.6 °F (37 °C)-100.5 °F (38.1 °C)] 98.6 °F (37 °C)  Heart Rate:  [] 99  Resp:  [14-22] 18  BP: ()/(49-88) 80/49    Intake/Output Last 3 shifts:  No intake/output data recorded.     Physical Exam:    General Appearance:    Cooperative, NAD   Back:     Mild right CVA tenderness   Lungs:     Respirations unlabored, no wheezing    Heart:    RRR, intact peripheral pulses   Abdomen:     Soft, ND, mild RLQ tenderness   :    Pelvic not performed, bladder nondistended and nontender       Results review:   I reviewed the patient's new clinical results.    Data review:  Lab Results (last 24 hours)       Procedure Component Value Units Date/Time    STAT Lactic Acid, Reflex [[id number]]  (Abnormal) Collected: 24 0430    Specimen: Blood Updated: 24 0509     Lactate 9.0 mmol/L     Lactic Acid, Plasma [[id number]]  (Abnormal) Collected: 24 0226    Specimen: Blood from Hand, Right Updated: 24 0302     Lactate 10.2  mmol/L     Blood Culture - Blood, Arm, Left [[id number]] Collected: 06/02/24 0100    Specimen: Blood from Arm, Left Updated: 06/02/24 0257    Urinalysis, Microscopic Only - Urine, Catheter [[id number]]  (Abnormal) Collected: 06/02/24 0134    Specimen: Urine, Catheter Updated: 06/02/24 0159     RBC, UA 21-50 /HPF      WBC, UA 21-50 /HPF      Bacteria, UA 1+ /HPF      Squamous Epithelial Cells, UA 3-6 /HPF      Hyaline Casts, UA 3-6 /LPF      Methodology Manual Light Microscopy    Urine Culture - Urine, Urine, Catheter [[id number]] Collected: 06/02/24 0134    Specimen: Urine, Catheter Updated: 06/02/24 0159    Urinalysis With Culture If Indicated - Urine, Catheter [[id number]]  (Abnormal) Collected: 06/02/24 0134    Specimen: Urine, Catheter Updated: 06/02/24 0153     Color, UA Yellow     Appearance, UA Cloudy     pH, UA 5.5     Specific Gravity, UA 1.009     Glucose, UA Negative     Ketones, UA Negative     Bilirubin, UA Negative     Blood, UA Large (3+)     Protein,  mg/dL (2+)     Leuk Esterase, UA Moderate (2+)     Nitrite, UA Negative     Urobilinogen, UA 1.0 E.U./dL    Narrative:      In absence of clinical symptoms, the presence of pyuria, bacteria, and/or nitrites on the urinalysis result does not correlate with infection.    Blood Culture - Blood, Arm, Left [[id number]] Collected: 06/02/24 0100    Specimen: Blood from Arm, Left Updated: 06/02/24 0117    C-reactive Protein [[id number]]  (Abnormal) Collected: 06/01/24 2113    Specimen: Blood Updated: 06/01/24 2333     C-Reactive Protein 2.90 mg/dL     Sedimentation Rate [[id number]]  (Normal) Collected: 06/01/24 2113    Specimen: Blood Updated: 06/01/24 2322     Sed Rate 8 mm/hr     Manual Differential [[id number]]  (Abnormal) Collected: 06/01/24 2113    Specimen: Blood Updated: 06/01/24 2157     Neutrophil % 94.0 %      Lymphocyte % 0.0 %      Monocyte % 0.0 %      Eosinophil % 0.0 %      Basophil % 0.0 %      Metamyelocyte % 5.0 %      Myelocyte % 1.0 %       Neutrophils Absolute 16.36 10*3/mm3      Lymphocytes Absolute 0.00 10*3/mm3      Monocytes Absolute 0.00 10*3/mm3      Eosinophils Absolute 0.00 10*3/mm3      Basophils Absolute 0.00 10*3/mm3      RBC Morphology Normal     WBC Morphology Normal     Platelet Morphology Normal    Comprehensive Metabolic Panel [[id number]]  (Abnormal) Collected: 06/01/24 2113    Specimen: Blood Updated: 06/01/24 2145     Glucose 96 mg/dL      BUN 26 mg/dL      Creatinine 1.60 mg/dL      Sodium 144 mmol/L      Potassium 3.5 mmol/L      Chloride 108 mmol/L      CO2 18.5 mmol/L      Calcium 10.1 mg/dL      Total Protein 6.1 g/dL      Albumin 3.9 g/dL      ALT (SGPT) 281 U/L      AST (SGOT) 274 U/L      Alkaline Phosphatase 79 U/L      Total Bilirubin 0.7 mg/dL      Globulin 2.2 gm/dL      A/G Ratio 1.8 g/dL      BUN/Creatinine Ratio 16.3     Anion Gap 17.5 mmol/L      eGFR 31.5 mL/min/1.73     Narrative:      GFR Normal >60  Chronic Kidney Disease <60  Kidney Failure <15    The GFR formula is only valid for adults with stable renal function between ages 18 and 70.    CBC & Differential [[id number]]  (Abnormal) Collected: 06/01/24 2113    Specimen: Blood Updated: 06/01/24 2142    Narrative:      The following orders were created for panel order CBC & Differential.  Procedure                               Abnormality         Status                     ---------                               -----------         ------                     CBC Auto Differential[[id number]]        Abnormal            Final result                 Please view results for these tests on the individual orders.    CBC Auto Differential [[id number]]  (Abnormal) Collected: 06/01/24 2113    Specimen: Blood Updated: 06/01/24 2142     WBC 17.40 10*3/mm3      RBC 4.60 10*6/mm3      Hemoglobin 13.6 g/dL      Hematocrit 41.0 %      MCV 89.1 fL      MCH 29.6 pg      MCHC 33.2 g/dL      RDW 12.8 %      RDW-SD 41.8 fl      MPV 10.3 fL      Platelets 140 10*3/mm3               Imaging:  Imaging Results (Last 24 Hours)       Procedure Component Value Units Date/Time    CT Abdomen Pelvis Without Contrast [[id number]] Collected: 06/01/24 2340     Updated: 06/01/24 2348    Narrative:      CT ABDOMEN PELVIS WO CONTRAST-     HISTORY: 84 years of age, Female.  Rule out ureteral stone     TECHNIQUE:  CT includes axial imaging from the lung bases to the  trochanters without intravenous contrast and without use of oral  contrast. Data reconstructed in coronal and sagittal planes. Radiation  dose reduction techniques were utilized, including automated exposure  control and exposure modulation based on body size.     COMPARISON: CT abdomen and pelvis 02/22/2021.     FINDINGS: There is a 3 mm distal right ureteral stone at the horizontal  level of the upper sacrum. Moderate to severe right hydronephrosis is  present and there is right perinephric stranding.     Left lateral renal cyst measures 1.8 cm. No left renal or ureteral stone  is evident.     Heart size is enlarged. Calcified left basilar pleural plaques.  Dependent basilar atelectasis. Liver, spleen, pancreas appear within  normal limits. Adrenal glands appear normal. Gallbladder partially  decompressed.     Sigmoid diverticulosis without evidence for diverticulitis. CT lumbar  spine has been completed same date and has been reported separately.       Impression:      1. 3 mm distal right ureteral stone at the horizontal level of the upper  sacrum with moderate to severe right hydronephrosis and right  perinephric stranding.  2. Cardiomegaly. Basilar atelectasis. Sigmoid diverticulosis without  evidence for diverticulitis.     Radiation dose reduction techniques were utilized, including automated  exposure control and exposure modulation based on body size.        This report was finalized on 6/1/2024 11:45 PM by Dr. Ki Roque M.D on Workstation: [id number]       CT Lumbar Spine Without Contrast [[id number]] Collected: 06/01/24  2252     Updated: 06/01/24 2300    Addenda:        ADDENDUM:  06 01 24 23:04 Call Doctor Regarding Osteomyelitis (if unsuspected),   called SHY Ashby on 06 01 23:04 (-04:00)      Signed: 06/01/24 2251 by Ivan Oro MD    Narrative:      CR  Patient: ABDIRASHID TA  Time Out: 22:51  Exam(s): CT L SPINE     EXAM:    CT Lumbar Spine Without Intravenous Contrast    CLINICAL HISTORY:    Low back pain    TECHNIQUE:    Axial computed tomography images of the lumbar spine without   intravenous contrast.  CTDI is 30.33 mGy and DLP is 826.4 mGy-cm.  This   CT exam was performed according to the principle of ALARA (As Low As   Reasonably Achievable) by using one or more of the following dose   reduction techniques: automated exposure control, adjustment of the mA   and or kV according to patient size, and or use of iterative   reconstruction technique.    COMPARISON:    MRI dated 2 18 21    FINDINGS:    Vertebrae:  Irregularity of the inferior endplate of L5 and superior   endplate of S1.  Multilevel facet arthropathy.  8 mm anterolisthesis of   L4-L5.  There is transitional anatomy.  The L5 vertebral body is   sacralized.  No acute fracture.    Discs spinal canal neural foramina:  Multilevel disc height loss.    There is significant irregularity of the disc space at L4-L5.  Vacuum   disc phenomenon at multiple levels in the lumbar spine.  No spinal canal   stenosis.    Soft tissues: Mild right hydroureteronephrosis.  Possible nephroliths   in the distal ureter..    Vasculature:  The distal thecal sac is ectatic, unchanged from prior   MRI.    IMPRESSION:       There is transitional anatomy with sacralization of the L5 vertebral body.    Findings are suspicious for discitis osteomyelitis at the L4-L5 level.    Recommend contrast enhanced MRI to further evaluate.    Multilevel degenerative changes.  No evidence of high-grade spinal canal   narrowing.    Mild to moderate right hydroureteronephrosis with possible distal    ureteral stone, not ideally evaluated on this study..    Impression:            Communications:     Call Doctor Osteomyelitis (if unsuspected)    Electronically signed by Ivan Oro MD on 06-01-24 at 2251             Assessment:     Sepsis  Right pyelonephritis  Right ureteral stone  Lumbar discitis    Plan:     To OR for urgent cysto, right stent insertion  Explained to the patient, however she is unable to consent for herself  I spoke with Gi, her new POA, who consented for us to proceed with emergency right stent insertion  I explained the risks, including worsening infection, sepsis, death - she agrees and understands the life-saving nature of this procedure  Rocephin given  Will need staged right uscope once well    Efra Schofield MD  06/02/24  05:37 EDT

## 2024-06-02 NOTE — CASE MANAGEMENT/SOCIAL WORK
Discharge Planning Assessment  [facility]     Patient Name: Kimberlee Urrutia  MRN: [id number]  Today's Date: 6/2/2024    Admit Date: 6/1/2024    Plan: Return to [facility] [place]   Discharge Needs Assessment       Row Name 06/02/24 1358       Living Environment    People in Home alone;facility resident    Unique Family Situation Pt currently is a resident at [facility] on [facility]. According to pt's POA, she will be moving in to [facility] in [place], [place], at the first of July.    Current Living Arrangements assisted living facility    Potentially Unsafe Housing Conditions none    Provides Primary Care For no one, unable/limited ability to care for self    Family Caregiver if Needed other relative(s)    Family Caregiver Names niece/POA Gi Syed    Quality of Family Relationships unable to assess    Able to Return to Prior Arrangements yes       Resource/Environmental Concerns    Resource/Environmental Concerns none    Transportation Concerns none       Transition Planning    Patient/Family Anticipates Transition to long-term care facility  return to [facility]    Patient/Family Anticipated Services at Transition none    Transportation Anticipated family or friend will provide;other (see comments)  check with [facility] first to see if they can pick pt up, otherwise, family will be able to drive pt home.       Discharge Needs Assessment    Readmission Within the Last 30 Days no previous admission in last 30 days    Current Outpatient/Agency/Support Group assisted living facility    Equipment Currently Used at Home other (see comments)  per POA, pt refuses to use a walker but it is available to her.    Concerns to be Addressed discharge planning    Anticipated Changes Related to Illness none    Equipment Needed After Discharge none    Outpatient/Agency/Support Group Needs assisted living facility    Discharge Facility/Level of Care Needs assisted living facility    Provided Post Acute  Provider List? N/A    N/A Provider List Comment POA anticipates pt will return to her [facility] and will be moving within the month to a memory care facility in [facility]    Current Discharge Risk chronically ill;cognitively impaired;dependent with mobility/activities of daily living                   Discharge Plan       Row Name 06/02/24 1511       Plan    Plan Return to [facility] [place]    Patient/Family in Agreement with Plan yes    Plan Comments Called POA/niece Gi Syed to discuss DC plans. Introduced self/explained role of CCP. Verified face sheet info. Address is for [facility]. Pt does own a home at [address], [address]. Gi states pt is usually IADLs and does not use AD for mobility. Pt owns a walker but refuses to use it. The facility takes care of pt's medications. Gi states pt has no Hx of HH. Pt went to [facility] about 3-4 yrs ago. The plan at this time is for pt to return to [facility]. She requested CCP contact [facility] first to see if transport available during the week (facility used to have that), otherwise, family will be able to drive pt home. Called [facility] and updated facility on pt's admission and plan to return, and asked if they had anyone in admissions that CCP would need to contact before pt's return. Per Elizabeth/Marito she will update the staff on the floor pt lives. CCP to follow........JW                  Continued Care and Services - Admitted Since 6/1/2024    No active coordination exists for this encounter.          Demographic Summary       Row Name 06/02/24 1314       General Information    Admission Type inpatient    Arrived From home    Referral Source admission list    Reason for Consult discharge planning    Preferred Language English       Contact Information    Permission Granted to Share Info With ;guardian                   Functional Status       Row Name 06/02/24 7989       Functional Status    Usual Activity  Tolerance good    Current Activity Tolerance fair       Functional Status, IADL    Medications completely dependent    Meal Preparation completely dependent    Housekeeping completely dependent    Laundry completely dependent    Shopping completely dependent                   Psychosocial    No documentation.                  Abuse/Neglect    No documentation.                  Legal    No documentation.                  Substance Abuse    No documentation.                  Patient Forms    No documentation.                     Yvonne Pedraza RN

## 2024-06-02 NOTE — ED NOTES
Med rec completed from the nursing home paperwork.  The employee from the [facility] ([facility]) called requesting lab results and documentation of the patient workup and diagnosis to be faxed to them.  I received the fax number [phone] and checked with the charge RN,  We are not able to fax any records to them.  They stated they needed the paperwork so they can call the niece in [place] and update her and I told them that the niece called me at about midnight and got an update from me.  She said she will call in the am and find out what room she is in...

## 2024-06-02 NOTE — CONSULTS
[facility] NEUROSURGERY CONSULT NOTE    Patient name: Kimberlee Urrutia  Referring Provider: Gabe Guevara MD  Reason for Consultation: L4-L5 discitis/osteomyelitis     Patient Care Team:  Meche Ji MD as PCP - General (Internal Medicine)  Josue Wilson MD as Consulting Physician (Orthopedic Surgery)  Yash Wilson MD as Consulting Physician (Gastroenterology)  Avi Ferris MD (Ophthalmology)  Tommy French MD (Inactive) as Consulting Physician (Otolaryngology)  Arnaud Lu MD as Consulting Physician (Urology)  Nu Partida MD as Consulting Physician (Pain Medicine)  Ki Small MD as Consulting Physician (Neurology)    Chief complaint: Back pain     Subjective .     History of present illness:    Patient is a 84 y.o. female  with a history of dementia, arthritis, HLD, HTN, chronic low back pain. She lives in a NH was sent to the ED yesterday with severe back pain.     She had a CT abdomen/pelvis and CT lumbar spine. She was found to have right ureter hydronephrosis and possible osteomyelitis L4-L5. She was seen by urology and taken to the OR for an urgent cystoscopy and right stent insertion.     She was also found to have an acute UTI and is currently in the ICU due to septic shock requiring vasopressor.    She has had periods of confusion. For this assessment, she is alert and oriented x 3. She denies back pain or extremity weakness. She is a poor historian and is unable to give me history of pain or past surgeries.     Review of Systems  Review of Systems   Constitutional:  Positive for activity change.   Musculoskeletal:  Positive for back pain.   Neurological: Negative.    Psychiatric/Behavioral:  Positive for confusion.        History  PAST MEDICAL HISTORY  Past Medical History:   Diagnosis Date    Allergic     Arthritis     Colon cancer screening 01/29/2020    Colon cancer screening 01/29/2020    Cough     Diarrhea     Hx of migraine headaches     Hyperlipidemia      Irregular heart beat     Osteoarthritis     Renal stones 2021    Vitamin D deficiency        PAST SURGICAL HISTORY  Past Surgical History:   Procedure Laterality Date    COLONOSCOPY      LUMBAR EPIDURAL INJECTION N/A 2021    Procedure: LUMBAR EPIDURAL 1ST VISIT 5-1;  Surgeon: Nu Partida MD;  Location: [facility] MAIN OR;  Service: Pain Management;  Laterality: N/A;    REPLACEMENT TOTAL KNEE Right 2015    Josue Wilson MD    SACROILIAC JOINT INJECTION Left 2021    Procedure: SACROILIAC INJECTION left;  Surgeon: Nu Partida MD;  Location: [facility] MAIN OR;  Service: Pain Management;  Laterality: Left;    TUBAL ABDOMINAL LIGATION      URETEROSCOPY LASER LITHOTRIPSY WITH STENT INSERTION Left 2021    Procedure: LT.URETEROSCOPY LASER LITHOTRIPSY WITH STENT  FOR STONE;  Surgeon: Arnaud Lu MD;  Location: [facility] MAIN OR;  Service: Urology;  Laterality: Left;    WISDOM TOOTH EXTRACTION         FAMILY HISTORY  Family History   Problem Relation Age of Onset    Heart disease Mother     Sudden death Mother 48        MI    No Known Problems Father     Cancer Sister     Cancer Son        SOCIAL HISTORY  Social History     Tobacco Use    Smoking status: Former     Current packs/day: 0.00     Types: Cigarettes     Start date: 1/10/1953     Quit date: 1/10/1985     Years since quittin.4    Smokeless tobacco: Never    Tobacco comments:     smoked 4-5 cigarettes daily   Vaping Use    Vaping status: Never Used   Substance Use Topics    Alcohol use: No     Comment: caffeine use coffee 3 cups daily    Drug use: No     Allergies:  Penicillins and Sulfa antibiotics    MEDICATIONS:  Medications Prior to Admission   Medication Sig Dispense Refill Last Dose    acetaminophen (TYLENOL) 325 MG tablet Take 2 tablets by mouth Every 6 (Six) Hours As Needed for Mild Pain.   2024 at 1800    amLODIPine (NORVASC) 2.5 MG tablet Take 1 tablet by mouth Daily. For BP 30 tablet 5 2024 at 0800     cholecalciferol (VITAMIN D3) 25 MCG (1000 UT) tablet Take 2 tablets by mouth Daily.   6/1/2024 at 0800    donepezil (ARICEPT) 10 MG tablet Take 1 tablet by mouth Daily.   5/31/2024 at 2200    meloxicam (MOBIC) 15 MG tablet Take 1 tablet by mouth Daily.   6/1/2024 at 0800    memantine (NAMENDA) 10 MG tablet Take 1 tablet by mouth 2 (Two) Times a Day.   6/1/2024 at 0800    Multiple Vitamins-Minerals (MULTI COMPLETE PO) Take  by mouth daily.   6/1/2024 at 0800    lidocaine (LIDODERM) 5 % Place patch over low back/sacral area.  Remove & Discard patch within 12 hours.  May use 4% over the counter patches in place of prescription 5%. 30 each 11 Unknown    rosuvastatin (CRESTOR) 20 MG tablet Take 1 tablet by mouth Daily.   5/31/2024 at 2200       Objective     Results Review:  LABS:    Admission on 06/01/2024   Component Date Value Ref Range Status    Glucose 06/01/2024 96  65 - 99 mg/dL Final    BUN 06/01/2024 26 (H)  8 - 23 mg/dL Final    Creatinine 06/01/2024 1.60 (H)  0.57 - 1.00 mg/dL Final    Sodium 06/01/2024 144  136 - 145 mmol/L Final    Potassium 06/01/2024 3.5  3.5 - 5.2 mmol/L Final    Chloride 06/01/2024 108 (H)  98 - 107 mmol/L Final    CO2 06/01/2024 18.5 (L)  22.0 - 29.0 mmol/L Final    Calcium 06/01/2024 10.1  8.6 - 10.5 mg/dL Final    Total Protein 06/01/2024 6.1  6.0 - 8.5 g/dL Final    Albumin 06/01/2024 3.9  3.5 - 5.2 g/dL Final    ALT (SGPT) 06/01/2024 281 (H)  1 - 33 U/L Final    AST (SGOT) 06/01/2024 274 (H)  1 - 32 U/L Final    Alkaline Phosphatase 06/01/2024 79  39 - 117 U/L Final    Total Bilirubin 06/01/2024 0.7  0.0 - 1.2 mg/dL Final    Globulin 06/01/2024 2.2  gm/dL Final    A/G Ratio 06/01/2024 1.8  g/dL Final    BUN/Creatinine Ratio 06/01/2024 16.3  7.0 - 25.0 Final    Anion Gap 06/01/2024 17.5 (H)  5.0 - 15.0 mmol/L Final    eGFR 06/01/2024 31.5 (L)  >60.0 mL/min/1.73 Final    Color, UA 06/02/2024 Yellow  Yellow, Straw Final    Appearance, UA 06/02/2024 Cloudy (A)  Clear Final    pH, UA  06/02/2024 5.5  5.0 - 8.0 Final    Specific Gravity, UA 06/02/2024 1.009  1.005 - 1.030 Final    Glucose, UA 06/02/2024 Negative  Negative Final    Ketones, UA 06/02/2024 Negative  Negative Final    Bilirubin, UA 06/02/2024 Negative  Negative Final    Blood, UA 06/02/2024 Large (3+) (A)  Negative Final    Protein, UA 06/02/2024 100 mg/dL (2+) (A)  Negative Final    Leuk Esterase, UA 06/02/2024 Moderate (2+) (A)  Negative Final    Nitrite, UA 06/02/2024 Negative  Negative Final    Urobilinogen, UA 06/02/2024 1.0 E.U./dL  0.2 - 1.0 E.U./dL Final    WBC 06/01/2024 17.40 (H)  3.40 - 10.80 10*3/mm3 Final    RBC 06/01/2024 4.60  3.77 - 5.28 10*6/mm3 Final    Hemoglobin 06/01/2024 13.6  12.0 - 15.9 g/dL Final    Hematocrit 06/01/2024 41.0  34.0 - 46.6 % Final    MCV 06/01/2024 89.1  79.0 - 97.0 fL Final    MCH 06/01/2024 29.6  26.6 - 33.0 pg Final    MCHC 06/01/2024 33.2  31.5 - 35.7 g/dL Final    RDW 06/01/2024 12.8  12.3 - 15.4 % Final    RDW-SD 06/01/2024 41.8  37.0 - 54.0 fl Final    MPV 06/01/2024 10.3  6.0 - 12.0 fL Final    Platelets 06/01/2024 140  140 - 450 10*3/mm3 Final    Neutrophil % 06/01/2024 94.0 (H)  42.7 - 76.0 % Final    Lymphocyte % 06/01/2024 0.0 (L)  19.6 - 45.3 % Final    Monocyte % 06/01/2024 0.0 (L)  5.0 - 12.0 % Final    Eosinophil % 06/01/2024 0.0 (L)  0.3 - 6.2 % Final    Basophil % 06/01/2024 0.0  0.0 - 1.5 % Final    Metamyelocyte % 06/01/2024 5.0 (H)  0.0 - 0.0 % Final    Myelocyte % 06/01/2024 1.0 (H)  0.0 - 0.0 % Final    Neutrophils Absolute 06/01/2024 16.36 (H)  1.70 - 7.00 10*3/mm3 Final    Lymphocytes Absolute 06/01/2024 0.00 (L)  0.70 - 3.10 10*3/mm3 Final    Monocytes Absolute 06/01/2024 0.00 (L)  0.10 - 0.90 10*3/mm3 Final    Eosinophils Absolute 06/01/2024 0.00  0.00 - 0.40 10*3/mm3 Final    Basophils Absolute 06/01/2024 0.00  0.00 - 0.20 10*3/mm3 Final    RBC Morphology 06/01/2024 Normal  Normal Final    WBC Morphology 06/01/2024 Normal  Normal Final    Platelet Morphology  06/01/2024 Normal  Normal Final    C-Reactive Protein 06/01/2024 2.90 (H)  0.00 - 0.50 mg/dL Final    Sed Rate 06/01/2024 8  0 - 30 mm/hr Final    Lactate 06/02/2024 10.2 (C)  0.5 - 2.0 mmol/L Final    RBC, UA 06/02/2024 21-50 (A)  None Seen, 0-2 /HPF Final    WBC, UA 06/02/2024 21-50 (A)  None Seen, 0-2 /HPF Final    Bacteria, UA 06/02/2024 1+ (A)  None Seen /HPF Final    Squamous Epithelial Cells, UA 06/02/2024 3-6 (A)  None Seen, 0-2 /HPF Final    Hyaline Casts, UA 06/02/2024 3-6  None Seen /LPF Final    Methodology 06/02/2024 Manual Light Microscopy   Final    Lactate 06/02/2024 9.0 (C)  0.5 - 2.0 mmol/L Final    Glucose 06/02/2024 43 (C)  70 - 130 mg/dL Final    Glucose 06/02/2024 40 (C)  70 - 130 mg/dL Final    Glucose 06/02/2024 95  70 - 130 mg/dL Final       DIAGNOSTICS:  CT Lumbar 6/1/24  IMPRESSION:       There is transitional anatomy with sacralization of the L5 vertebral body.     Findings are suspicious for discitis osteomyelitis at the L4-L5 level.    Recommend contrast enhanced MRI to further evaluate.     Multilevel degenerative changes.  No evidence of high-grade spinal canal   narrowing.     Mild to moderate right hydroureteronephrosis with possible distal   ureteral stone, not ideally evaluated on this study..  IMPRESSION:    Communications:      Call Doctor Osteomyelitis (if unsuspected)     Electronically signed by Ivan Oro MD on 06-01-24 at 1767    Results Review:   I reviewed the patient's new clinical results.  I personally viewed and interpreted the patient's chart.     Vital Signs   Temp:  [98.3 °F (36.8 °C)-100.5 °F (38.1 °C)] 98.3 °F (36.8 °C)  Heart Rate:  [] 107  Resp:  [14-22] 20  BP: ()/(44-91) 108/58    Physical Exam:  Physical Exam  Eyes:      Pupils: Pupils are equal, round, and reactive to light.   Pulmonary:      Effort: Pulmonary effort is normal.   Skin:     General: Skin is warm and dry.   Neurological:      Mental Status: She is alert and oriented to person,  place, and time.      Comments: Has periods of confusion per nursing    Psychiatric:         Speech: Speech normal.       Neurologic Exam     Mental Status   Oriented to person, place, and time.   Attention: normal. Concentration: normal.   Speech: speech is normal   Level of consciousness: alert  Knowledge: poor.   The patient is alert and oriented for this assessment. She has not been oriented in the past nursing assessments      Cranial Nerves     CN II   Visual fields full to confrontation.     CN III, IV, VI   Pupils are equal, round, and reactive to light.  Right pupil: Size: 1 mm.   Left pupil: Size: 1 mm.     CN V   Facial sensation intact.     CN VII   Facial expression full, symmetric.     CN VIII   Hearing: intact    Motor Exam   Muscle bulk: normal    Strength   Right deltoid: 5/5  Left deltoid: 5/5  Right biceps: 5/5  Left biceps: 5/5  Right triceps: 5/5  Left triceps: 5/5  Right iliopsoas: 5/5  Left iliopsoas: 5/5  Right quadriceps: 5/5  Left quadriceps: 5/5  Right hamstrin/5  Left hamstrin/5  Right posterior tibial: 5/5  Left posterior tibial: 5/5  Right gastroc: 5/5  Left gastroc: 5/5    Sensory Exam   Light touch normal.     Gait, Coordination, and Reflexes     Reflexes   Right Moreau: absent  Left Moreau: absent  Right ankle clonus: absent  Left ankle clonus: absent      Assessment & Plan       Right ureteral stone    PRINCE (acute kidney injury)    Acute low back pain    Shock, septic    Lumbar spinal stenosis    CAYLA (dementia of Alzheimer type)    Chronic bilateral low back pain without sciatica    Discitis of lumbar region    Acute UTI (urinary tract infection)    Hypoglycemia    Abnormal LFTs    The patient is an 84-year old female with a history of dementia and chronic back pain. She was sent from her NH residence yesterday with complaints of severe back pain. Today, she denies back pain.     She was found to have right ureter hydronephrosis and possible osteomyelitis L4-L5 per CT  abdomen/pelvis and CT lumbar spine. She was also found to have an acute UTI. Urology placed a right ureter stent. She is on IV Rocephin and Vancomycin. CRP is slightly elevated. Blood cultures are pending. She was transferred to the ICU due to septic shock requiring vasopressor support.     Lumbar MRI has been ordered.     PLAN:   Lumbar MRI-further recommendations to follow MRI    I discussed the patient's findings and my recommendations with patient and nursing staff      MICHELE Peguero  06/02/24  10:28 EDT

## 2024-06-02 NOTE — CASE MANAGEMENT/SOCIAL WORK
"Discharge Planning Assessment  [facility]     Patient Name: Kimberlee Urrutia  MRN: [id number]  Today's Date: 6/1/2024    Admit Date: 6/1/2024        Discharge Needs Assessment    No documentation.                  Discharge Plan       Row Name 06/01/24 2127       Plan    Plan Comments Registration called to notify this CCP of LG- Call placed to [facility]- supervisor regarding legal guardian per protocol- banner on file- no paperwork on file as recent LG just \"passed away\" and we havent obtained new paperwork- registration advises it will be faxed to us tomorrow- consent obtained for treatment;                   Continued Care and Services - Admitted Since 6/1/2024    No active coordination exists for this encounter.          Demographic Summary    No documentation.                  Functional Status    No documentation.                  Psychosocial    No documentation.                  Abuse/Neglect    No documentation.                  Legal    No documentation.                  Substance Abuse    No documentation.                  Patient Forms    No documentation.                     Amalia Burris RN    "

## 2024-06-02 NOTE — H&P
Patient Name:  Kimberlee Urrutia  YOB: 1940  MRN:  [id number]  Admit Date:  6/1/2024  Patient Care Team:  Meche Ji MD as PCP - General (Internal Medicine)  Josue Wilson MD as Consulting Physician (Orthopedic Surgery)  Yash Wilson MD as Consulting Physician (Gastroenterology)  Avi Ferris MD (Ophthalmology)  Tommy French MD (Inactive) as Consulting Physician (Otolaryngology)  Arnaud Lu MD as Consulting Physician (Urology)  Nu Partida MD as Consulting Physician (Pain Medicine)  Ki Small MD as Consulting Physician (Neurology)      Subjective   History Present Illness     Chief Complaint   Patient presents with   • Back Pain       Ms. Urrutia is a 84 y.o. female with a past history of dementia who came in from a facility apparently with low back pain.  She has a known history of spinal stenosis and prior epidurals a couple of years ago per records.  She has dementia and is a poor historian.  She initially was afebrile but did eventually spike a temp to 100.5.  She had only some slight tachycardia but labs did show significant leukocytosis at 17.4 along with some mild acute kidney injury, elevated LFTs and eventually lactate was obtained of 10.2.  At this point [facility] was called for admission.  She was given a 1 L bolus and recheck had come down to 9.  In this interval she had also received CT of the abdomen pelvis which showed an obstructing small right ureteral stone.  She was transferred up to the telemetry floor and almost immediately within an hour taken up to the OR where she had a stent placed into the right ureter.  She was started on pressors during the procedure and has not been able to wean fully off the Levophed so now she is in the ICU where I am seeing her.  She voices no complaints currently but is confused.  Glucose was initially 40 on getting to the ICU but has been treated appropriately and up to 95 now.      Review of Systems    Unable to perform ROS: Dementia        Personal History     Past Medical History:   Diagnosis Date   • Allergic    • Arthritis    • Colon cancer screening 2020   • Colon cancer screening 2020   • Cough    • Diarrhea    • Hx of migraine headaches    • Hyperlipidemia    • Irregular heart beat    • Osteoarthritis    • Renal stones 2021   • Vitamin D deficiency      Past Surgical History:   Procedure Laterality Date   • COLONOSCOPY     • LUMBAR EPIDURAL INJECTION N/A 2021    Procedure: LUMBAR EPIDURAL 1ST VISIT 5-1;  Surgeon: Nu Partida MD;  Location: [facility] [place] MAIN OR;  Service: Pain Management;  Laterality: N/A;   • REPLACEMENT TOTAL KNEE Right 2015    Josue Wilson MD   • SACROILIAC JOINT INJECTION Left 2021    Procedure: SACROILIAC INJECTION left;  Surgeon: Nu Partida MD;  Location: [facility] [place] MAIN OR;  Service: Pain Management;  Laterality: Left;   • TUBAL ABDOMINAL LIGATION     • URETEROSCOPY LASER LITHOTRIPSY WITH STENT INSERTION Left 2021    Procedure: LT.URETEROSCOPY LASER LITHOTRIPSY WITH STENT  FOR STONE;  Surgeon: Arnaud Lu MD;  Location: [facility] MAIN OR;  Service: Urology;  Laterality: Left;   • WISDOM TOOTH EXTRACTION       Family History   Problem Relation Age of Onset   • Heart disease Mother    • Sudden death Mother 48        MI   • No Known Problems Father    • Cancer Sister    • Cancer Son      Social History     Tobacco Use   • Smoking status: Former     Current packs/day: 0.00     Types: Cigarettes     Start date: 1/10/1953     Quit date: 1/10/1985     Years since quittin.4   • Smokeless tobacco: Never   • Tobacco comments:     smoked 4-5 cigarettes daily   Vaping Use   • Vaping status: Never Used   Substance Use Topics   • Alcohol use: No     Comment: caffeine use coffee 3 cups daily   • Drug use: No     No current facility-administered medications on file prior to encounter.     Current Outpatient Medications on File Prior to Encounter    Medication Sig Dispense Refill   • acetaminophen (TYLENOL) 325 MG tablet Take 2 tablets by mouth Every 6 (Six) Hours As Needed for Mild Pain.     • amLODIPine (NORVASC) 2.5 MG tablet Take 1 tablet by mouth Daily. For BP 30 tablet 5   • cholecalciferol (VITAMIN D3) 25 MCG (1000 UT) tablet Take 2 tablets by mouth Daily.     • donepezil (ARICEPT) 10 MG tablet Take 1 tablet by mouth Daily.     • meloxicam (MOBIC) 15 MG tablet Take 1 tablet by mouth Daily.     • memantine (NAMENDA) 10 MG tablet Take 1 tablet by mouth 2 (Two) Times a Day.     • Multiple Vitamins-Minerals (MULTI COMPLETE PO) Take  by mouth daily.     • lidocaine (LIDODERM) 5 % Place patch over low back/sacral area.  Remove & Discard patch within 12 hours.  May use 4% over the counter patches in place of prescription 5%. 30 each 11   • rosuvastatin (CRESTOR) 20 MG tablet Take 1 tablet by mouth Daily.       Allergies   Allergen Reactions   • Penicillins Other (See Comments) and Unknown (See Comments)     Tolerated Ceftriaxone during 09/2017 admission   Tolerated Ceftriaxone during 09/2017 admission   Tolerated Ceftriaxone during 09/2017 admission   Tolerated Ceftriaxone during 09/2017 admission    • Sulfa Antibiotics Rash       Objective    Objective     Vital Signs  Temp:  [98.3 °F (36.8 °C)-100.5 °F (38.1 °C)] 98.3 °F (36.8 °C)  Heart Rate:  [] 109  Resp:  [14-22] 20  BP: ()/(44-91) 116/53  SpO2:  [91 %-98 %] 97 %  on  Flow (L/min):  [4] 4;   Device (Oxygen Therapy): nasal cannula  Body mass index is 33.29 kg/m².    Physical Exam  Vitals reviewed.   Constitutional:       General: She is not in acute distress.  HENT:      Head: Normocephalic and atraumatic.      Mouth/Throat:      Mouth: Mucous membranes are dry.   Eyes:      General: No scleral icterus.  Neck:      Vascular: No JVD.   Cardiovascular:      Rate and Rhythm: Regular rhythm. Tachycardia present.      Heart sounds: Murmur heard.   Pulmonary:      Effort: Pulmonary effort is  normal. No respiratory distress.      Breath sounds: Normal breath sounds.   Abdominal:      General: There is no distension.      Palpations: Abdomen is soft.      Tenderness: There is no abdominal tenderness.   Musculoskeletal:         General: No swelling or tenderness.   Skin:     General: Skin is warm and dry.      Capillary Refill: Capillary refill takes less than 2 seconds.      Coloration: Skin is not jaundiced.      Findings: No rash.   Neurological:      General: No focal deficit present.      Mental Status: She is alert. She is disoriented.   Psychiatric:         Mood and Affect: Mood normal.         Results Review:  I reviewed the patient's new clinical results.  I reviewed the patient's new imaging results and agree with the interpretation.  I reviewed the patient's other test results and agree with the interpretation  I personally viewed and interpreted the patient's EKG/Telemetry data  Discussed with intensivist    Lab Results (last 24 hours)       Procedure Component Value Units Date/Time    CBC & Differential [[id number]]  (Abnormal) Collected: 06/01/24 2113    Specimen: Blood Updated: 06/01/24 2142    Narrative:      The following orders were created for panel order CBC & Differential.  Procedure                               Abnormality         Status                     ---------                               -----------         ------                     CBC Auto Differential[[id number]]        Abnormal            Final result                 Please view results for these tests on the individual orders.    Comprehensive Metabolic Panel [[id number]]  (Abnormal) Collected: 06/01/24 2113    Specimen: Blood Updated: 06/01/24 2145     Glucose 96 mg/dL      BUN 26 mg/dL      Creatinine 1.60 mg/dL      Sodium 144 mmol/L      Potassium 3.5 mmol/L      Chloride 108 mmol/L      CO2 18.5 mmol/L      Calcium 10.1 mg/dL      Total Protein 6.1 g/dL      Albumin 3.9 g/dL      ALT (SGPT) 281 U/L      AST (SGOT)  274 U/L      Alkaline Phosphatase 79 U/L      Total Bilirubin 0.7 mg/dL      Globulin 2.2 gm/dL      A/G Ratio 1.8 g/dL      BUN/Creatinine Ratio 16.3     Anion Gap 17.5 mmol/L      eGFR 31.5 mL/min/1.73     Narrative:      GFR Normal >60  Chronic Kidney Disease <60  Kidney Failure <15    The GFR formula is only valid for adults with stable renal function between ages 18 and 70.    CBC Auto Differential [[id number]]  (Abnormal) Collected: 06/01/24 2113    Specimen: Blood Updated: 06/01/24 2142     WBC 17.40 10*3/mm3      RBC 4.60 10*6/mm3      Hemoglobin 13.6 g/dL      Hematocrit 41.0 %      MCV 89.1 fL      MCH 29.6 pg      MCHC 33.2 g/dL      RDW 12.8 %      RDW-SD 41.8 fl      MPV 10.3 fL      Platelets 140 10*3/mm3     Manual Differential [[id number]]  (Abnormal) Collected: 06/01/24 2113    Specimen: Blood Updated: 06/01/24 2157     Neutrophil % 94.0 %      Lymphocyte % 0.0 %      Monocyte % 0.0 %      Eosinophil % 0.0 %      Basophil % 0.0 %      Metamyelocyte % 5.0 %      Myelocyte % 1.0 %      Neutrophils Absolute 16.36 10*3/mm3      Lymphocytes Absolute 0.00 10*3/mm3      Monocytes Absolute 0.00 10*3/mm3      Eosinophils Absolute 0.00 10*3/mm3      Basophils Absolute 0.00 10*3/mm3      RBC Morphology Normal     WBC Morphology Normal     Platelet Morphology Normal    C-reactive Protein [[id number]]  (Abnormal) Collected: 06/01/24 2113    Specimen: Blood Updated: 06/01/24 2333     C-Reactive Protein 2.90 mg/dL     Sedimentation Rate [[id number]]  (Normal) Collected: 06/01/24 2113    Specimen: Blood Updated: 06/01/24 2322     Sed Rate 8 mm/hr     Blood Culture - Blood, Arm, Left [[id number]] Collected: 06/02/24 0100    Specimen: Blood from Arm, Left Updated: 06/02/24 0257    Blood Culture - Blood, Arm, Left [[id number]] Collected: 06/02/24 0100    Specimen: Blood from Arm, Left Updated: 06/02/24 0117    Urinalysis With Culture If Indicated - Urine, Catheter [[id number]]  (Abnormal) Collected: 06/02/24 7363     Specimen: Urine, Catheter Updated: 06/02/24 0153     Color, UA Yellow     Appearance, UA Cloudy     pH, UA 5.5     Specific Gravity, UA 1.009     Glucose, UA Negative     Ketones, UA Negative     Bilirubin, UA Negative     Blood, UA Large (3+)     Protein,  mg/dL (2+)     Leuk Esterase, UA Moderate (2+)     Nitrite, UA Negative     Urobilinogen, UA 1.0 E.U./dL    Narrative:      In absence of clinical symptoms, the presence of pyuria, bacteria, and/or nitrites on the urinalysis result does not correlate with infection.    Urinalysis, Microscopic Only - Urine, Catheter [[id number]]  (Abnormal) Collected: 06/02/24 0134    Specimen: Urine, Catheter Updated: 06/02/24 0159     RBC, UA 21-50 /HPF      WBC, UA 21-50 /HPF      Bacteria, UA 1+ /HPF      Squamous Epithelial Cells, UA 3-6 /HPF      Hyaline Casts, UA 3-6 /LPF      Methodology Manual Light Microscopy    Urine Culture - Urine, Urine, Catheter [[id number]] Collected: 06/02/24 0134    Specimen: Urine, Catheter Updated: 06/02/24 0159    Lactic Acid, Plasma [[id number]]  (Abnormal) Collected: 06/02/24 0226    Specimen: Blood from Hand, Right Updated: 06/02/24 0302     Lactate 10.2 mmol/L     STAT Lactic Acid, Reflex [[id number]]  (Abnormal) Collected: 06/02/24 0430    Specimen: Blood Updated: 06/02/24 0509     Lactate 9.0 mmol/L     POC Glucose Once [[id number]]  (Abnormal) Collected: 06/02/24 0829    Specimen: Blood Updated: 06/02/24 0834     Glucose 43 mg/dL     POC Glucose Once [[id number]]  (Abnormal) Collected: 06/02/24 0832    Specimen: Blood Updated: 06/02/24 0834     Glucose 40 mg/dL     POC Glucose Once [[id number]]  (Normal) Collected: 06/02/24 0853    Specimen: Blood Updated: 06/02/24 0854     Glucose 95 mg/dL             Imaging Results (Last 24 Hours)       Procedure Component Value Units Date/Time    FL C Arm During Surgery [[id number]] Resulted: 06/02/24 0623     Updated: 06/02/24 0623    Narrative:      This procedure was auto-finalized with no  dictation required.    XR Abdomen KUB [[id number]] Resulted: 06/02/24 0622     Updated: 06/02/24 0623    CT Abdomen Pelvis Without Contrast [[id number]] Collected: 06/01/24 2340     Updated: 06/01/24 2348    Narrative:      CT ABDOMEN PELVIS WO CONTRAST-     HISTORY: 84 years of age, Female.  Rule out ureteral stone     TECHNIQUE:  CT includes axial imaging from the lung bases to the  trochanters without intravenous contrast and without use of oral  contrast. Data reconstructed in coronal and sagittal planes. Radiation  dose reduction techniques were utilized, including automated exposure  control and exposure modulation based on body size.     COMPARISON: CT abdomen and pelvis 02/22/2021.     FINDINGS: There is a 3 mm distal right ureteral stone at the horizontal  level of the upper sacrum. Moderate to severe right hydronephrosis is  present and there is right perinephric stranding.     Left lateral renal cyst measures 1.8 cm. No left renal or ureteral stone  is evident.     Heart size is enlarged. Calcified left basilar pleural plaques.  Dependent basilar atelectasis. Liver, spleen, pancreas appear within  normal limits. Adrenal glands appear normal. Gallbladder partially  decompressed.     Sigmoid diverticulosis without evidence for diverticulitis. CT lumbar  spine has been completed same date and has been reported separately.       Impression:      1. 3 mm distal right ureteral stone at the horizontal level of the upper  sacrum with moderate to severe right hydronephrosis and right  perinephric stranding.  2. Cardiomegaly. Basilar atelectasis. Sigmoid diverticulosis without  evidence for diverticulitis.     Radiation dose reduction techniques were utilized, including automated  exposure control and exposure modulation based on body size.        This report was finalized on 6/1/2024 11:45 PM by Dr. Ki Roque M.D on Workstation: [id number]       CT Lumbar Spine Without Contrast [[id number]] Collected:  06/01/24 2252     Updated: 06/01/24 7986    Addenda:        ADDENDUM:  06 01 24 23:04 Call Doctor Regarding Osteomyelitis (if unsuspected),   called SHY Ashby on 06 01 23:04 (-04:00)      Signed: 06/01/24 2251 by Ivan Oro MD    Narrative:      CR  Patient: ABDIRASHID TA  Time Out: 22:51  Exam(s): CT L SPINE     EXAM:    CT Lumbar Spine Without Intravenous Contrast    CLINICAL HISTORY:    Low back pain    TECHNIQUE:    Axial computed tomography images of the lumbar spine without   intravenous contrast.  CTDI is 30.33 mGy and DLP is 826.4 mGy-cm.  This   CT exam was performed according to the principle of ALARA (As Low As   Reasonably Achievable) by using one or more of the following dose   reduction techniques: automated exposure control, adjustment of the mA   and or kV according to patient size, and or use of iterative   reconstruction technique.    COMPARISON:    MRI dated 2 18 21    FINDINGS:    Vertebrae:  Irregularity of the inferior endplate of L5 and superior   endplate of S1.  Multilevel facet arthropathy.  8 mm anterolisthesis of   L4-L5.  There is transitional anatomy.  The L5 vertebral body is   sacralized.  No acute fracture.    Discs spinal canal neural foramina:  Multilevel disc height loss.    There is significant irregularity of the disc space at L4-L5.  Vacuum   disc phenomenon at multiple levels in the lumbar spine.  No spinal canal   stenosis.    Soft tissues: Mild right hydroureteronephrosis.  Possible nephroliths   in the distal ureter..    Vasculature:  The distal thecal sac is ectatic, unchanged from prior   MRI.    IMPRESSION:       There is transitional anatomy with sacralization of the L5 vertebral body.    Findings are suspicious for discitis osteomyelitis at the L4-L5 level.    Recommend contrast enhanced MRI to further evaluate.    Multilevel degenerative changes.  No evidence of high-grade spinal canal   narrowing.    Mild to moderate right hydroureteronephrosis with possible  distal   ureteral stone, not ideally evaluated on this study..    Impression:            Communications:     Call Doctor Osteomyelitis (if unsuspected)    Electronically signed by Ivan Oro MD on 06-01-24 at 2251                No orders to display        Assessment/Plan     Active Hospital Problems    Diagnosis  POA   • **Right ureteral stone [N20.1]  Yes   • Discitis of lumbar region [M46.46]  Yes   • Acute UTI (urinary tract infection) [N39.0]  Yes   • Hypoglycemia [E16.2]  Yes   • Abnormal LFTs [R79.89]  Yes   • Chronic bilateral low back pain without sciatica [M54.50, G89.29]  Yes   • CAYLA (dementia of Alzheimer type) [G30.9, F02.80]  Yes   • Lumbar spinal stenosis [M48.061]  Yes   • Acute low back pain [M54.50]  Yes   • Shock, septic [A41.9, R65.21]  Yes   • PRINCE (acute kidney injury) [N17.9]  Yes      Resolved Hospital Problems   No resolved problems to display.       Ms. Urrutia is a 84 y.o. female with some history of dementia and spinal stenosis who presented with lower back pain, found to have sepsis most likely from urinary source with obstructing right ureteral stone now treated with stent placement along with possible osteomyelitis/discitis on lumbar CT     Septic shock due to above, currently on small dose Levophed with anticipation is to be weaned off quickly.  Will ensure she receives adequate fluid resuscitation as well  Agree with antibiotics with ceftriaxone and vancomycin.  May require MRI to sort out the possibility of discitis.  Agree with pulmonology regarding neurosurgical consult  Follow-up urine and blood cultures  Will review home meds  Rechecking labs now including repeating renal function and liver function test.  Trending lactate as well  I discussed the patient's findings with Dr. Guevara from intensivist team but we will continue to follow as well since anticipate a short ICU stay  Discussed with RN at bedside    VTE Prophylaxis - Heparin SC.  Code Status - Full code for now.  Will  need to confirm with POA       Mark Alvarez MD  [facility]  06/02/24  09:29 EDT

## 2024-06-02 NOTE — NURSING NOTE
Call placed to A re: post op bed change (from telemetry to critical care) d/t to patient hypotensive and requiring pressure support (Levophed drip).  Awaiting return call from on call MD.

## 2024-06-02 NOTE — ED NOTES
Nursing report ED to floor  Kimberlee Urrutia  84 y.o.  female    HPI :  HPI (Adult)  Stated Reason for Visit: BACK PAIN  History Obtained From: patient    Chief Complaint  Chief Complaint   Patient presents with    Back Pain       Admitting doctor:   Silverio Carlton MD    Admitting diagnosis:   The primary encounter diagnosis was Right ureteral stone. Diagnoses of Acute UTI, Severe sepsis, Elevated lactic acid level, Osteomyelitis of lumbar spine, Elevated AST (SGOT), Elevated ALT measurement, Elevated C-reactive protein (CRP), and PRINCE (acute kidney injury) were also pertinent to this visit.    Code status:   Current Code Status       Date Active Code Status Order ID Comments User Context       Prior            Allergies:   Penicillins and Sulfa antibiotics    Isolation:   No active isolations    Intake and Output  No intake or output data in the 24 hours ending 06/02/24 0345    Weight:       06/01/24 2030   Weight: 81 kg (178 lb 9.2 oz)       Most recent vitals:   Vitals:    06/02/24 0228 06/02/24 0300 06/02/24 0305 06/02/24 0328   BP: 135/63   110/52   BP Location: Left arm   Left arm   Patient Position: Lying   Lying   Pulse: 89 93 89 93   Resp: 18   16   Temp:       TempSrc:       SpO2: 93% 92% 92% 92%   Weight:       Height:           Active LDAs/IV Access:   Lines, Drains & Airways       Active LDAs       Name Placement date Placement time Site Days    Peripheral IV 06/01/24 2117 Anterior;Right Forearm 06/01/24 2117  Forearm  less than 1                    Labs (abnormal labs have a star):   Labs Reviewed   COMPREHENSIVE METABOLIC PANEL - Abnormal; Notable for the following components:       Result Value    BUN 26 (*)     Creatinine 1.60 (*)     Chloride 108 (*)     CO2 18.5 (*)     ALT (SGPT) 281 (*)     AST (SGOT) 274 (*)     Anion Gap 17.5 (*)     eGFR 31.5 (*)     All other components within normal limits    Narrative:     GFR Normal >60  Chronic Kidney Disease <60  Kidney Failure <15    The GFR formula  is only valid for adults with stable renal function between ages 18 and 70.   URINALYSIS W/ CULTURE IF INDICATED - Abnormal; Notable for the following components:    Appearance, UA Cloudy (*)     Blood, UA Large (3+) (*)     Protein,  mg/dL (2+) (*)     Leuk Esterase, UA Moderate (2+) (*)     All other components within normal limits    Narrative:     In absence of clinical symptoms, the presence of pyuria, bacteria, and/or nitrites on the urinalysis result does not correlate with infection.   CBC WITH AUTO DIFFERENTIAL - Abnormal; Notable for the following components:    WBC 17.40 (*)     All other components within normal limits   MANUAL DIFFERENTIAL - Abnormal; Notable for the following components:    Neutrophil % 94.0 (*)     Lymphocyte % 0.0 (*)     Monocyte % 0.0 (*)     Eosinophil % 0.0 (*)     Metamyelocyte % 5.0 (*)     Myelocyte % 1.0 (*)     Neutrophils Absolute 16.36 (*)     Lymphocytes Absolute 0.00 (*)     Monocytes Absolute 0.00 (*)     All other components within normal limits   C-REACTIVE PROTEIN - Abnormal; Notable for the following components:    C-Reactive Protein 2.90 (*)     All other components within normal limits   LACTIC ACID, PLASMA - Abnormal; Notable for the following components:    Lactate 10.2 (*)     All other components within normal limits   URINALYSIS, MICROSCOPIC ONLY - Abnormal; Notable for the following components:    RBC, UA 21-50 (*)     WBC, UA 21-50 (*)     Bacteria, UA 1+ (*)     Squamous Epithelial Cells, UA 3-6 (*)     All other components within normal limits   SEDIMENTATION RATE - Normal   BLOOD CULTURE   BLOOD CULTURE   URINE CULTURE   LACTIC ACID, REFLEX   CBC AND DIFFERENTIAL    Narrative:     The following orders were created for panel order CBC & Differential.  Procedure                               Abnormality         Status                     ---------                               -----------         ------                     CBC Auto  Differential[[id number]]        Abnormal            Final result                 Please view results for these tests on the individual orders.       EKG:   No orders to display       Meds given in ED:   Medications   sodium chloride 0.9 % flush 10 mL (has no administration in time range)   vancomycin IVPB 1500 mg in 0.9% NaCl (Premix) 500 mL (1,500 mg Intravenous New Bag 6/2/24 0235)   sodium chloride 0.9 % bolus 1,000 mL (1,000 mL Intravenous New Bag 6/2/24 0310)   morphine injection 2 mg (2 mg Intravenous Given 6/1/24 2203)   ondansetron (ZOFRAN) injection 4 mg (4 mg Intravenous Given 6/1/24 2203)   morphine injection 4 mg (4 mg Intravenous Given 6/1/24 2339)   cefTRIAXone (ROCEPHIN) 2,000 mg in sodium chloride 0.9 % 100 mL MBP (0 mg Intravenous Stopped 6/2/24 0200)       Imaging results:  CT Abdomen Pelvis Without Contrast    Result Date: 6/1/2024  1. 3 mm distal right ureteral stone at the horizontal level of the upper sacrum with moderate to severe right hydronephrosis and right perinephric stranding. 2. Cardiomegaly. Basilar atelectasis. Sigmoid diverticulosis without evidence for diverticulitis.  Radiation dose reduction techniques were utilized, including automated exposure control and exposure modulation based on body size.   This report was finalized on 6/1/2024 11:45 PM by Dr. Ki Roque M.D on Workstation: [id number]      CT Lumbar Spine Without Contrast    Addendum Date: 6/1/2024    ADDENDUM: 06 01 24 23:04 Call Doctor Regarding Osteomyelitis (if unsuspected), called SHY Ashby on 06 01 23:04 (-04:00)     Result Date: 6/1/2024  Communications:  Call Doctor Osteomyelitis (if unsuspected) Electronically signed by Ivan Oro MD on 06-01-24 at 2250     Ambulatory status:   - bedrest    Social issues:   Social History     Socioeconomic History    Marital status:    Tobacco Use    Smoking status: Former     Current packs/day: 0.00     Types: Cigarettes     Start date: 1/10/1953     Quit date:  1/10/1985     Years since quittin.4    Smokeless tobacco: Never    Tobacco comments:     smoked 4-5 cigarettes daily   Vaping Use    Vaping status: Never Used   Substance and Sexual Activity    Alcohol use: No     Comment: caffeine use coffee 3 cups daily    Drug use: No    Sexual activity: Defer       Peripheral Neurovascular  Peripheral Neurovascular (Adult)  Peripheral Neurovascular WDL: WDL    Neuro Cognitive  Neuro Cognitive (Adult)  Cognitive/Neuro/Behavioral WDL: .WDL except, all  Arousal Level: arouses to voice  Orientation: oriented x 4  Speech: spontaneous, logical  Mood/Behavior: cooperative, sad, withdrawn    Learning  Learning Assessment (Adult)  Learning Readiness and Ability: no barriers identified  Education Provided  Person Taught: patient  Teaching Method: verbal instruction  Teaching Focus: symptom/problem overview, medical device/equipment use  Education Outcome Evaluation: acceptance expressed, verbalizes understanding    Respiratory  Respiratory (Adult)  Airway WDL: WDL  Respiratory WDL  Respiratory WDL: WDL    Abdominal Pain       Pain Assessments  Pain (Adult)  (0-10) Pain Rating: Rest: 10  (0-10) Pain Rating: Activity: 10  Pain Location: back  Pain Side/Orientation: lower    NIH Stroke Scale       Courtney Murphy RN  24 03:45 EDT

## 2024-06-02 NOTE — ED NOTES
"Chief Complaint   Patient presents with    Back Pain     Blood pressure 123/84, pulse 87, temperature 99.9 °F (37.7 °C), temperature source Tympanic, resp. rate 22, height 158.8 cm (62.5\"), weight 81 kg (178 lb 9.2 oz), SpO2 95%, not currently breastfeeding.    The patient presents to the ER via ems with complaints of back pain. She does not know when it started but the facility called ahead and said the pain started at 1800 and they gave her tylenol with no relief. Placed her in a gown and asked her to let us know when she is able to give a urine sample..  "

## 2024-06-02 NOTE — ED NOTES
Attempted to call report at 0355 and was asked to clarify why the patient is coming to their floor when her lactic was 10, and if she was stable enough to come to the floor... The nurse requests a temperature recheck and that she would hold on for me to go do that.. The call was then disconnected and I was unable to attempt report so I spoke with my charge RN, who placed the patient in transport and asked me to go ahead and draw the lactic.  Symone BEGUM ordered tylenol PO for a temp of 100.5 and I administered the tylenol po,  patient swallowed without difficulty.  I drew the lactic acid and sent to the lab.  Patient is alert and oriented x2-3 at baseline ( person, and place with intermittent situation).  Vital signs charted prior to transport.  She was given rocephin IV and vancomycin after obtaining blood cultures during her ED stay.

## 2024-06-02 NOTE — SIGNIFICANT NOTE
"   06/02/24 1700   PICC Triple Lumen 06/02/24 Right Basilic   Placement date: If unknown, DO NOT use \"Add Comment\" note/Placement time: If unknown, DO NOT use \"Add Comment\" note: 06/02/24 1712   Hand Hygiene Completed: Yes  Size (Fr): 5  Description (optional): [id number]   05/31/2025  Length (cm): 41 cm  Orientat...   #1 Lumen Status Blood return noted;Capped;Flushed;Normal saline locked   #2 Lumen Status Blood return noted;Capped;Flushed;Normal saline locked   #3 Lumen Status Blood return noted;Capped;Clotted;Normal saline locked   Length kaitlin (cm) 41 cm   Extremity Circumference (cm) 35 cm   Dressing Type Border Dressing;Transparent;Securing device;Antimicrobial dressing/disc   Liquid Adhesive Applied   Dressing Change Due 06/09/24   Indication/Daily Review of Necessity poor venous access           3 NEEDLES 2 GUIDE WIRES AND 1 SCALPEL WERE ACCOUNTED FOR AND DISPOSED OF PROPERLY      PICC IS APPROVED FOR USE  "

## 2024-06-02 NOTE — NURSING NOTE
Pt booked to 468 from ER. While reviewing chart noted high lactic level 0f 10.2 and wbc 17 and temp 99.9. Pt being admitted for kidney stone. Pt has hx dementia and baseline confusion. Soft BP noted. Notified my charge RN Keli to review pt chart as well. Call was made per Keli RN to ER charge nurse Skyla to discuss pt acuity and if stable enough for telemetry or if will need higher level of care. Request to have temperature rechecked and have 2hr lactic reflex drawn prior to sending pt to floor. Pt was given Tylenol and lactate redrawn prior to transporting to unit. A final report was not called to this primary nurse prior to pt being transported to rm 468.     Pt alert to person and place. Disoriented to time and situation but is pt baseline.  Soft BP. No acute distress. Room air. NSR. Urology consulted. [facility] admitting.     0525 OR nurse arrived to unit to take pt to OR for stent placement. Report given to OR CIPRIANO Rodríguez on unit and repeat lactic level of 9.0 also given in report.  Consent will be obtained in pre-op. POA information given to OR nurse Rodríguez. Pt transported to OR via bed. Stable. Plan of care ongoing.

## 2024-06-02 NOTE — ANESTHESIA POSTPROCEDURE EVALUATION
"Patient: Kimberlee Urrutia    Procedure Summary       Date: 06/02/24 Room / Location: [facility] OR 01 / [facility] MAIN OR    Anesthesia Start: 0550 Anesthesia Stop: 0635    Procedure: CYSTOSCOPY URETERAL CATHETER/STENT INSERTION (Right) Diagnosis:     Surgeons: Efra Schofield MD Provider: Jose Prather DO    Anesthesia Type: general ASA Status: 3 - Emergent            Anesthesia Type: general    Vitals  Vitals Value Taken Time   /53 06/02/24 0645   Temp 36.8 °C (98.3 °F) 06/02/24 0625   Pulse 95 06/02/24 0653   Resp 16 06/02/24 0645   SpO2 95 % 06/02/24 0653   Vitals shown include unfiled device data.        Post Anesthesia Care and Evaluation    Patient location during evaluation: bedside  Patient participation: complete - patient participated  Level of consciousness: awake and alert  Pain management: adequate    Airway patency: patent  Anesthetic complications: No anesthetic complications  PONV Status: controlled  Cardiovascular status: acceptable and hemodynamically stable  Respiratory status: acceptable, spontaneous ventilation and nonlabored ventilation  Hydration status: acceptable    Comments: /53 (BP Location: Left arm, Patient Position: Lying)   Pulse 94   Temp 36.8 °C (98.3 °F) (Oral)   Resp 16   Ht 158.8 cm (62.5\")   Wt 85.4 kg (188 lb 4.4 oz)   LMP  (LMP Unknown)   SpO2 97%   BMI 33.89 kg/m²       "

## 2024-06-02 NOTE — OP NOTE
Operative Report    [facility] OR    Patient: Kimberlee Urrutia  Age:      84 y.o.  :     1940  Sex:      female    Medical Record:  [id number]    Date of Operation/Procedure:  2024    Pre-operative Diagnosis:   Right ureteral stone, sepsis, pyelo, PRINCE    Post-operative Diagnosis:  same    Surgeon:  Efra Schofield MD    Name of Operation/Procedure:      Cystoscopy, right stent insertion    Findings/Complications:  Cloudy urine, no compilcations    Description of procedure:     The patient was taken to the OR and placed under GA in lithotomy position.  Prepped and draped in sterile fashion.  The 21 Fr cystoscope was introduced and pancystoscopy was performed.  No tumors or stones were seen.  The right ureteral orifice was cannulated with a Sensor guidewire which passed into the kidney without difficulty.  A 7 Fr x 26 cm stent was passed over the wire into the proper anatomic position without difficulty.  The urine was cloudy.  The tether was not left on the stent.  I then placed a 16 Fr Rodriguez to SD.    Patient was awakened and taken to RR in good condition, no complications.    Estimated Blood Loss: none    Specimens: * No orders in the log *    Fluids/Drains: 16 Fr Rodriguez    Efra Schofield MD  2024  05:54 EDT

## 2024-06-02 NOTE — ED PROVIDER NOTES
EMERGENCY DEPARTMENT ENCOUNTER  Room Number:  02/02  PCP: Meche Ji MD  Independent Historians: Patient      HPI:  Chief Complaint: had concerns including Back Pain.     A complete HPI/ROS/PMH/PSH/SH/FH are unobtainable due to: None      Chronic or social conditions impacting patient care (Social Determinants of Health): None      Context: Kimberlee Urrutia is a 84 y.o. female with a medical history of arthritis, hyperlipidemia, hypertension, chronic low back pain, pyuria, dementia, multinodular thyroid, metabolic encephalopathy, renal stone, who presents to the emergency department complaints of diffuse low back pain.  Symptoms started earlier today.  Patient resides at [facility].  Staff at the facility informed EMS that the patient was complaining of back pain throughout the day and they gave the patient Tylenol without relief.  Staff at the facility advised EMS no known injury or trauma.  History is limited due to patient's dementia.    Review of prior external notes (non-ED) -and- Review of prior external test results outside of this encounter:   November 30, 2023: Pain management office visit.  Patient was seen for lumbar stenosis, lumbar spondylolisthesis, and SI joint pain.  It was noted patient underwent a caudal epidural injection on April 4, 2022 where she had 100% relief for several months.      PAST MEDICAL HISTORY  Active Ambulatory Problems     Diagnosis Date Noted    Arthritis 02/24/2016    HLD (hyperlipidemia) 02/24/2016    Primary hypertension 03/01/2016    Health care maintenance 03/01/2016    Knee pain, right 03/30/2016    Hx of total knee arthroplasty 03/30/2016    Seasonal allergies 05/02/2016    Vitamin D deficiency 09/14/2017    Pyuria 09/14/2017    Transient alteration of awareness 09/14/2017    Migraine without status migrainosus, not intractable 09/17/2017    Leukocytosis 09/17/2017    Viral pharyngitis 10/23/2017    Multinodular thyroid 10/10/2018    Osteopenia  of multiple sites 10/10/2018    Colon cancer screening 01/29/2020    Serum calcium elevated 09/19/2020    Acute low back pain 02/17/2021    Elevated procalcitonin 02/17/2021    Sepsis with acute organ dysfunction 02/17/2021    Sepsis due to Escherichia coli with acute renal failure without septic shock 02/18/2021    Recurrent oral herpes simplex 02/19/2021    Lumbar spinal stenosis 02/20/2021    Metabolic encephalopathy 02/20/2021    Renal stones     Colon cancer screening 01/29/2020    Sacroiliac joint pain 06/07/2021    Lumbar stenosis without neurogenic claudication 07/15/2021    Renal stone 01/20/2022    Hyperlipidemia 02/24/2016    History of total knee arthroplasty 03/30/2016    Spinal stenosis of lumbar region 02/20/2021    Multinodular goiter 10/10/2018    Osteopenia 10/10/2018    Pain in right knee 03/30/2016    Sacroiliac joint pain 06/07/2021    Hypercalcemia 09/19/2020    CAYLA (dementia of Alzheimer type) 09/26/2023    Chronic bilateral low back pain without sciatica 11/30/2023     Resolved Ambulatory Problems     Diagnosis Date Noted    Arthritis of knee, degenerative 02/24/2016    Avitaminosis D 02/24/2016    Hyperlipidemia 03/01/2016    Right knee pain 03/01/2016    Eustachian tube dysfunction 05/02/2016    Acute cystitis without hematuria 09/14/2017    Altered mental status 09/14/2017    Bandemia 09/14/2017    PRINCE (acute kidney injury) 02/17/2021    Thrombocytopenia, acquired 02/18/2021     Past Medical History:   Diagnosis Date    Allergic     Cough     Diarrhea     Hx of migraine headaches     Irregular heart beat     Osteoarthritis          PAST SURGICAL HISTORY  Past Surgical History:   Procedure Laterality Date    COLONOSCOPY      LUMBAR EPIDURAL INJECTION N/A 8/2/2021    Procedure: LUMBAR EPIDURAL 1ST VISIT 5-1;  Surgeon: Nu Partida MD;  Location: [facility] [place] MAIN OR;  Service: Pain Management;  Laterality: N/A;    REPLACEMENT TOTAL KNEE Right 01/2015    Josue Wilson MD    SACROILIAC JOINT  INJECTION Left 2021    Procedure: SACROILIAC INJECTION left;  Surgeon: Nu Partida MD;  [place] [facility] [place];  Service: Pain Management;  Laterality: Left;    TUBAL ABDOMINAL LIGATION      URETEROSCOPY LASER LITHOTRIPSY WITH STENT INSERTION Left 2021    Procedure: LT.URETEROSCOPY LASER LITHOTRIPSY WITH STENT  FOR STONE;  Surgeon: Arnaud Lu MD;  Location: [facility] MAIN OR;  Service: Urology;  Laterality: Left;    WISDOM TOOTH EXTRACTION           FAMILY HISTORY  Family History   Problem Relation Age of Onset    Heart disease Mother     Sudden death Mother 48        MI    No Known Problems Father     Cancer Sister     Cancer Son          SOCIAL HISTORY  Social History     Socioeconomic History    Marital status:    Tobacco Use    Smoking status: Former     Current packs/day: 0.00     Types: Cigarettes     Start date: 1/10/1953     Quit date: 1/10/1985     Years since quittin.4    Smokeless tobacco: Never    Tobacco comments:     smoked 4-5 cigarettes daily   Vaping Use    Vaping status: Never Used   Substance and Sexual Activity    Alcohol use: No     Comment: caffeine use coffee 3 cups daily    Drug use: No    Sexual activity: Defer         ALLERGIES  Penicillins and Sulfa antibiotics      REVIEW OF SYSTEMS  Included in HPI  All systems reviewed and negative except for those discussed in HPI.      PHYSICAL EXAM    I have reviewed the triage vital signs and nursing notes.    ED Triage Vitals [24 2030]   Temp Heart Rate Resp BP SpO2   99.9 °F (37.7 °C) 87 22 123/84 95 %      Temp src Heart Rate Source Patient Position BP Location FiO2 (%)   Tympanic -- -- -- --       GENERAL: not distressed  HENT: nares patent  Head/neck/ face are symmetric without gross deformity or swelling  EYES: no scleral icterus  CV: regular rhythm, regular rate with intact distal pulses  RESPIRATORY: normal effort and no respiratory distress  ABDOMEN: soft and non-tender  MUSCULOSKELETAL: no  deformity  Lumbar spine: No step-offs or deformities, diffuse tenderness to palpation.  Bilateral lower extremities: Negative straight leg raise.  5 out of 5 strength.  Sensation intact light touch.    NEURO: alert and appropriate, moves all extremities, follows commands  SKIN: warm, dry    Vital signs and nursing notes reviewed.      LAB RESULTS  Recent Results (from the past 24 hour(s))   Comprehensive Metabolic Panel    Collection Time: 06/01/24  9:13 PM    Specimen: Blood   Result Value Ref Range    Glucose 96 65 - 99 mg/dL    BUN 26 (H) 8 - 23 mg/dL    Creatinine 1.60 (H) 0.57 - 1.00 mg/dL    Sodium 144 136 - 145 mmol/L    Potassium 3.5 3.5 - 5.2 mmol/L    Chloride 108 (H) 98 - 107 mmol/L    CO2 18.5 (L) 22.0 - 29.0 mmol/L    Calcium 10.1 8.6 - 10.5 mg/dL    Total Protein 6.1 6.0 - 8.5 g/dL    Albumin 3.9 3.5 - 5.2 g/dL    ALT (SGPT) 281 (H) 1 - 33 U/L    AST (SGOT) 274 (H) 1 - 32 U/L    Alkaline Phosphatase 79 39 - 117 U/L    Total Bilirubin 0.7 0.0 - 1.2 mg/dL    Globulin 2.2 gm/dL    A/G Ratio 1.8 g/dL    BUN/Creatinine Ratio 16.3 7.0 - 25.0    Anion Gap 17.5 (H) 5.0 - 15.0 mmol/L    eGFR 31.5 (L) >60.0 mL/min/1.73   CBC Auto Differential    Collection Time: 06/01/24  9:13 PM    Specimen: Blood   Result Value Ref Range    WBC 17.40 (H) 3.40 - 10.80 10*3/mm3    RBC 4.60 3.77 - 5.28 10*6/mm3    Hemoglobin 13.6 12.0 - 15.9 g/dL    Hematocrit 41.0 34.0 - 46.6 %    MCV 89.1 79.0 - 97.0 fL    MCH 29.6 26.6 - 33.0 pg    MCHC 33.2 31.5 - 35.7 g/dL    RDW 12.8 12.3 - 15.4 %    RDW-SD 41.8 37.0 - 54.0 fl    MPV 10.3 6.0 - 12.0 fL    Platelets 140 140 - 450 10*3/mm3   Manual Differential    Collection Time: 06/01/24  9:13 PM    Specimen: Blood   Result Value Ref Range    Neutrophil % 94.0 (H) 42.7 - 76.0 %    Lymphocyte % 0.0 (L) 19.6 - 45.3 %    Monocyte % 0.0 (L) 5.0 - 12.0 %    Eosinophil % 0.0 (L) 0.3 - 6.2 %    Basophil % 0.0 0.0 - 1.5 %    Metamyelocyte % 5.0 (H) 0.0 - 0.0 %    Myelocyte % 1.0 (H) 0.0 - 0.0 %     Neutrophils Absolute 16.36 (H) 1.70 - 7.00 10*3/mm3    Lymphocytes Absolute 0.00 (L) 0.70 - 3.10 10*3/mm3    Monocytes Absolute 0.00 (L) 0.10 - 0.90 10*3/mm3    Eosinophils Absolute 0.00 0.00 - 0.40 10*3/mm3    Basophils Absolute 0.00 0.00 - 0.20 10*3/mm3    RBC Morphology Normal Normal    WBC Morphology Normal Normal    Platelet Morphology Normal Normal   C-reactive Protein    Collection Time: 06/01/24  9:13 PM    Specimen: Blood   Result Value Ref Range    C-Reactive Protein 2.90 (H) 0.00 - 0.50 mg/dL   Sedimentation Rate    Collection Time: 06/01/24  9:13 PM    Specimen: Blood   Result Value Ref Range    Sed Rate 8 0 - 30 mm/hr   Urinalysis With Culture If Indicated - Urine, Catheter    Collection Time: 06/02/24  1:34 AM    Specimen: Urine, Catheter   Result Value Ref Range    Color, UA Yellow Yellow, Straw    Appearance, UA Cloudy (A) Clear    pH, UA 5.5 5.0 - 8.0    Specific Gravity, UA 1.009 1.005 - 1.030    Glucose, UA Negative Negative    Ketones, UA Negative Negative    Bilirubin, UA Negative Negative    Blood, UA Large (3+) (A) Negative    Protein,  mg/dL (2+) (A) Negative    Leuk Esterase, UA Moderate (2+) (A) Negative    Nitrite, UA Negative Negative    Urobilinogen, UA 1.0 E.U./dL 0.2 - 1.0 E.U./dL   Urinalysis, Microscopic Only - Urine, Catheter    Collection Time: 06/02/24  1:34 AM    Specimen: Urine, Catheter   Result Value Ref Range    RBC, UA 21-50 (A) None Seen, 0-2 /HPF    WBC, UA 21-50 (A) None Seen, 0-2 /HPF    Bacteria, UA 1+ (A) None Seen /HPF    Squamous Epithelial Cells, UA 3-6 (A) None Seen, 0-2 /HPF    Hyaline Casts, UA 3-6 None Seen /LPF    Methodology Manual Light Microscopy    Lactic Acid, Plasma    Collection Time: 06/02/24  2:26 AM    Specimen: Hand, Right; Blood   Result Value Ref Range    Lactate 10.2 (C) 0.5 - 2.0 mmol/L         RADIOLOGY  CT Abdomen Pelvis Without Contrast    Result Date: 6/1/2024  CT ABDOMEN PELVIS WO CONTRAST-  HISTORY: 84 years of age, Female.  Rule out  ureteral stone  TECHNIQUE:  CT includes axial imaging from the lung bases to the trochanters without intravenous contrast and without use of oral contrast. Data reconstructed in coronal and sagittal planes. Radiation dose reduction techniques were utilized, including automated exposure control and exposure modulation based on body size.  COMPARISON: CT abdomen and pelvis 02/22/2021.  FINDINGS: There is a 3 mm distal right ureteral stone at the horizontal level of the upper sacrum. Moderate to severe right hydronephrosis is present and there is right perinephric stranding.  Left lateral renal cyst measures 1.8 cm. No left renal or ureteral stone is evident.  Heart size is enlarged. Calcified left basilar pleural plaques. Dependent basilar atelectasis. Liver, spleen, pancreas appear within normal limits. Adrenal glands appear normal. Gallbladder partially decompressed.  Sigmoid diverticulosis without evidence for diverticulitis. CT lumbar spine has been completed same date and has been reported separately.      1. 3 mm distal right ureteral stone at the horizontal level of the upper sacrum with moderate to severe right hydronephrosis and right perinephric stranding. 2. Cardiomegaly. Basilar atelectasis. Sigmoid diverticulosis without evidence for diverticulitis.  Radiation dose reduction techniques were utilized, including automated exposure control and exposure modulation based on body size.   This report was finalized on 6/1/2024 11:45 PM by Dr. Ki Roque M.D on Workstation: [id number]      CT Lumbar Spine Without Contrast    Addendum Date: 6/1/2024    ADDENDUM: 06 01 24 23:04 Call Doctor Regarding Osteomyelitis (if unsuspected), called SHY Ashby on 06 01 23:04 (-04:00)     Result Date: 6/1/2024  CR Patient: ABDIRASHID TA  Time Out: 22:51 Exam(s): CT L SPINE EXAM:   CT Lumbar Spine Without Intravenous Contrast CLINICAL HISTORY:   Low back pain TECHNIQUE:   Axial computed tomography images of the lumbar  spine without intravenous contrast.  CTDI is 30.33 mGy and DLP is 826.4 mGy-cm.  This CT exam was performed according to the principle of ALARA (As Low As Reasonably Achievable) by using one or more of the following dose reduction techniques: automated exposure control, adjustment of the mA and or kV according to patient size, and or use of iterative reconstruction technique. COMPARISON:   MRI dated 2 18 21 FINDINGS:   Vertebrae:  Irregularity of the inferior endplate of L5 and superior endplate of S1.  Multilevel facet arthropathy.  8 mm anterolisthesis of L4-L5.  There is transitional anatomy.  The L5 vertebral body is sacralized.  No acute fracture.   Discs spinal canal neural foramina:  Multilevel disc height loss.  There is significant irregularity of the disc space at L4-L5.  Vacuum disc phenomenon at multiple levels in the lumbar spine.  No spinal canal stenosis.   Soft tissues: Mild right hydroureteronephrosis.  Possible nephroliths in the distal ureter..   Vasculature:  The distal thecal sac is ectatic, unchanged from prior MRI. IMPRESSION:     There is transitional anatomy with sacralization of the L5 vertebral body. Findings are suspicious for discitis osteomyelitis at the L4-L5 level.  Recommend contrast enhanced MRI to further evaluate. Multilevel degenerative changes.  No evidence of high-grade spinal canal narrowing. Mild to moderate right hydroureteronephrosis with possible distal ureteral stone, not ideally evaluated on this study..    Communications:  Call Doctor Osteomyelitis (if unsuspected) Electronically signed by Ivan Oro MD on 06-01-24 at 225       MEDICATIONS GIVEN IN ER  Medications   sodium chloride 0.9 % flush 10 mL (has no administration in time range)   vancomycin IVPB 1500 mg in 0.9% NaCl (Premix) 500 mL (1,500 mg Intravenous New Bag 6/2/24 0235)   sodium chloride 0.9 % bolus 1,000 mL (1,000 mL Intravenous New Bag 6/2/24 0310)   morphine injection 2 mg (2 mg Intravenous Given  6/1/24 2203)   ondansetron (ZOFRAN) injection 4 mg (4 mg Intravenous Given 6/1/24 2203)   morphine injection 4 mg (4 mg Intravenous Given 6/1/24 2339)   cefTRIAXone (ROCEPHIN) 2,000 mg in sodium chloride 0.9 % 100 mL MBP (0 mg Intravenous Stopped 6/2/24 0200)           OUTPATIENT MEDICATION MANAGEMENT:  Current Facility-Administered Medications Ordered in Epic   Medication Dose Route Frequency Provider Last Rate Last Admin    sodium chloride 0.9 % bolus 1,000 mL  1,000 mL Intravenous Once Symone Chao APRN 1,000 mL/hr at 06/02/24 0310 1,000 mL at 06/02/24 0310    sodium chloride 0.9 % flush 10 mL  10 mL Intravenous PRN Symone Chao APRN        vancomycin IVPB 1500 mg in 0.9% NaCl (Premix) 500 mL  20 mg/kg Intravenous Once Symone Chao APRN 333.3 mL/hr at 06/02/24 0235 1,500 mg at 06/02/24 0235     Current Outpatient Medications Ordered in Epic   Medication Sig Dispense Refill    amLODIPine (NORVASC) 2.5 MG tablet Take 1 tablet by mouth Daily. For BP 30 tablet 5    cholecalciferol (VITAMIN D3) 25 MCG (1000 UT) tablet Take 1 tablet by mouth Daily.      donepezil (ARICEPT) 10 MG tablet Take 1 tablet by mouth Daily.      lidocaine (LIDODERM) 5 % Place patch over low back/sacral area.  Remove & Discard patch within 12 hours.  May use 4% over the counter patches in place of prescription 5%. 30 each 11    meloxicam (MOBIC) 15 MG tablet Take 1 tablet by mouth Daily.      memantine (NAMENDA) 10 MG tablet Take 1 tablet by mouth 2 (Two) Times a Day.      Multiple Vitamins-Minerals (MULTI COMPLETE PO) Take  by mouth daily.      rosuvastatin (CRESTOR) 20 MG tablet Take 1 tablet by mouth Daily.           PROGRESS, DATA ANALYSIS, CONSULTS, AND MEDICAL DECISION MAKING  ORDERS PLACED DURING THIS VISIT:  Orders Placed This Encounter   Procedures    Blood Culture - Blood,    Blood Culture - Blood,    Urine Culture - Urine,    CT Lumbar Spine Without Contrast    CT Abdomen Pelvis Without Contrast    Comprehensive Metabolic  Panel    Urinalysis With Culture If Indicated - Urine, Catheter    CBC Auto Differential    Manual Differential    C-reactive Protein    Sedimentation Rate    Lactic Acid, Plasma    Urinalysis, Microscopic Only - Urine, Clean Catch    STAT Lactic Acid, Reflex    NPO Diet NPO Type: Strict NPO    Straight Cath    Urology (on-call MD unless specified)    [facility] (on-call MD unless specified) Details    Insert Peripheral IV    Inpatient Admission    CBC & Differential       All labs have been independently interpreted by me.  All radiology studies have been reviewed by me. All EKG's have been independently viewed by me.  Discussion below represents my analysis of pertinent findings related to patient's condition, differential diagnosis, treatment plan and final disposition.    Differential diagnosis includes but is not limited to:   Compression fracture, spinal stenosis, sciatica, ureteral stone, etc.    ED Course/Progress Notes/MDM:  ED Course as of 06/02/24 0553   Sat Jun 01, 2024   2151 I discussed the case with Dr. Caballero and they agree to evaluate the patient at the bedside.    [AR]   2305 Spoke with stat rad radiologist regarding CT lumbar report. Will order ct abd and pelvis to elevate [AR]   2314 Patient has 2 + SIRS criteria with elevated heart rate and elevated white blood cell count.  CT scan of lumbar spine reveals osteomyelitis at L4-L5.  Patient is considered septic at this time.  Blood cultures, lactic acid, and IV antibiotics ordered. [AR]   Sun Jun 02, 2024   0000 CT scan abdomen and pelvis shows 3 mm distal right ureteral stone with right perinephric stranding.  Awaiting urinalysis. [AR]   0200 Urinalysis reviewed.  Patient has already received IV Rocephin. [AR]   0302 Phone call with Dr. Schofield (Urology).  Discussed the patient, relevant history, exam, diagnostics, ED findings/progress, and concerns. He states to keep the patient NPO as she may require stent placement this morning. They agree to  consult.    [AR]   0306 Lactate(!!): 10.2 [AR]   0336 Phone call with VASQUEZ Valdez with [facility].  Discussed the patient, relevant history, exam, diagnostics, ED findings/progress, and concerns. They agree to admit the patient to Dr. Carlton. Care assumed by the admitting physician at this time.     [AR]      ED Course User Index  [AR] Symone Chao APRN     -Patient's family member and nursing facility has been updated by primary RN.      AS OF 03:36 EDT VITALS:    BP - 110/52  HR - 93  TEMP - 99.9 °F (37.7 °C) (Tympanic)  O2 SATS - 92%      MDM:  Patient is a 84-year-old female who presents to the emergency department complaints of diffuse low back pain.  No known injury or trauma.  Patient was found to have possible osteomyelitis secondary to discitis at L4-L5 along with a right ureteral stone with acute UTI.  Patient had white blood cell count of 17 along with heart rate greater than 90 therefore making patient septic.  Patient's lactic acid resulted at 10.2, therefore making patient severe sepsis.  IV fluids ordered.  Pain controlled while in ED.  Patient will be admitted for further evaluation and management.      COMPLEXITY OF CARE  The patient requires admission.    Critical care provider statement:    Critical care time (minutes): 46 or <30.   Critical care time was exclusive of:  Separately billable procedures and treating other patients   Critical care was necessary to treat or prevent imminent or life-threatening deterioration of the following conditions:  Sepsis   Critical care was time spent personally by me on the following activities:  Development of treatment plan with patient or surrogate, discussions with consultants, evaluation of patient's response to treatment, examination of patient, obtaining history from patient or surrogate, ordering and performing treatments and interventions, ordering and review of laboratory studies, ordering and review of radiographic studies, pulse oximetry,  re-evaluation of patient's condition and review of old charts. Critical Care indicators: Sepsis / septicemia     DIAGNOSIS  Final diagnoses:   Right ureteral stone   Acute UTI   Severe sepsis   Elevated lactic acid level   Osteomyelitis of lumbar spine   Elevated AST (SGOT)   Elevated ALT measurement   Elevated C-reactive protein (CRP)   PRINCE (acute kidney injury)         DISPOSITION  ED Disposition       ED Disposition   Decision to Admit    Condition   --    Comment   Level of Care: Telemetry [5]   Diagnosis: Right ureteral stone [553168]   Admitting Physician: LIVIER BARAKAT LOC [[id number]]   Attending Physician: LIVIER BARAKAT LOC [[id number]]   Certification: I Certify That Inpatient Hospital Services Are Medically Necessary For Greater Than 2 Midnights                        Please note that portions of this document were completed with a voice recognition program.    Note Disclaimer: At [facility], we believe that sharing information builds trust and better relationships. You are receiving this note because you recently visited [facility]. It is possible you will see health information before a provider has talked with you about it. This kind of information can be easy to misunderstand. To help you fully understand what it means for your health, we urge you to discuss this note with your provider.     Symone Chao, MICHELE  06/02/24 0555

## 2024-06-02 NOTE — ED PROVIDER NOTES
" EMERGENCY DEPARTMENT MD ATTESTATION NOTE    Room Number:  I375/1  PCP: Meche Ji MD  Independent Historians: Patient and EMS    HPI:  A complete HPI/ROS/PMH/PSH/SH/FH are unobtainable due to: Poor historian and Dementia    Chronic or social conditions impacting patient care (Social Determinants of Health): None      Context: Kimberlee Urrutia is a 84 y.o. female with a medical history of dementia, arthritis, hyperlipidemia and renal stones who presents to the ED c/o acute low back pain without injury today.  Patient presents via EMS from nursing home for evaluation of this symptom.  Staff gave her one dose of Tyenol pta.  Pt denies chest or abd pain.  Describes pain \"all across\" her lower back area.  Additional hx is rather limited d/t pt's age and hx of dementia.        Review of prior external notes (non-ED) -and- Review of prior external test results outside of this encounter: I independently reviewed the progress note from November 30, 2023.  Problems listed at that time include lumbar stenosis, lumbar spondylolisthesis, sacroiliac joint pain.  Previous interventions reported include epidural steroid injections.      PHYSICAL EXAM    I have reviewed the triage vital signs and nursing notes.    ED Triage Vitals [06/01/24 2030]   Temp Heart Rate Resp BP SpO2   99.9 °F (37.7 °C) 87 22 123/84 95 %      Temp src Heart Rate Source Patient Position BP Location FiO2 (%)   Tympanic -- -- -- --       Physical Exam  GENERAL: alert, appears uncomfortable, but no acute distress  SKIN: Warm, dry, no rashes  HENT: Normocephalic, atraumatic  EYES: no scleral icterus  CV: regular rhythm, regular rate  RESPIRATORY: normal effort, lungs clear  ABDOMEN: soft, nontender, nondistended  MUSCULOSKELETAL: no deformity or asymmetry to extremities.  Moderate diffuse tenderness throughout the lumbar spine and bilateral back soft tissues is noted.  NEURO: alert, moves all extremities, follows commands          MEDICATIONS GIVEN " IN ER  Medications   sodium chloride 0.9 % flush 10 mL ( Intravenous MAR Unhold 6/2/24 0905)   sodium chloride 0.9 % flush 10 mL ( Intravenous Dose Auto Held 6/18/24 2100)   sodium chloride 0.9 % flush 10 mL ( Intravenous MAR Hold 6/2/24 0524)   sodium chloride 0.9 % infusion 40 mL ( Intravenous MAR Hold 6/2/24 0524)   lactated ringers infusion (9 mL/hr Intravenous New Bag 6/2/24 0535)   dextrose (D50W) (25 g/50 mL) IV injection 50 mL (50 mL Intravenous Given 6/3/24 0923)   donepezil (ARICEPT) tablet 10 mg (10 mg Oral Given 6/3/24 0929)   memantine (NAMENDA) tablet 10 mg (10 mg Oral Given 6/3/24 0929)   nitroglycerin (NITROSTAT) SL tablet 0.4 mg (has no administration in time range)   aluminum-magnesium hydroxide-simethicone (MAALOX MAX) 400-400-40 MG/5ML suspension 15 mL (has no administration in time range)   sennosides-docusate (PERICOLACE) 8.6-50 MG per tablet 2 tablet (2 tablets Oral Not Given 6/3/24 0929)     And   polyethylene glycol (MIRALAX) packet 17 g (has no administration in time range)     And   bisacodyl (DULCOLAX) EC tablet 5 mg (has no administration in time range)     And   bisacodyl (DULCOLAX) suppository 10 mg (has no administration in time range)   acetaminophen (TYLENOL) tablet 650 mg (has no administration in time range)     Or   acetaminophen (TYLENOL) suppository 650 mg (has no administration in time range)   ondansetron ODT (ZOFRAN-ODT) disintegrating tablet 4 mg (has no administration in time range)     Or   ondansetron (ZOFRAN) injection 4 mg (has no administration in time range)   Magnesium Standard Dose Replacement - Follow Nurse / BPA Driven Protocol (has no administration in time range)   Phosphorus Replacement - Follow Nurse / BPA Driven Protocol (has no administration in time range)   Calcium Replacement - Follow Nurse / BPA Driven Protocol (has no administration in time range)   heparin (porcine) 5000 UNIT/ML injection 5,000 Units ( Subcutaneous Dose Auto Held 6/18/24 2200)    dextrose (GLUTOSE) oral gel 15 g (15 g Oral Not Given 6/3/24 0230)   dextrose (D50W) (25 g/50 mL) IV injection 25 g (25 g Intravenous Given 6/3/24 0222)   glucagon (GLUCAGEN) injection 1 mg (has no administration in time range)   lactated ringers bolus 1,000 mL (1,000 mL Intravenous Not Given 6/2/24 1405)   sodium chloride 0.9 % flush 10 mL (10 mL Intravenous Given 6/3/24 0929)   sodium chloride 0.9 % flush 10 mL (10 mL Intravenous Given 6/3/24 0928)   sodium chloride 0.9 % flush 10 mL (10 mL Intravenous Given 6/3/24 0928)   sodium chloride 0.9 % flush 10 mL (has no administration in time range)   sodium chloride 0.9 % flush 20 mL (has no administration in time range)   ertapenem (INVanz) 500 mg in sodium chloride 0.9 % 50 mL IVPB (500 mg Intravenous New Bag 6/3/24 1644)   Potassium Replacement - Follow Nurse / BPA Driven Protocol (has no administration in time range)   dextrose 10 % infusion (75 mL/hr Intravenous New Bag 6/3/24 0509)   morphine injection 2 mg (2 mg Intravenous Given 6/1/24 2203)   ondansetron (ZOFRAN) injection 4 mg (4 mg Intravenous Given 6/1/24 2203)   morphine injection 4 mg (4 mg Intravenous Given 6/1/24 2339)   cefTRIAXone (ROCEPHIN) 2,000 mg in sodium chloride 0.9 % 100 mL MBP (0 mg Intravenous Stopped 6/2/24 0200)   vancomycin IVPB 1500 mg in 0.9% NaCl (Premix) 500 mL (0 mg Intravenous Stopped 6/2/24 0420)   sodium chloride 0.9 % bolus 1,000 mL (0 mL Intravenous Stopped 6/2/24 0430)   acetaminophen (TYLENOL) tablet 650 mg (650 mg Oral Given 6/2/24 0416)   famotidine (PEPCID) injection 20 mg (20 mg Intravenous Given 6/2/24 0541)   sepsis fluid NS 0.9 % bolus 1,929 mL (1,929 mL Intravenous New Bag 6/2/24 1100)   midazolam (VERSED) injection 1 mg (1 mg Intravenous Given 6/2/24 1132)   gadobenate dimeglumine (MULTIHANCE) injection 17 mL (17 mL Intravenous Given 6/2/24 1218)   phentolamine (REGITINE) 5mg in NS 10ml syringe (5 mg Subcutaneous Given 6/2/24 1616)   potassium chloride  (K-DUR,KLOR-CON) ER tablet 40 mEq (40 mEq Oral Given 6/2/24 1621)   potassium chloride (K-DUR,KLOR-CON) ER tablet 20 mEq (20 mEq Oral Given 6/2/24 2141)         ORDERS PLACED DURING THIS VISIT:  Orders Placed This Encounter   Procedures    Blood Culture - Blood,    Blood Culture - Blood,    Urine Culture - Urine,    Blood Culture ID, PCR - Blood,    Blood Culture - Blood,    Blood Culture - Blood,    CANDIDA AURIS SCREEN - Swab, Axilla Right, Axilla Left and Groin    CT Lumbar Spine Without Contrast    CT Abdomen Pelvis Without Contrast    MRI Lumbar Spine With & Without Contrast    FL C Arm During Surgery    XR Abdomen KUB    Comprehensive Metabolic Panel    Urinalysis With Culture If Indicated - Urine, Catheter    CBC Auto Differential    Manual Differential    C-reactive Protein    Sedimentation Rate    Lactic Acid, Plasma    Urinalysis, Microscopic Only - Urine, Clean Catch    STAT Lactic Acid, Reflex    STAT Lactic Acid, Reflex    Vancomycin, Random    Phosphorus    Magnesium    Basic Metabolic Panel    Procalcitonin    Comprehensive Metabolic Panel    CBC Auto Differential    Manual Differential    STAT Lactic Acid, Reflex    Scan Slide    STAT Lactic Acid, Reflex    CBC Auto Differential    STAT Lactic Acid, Reflex    Manual Differential    Comprehensive Metabolic Panel    Diet: Cardiac; Healthy Heart (2-3 Na+); Fluid Consistency: Thin (IDDSI 0)    Straight Cath    Vital Signs    Intake & Output    Weigh Patient    Oral Care    Place Sequential Compression Device    Maintain Sequential Compression Device    Telemetry - Place Orders & Notify Provider of Results When Patient Experiences Acute Chest Pain, Dysrhythmia or Respiratory Distress    May Be Off Telemetry for Tests    Up With Assistance    Neuro Checks    Verify Informed Consent    Vital Signs Per hospital policy    Telemetry - Place Orders & Notify Provider of Results When Patient Experiences Acute Chest Pain, Dysrhythmia or Respiratory Distress     Continuous Pulse Oximetry    Height and weight    Daily Weights    Intake and Output    Oral Care - Patient Not on NPPV & Not Intubated    Target Arousal Level RASS 0 to -1    If Patient has BG of < 80 and is symptomatic but not on an IV insulin protocol then use the Adult Hypoglycemia Treatment Orders.    Saline Lock    Place Order to Consult Intensivist For Critical Care Management (If Patient Not Admitted to Cardiology for Primary Cardiology Condition)    Use Mobility Guidelines for Advancement of Activity    Continue Indwelling Urinary Catheter    Urinary Catheter Care    Extravasation Standing Orders - IMMEDIATELY STOP INFUSION    Extravasation Standing Orders - DO NOT REMOVE CATHETER / NEEDLE    Extravasation Standing Orders - AVOID PRESSURE    Extravasation Standing Orders - Disconnect the IV Administration Set    Extravasation Standing Orders - Evaluate the Site for Extravasation of Fluid (Check for Blood Return)    Extravasation Standing Orders - Aspirate as Much of the Medication From the Needle / Cannula As Possible Using A Small (1-5mL) Syringe - Verify Recommended Treatment - If Antidote Does Not Require Infusion Through Extravasated Line Remove Needle / Cannula After Aspiration    Extravasation Standing Orders - Avi Around the Area With A Pen    Extravasation Standing Orders - Photograph the Area for Medical Record If Indicated Per Photo Policy    Extravasation Standing Orders - Elevate Affected Extremity for 24-48 Hours    Extravasation Standing Orders - Encourage Active Range of Motion After 48 Hours    Notify Provider Prior to Administration of Antidote - Extravasation Standing Orders    Notify Provider for Tissue Sloughing, Necrosis or Blistering at Extravasation Site    Indicate Extravasated Medication to Determine Antidote to Initiate    Apply Warm Compress to Affected Area for 20 Minutes    Measure Blood Pressure Every 10 Minutes After phentolamine (REGITINE) Administration for 90 Minutes     Notify Provider of Blood Pressure Less Than 90/40 After phentolamine (REGITINE) Administration    Change Transparent Dressing With CHG Disk & Securement Device Every 7 Days    Change Dressing to IV Site As Needed When Damp, Loose or Soiled    Connectors / Hubs Must Be Scrubbed 15 Seconds Using 70% Alcohol & Allowed to Dry Before Accessing Line    Change Needleless Connectors    Daily CHG Bath While Central Line in Place    RN To Release PICC Line Care Orders Once Line in Place    Obtain Informed Consent (If Not Done Inpatient or PAT)    Code Status and Medical Interventions:    Urology (on-call MD unless specified)    [facility] (on-call MD unless specified) Details    Inpatient Urology Consult    Inpatient Neurosurgery Consult    Inpatient Consult to Intensivist    Inpatient Consult to Case Management     Inpatient IV Team Consult PICC 3 Lumens    Inpatient Infectious Diseases Consult    Incentive Spirometry    Oxygen Therapy- Nasal Cannula; Titrate 1-6 LPM Per SpO2; 90 - 95%    POC Glucose Once    POC Glucose Once    POC Glucose Once    POC Glucose Q4H    POC Glucose Once    POC Glucose Once    POC Glucose Once    POC Glucose Once    POC Glucose Once    POC Glucose Once    POC Glucose Once    POC Glucose Once    POC Glucose Once    POC Glucose Once    POC Glucose Once    POC Glucose Once    POC Glucose Once    POC Glucose Once    POC Glucose Once    POC Glucose Once    POC Glucose Once    POC Glucose Once    POC Glucose Once    POC Glucose Once    POC Glucose Once    POC Glucose Once    POC Glucose Once    POC Glucose Once    POC Glucose Once    POC Glucose Once    POC Glucose Once    POC Glucose Once    POC Glucose Once    POC Glucose Once    Insert Peripheral IV    Insert Peripheral IV    Inpatient Admission    Transfer Patient    Restraints Non-Violent or Non-Self Destructive    CBC & Differential    CBC & Differential    CBC & Differential         PROCEDURES  Procedures            PROGRESS, DATA  ANALYSIS, CONSULTS, AND MEDICAL DECISION MAKING  All labs have been independently interpreted by me.  All radiology studies have been reviewed by me. All EKG's have been independently viewed and interpreted by me.  Discussion below represents my analysis of pertinent findings related to patient's condition, differential diagnosis, treatment plan and final disposition.    Differential diagnosis includes but is not limited to pyelonephritis, kidney stone, degenerative disc disease, cauda equina syndrome.    Clinical Scores:                   ED Course as of 06/03/24 2100   Sat Jun 01, 2024   2151 I discussed the case with Dr. Caballero and they agree to evaluate the patient at the bedside.    [AR]   2305 Spoke with stat rad radiologist regarding CT lumbar report. Will order ct abd and pelvis to elevate [AR]   2314 Patient has 2 + SIRS criteria with elevated heart rate and elevated white blood cell count.  CT scan of lumbar spine reveals osteomyelitis at L4-L5.  Patient is considered septic at this time.  Blood cultures, lactic acid, and IV antibiotics ordered. [AR]   Sun Jun 02, 2024   0000 CT scan abdomen and pelvis shows 3 mm distal right ureteral stone with right perinephric stranding.  Awaiting urinalysis. [AR]   0200 Urinalysis reviewed.  Patient has already received IV Rocephin. [AR]   0302 Phone call with Dr. Schofield (Urology).  Discussed the patient, relevant history, exam, diagnostics, ED findings/progress, and concerns. He states to keep the patient NPO as she may require stent placement this morning. They agree to consult.    [AR]   0306 Lactate(!!): 10.2 [AR]   0336 Phone call with VASQUEZ Valdez with [facility].  Discussed the patient, relevant history, exam, diagnostics, ED findings/progress, and concerns. They agree to admit the patient to Dr. Carlton. Care assumed by the admitting physician at this time.     [AR]      ED Course User Index  [AR] Symone Chao APRN       MDM:   I independently interpreted  the abdomen CT study and my findings are: No free air, no small bowel obstruction pattern.  Distal right renal stone visualized    After reviewing the laboratory values and the CT report from tonight's visit, we have concern for sepsis process with UTI and kidney stone at this time.  Patient also has curious transaminitis noted of uncertain significance.  IV fluids and broad-spectrum antibiotics are initiated in the ED.  Urology also consulted given probable need for procedural intervention with her kidney stone.  We made arrangements to admit the patient to hospitalist team for further medical management of this infectious illness.      COMPLEXITY OF CARE  The patient requires admission.    Please note that portions of this document were completed with a voice recognition program.    Note Disclaimer: At [facility], we believe that sharing information builds trust and better relationships. You are receiving this note because you recently visited [facility]. It is possible you will see health information before a provider has talked with you about it. This kind of information can be easy to misunderstand. To help you fully understand what it means for your health, we urge you to discuss this note with your provider.         Evelio Caballero MD  06/03/24 2100

## 2024-06-02 NOTE — PROGRESS NOTES
"[facility] Clinical Pharmacy Services: Vancomycin Pharmacokinetic Initial Consult Note    Kimberlee Urrutia is a 84 y.o. female who is on day 1 of pharmacy to dose vancomycin.    Indication: Bone and/or Joint Infection  Consulting Provider: MICHELE Leavitt  Duration of Therapy: 5 days  Loading Dose Given: 1500 mg on 6/2 at 0235  Target: Dose by Levels    Vitals/Labs  Ht: 158.8 cm (62.5\"); Wt: 85.4 kg (188 lb 4.4 oz)  Temp Readings from Last 1 Encounters:   06/02/24 100.4 °F (38 °C) (Oral)    Estimated Creatinine Clearance: 26.3 mL/min (A) (by C-G formula based on SCr of 1.6 mg/dL (H)).     Results from last 7 days   Lab Units 06/01/24  2113   CREATININE mg/dL 1.60*   WBC 10*3/mm3 17.40*     Assessment/Plan:  PRINCE  Will dose vancomycin intermittently per levels in the setting of PRINCE  Vanc Random has been ordered for 6/2 at 1600    Pharmacy will follow patient's kidney function and will adjust doses and obtain levels as necessary. Thank you for involving pharmacy in this patient's care. Please contact pharmacy with any questions or concerns.                           Nathaniel Hickman III, PharmD  Clinical Pharmacist    "

## 2024-06-03 PROBLEM — A41.9 SHOCK, SEPTIC: Status: RESOLVED | Noted: 2021-02-17 | Resolved: 2024-06-03

## 2024-06-03 PROBLEM — R65.21 SHOCK, SEPTIC: Status: RESOLVED | Noted: 2021-02-17 | Resolved: 2024-06-03

## 2024-06-03 LAB
ANION GAP SERPL CALCULATED.3IONS-SCNC: 14.6 MMOL/L (ref 5–15)
BASOPHILS # BLD MANUAL: 0 10*3/MM3 (ref 0–0.2)
BASOPHILS NFR BLD MANUAL: 0 % (ref 0–1.5)
BUN SERPL-MCNC: 35 MG/DL (ref 8–23)
BUN/CREAT SERPL: 20.7 (ref 7–25)
CALCIUM SPEC-SCNC: 8.4 MG/DL (ref 8.6–10.5)
CHLORIDE SERPL-SCNC: 112 MMOL/L (ref 98–107)
CO2 SERPL-SCNC: 14.4 MMOL/L (ref 22–29)
CREAT SERPL-MCNC: 1.69 MG/DL (ref 0.57–1)
D-LACTATE SERPL-SCNC: 4.8 MMOL/L (ref 0.5–2)
D-LACTATE SERPL-SCNC: 5.9 MMOL/L (ref 0.5–2)
DEPRECATED RDW RBC AUTO: 43.4 FL (ref 37–54)
EGFRCR SERPLBLD CKD-EPI 2021: 29.5 ML/MIN/1.73
EOSINOPHIL # BLD MANUAL: 0 10*3/MM3 (ref 0–0.4)
EOSINOPHIL NFR BLD MANUAL: 0 % (ref 0.3–6.2)
ERYTHROCYTE [DISTWIDTH] IN BLOOD BY AUTOMATED COUNT: 13.2 % (ref 12.3–15.4)
GLUCOSE BLDC GLUCOMTR-MCNC: 111 MG/DL (ref 70–130)
GLUCOSE BLDC GLUCOMTR-MCNC: 113 MG/DL (ref 70–130)
GLUCOSE BLDC GLUCOMTR-MCNC: 118 MG/DL (ref 70–130)
GLUCOSE BLDC GLUCOMTR-MCNC: 127 MG/DL (ref 70–130)
GLUCOSE BLDC GLUCOMTR-MCNC: 130 MG/DL (ref 70–130)
GLUCOSE BLDC GLUCOMTR-MCNC: 144 MG/DL (ref 70–130)
GLUCOSE BLDC GLUCOMTR-MCNC: 162 MG/DL (ref 70–130)
GLUCOSE BLDC GLUCOMTR-MCNC: 52 MG/DL (ref 70–130)
GLUCOSE BLDC GLUCOMTR-MCNC: 66 MG/DL (ref 70–130)
GLUCOSE BLDC GLUCOMTR-MCNC: 67 MG/DL (ref 70–130)
GLUCOSE BLDC GLUCOMTR-MCNC: 75 MG/DL (ref 70–130)
GLUCOSE BLDC GLUCOMTR-MCNC: 78 MG/DL (ref 70–130)
GLUCOSE BLDC GLUCOMTR-MCNC: 78 MG/DL (ref 70–130)
GLUCOSE BLDC GLUCOMTR-MCNC: 84 MG/DL (ref 70–130)
GLUCOSE BLDC GLUCOMTR-MCNC: 86 MG/DL (ref 70–130)
GLUCOSE BLDC GLUCOMTR-MCNC: 87 MG/DL (ref 70–130)
GLUCOSE BLDC GLUCOMTR-MCNC: 89 MG/DL (ref 70–130)
GLUCOSE BLDC GLUCOMTR-MCNC: 93 MG/DL (ref 70–130)
GLUCOSE BLDC GLUCOMTR-MCNC: 94 MG/DL (ref 70–130)
GLUCOSE BLDC GLUCOMTR-MCNC: 98 MG/DL (ref 70–130)
GLUCOSE SERPL-MCNC: 93 MG/DL (ref 65–99)
HCT VFR BLD AUTO: 34.8 % (ref 34–46.6)
HGB BLD-MCNC: 11.5 G/DL (ref 12–15.9)
LYMPHOCYTES # BLD MANUAL: 0.2 10*3/MM3 (ref 0.7–3.1)
LYMPHOCYTES NFR BLD MANUAL: 4 % (ref 5–12)
MAGNESIUM SERPL-MCNC: 1.7 MG/DL (ref 1.6–2.4)
MCH RBC QN AUTO: 29.8 PG (ref 26.6–33)
MCHC RBC AUTO-ENTMCNC: 33 G/DL (ref 31.5–35.7)
MCV RBC AUTO: 90.2 FL (ref 79–97)
METAMYELOCYTES NFR BLD MANUAL: 1 % (ref 0–0)
MONOCYTES # BLD: 0.81 10*3/MM3 (ref 0.1–0.9)
NEUTROPHILS # BLD AUTO: 18.94 10*3/MM3 (ref 1.7–7)
NEUTROPHILS NFR BLD MANUAL: 94 % (ref 42.7–76)
PHOSPHATE SERPL-MCNC: 2.5 MG/DL (ref 2.5–4.5)
PLAT MORPH BLD: NORMAL
PLATELET # BLD AUTO: 57 10*3/MM3 (ref 140–450)
PMV BLD AUTO: 11.7 FL (ref 6–12)
POTASSIUM SERPL-SCNC: 4.8 MMOL/L (ref 3.5–5.2)
RBC # BLD AUTO: 3.86 10*6/MM3 (ref 3.77–5.28)
RBC MORPH BLD: NORMAL
SODIUM SERPL-SCNC: 141 MMOL/L (ref 136–145)
VARIANT LYMPHS NFR BLD MANUAL: 1 % (ref 19.6–45.3)
WBC MORPH BLD: NORMAL
WBC NRBC COR # BLD AUTO: 20.15 10*3/MM3 (ref 3.4–10.8)

## 2024-06-03 PROCEDURE — 80048 BASIC METABOLIC PNL TOTAL CA: CPT | Performed by: HOSPITALIST

## 2024-06-03 PROCEDURE — 83605 ASSAY OF LACTIC ACID: CPT | Performed by: NURSE PRACTITIONER

## 2024-06-03 PROCEDURE — 99223 1ST HOSP IP/OBS HIGH 75: CPT | Performed by: INTERNAL MEDICINE

## 2024-06-03 PROCEDURE — 25010000002 ERTAPENEM PER 500 MG: Performed by: HOSPITALIST

## 2024-06-03 PROCEDURE — 85025 COMPLETE CBC W/AUTO DIFF WBC: CPT | Performed by: HOSPITALIST

## 2024-06-03 PROCEDURE — 25810000003 LACTATED RINGERS PER 1000 ML: Performed by: HOSPITALIST

## 2024-06-03 PROCEDURE — 87102 FUNGUS ISOLATION CULTURE: CPT | Performed by: HOSPITALIST

## 2024-06-03 PROCEDURE — 84100 ASSAY OF PHOSPHORUS: CPT | Performed by: HOSPITALIST

## 2024-06-03 PROCEDURE — 82948 REAGENT STRIP/BLOOD GLUCOSE: CPT

## 2024-06-03 PROCEDURE — 83735 ASSAY OF MAGNESIUM: CPT | Performed by: HOSPITALIST

## 2024-06-03 PROCEDURE — 85007 BL SMEAR W/DIFF WBC COUNT: CPT | Performed by: HOSPITALIST

## 2024-06-03 RX ORDER — DEXTROSE MONOHYDRATE 100 MG/ML
75 INJECTION, SOLUTION INTRAVENOUS CONTINUOUS
Status: DISCONTINUED | OUTPATIENT
Start: 2024-06-03 | End: 2024-06-03

## 2024-06-03 RX ORDER — DEXTROSE MONOHYDRATE 100 MG/ML
100 INJECTION, SOLUTION INTRAVENOUS CONTINUOUS
Status: DISCONTINUED | OUTPATIENT
Start: 2024-06-03 | End: 2024-06-04

## 2024-06-03 RX ADMIN — DEXTROSE MONOHYDRATE 50 ML: 25 INJECTION, SOLUTION INTRAVENOUS at 09:23

## 2024-06-03 RX ADMIN — Medication 10 ML: at 00:19

## 2024-06-03 RX ADMIN — DONEPEZIL HYDROCHLORIDE 10 MG: 10 TABLET, FILM COATED ORAL at 09:29

## 2024-06-03 RX ADMIN — Medication 10 ML: at 09:29

## 2024-06-03 RX ADMIN — DEXTROSE MONOHYDRATE 75 ML/HR: 100 INJECTION, SOLUTION INTRAVENOUS at 05:09

## 2024-06-03 RX ADMIN — MEMANTINE HYDROCHLORIDE 10 MG: 10 TABLET, FILM COATED ORAL at 21:43

## 2024-06-03 RX ADMIN — DEXTROSE MONOHYDRATE 100 ML/HR: 100 INJECTION, SOLUTION INTRAVENOUS at 22:56

## 2024-06-03 RX ADMIN — Medication 10 ML: at 09:28

## 2024-06-03 RX ADMIN — Medication 10 ML: at 21:42

## 2024-06-03 RX ADMIN — ERTAPENEM SODIUM 500 MG: 1 INJECTION, POWDER, LYOPHILIZED, FOR SOLUTION INTRAMUSCULAR; INTRAVENOUS at 16:44

## 2024-06-03 RX ADMIN — MEMANTINE HYDROCHLORIDE 10 MG: 10 TABLET, FILM COATED ORAL at 09:29

## 2024-06-03 RX ADMIN — Medication 10 ML: at 21:00

## 2024-06-03 RX ADMIN — SODIUM CHLORIDE, POTASSIUM CHLORIDE, SODIUM LACTATE AND CALCIUM CHLORIDE 125 ML/HR: 600; 310; 30; 20 INJECTION, SOLUTION INTRAVENOUS at 02:07

## 2024-06-03 RX ADMIN — DEXTROSE MONOHYDRATE 25 G: 25 INJECTION, SOLUTION INTRAVENOUS at 02:22

## 2024-06-03 NOTE — PROGRESS NOTES
"   LOS: 1 day   Patient Care Team:  Meche Ji MD as PCP - General (Internal Medicine)  Josue Wilson MD as Consulting Physician (Orthopedic Surgery)  Yash Wilson MD as Consulting Physician (Gastroenterology)  Avi Ferris MD (Ophthalmology)  Tommy French MD (Inactive) as Consulting Physician (Otolaryngology)  Arnaud Lu MD as Consulting Physician (Urology)  Nu Partida MD as Consulting Physician (Pain Medicine)  Ki Small MD as Consulting Physician (Neurology)      Subjective   Interval History: No events overnight. Has remained off pressors.    Objective     ROS   12 POINT NEG ROS PERTINENT IN HPI      Vital Signs  Temp:  [99 °F (37.2 °C)-99.4 °F (37.4 °C)] 99.4 °F (37.4 °C)  Heart Rate:  [] 81  Resp:  [18] 18  BP: ()/(54-98) 107/60      Intake/Output Summary (Last 24 hours) at 6/3/2024 1739  Last data filed at 6/3/2024 1100  Gross per 24 hour   Intake 3196 ml   Output 875 ml   Net 2321 ml       Flowsheet Rows      Flowsheet Row First Filed Value   Admission Height 158.8 cm (62.5\") Documented at 06/01/2024 2030   Admission Weight 81 kg (178 lb 9.2 oz) Documented at 06/01/2024 2030            Physical Exam:     General appearance: resting comfortably      Lab Results (all)       Procedure Component Value Units Date/Time    [facility] A[facility]IS SCREEN - Swab, Axilla Right, Axilla Left and Groin [[id number]] Collected: 06/03/24 1648    Specimen: Swab from Axilla Right, Axilla Left and Groin Updated: 06/03/24 1653    POC Glucose Once [[id number]]  (Normal) Collected: 06/03/24 1559    Specimen: Blood Updated: 06/03/24 1600     Glucose 84 mg/dL     POC Glucose Once [[id number]]  (Normal) Collected: 06/03/24 1053    Specimen: Blood Updated: 06/03/24 1055     Glucose 111 mg/dL     POC Glucose Once [[id number]]  (Normal) Collected: 06/03/24 1032    Specimen: Blood Updated: 06/03/24 1043     Glucose 127 mg/dL     Urine Culture - Urine, Urine, Catheter [[id number]]  " (Abnormal) Collected: 06/02/24 0134    Specimen: Urine, Catheter Updated: 06/03/24 1004     Urine Culture >100,000 CFU/mL Escherichia coli    Narrative:      Colonization of the urinary tract without infection is common. Treatment is discouraged unless the patient is symptomatic, pregnant, or undergoing an invasive urologic procedure.    POC Glucose Once [[id number]]  (Abnormal) Collected: 06/03/24 0911    Specimen: Blood Updated: 06/03/24 0922     Glucose 66 mg/dL     Blood Culture - Blood, Arm, Left [[id number]]  (Abnormal) Collected: 06/02/24 0100    Specimen: Blood from Arm, Left Updated: 06/03/24 0805     Blood Culture Escherichia coli     Isolated from Aerobic Bottle     Gram Stain Aerobic Bottle Gram negative bacilli    Blood Culture - Blood, Arm, Left [[id number]]  (Abnormal) Collected: 06/02/24 0100    Specimen: Blood from Arm, Left Updated: 06/03/24 0805     Blood Culture Escherichia coli     Isolated from Aerobic and Anaerobic Bottles     Gram Stain Anaerobic Bottle Gram negative bacilli      Aerobic Bottle Gram negative bacilli    Narrative:      Less than seven (7) mL's of blood was collected.  Insufficient quantity may yield false negative results.    CBC & Differential [[id number]]  (Abnormal) Collected: 06/03/24 0636    Specimen: Blood Updated: 06/03/24 0804    Narrative:      The following orders were created for panel order CBC & Differential.  Procedure                               Abnormality         Status                     ---------                               -----------         ------                     CBC Auto Differential[[id number]]        Abnormal            Final result                 Please view results for these tests on the individual orders.    CBC Auto Differential [[id number]]  (Abnormal) Collected: 06/03/24 0636    Specimen: Blood Updated: 06/03/24 0804     WBC 20.15 10*3/mm3      RBC 3.86 10*6/mm3      Hemoglobin 11.5 g/dL      Hematocrit 34.8 %      MCV 90.2 fL      MCH 29.8  pg      MCHC 33.0 g/dL      RDW 13.2 %      RDW-SD 43.4 fl      MPV 11.7 fL      Platelets 57 10*3/mm3     Manual Differential [[id number]]  (Abnormal) Collected: 06/03/24 0636    Specimen: Blood Updated: 06/03/24 0804     Neutrophil % 94.0 %      Lymphocyte % 1.0 %      Monocyte % 4.0 %      Eosinophil % 0.0 %      Basophil % 0.0 %      Metamyelocyte % 1.0 %      Neutrophils Absolute 18.94 10*3/mm3      Lymphocytes Absolute 0.20 10*3/mm3      Monocytes Absolute 0.81 10*3/mm3      Eosinophils Absolute 0.00 10*3/mm3      Basophils Absolute 0.00 10*3/mm3      RBC Morphology Normal     WBC Morphology Normal     Platelet Morphology Normal    POC Glucose Once [[id number]]  (Normal) Collected: 06/03/24 0718    Specimen: Blood Updated: 06/03/24 0720     Glucose 94 mg/dL     STAT Lactic Acid, Reflex [[id number]]  (Abnormal) Collected: 06/03/24 0636    Specimen: Blood Updated: 06/03/24 0712     Lactate 4.8 mmol/L     Magnesium [[id number]]  (Normal) Collected: 06/03/24 0636    Specimen: Blood Updated: 06/03/24 0712     Magnesium 1.7 mg/dL     Basic Metabolic Panel [[id number]]  (Abnormal) Collected: 06/03/24 0636    Specimen: Blood Updated: 06/03/24 0712     Glucose 93 mg/dL      BUN 35 mg/dL      Creatinine 1.69 mg/dL      Sodium 141 mmol/L      Potassium 4.8 mmol/L      Comment: Slight hemolysis detected by analyzer. Result may be falsely elevated.        Chloride 112 mmol/L      CO2 14.4 mmol/L      Calcium 8.4 mg/dL      BUN/Creatinine Ratio 20.7     Anion Gap 14.6 mmol/L      eGFR 29.5 mL/min/1.73     Narrative:      GFR Normal >60  Chronic Kidney Disease <60  Kidney Failure <15    The GFR formula is only valid for adults with stable renal function between ages 18 and 70.    Phosphorus [[id number]]  (Normal) Collected: 06/03/24 0636    Specimen: Blood Updated: 06/03/24 0706     Phosphorus 2.5 mg/dL     POC Glucose Once [[id number]]  (Normal) Collected: 06/03/24 0631    Specimen: Blood Updated: 06/03/24 0633     Glucose 75  mg/dL     POC Glucose Once [[id number]]  (Normal) Collected: 06/03/24 0604    Specimen: Blood Updated: 06/03/24 0607     Glucose 78 mg/dL     POC Glucose Once [[id number]]  (Normal) Collected: 06/03/24 0530    Specimen: Blood Updated: 06/03/24 0531     Glucose 87 mg/dL     POC Glucose Once [[id number]]  (Normal) Collected: 06/03/24 0502    Specimen: Blood Updated: 06/03/24 0503     Glucose 86 mg/dL     POC Glucose Once [[id number]]  (Normal) Collected: 06/03/24 0439    Specimen: Blood Updated: 06/03/24 0440     Glucose 89 mg/dL     POC Glucose Once [[id number]]  (Normal) Collected: 06/03/24 0402    Specimen: Blood Updated: 06/03/24 0404     Glucose 113 mg/dL     POC Glucose Once [[id number]]  (Abnormal) Collected: 06/03/24 0335    Specimen: Blood Updated: 06/03/24 0343     Glucose 144 mg/dL     POC Glucose Once [[id number]]  (Abnormal) Collected: 06/03/24 0304    Specimen: Blood Updated: 06/03/24 0306     Glucose 162 mg/dL     POC Glucose Once [[id number]]  (Normal) Collected: 06/03/24 0235    Specimen: Blood Updated: 06/03/24 0237     Glucose 130 mg/dL     POC Glucose Once [[id number]]  (Abnormal) Collected: 06/03/24 0228    Specimen: Blood Updated: 06/03/24 0229     Glucose 67 mg/dL     POC Glucose Once [[id number]]  (Abnormal) Collected: 06/03/24 0215    Specimen: Blood Updated: 06/03/24 0216     Glucose 52 mg/dL     STAT Lactic Acid, Reflex [[id number]]  (Abnormal) Collected: 06/03/24 0035    Specimen: Blood Updated: 06/03/24 0137     Lactate 5.9 mmol/L     POC Glucose Once [[id number]]  (Normal) Collected: 06/02/24 2358    Specimen: Blood Updated: 06/03/24 0000     Glucose 98 mg/dL     POC Glucose Once [[id number]]  (Normal) Collected: 06/02/24 2319    Specimen: Blood Updated: 06/02/24 2320     Glucose 82 mg/dL     POC Glucose Once [[id number]]  (Normal) Collected: 06/02/24 2313    Specimen: Blood Updated: 06/02/24 2316     Glucose 108 mg/dL     POC Glucose Once [[id number]]  (Normal) Collected: 06/02/24 2136    Specimen:  "Blood Updated: 06/02/24 2137     Glucose 73 mg/dL     STAT Lactic Acid, Reflex [[id number]]  (Abnormal) Collected: 06/02/24 1722    Specimen: Blood Updated: 06/02/24 1754     Lactate 4.0 mmol/L     POC Glucose Once [[id number]]  (Abnormal) Collected: 06/02/24 1720    Specimen: Blood Updated: 06/02/24 1731     Glucose 132 mg/dL     POC Glucose Once [[id number]]  (Normal) Collected: 06/02/24 1544    Specimen: Blood Updated: 06/02/24 1547     Glucose 110 mg/dL     Procalcitonin [[id number]]  (Abnormal) Collected: 06/02/24 1356    Specimen: Blood from Arm, Left Updated: 06/02/24 1521     Procalcitonin 208.80 ng/mL     Narrative:      As a Marker for Sepsis (Non-Neonates):    1. <0.5 ng/mL represents a low risk of severe sepsis and/or septic shock.  2. >2 ng/mL represents a high risk of severe sepsis and/or septic shock.    As a Marker for Lower Respiratory Tract Infections that require antibiotic therapy:    PCT on Admission    Antibiotic Therapy       6-12 Hrs later    >0.5                Strongly Recommended  >0.25 - <0.5        Recommended   0.1 - 0.25          Discouraged              Remeasure/reassess PCT  <0.1                Strongly Discouraged     Remeasure/reassess PCT    As 28 day mortality risk marker: \"Change in Procalcitonin Result\" (>80% or <=80%) if Day 0 (or Day 1) and Day 4 values are available. Refer to http://www.[email or web address]-pct-calculator.com    Change in PCT <=80%  A decrease of PCT levels below or equal to 80% defines a positive change in PCT test result representing a higher risk for 28-day all-cause mortality of patients diagnosed with severe sepsis for septic shock.    Change in PCT >80%  A decrease of PCT levels of more than 80% defines a negative change in PCT result representing a lower risk for 28-day all-cause mortality of patients diagnosed with severe sepsis or septic shock.       POC Glucose Once [[id number]]  (Abnormal) Collected: 06/02/24 1516    Specimen: Blood Updated: 06/02/24 1519     " Glucose 40 mg/dL     CBC & Differential [[id number]]  (Abnormal) Collected: 06/02/24 1354    Specimen: Blood from Arm, Left Updated: 06/02/24 1501    Narrative:      The following orders were created for panel order CBC & Differential.  Procedure                               Abnormality         Status                     ---------                               -----------         ------                     CBC Auto Differential[[id number]]        Abnormal            Final result               Scan Slide[[id number]]                                       Final result                 Please view results for these tests on the individual orders.    Scan Slide [[id number]] Collected: 06/02/24 1354    Specimen: Blood from Arm, Left Updated: 06/02/24 1501     Dohle Bodies Present     Vacuolated Neutrophils Slight/1+    Manual Differential [[id number]]  (Abnormal) Collected: 06/02/24 1354    Specimen: Blood from Arm, Left Updated: 06/02/24 1500     Neutrophil % 82.0 %      Lymphocyte % 3.0 %      Monocyte % 4.0 %      Eosinophil % 0.0 %      Basophil % 0.0 %      Metamyelocyte % 6.0 %      Myelocyte % 5.0 %      Neutrophils Absolute 15.12 10*3/mm3      Lymphocytes Absolute 0.55 10*3/mm3      Monocytes Absolute 0.74 10*3/mm3      Eosinophils Absolute 0.00 10*3/mm3      Basophils Absolute 0.00 10*3/mm3      nRBC 1.0 /100 WBC      RBC Morphology Normal     Dohle Bodies Present     Vacuolated Neutrophils Slight/1+     Platelet Morphology Normal    CBC Auto Differential [[id number]]  (Abnormal) Collected: 06/02/24 1354    Specimen: Blood from Arm, Left Updated: 06/02/24 1500     WBC 18.44 10*3/mm3      RBC 3.73 10*6/mm3      Hemoglobin 10.8 g/dL      Hematocrit 34.3 %      MCV 92.0 fL      MCH 29.0 pg      MCHC 31.5 g/dL      RDW 13.2 %      RDW-SD 44.9 fl      MPV 11.4 fL      Platelets 55 10*3/mm3      Comment:          Blood Culture ID, PCR - Blood, Arm, Left [[id number]]  (Abnormal) Collected: 06/02/24 0100    Specimen: Blood from  Arm, Left Updated: 06/02/24 1451     BCID, PCR Escherichia coli. Identification by BCID2 PCR.     BCID, PCR 2 CTX-M (ESBL) detected. Identification by BCID2 PCR     BOTTLE TYPE Anaerobic Bottle    Comprehensive Metabolic Panel [[id number]]  (Abnormal) Collected: 06/02/24 1356    Specimen: Blood from Arm, Left Updated: 06/02/24 1429     Glucose 50 mg/dL      BUN 33 mg/dL      Creatinine 1.90 mg/dL      Sodium 146 mmol/L      Potassium 2.9 mmol/L      Comment: Slight hemolysis detected by analyzer. Result may be falsely elevated.        Chloride 117 mmol/L      CO2 17.0 mmol/L      Calcium 7.4 mg/dL      Total Protein 4.7 g/dL      Albumin 3.0 g/dL      ALT (SGPT) 140 U/L      AST (SGOT) 84 U/L      Alkaline Phosphatase 80 U/L      Total Bilirubin 0.6 mg/dL      Globulin 1.7 gm/dL      A/G Ratio 1.8 g/dL      BUN/Creatinine Ratio 17.4     Anion Gap 12.0 mmol/L      eGFR 25.6 mL/min/1.73     Narrative:      GFR Normal >60  Chronic Kidney Disease <60  Kidney Failure <15    The GFR formula is only valid for adults with stable renal function between ages 18 and 70.    STAT Lactic Acid, Reflex [[id number]]  (Abnormal) Collected: 06/02/24 1354    Specimen: Blood from Arm, Left Updated: 06/02/24 1429     Lactate 5.6 mmol/L     Vancomycin, Random [[id number]]  (Normal) Collected: 06/02/24 1356    Specimen: Blood from Arm, Left Updated: 06/02/24 1427     Vancomycin Random 9.70 mcg/mL     Narrative:      Therapeutic Ranges for Vancomycin    Vancomycin Random   5.0-40.0 mcg/mL  Vancomycin Trough   5.0-20.0 mcg/mL  Vancomycin Peak     20.0-40.0 mcg/mL    POC Glucose Once [[id number]]  (Normal) Collected: 06/02/24 1414    Specimen: Blood Updated: 06/02/24 1416     Glucose 74 mg/dL     POC Glucose Once [[id number]]  (Abnormal) Collected: 06/02/24 1412    Specimen: Blood Updated: 06/02/24 1416     Glucose 50 mg/dL     POC Glucose Once [[id number]]  (Abnormal) Collected: 06/02/24 1342    Specimen: Blood Updated: 06/02/24 1344     Glucose  54 mg/dL     POC Glucose Once [[id number]]  (Normal) Collected: 06/02/24 1241    Specimen: Blood Updated: 06/02/24 1242     Glucose 72 mg/dL     POC Glucose Once [[id number]]  (Abnormal) Collected: 06/02/24 1122    Specimen: Blood Updated: 06/02/24 1144     Glucose 181 mg/dL     POC Glucose Once [[id number]]  (Abnormal) Collected: 06/02/24 1107    Specimen: Blood Updated: 06/02/24 1118     Glucose 56 mg/dL     POC Glucose Once [[id number]]  (Normal) Collected: 06/02/24 0853    Specimen: Blood Updated: 06/02/24 0854     Glucose 95 mg/dL     POC Glucose Once [[id number]]  (Abnormal) Collected: 06/02/24 0832    Specimen: Blood Updated: 06/02/24 0834     Glucose 40 mg/dL     POC Glucose Once [[id number]]  (Abnormal) Collected: 06/02/24 0829    Specimen: Blood Updated: 06/02/24 0834     Glucose 43 mg/dL     STAT Lactic Acid, Reflex [[id number]]  (Abnormal) Collected: 06/02/24 0430    Specimen: Blood Updated: 06/02/24 0509     Lactate 9.0 mmol/L     Lactic Acid, Plasma [[id number]]  (Abnormal) Collected: 06/02/24 0226    Specimen: Blood from Hand, Right Updated: 06/02/24 0302     Lactate 10.2 mmol/L     Urinalysis, Microscopic Only - Urine, Catheter [[id number]]  (Abnormal) Collected: 06/02/24 0134    Specimen: Urine, Catheter Updated: 06/02/24 0159     RBC, UA 21-50 /HPF      WBC, UA 21-50 /HPF      Bacteria, UA 1+ /HPF      Squamous Epithelial Cells, UA 3-6 /HPF      Hyaline Casts, UA 3-6 /LPF      Methodology Manual Light Microscopy    Urinalysis With Culture If Indicated - Urine, Catheter [[id number]]  (Abnormal) Collected: 06/02/24 0134    Specimen: Urine, Catheter Updated: 06/02/24 0153     Color, UA Yellow     Appearance, UA Cloudy     pH, UA 5.5     Specific Gravity, UA 1.009     Glucose, UA Negative     Ketones, UA Negative     Bilirubin, UA Negative     Blood, UA Large (3+)     Protein,  mg/dL (2+)     Leuk Esterase, UA Moderate (2+)     Nitrite, UA Negative     Urobilinogen, UA 1.0 E.U./dL    Narrative:      In  absence of clinical symptoms, the presence of pyuria, bacteria, and/or nitrites on the urinalysis result does not correlate with infection.    C-reactive Protein [[id number]]  (Abnormal) Collected: 06/01/24 2113    Specimen: Blood Updated: 06/01/24 2333     C-Reactive Protein 2.90 mg/dL     Sedimentation Rate [[id number]]  (Normal) Collected: 06/01/24 2113    Specimen: Blood Updated: 06/01/24 2322     Sed Rate 8 mm/hr     Manual Differential [[id number]]  (Abnormal) Collected: 06/01/24 2113    Specimen: Blood Updated: 06/01/24 2157     Neutrophil % 94.0 %      Lymphocyte % 0.0 %      Monocyte % 0.0 %      Eosinophil % 0.0 %      Basophil % 0.0 %      Metamyelocyte % 5.0 %      Myelocyte % 1.0 %      Neutrophils Absolute 16.36 10*3/mm3      Lymphocytes Absolute 0.00 10*3/mm3      Monocytes Absolute 0.00 10*3/mm3      Eosinophils Absolute 0.00 10*3/mm3      Basophils Absolute 0.00 10*3/mm3      RBC Morphology Normal     WBC Morphology Normal     Platelet Morphology Normal    Comprehensive Metabolic Panel [[id number]]  (Abnormal) Collected: 06/01/24 2113    Specimen: Blood Updated: 06/01/24 2145     Glucose 96 mg/dL      BUN 26 mg/dL      Creatinine 1.60 mg/dL      Sodium 144 mmol/L      Potassium 3.5 mmol/L      Chloride 108 mmol/L      CO2 18.5 mmol/L      Calcium 10.1 mg/dL      Total Protein 6.1 g/dL      Albumin 3.9 g/dL      ALT (SGPT) 281 U/L      AST (SGOT) 274 U/L      Alkaline Phosphatase 79 U/L      Total Bilirubin 0.7 mg/dL      Globulin 2.2 gm/dL      A/G Ratio 1.8 g/dL      BUN/Creatinine Ratio 16.3     Anion Gap 17.5 mmol/L      eGFR 31.5 mL/min/1.73     Narrative:      GFR Normal >60  Chronic Kidney Disease <60  Kidney Failure <15    The GFR formula is only valid for adults with stable renal function between ages 18 and 70.    CBC & Differential [[id number]]  (Abnormal) Collected: 06/01/24 2113    Specimen: Blood Updated: 06/01/24 2142    Narrative:      The following orders were created for panel order CBC  & Differential.  Procedure                               Abnormality         Status                     ---------                               -----------         ------                     CBC Auto Differential[[id number]]        Abnormal            Final result                 Please view results for these tests on the individual orders.    CBC Auto Differential [[id number]]  (Abnormal) Collected: 06/01/24 2113    Specimen: Blood Updated: 06/01/24 2142     WBC 17.40 10*3/mm3      RBC 4.60 10*6/mm3      Hemoglobin 13.6 g/dL      Hematocrit 41.0 %      MCV 89.1 fL      MCH 29.6 pg      MCHC 33.2 g/dL      RDW 12.8 %      RDW-SD 41.8 fl      MPV 10.3 fL      Platelets 140 10*3/mm3             Imaging Results (All)       Procedure Component Value Units Date/Time    MRI Lumbar Spine With & Without Contrast [[id number]] Collected: 06/02/24 1259     Updated: 06/02/24 1357    Narrative:      MRI LUMBAR SPINE WITH AND WITHOUT CONTRAST     History: Dementia. Possible osteomyelitis at L4-5 on CT 06/01/2024     TECHNIQUE: MRI lumbar spine includes sagittal PD, T1, T2, STIR as well  as axial T1, T2 weighted sequences.  Contrast was administered IV  followed by axial and sagittal T1 weighted sequences.     COMPARISON: CT abdomen and pelvis 06/01/2024, CT lumbar spine  06/01/2024.     FINDINGS: There is transitional lumbosacral anatomy with partial  sacralization of what is termed L5. Conus medullaris tip terminates at  L1-2 level.     At T12-L1, there is advanced degenerative disc disease with disc space  narrowing and there is a broad disc osteophyte narrowing of the central  canal. Disc bulge eccentric to the left and extends into the inferior  aspect of the left neural foramen in conjunction with facet arthritis  contributes to moderate to severe left and moderate right foraminal  narrowing. Disc appears to contact the undersurface exiting left T12  nerve.     At L1-2, there is a broad disc bulge and there is mild facet  arthritis.  Central canal and neural foramina are patent.     At L2-3, there is a broad disc bulge and there is bilateral facet  arthritis with ligamentous thickening and very mild narrowing of the  central canal. Patent neural foramina.     At L3-4, there is disc space narrowing with 8 mm anterolisthesis L3 on  L4. Uncovered disc bulge is present and there is advanced bilateral  facet arthritis facet joint fluid. There is severe central canal and  lateral recess narrowing. There is also diminished femoral height with  moderate right and mild left foraminal narrowing and uncovered disc  bulge contacts undersurface the exiting L3 nerves.     At L4-5, there is partial bony fusion of the vertebral bodies. Posterior  end plate spurring is present and there is moderate facet arthritis  though the central canal is patent. Mild foraminal narrowing.     At L5-S1, there is transitional anatomy with partial sacralization of  L5. Patent central canal and neural foramina.       Impression:      1. No evidence for disc space infection or osteomyelitis.  2. Multilevel degenerative disc disease with degree of canal stenosis  greatest at L3-4 where there is 8 mm anterolisthesis L3 on L4. Uncovered  disc bulge is present and there is bilateral facet arthritis with severe  central canal lateral recess narrowing as well as moderate right and  mild left foraminal narrowing.  3. Partial bony fusion of the vertebral bodies at L4-5.  4. At T12-L1, there is degenerative disc disease with disc space  narrowing and there is a disc bulge eccentric to the left extending into  the inferior aspect of the left neural foramen contributing to moderate  to severe left foraminal narrowing and disc appears to contact the  undersurface exiting left T12 nerve.     This report was finalized on 6/2/2024 1:54 PM by Dr. Ki Roque M.D on Workstation: [id number]       FL C Arm During Surgery [[id number]] Resulted: 06/02/24 0623     Updated: 06/02/24  0623    Narrative:      This procedure was auto-finalized with no dictation required.    XR Abdomen KUB [[id number]] Resulted: 06/02/24 0622     Updated: 06/02/24 0623    CT Abdomen Pelvis Without Contrast [[id number]] Collected: 06/01/24 2340     Updated: 06/01/24 2348    Narrative:      CT ABDOMEN PELVIS WO CONTRAST-     HISTORY: 84 years of age, Female.  Rule out ureteral stone     TECHNIQUE:  CT includes axial imaging from the lung bases to the  trochanters without intravenous contrast and without use of oral  contrast. Data reconstructed in coronal and sagittal planes. Radiation  dose reduction techniques were utilized, including automated exposure  control and exposure modulation based on body size.     COMPARISON: CT abdomen and pelvis 02/22/2021.     FINDINGS: There is a 3 mm distal right ureteral stone at the horizontal  level of the upper sacrum. Moderate to severe right hydronephrosis is  present and there is right perinephric stranding.     Left lateral renal cyst measures 1.8 cm. No left renal or ureteral stone  is evident.     Heart size is enlarged. Calcified left basilar pleural plaques.  Dependent basilar atelectasis. Liver, spleen, pancreas appear within  normal limits. Adrenal glands appear normal. Gallbladder partially  decompressed.     Sigmoid diverticulosis without evidence for diverticulitis. CT lumbar  spine has been completed same date and has been reported separately.       Impression:      1. 3 mm distal right ureteral stone at the horizontal level of the upper  sacrum with moderate to severe right hydronephrosis and right  perinephric stranding.  2. Cardiomegaly. Basilar atelectasis. Sigmoid diverticulosis without  evidence for diverticulitis.     Radiation dose reduction techniques were utilized, including automated  exposure control and exposure modulation based on body size.        This report was finalized on 6/1/2024 11:45 PM by Dr. Ki Roque M.D on Workstation: [id number]        CT Lumbar Spine Without Contrast [[id number]] Collected: 06/01/24 2252     Updated: 06/01/24 2305    Addenda:        ADDENDUM:  06 01 24 23:04 Call Doctor Regarding Osteomyelitis (if unsuspected),   called SHY Ashby on 06 01 23:04 (-04:00)      Signed: 06/01/24 2251 by Ivan Oro MD    Narrative:      CR  Patient: ABDIRASHID TA  Time Out: 22:51  Exam(s): CT L SPINE     EXAM:    CT Lumbar Spine Without Intravenous Contrast    CLINICAL HISTORY:    Low back pain    TECHNIQUE:    Axial computed tomography images of the lumbar spine without   intravenous contrast.  CTDI is 30.33 mGy and DLP is 826.4 mGy-cm.  This   CT exam was performed according to the principle of ALARA (As Low As   Reasonably Achievable) by using one or more of the following dose   reduction techniques: automated exposure control, adjustment of the mA   and or kV according to patient size, and or use of iterative   reconstruction technique.    COMPARISON:    MRI dated 2 18 21    FINDINGS:    Vertebrae:  Irregularity of the inferior endplate of L5 and superior   endplate of S1.  Multilevel facet arthropathy.  8 mm anterolisthesis of   L4-L5.  There is transitional anatomy.  The L5 vertebral body is   sacralized.  No acute fracture.    Discs spinal canal neural foramina:  Multilevel disc height loss.    There is significant irregularity of the disc space at L4-L5.  Vacuum   disc phenomenon at multiple levels in the lumbar spine.  No spinal canal   stenosis.    Soft tissues: Mild right hydroureteronephrosis.  Possible nephroliths   in the distal ureter..    Vasculature:  The distal thecal sac is ectatic, unchanged from prior   MRI.    IMPRESSION:       There is transitional anatomy with sacralization of the L5 vertebral body.    Findings are suspicious for discitis osteomyelitis at the L4-L5 level.    Recommend contrast enhanced MRI to further evaluate.    Multilevel degenerative changes.  No evidence of high-grade spinal canal    narrowing.    Mild to moderate right hydroureteronephrosis with possible distal   ureteral stone, not ideally evaluated on this study..    Impression:            Communications:     Call Doctor Osteomyelitis (if unsuspected)    Electronically signed by Ivan Oro MD on 06-01-24 at 2256            Medication Review:           Assessment & Plan         Right ureteral stone    PRINCE (acute kidney injury)    Acute low back pain    Lumbar spinal stenosis    CAYLA (dementia of Alzheimer type)    Chronic bilateral low back pain without sciatica    Discitis of lumbar region    Acute UTI (urinary tract infection)    Hypoglycemia    Abnormal LFTs        Plan   - continue antibiotics per ID recommendations  - voiding trial once ambulatory  - we will arrange follow up with Dr. Schofield in one week.   - Will follow remotely. Please call with questions or concerns.    William Miller Jr., MD  06/03/24  17:39 EDT

## 2024-06-03 NOTE — NURSING NOTE
"Pt remains A&Ox4, but slightly confused. States \" I want to go to the room I ate dinner in, this not my room.\" Pt re-oriented frequently. Pt glucose dropped to 50, 1 amp D50 given IV and q30 minute sugar checks started. D10 increased to 75 mL/hr.     Restraints placed on pt d/t pt pulling out PICC line   "

## 2024-06-03 NOTE — PROGRESS NOTES
"  Intensive Care Unit Daily Progress Note.   [facility] INTENSIVE CARE  6/3/2024    Patient Name:  Kimberlee Urrutia  MRN:  [id number]  YOB: 1940  Age: 84 y.o.  Sex: female         Reason for Admission / Chief Complaint:  Back pain, shock    Hospital Course:   84-year-old female with a history of arthritis, chronic low back pain, dementia, multinodular thyroid who presented with lower back pain and found to have hydronephrosis of the right ureter having undergone cystoscopy and right ureteral stent placement and admitted to the ICU for septic shock secondary to urinary tract infection.    Interval History:  Admitted yesterday from the emergency department  Underwent cystoscopy and ureteral stent placement  Agitated overnight, pulled out her PICC line, sitter at bedside  States that she feels better  No shortness of breath or cough  No chest pain or palpitations  No nausea vomiting  No fevers or chills  Off of vasopressor therapy    Physical Exam:  /54   Pulse 92   Temp 99.4 °F (37.4 °C) (Axillary)   Resp 18   Ht 158.8 cm (62.5\")   Wt 86.2 kg (190 lb 0.6 oz)   LMP  (LMP Unknown)   SpO2 98%   BMI 34.20 kg/m²   Body mass index is 34.2 kg/m².    Intake/Output    Intake/Output Summary (Last 24 hours) at 6/3/2024 1223  Last data filed at 6/3/2024 1100  Gross per 24 hour   Intake 3496 ml   Output 875 ml   Net 2621 ml     General: Alert, nontoxic, NAD, confused  HEENT: NC/AT, EOMI, MMM  Neck: Supple, trachea midline  Cardiac: RRR, no murmur, gallops, rubs  Pulmonary: Clear to auscultation bilaterally, no adventitious breath sounds, normal respiratory effort  GI: Soft, non-tender, non-distended, normal bowel sounds  Extremities: Warm, well perfused, no LE edema  Skin: no visible rash  Neuro: CN II - XII grossly intact  Psychiatry: Normal mood and affect    Data Review:  Notable Labs:  Results from last 7 days   Lab Units 06/03/24  0636 06/02/24  1354 06/01/24  2113   WBC 10*3/mm3 " 20.15* 18.44* 17.40*   HEMOGLOBIN g/dL 11.5* 10.8* 13.6   PLATELETS 10*3/mm3 57* 55* 140     Results from last 7 days   Lab Units 06/03/24  0636 06/02/24  1356 06/01/24  2113   SODIUM mmol/L 141 146* 144   POTASSIUM mmol/L 4.8 2.9* 3.5   CHLORIDE mmol/L 112* 117* 108*   CO2 mmol/L 14.4* 17.0* 18.5*   BUN mg/dL 35* 33* 26*   CREATININE mg/dL 1.69* 1.90* 1.60*   GLUCOSE mg/dL 93 50* 96   CALCIUM mg/dL 8.4* 7.4* 10.1   MAGNESIUM mg/dL 1.7  --   --    PHOSPHORUS mg/dL 2.5  --   --    Estimated Creatinine Clearance: 25.1 mL/min (A) (by C-G formula based on SCr of 1.69 mg/dL (H)).    Results from last 7 days   Lab Units 06/03/24  0636 06/03/24  0035 06/02/24  1722 06/02/24  1356 06/02/24  1354 06/02/24 0226 06/01/24  2113   AST (SGOT) U/L  --   --   --  84*  --   --  274*   ALT (SGPT) U/L  --   --   --  140*  --   --  281*   PROCALCITONIN ng/mL  --   --   --  208.80*  --   --   --    LACTATE mmol/L 4.8* 5.9* 4.0*  --  5.6*   < >  --    CRP mg/dL  --   --   --   --   --   --  2.90*   PLATELETS 10*3/mm3 57*  --   --   --  55*  --  140    < > = values in this interval not displayed.             Imaging:  Reviewed chest images personally from past 3 days    ASSESSMENT  /  PLAN:    Septic shock secondary to urinary source-resolved  ESBL E. coli UTI  Moderate to severe right hydronephrosis  Right ureteral stone status post cystoscopy and stent placement (6/2)  Hypoglycemia  Anion gap metabolic acidosis, lactic acidosis  Discitis, osteomyelitis of L4/L5  Acute kidney injury  Leukocytosis  History of hypertension  Hyperlipidemia  Dementia  Arthritis  Thrombocytopenia     - Patient presented with back pain and ultimately found to have L4/L5 discitis/osteomyelitis in addition to right-sided hydronephrosis for which she went to OR for cystoscopy and right ureteral stent placement by urology.  -Clinical status improved overnight.  Now off vasopressors.  -Ended up pulling out her PICC line yesterday, sitter is at bedside.  -Continue  ertapenem for ESBL E. coli.  Susceptibilities pending.  -Neurosurgery consultation regarding discitis/osteomyelitis of L4/L5.  MRI without any evidence of disc space infection or osteomyelitis  -Continue donepezil and memantine for history of dementia  -Wean oxygen as tolerated for SpO2 goal greater than 90%  -On dextrose drip for hypoglycemia  -Lactic acidosis improving  -Platelet dropped, heparin held     GI prophylaxis: Not indicated  DVT prophylaxis: SCDs  Rodriguez catheter: Yes  Bowel regimen: Ordered  Nutrition: N.p.o.     Disposition: Transfer to floor.  Internal medicine will assume care on floor.    Gabe Guevara MD  [facility]  Pulmonary and Critical Care Medicine, Interventional Pulmonology    Parts of this note may be an electronic transcription/translation of spoken language to printed text using the Dragon dictation system.

## 2024-06-03 NOTE — PROGRESS NOTES
Pulm/CC note    Platelet count 55 at 1PM today, drop from 140 last night.  Hgb stable, no active signs bleeding.  Will place hold on sq heparin.  Add SCDs.  Monitor repeat labs in AM.

## 2024-06-03 NOTE — PLAN OF CARE
Goal Outcome Evaluation: Pt has had a sitter for agitation, confusion , and removal of lines.This shift showed improvement.D5 continued to maintain BG.

## 2024-06-03 NOTE — CONSULTS
Referring Provider: Evelio Caballero MD      Subjective   History of present illness: 84-year-old rey evaluate for E. coli septicemia.  She is confused and in restraints and unable to provide any history.  Per review of admitting notes, she has a history of dementia and came in from her facility with lower back pain.CT spine showed possible discitis and osteomyelitis but MRI L-spine was negative for infection.  Imaging however did reveal a obstructing right ureteral stone from pain she went to the OR for cystoscopy and stent placement.  Blood cultures growing ESBL E. coli and patient now on ertapenem.    Past Medical History:   Diagnosis Date    Allergic     Arthritis     Colon cancer screening 01/29/2020    Colon cancer screening 01/29/2020    Cough     Diarrhea     Hx of migraine headaches     Hyperlipidemia     Irregular heart beat     Osteoarthritis     Renal stones 02/2021    Vitamin D deficiency        Past Surgical History:   Procedure Laterality Date    COLONOSCOPY      CYSTOSCOPY W/ URETERAL STENT PLACEMENT Right 6/2/2024    Procedure: CYSTOSCOPY, RIGHT URETERAL STENT PLACEMENT;  Surgeon: Efra Schofield MD;  Location: [facility] OR;  Service: Urology;  Laterality: Right;    LUMBAR EPIDURAL INJECTION N/A 8/2/2021    Procedure: LUMBAR EPIDURAL 1ST VISIT 5-1;  Surgeon: Nu Partida MD;  Location: [facility] MAIN OR;  Service: Pain Management;  Laterality: N/A;    REPLACEMENT TOTAL KNEE Right 01/2015    Josue Wilson MD    SACROILIAC JOINT INJECTION Left 6/16/2021    Procedure: SACROILIAC INJECTION left;  Surgeon: Nu Partida MD;  Location: [facility] MAIN OR;  Service: Pain Management;  Laterality: Left;    TUBAL ABDOMINAL LIGATION      URETEROSCOPY LASER LITHOTRIPSY WITH STENT INSERTION Left 2/23/2021    Procedure: LT.URETEROSCOPY LASER LITHOTRIPSY WITH STENT  FOR STONE;  Surgeon: Arnaud Lu MD;  Location: [facility] OR;  Service: Urology;  Laterality: Left;    WISDOM TOOTH  EXTRACTION             Allergies   Allergen Reactions    Penicillins Other (See Comments) and Unknown (See Comments)     Tolerates cephalosporins    Sulfa Antibiotics Rash         Physical Exam:   Vital Signs   Temp:  [99 °F (37.2 °C)-99.4 °F (37.4 °C)] 99.4 °F (37.4 °C)  Heart Rate:  [] 81  Resp:  [18] 18  BP: ()/(54-98) 107/60    GENERAL: Awake and alert, sickly  HEENT: Oropharynx is clear. Hearing is grossly normal.   EYES: . No conjunctival injection. No lid lag.   LUNGS:normal respiratory effort.   SKIN: no cutaneous eruptions in exposed areas  PSYCHIATRIC:following simple commands but not responding approriately    Results Review:  WBC 20.2  Cr 1.69  , ast 84  6/2 BCx 2/2 ESBL e coli  6/2 UCx E coli  CT abdomen pelvis shows obstructing 1.3 right distal ureteral stone with right pyelonephritis  CT lumbar spine with possible L4/L5 discitis and osteomyelitis.  MRI L-spine without evidence of discitis or osteomyelitis      A/p  1.  ESBL E. coli septicemia  2.  Right pyelonephritis with obstructing ureteral stone status post stenting  3.  Acute kidney injury, Creatinine of about 0.8-1 as baseline   4.  Elevated liver function test, likely related to sepsis      continue IV ertapenem 500 mg IV every 24 hours, renal dose.  We will tailor based on final sensitivities.  I suspect her white count will start to come down the further she gets away from infection and hopefully her mental status will improve as well.  Provided repeat blood cultures stay negative and she clinically improved and I think something like a week of therapy following surgery would be acceptable.           Thank you for this consult.  We will continue to follow along and tailor antibiotics as the patient's clinical course evolves.

## 2024-06-03 NOTE — PROGRESS NOTES
Name: Kimberlee Urrutia ADMIT: 2024   : 1940  PCP: Meche Ji MD    MRN: [id number] LOS: 1 days   AGE/SEX: 84 y.o. female  ROOM: [facility]     Subjective   Subjective   Confused and sleeping most of the day. In restraints       Objective   Objective   Vital Signs  Temp:  [98.1 °F (36.7 °C)-99.4 °F (37.4 °C)] 99.4 °F (37.4 °C)  Heart Rate:  [] 84  Resp:  [18] 18  BP: ()/(54-99) 107/76  SpO2:  [72 %-100 %] 97 %  on  Flow (L/min):  [4] 4;   Device (Oxygen Therapy): nasal cannula  Body mass index is 34.2 kg/m².  Physical Exam  Vitals and nursing note reviewed.   Constitutional:       General: She is not in acute distress.     Comments: Drowsy but arousable   HENT:      Head: Normocephalic and atraumatic.   Eyes:      General: No scleral icterus.  Neck:      Vascular: No JVD.   Cardiovascular:      Rate and Rhythm: Normal rate and regular rhythm.      Pulses: Normal pulses.      Heart sounds: No murmur heard.  Pulmonary:      Effort: No respiratory distress.      Comments: Shallow resps  Abdominal:      General: There is no distension.      Palpations: Abdomen is soft.      Tenderness: There is no abdominal tenderness.   Musculoskeletal:         General: No swelling or tenderness.      Cervical back: Neck supple.   Skin:     General: Skin is warm and dry.      Coloration: Skin is not jaundiced.      Findings: No rash.   Psychiatric:         Mood and Affect: Mood normal.         Behavior: Behavior normal.       Results Review     I reviewed the patient's new clinical results.  Results from last 7 days   Lab Units 24  0636 24  1354 24  2113   WBC 10*3/mm3 20.15* 18.44* 17.40*   HEMOGLOBIN g/dL 11.5* 10.8* 13.6   PLATELETS 10*3/mm3 57* 55* 140     Results from last 7 days   Lab Units 24  0636 24  1356 24  2113   SODIUM mmol/L 141 146* 144   POTASSIUM mmol/L 4.8 2.9* 3.5   CHLORIDE mmol/L 112* 117* 108*   CO2 mmol/L 14.4* 17.0* 18.5*   BUN mg/dL 35* 33*  26*   CREATININE mg/dL 1.69* 1.90* 1.60*   GLUCOSE mg/dL 93 50* 96   EGFR mL/min/1.73 29.5* 25.6* 31.5*     Results from last 7 days   Lab Units 06/02/24  1356 06/01/24  2113   ALBUMIN g/dL 3.0* 3.9   BILIRUBIN mg/dL 0.6 0.7   ALK PHOS U/L 80 79   AST (SGOT) U/L 84* 274*   ALT (SGPT) U/L 140* 281*     Results from last 7 days   Lab Units 06/03/24  0636 06/02/24  1356 06/01/24  2113   CALCIUM mg/dL 8.4* 7.4* 10.1   ALBUMIN g/dL  --  3.0* 3.9   MAGNESIUM mg/dL 1.7  --   --    PHOSPHORUS mg/dL 2.5  --   --      Results from last 7 days   Lab Units 06/03/24  0636 06/03/24  0035 06/02/24  1722 06/02/24  1356 06/02/24  1354   PROCALCITONIN ng/mL  --   --   --  208.80*  --    LACTATE mmol/L 4.8* 5.9* 4.0*  --  5.6*     Glucose   Date/Time Value Ref Range Status   06/03/2024 1053 111 70 - 130 mg/dL Final   06/03/2024 1032 127 70 - 130 mg/dL Final   06/03/2024 0911 66 (L) 70 - 130 mg/dL Final   06/03/2024 0718 94 70 - 130 mg/dL Final   06/03/2024 0631 75 70 - 130 mg/dL Final   06/03/2024 0604 78 70 - 130 mg/dL Final   06/03/2024 0530 87 70 - 130 mg/dL Final       MRI Lumbar Spine With & Without Contrast    Result Date: 6/2/2024  1. No evidence for disc space infection or osteomyelitis. 2. Multilevel degenerative disc disease with degree of canal stenosis greatest at L3-4 where there is 8 mm anterolisthesis L3 on L4. Uncovered disc bulge is present and there is bilateral facet arthritis with severe central canal lateral recess narrowing as well as moderate right and mild left foraminal narrowing. 3. Partial bony fusion of the vertebral bodies at L4-5. 4. At T12-L1, there is degenerative disc disease with disc space narrowing and there is a disc bulge eccentric to the left extending into the inferior aspect of the left neural foramen contributing to moderate to severe left foraminal narrowing and disc appears to contact the undersurface exiting left T12 nerve.  This report was finalized on 6/2/2024 1:54 PM by Dr. Emerson  JADON Roque on Workstation: [id number]      CT Abdomen Pelvis Without Contrast    Result Date: 6/1/2024  1. 3 mm distal right ureteral stone at the horizontal level of the upper sacrum with moderate to severe right hydronephrosis and right perinephric stranding. 2. Cardiomegaly. Basilar atelectasis. Sigmoid diverticulosis without evidence for diverticulitis.  Radiation dose reduction techniques were utilized, including automated exposure control and exposure modulation based on body size.   This report was finalized on 6/1/2024 11:45 PM by Dr. Ki Roque M.D on Workstation: [id number]      CT Lumbar Spine Without Contrast    Addendum Date: 6/1/2024    ADDENDUM: 06 01 24 23:04 Call Doctor Regarding Osteomyelitis (if unsuspected), called SHY Ashby on 06 01 23:04 (-04:00)     Result Date: 6/1/2024  Communications:  Call Doctor Osteomyelitis (if unsuspected) Electronically signed by Ivan Oro MD on 06-01-24 at 2251     I have personally reviewed all medications:  Scheduled Medications  donepezil, 10 mg, Oral, Daily  ertapenem, 500 mg, Intravenous, Q24H  [Held by provider] heparin (porcine), 5,000 Units, Subcutaneous, Q8H  lactated ringers, 1,000 mL, Intravenous, Once  memantine, 10 mg, Oral, BID  senna-docusate sodium, 2 tablet, Oral, BID  [Transfer Hold] sodium chloride, 10 mL, Intravenous, Q12H  sodium chloride, 10 mL, Intravenous, Q12H  sodium chloride, 10 mL, Intravenous, Q12H  sodium chloride, 10 mL, Intravenous, Q12H    Infusions  dextrose, 100 mL/hr, Last Rate: 75 mL/hr (06/03/24 0509)  lactated ringers, 125 mL/hr, Last Rate: 125 mL/hr (06/03/24 0207)  lactated ringers, 9 mL/hr, Last Rate: 9 mL/hr (06/02/24 0535)  norepinephrine, 0.02-0.3 mcg/kg/min, Last Rate: Stopped (06/02/24 1800)    Diet  Diet: Cardiac; Healthy Heart (2-3 Na+); Fluid Consistency: Thin (IDDSI 0)    I have personally reviewed:  [x]  Laboratory   []  Microbiology   []  Radiology   [x]  EKG/Telemetry  [x]  Cardiology/Vascular   []   Pathology    []  Records       Assessment/Plan     Active Hospital Problems    Diagnosis  POA    **Right ureteral stone [N20.1]  Yes    Discitis of lumbar region [M46.46]  Yes    Acute UTI (urinary tract infection) [N39.0]  Yes    Hypoglycemia [E16.2]  Yes    Abnormal LFTs [R79.89]  Yes    Chronic bilateral low back pain without sciatica [M54.50, G89.29]  Yes    CAYLA (dementia of Alzheimer type) [G30.9, F02.80]  Yes    Lumbar spinal stenosis [M48.061]  Yes    Acute low back pain [M54.50]  Yes    Shock, septic [A41.9, R65.21]  Yes    PRINCE (acute kidney injury) [N17.9]  Yes      Resolved Hospital Problems   No resolved problems to display.              Ms. Urrutia is a 84 y.o. female with history of dementia and spinal stenosis who presented with lower back pain, found to have sepsis most likely from urinary source with obstructing right ureteral stone now treated with stent placement along with possible osteomyelitis/discitis on lumbar CT      Septic shock due to above, now resolved and off pressors.  ESBL UTI/bacteremia - now on Invanz.  Repeat blood cultures in the morning to document clearance.  WBC up slightly.  Will ask ID to weigh in as well regarding duration of antibiotics  MRI negative for discitis and NS signed off. DC'ed Vanc  PRINCE stable but not at baseline.   Metabolic acidosis with AG of only 15  Probable metabolic encephalopathy with background Alzheimer dementia.  Worsened by sepsis and acute medical issues.  Hypoglycemia, now on D10 drip.  Will try to wean as tolerated.  Hopefully can improve dietary intake  Thrombocytopenia likely related to sepsis.  Significant drop since admission but stable overnight.  Holding heparin for now  Follow-up urine and blood cultures  OK to transfer to floor from my POV       VTE Prophylaxis - SCDs  Code Status - Full code for now.  Will need to confirm with POA    Addendum 1500: Discussed with her POA Gi by phone.  She confirms the patient is DNR.  Updated the CODE  STATUS as such.      Mark Alvarez MD  [facility]  06/03/24  14:18 EDT

## 2024-06-04 ENCOUNTER — APPOINTMENT (OUTPATIENT)
Dept: GENERAL RADIOLOGY | Facility: HOSPITAL | Age: 84
End: 2024-06-04
Payer: MEDICARE

## 2024-06-04 LAB
ALBUMIN SERPL-MCNC: 2.6 G/DL (ref 3.5–5.2)
ALBUMIN/GLOB SERPL: 1.2 G/DL
ALP SERPL-CCNC: 167 U/L (ref 39–117)
ALT SERPL W P-5'-P-CCNC: 81 U/L (ref 1–33)
ANION GAP SERPL CALCULATED.3IONS-SCNC: 9.6 MMOL/L (ref 5–15)
AST SERPL-CCNC: 40 U/L (ref 1–32)
BACTERIA SPEC AEROBE CULT: ABNORMAL
BASOPHILS # BLD MANUAL: 0.21 10*3/MM3 (ref 0–0.2)
BASOPHILS NFR BLD MANUAL: 1 % (ref 0–1.5)
BILIRUB SERPL-MCNC: 0.8 MG/DL (ref 0–1.2)
BUN SERPL-MCNC: 24 MG/DL (ref 8–23)
BUN/CREAT SERPL: 18.3 (ref 7–25)
CALCIUM SPEC-SCNC: 8.5 MG/DL (ref 8.6–10.5)
CHLORIDE SERPL-SCNC: 114 MMOL/L (ref 98–107)
CO2 SERPL-SCNC: 18.4 MMOL/L (ref 22–29)
CORTIS SERPL-MCNC: 15.4 MCG/DL
CREAT SERPL-MCNC: 1.31 MG/DL (ref 0.57–1)
DEPRECATED RDW RBC AUTO: 41.1 FL (ref 37–54)
EGFRCR SERPLBLD CKD-EPI 2021: 40 ML/MIN/1.73
EOSINOPHIL # BLD MANUAL: 0 10*3/MM3 (ref 0–0.4)
EOSINOPHIL NFR BLD MANUAL: 0 % (ref 0.3–6.2)
ERYTHROCYTE [DISTWIDTH] IN BLOOD BY AUTOMATED COUNT: 13.1 % (ref 12.3–15.4)
GLOBULIN UR ELPH-MCNC: 2.1 GM/DL
GLUCOSE BLDC GLUCOMTR-MCNC: 102 MG/DL (ref 70–130)
GLUCOSE BLDC GLUCOMTR-MCNC: 102 MG/DL (ref 70–130)
GLUCOSE BLDC GLUCOMTR-MCNC: 103 MG/DL (ref 70–130)
GLUCOSE BLDC GLUCOMTR-MCNC: 105 MG/DL (ref 70–130)
GLUCOSE BLDC GLUCOMTR-MCNC: 105 MG/DL (ref 70–130)
GLUCOSE BLDC GLUCOMTR-MCNC: 134 MG/DL (ref 70–130)
GLUCOSE BLDC GLUCOMTR-MCNC: 162 MG/DL (ref 70–130)
GLUCOSE BLDC GLUCOMTR-MCNC: 21 MG/DL (ref 70–130)
GLUCOSE BLDC GLUCOMTR-MCNC: 40 MG/DL (ref 70–130)
GLUCOSE BLDC GLUCOMTR-MCNC: 41 MG/DL (ref 70–130)
GLUCOSE BLDC GLUCOMTR-MCNC: 48 MG/DL (ref 70–130)
GLUCOSE BLDC GLUCOMTR-MCNC: 55 MG/DL (ref 70–130)
GLUCOSE BLDC GLUCOMTR-MCNC: 56 MG/DL (ref 70–130)
GLUCOSE BLDC GLUCOMTR-MCNC: 56 MG/DL (ref 70–130)
GLUCOSE BLDC GLUCOMTR-MCNC: 62 MG/DL (ref 70–130)
GLUCOSE BLDC GLUCOMTR-MCNC: 62 MG/DL (ref 70–130)
GLUCOSE BLDC GLUCOMTR-MCNC: 63 MG/DL (ref 70–130)
GLUCOSE BLDC GLUCOMTR-MCNC: 64 MG/DL (ref 70–130)
GLUCOSE BLDC GLUCOMTR-MCNC: 72 MG/DL (ref 70–130)
GLUCOSE BLDC GLUCOMTR-MCNC: 74 MG/DL (ref 70–130)
GLUCOSE BLDC GLUCOMTR-MCNC: 74 MG/DL (ref 70–130)
GLUCOSE BLDC GLUCOMTR-MCNC: 76 MG/DL (ref 70–130)
GLUCOSE BLDC GLUCOMTR-MCNC: 76 MG/DL (ref 70–130)
GLUCOSE BLDC GLUCOMTR-MCNC: 77 MG/DL (ref 70–130)
GLUCOSE BLDC GLUCOMTR-MCNC: 78 MG/DL (ref 70–130)
GLUCOSE BLDC GLUCOMTR-MCNC: 82 MG/DL (ref 70–130)
GLUCOSE BLDC GLUCOMTR-MCNC: 85 MG/DL (ref 70–130)
GLUCOSE BLDC GLUCOMTR-MCNC: 87 MG/DL (ref 70–130)
GLUCOSE BLDC GLUCOMTR-MCNC: 91 MG/DL (ref 70–130)
GLUCOSE BLDC GLUCOMTR-MCNC: 93 MG/DL (ref 70–130)
GLUCOSE BLDC GLUCOMTR-MCNC: 94 MG/DL (ref 70–130)
GLUCOSE BLDC GLUCOMTR-MCNC: 97 MG/DL (ref 70–130)
GLUCOSE BLDC GLUCOMTR-MCNC: 97 MG/DL (ref 70–130)
GLUCOSE BLDC GLUCOMTR-MCNC: 98 MG/DL (ref 70–130)
GLUCOSE BLDC GLUCOMTR-MCNC: 99 MG/DL (ref 70–130)
GLUCOSE SERPL-MCNC: 140 MG/DL (ref 65–99)
GRAM STN SPEC: ABNORMAL
HCT VFR BLD AUTO: 35.4 % (ref 34–46.6)
HGB BLD-MCNC: 12.2 G/DL (ref 12–15.9)
ISOLATED FROM: ABNORMAL
ISOLATED FROM: ABNORMAL
LYMPHOCYTES # BLD MANUAL: 1.06 10*3/MM3 (ref 0.7–3.1)
LYMPHOCYTES NFR BLD MANUAL: 4 % (ref 5–12)
MAGNESIUM SERPL-MCNC: 1.8 MG/DL (ref 1.6–2.4)
MCH RBC QN AUTO: 29.7 PG (ref 26.6–33)
MCHC RBC AUTO-ENTMCNC: 34.5 G/DL (ref 31.5–35.7)
MCV RBC AUTO: 86.1 FL (ref 79–97)
MONOCYTES # BLD: 0.85 10*3/MM3 (ref 0.1–0.9)
NEUTROPHILS # BLD AUTO: 19.04 10*3/MM3 (ref 1.7–7)
NEUTROPHILS NFR BLD MANUAL: 90 % (ref 42.7–76)
PHOSPHATE SERPL-MCNC: 2.2 MG/DL (ref 2.5–4.5)
PHOSPHATE SERPL-MCNC: 2.4 MG/DL (ref 2.5–4.5)
PLAT MORPH BLD: NORMAL
PLATELET # BLD AUTO: 41 10*3/MM3 (ref 140–450)
PMV BLD AUTO: 12.2 FL (ref 6–12)
POTASSIUM SERPL-SCNC: 4.2 MMOL/L (ref 3.5–5.2)
PROT SERPL-MCNC: 4.7 G/DL (ref 6–8.5)
RBC # BLD AUTO: 4.11 10*6/MM3 (ref 3.77–5.28)
RBC MORPH BLD: NORMAL
SODIUM SERPL-SCNC: 142 MMOL/L (ref 136–145)
VARIANT LYMPHS NFR BLD MANUAL: 5 % (ref 19.6–45.3)
WBC MORPH BLD: NORMAL
WBC NRBC COR # BLD AUTO: 21.15 10*3/MM3 (ref 3.4–10.8)

## 2024-06-04 PROCEDURE — 80053 COMPREHEN METABOLIC PANEL: CPT | Performed by: HOSPITALIST

## 2024-06-04 PROCEDURE — 87040 BLOOD CULTURE FOR BACTERIA: CPT | Performed by: HOSPITALIST

## 2024-06-04 PROCEDURE — 92610 EVALUATE SWALLOWING FUNCTION: CPT

## 2024-06-04 PROCEDURE — 83735 ASSAY OF MAGNESIUM: CPT | Performed by: HOSPITALIST

## 2024-06-04 PROCEDURE — 99233 SBSQ HOSP IP/OBS HIGH 50: CPT | Performed by: INTERNAL MEDICINE

## 2024-06-04 PROCEDURE — 25010000002 ERTAPENEM PER 500 MG: Performed by: INTERNAL MEDICINE

## 2024-06-04 PROCEDURE — 84100 ASSAY OF PHOSPHORUS: CPT | Performed by: HOSPITALIST

## 2024-06-04 PROCEDURE — 86022 PLATELET ANTIBODIES: CPT | Performed by: HOSPITALIST

## 2024-06-04 PROCEDURE — 85007 BL SMEAR W/DIFF WBC COUNT: CPT | Performed by: HOSPITALIST

## 2024-06-04 PROCEDURE — 25010000002 GLUCAGON (RDNA) PER 1 MG: Performed by: HOSPITALIST

## 2024-06-04 PROCEDURE — 85025 COMPLETE CBC W/AUTO DIFF WBC: CPT | Performed by: HOSPITALIST

## 2024-06-04 PROCEDURE — 76000 FLUOROSCOPY <1 HR PHYS/QHP: CPT

## 2024-06-04 PROCEDURE — 82948 REAGENT STRIP/BLOOD GLUCOSE: CPT

## 2024-06-04 PROCEDURE — 25810000003 SODIUM CHLORIDE 0.9 % SOLUTION: Performed by: HOSPITALIST

## 2024-06-04 PROCEDURE — 82533 TOTAL CORTISOL: CPT | Performed by: HOSPITALIST

## 2024-06-04 RX ORDER — DEXTROSE MONOHYDRATE AND SODIUM CHLORIDE 5; .45 G/100ML; G/100ML
75 INJECTION, SOLUTION INTRAVENOUS CONTINUOUS
Status: DISCONTINUED | OUTPATIENT
Start: 2024-06-04 | End: 2024-06-07

## 2024-06-04 RX ADMIN — GLUCAGON 1 MG: KIT at 07:48

## 2024-06-04 RX ADMIN — SODIUM PHOSPHATE, MONOBASIC, MONOHYDRATE AND SODIUM PHOSPHATE, DIBASIC, ANHYDROUS 15 MMOL: 142; 276 INJECTION, SOLUTION INTRAVENOUS at 09:43

## 2024-06-04 RX ADMIN — DEXTROSE AND SODIUM CHLORIDE 75 ML/HR: 5; 450 INJECTION, SOLUTION INTRAVENOUS at 08:32

## 2024-06-04 RX ADMIN — ERTAPENEM SODIUM 1000 MG: 1 INJECTION, POWDER, LYOPHILIZED, FOR SOLUTION INTRAMUSCULAR; INTRAVENOUS at 17:41

## 2024-06-04 RX ADMIN — DEXTROSE MONOHYDRATE 25 G: 25 INJECTION, SOLUTION INTRAVENOUS at 07:06

## 2024-06-04 RX ADMIN — DEXTROSE MONOHYDRATE 50 ML: 25 INJECTION, SOLUTION INTRAVENOUS at 19:34

## 2024-06-04 RX ADMIN — Medication: at 15:19

## 2024-06-04 NOTE — PROGRESS NOTES
Name: Kimberlee Urrutia ADMIT: 2024   : 1940  PCP: Meche Ji MD    MRN: [id number] LOS: 2 days   AGE/SEX: 84 y.o. female  ROOM: [facility]     Subjective   Subjective   Looks about the same       Objective   Objective   Vital Signs  Temp:  [97.6 °F (36.4 °C)-98.4 °F (36.9 °C)] 98.4 °F (36.9 °C)  Heart Rate:  [75-94] 78  Resp:  [14-18] 16  BP: (100-129)/(55-86) 122/67  SpO2:  [94 %-100 %] 99 %  on  Flow (L/min):  [4] 4;   Device (Oxygen Therapy): nasal cannula  Body mass index is 34.12 kg/m².  Physical Exam  Vitals and nursing note reviewed.   Constitutional:       General: She is not in acute distress.     Comments: Drowsy but arousable   HENT:      Head: Normocephalic and atraumatic.   Eyes:      General: No scleral icterus.  Neck:      Vascular: No JVD.   Cardiovascular:      Rate and Rhythm: Normal rate and regular rhythm.      Pulses: Normal pulses.      Heart sounds: No murmur heard.  Pulmonary:      Effort: No respiratory distress.      Comments: Shallow resps  Abdominal:      General: There is no distension.      Palpations: Abdomen is soft.      Tenderness: There is no abdominal tenderness.   Musculoskeletal:         General: No swelling or tenderness.      Cervical back: Neck supple.   Skin:     General: Skin is warm and dry.      Coloration: Skin is not jaundiced.      Findings: Bruising present. No rash.   Psychiatric:         Mood and Affect: Mood normal.         Behavior: Behavior normal.       Results Review     I reviewed the patient's new clinical results.  Results from last 7 days   Lab Units 24  0730 24  0636 24  1354 24  2113   WBC 10*3/mm3 21.15* 20.15* 18.44* 17.40*   HEMOGLOBIN g/dL 12.2 11.5* 10.8* 13.6   PLATELETS 10*3/mm3 41* 57* 55* 140     Results from last 7 days   Lab Units 24  0730 24  0636 24  1356 06/01/24  2113   SODIUM mmol/L 142 141 146* 144   POTASSIUM mmol/L 4.2 4.8 2.9* 3.5   CHLORIDE mmol/L 114* 112* 117* 108*    CO2 mmol/L 18.4* 14.4* 17.0* 18.5*   BUN mg/dL 24* 35* 33* 26*   CREATININE mg/dL 1.31* 1.69* 1.90* 1.60*   GLUCOSE mg/dL 140* 93 50* 96   EGFR mL/min/1.73 40.0* 29.5* 25.6* 31.5*     Results from last 7 days   Lab Units 06/04/24  0730 06/02/24  1356 06/01/24  2113   ALBUMIN g/dL 2.6* 3.0* 3.9   BILIRUBIN mg/dL 0.8 0.6 0.7   ALK PHOS U/L 167* 80 79   AST (SGOT) U/L 40* 84* 274*   ALT (SGPT) U/L 81* 140* 281*     Results from last 7 days   Lab Units 06/04/24  0730 06/03/24  0636 06/02/24  1356 06/01/24  2113   CALCIUM mg/dL 8.5* 8.4* 7.4* 10.1   ALBUMIN g/dL 2.6*  --  3.0* 3.9   MAGNESIUM mg/dL 1.8 1.7  --   --    PHOSPHORUS mg/dL 2.2* 2.5  --   --      Results from last 7 days   Lab Units 06/03/24  0636 06/03/24  0035 06/02/24  1722 06/02/24  1356 06/02/24  1354   PROCALCITONIN ng/mL  --   --   --  208.80*  --    LACTATE mmol/L 4.8* 5.9* 4.0*  --  5.6*     Glucose   Date/Time Value Ref Range Status   06/04/2024 1553 97 70 - 130 mg/dL Final   06/04/2024 1258 102 70 - 130 mg/dL Final   06/04/2024 1234 93 70 - 130 mg/dL Final   06/04/2024 1000 78 70 - 130 mg/dL Final   06/04/2024 0929 76 70 - 130 mg/dL Final   06/04/2024 0859 76 70 - 130 mg/dL Final   06/04/2024 0830 63 (L) 70 - 130 mg/dL Final       No radiology results for the last day    I have personally reviewed all medications:  Scheduled Medications  donepezil, 10 mg, Oral, Daily  ertapenem, 1,000 mg, Intravenous, Q24H  lactated ringers, 1,000 mL, Intravenous, Once  memantine, 10 mg, Oral, BID  senna-docusate sodium, 2 tablet, Oral, BID    Infusions  dextrose 5 % and sodium chloride 0.45 %, 75 mL/hr, Last Rate: 75 mL/hr (06/04/24 0832)    Diet  NPO Diet NPO Type: Ice Chips    I have personally reviewed:  [x]  Laboratory   []  Microbiology   []  Radiology   [x]  EKG/Telemetry  [x]  Cardiology/Vascular   []  Pathology    []  Records       Assessment/Plan     Active Hospital Problems    Diagnosis  POA    **Right ureteral stone [N20.1]  Yes    Discitis of lumbar  region [M46.46]  Yes    Acute UTI (urinary tract infection) [N39.0]  Yes    Hypoglycemia [E16.2]  Yes    Abnormal LFTs [R79.89]  Yes    Chronic bilateral low back pain without sciatica [M54.50, G89.29]  Yes    CAYLA (dementia of Alzheimer type) [G30.9, F02.80]  Yes    Lumbar spinal stenosis [M48.061]  Yes    Acute low back pain [M54.50]  Yes    PRINCE (acute kidney injury) [N17.9]  Yes      Resolved Hospital Problems    Diagnosis Date Resolved POA    Shock, septic [A41.9, R65.21] 06/03/2024 Yes              Ms. Urrutia is a 84 y.o. female with history of dementia and spinal stenosis who presented with lower back pain, found to have sepsis most likely from urinary source with obstructing right ureteral stone now treated with stent placement along with possible osteomyelitis/discitis on lumbar CT      Septic shock due to above, now resolved and off pressors.  ESBL UTI/bacteremia - now on Invanz.  Repeat blood cultures pending to document clearance.  WBC up slightly.  Will ask ID to weigh in as well regarding duration of antibiotics  MRI negative for discitis and NS signed off. DC'ed Vanc  PRINCE stable/downtrending  Metabolic acidosis with AG of only 15  Probable metabolic encephalopathy with background Alzheimer dementia.  Worsened by sepsis and acute medical issues.  Hypoglycemia: Persists when off the dextrose fluids.  Stable now on D5 half-normal.  Hopefully will improve if she can improve her dietary intake  Thrombocytopenia likely related to sepsis.  HIT antibody ordered by pulmonology, off heparin now.  Very unlikely to have HIT as she only got 1 dose of heparin, would not have enough time to have caused HIT  Discussed with RN       VTE Prophylaxis - SCDs  Code Status -DNR  Disposition: Likely will need SNF at discharge.  Not close to readiness yet        Mark Alvarez MD  [facility]  06/04/24  16:06 EDT

## 2024-06-04 NOTE — PROGRESS NOTES
"  Intensive Care Unit Daily Progress Note.   [facility] INTENSIVE CARE  6/4/2024    Patient Name:  Kimberlee Urrutia  MRN:  [id number]  YOB: 1940  Age: 84 y.o.  Sex: female         Reason for Admission / Chief Complaint:  Back pain, shock    Hospital Course:   84-year-old female with a history of arthritis, chronic low back pain, dementia, multinodular thyroid who presented with lower back pain and found to have hydronephrosis of the right ureter having undergone cystoscopy and right ureteral stent placement and admitted to the ICU for septic shock secondary to urinary tract infection.    Interval History:  No acute events overnight  Remains intermittently hypoglycemic  Mental status overall stable  Sitter at bedside    Physical Exam:  /60   Pulse 75   Temp 98.4 °F (36.9 °C) (Oral)   Resp 18   Ht 158.8 cm (62.5\")   Wt 86 kg (189 lb 9.5 oz)   LMP  (LMP Unknown)   SpO2 97%   BMI 34.12 kg/m²   Body mass index is 34.12 kg/m².    Intake/Output    Intake/Output Summary (Last 24 hours) at 6/4/2024 0954  Last data filed at 6/4/2024 0706  Gross per 24 hour   Intake 2952 ml   Output 2175 ml   Net 777 ml     General: Alert, nontoxic, NAD, confused  HEENT: NC/AT, EOMI, MMM  Neck: Supple, trachea midline  Cardiac: RRR, no murmur, gallops, rubs  Pulmonary: Clear to auscultation bilaterally, no adventitious breath sounds, normal respiratory effort  GI: Soft, non-tender, non-distended, normal bowel sounds  Extremities: Warm, well perfused, no LE edema  Skin: no visible rash  Neuro: CN II - XII grossly intact  Psychiatry: Normal mood and affect    Data Review:  Notable Labs:  Results from last 7 days   Lab Units 06/04/24  0730 06/03/24  0636 06/02/24  1354 06/01/24  2113   WBC 10*3/mm3 21.15* 20.15* 18.44* 17.40*   HEMOGLOBIN g/dL 12.2 11.5* 10.8* 13.6   PLATELETS 10*3/mm3 41* 57* 55* 140     Results from last 7 days   Lab Units 06/04/24  0730 06/03/24  0636 06/02/24  1356 06/01/24  2113 "   SODIUM mmol/L 142 141 146* 144   POTASSIUM mmol/L 4.2 4.8 2.9* 3.5   CHLORIDE mmol/L 114* 112* 117* 108*   CO2 mmol/L 18.4* 14.4* 17.0* 18.5*   BUN mg/dL 24* 35* 33* 26*   CREATININE mg/dL 1.31* 1.69* 1.90* 1.60*   GLUCOSE mg/dL 140* 93 50* 96   CALCIUM mg/dL 8.5* 8.4* 7.4* 10.1   MAGNESIUM mg/dL 1.8 1.7  --   --    PHOSPHORUS mg/dL 2.2* 2.5  --   --    Estimated Creatinine Clearance: 32.3 mL/min (A) (by C-G formula based on SCr of 1.31 mg/dL (H)).    Results from last 7 days   Lab Units 06/04/24  0730 06/03/24  0636 06/03/24  0035 06/02/24  1722 06/02/24  1356 06/02/24  1354 06/02/24  0226 06/01/24  2113   AST (SGOT) U/L 40*  --   --   --  84*  --   --  274*   ALT (SGPT) U/L 81*  --   --   --  140*  --   --  281*   PROCALCITONIN ng/mL  --   --   --   --  208.80*  --   --   --    LACTATE mmol/L  --  4.8* 5.9* 4.0*  --  5.6*   < >  --    CRP mg/dL  --   --   --   --   --   --   --  2.90*   PLATELETS 10*3/mm3 41* 57*  --   --   --  55*  --  140    < > = values in this interval not displayed.             Imaging:  Reviewed chest images personally from past 3 days    ASSESSMENT  /  PLAN:    Septic shock secondary to urinary source-resolved  ESBL E. coli UTI  Moderate to severe right hydronephrosis  Right ureteral stone status post cystoscopy and stent placement (6/2)  Hypoglycemia  Anion gap metabolic acidosis, lactic acidosis  Discitis, osteomyelitis of L4/L5  Acute kidney injury  Leukocytosis  History of hypertension  Hyperlipidemia  Dementia  Arthritis  Thrombocytopenia     - Patient presented with back pain and ultimately found to have L4/L5 discitis/osteomyelitis in addition to right-sided hydronephrosis for which she went to OR for cystoscopy and right ureteral stent placement by urology.  -Hemodynamic stable  -Continue ertapenem for ESBL E. coli.  Will need IV antibiotics upon discharge.  Infectious disease following, appreciate their recommendations.  -Neurosurgery consultation regarding discitis/osteomyelitis  of L4/L5.  MRI without any evidence of disc space infection or osteomyelitis  -Continue donepezil and memantine for history of dementia  -Wean oxygen as tolerated for SpO2 goal greater than 90%  -On dextrose drip for hypoglycemia, likely in the setting of septic shock and critical illness.  Will get cortisol level to evaluate.  -Platelet dropped, heparin held, likely due to infection, will obtain HIT level.     GI prophylaxis: Not indicated  DVT prophylaxis: SCDs  Rodriguez catheter: Yes  Bowel regimen: Ordered  Nutrition: N.p.o.     Disposition: Telemetry overflow.  Awaiting floor bed.    Gabe Guevara MD  [facility]  Pulmonary and Critical Care Medicine, Interventional Pulmonology    Parts of this note may be an electronic transcription/translation of spoken language to printed text using the Dragon dictation system.

## 2024-06-04 NOTE — THERAPY EVALUATION
Acute Care - Speech Language Pathology   Swallow Initial Evaluation [facility]     Patient Name: Kimberlee Urrutia  : 1940  MRN: [id number]  Today's Date: 2024               Admit Date: 2024    Visit Dx:     ICD-10-CM ICD-9-CM   1. Right ureteral stone  N20.1 592.1   2. Acute UTI  N39.0 599.0   3. Severe sepsis  A41.9 038.9    R65.20 995.92   4. Elevated lactic acid level  R79.89 276.2   5. Osteomyelitis of lumbar spine  M46.26 730.28   6. Elevated AST (SGOT)  R74.01 790.4   7. Elevated ALT measurement  R74.01 790.4   8. Elevated C-reactive protein (CRP)  R79.82 790.95   9. PRINCE (acute kidney injury)  N17.9 584.9     Patient Active Problem List   Diagnosis    Arthritis    HLD (hyperlipidemia)    Primary hypertension    Health care maintenance    Knee pain, right    Hx of total knee arthroplasty    Seasonal allergies    Vitamin D deficiency    Pyuria    Transient alteration of awareness    Migraine without status migrainosus, not intractable    Leukocytosis    Viral pharyngitis    Multinodular thyroid    Osteopenia of multiple sites    Colon cancer screening    Serum calcium elevated    PRINCE (acute kidney injury)    Acute low back pain    Elevated procalcitonin    Sepsis due to Escherichia coli with acute renal failure without septic shock    Recurrent oral herpes simplex    Lumbar spinal stenosis    Metabolic encephalopathy    Renal stones    Colon cancer screening    Sacroiliac joint pain    Lumbar stenosis without neurogenic claudication    Renal stone    Hyperlipidemia    History of total knee arthroplasty    Spinal stenosis of lumbar region    Multinodular goiter    Osteopenia    Pain in right knee    Sacroiliac joint pain    Hypercalcemia    CAYLA (dementia of Alzheimer type)    Chronic bilateral low back pain without sciatica    Right ureteral stone    Discitis of lumbar region    Acute UTI (urinary tract infection)    Hypoglycemia    Abnormal LFTs     Past Medical History:   Diagnosis Date     Allergic     Arthritis     Colon cancer screening 01/29/2020    Colon cancer screening 01/29/2020    Cough     Diarrhea     Hx of migraine headaches     Hyperlipidemia     Irregular heart beat     Osteoarthritis     Renal stones 02/2021    Vitamin D deficiency      Past Surgical History:   Procedure Laterality Date    COLONOSCOPY      CYSTOSCOPY W/ URETERAL STENT PLACEMENT Right 6/2/2024    Procedure: CYSTOSCOPY, RIGHT URETERAL STENT PLACEMENT;  Surgeon: Efra Schofield MD;  Location: [facility] MAIN OR;  Service: Urology;  Laterality: Right;    LUMBAR EPIDURAL INJECTION N/A 8/2/2021    Procedure: LUMBAR EPIDURAL 1ST VISIT 5-1;  Surgeon: Nu Partida MD;  Location: [facility] MAIN OR;  Service: Pain Management;  Laterality: N/A;    REPLACEMENT TOTAL KNEE Right 01/2015    Josue Wilson MD    SACROILIAC JOINT INJECTION Left 6/16/2021    Procedure: SACROILIAC INJECTION left;  Surgeon: Nu Partida MD;  Location: [facility] MAIN OR;  Service: Pain Management;  Laterality: Left;    TUBAL ABDOMINAL LIGATION      URETEROSCOPY LASER LITHOTRIPSY WITH STENT INSERTION Left 2/23/2021    Procedure: LT.URETEROSCOPY LASER LITHOTRIPSY WITH STENT  FOR STONE;  Surgeon: Arnaud Lu MD;  Location: [facility] MAIN OR;  Service: Urology;  Laterality: Left;    WISDOM TOOTH EXTRACTION         SLP Recommendation and Plan  SLP Swallowing Diagnosis: suspected pharyngeal dysphagia, oral dysphagia (06/04/24 1100)  SLP Diet Recommendation: NPO, ice chips between meals after oral care, with supervision (06/04/24 1100)     SLP Rec. for Method of Medication Administration: meds via alternate route (06/04/24 1100)     Monitor for Signs of Aspiration: yes, notify SLP if any concerns (06/04/24 1100)  Recommended Diagnostics: reassess via clinical swallow evaluation (06/04/24 1100)  Swallow Criteria for Skilled Therapeutic Interventions Met: demonstrates skilled criteria (06/04/24 1100)     Rehab Potential/Prognosis, Swallowing: good, to  achieve stated therapy goals (06/04/24 1100)  Therapy Frequency (Swallow): PRN (06/04/24 1100)  Predicted Duration Therapy Intervention (Days): until discharge (06/04/24 1100)  Oral Care Recommendations: Oral Care BID/PRN, Before ice/water (06/04/24 1100)                                        Outcome Evaluation: Clinical swallow evaluation completed. Mental status appears to be impacting swallow function at this time. Recommend NPO. Ice chips sparingly after oral care. Speech to follow for re-eval.      SWALLOW EVALUATION (Last 72 Hours)       SLP Adult Swallow Evaluation       Row Name 06/04/24 1100                   Rehab Evaluation    Document Type evaluation  -CR        Subjective Information no complaints  -CR        Patient Observations lethargic  -CR        Patient Effort adequate  -CR        Symptoms Noted During/After Treatment none  -CR           General Information    Patient Profile Reviewed yes  -CR        Pertinent History Of Current Problem 85 y/o female presented with low back pain. Cystoscopy performed 6/2/24. Septic shock secondary to UTI. CT abdomen states basilar atelectasis. Hx includes dementia.  -CR        Current Method of Nutrition regular textures;thin liquids  -CR        Precautions/Limitations, Vision WFL;for purposes of eval  -CR        Precautions/Limitations, Hearing WFL;for purposes of eval  -CR        Prior Level of Function-Swallowing no diet consistency restrictions  -CR        Plans/Goals Discussed with patient;agreed upon  -CR        Barriers to Rehab medically complex  -CR           Pain    Additional Documentation Pain Scale: FACES Pre/Post-Treatment (Group)  -CR           Pain Scale: FACES Pre/Post-Treatment    Pain: FACES Scale, Pretreatment 0-->no hurt  -CR           Oral Motor Structure and Function    Dentition Assessment natural, present and adequate  -CR        Secretion Management WNL/WFL  -CR        Mucosal Quality dry  -CR           Oral Musculature and Cranial Nerve  Assessment    Oral Motor General Assessment generalized oral motor weakness  -CR           Clinical Swallow Eval    Clinical Swallow Evaluation Summary Clinical swallow evaluation completed. Friend at bedside. Pt able to increase alertness for evaluation but required MIN cues to sustain alertness. Following most 1-step directions. Across PO trials, pt presented with disorganized oral stage, difficulties pulling from the spoon, and slow A/P transit. Anterior loss x4 during liquid trials. Audible swallows, suspect mistiming. Cough with thin liquid by straw. Throat clearing with nectar by spoon/cup and puree trials. No further trials attempted as SLP suspects mental status is impacting swallow function at this time. Recommend NPO. Ice chips sparingly after oral care. Speech to follow for re-eval.  -CR           SLP Evaluation Clinical Impression    SLP Swallowing Diagnosis suspected pharyngeal dysphagia;oral dysphagia  -CR        Functional Impact risk of aspiration/pneumonia  -CR        Rehab Potential/Prognosis, Swallowing good, to achieve stated therapy goals  -CR        Swallow Criteria for Skilled Therapeutic Interventions Met demonstrates skilled criteria  -CR           Recommendations    Therapy Frequency (Swallow) PRN  -CR        Predicted Duration Therapy Intervention (Days) until discharge  -CR        SLP Diet Recommendation NPO;ice chips between meals after oral care, with supervision  -CR        Recommended Diagnostics reassess via clinical swallow evaluation  -CR        Oral Care Recommendations Oral Care BID/PRN;Before ice/water  -CR        SLP Rec. for Method of Medication Administration meds via alternate route  -CR        Monitor for Signs of Aspiration yes;notify SLP if any concerns  -CR           Swallow Goals (SLP)    Swallow LTGs Patient will demonstrate functional swallow for  -CR           (LTG) Patient will demonstrate functional swallow for    Diet Texture (Demonstrate functional swallow) soft  to chew (ground) textures  -CR        Liquid viscosity (Demonstrate functional swallow) thin liquids  -CR        [place] (Demonstrate functional swallow) independently (over 90% accuracy)  -CR        Time Frame (Demonstrate functional swallow) by discharge  -CR        Progress/Outcomes (Demonstrate functional swallow) new goal  -CR                  User Key  (r) = Recorded By, (t) = Taken By, (c) = Cosigned By      Initials Name Effective Dates    Shalini Ruiz SLP 08/28/23 -                     EDUCATION  The patient has been educated in the following areas:   Dysphagia (Swallowing Impairment).        SLP GOALS       Row Name 06/04/24 1100             (LTG) Patient will demonstrate functional swallow for    Diet Texture (Demonstrate functional swallow) soft to chew (ground) textures  -CR      Liquid viscosity (Demonstrate functional swallow) thin liquids  -CR      [place] (Demonstrate functional swallow) independently (over 90% accuracy)  -CR      Time Frame (Demonstrate functional swallow) by discharge  -CR      Progress/Outcomes (Demonstrate functional swallow) new goal  -CR                User Key  (r) = Recorded By, (t) = Taken By, (c) = Cosigned By      Initials Name Provider Type    Shalini Ruiz SLP Speech and Language Pathologist                         Time Calculation:    Time Calculation- SLP       Row Name 06/04/24 1152             Time Calculation- SLP    SLP Start Time 0930  -CR      SLP Received On 06/04/24  -CR         Untimed Charges    67520-OD Eval Oral Pharyng Swallow Minutes 45  -CR         Total Minutes    Untimed Charges Total Minutes 45  -CR       Total Minutes 45  -CR                User Key  (r) = Recorded By, (t) = Taken By, (c) = Cosigned By      Initials Name Provider Type    Shalini Ruiz SLP Speech and Language Pathologist                    Therapy Charges for Today       Code Description Service Date Service Provider Modifiers Qty    [id number]   ST EVAL ORAL PHARYNG SWALLOW 3 6/4/2024 Shalini Carmichael, SLP GN 1                 JOSUÉ Rosado  6/4/2024

## 2024-06-04 NOTE — PLAN OF CARE
Goal Outcome Evaluation:  Pt remains confused and in restraints with 1:1 sitter. VSS throughout shift. On 4L NC.

## 2024-06-04 NOTE — PROGRESS NOTES
ID note for sepsis  S: more awake today. AF. No sig pain. Tolerating abx.    Physical Exam:   Vital Signs   Temp:  [97.6 °F (36.4 °C)-98.4 °F (36.9 °C)] 98.4 °F (36.9 °C)  Heart Rate:  [78-96] 81  Resp:  [14-18] 18  BP: ()/(54-86) 129/79    GENERAL: Awake and alert, sickly  HEENT: Oropharynx is clear. Hearing is grossly normal.   EYES: . No conjunctival injection. No lid lag.   LUNGS:normal respiratory effort.   SKIN: no cutaneous eruptions in exposed areas  PSYCHIATRIC:following simple commands but not responding approriately    Results Review:  WBC 21  Cr 1.31  ALT 81, ast 40  6/2 BCx 2/2 ESBL e coli  6/2 UCx E coli  6/4 Bcx p    A/p  1.  ESBL E. coli septicemia  2.  Right pyelonephritis with obstructing ureteral stone status post stenting  3.  Acute kidney injury, Creatinine of about 0.8-1 as baseline   4.  Elevated liver function test, likely related to sepsis    continue IV ertapenem but increase to 1000 mg IV every 24 hours, given improving PRINCE.  Will likely need IV abx at dc  LFTs better  WBC stable but high.  I suspect her white count will start to come down the further she gets away from infection and hopefully her mental status will improve as well.   D/w Dr Alvarez re impressions and plans

## 2024-06-04 NOTE — NURSING NOTE
Provider  Antonio notified of critical platelets . No new orders at this time . MD notified of low sugars. See orders.

## 2024-06-04 NOTE — PLAN OF CARE
Goal Outcome Evaluation:              Outcome Evaluation: Clinical swallow evaluation completed. Mental status appears to be impacting swallow function at this time. Recommend NPO. Ice chips sparingly after oral care. Speech to follow for re-eval.

## 2024-06-05 ENCOUNTER — APPOINTMENT (OUTPATIENT)
Dept: CARDIOLOGY | Facility: HOSPITAL | Age: 84
End: 2024-06-05
Payer: MEDICARE

## 2024-06-05 LAB
ANION GAP SERPL CALCULATED.3IONS-SCNC: 9.8 MMOL/L (ref 5–15)
APTT PPP: 23.1 SECONDS (ref 22.7–35.4)
BH CV LOWER VASCULAR LEFT COMMON FEMORAL AUGMENT: NORMAL
BH CV LOWER VASCULAR LEFT COMMON FEMORAL COMPETENT: NORMAL
BH CV LOWER VASCULAR LEFT COMMON FEMORAL COMPRESS: NORMAL
BH CV LOWER VASCULAR LEFT COMMON FEMORAL PHASIC: NORMAL
BH CV LOWER VASCULAR LEFT COMMON FEMORAL SPONT: NORMAL
BH CV LOWER VASCULAR LEFT DISTAL FEMORAL COMPRESS: NORMAL
BH CV LOWER VASCULAR LEFT GASTRONEMIUS COMPRESS: NORMAL
BH CV LOWER VASCULAR LEFT GREATER SAPH AK COMPRESS: NORMAL
BH CV LOWER VASCULAR LEFT GREATER SAPH BK COMPRESS: NORMAL
BH CV LOWER VASCULAR LEFT LESSER SAPH COMPRESS: NORMAL
BH CV LOWER VASCULAR LEFT MID FEMORAL AUGMENT: NORMAL
BH CV LOWER VASCULAR LEFT MID FEMORAL COMPETENT: NORMAL
BH CV LOWER VASCULAR LEFT MID FEMORAL COMPRESS: NORMAL
BH CV LOWER VASCULAR LEFT MID FEMORAL PHASIC: NORMAL
BH CV LOWER VASCULAR LEFT MID FEMORAL SPONT: NORMAL
BH CV LOWER VASCULAR LEFT PERONEAL COMPRESS: NORMAL
BH CV LOWER VASCULAR LEFT POPLITEAL AUGMENT: NORMAL
BH CV LOWER VASCULAR LEFT POPLITEAL COMPETENT: NORMAL
BH CV LOWER VASCULAR LEFT POPLITEAL COMPRESS: NORMAL
BH CV LOWER VASCULAR LEFT POPLITEAL PHASIC: NORMAL
BH CV LOWER VASCULAR LEFT POPLITEAL SPONT: NORMAL
BH CV LOWER VASCULAR LEFT POSTERIOR TIBIAL COMPRESS: NORMAL
BH CV LOWER VASCULAR LEFT PROFUNDA FEMORAL COMPRESS: NORMAL
BH CV LOWER VASCULAR LEFT PROXIMAL FEMORAL COMPRESS: NORMAL
BH CV LOWER VASCULAR LEFT SAPHENOFEMORAL JUNCTION COMPRESS: NORMAL
BH CV LOWER VASCULAR RIGHT COMMON FEMORAL AUGMENT: NORMAL
BH CV LOWER VASCULAR RIGHT COMMON FEMORAL COMPETENT: NORMAL
BH CV LOWER VASCULAR RIGHT COMMON FEMORAL COMPRESS: NORMAL
BH CV LOWER VASCULAR RIGHT COMMON FEMORAL PHASIC: NORMAL
BH CV LOWER VASCULAR RIGHT COMMON FEMORAL SPONT: NORMAL
BH CV LOWER VASCULAR RIGHT DISTAL FEMORAL COMPRESS: NORMAL
BH CV LOWER VASCULAR RIGHT GASTRONEMIUS COMPRESS: NORMAL
BH CV LOWER VASCULAR RIGHT GREATER SAPH AK COMPRESS: NORMAL
BH CV LOWER VASCULAR RIGHT GREATER SAPH BK COMPRESS: NORMAL
BH CV LOWER VASCULAR RIGHT LESSER SAPH COMPRESS: NORMAL
BH CV LOWER VASCULAR RIGHT MID FEMORAL AUGMENT: NORMAL
BH CV LOWER VASCULAR RIGHT MID FEMORAL COMPETENT: NORMAL
BH CV LOWER VASCULAR RIGHT MID FEMORAL COMPRESS: NORMAL
BH CV LOWER VASCULAR RIGHT MID FEMORAL PHASIC: NORMAL
BH CV LOWER VASCULAR RIGHT MID FEMORAL SPONT: NORMAL
BH CV LOWER VASCULAR RIGHT PERONEAL COMPRESS: NORMAL
BH CV LOWER VASCULAR RIGHT POPLITEAL AUGMENT: NORMAL
BH CV LOWER VASCULAR RIGHT POPLITEAL COMPETENT: NORMAL
BH CV LOWER VASCULAR RIGHT POPLITEAL COMPRESS: NORMAL
BH CV LOWER VASCULAR RIGHT POPLITEAL PHASIC: NORMAL
BH CV LOWER VASCULAR RIGHT POPLITEAL SPONT: NORMAL
BH CV LOWER VASCULAR RIGHT POSTERIOR TIBIAL COMPRESS: NORMAL
BH CV LOWER VASCULAR RIGHT PROFUNDA FEMORAL COMPRESS: NORMAL
BH CV LOWER VASCULAR RIGHT PROXIMAL FEMORAL COMPRESS: NORMAL
BH CV LOWER VASCULAR RIGHT SAPHENOFEMORAL JUNCTION COMPRESS: NORMAL
BH CV LOWER VASCULAR RIGHT SOLEAL COMPRESS: NORMAL
BUN SERPL-MCNC: 23 MG/DL (ref 8–23)
BUN/CREAT SERPL: 22.5 (ref 7–25)
CALCIUM SPEC-SCNC: 8.6 MG/DL (ref 8.6–10.5)
CHLORIDE SERPL-SCNC: 120 MMOL/L (ref 98–107)
CO2 SERPL-SCNC: 17.2 MMOL/L (ref 22–29)
CREAT SERPL-MCNC: 1.02 MG/DL (ref 0.57–1)
DEPRECATED RDW RBC AUTO: 46.4 FL (ref 37–54)
EGFRCR SERPLBLD CKD-EPI 2021: 54 ML/MIN/1.73
ERYTHROCYTE [DISTWIDTH] IN BLOOD BY AUTOMATED COUNT: 14.3 % (ref 12.3–15.4)
GLUCOSE BLDC GLUCOMTR-MCNC: 107 MG/DL (ref 70–130)
GLUCOSE BLDC GLUCOMTR-MCNC: 123 MG/DL (ref 70–130)
GLUCOSE BLDC GLUCOMTR-MCNC: 51 MG/DL (ref 70–130)
GLUCOSE BLDC GLUCOMTR-MCNC: 60 MG/DL (ref 70–130)
GLUCOSE BLDC GLUCOMTR-MCNC: 63 MG/DL (ref 70–130)
GLUCOSE BLDC GLUCOMTR-MCNC: 64 MG/DL (ref 70–130)
GLUCOSE BLDC GLUCOMTR-MCNC: 73 MG/DL (ref 70–130)
GLUCOSE BLDC GLUCOMTR-MCNC: 84 MG/DL (ref 70–130)
GLUCOSE BLDC GLUCOMTR-MCNC: 88 MG/DL (ref 70–130)
GLUCOSE BLDC GLUCOMTR-MCNC: 96 MG/DL (ref 70–130)
GLUCOSE BLDC GLUCOMTR-MCNC: 96 MG/DL (ref 70–130)
GLUCOSE SERPL-MCNC: 87 MG/DL (ref 65–99)
HCT VFR BLD AUTO: 38.1 % (ref 34–46.6)
HGB BLD-MCNC: 12.5 G/DL (ref 12–15.9)
LYMPHOCYTES # BLD MANUAL: 0.75 10*3/MM3 (ref 0.7–3.1)
LYMPHOCYTES NFR BLD MANUAL: 4 % (ref 5–12)
MAGNESIUM SERPL-MCNC: 1.8 MG/DL (ref 1.6–2.4)
MCH RBC QN AUTO: 29 PG (ref 26.6–33)
MCHC RBC AUTO-ENTMCNC: 32.8 G/DL (ref 31.5–35.7)
MCV RBC AUTO: 88.4 FL (ref 79–97)
MONOCYTES # BLD: 1.49 10*3/MM3 (ref 0.1–0.9)
NEUTROPHILS # BLD AUTO: 35.02 10*3/MM3 (ref 1.7–7)
NEUTROPHILS NFR BLD MANUAL: 94 % (ref 42.7–76)
NRBC BLD AUTO-RTO: 0.1 /100 WBC (ref 0–0.2)
PF4 HEPARIN CMPLX IGG SERPL IA: 0.04 OD (ref 0–0.4)
PHOSPHATE SERPL-MCNC: 1.8 MG/DL (ref 2.5–4.5)
PHOSPHATE SERPL-MCNC: 1.9 MG/DL (ref 2.5–4.5)
PLAT MORPH BLD: NORMAL
PLATELET # BLD AUTO: 62 10*3/MM3 (ref 140–450)
PMV BLD AUTO: 13.4 FL (ref 6–12)
POIKILOCYTOSIS BLD QL SMEAR: ABNORMAL
POTASSIUM SERPL-SCNC: 3.9 MMOL/L (ref 3.5–5.2)
RBC # BLD AUTO: 4.31 10*6/MM3 (ref 3.77–5.28)
SODIUM SERPL-SCNC: 147 MMOL/L (ref 136–145)
VARIANT LYMPHS NFR BLD MANUAL: 2 % (ref 19.6–45.3)
WBC MORPH BLD: NORMAL
WBC NRBC COR # BLD AUTO: 37.26 10*3/MM3 (ref 3.4–10.8)

## 2024-06-05 PROCEDURE — 85007 BL SMEAR W/DIFF WBC COUNT: CPT | Performed by: HOSPITALIST

## 2024-06-05 PROCEDURE — 97162 PT EVAL MOD COMPLEX 30 MIN: CPT

## 2024-06-05 PROCEDURE — 85025 COMPLETE CBC W/AUTO DIFF WBC: CPT | Performed by: HOSPITALIST

## 2024-06-05 PROCEDURE — 82948 REAGENT STRIP/BLOOD GLUCOSE: CPT

## 2024-06-05 PROCEDURE — 84100 ASSAY OF PHOSPHORUS: CPT | Performed by: HOSPITALIST

## 2024-06-05 PROCEDURE — 80048 BASIC METABOLIC PNL TOTAL CA: CPT | Performed by: HOSPITALIST

## 2024-06-05 PROCEDURE — 25010000002 ERTAPENEM PER 500 MG: Performed by: INTERNAL MEDICINE

## 2024-06-05 PROCEDURE — 99232 SBSQ HOSP IP/OBS MODERATE 35: CPT | Performed by: INTERNAL MEDICINE

## 2024-06-05 PROCEDURE — 97530 THERAPEUTIC ACTIVITIES: CPT

## 2024-06-05 PROCEDURE — 85730 THROMBOPLASTIN TIME PARTIAL: CPT | Performed by: HOSPITALIST

## 2024-06-05 PROCEDURE — 92526 ORAL FUNCTION THERAPY: CPT

## 2024-06-05 PROCEDURE — 83735 ASSAY OF MAGNESIUM: CPT | Performed by: HOSPITALIST

## 2024-06-05 PROCEDURE — 25810000003 SODIUM CHLORIDE 0.9 % SOLUTION: Performed by: HOSPITALIST

## 2024-06-05 PROCEDURE — 93970 EXTREMITY STUDY: CPT | Performed by: SURGERY

## 2024-06-05 PROCEDURE — 93970 EXTREMITY STUDY: CPT

## 2024-06-05 RX ORDER — FENTANYL/ROPIVACAINE/NS/PF 2-625MCG/1
15 PLASTIC BAG, INJECTION (ML) EPIDURAL ONCE
Status: COMPLETED | OUTPATIENT
Start: 2024-06-05 | End: 2024-06-05

## 2024-06-05 RX ADMIN — ERTAPENEM SODIUM 1000 MG: 1 INJECTION, POWDER, LYOPHILIZED, FOR SOLUTION INTRAMUSCULAR; INTRAVENOUS at 16:36

## 2024-06-05 RX ADMIN — DEXTROSE AND SODIUM CHLORIDE 75 ML/HR: 5; 450 INJECTION, SOLUTION INTRAVENOUS at 18:23

## 2024-06-05 RX ADMIN — SENNOSIDES AND DOCUSATE SODIUM 2 TABLET: 50; 8.6 TABLET ORAL at 20:00

## 2024-06-05 RX ADMIN — DEXTROSE AND SODIUM CHLORIDE 75 ML/HR: 5; 450 INJECTION, SOLUTION INTRAVENOUS at 04:01

## 2024-06-05 RX ADMIN — MEMANTINE HYDROCHLORIDE 10 MG: 10 TABLET, FILM COATED ORAL at 20:00

## 2024-06-05 RX ADMIN — POTASSIUM PHOSPHATE, MONOBASIC POTASSIUM PHOSPHATE, DIBASIC 15 MMOL: 224; 236 INJECTION, SOLUTION, CONCENTRATE INTRAVENOUS at 19:57

## 2024-06-05 RX ADMIN — POTASSIUM PHOSPHATE, MONOBASIC POTASSIUM PHOSPHATE, DIBASIC 15 MMOL: 224; 236 INJECTION, SOLUTION, CONCENTRATE INTRAVENOUS at 09:00

## 2024-06-05 NOTE — NURSING NOTE
Latest Reference Range & Units 06/04/24 18:31 06/04/24 19:28 06/04/24 19:30 06/04/24 19:54 06/04/24 22:31 06/04/24 22:32 06/04/24 22:34 06/05/24 00:09 06/05/24 00:13   Glucose 70 - 130 mg/dL 134 (H) 40 (C) 48 (C) 97 64 (L) 62 (L) 94 64 (L) 123   (C): Data is critically low  (H): Data is abnormally high  (L): Data is abnormally low    1831- finger stick  1928-finger stick  1930- finger stick opposite hand  1954- fingerstick after d50  2231- fingerstick  2232- finger stick opposite hand  2234- earlobe stick  0009- finger stick  0013- drawn off IV after a waste.

## 2024-06-05 NOTE — PROGRESS NOTES
Name: Kimberlee Urrutia ADMIT: 2024   : 1940  PCP: Meche Ji MD    MRN: [id number] LOS: 3 days   AGE/SEX: 84 y.o. female  ROOM: [facility]     Subjective   Subjective   No new events. She denies any complaints       Objective   Objective   Vital Signs  Temp:  [97.3 °F (36.3 °C)-98.2 °F (36.8 °C)] 97.3 °F (36.3 °C)  Heart Rate:  [74-82] 82  Resp:  [18] 18  BP: (109-133)/(56-74) 133/67  SpO2:  [97 %-100 %] 98 %  on  Flow (L/min):  [1-3] (P) 1;   Device (Oxygen Therapy): room air  Body mass index is 34 kg/m².  Physical Exam  Vitals and nursing note reviewed.   Constitutional:       General: She is not in acute distress.     Comments: Drowsy but arousable   HENT:      Head: Normocephalic and atraumatic.   Eyes:      General: No scleral icterus.  Neck:      Vascular: No JVD.   Cardiovascular:      Rate and Rhythm: Normal rate and regular rhythm.      Pulses: Normal pulses.      Heart sounds: No murmur heard.  Pulmonary:      Effort: No respiratory distress.      Comments: Shallow resps  Abdominal:      General: There is no distension.      Palpations: Abdomen is soft.      Tenderness: There is no abdominal tenderness.   Musculoskeletal:         General: No swelling or tenderness.      Cervical back: Neck supple.   Skin:     General: Skin is warm and dry.      Coloration: Skin is not jaundiced.      Findings: Bruising present. No rash.   Psychiatric:         Mood and Affect: Mood normal.         Behavior: Behavior normal.       Results Review     I reviewed the patient's new clinical results.  Results from last 7 days   Lab Units 24  0728 24  0730 24  0624  1354   WBC 10*3/mm3 37.26* 21.15* 20.15* 18.44*   HEMOGLOBIN g/dL 12.5 12.2 11.5* 10.8*   PLATELETS 10*3/mm3 62* 41* 57* 55*     Results from last 7 days   Lab Units 24  0635 24  0730 24  0636 24  1356   SODIUM mmol/L 147* 142 141 146*   POTASSIUM mmol/L 3.9 4.2 4.8 2.9*   CHLORIDE mmol/L 120*  114* 112* 117*   CO2 mmol/L 17.2* 18.4* 14.4* 17.0*   BUN mg/dL 23 24* 35* 33*   CREATININE mg/dL 1.02* 1.31* 1.69* 1.90*   GLUCOSE mg/dL 87 140* 93 50*   EGFR mL/min/1.73 54.0* 40.0* 29.5* 25.6*     Results from last 7 days   Lab Units 06/04/24  0730 06/02/24  1356 06/01/24  2113   ALBUMIN g/dL 2.6* 3.0* 3.9   BILIRUBIN mg/dL 0.8 0.6 0.7   ALK PHOS U/L 167* 80 79   AST (SGOT) U/L 40* 84* 274*   ALT (SGPT) U/L 81* 140* 281*     Results from last 7 days   Lab Units 06/05/24  0635 06/04/24  1855 06/04/24  0730 06/03/24  0636 06/02/24  1356 06/01/24  2113   CALCIUM mg/dL 8.6  --  8.5* 8.4* 7.4* 10.1   ALBUMIN g/dL  --   --  2.6*  --  3.0* 3.9   MAGNESIUM mg/dL 1.8  --  1.8 1.7  --   --    PHOSPHORUS mg/dL 1.8* 2.4* 2.2* 2.5  --   --      Results from last 7 days   Lab Units 06/03/24  0636 06/03/24  0035 06/02/24  1722 06/02/24  1356 06/02/24  1354   PROCALCITONIN ng/mL  --   --   --  208.80*  --    LACTATE mmol/L 4.8* 5.9* 4.0*  --  5.6*     Glucose   Date/Time Value Ref Range Status   06/05/2024 1545 88 70 - 130 mg/dL Final   06/05/2024 1104 107 70 - 130 mg/dL Final   06/05/2024 0545 84 70 - 130 mg/dL Final   06/05/2024 0532 60 (L) 70 - 130 mg/dL Final   06/05/2024 0342 73 70 - 130 mg/dL Final   06/05/2024 0338 51 (L) 70 - 130 mg/dL Final   06/05/2024 0013 123 70 - 130 mg/dL Final       No radiology results for the last day    I have personally reviewed all medications:  Scheduled Medications  donepezil, 10 mg, Oral, Daily  ertapenem, 1,000 mg, Intravenous, Q24H  lactated ringers, 1,000 mL, Intravenous, Once  memantine, 10 mg, Oral, BID  senna-docusate sodium, 2 tablet, Oral, BID    Infusions  dextrose 5 % and sodium chloride 0.45 %, 75 mL/hr, Last Rate: 75 mL/hr (06/05/24 0401)    Diet  Diet: Regular/House; Feeding Assistance - Nursing; Texture: Pureed (NDD 1); Fluid Consistency: Nectar Thick    I have personally reviewed:  [x]  Laboratory   []  Microbiology   []  Radiology   [x]  EKG/Telemetry  [x]   Cardiology/Vascular   []  Pathology    []  Records       Assessment/Plan     Active Hospital Problems    Diagnosis  POA    **Right ureteral stone [N20.1]  Yes    Discitis of lumbar region [M46.46]  Yes    Acute UTI (urinary tract infection) [N39.0]  Yes    Hypoglycemia [E16.2]  Yes    Abnormal LFTs [R79.89]  Yes    Chronic bilateral low back pain without sciatica [M54.50, G89.29]  Yes    CAYLA (dementia of Alzheimer type) [G30.9, F02.80]  Yes    Lumbar spinal stenosis [M48.061]  Yes    Acute low back pain [M54.50]  Yes    PRINCE (acute kidney injury) [N17.9]  Yes      Resolved Hospital Problems    Diagnosis Date Resolved POA    Shock, septic [A41.9, R65.21] 06/03/2024 Yes              Ms. Urrutia is a 84 y.o. female with history of dementia and spinal stenosis who presented with lower back pain, found to have sepsis most likely from urinary source with obstructing right ureteral stone now treated with stent placement along with possible osteomyelitis/discitis on lumbar CT      Septic shock due to above, now resolved and off pressors.  ESBL UTI/bacteremia - now on Invanz.  Repeat blood cultures negative so far but WBC continues to rise.  Ordered repeat CT to workup for any intra-abdominal abscess.  Doppler ordered by ID is negative.    MRI negative for discitis and NS signed off. DC'ed Vanc  PRINCE stable/downtrending  Metabolic acidosis resolved  Probable metabolic encephalopathy with background Alzheimer dementia.  Worsened by sepsis and acute medical issues.  Hypoglycemia: Persists when off the dextrose fluids.  Stable now on D5 half-normal.  Hopefully will improve if she can improve her dietary intake  Thrombocytopenia likely related to sepsis.  HIT antibody negative     VTE Prophylaxis -SCDs.  Plan to add Lovenox if platelets continue to trend upward tomorrow  Code Status -DNR  Disposition: Likely will need SNF at discharge.  Not close to readiness yet  Discussed with family at bedside      Mark Alvarez,  MD [facility]  06/05/24  17:53 EDT

## 2024-06-05 NOTE — PLAN OF CARE
Goal Outcome Evaluation:  Plan of Care Reviewed With: patient        Progress: no change  Outcome Evaluation: A&Ox1-2. Slow responses. Right arm swollen but no signs or symptoms of current infiltration. Blood sugars checked frequently. Most accurate results when checked from earlobe or drawn off PIV. Finger sticks have been unreliable. Treated x1 with D50. D5 1/2 NS infusing. FC in place. Frequently refusing turns saying shes comfortable. Q4 accucheck.

## 2024-06-05 NOTE — PLAN OF CARE
Goal Outcome Evaluation:         A/O x 2, patient is on room air, NSR on monitor, IV fluids and IV antibiotics continued, Phosphorus replaced per protocol, upgraded to pureed diet NTL, Q2 turns, VSS.

## 2024-06-05 NOTE — PROGRESS NOTES
ID note for sepsis  S: She denies any pain or dysuria.  She is afebrile.  Says she is tolerating the antibiotics.    Physical Exam:   Vital Signs   Temp:  [97.7 °F (36.5 °C)-98.4 °F (36.9 °C)] 97.7 °F (36.5 °C)  Heart Rate:  [74-86] 76  Resp:  [16-18] 18  BP: (109-130)/(55-74) 125/74    GENERAL: Awake and alert, sickly  HEENT: Oropharynx is clear. Hearing is grossly normal.   EYES: . No conjunctival injection. No lid lag.   LUNGS:normal respiratory effort.   SKIN: no cutaneous eruptions in exposed areas  PSYCHIATRIC:following simple commands but very slow to respond    Results Review:  White count 37.26, hemoglobin 12.5, platelets 62  Creatinine 1.02  6/2 BCx 2/2 ESBL e coli  6/2 UCx E coli  6/4 Bcx no growth to date    A/p  1.  ESBL E. coli septicemia  2.  Right pyelonephritis with obstructing ureteral stone status post stenting  3.  Acute kidney injury, Creatinine of about 0.8-1 as baseline   4.  Elevated liver function test, likely related to sepsis    Continue ertapenem 1 g IV every 24 hours.  Repeat CT abdomen pelvis has been ordered given ongoing and worsening leukocytosis.  Will also check lower extremity Dopplers as that is very common cause of sterile leukocytosis and she is not on any pharmaceutical DVT prophylaxis given concerns for possible HIT  continue IV ertapenem but increase to 1000 mg IV every 24 hours, given improving PRINCE.  Will likely need IV abx at [facility]

## 2024-06-05 NOTE — DISCHARGE PLACEMENT REQUEST
"Kimberlee Urrutia (84 y.o. Female)       Date of Birth   1940    Social Security Number       Address   [address] [place] [address]    Home Phone   [phone]    MRN   [id number]       [facility]    Marital Status                               Admission Date   6/1/24    Admission Type   Emergency    Admitting Provider   Mark Alvarez MD    Attending Provider   Mark Alvarez MD    Department, Room/Bed   [address], [address]/1       Discharge Date       Discharge Disposition       Discharge Destination                                 Attending Provider: Mark Alvarez MD    Allergies: Penicillins, Sulfa Antibiotics    Isolation: Contact   Infection: ESBL (06/02/24), Amarilys Auris (rule out) (06/03/24), ESBL E coli (06/04/24)   Code Status: No CPR    Ht: 158.8 cm (62.5\")   Wt: 85.7 kg (188 lb 14.4 oz)    Admission Cmt: None   Principal Problem: Right ureteral stone [N20.1]                   Active Insurance as of 6/1/2024       Patient has no active insurance coverage on file for 6/1/2024.            Emergency Contacts        (Rel.) Home Phone Work Phone Mobile Phone    Yhaaira(POA)Gi (Legal Guardian) -- -- [phone]    DonronniJohanna (Friend) [phone] -- --                "

## 2024-06-05 NOTE — PROGRESS NOTES
"   LOS: 3 days   Patient Care Team:  Meche Ji MD as PCP - General (Internal Medicine)  Josue Wilson MD as Consulting Physician (Orthopedic Surgery)  Yash Wilson MD as Consulting Physician (Gastroenterology)  Avi Ferris MD (Ophthalmology)  Tommy French MD (Inactive) as Consulting Physician (Otolaryngology)  Arnaud Lu MD as Consulting Physician (Urology)  Nu Partida MD as Consulting Physician (Pain Medicine)  Ki Small MD as Consulting Physician (Neurology)      Subjective   Interval History: Comfortable. Denies subjective fevers or chills.    Objective     ROS   12 POINT NEG ROS PERTINENT IN HPI      Vital Signs  Temp:  [97.7 °F (36.5 °C)-98.4 °F (36.9 °C)] 97.7 °F (36.5 °C)  Heart Rate:  [74-86] 76  Resp:  [16-18] 18  BP: (109-130)/(55-74) 125/74      Intake/Output Summary (Last 24 hours) at 6/5/2024 0804  Last data filed at 6/5/2024 0734  Gross per 24 hour   Intake --   Output 1600 ml   Net -1600 ml       Flowsheet Rows      Flowsheet Row First Filed Value   Admission Height 158.8 cm (62.5\") Documented at 06/01/2024 2030   Admission Weight 81 kg (178 lb 9.2 oz) Documented at 06/01/2024 2030            Physical Exam:     General appearance: alert, cooperative, oriented       Lab Results (all)       Procedure Component Value Units Date/Time    CBC & Differential [[id number]]  (Abnormal) Collected: 06/05/24 0728    Specimen: Blood Updated: 06/05/24 0753    Narrative:      The following orders were created for panel order CBC & Differential.  Procedure                               Abnormality         Status                     ---------                               -----------         ------                     CBC Auto Differential[[id number]]        Abnormal            Final result                 Please view results for these tests on the individual orders.    CBC Auto Differential [[id number]]  (Abnormal) Collected: 06/05/24 0728    Specimen: Blood " Updated: 06/05/24 0753     WBC 37.26 10*3/mm3      RBC 4.31 10*6/mm3      Hemoglobin 12.5 g/dL      Hematocrit 38.1 %      MCV 88.4 fL      MCH 29.0 pg      MCHC 32.8 g/dL      RDW 14.3 %      RDW-SD 46.4 fl      MPV 13.4 fL      Platelets 62 10*3/mm3      nRBC 0.1 /100 WBC     Manual Differential [[id number]]  (Abnormal) Collected: 06/05/24 0728    Specimen: Blood Updated: 06/05/24 0753     Neutrophil % 94.0 %      Lymphocyte % 2.0 %      Monocyte % 4.0 %      Neutrophils Absolute 35.02 10*3/mm3      Lymphocytes Absolute 0.75 10*3/mm3      Monocytes Absolute 1.49 10*3/mm3      Poikilocytes Mod/2+     WBC Morphology Normal     Platelet Morphology Normal    Blood Culture - Blood, Arm, Left [[id number]]  (Normal) Collected: 06/04/24 0730    Specimen: Blood from Arm, Left Updated: 06/05/24 0745     Blood Culture No growth at 24 hours    Narrative:      Less than seven (7) mL's of blood was collected.  Insufficient quantity may yield false negative results.    Phosphorus [[id number]]  (Abnormal) Collected: 06/05/24 0635    Specimen: Blood Updated: 06/05/24 0717     Phosphorus 1.8 mg/dL     aPTT [[id number]]  (Normal) Collected: 06/05/24 0635    Specimen: Blood Updated: 06/05/24 0708     PTT 23.1 seconds     Magnesium [[id number]]  (Normal) Collected: 06/05/24 0635    Specimen: Blood Updated: 06/05/24 0707     Magnesium 1.8 mg/dL     Basic Metabolic Panel [[id number]]  (Abnormal) Collected: 06/05/24 0635    Specimen: Blood Updated: 06/05/24 0707     Glucose 87 mg/dL      BUN 23 mg/dL      Creatinine 1.02 mg/dL      Sodium 147 mmol/L      Potassium 3.9 mmol/L      Chloride 120 mmol/L      CO2 17.2 mmol/L      Calcium 8.6 mg/dL      BUN/Creatinine Ratio 22.5     Anion Gap 9.8 mmol/L      eGFR 54.0 mL/min/1.73     Narrative:      GFR Normal >60  Chronic Kidney Disease <60  Kidney Failure <15    The GFR formula is only valid for adults with stable renal function between ages 18 and 70.    POC Glucose Once [[id number]]  (Normal)  Collected: 06/05/24 0545    Specimen: Blood Updated: 06/05/24 0546     Glucose 84 mg/dL     POC Glucose Once [[id number]]  (Abnormal) Collected: 06/05/24 0532    Specimen: Blood Updated: 06/05/24 0534     Glucose 60 mg/dL     POC Glucose Once [[id number]]  (Normal) Collected: 06/05/24 0342    Specimen: Blood Updated: 06/05/24 0345     Glucose 73 mg/dL     POC Glucose Once [[id number]]  (Abnormal) Collected: 06/05/24 0338    Specimen: Blood Updated: 06/05/24 0344     Glucose 51 mg/dL     POC Glucose Once [[id number]]  (Normal) Collected: 06/05/24 0013    Specimen: Blood Updated: 06/05/24 0015     Glucose 123 mg/dL     POC Glucose Once [[id number]]  (Abnormal) Collected: 06/05/24 0009    Specimen: Blood Updated: 06/05/24 0009     Glucose 64 mg/dL     POC Glucose Once [[id number]]  (Normal) Collected: 06/04/24 2234    Specimen: Blood Updated: 06/04/24 2235     Glucose 94 mg/dL     POC Glucose Once [[id number]]  (Abnormal) Collected: 06/04/24 2232    Specimen: Blood Updated: 06/04/24 2232     Glucose 62 mg/dL     POC Glucose Once [[id number]]  (Abnormal) Collected: 06/04/24 2231    Specimen: Blood Updated: 06/04/24 2232     Glucose 64 mg/dL     Phosphorus [[id number]]  (Abnormal) Collected: 06/04/24 1855    Specimen: Blood Updated: 06/04/24 2003     Phosphorus 2.4 mg/dL     POC Glucose Once [[id number]]  (Normal) Collected: 06/04/24 1954    Specimen: Blood Updated: 06/04/24 1955     Glucose 97 mg/dL     POC Glucose Once [[id number]]  (Normal) Collected: 06/04/24 1156    Specimen: Blood Updated: 06/1939     Glucose 103 mg/dL     POC Glucose Once [[id number]]  (Normal) Collected: 06/04/24 1129    Specimen: Blood Updated: 06/04/24 1938     Glucose 87 mg/dL     POC Glucose Once [[id number]]  (Abnormal) Collected: 06/04/24 1127    Specimen: Blood Updated: 06/04/24 1938     Glucose 55 mg/dL     POC Glucose Once [[id number]]  (Normal) Collected: 06/04/24 1104    Specimen: Blood Updated: 06/04/24 1938     Glucose 91 mg/dL     POC  Glucose Once [[id number]]  (Normal) Collected: 06/04/24 1037    Specimen: Blood Updated: 06/04/24 1938     Glucose 99 mg/dL     POC Glucose Once [[id number]]  (Abnormal) Collected: 06/04/24 1035    Specimen: Blood Updated: 06/04/24 1938     Glucose 21 mg/dL     POC Glucose Once [[id number]]  (Abnormal) Collected: 06/04/24 1930    Specimen: Blood Updated: 06/04/24 1934     Glucose 48 mg/dL     POC Glucose Once [[id number]]  (Abnormal) Collected: 06/04/24 1928    Specimen: Blood Updated: 06/04/24 1934     Glucose 40 mg/dL     POC Glucose Once [[id number]]  (Abnormal) Collected: 06/04/24 1831    Specimen: Blood Updated: 06/04/24 1832     Glucose 134 mg/dL     CANDIDA AURIS SCREEN - Swab, Axilla Right, Axilla Left and Groin [[id number]]  (Normal) Collected: 06/03/24 1648    Specimen: Swab from Axilla Right, Axilla Left and Groin Updated: 06/04/24 1700     Amarilys Auris Screen Culture No Candida auris isolated at 24 hours    POC Glucose Once [[id number]]  (Normal) Collected: 06/04/24 1553    Specimen: Blood Updated: 06/04/24 1555     Glucose 97 mg/dL     Heparin Induced PLT Antibody With / Rfx [[id number]] Collected: 06/04/24 1352    Specimen: Blood Updated: 06/04/24 1354    POC Glucose Once [[id number]]  (Normal) Collected: 06/04/24 1258    Specimen: Blood Updated: 06/04/24 1300     Glucose 102 mg/dL     POC Glucose Once [[id number]]  (Normal) Collected: 06/04/24 1234    Specimen: Blood Updated: 06/04/24 1234     Glucose 93 mg/dL     Blood Culture - Blood, Arm, Left [[id number]] Collected: 06/04/24 1128    Specimen: Blood from Arm, Left Updated: 06/04/24 1147    Cortisol [[id number]] Collected: 06/04/24 0730    Specimen: Blood Updated: 06/04/24 1105     Cortisol 15.40 mcg/dL     Narrative:      Cortisol Reference Ranges:    Cortisol 6AM - 10AM Range: 6.02-18.40 mcg/dl  Cortisol 4PM - 8PM Range: 2.68-10.50 mcg/dl      Results may be falsely increased if patient taking Biotin.      POC Glucose Once [[id number]]  (Normal) Collected:  06/04/24 1000    Specimen: Blood Updated: 06/04/24 1000     Glucose 78 mg/dL     POC Glucose Once [[id number]]  (Normal) Collected: 06/04/24 0929    Specimen: Blood Updated: 06/04/24 0931     Glucose 76 mg/dL     Urine Culture - Urine, Urine, Catheter [[id number]]  (Abnormal)  (Susceptibility) Collected: 06/02/24 0134    Specimen: Urine, Catheter Updated: 06/04/24 0916     Urine Culture >100,000 CFU/mL Escherichia coli ESBL     Comment:   Consider infectious disease consult.  Susceptibility results may not correlate to clinical outcomes.       Narrative:      Colonization of the urinary tract without infection is common. Treatment is discouraged unless the patient is symptomatic, pregnant, or undergoing an invasive urologic procedure.  Recent outcomes data supports the use of pip/tazo in the treatment of susceptible ESBL infections for uncomplicated UTI. Consider use of pip/tazo as a carbapenem-sparing regimen in applicable patients.    Susceptibility        Escherichia coli ESBL      CHAN      Ertapenem Susceptible      Gentamicin Susceptible      Levofloxacin Resistant      Meropenem Susceptible      Nitrofurantoin Susceptible      Piperacillin + Tazobactam Susceptible      Trimethoprim + Sulfamethoxazole Susceptible                           POC Glucose Once [[id number]]  (Normal) Collected: 06/04/24 0859    Specimen: Blood Updated: 06/04/24 0900     Glucose 76 mg/dL     POC Glucose Once [[id number]]  (Abnormal) Collected: 06/04/24 0830    Specimen: Blood Updated: 06/04/24 0831     Glucose 63 mg/dL     CBC & Differential [[id number]]  (Abnormal) Collected: 06/04/24 0730    Specimen: Blood Updated: 06/04/24 0813    Narrative:      The following orders were created for panel order CBC & Differential.  Procedure                               Abnormality         Status                     ---------                               -----------         ------                     CBC Auto Differential[[id number]]        Abnormal             Final result                 Please view results for these tests on the individual orders.    Manual Differential [[id number]]  (Abnormal) Collected: 06/04/24 0730    Specimen: Blood Updated: 06/04/24 0813     Neutrophil % 90.0 %      Lymphocyte % 5.0 %      Monocyte % 4.0 %      Eosinophil % 0.0 %      Basophil % 1.0 %      Neutrophils Absolute 19.04 10*3/mm3      Lymphocytes Absolute 1.06 10*3/mm3      Monocytes Absolute 0.85 10*3/mm3      Eosinophils Absolute 0.00 10*3/mm3      Basophils Absolute 0.21 10*3/mm3      RBC Morphology Normal     WBC Morphology Normal     Platelet Morphology Normal    CBC Auto Differential [[id number]]  (Abnormal) Collected: 06/04/24 0730    Specimen: Blood Updated: 06/04/24 0813     WBC 21.15 10*3/mm3      RBC 4.11 10*6/mm3      Hemoglobin 12.2 g/dL      Hematocrit 35.4 %      MCV 86.1 fL      MCH 29.7 pg      MCHC 34.5 g/dL      RDW 13.1 %      RDW-SD 41.1 fl      MPV 12.2 fL      Platelets 41 10*3/mm3     Magnesium [[id number]]  (Normal) Collected: 06/04/24 0730    Specimen: Blood Updated: 06/04/24 0805     Magnesium 1.8 mg/dL     Comprehensive Metabolic Panel [[id number]]  (Abnormal) Collected: 06/04/24 0730    Specimen: Blood Updated: 06/04/24 0805     Glucose 140 mg/dL      BUN 24 mg/dL      Creatinine 1.31 mg/dL      Sodium 142 mmol/L      Potassium 4.2 mmol/L      Comment: Slight hemolysis detected by analyzer. Result may be falsely elevated.        Chloride 114 mmol/L      CO2 18.4 mmol/L      Calcium 8.5 mg/dL      Total Protein 4.7 g/dL      Albumin 2.6 g/dL      ALT (SGPT) 81 U/L      AST (SGOT) 40 U/L      Alkaline Phosphatase 167 U/L      Total Bilirubin 0.8 mg/dL      Globulin 2.1 gm/dL      A/G Ratio 1.2 g/dL      BUN/Creatinine Ratio 18.3     Anion Gap 9.6 mmol/L      eGFR 40.0 mL/min/1.73     Narrative:      GFR Normal >60  Chronic Kidney Disease <60  Kidney Failure <15    The GFR formula is only valid for adults with stable renal function between ages 18 and  70.    Phosphorus [[id number]]  (Abnormal) Collected: 06/04/24 0730    Specimen: Blood Updated: 06/04/24 0803     Phosphorus 2.2 mg/dL     POC Glucose Once [[id number]]  (Abnormal) Collected: 06/04/24 0801    Specimen: Blood Updated: 06/04/24 0801     Glucose 56 mg/dL     POC Glucose Once [[id number]]  (Abnormal) Collected: 06/04/24 0739    Specimen: Blood Updated: 06/04/24 0742     Glucose 56 mg/dL     POC Glucose Once [[id number]]  (Normal) Collected: 06/04/24 0713    Specimen: Blood Updated: 06/04/24 0714     Glucose 74 mg/dL     POC Glucose Once [[id number]]  (Normal) Collected: 06/04/24 0710    Specimen: Blood Updated: 06/04/24 0712     Glucose 85 mg/dL     POC Glucose Once [[id number]]  (Abnormal) Collected: 06/04/24 0702    Specimen: Blood Updated: 06/04/24 0704     Glucose 41 mg/dL     Blood Culture - Blood, Arm, Left [[id number]]  (Abnormal) Collected: 06/02/24 0100    Specimen: Blood from Arm, Left Updated: 06/04/24 0702     Blood Culture Escherichia coli ESBL     Comment:   Consider infectious disease consult.  Susceptibility results may not correlate to clinical outcomes.  For ESBL-producing infections in the blood, a carbapenem is recommended as first-line therapy for optimal clinical outcomes.        Isolated from Aerobic Bottle     Gram Stain Aerobic Bottle Gram negative bacilli    Narrative:      Refer to previous blood culture collected on 06/02/2024 0100 for MICS.      Blood Culture - Blood, Arm, Left [[id number]]  (Abnormal)  (Susceptibility) Collected: 06/02/24 0100    Specimen: Blood from Arm, Left Updated: 06/04/24 0702     Blood Culture Escherichia coli ESBL     Comment:   Consider infectious disease consult.  Susceptibility results may not correlate to clinical outcomes.  For ESBL-producing infections in the blood, a carbapenem is recommended as first-line therapy for optimal clinical outcomes.        Isolated from Aerobic and Anaerobic Bottles     Gram Stain Anaerobic Bottle Gram negative bacilli       Aerobic Bottle Gram negative bacilli    Narrative:      Less than seven (7) mL's of blood was collected.  Insufficient quantity may yield false negative results.    Susceptibility        Escherichia coli ESBL      CHAN      Ertapenem Susceptible      Levofloxacin Resistant      Meropenem Susceptible      Trimethoprim + Sulfamethoxazole Susceptible                       Susceptibility Comments       Escherichia coli ESBL    Cefazolin sensitivity will not be reported for Enterobacteriaceae in non-urine isolates. If cefazolin is preferred, please call the microbiology lab to request an E-test.  With the exception of urinary-sourced infections, aminoglycosides should not be used as monotherapy.               POC Glucose Once [[id number]]  (Normal) Collected: 06/04/24 0535    Specimen: Blood Updated: 06/04/24 0536     Glucose 105 mg/dL     POC Glucose Once [[id number]]  (Normal) Collected: 06/04/24 0435    Specimen: Blood Updated: 06/04/24 0436     Glucose 102 mg/dL     POC Glucose Once [[id number]]  (Normal) Collected: 06/04/24 0334    Specimen: Blood Updated: 06/04/24 0335     Glucose 77 mg/dL     POC Glucose Once [[id number]]  (Normal) Collected: 06/04/24 0305    Specimen: Blood Updated: 06/04/24 0306     Glucose 82 mg/dL     POC Glucose Once [[id number]]  (Abnormal) Collected: 06/04/24 0300    Specimen: Blood Updated: 06/04/24 0301     Glucose 62 mg/dL     POC Glucose Once [[id number]]  (Normal) Collected: 06/04/24 0238    Specimen: Blood Updated: 06/04/24 0240     Glucose 105 mg/dL     POC Glucose Once [[id number]]  (Normal) Collected: 06/04/24 0200    Specimen: Blood Updated: 06/04/24 0201     Glucose 74 mg/dL     POC Glucose Once [[id number]]  (Abnormal) Collected: 06/04/24 0137    Specimen: Blood Updated: 06/04/24 0139     Glucose 162 mg/dL     POC Glucose Once [[id number]]  (Normal) Collected: 06/04/24 0136    Specimen: Blood Updated: 06/04/24 0139     Glucose 98 mg/dL     POC Glucose Once [[id number]]  (Normal)  Collected: 06/04/24 0053    Specimen: Blood Updated: 06/04/24 0054     Glucose 72 mg/dL     POC Glucose Once [[id number]]  (Normal) Collected: 06/03/24 2352    Specimen: Blood Updated: 06/03/24 2353     Glucose 78 mg/dL     POC Glucose Once [[id number]]  (Normal) Collected: 06/03/24 2155    Specimen: Blood Updated: 06/03/24 2156     Glucose 118 mg/dL     POC Glucose Once [[id number]]  (Normal) Collected: 06/03/24 2011    Specimen: Blood Updated: 06/03/24 2012     Glucose 93 mg/dL     POC Glucose Once [[id number]]  (Normal) Collected: 06/03/24 1559    Specimen: Blood Updated: 06/03/24 1600     Glucose 84 mg/dL     POC Glucose Once [[id number]]  (Normal) Collected: 06/03/24 1053    Specimen: Blood Updated: 06/03/24 1055     Glucose 111 mg/dL     POC Glucose Once [[id number]]  (Normal) Collected: 06/03/24 1032    Specimen: Blood Updated: 06/03/24 1043     Glucose 127 mg/dL     POC Glucose Once [[id number]]  (Abnormal) Collected: 06/03/24 0911    Specimen: Blood Updated: 06/03/24 0922     Glucose 66 mg/dL     CBC & Differential [[id number]]  (Abnormal) Collected: 06/03/24 0636    Specimen: Blood Updated: 06/03/24 0804    Narrative:      The following orders were created for panel order CBC & Differential.  Procedure                               Abnormality         Status                     ---------                               -----------         ------                     CBC Auto Differential[[id number]]        Abnormal            Final result                 Please view results for these tests on the individual orders.    CBC Auto Differential [[id number]]  (Abnormal) Collected: 06/03/24 0636    Specimen: Blood Updated: 06/03/24 0804     WBC 20.15 10*3/mm3      RBC 3.86 10*6/mm3      Hemoglobin 11.5 g/dL      Hematocrit 34.8 %      MCV 90.2 fL      MCH 29.8 pg      MCHC 33.0 g/dL      RDW 13.2 %      RDW-SD 43.4 fl      MPV 11.7 fL      Platelets 57 10*3/mm3     Manual Differential [[id number]]  (Abnormal) Collected:  06/03/24 0636    Specimen: Blood Updated: 06/03/24 0804     Neutrophil % 94.0 %      Lymphocyte % 1.0 %      Monocyte % 4.0 %      Eosinophil % 0.0 %      Basophil % 0.0 %      Metamyelocyte % 1.0 %      Neutrophils Absolute 18.94 10*3/mm3      Lymphocytes Absolute 0.20 10*3/mm3      Monocytes Absolute 0.81 10*3/mm3      Eosinophils Absolute 0.00 10*3/mm3      Basophils Absolute 0.00 10*3/mm3      RBC Morphology Normal     WBC Morphology Normal     Platelet Morphology Normal    POC Glucose Once [[id number]]  (Normal) Collected: 06/03/24 0718    Specimen: Blood Updated: 06/03/24 0720     Glucose 94 mg/dL     STAT Lactic Acid, Reflex [[id number]]  (Abnormal) Collected: 06/03/24 0636    Specimen: Blood Updated: 06/03/24 0712     Lactate 4.8 mmol/L     Magnesium [[id number]]  (Normal) Collected: 06/03/24 0636    Specimen: Blood Updated: 06/03/24 0712     Magnesium 1.7 mg/dL     Basic Metabolic Panel [[id number]]  (Abnormal) Collected: 06/03/24 0636    Specimen: Blood Updated: 06/03/24 0712     Glucose 93 mg/dL      BUN 35 mg/dL      Creatinine 1.69 mg/dL      Sodium 141 mmol/L      Potassium 4.8 mmol/L      Comment: Slight hemolysis detected by analyzer. Result may be falsely elevated.        Chloride 112 mmol/L      CO2 14.4 mmol/L      Calcium 8.4 mg/dL      BUN/Creatinine Ratio 20.7     Anion Gap 14.6 mmol/L      eGFR 29.5 mL/min/1.73     Narrative:      GFR Normal >60  Chronic Kidney Disease <60  Kidney Failure <15    The GFR formula is only valid for adults with stable renal function between ages 18 and 70.    Phosphorus [[id number]]  (Normal) Collected: 06/03/24 0636    Specimen: Blood Updated: 06/03/24 0706     Phosphorus 2.5 mg/dL     POC Glucose Once [[id number]]  (Normal) Collected: 06/03/24 0631    Specimen: Blood Updated: 06/03/24 0633     Glucose 75 mg/dL     POC Glucose Once [[id number]]  (Normal) Collected: 06/03/24 0604    Specimen: Blood Updated: 06/03/24 0607     Glucose 78 mg/dL     POC Glucose Once  [[id number]]  (Normal) Collected: 06/03/24 0530    Specimen: Blood Updated: 06/03/24 0531     Glucose 87 mg/dL     POC Glucose Once [[id number]]  (Normal) Collected: 06/03/24 0502    Specimen: Blood Updated: 06/03/24 0503     Glucose 86 mg/dL     POC Glucose Once [[id number]]  (Normal) Collected: 06/03/24 0439    Specimen: Blood Updated: 06/03/24 0440     Glucose 89 mg/dL     POC Glucose Once [[id number]]  (Normal) Collected: 06/03/24 0402    Specimen: Blood Updated: 06/03/24 0404     Glucose 113 mg/dL     POC Glucose Once [[id number]]  (Abnormal) Collected: 06/03/24 0335    Specimen: Blood Updated: 06/03/24 0343     Glucose 144 mg/dL     POC Glucose Once [[id number]]  (Abnormal) Collected: 06/03/24 0304    Specimen: Blood Updated: 06/03/24 0306     Glucose 162 mg/dL     POC Glucose Once [[id number]]  (Normal) Collected: 06/03/24 0235    Specimen: Blood Updated: 06/03/24 0237     Glucose 130 mg/dL     POC Glucose Once [[id number]]  (Abnormal) Collected: 06/03/24 0228    Specimen: Blood Updated: 06/03/24 0229     Glucose 67 mg/dL     POC Glucose Once [[id number]]  (Abnormal) Collected: 06/03/24 0215    Specimen: Blood Updated: 06/03/24 0216     Glucose 52 mg/dL     STAT Lactic Acid, Reflex [[id number]]  (Abnormal) Collected: 06/03/24 0035    Specimen: Blood Updated: 06/03/24 0137     Lactate 5.9 mmol/L     POC Glucose Once [[id number]]  (Normal) Collected: 06/02/24 2358    Specimen: Blood Updated: 06/03/24 0000     Glucose 98 mg/dL     POC Glucose Once [[id number]]  (Normal) Collected: 06/02/24 2319    Specimen: Blood Updated: 06/02/24 2320     Glucose 82 mg/dL     POC Glucose Once [[id number]]  (Normal) Collected: 06/02/24 2313    Specimen: Blood Updated: 06/02/24 2316     Glucose 108 mg/dL     POC Glucose Once [[id number]]  (Normal) Collected: 06/02/24 2136    Specimen: Blood Updated: 06/02/24 2137     Glucose 73 mg/dL     STAT Lactic Acid, Reflex [[id number]]  (Abnormal) Collected: 06/02/24 1722    Specimen: Blood Updated:  "06/02/24 1754     Lactate 4.0 mmol/L     POC Glucose Once [[id number]]  (Abnormal) Collected: 06/02/24 1720    Specimen: Blood Updated: 06/02/24 1731     Glucose 132 mg/dL     POC Glucose Once [[id number]]  (Normal) Collected: 06/02/24 1544    Specimen: Blood Updated: 06/02/24 1547     Glucose 110 mg/dL     Procalcitonin [[id number]]  (Abnormal) Collected: 06/02/24 1356    Specimen: Blood from Arm, Left Updated: 06/02/24 1521     Procalcitonin 208.80 ng/mL     Narrative:      As a Marker for Sepsis (Non-Neonates):    1. <0.5 ng/mL represents a low risk of severe sepsis and/or septic shock.  2. >2 ng/mL represents a high risk of severe sepsis and/or septic shock.    As a Marker for Lower Respiratory Tract Infections that require antibiotic therapy:    PCT on Admission    Antibiotic Therapy       6-12 Hrs later    >0.5                Strongly Recommended  >0.25 - <0.5        Recommended   0.1 - 0.25          Discouraged              Remeasure/reassess PCT  <0.1                Strongly Discouraged     Remeasure/reassess PCT    As 28 day mortality risk marker: \"Change in Procalcitonin Result\" (>80% or <=80%) if Day 0 (or Day 1) and Day 4 values are available. Refer to http://[email or web address]-[email or web address]    Change in PCT <=80%  A decrease of PCT levels below or equal to 80% defines a positive change in PCT test result representing a higher risk for 28-day all-cause mortality of patients diagnosed with severe sepsis for septic shock.    Change in PCT >80%  A decrease of PCT levels of more than 80% defines a negative change in PCT result representing a lower risk for 28-day all-cause mortality of patients diagnosed with severe sepsis or septic shock.       POC Glucose Once [[id number]]  (Abnormal) Collected: 06/02/24 1516    Specimen: Blood Updated: 06/02/24 1519     Glucose 40 mg/dL     CBC & Differential [[id number]]  (Abnormal) Collected: 06/02/24 1354    Specimen: Blood from Arm, Left Updated: 06/02/24 1501    Narrative:   "    The following orders were created for panel order CBC & Differential.  Procedure                               Abnormality         Status                     ---------                               -----------         ------                     CBC Auto Differential[[id number]]        Abnormal            Final result               Scan Slide[[id number]]                                       Final result                 Please view results for these tests on the individual orders.    Scan Slide [[id number]] Collected: 06/02/24 1354    Specimen: Blood from Arm, Left Updated: 06/02/24 1501     Dohle Bodies Present     Vacuolated Neutrophils Slight/1+    Manual Differential [[id number]]  (Abnormal) Collected: 06/02/24 1354    Specimen: Blood from Arm, Left Updated: 06/02/24 1500     Neutrophil % 82.0 %      Lymphocyte % 3.0 %      Monocyte % 4.0 %      Eosinophil % 0.0 %      Basophil % 0.0 %      Metamyelocyte % 6.0 %      Myelocyte % 5.0 %      Neutrophils Absolute 15.12 10*3/mm3      Lymphocytes Absolute 0.55 10*3/mm3      Monocytes Absolute 0.74 10*3/mm3      Eosinophils Absolute 0.00 10*3/mm3      Basophils Absolute 0.00 10*3/mm3      nRBC 1.0 /100 WBC      RBC Morphology Normal     Dohle Bodies Present     Vacuolated Neutrophils Slight/1+     Platelet Morphology Normal    CBC Auto Differential [[id number]]  (Abnormal) Collected: 06/02/24 1354    Specimen: Blood from Arm, Left Updated: 06/02/24 1500     WBC 18.44 10*3/mm3      RBC 3.73 10*6/mm3      Hemoglobin 10.8 g/dL      Hematocrit 34.3 %      MCV 92.0 fL      MCH 29.0 pg      MCHC 31.5 g/dL      RDW 13.2 %      RDW-SD 44.9 fl      MPV 11.4 fL      Platelets 55 10*3/mm3      Comment:          Blood Culture ID, PCR - Blood, Arm, Left [[id number]]  (Abnormal) Collected: 06/02/24 0100    Specimen: Blood from Arm, Left Updated: 06/02/24 1451     BCID, PCR Escherichia coli. Identification by BCID2 PCR.     BCID, PCR 2 CTX-M (ESBL) detected. Identification by BCID2 PCR      BOTTLE TYPE Anaerobic Bottle    Comprehensive Metabolic Panel [[id number]]  (Abnormal) Collected: 06/02/24 1356    Specimen: Blood from Arm, Left Updated: 06/02/24 1429     Glucose 50 mg/dL      BUN 33 mg/dL      Creatinine 1.90 mg/dL      Sodium 146 mmol/L      Potassium 2.9 mmol/L      Comment: Slight hemolysis detected by analyzer. Result may be falsely elevated.        Chloride 117 mmol/L      CO2 17.0 mmol/L      Calcium 7.4 mg/dL      Total Protein 4.7 g/dL      Albumin 3.0 g/dL      ALT (SGPT) 140 U/L      AST (SGOT) 84 U/L      Alkaline Phosphatase 80 U/L      Total Bilirubin 0.6 mg/dL      Globulin 1.7 gm/dL      A/G Ratio 1.8 g/dL      BUN/Creatinine Ratio 17.4     Anion Gap 12.0 mmol/L      eGFR 25.6 mL/min/1.73     Narrative:      GFR Normal >60  Chronic Kidney Disease <60  Kidney Failure <15    The GFR formula is only valid for adults with stable renal function between ages 18 and 70.    STAT Lactic Acid, Reflex [[id number]]  (Abnormal) Collected: 06/02/24 1354    Specimen: Blood from Arm, Left Updated: 06/02/24 1429     Lactate 5.6 mmol/L     Vancomycin, Random [[id number]]  (Normal) Collected: 06/02/24 1356    Specimen: Blood from Arm, Left Updated: 06/02/24 1427     Vancomycin Random 9.70 mcg/mL     Narrative:      Therapeutic Ranges for Vancomycin    Vancomycin Random   5.0-40.0 mcg/mL  Vancomycin Trough   5.0-20.0 mcg/mL  Vancomycin Peak     20.0-40.0 mcg/mL    POC Glucose Once [[id number]]  (Normal) Collected: 06/02/24 1414    Specimen: Blood Updated: 06/02/24 1416     Glucose 74 mg/dL     POC Glucose Once [[id number]]  (Abnormal) Collected: 06/02/24 1412    Specimen: Blood Updated: 06/02/24 1416     Glucose 50 mg/dL     POC Glucose Once [[id number]]  (Abnormal) Collected: 06/02/24 1342    Specimen: Blood Updated: 06/02/24 1344     Glucose 54 mg/dL     POC Glucose Once [[id number]]  (Normal) Collected: 06/02/24 1241    Specimen: Blood Updated: 06/02/24 1242     Glucose 72 mg/dL     POC Glucose Once  [[id number]]  (Abnormal) Collected: 06/02/24 1122    Specimen: Blood Updated: 06/02/24 1144     Glucose 181 mg/dL     POC Glucose Once [[id number]]  (Abnormal) Collected: 06/02/24 1107    Specimen: Blood Updated: 06/02/24 1118     Glucose 56 mg/dL     POC Glucose Once [[id number]]  (Normal) Collected: 06/02/24 0853    Specimen: Blood Updated: 06/02/24 0854     Glucose 95 mg/dL     POC Glucose Once [[id number]]  (Abnormal) Collected: 06/02/24 0832    Specimen: Blood Updated: 06/02/24 0834     Glucose 40 mg/dL     POC Glucose Once [[id number]]  (Abnormal) Collected: 06/02/24 0829    Specimen: Blood Updated: 06/02/24 0834     Glucose 43 mg/dL     STAT Lactic Acid, Reflex [[id number]]  (Abnormal) Collected: 06/02/24 0430    Specimen: Blood Updated: 06/02/24 0509     Lactate 9.0 mmol/L     Lactic Acid, Plasma [[id number]]  (Abnormal) Collected: 06/02/24 0226    Specimen: Blood from Hand, Right Updated: 06/02/24 0302     Lactate 10.2 mmol/L     Urinalysis, Microscopic Only - Urine, Catheter [[id number]]  (Abnormal) Collected: 06/02/24 0134    Specimen: Urine, Catheter Updated: 06/02/24 0159     RBC, UA 21-50 /HPF      WBC, UA 21-50 /HPF      Bacteria, UA 1+ /HPF      Squamous Epithelial Cells, UA 3-6 /HPF      Hyaline Casts, UA 3-6 /LPF      Methodology Manual Light Microscopy    Urinalysis With Culture If Indicated - Urine, Catheter [[id number]]  (Abnormal) Collected: 06/02/24 0134    Specimen: Urine, Catheter Updated: 06/02/24 0153     Color, UA Yellow     Appearance, UA Cloudy     pH, UA 5.5     Specific Gravity, UA 1.009     Glucose, UA Negative     Ketones, UA Negative     Bilirubin, UA Negative     Blood, UA Large (3+)     Protein,  mg/dL (2+)     Leuk Esterase, UA Moderate (2+)     Nitrite, UA Negative     Urobilinogen, UA 1.0 E.U./dL    Narrative:      In absence of clinical symptoms, the presence of pyuria, bacteria, and/or nitrites on the urinalysis result does not correlate with infection.    C-reactive Protein  [[id number]]  (Abnormal) Collected: 06/01/24 2113    Specimen: Blood Updated: 06/01/24 2333     C-Reactive Protein 2.90 mg/dL     Sedimentation Rate [[id number]]  (Normal) Collected: 06/01/24 2113    Specimen: Blood Updated: 06/01/24 2322     Sed Rate 8 mm/hr     Manual Differential [[id number]]  (Abnormal) Collected: 06/01/24 2113    Specimen: Blood Updated: 06/01/24 2157     Neutrophil % 94.0 %      Lymphocyte % 0.0 %      Monocyte % 0.0 %      Eosinophil % 0.0 %      Basophil % 0.0 %      Metamyelocyte % 5.0 %      Myelocyte % 1.0 %      Neutrophils Absolute 16.36 10*3/mm3      Lymphocytes Absolute 0.00 10*3/mm3      Monocytes Absolute 0.00 10*3/mm3      Eosinophils Absolute 0.00 10*3/mm3      Basophils Absolute 0.00 10*3/mm3      RBC Morphology Normal     WBC Morphology Normal     Platelet Morphology Normal    Comprehensive Metabolic Panel [[id number]]  (Abnormal) Collected: 06/01/24 2113    Specimen: Blood Updated: 06/01/24 2145     Glucose 96 mg/dL      BUN 26 mg/dL      Creatinine 1.60 mg/dL      Sodium 144 mmol/L      Potassium 3.5 mmol/L      Chloride 108 mmol/L      CO2 18.5 mmol/L      Calcium 10.1 mg/dL      Total Protein 6.1 g/dL      Albumin 3.9 g/dL      ALT (SGPT) 281 U/L      AST (SGOT) 274 U/L      Alkaline Phosphatase 79 U/L      Total Bilirubin 0.7 mg/dL      Globulin 2.2 gm/dL      A/G Ratio 1.8 g/dL      BUN/Creatinine Ratio 16.3     Anion Gap 17.5 mmol/L      eGFR 31.5 mL/min/1.73     Narrative:      GFR Normal >60  Chronic Kidney Disease <60  Kidney Failure <15    The GFR formula is only valid for adults with stable renal function between ages 18 and 70.    CBC & Differential [[id number]]  (Abnormal) Collected: 06/01/24 2113    Specimen: Blood Updated: 06/01/24 2142    Narrative:      The following orders were created for panel order CBC & Differential.  Procedure                               Abnormality         Status                     ---------                               -----------          ------                     CBC Auto Differential[[id number]]        Abnormal            Final result                 Please view results for these tests on the individual orders.    CBC Auto Differential [[id number]]  (Abnormal) Collected: 06/01/24 2113    Specimen: Blood Updated: 06/01/24 2142     WBC 17.40 10*3/mm3      RBC 4.60 10*6/mm3      Hemoglobin 13.6 g/dL      Hematocrit 41.0 %      MCV 89.1 fL      MCH 29.6 pg      MCHC 33.2 g/dL      RDW 12.8 %      RDW-SD 41.8 fl      MPV 10.3 fL      Platelets 140 10*3/mm3             Imaging Results (All)       Procedure Component Value Units Date/Time    FL C Arm During Surgery [[id number]] Resulted: 06/04/24 0942     Updated: 06/04/24 0942    Narrative:      This procedure was auto-finalized with no dictation required.    MRI Lumbar Spine With & Without Contrast [[id number]] Collected: 06/02/24 1259     Updated: 06/02/24 1357    Narrative:      MRI LUMBAR SPINE WITH AND WITHOUT CONTRAST     History: Dementia. Possible osteomyelitis at L4-5 on CT 06/01/2024     TECHNIQUE: MRI lumbar spine includes sagittal PD, T1, T2, STIR as well  as axial T1, T2 weighted sequences.  Contrast was administered IV  followed by axial and sagittal T1 weighted sequences.     COMPARISON: CT abdomen and pelvis 06/01/2024, CT lumbar spine  06/01/2024.     FINDINGS: There is transitional lumbosacral anatomy with partial  sacralization of what is termed L5. Conus medullaris tip terminates at  L1-2 level.     At T12-L1, there is advanced degenerative disc disease with disc space  narrowing and there is a broad disc osteophyte narrowing of the central  canal. Disc bulge eccentric to the left and extends into the inferior  aspect of the left neural foramen in conjunction with facet arthritis  contributes to moderate to severe left and moderate right foraminal  narrowing. Disc appears to contact the undersurface exiting left T12  nerve.     At L1-2, there is a broad disc bulge and there is mild  facet arthritis.  Central canal and neural foramina are patent.     At L2-3, there is a broad disc bulge and there is bilateral facet  arthritis with ligamentous thickening and very mild narrowing of the  central canal. Patent neural foramina.     At L3-4, there is disc space narrowing with 8 mm anterolisthesis L3 on  L4. Uncovered disc bulge is present and there is advanced bilateral  facet arthritis facet joint fluid. There is severe central canal and  lateral recess narrowing. There is also diminished femoral height with  moderate right and mild left foraminal narrowing and uncovered disc  bulge contacts undersurface the exiting L3 nerves.     At L4-5, there is partial bony fusion of the vertebral bodies. Posterior  end plate spurring is present and there is moderate facet arthritis  though the central canal is patent. Mild foraminal narrowing.     At L5-S1, there is transitional anatomy with partial sacralization of  L5. Patent central canal and neural foramina.       Impression:      1. No evidence for disc space infection or osteomyelitis.  2. Multilevel degenerative disc disease with degree of canal stenosis  greatest at L3-4 where there is 8 mm anterolisthesis L3 on L4. Uncovered  disc bulge is present and there is bilateral facet arthritis with severe  central canal lateral recess narrowing as well as moderate right and  mild left foraminal narrowing.  3. Partial bony fusion of the vertebral bodies at L4-5.  4. At T12-L1, there is degenerative disc disease with disc space  narrowing and there is a disc bulge eccentric to the left extending into  the inferior aspect of the left neural foramen contributing to moderate  to severe left foraminal narrowing and disc appears to contact the  undersurface exiting left T12 nerve.     This report was finalized on 6/2/2024 1:54 PM by Dr. Ki Roque M.D on Workstation: [id number]       CT Abdomen Pelvis Without Contrast [[id number]] Collected: 06/01/24 6644      Updated: 06/01/24 2348    Narrative:      CT ABDOMEN PELVIS WO CONTRAST-     HISTORY: 84 years of age, Female.  Rule out ureteral stone     TECHNIQUE:  CT includes axial imaging from the lung bases to the  trochanters without intravenous contrast and without use of oral  contrast. Data reconstructed in coronal and sagittal planes. Radiation  dose reduction techniques were utilized, including automated exposure  control and exposure modulation based on body size.     COMPARISON: CT abdomen and pelvis 02/22/2021.     FINDINGS: There is a 3 mm distal right ureteral stone at the horizontal  level of the upper sacrum. Moderate to severe right hydronephrosis is  present and there is right perinephric stranding.     Left lateral renal cyst measures 1.8 cm. No left renal or ureteral stone  is evident.     Heart size is enlarged. Calcified left basilar pleural plaques.  Dependent basilar atelectasis. Liver, spleen, pancreas appear within  normal limits. Adrenal glands appear normal. Gallbladder partially  decompressed.     Sigmoid diverticulosis without evidence for diverticulitis. CT lumbar  spine has been completed same date and has been reported separately.       Impression:      1. 3 mm distal right ureteral stone at the horizontal level of the upper  sacrum with moderate to severe right hydronephrosis and right  perinephric stranding.  2. Cardiomegaly. Basilar atelectasis. Sigmoid diverticulosis without  evidence for diverticulitis.     Radiation dose reduction techniques were utilized, including automated  exposure control and exposure modulation based on body size.        This report was finalized on 6/1/2024 11:45 PM by Dr. Ki Roque M.D on Workstation: [id number]       CT Lumbar Spine Without Contrast [[id number]] Collected: 06/01/24 2252     Updated: 06/01/24 2305    Addenda:        ADDENDUM:  06 01 24 23:04 Call Doctor Regarding Osteomyelitis (if unsuspected),   called SHY Ashby on 06 01 23:04 (-04:00)       Signed: 06/01/24 5183 by Ivan Oro MD    Narrative:      CR  Patient: ABDIRASHID TA  Time Out: 22:51  Exam(s): CT L SPINE     EXAM:    CT Lumbar Spine Without Intravenous Contrast    CLINICAL HISTORY:    Low back pain    TECHNIQUE:    Axial computed tomography images of the lumbar spine without   intravenous contrast.  CTDI is 30.33 mGy and DLP is 826.4 mGy-cm.  This   CT exam was performed according to the principle of ALARA (As Low As   Reasonably Achievable) by using one or more of the following dose   reduction techniques: automated exposure control, adjustment of the mA   and or kV according to patient size, and or use of iterative   reconstruction technique.    COMPARISON:    MRI dated 2 18 21    FINDINGS:    Vertebrae:  Irregularity of the inferior endplate of L5 and superior   endplate of S1.  Multilevel facet arthropathy.  8 mm anterolisthesis of   L4-L5.  There is transitional anatomy.  The L5 vertebral body is   sacralized.  No acute fracture.    Discs spinal canal neural foramina:  Multilevel disc height loss.    There is significant irregularity of the disc space at L4-L5.  Vacuum   disc phenomenon at multiple levels in the lumbar spine.  No spinal canal   stenosis.    Soft tissues: Mild right hydroureteronephrosis.  Possible nephroliths   in the distal ureter..    Vasculature:  The distal thecal sac is ectatic, unchanged from prior   MRI.    IMPRESSION:       There is transitional anatomy with sacralization of the L5 vertebral body.    Findings are suspicious for discitis osteomyelitis at the L4-L5 level.    Recommend contrast enhanced MRI to further evaluate.    Multilevel degenerative changes.  No evidence of high-grade spinal canal   narrowing.    Mild to moderate right hydroureteronephrosis with possible distal   ureteral stone, not ideally evaluated on this study..    Impression:            Communications:     Call Doctor Osteomyelitis (if unsuspected)    Electronically signed by Ivan  MD Shorty on 06-01-24 at 2251            Medication Review:   Current Facility-Administered Medications   Medication Dose Route Frequency Provider Last Rate Last Admin    acetaminophen (TYLENOL) tablet 650 mg  650 mg Oral Q4H PRN Gabe Guevara MD        Or    acetaminophen (TYLENOL) suppository 650 mg  650 mg Rectal Q4H PRN Gabe Guevara MD        aluminum-magnesium hydroxide-simethicone (MAALOX MAX) 400-400-40 MG/5ML suspension 15 mL  15 mL Oral Q6H PRN Gabe Guevara MD        sennosides-docusate (PERICOLACE) 8.6-50 MG per tablet 2 tablet  2 tablet Oral BID Gabe Guevara MD   2 tablet at 06/02/24 2142    And    polyethylene glycol (MIRALAX) packet 17 g  17 g Oral Daily PRN Gabe Guevara MD        And    bisacodyl (DULCOLAX) EC tablet 5 mg  5 mg Oral Daily PRN Gabe Guevara MD        And    bisacodyl (DULCOLAX) suppository 10 mg  10 mg Rectal Daily PRN Gabe Guevara MD        Calcium Replacement - Follow Nurse / BPA Driven Protocol   Does not apply PRN Gabe Guevara MD        dextrose (D50W) (25 g/50 mL) IV injection 25 g  25 g Intravenous Q15 Min PRN Gabe Guevara MD   25 g at 06/04/24 0706    dextrose (D50W) (25 g/50 mL) IV injection 50 mL  50 mL Intravenous Q1H PRN Gabe Guevara MD   50 mL at 06/04/24 1934    dextrose (GLUTOSE) oral gel 15 g  15 g Oral Q15 Min PRN Gabe Guevara MD        dextrose 5 % and sodium chloride 0.45 % infusion  75 mL/hr Intravenous Continuous Mark Alvarez MD 75 mL/hr at 06/05/24 0401 75 mL/hr at 06/05/24 0401    donepezil (ARICEPT) tablet 10 mg  10 mg Oral Daily Gabe Guevara MD   10 mg at 06/03/24 0929    ertapenem (INVanz) 1,000 mg in sodium chloride 0.9 % 100 mL MBP  1,000 mg Intravenous Q24H Devyn Corley  mL/hr at 06/04/24 1741 1,000 mg at 06/04/24 1741    glucagon (GLUCAGEN) injection 1 mg  1 mg Subcutaneous Q15 Min Gabe Benedict MD   1 mg at 06/04/24 0748    lactated ringers  bolus 1,000 mL  1,000 mL Intravenous Once Gabe Guevara MD        Magnesium Standard Dose Replacement - Follow Nurse / BPA Driven Protocol   Does not apply PRN Gabe Guevara MD        memantine (NAMENDA) tablet 10 mg  10 mg Oral BID Gabe Guevara MD   10 mg at 06/03/24 2143    nitroglycerin (NITROSTAT) SL tablet 0.4 mg  0.4 mg Sublingual Q5 Min PRN Gabe Guevara MD        ondansetron ODT (ZOFRAN-ODT) disintegrating tablet 4 mg  4 mg Oral Q6H PRN Gabe Guevara MD        Or    ondansetron (ZOFRAN) injection 4 mg  4 mg Intravenous Q6H PRN Gabe Guevara MD        Phosphorus Replacement - Follow Nurse / BPA Driven Protocol   Does not apply PRN Gabe Guevara MD        potassium phosphate 15 mmol in 0.9% normal saline 250 mL IVPB  15 mmol Intravenous Once Mark Alvarez MD        Potassium Replacement - Follow Nurse / BPA Driven Protocol   Does not apply PRN Gabe Guevara MD        sodium chloride 0.9 % flush 10 mL  10 mL Intravenous PRN Gabe Guevara MD        sodium chloride 0.9 % flush 10 mL  10 mL Intravenous PRN Gabe Guevara MD        sodium chloride 0.9 % flush 20 mL  20 mL Intravenous PRN Gabe Guevara MD        [Transfer Hold] sodium chloride 0.9 % infusion 40 mL  40 mL Intravenous PRN Courtney Salgado APRN           Current Facility-Administered Medications:     acetaminophen (TYLENOL) tablet 650 mg, 650 mg, Oral, Q4H PRN **OR** acetaminophen (TYLENOL) suppository 650 mg, 650 mg, Rectal, Q4H PRN, Gabe Guevara MD    aluminum-magnesium hydroxide-simethicone (MAALOX MAX) 400-400-40 MG/5ML suspension 15 mL, 15 mL, Oral, Q6H PRN, Gabe Guevara MD    sennosides-docusate (PERICOLACE) 8.6-50 MG per tablet 2 tablet, 2 tablet, Oral, BID, 2 tablet at 06/02/24 2142 **AND** polyethylene glycol (MIRALAX) packet 17 g, 17 g, Oral, Daily PRN **AND** bisacodyl (DULCOLAX) EC tablet 5 mg, 5 mg, Oral, Daily PRN **AND** bisacodyl (DULCOLAX) suppository  10 mg, 10 mg, Rectal, Daily PRN, Gabe Guevara MD    Calcium Replacement - Follow Nurse / BPA Driven Protocol, , Does not apply, PRN, Gabe Guevara MD    dextrose (D50W) (25 g/50 mL) IV injection 25 g, 25 g, Intravenous, Q15 Min PRN, Gabe Guevara MD, 25 g at 06/04/24 0706    dextrose (D50W) (25 g/50 mL) IV injection 50 mL, 50 mL, Intravenous, Q1H PRN, Gabe Guevara MD, 50 mL at 06/04/24 1934    dextrose (GLUTOSE) oral gel 15 g, 15 g, Oral, Q15 Min PRN, Gabe Guevara MD    dextrose 5 % and sodium chloride 0.45 % infusion, 75 mL/hr, Intravenous, Continuous, Mark Alvarez MD, Last Rate: 75 mL/hr at 06/05/24 0401, 75 mL/hr at 06/05/24 0401    donepezil (ARICEPT) tablet 10 mg, 10 mg, Oral, Daily, Gabe Guevara MD, 10 mg at 06/03/24 0929    ertapenem (INVanz) 1,000 mg in sodium chloride 0.9 % 100 mL MBP, 1,000 mg, Intravenous, Q24H, Devyn Corley MD, Last Rate: 200 mL/hr at 06/04/24 1741, 1,000 mg at 06/04/24 1741    glucagon (GLUCAGEN) injection 1 mg, 1 mg, Subcutaneous, Q15 Min PRN, Gabe Guevara MD, 1 mg at 06/04/24 0748    lactated ringers bolus 1,000 mL, 1,000 mL, Intravenous, Once, Gabe Guevara MD    Magnesium Standard Dose Replacement - Follow Nurse / BPA Driven Protocol, , Does not apply, PRIsmael AGARWAL Sandeep K, MD    memantine (NAMENDA) tablet 10 mg, 10 mg, Oral, BID, Gabe Guevara MD, 10 mg at 06/03/24 2143    nitroglycerin (NITROSTAT) SL tablet 0.4 mg, 0.4 mg, Sublingual, Q5 Min PRN, Gabe Guevara MD    ondansetron ODT (ZOFRAN-ODT) disintegrating tablet 4 mg, 4 mg, Oral, Q6H PRN **OR** ondansetron (ZOFRAN) injection 4 mg, 4 mg, Intravenous, Q6H PRN, Gabe Guevara MD    Phosphorus Replacement - Follow Nurse / BPA Driven Protocol, , Does not apply, PRN, Gabe Guevara MD    potassium phosphate 15 mmol in 0.9% normal saline 250 mL IVPB, 15 mmol, Intravenous, Once, Mark Alvarez MD    Potassium Replacement - Follow  Nurse / BPA Driven Protocol, , Does not apply, Ismael IVERSON Sandeep K, MD    [COMPLETED] Insert Peripheral IV, , , Once **AND** sodium chloride 0.9 % flush 10 mL, 10 mL, Intravenous, PRImsael AGARWAL Sandeep K, MD    sodium chloride 0.9 % flush 10 mL, 10 mL, Intravenous, PRN, Gabe Guevara MD    sodium chloride 0.9 % flush 20 mL, 20 mL, Intravenous, Ismael IVERSON Sandeep K, MD    [Transfer Hold] sodium chloride 0.9 % infusion 40 mL, 40 mL, Intravenous, PRN, Courtney Salgado APRN  Medications Discontinued During This Encounter   Medication Reason    Pharmacy Consult - Pharmacy to dose     Pharmacy to Dose LevoFLOXacin (LEVAQUIN)     sodium chloride 0.9 % flush 3 mL Patient Transfer    sodium chloride 0.9 % flush 3-10 mL Patient Transfer    lidocaine (XYLOCAINE) 1 % injection 0.5 mL Patient Transfer    fentaNYL citrate (PF) (SUBLIMAZE) injection 50 mcg Patient Transfer    Lidocaine HCl gel (XYLOCAINE) urethral/mucosal syringe Patient Discharge    sterile water irrigation solution Patient Discharge    HYDROmorphone (DILAUDID) injection 0.25 mg Patient Transfer    fentaNYL citrate (PF) (SUBLIMAZE) injection 25 mcg Patient Transfer    naloxone (NARCAN) injection 0.2 mg Patient Transfer    flumazenil (ROMAZICON) injection 0.2 mg Patient Transfer    ondansetron (ZOFRAN) injection 4 mg Patient Transfer    droperidol (INAPSINE) injection 0.625 mg Patient Transfer    droperidol (INAPSINE) injection 0.625 mg Patient Transfer    promethazine (PHENERGAN) suppository 25 mg Patient Transfer    promethazine (PHENERGAN) tablet 25 mg Patient Transfer    labetalol (NORMODYNE,TRANDATE) injection 5 mg Patient Transfer    hydrALAZINE (APRESOLINE) injection 5 mg Patient Transfer    ePHEDrine injection 5 mg Patient Transfer    diphenhydrAMINE (BENADRYL) injection 12.5 mg Patient Transfer    ipratropium-albuterol (DUO-NEB) nebulizer solution 3 mL Patient Transfer    cefTRIAXone (ROCEPHIN) 2,000 mg in sodium chloride 0.9 % 100 mL MBP      LORazepam (ATIVAN) tablet 1 mg     cefTRIAXone (ROCEPHIN) 2,000 mg in sodium chloride 0.9 % 100 mL MBP     Pharmacy to dose vancomycin     Vancomycin Pharmacy Intermittent/Pulse Dosing     vancomycin 750 mg in sodium chloride 0.9 % 250 mL IVPB-VTB     Potassium Replacement - Follow Nurse / BPA Driven Protocol     dextrose 10 % infusion     dextrose 10 % infusion     lidocaine (LIDODERM) 5 % *Therapy completed    HYDROmorphone (DILAUDID) injection 0.5 mg     naloxone (NARCAN) injection 0.4 mg     nitroglycerin (NITROSTAT) SL tablet 0.4 mg     acetaminophen (TYLENOL) tablet 650 mg     acetaminophen (TYLENOL) 160 MG/5ML oral solution 650 mg     acetaminophen (TYLENOL) suppository 650 mg     sennosides-docusate (PERICOLACE) 8.6-50 MG per tablet 2 tablet     polyethylene glycol (MIRALAX) packet 17 g     bisacodyl (DULCOLAX) EC tablet 5 mg     bisacodyl (DULCOLAX) suppository 10 mg     ondansetron (ZOFRAN) injection 4 mg     lactated ringers infusion     norepinephrine (LEVOPHED) 8 mg in 250 mL NS infusion (premix)     ertapenem (INVanz) 500 mg in sodium chloride 0.9 % 50 mL IVPB     heparin (porcine) 5000 UNIT/ML injection 5,000 Units     sodium chloride 0.9 % flush 10 mL     sodium chloride 0.9 % flush 10 mL     lactated ringers infusion     sodium chloride 0.9 % flush 10 mL     sodium chloride 0.9 % flush 10 mL     sodium chloride 0.9 % flush 10 mL     dextrose 10 % infusion        Assessment & Plan         Right ureteral stone    PRINCE (acute kidney injury)    Acute low back pain    Lumbar spinal stenosis    CAYLA (dementia of Alzheimer type)    Chronic bilateral low back pain without sciatica    Discitis of lumbar region    Acute UTI (urinary tract infection)    Hypoglycemia    Abnormal LFTs        Plan   - WBC count remains elevated despite stent  - continue IV ertapenem for ESBL+ E coli  - recommend CT abd/pelvis with/without contrast to evaluate for abscess    William Miller Jr., MD  06/05/24  08:04 EDT

## 2024-06-05 NOTE — THERAPY EVALUATION
Patient Name: Kimberlee Urrutia  : 1940    MRN: [id number]                              Today's Date: 2024       Admit Date: 2024    Visit Dx:     ICD-10-CM ICD-9-CM   1. Right ureteral stone  N20.1 592.1   2. Acute UTI  N39.0 599.0   3. Severe sepsis  A41.9 038.9    R65.20 995.92   4. Elevated lactic acid level  R79.89 276.2   5. Osteomyelitis of lumbar spine  M46.26 730.28   6. Elevated AST (SGOT)  R74.01 790.4   7. Elevated ALT measurement  R74.01 790.4   8. Elevated C-reactive protein (CRP)  R79.82 790.95   9. PRINCE (acute kidney injury)  N17.9 584.9     Patient Active Problem List   Diagnosis    Arthritis    HLD (hyperlipidemia)    Primary hypertension    Health care maintenance    Knee pain, right    Hx of total knee arthroplasty    Seasonal allergies    Vitamin D deficiency    Pyuria    Transient alteration of awareness    Migraine without status migrainosus, not intractable    Leukocytosis    Viral pharyngitis    Multinodular thyroid    Osteopenia of multiple sites    Colon cancer screening    Serum calcium elevated    PRINCE (acute kidney injury)    Acute low back pain    Elevated procalcitonin    Sepsis due to Escherichia coli with acute renal failure without septic shock    Recurrent oral herpes simplex    Lumbar spinal stenosis    Metabolic encephalopathy    Renal stones    Colon cancer screening    Sacroiliac joint pain    Lumbar stenosis without neurogenic claudication    Renal stone    Hyperlipidemia    History of total knee arthroplasty    Spinal stenosis of lumbar region    Multinodular goiter    Osteopenia    Pain in right knee    Sacroiliac joint pain    Hypercalcemia    CAYLA (dementia of Alzheimer type)    Chronic bilateral low back pain without sciatica    Right ureteral stone    Discitis of lumbar region    Acute UTI (urinary tract infection)    Hypoglycemia    Abnormal LFTs     Past Medical History:   Diagnosis Date    Allergic     Arthritis     Colon cancer screening 2020     Colon cancer screening 01/29/2020    Cough     Diarrhea     Hx of migraine headaches     Hyperlipidemia     Irregular heart beat     Osteoarthritis     Renal stones 02/2021    Vitamin D deficiency      Past Surgical History:   Procedure Laterality Date    COLONOSCOPY      CYSTOSCOPY W/ URETERAL STENT PLACEMENT Right 6/2/2024    Procedure: CYSTOSCOPY, RIGHT URETERAL STENT PLACEMENT;  Surgeon: Efra Schofield MD;  Location: [facility] OR;  Service: Urology;  Laterality: Right;    LUMBAR EPIDURAL INJECTION N/A 8/2/2021    Procedure: LUMBAR EPIDURAL 1ST VISIT 5-1;  Surgeon: Nu Partida MD;  Location: [facility] MAIN OR;  Service: Pain Management;  Laterality: N/A;    REPLACEMENT TOTAL KNEE Right 01/2015    Josue Wilson MD    SACROILIAC JOINT INJECTION Left 6/16/2021    Procedure: SACROILIAC INJECTION left;  Surgeon: Nu Partida MD;  Location: [facility] MAIN OR;  Service: Pain Management;  Laterality: Left;    TUBAL ABDOMINAL LIGATION      URETEROSCOPY LASER LITHOTRIPSY WITH STENT INSERTION Left 2/23/2021    Procedure: LT.URETEROSCOPY LASER LITHOTRIPSY WITH STENT  FOR STONE;  Surgeon: Arnaud Lu MD;  Location: [facility] MAIN OR;  Service: Urology;  Laterality: Left;    WISDOM TOOTH EXTRACTION        General Information       Row Name 06/05/24 1629          Physical Therapy Time and Intention    Document Type evaluation  -EM     Mode of Treatment individual therapy;physical therapy  -EM       Row Name 06/05/24 1621          General Information    Patient Profile Reviewed yes  -EM     Prior Level of Function independent:  pt from assisted living facility, normally ambulates without AD  -EM     Existing Precautions/Restrictions fall  -EM       Row Name 06/05/24 1629          Living Environment    People in Home alone;facility resident  lives in assisted living  -EM       Row Name 06/05/24 1629          Cognition    Orientation Status (Cognition) oriented x 3  -EM       Row Name 06/05/24 6672           Safety Issues, Functional Mobility    Impairments Affecting Function (Mobility) balance;endurance/activity tolerance;strength;postural/trunk control  -EM               User Key  (r) = Recorded By, (t) = Taken By, (c) = Cosigned By      Initials Name Provider Type    Yvette Helms PT Physical Therapist                   Mobility       Row Name 06/05/24 1634          Bed Mobility    Bed Mobility supine-sit;sit-supine  -EM     Supine-Sit [place] (Bed Mobility) moderate assist (50% patient effort);2 person assist  -EM     Sit-Supine [place] (Bed Mobility) moderate assist (50% patient effort);2 person assist  -EM     Assistive Device (Bed Mobility) head of bed elevated  -EM       Row Name 06/05/24 1634          Sit-Stand Transfer    Sit-Stand [place] (Transfers) moderate assist (50% patient effort);2 person assist  -EM     Assistive Device (Sit-Stand Transfers) walker, front-wheeled  -EM     Comment, (Sit-Stand Transfer) 2 standing attempts at the bedside, pt able to stand but difficulty achieve full upright standing, unable to weight shift  -EM               User Key  (r) = Recorded By, (t) = Taken By, (c) = Cosigned By      Initials Name Provider Type    Yvette Helms PT Physical Therapist                   Obj/Interventions       Row Name 06/05/24 1635          Range of Motion Comprehensive    General Range of Motion no range of motion deficits identified  -EM       Row Name 06/05/24 1635          Strength Comprehensive (MMT)    General Manual Muscle Testing (MMT) Assessment other (see comments)  -EM     Comment, General Manual Muscle Testing (MMT) Assessment no focal deficits identified, generalized weakness noted  -EM       Row Name 06/05/24 1635          Balance    Balance Assessment sitting static balance;sitting dynamic balance;standing static balance;standing dynamic balance  -EM     Static Sitting Balance minimal assist  -EM     Dynamic Sitting Balance moderate assist  -EM      Position, Sitting Balance unsupported;sitting edge of bed  -EM     Static Standing Balance moderate assist;2-person assist  -EM     Dynamic Standing Balance moderate assist;maximum assist;2-person assist  -EM       Row Name 06/05/24 1635          Sensory Assessment (Somatosensory)    Sensory Assessment (Somatosensory) sensation intact  -EM               User Key  (r) = Recorded By, (t) = Taken By, (c) = Cosigned By      Initials Name Provider Type    Yvette Helms PT Physical Therapist                   Goals/Plan       Row Name 06/05/24 1639          Bed Mobility Goal 1 (PT)    Activity/Assistive Device (Bed Mobility Goal 1, PT) bed mobility activities, all  -EM     [place] Level/Cues Needed (Bed Mobility Goal 1, PT) minimum assist (75% or more patient effort)  -EM     Time Frame (Bed Mobility Goal 1, PT) 1 week  -EM       Row Name 06/05/24 1639          Transfer Goal 1 (PT)    Activity/Assistive Device (Transfer Goal 1, PT) transfers, all;walker, rolling  -EM     [place] Level/Cues Needed (Transfer Goal 1, PT) minimum assist (75% or more patient effort)  -EM     Time Frame (Transfer Goal 1, PT) 1 week  -EM       Row Name 06/05/24 1639          Gait Training Goal 1 (PT)    Activity/Assistive Device (Gait Training Goal 1, PT) gait (walking locomotion);walker, rolling  -EM     [place] Level (Gait Training Goal 1, PT) minimum assist (75% or more patient effort)  -EM     Distance (Gait Training Goal 1, PT) 25  -EM     Time Frame (Gait Training Goal 1, PT) 1 week  -EM       Row Name 06/05/24 1639          Therapy Assessment/Plan (PT)    Planned Therapy Interventions (PT) bed mobility training;gait training;home exercise program;patient/family education;transfer training;strengthening  -EM               User Key  (r) = Recorded By, (t) = Taken By, (c) = Cosigned By      Initials Name Provider Type    Yvette Helms PT Physical Therapist                   Clinical Impression        Row Name 06/05/24 1636          Pain    Pretreatment Pain Rating 0/10 - no pain  -EM     Pre/Posttreatment Pain Comment pt denies pain but moans at times when mobilizing, unable to state where pain is  -EM       Row Name 06/05/24 1636          Plan of Care Review    Plan of Care Reviewed With patient  -EM     Outcome Evaluation Pt is 85 yo female admitted to [facility] with R ureteral stone, UTI, septic shock. PMH significant for dementia, low back pain, HTN. patient lives in assisted living, normally ambulatory without use of AD. Today, pt required modAx2 for bed mobility, sit to stand with modAx2, unable to weight shift or take any steps. patient presents with impairments consisting of generalized weakness, decreased activity tolerance, decreased balance and would benefit from skilled PT. d/c recommendations is SNU.  -EM       Row Name 06/05/24 1636          Therapy Assessment/Plan (PT)    Patient/Family Therapy Goals Statement (PT) get stronger  -EM     Rehab Potential (PT) good, to achieve stated therapy goals  -EM     Criteria for Skilled Interventions Met (PT) yes;skilled treatment is necessary  -EM     Therapy Frequency (PT) 5 times/wk  -EM       Row Name 06/05/24 1636          Positioning and Restraints    Pre-Treatment Position in bed  -EM     Post Treatment Position bed  -EM     In Bed supine;call light within reach;exit alarm on  -EM               User Key  (r) = Recorded By, (t) = Taken By, (c) = Cosigned By      Initials Name Provider Type    EM Yvette Guajardo, PT Physical Therapist                   Outcome Measures       Row Name 06/05/24 1640 06/05/24 1412       How much help from another person do you currently need...    Turning from your back to your side while in flat bed without using bedrails? 2  -EM 1 (P)   -BK    Moving from lying on back to sitting on the side of a flat bed without bedrails? 2  -EM 1 (P)   -BK    Moving to and from a bed to a chair (including a wheelchair)? 1  -EM 1 (P)   -BK     Standing up from a chair using your arms (e.g., wheelchair, bedside chair)? 2  -EM 1 (P)   -BK    Climbing 3-5 steps with a railing? 1  -EM 1 (P)   -BK    To walk in hospital room? 1  -EM 1 (P)   -BK    AM-PAC 6 Clicks Score (PT) 9  -EM 6 (P)   -BK    Highest Level of Mobility Goal 3 --> Sit at edge of bed  -EM 2 --> Bed activities/dependent transfer (P)   -BK      Row Name 06/05/24 0918          How much help from another person do you currently need...    Turning from your back to your side while in flat bed without using bedrails? 2  -KE     Moving from lying on back to sitting on the side of a flat bed without bedrails? 2  -KE     Moving to and from a bed to a chair (including a wheelchair)? 1  -KE     Standing up from a chair using your arms (e.g., wheelchair, bedside chair)? 1  -KE     Climbing 3-5 steps with a railing? 1  -KE     To walk in hospital room? 1  -KE     AM-PAC 6 Clicks Score (PT) 8  -KE     Highest Level of Mobility Goal 3 --> Sit at edge of bed  -KE               User Key  (r) = Recorded By, (t) = Taken By, (c) = Cosigned By      Initials Name Provider Type    Yvette Helms, PT Physical Therapist    Keyona Huertas, RN Registered Nurse    Nu Baumann, RN Extern Registered Nurse                                 Physical Therapy Education       Title: PT OT SLP Therapies (In Progress)       Topic: Physical Therapy (In Progress)       Point: Mobility training (In Progress)       Learning Progress Summary             Patient Acceptance, E, NR by EM at 6/5/2024 1640                         Point: Home exercise program (Not Started)       Learner Progress:  Not documented in this visit.              Point: Body mechanics (Not Started)       Learner Progress:  Not documented in this visit.              Point: Precautions (Not Started)       Learner Progress:  Not documented in this visit.                              User Key       Initials Effective Dates Name Provider Type  Discipline    EM 06/16/21 -  Yvette Guajardo PT Physical Therapist PT                  PT Recommendation and Plan  Planned Therapy Interventions (PT): bed mobility training, gait training, home exercise program, patient/family education, transfer training, strengthening  Plan of Care Reviewed With: patient  Outcome Evaluation: Pt is 83 yo female admitted to [facility] with R ureteral stone, UTI, septic shock. PMH significant for dementia, low back pain, HTN. patient lives in assisted living, normally ambulatory without use of AD. Today, pt required modAx2 for bed mobility, sit to stand with modAx2, unable to weight shift or take any steps. patient presents with impairments consisting of generalized weakness, decreased activity tolerance, decreased balance and would benefit from skilled PT. d/c recommendations is SNU.     Time Calculation:         PT Charges       Row Name 06/05/24 1641             Time Calculation    Start Time 1430  -EM      Stop Time 1449  -EM      Time Calculation (min) 19 min  -EM      PT Received On 06/05/24  -EM      PT - Next Appointment 06/06/24  -EM      PT Goal Re-Cert Due Date 06/12/24  -EM         Time Calculation- PT    Total Timed Code Minutes- PT 15 minute(s)  -EM         Timed Charges    28272 - PT Therapeutic Activity Minutes 15  -EM         Total Minutes    Timed Charges Total Minutes 15  -EM       Total Minutes 15  -EM                User Key  (r) = Recorded By, (t) = Taken By, (c) = Cosigned By      Initials Name Provider Type    EM Yvette Guajardo PT Physical Therapist                  Therapy Charges for Today       Code Description Service Date Service Provider Modifiers Qty    95355675164 HC PT THERAPEUTIC ACT EA 15 MIN 6/5/2024 Yvette Guajardo, PT GP 1    86335977724 HC PT EVAL MOD COMPLEXITY 2 6/5/2024 Yvette Guajardo, PT GP 1    68501330837 HC PT THER SUPP EA 15 MIN 6/5/2024 Yvette Guajardo, PT GP 1            PT G-Codes  AM-PAC 6 Clicks Score (PT):  9  PT Discharge Summary  Anticipated Discharge Disposition (PT): skilled nursing facility    Yvette Guajardo, PT  6/5/2024

## 2024-06-05 NOTE — THERAPY RE-EVALUATION
Acute Care - Speech Language Pathology   Swallow Re-Evaluation [facility]     Patient Name: Kimberlee Urrutia  : 1940  MRN: [id number]  Today's Date: 2024               Admit Date: 2024    Visit Dx:     ICD-10-CM ICD-9-CM   1. Right ureteral stone  N20.1 592.1   2. Acute UTI  N39.0 599.0   3. Severe sepsis  A41.9 038.9    R65.20 995.92   4. Elevated lactic acid level  R79.89 276.2   5. Osteomyelitis of lumbar spine  M46.26 730.28   6. Elevated AST (SGOT)  R74.01 790.4   7. Elevated ALT measurement  R74.01 790.4   8. Elevated C-reactive protein (CRP)  R79.82 790.95   9. PRINCE (acute kidney injury)  N17.9 584.9     Patient Active Problem List   Diagnosis    Arthritis    HLD (hyperlipidemia)    Primary hypertension    Health care maintenance    Knee pain, right    Hx of total knee arthroplasty    Seasonal allergies    Vitamin D deficiency    Pyuria    Transient alteration of awareness    Migraine without status migrainosus, not intractable    Leukocytosis    Viral pharyngitis    Multinodular thyroid    Osteopenia of multiple sites    Colon cancer screening    Serum calcium elevated    PRINCE (acute kidney injury)    Acute low back pain    Elevated procalcitonin    Sepsis due to Escherichia coli with acute renal failure without septic shock    Recurrent oral herpes simplex    Lumbar spinal stenosis    Metabolic encephalopathy    Renal stones    Colon cancer screening    Sacroiliac joint pain    Lumbar stenosis without neurogenic claudication    Renal stone    Hyperlipidemia    History of total knee arthroplasty    Spinal stenosis of lumbar region    Multinodular goiter    Osteopenia    Pain in right knee    Sacroiliac joint pain    Hypercalcemia    CAYLA (dementia of Alzheimer type)    Chronic bilateral low back pain without sciatica    Right ureteral stone    Discitis of lumbar region    Acute UTI (urinary tract infection)    Hypoglycemia    Abnormal LFTs     Past Medical History:   Diagnosis Date     Allergic     Arthritis     Colon cancer screening 01/29/2020    Colon cancer screening 01/29/2020    Cough     Diarrhea     Hx of migraine headaches     Hyperlipidemia     Irregular heart beat     Osteoarthritis     Renal stones 02/2021    Vitamin D deficiency      Past Surgical History:   Procedure Laterality Date    COLONOSCOPY      CYSTOSCOPY W/ URETERAL STENT PLACEMENT Right 6/2/2024    Procedure: CYSTOSCOPY, RIGHT URETERAL STENT PLACEMENT;  Surgeon: Efra Schofield MD;  Location: [facility] MAIN OR;  Service: Urology;  Laterality: Right;    LUMBAR EPIDURAL INJECTION N/A 8/2/2021    Procedure: LUMBAR EPIDURAL 1ST VISIT 5-1;  Surgeon: Nu Partida MD;  Location: [facility] MAIN OR;  Service: Pain Management;  Laterality: N/A;    REPLACEMENT TOTAL KNEE Right 01/2015    Josue Wilson MD    SACROILIAC JOINT INJECTION Left 6/16/2021    Procedure: SACROILIAC INJECTION left;  Surgeon: Nu Partida MD;  Location: [facility] MAIN OR;  Service: Pain Management;  Laterality: Left;    TUBAL ABDOMINAL LIGATION      URETEROSCOPY LASER LITHOTRIPSY WITH STENT INSERTION Left 2/23/2021    Procedure: LT.URETEROSCOPY LASER LITHOTRIPSY WITH STENT  FOR STONE;  Surgeon: Arnaud Lu MD;  Location: [facility] MAIN OR;  Service: Urology;  Laterality: Left;    WISDOM TOOTH EXTRACTION         SLP Recommendation and Plan                                                                               Plan of Care Reviewed With: patient  Outcome Evaluation: Patient seen for clinical swallow assessment. No family present. Oral [facility] exam revealed no upper dentition, some missing lower. Voice strong and clear. No overt s/s of aspiration with ice or thins via spoon. Subtle throat clearing with thins via cup and straw. No overt s/s of aspiration with nectar via straw or puree. Prolonged bolus manipulation and transit, feel soft not appropriate this date. SLP recs puree and nectar, straws okay. Meds whole or crushed with nectar or puree  as matthew. Can allow free water protocol with ice. Total feed.      SWALLOW EVALUATION (Last 72 Hours)       SLP Adult Swallow Evaluation       Row Name 06/05/24 1100 06/04/24 1100       Rehab Evaluation    Document Type re-evaluation  -SH evaluation  -CR    Subjective Information no complaints  -SH no complaints  -CR    Patient Observations -- lethargic  -CR    Patient Effort adequate  -SH adequate  -CR    Symptoms Noted During/After Treatment -- none  -CR       General Information    Patient Profile Reviewed yes  -SH yes  -CR    Pertinent History Of Current Problem -- 83 y/o female presented with low back pain. Cystoscopy performed 6/2/24. Septic shock secondary to UTI. CT abdomen states basilar atelectasis. Hx includes dementia.  -CR    Current Method of Nutrition NPO  -SH regular textures;thin liquids  -CR       Pain    Additional Documentation Pain Scale: FACES Pre/Post-Treatment (Group)  -SH Pain Scale: FACES Pre/Post-Treatment (Group)  -CR       Pain Scale: FACES Pre/Post-Treatment    Pain: FACES Scale, Pretreatment 0-->no hurt  -SH 0-->no hurt  -CR       Oral Motor Structure and Function    Dentition Assessment -- natural, present and adequate  -CR    Secretion Management -- WNL/WFL  -CR    Mucosal Quality -- dry  -CR       Oral Musculature and Cranial Nerve Assessment    Oral Motor General Assessment generalized oral motor weakness;unable to assess;other (see comments)  pt would not follow all commands for OME  -SH generalized oral motor weakness  -CR       Clinical Swallow Eval    Clinical Swallow Evaluation Summary -- Clinical swallow evaluation completed. Friend at bedside. Pt able to increase alertness for evaluation but required MIN cues to sustain alertness. Following most 1-step directions. Across PO trials, pt presented with disorganized oral stage, difficulties pulling from the spoon, and slow A/P transit. Anterior loss x4 during liquid trials. Audible swallows, suspect mistiming. Cough with thin  liquid by straw. Throat clearing with nectar by spoon/cup and puree trials. No further trials attempted as SLP suspects mental status is impacting swallow function at this time. Recommend NPO. Ice chips sparingly after oral care. Speech to follow for re-eval.  -CR       SLP Evaluation Clinical Impression    SLP Swallowing Diagnosis -- suspected pharyngeal dysphagia;oral dysphagia  -CR    Functional Impact -- risk of aspiration/pneumonia  -CR    Rehab Potential/Prognosis, Swallowing -- good, to achieve stated therapy goals  -CR    Swallow Criteria for Skilled Therapeutic Interventions Met -- demonstrates skilled criteria  -CR       Recommendations    Therapy Frequency (Swallow) -- PRN  -CR    Predicted Duration Therapy Intervention (Days) -- until discharge  -CR    SLP Diet Recommendation -- NPO;ice chips between meals after oral care, with supervision  -CR    Recommended Diagnostics -- reassess via clinical swallow evaluation  -CR    Oral Care Recommendations -- Oral Care BID/PRN;Before ice/water  -CR    SLP Rec. for Method of Medication Administration -- meds via alternate route  -CR    Monitor for Signs of Aspiration -- yes;notify SLP if any concerns  -CR       Swallow Goals (SLP)    Swallow LTGs -- Patient will demonstrate functional swallow for  -CR       (LTG) Patient will demonstrate functional swallow for    Diet Texture (Demonstrate functional swallow) -- soft to chew (ground) textures  -CR    Liquid viscosity (Demonstrate functional swallow) -- thin liquids  -CR    [place] (Demonstrate functional swallow) -- independently (over 90% accuracy)  -CR    Time Frame (Demonstrate functional swallow) -- by discharge  -CR    Progress/Outcomes (Demonstrate functional swallow) -- new goal  -CR              User Key  (r) = Recorded By, (t) = Taken By, (c) = Cosigned By      Initials Name Effective Dates    SH Amaya Rowe SLP 01/05/24 -     CR Shalini Carmichael SLP 08/28/23 -                     EDUCATION  The  patient has been educated in the following areas:   Dysphagia (Swallowing Impairment).        SLP GOALS       Row Name 06/05/24 1200 06/04/24 1100          (LTG) Patient will demonstrate functional swallow for    Diet Texture (Demonstrate functional swallow) soft to chew (ground) textures  -SH soft to chew (ground) textures  -CR     Liquid viscosity (Demonstrate functional swallow) thin liquids  -SH thin liquids  -CR     [place] (Demonstrate functional swallow) independently (over 90% accuracy)  - independently (over 90% accuracy)  -CR     Time Frame (Demonstrate functional swallow) by discharge  - by discharge  -CR     Progress/Outcomes (Demonstrate functional swallow) new goal  -SH new goal  -CR     Comment (Demonstrate functional swallow) Patient seen for clinical swallow assessment. No family present. Oral [facility] exam revealed no upper dentition, some missing lower. Voice strong and clear. No overt s/s of aspiration with ice or thins via spoon. Subtle throat clearing with thins via cup and straw. No overt s/s of aspiration with nectar via straw or puree. Prolonged bolus manipulation and transit, feel soft not appropriate this date. SLP recs puree and nectar, straws okay. Meds whole or crushed with nectar or puree as matthew. Can allow free water protocol with ice. Total feed.  - --               User Key  (r) = Recorded By, (t) = Taken By, (c) = Cosigned By      Initials Name Provider Type    Amaya Martins SLP Speech and Language Pathologist    Shalini Ruiz SLP Speech and Language Pathologist                         Time Calculation:    Time Calculation- SLP       Row Name 06/05/24 1201             Time Calculation- [facility]    SLP Start Time 1100  -      SLP Received On 06/05/24  -                User Key  (r) = Recorded By, (t) = Taken By, (c) = Cosigned By      Initials Name Provider Type    Amaya Martins SLP Speech and Language Pathologist                    Therapy Charges for Today        Code Description Service Date Service Provider Modifiers Qty    34223121833 HC ST TREATMENT SWALLOW 4 6/5/2024 Amaya Rowe, SLP GN 1                 JOSUÉ Mi  6/5/2024

## 2024-06-05 NOTE — PLAN OF CARE
Goal Outcome Evaluation:  Plan of Care Reviewed With: patient           Outcome Evaluation: Pt is 85 yo female admitted to [facility] with R ureteral stone, UTI, septic shock. PMH significant for dementia, low back pain, HTN. patient lives in assisted living, normally ambulatory without use of AD. Today, pt required modAx2 for bed mobility, sit to stand with modAx2, unable to weight shift or take any steps. patient presents with impairments consisting of generalized weakness, decreased activity tolerance, decreased balance and would benefit from skilled PT. d/c recommendations is SNU.      Anticipated Discharge Disposition (PT): skilled nursing facility

## 2024-06-05 NOTE — PROGRESS NOTES
Continued Stay Note  [facility]     Patient Name: Kimberlee Urrutia  MRN: [id number]  Today's Date: 6/5/2024    Admit Date: 6/1/2024    Plan: Referral to [facility] pending, will need pre-cert.  Then return to [facility] [place]   Discharge Plan       Row Name 06/05/24 1117       Plan    Plan Referral to [facility] pending, will need pre-cert.  Then return to [facility] [place]    Patient/Family in Agreement with Plan yes    Plan Comments Call to JOVI/Gi Syed [phone].  She is aware patient will need SNF at [place] - will need IV antibiotics.  Patient has been to [facility] in the past and would like a referral there again.  Spoke with [facility] and he will evaluate.  CCP will follow.  Dave COTA                         Expected Discharge Date and Time       Expected Discharge Date Expected Discharge Time    Jun 5, 2024               Becky S. [place], RN

## 2024-06-05 NOTE — PLAN OF CARE
Goal Outcome Evaluation:  Plan of Care Reviewed With: patient           Outcome Evaluation: Patient seen for clinical swallow assessment. No family present. Oral [facility] exam revealed no upper dentition, some missing lower. Voice strong and clear. No overt s/s of aspiration with ice or thins via spoon. Subtle throat clearing with thins via cup and straw. No overt s/s of aspiration with nectar via straw or puree. Prolonged bolus manipulation and transit, feel soft not appropriate this date. SLP recs puree and nectar, straws okay. Meds whole or crushed with nectar or puree as matthew. Can allow free water protocol with ice. Total feed.

## 2024-06-05 NOTE — NURSING NOTE
WOCN consult: assess buttock. Unit RN states heels are pink and blanchable.  Patient standing at bedside with PT. Assessed buttock and gluteal cleft  Moisture related is not pressure. Continue to use zguard.

## 2024-06-06 ENCOUNTER — APPOINTMENT (OUTPATIENT)
Dept: CT IMAGING | Facility: HOSPITAL | Age: 84
End: 2024-06-06
Payer: MEDICARE

## 2024-06-06 PROBLEM — N17.9 AKI (ACUTE KIDNEY INJURY): Status: RESOLVED | Noted: 2021-02-17 | Resolved: 2024-06-06

## 2024-06-06 LAB
ANION GAP SERPL CALCULATED.3IONS-SCNC: 7 MMOL/L (ref 5–15)
APTT PPP: 23.8 SECONDS (ref 22.7–35.4)
BUN SERPL-MCNC: 25 MG/DL (ref 8–23)
BUN/CREAT SERPL: 25.5 (ref 7–25)
CALCIUM SPEC-SCNC: 8.8 MG/DL (ref 8.6–10.5)
CHLORIDE SERPL-SCNC: 120 MMOL/L (ref 98–107)
CO2 SERPL-SCNC: 21 MMOL/L (ref 22–29)
CREAT SERPL-MCNC: 0.98 MG/DL (ref 0.57–1)
DEPRECATED RDW RBC AUTO: 42.4 FL (ref 37–54)
EGFRCR SERPLBLD CKD-EPI 2021: 56.7 ML/MIN/1.73
ERYTHROCYTE [DISTWIDTH] IN BLOOD BY AUTOMATED COUNT: 13.5 % (ref 12.3–15.4)
GLUCOSE BLDC GLUCOMTR-MCNC: 83 MG/DL (ref 70–130)
GLUCOSE BLDC GLUCOMTR-MCNC: 89 MG/DL (ref 70–130)
GLUCOSE BLDC GLUCOMTR-MCNC: 91 MG/DL (ref 70–130)
GLUCOSE BLDC GLUCOMTR-MCNC: 94 MG/DL (ref 70–130)
GLUCOSE SERPL-MCNC: 101 MG/DL (ref 65–99)
HCT VFR BLD AUTO: 34.3 % (ref 34–46.6)
HGB BLD-MCNC: 11.4 G/DL (ref 12–15.9)
LYMPHOCYTES # BLD MANUAL: 1.11 10*3/MM3 (ref 0.7–3.1)
LYMPHOCYTES NFR BLD MANUAL: 4 % (ref 5–12)
MAGNESIUM SERPL-MCNC: 1.9 MG/DL (ref 1.6–2.4)
MCH RBC QN AUTO: 29.1 PG (ref 26.6–33)
MCHC RBC AUTO-ENTMCNC: 33.2 G/DL (ref 31.5–35.7)
MCV RBC AUTO: 87.5 FL (ref 79–97)
MONOCYTES # BLD: 1.11 10*3/MM3 (ref 0.1–0.9)
NEUTROPHILS # BLD AUTO: 25.53 10*3/MM3 (ref 1.7–7)
NEUTROPHILS NFR BLD MANUAL: 92 % (ref 42.7–76)
PHOSPHATE SERPL-MCNC: 2.7 MG/DL (ref 2.5–4.5)
PLAT MORPH BLD: NORMAL
PLATELET # BLD AUTO: 42 10*3/MM3 (ref 140–450)
PMV BLD AUTO: ABNORMAL FL
POTASSIUM SERPL-SCNC: 3.6 MMOL/L (ref 3.5–5.2)
POTASSIUM SERPL-SCNC: 4.4 MMOL/L (ref 3.5–5.2)
RBC # BLD AUTO: 3.92 10*6/MM3 (ref 3.77–5.28)
RBC MORPH BLD: NORMAL
SODIUM SERPL-SCNC: 148 MMOL/L (ref 136–145)
VARIANT LYMPHS NFR BLD MANUAL: 4 % (ref 19.6–45.3)
WBC MORPH BLD: NORMAL
WBC NRBC COR # BLD AUTO: 27.75 10*3/MM3 (ref 3.4–10.8)

## 2024-06-06 PROCEDURE — 84100 ASSAY OF PHOSPHORUS: CPT | Performed by: HOSPITALIST

## 2024-06-06 PROCEDURE — 85007 BL SMEAR W/DIFF WBC COUNT: CPT | Performed by: HOSPITALIST

## 2024-06-06 PROCEDURE — 80048 BASIC METABOLIC PNL TOTAL CA: CPT | Performed by: HOSPITALIST

## 2024-06-06 PROCEDURE — 25510000001 IOPAMIDOL 61 % SOLUTION: Performed by: HOSPITALIST

## 2024-06-06 PROCEDURE — C1751 CATH, INF, PER/CENT/MIDLINE: HCPCS

## 2024-06-06 PROCEDURE — 82948 REAGENT STRIP/BLOOD GLUCOSE: CPT

## 2024-06-06 PROCEDURE — 85025 COMPLETE CBC W/AUTO DIFF WBC: CPT | Performed by: HOSPITALIST

## 2024-06-06 PROCEDURE — 74177 CT ABD & PELVIS W/CONTRAST: CPT

## 2024-06-06 PROCEDURE — 83735 ASSAY OF MAGNESIUM: CPT | Performed by: HOSPITALIST

## 2024-06-06 PROCEDURE — 84132 ASSAY OF SERUM POTASSIUM: CPT | Performed by: HOSPITALIST

## 2024-06-06 PROCEDURE — 97530 THERAPEUTIC ACTIVITIES: CPT

## 2024-06-06 PROCEDURE — 97110 THERAPEUTIC EXERCISES: CPT

## 2024-06-06 PROCEDURE — 25010000002 ERTAPENEM PER 500 MG: Performed by: INTERNAL MEDICINE

## 2024-06-06 PROCEDURE — 36410 VNPNXR 3YR/> PHY/QHP DX/THER: CPT

## 2024-06-06 PROCEDURE — 99232 SBSQ HOSP IP/OBS MODERATE 35: CPT | Performed by: INTERNAL MEDICINE

## 2024-06-06 PROCEDURE — 85730 THROMBOPLASTIN TIME PARTIAL: CPT | Performed by: HOSPITALIST

## 2024-06-06 RX ORDER — SODIUM CHLORIDE 0.9 % (FLUSH) 0.9 %
10 SYRINGE (ML) INJECTION AS NEEDED
Status: DISCONTINUED | OUTPATIENT
Start: 2024-06-06 | End: 2024-06-28

## 2024-06-06 RX ORDER — SODIUM CHLORIDE 0.9 % (FLUSH) 0.9 %
10 SYRINGE (ML) INJECTION EVERY 12 HOURS SCHEDULED
Status: DISCONTINUED | OUTPATIENT
Start: 2024-06-06 | End: 2024-06-28

## 2024-06-06 RX ORDER — POTASSIUM CHLORIDE 1.5 G/1.58G
40 POWDER, FOR SOLUTION ORAL EVERY 4 HOURS
Status: COMPLETED | OUTPATIENT
Start: 2024-06-06 | End: 2024-06-06

## 2024-06-06 RX ORDER — SODIUM CHLORIDE 9 MG/ML
40 INJECTION, SOLUTION INTRAVENOUS AS NEEDED
Status: DISCONTINUED | OUTPATIENT
Start: 2024-06-06 | End: 2024-06-28

## 2024-06-06 RX ADMIN — ERTAPENEM SODIUM 1000 MG: 1 INJECTION, POWDER, LYOPHILIZED, FOR SOLUTION INTRAMUSCULAR; INTRAVENOUS at 16:25

## 2024-06-06 RX ADMIN — POTASSIUM CHLORIDE 40 MEQ: 1.5 POWDER, FOR SOLUTION ORAL at 11:33

## 2024-06-06 RX ADMIN — Medication 10 ML: at 20:07

## 2024-06-06 RX ADMIN — DEXTROSE AND SODIUM CHLORIDE 75 ML/HR: 5; 450 INJECTION, SOLUTION INTRAVENOUS at 07:14

## 2024-06-06 RX ADMIN — SENNOSIDES AND DOCUSATE SODIUM 2 TABLET: 50; 8.6 TABLET ORAL at 20:07

## 2024-06-06 RX ADMIN — IOPAMIDOL 85 ML: 612 INJECTION, SOLUTION INTRAVENOUS at 09:33

## 2024-06-06 RX ADMIN — MEMANTINE HYDROCHLORIDE 10 MG: 10 TABLET, FILM COATED ORAL at 08:04

## 2024-06-06 RX ADMIN — POTASSIUM CHLORIDE 40 MEQ: 1.5 POWDER, FOR SOLUTION ORAL at 06:18

## 2024-06-06 RX ADMIN — MEMANTINE HYDROCHLORIDE 10 MG: 10 TABLET, FILM COATED ORAL at 20:07

## 2024-06-06 RX ADMIN — DONEPEZIL HYDROCHLORIDE 10 MG: 10 TABLET, FILM COATED ORAL at 08:04

## 2024-06-06 NOTE — PLAN OF CARE
Goal Outcome Evaluation:  Plan of Care Reviewed With: patient           Outcome Evaluation: Patient sleeping but arousable, agreeable to therapy. patient performed supine ther ex with assistance, bed mobility with modAx2, leaning heavily to L with initial sitting on EOB requiring modA to maintain sitting balance. Patient performed sit to stand x 2 trials with maxAx2 and rwx at the bedside, difficulty achieving full upright standing. Pt continues to be well below stated functional baseline, recommend SNU at d/c.      Anticipated Discharge Disposition (PT): skilled nursing facility

## 2024-06-06 NOTE — PROGRESS NOTES
Name: Kimberlee Urrutia ADMIT: 2024   : 1940  PCP: Meche Ji MD    MRN: [id number] LOS: 4 days   AGE/SEX: 84 y.o. female  ROOM: [facility]     Subjective   Subjective   No new events.  Patient lethargic but awakes easily and denies complaints       Objective   Objective   Vital Signs  Temp:  [97.7 °F (36.5 °C)-98.8 °F (37.1 °C)] 97.7 °F (36.5 °C)  Heart Rate:  [82-89] 89  Resp:  [18] 18  BP: (139-146)/(77-84) 146/80  SpO2:  [96 %-98 %] 98 %  on   ;   Device (Oxygen Therapy): room air  Body mass index is 35.75 kg/m².  Physical Exam  Vitals and nursing note reviewed.   Constitutional:       General: She is not in acute distress.     Comments: Drowsy but arousable   HENT:      Head: Normocephalic and atraumatic.   Eyes:      General: No scleral icterus.  Neck:      Vascular: No JVD.   Cardiovascular:      Rate and Rhythm: Normal rate and regular rhythm.      Pulses: Normal pulses.      Heart sounds: No murmur heard.  Pulmonary:      Effort: No respiratory distress.      Comments: Shallow resps  Abdominal:      General: There is no distension.      Palpations: Abdomen is soft.      Tenderness: There is no abdominal tenderness.   Musculoskeletal:         General: No swelling or tenderness.      Cervical back: Neck supple.   Skin:     General: Skin is warm and dry.      Coloration: Skin is not jaundiced.      Findings: Bruising present. No rash.   Psychiatric:         Mood and Affect: Mood normal.         Behavior: Behavior normal.       Results Review     I reviewed the patient's new clinical results.  Results from last 7 days   Lab Units 24  0545 24  0728 24  0730 24  0636   WBC 10*3/mm3 27.75* 37.26* 21.15* 20.15*   HEMOGLOBIN g/dL 11.4* 12.5 12.2 11.5*   PLATELETS 10*3/mm3 42* 62* 41* 57*     Results from last 7 days   Lab Units 24  1458 24  0434 24  0635 24  0730 24  0636   SODIUM mmol/L  --  148* 147* 142 141   POTASSIUM mmol/L 4.4 3.6 3.9  4.2 4.8   CHLORIDE mmol/L  --  120* 120* 114* 112*   CO2 mmol/L  --  21.0* 17.2* 18.4* 14.4*   BUN mg/dL  --  25* 23 24* 35*   CREATININE mg/dL  --  0.98 1.02* 1.31* 1.69*   GLUCOSE mg/dL  --  101* 87 140* 93   EGFR mL/min/1.73  --  56.7* 54.0* 40.0* 29.5*     Results from last 7 days   Lab Units 06/04/24  0730 06/02/24  1356 06/01/24  2113   ALBUMIN g/dL 2.6* 3.0* 3.9   BILIRUBIN mg/dL 0.8 0.6 0.7   ALK PHOS U/L 167* 80 79   AST (SGOT) U/L 40* 84* 274*   ALT (SGPT) U/L 81* 140* 281*     Results from last 7 days   Lab Units 06/06/24  0434 06/05/24  1713 06/05/24  0635 06/04/24  1855 06/04/24  0730 06/03/24  0636 06/02/24  1356 06/02/24  1356 06/01/24  2113   CALCIUM mg/dL 8.8  --  8.6  --  8.5* 8.4*  --  7.4* 10.1   ALBUMIN g/dL  --   --   --   --  2.6*  --   --  3.0* 3.9   MAGNESIUM mg/dL 1.9  --  1.8  --  1.8 1.7  --   --   --    PHOSPHORUS mg/dL 2.7 1.9* 1.8* 2.4* 2.2* 2.5   < >  --   --     < > = values in this interval not displayed.     Results from last 7 days   Lab Units 06/03/24  0636 06/03/24  0035 06/02/24  1722 06/02/24  1356 06/02/24  1354   PROCALCITONIN ng/mL  --   --   --  208.80*  --    LACTATE mmol/L 4.8* 5.9* 4.0*  --  5.6*     Glucose   Date/Time Value Ref Range Status   06/06/2024 1502 91 70 - 130 mg/dL Final   06/06/2024 1118 89 70 - 130 mg/dL Final   06/06/2024 0415 94 70 - 130 mg/dL Final   06/05/2024 2353 96 70 - 130 mg/dL Final   06/05/2024 2042 96 70 - 130 mg/dL Final   06/05/2024 2040 63 (L) 70 - 130 mg/dL Final   06/05/2024 1545 88 70 - 130 mg/dL Final       CT Abdomen Pelvis With Contrast    Result Date: 6/6/2024   1. New third spacing with small right greater than left pleural effusions and body wall edema. 2. Interval placement of right ureteral stent with resolution of hydronephrosis. Ureterolithiasis no longer seen 3. Colonic diverticulosis  This report was finalized on 6/6/2024 10:41 AM by Dr. Diony Lam M.D on Workstation: [id number]       I have personally reviewed all  medications:  Scheduled Medications  donepezil, 10 mg, Oral, Daily  ertapenem, 1,000 mg, Intravenous, Q24H  lactated ringers, 1,000 mL, Intravenous, Once  memantine, 10 mg, Oral, BID  senna-docusate sodium, 2 tablet, Oral, BID    Infusions  dextrose 5 % and sodium chloride 0.45 %, 75 mL/hr, Last Rate: Stopped (06/06/24 0945)    Diet  Diet: Regular/House; Feeding Assistance - Nursing; Texture: Pureed (NDD 1); Fluid Consistency: Nectar Thick    I have personally reviewed:  [x]  Laboratory   []  Microbiology   []  Radiology   [x]  EKG/Telemetry  [x]  Cardiology/Vascular   []  Pathology    []  Records       Assessment/Plan     Active Hospital Problems    Diagnosis  POA    **Right ureteral stone [N20.1]  Yes    Acute UTI (urinary tract infection) [N39.0]  Yes    Hypoglycemia [E16.2]  Yes    Abnormal LFTs [R79.89]  Yes    Chronic bilateral low back pain without sciatica [M54.50, G89.29]  Yes    CAYLA (dementia of Alzheimer type) [G30.9, F02.80]  Yes    Lumbar spinal stenosis [M48.061]  Yes    Thrombocytopenia [D69.6]  Yes    Acute low back pain [M54.50]  Yes      Resolved Hospital Problems    Diagnosis Date Resolved POA    Shock, septic [A41.9, R65.21] 06/03/2024 Yes    PRINCE (acute kidney injury) [N17.9] 06/06/2024 Yes              Ms. Urrutia is a 84 y.o. female with history of dementia and spinal stenosis who presented with lower back pain, found to have sepsis most likely from urinary source with obstructing right ureteral stone now treated with stent placement along with possible osteomyelitis/discitis on lumbar CT which neurosurgery did not feel was significant/present     ESBL UTI/bacteremia - now on Invanz.  Repeat blood cultures negative so far  WBC improved some today.  Reviewed repeat CT which does not show any overt intra-abdominal infection with stent in good place and appears to be functional.    Midline to be placed due to poor IV access and need for IV antibiotics at least for a few more days  Probable metabolic  encephalopathy with background Alzheimer dementia.  Worsened by sepsis and acute medical issues.  Hypoglycemia: Still with some borderline low sugars.  Patient's intake is very poor and I do not think she has much in the way of nutritional stores.  Continue D5 half-normal until appetite improves.   Thrombocytopenia likely related to sepsis.  HIT antibody negative  Elevated transaminases downtrending at last check     VTE Prophylaxis -SCDs.  No pharmacologic prophylaxis given thrombocytopenia  Code Status -DNR  Disposition: Discussed with CCP.  SNF placement difficult due to insurance issues.  May need to stay here long enough to complete antibiotics.    Attempted to return call to POA without answer.  Will try again later or in the morning    Mark Alvarez MD  [facility]  06/06/24  15:47 EDT

## 2024-06-06 NOTE — PLAN OF CARE
Goal Outcome Evaluation:  Plan of Care Reviewed With: patient           Outcome Evaluation: Pt oriented to self, NSR on monitor, phosphorus replaced per protocol, Q2 turns, IV fluid continued, nectar thick pureed diet, VSS

## 2024-06-06 NOTE — PROGRESS NOTES
ID note for sepsis  S: She denies any pain or dysuria.  She is afebrile.  Says she is tolerating the antibiotics.    Physical Exam:   Vital Signs   Temp:  [97.3 °F (36.3 °C)-98.8 °F (37.1 °C)] 98.8 °F (37.1 °C)  Heart Rate:  [76-88] 88  Resp:  [18] 18  BP: (125-143)/(67-84) 143/78    GENERAL: Awake and alert, sickly  HEENT: Oropharynx is clear. Hearing is grossly normal.   EYES: . No conjunctival injection. No lid lag.   LUNGS:normal respiratory effort.   SKIN: no cutaneous eruptions in exposed areas  PSYCHIATRIC:following simple commands but very slow to respond    Results Review:  White count 27.8, plt 42  Creatinine 0.98  6/2 BCx 2/2 ESBL e coli  6/2 UCx E coli  6/4 Bcx no growth to date    A/p  1.  ESBL E. coli septicemia  2.  Right pyelonephritis with obstructing ureteral stone status post stenting  3.  Acute kidney injury, Creatinine of about 0.8-1 as baseline   4.  Elevated liver function test, likely related to sepsis    Clinically she seems to be stable to improved compared to earlier in the week.  Her repeat blood cultures are negative and she remains afebrile.  White count remains elevated but improved since yesterday.    I would just continue the ertapenem through 6/11/2024 and she is okay for midline from my perspective.    She is getting a her Doppler was negative.  She is getting a CT abdomen pelvis to look for abscess.

## 2024-06-06 NOTE — THERAPY TREATMENT NOTE
Patient Name: Kimberlee Urrutia  : 1940    MRN: [id number]                              Today's Date: 2024       Admit Date: 2024    Visit Dx:     ICD-10-CM ICD-9-CM   1. Right ureteral stone  N20.1 592.1   2. Acute UTI  N39.0 599.0   3. Severe sepsis  A41.9 038.9    R65.20 995.92   4. Elevated lactic acid level  R79.89 276.2   5. Osteomyelitis of lumbar spine  M46.26 730.28   6. Elevated AST (SGOT)  R74.01 790.4   7. Elevated ALT measurement  R74.01 790.4   8. Elevated C-reactive protein (CRP)  R79.82 790.95   9. PRINCE (acute kidney injury)  N17.9 584.9     Patient Active Problem List   Diagnosis    Arthritis    HLD (hyperlipidemia)    Primary hypertension    Health care maintenance    Knee pain, right    Hx of total knee arthroplasty    Seasonal allergies    Vitamin D deficiency    Pyuria    Transient alteration of awareness    Migraine without status migrainosus, not intractable    Leukocytosis    Viral pharyngitis    Multinodular thyroid    Osteopenia of multiple sites    Colon cancer screening    Serum calcium elevated    Acute low back pain    Elevated procalcitonin    Sepsis due to Escherichia coli with acute renal failure without septic shock    Thrombocytopenia    Recurrent oral herpes simplex    Lumbar spinal stenosis    Metabolic encephalopathy    Renal stones    Colon cancer screening    Sacroiliac joint pain    Lumbar stenosis without neurogenic claudication    Renal stone    Hyperlipidemia    History of total knee arthroplasty    Spinal stenosis of lumbar region    Multinodular goiter    Osteopenia    Pain in right knee    Sacroiliac joint pain    Hypercalcemia    CAYLA (dementia of Alzheimer type)    Chronic bilateral low back pain without sciatica    Right ureteral stone    Discitis of lumbar region    Acute UTI (urinary tract infection)    Hypoglycemia    Abnormal LFTs     Past Medical History:   Diagnosis Date    Allergic     Arthritis     Colon cancer screening 2020    Colon  cancer screening 01/29/2020    Cough     Diarrhea     Hx of migraine headaches     Hyperlipidemia     Irregular heart beat     Osteoarthritis     Renal stones 02/2021    Vitamin D deficiency      Past Surgical History:   Procedure Laterality Date    COLONOSCOPY      CYSTOSCOPY W/ URETERAL STENT PLACEMENT Right 6/2/2024    Procedure: CYSTOSCOPY, RIGHT URETERAL STENT PLACEMENT;  Surgeon: Efra Schofield MD;  Location: [facility] OR;  Service: Urology;  Laterality: Right;    LUMBAR EPIDURAL INJECTION N/A 8/2/2021    Procedure: LUMBAR EPIDURAL 1ST VISIT 5-1;  Surgeon: Nu Partida MD;  Location: [facility] MAIN OR;  Service: Pain Management;  Laterality: N/A;    REPLACEMENT TOTAL KNEE Right 01/2015    Josue Wilson MD    SACROILIAC JOINT INJECTION Left 6/16/2021    Procedure: SACROILIAC INJECTION left;  Surgeon: Nu Partida MD;  Location: [facility] MAIN OR;  Service: Pain Management;  Laterality: Left;    TUBAL ABDOMINAL LIGATION      URETEROSCOPY LASER LITHOTRIPSY WITH STENT INSERTION Left 2/23/2021    Procedure: LT.URETEROSCOPY LASER LITHOTRIPSY WITH STENT  FOR STONE;  Surgeon: Arnaud Lu MD;  Location: [facility] MAIN OR;  Service: Urology;  Laterality: Left;    WISDOM TOOTH EXTRACTION        General Information       Row Name 06/06/24 1556          Physical Therapy Time and Intention    Document Type therapy note (daily note)  -EM     Mode of Treatment individual therapy;physical therapy  -EM       Row Name 06/06/24 1550          General Information    Existing Precautions/Restrictions fall  -EM               User Key  (r) = Recorded By, (t) = Taken By, (c) = Cosigned By      Initials Name Provider Type    EM Yevtte Guajardo PT Physical Therapist                   Mobility       Row Name 06/06/24 1550          Bed Mobility    Supine-Sit [place] (Bed Mobility) moderate assist (50% patient effort);2 person assist  -EM     Sit-Supine [place] (Bed Mobility) moderate assist (50%  patient effort);2 person assist  -EM     Assistive Device (Bed Mobility) head of bed elevated;draw sheet  -EM       Row Name 06/06/24 1556          Sit-Stand Transfer    Sit-Stand [place] (Transfers) maximum assist (25% patient effort);2 person assist  -EM     Assistive Device (Sit-Stand Transfers) walker, front-wheeled  -EM     Comment, (Sit-Stand Transfer) 2 standing trials at the bedside, stood with flexed posture on first attempt but able to clear buttocks, barely able to clear buttocks on 2nd attempt  -EM               User Key  (r) = Recorded By, (t) = Taken By, (c) = Cosigned By      Initials Name Provider Type    Yvette Helms PT Physical Therapist                   Obj/Interventions       Row Name 06/06/24 1551          Motor Skills    Therapeutic Exercise other (see comments)  in supine: AP, heel slides with assist x 10 reps. malena UE AAROM x 5 reps. attempted LAQ in sitting but patient unable to lift legs against gravity  -EM               User Key  (r) = Recorded By, (t) = Taken By, (c) = Cosigned By      Initials Name Provider Type    Yvette Helms PT Physical Therapist                   Goals/Plan    No documentation.                  Clinical Impression       Row Name 06/06/24 1602          Pain    Pretreatment Pain Rating 0/10 - no pain  -EM     Pre/Posttreatment Pain Comment pt c/o pain all over, did not rate on numeric scale  -EM     Pain Intervention(s) Repositioned  -EM       Row Name 06/06/24 1602          Plan of Care Review    Plan of Care Reviewed With patient  -EM     Outcome Evaluation Patient sleeping but arousable, agreeable to therapy. patient performed supine ther ex with assistance, bed mobility with modAx2, leaning heavily to L with initial sitting on EOB requiring modA to maintain sitting balance. Patient performed sit to stand x 2 trials with maxAx2 and rwx at the bedside, difficulty achieving full upright standing. Pt continues to be well below stated  functional baseline, recommend SNU at d/c.  -EM       Row Name 06/06/24 1602          Positioning and Restraints    Pre-Treatment Position in bed  -EM     Post Treatment Position bed  -EM     In Bed supine;call light within reach;exit alarm on  -EM               User Key  (r) = Recorded By, (t) = Taken By, (c) = Cosigned By      Initials Name Provider Type    Yvette Helms, PT Physical Therapist                   Outcome Measures       Row Name 06/06/24 1605 06/06/24 1400       How much help from another person do you currently need...    Turning from your back to your side while in flat bed without using bedrails? 2  -EM 2  -KE    Moving from lying on back to sitting on the side of a flat bed without bedrails? 2  -EM 2  -KE    Moving to and from a bed to a chair (including a wheelchair)? 1  -EM 2  -KE    Standing up from a chair using your arms (e.g., wheelchair, bedside chair)? 2  -EM 2  -KE    Climbing 3-5 steps with a railing? 1  -EM 1  -KE    To walk in hospital room? 1  -EM 1  -KE    AM-PAC 6 Clicks Score (PT) 9  -EM 10  -KE    Highest Level of Mobility Goal 3 --> Sit at edge of bed  -EM 4 --> Transfer to chair/commode  -KE      Row Name 06/06/24 0812          How much help from another person do you currently need...    Turning from your back to your side while in flat bed without using bedrails? 2  -KE     Moving from lying on back to sitting on the side of a flat bed without bedrails? 2  -KE     Moving to and from a bed to a chair (including a wheelchair)? 1  -KE     Standing up from a chair using your arms (e.g., wheelchair, bedside chair)? 2  -KE     Climbing 3-5 steps with a railing? 1  -KE     To walk in hospital room? 1  -KE     AM-PAC 6 Clicks Score (PT) 9  -KE     Highest Level of Mobility Goal 3 --> Sit at edge of bed  -KE               User Key  (r) = Recorded By, (t) = Taken By, (c) = Cosigned By      Initials Name Provider Type    Yvette Helms, PT Physical Therapist    KE  Keyona Melvin, RN Registered Nurse                                 Physical Therapy Education       Title: PT OT SLP Therapies (In Progress)       Topic: Physical Therapy (In Progress)       Point: Mobility training (In Progress)       Learning Progress Summary             Patient Acceptance, E, NR by EM at 6/6/2024 1605    Acceptance, E,TB, VU by BK at 6/5/2024 1700    Acceptance, E, NR by EM at 6/5/2024 1640                         Point: Home exercise program (In Progress)       Learning Progress Summary             Patient Acceptance, E, NR by EM at 6/6/2024 1605                         Point: Body mechanics (Not Started)       Learner Progress:  Not documented in this visit.              Point: Precautions (Not Started)       Learner Progress:  Not documented in this visit.                              User Key       Initials Effective Dates Name Provider Type Discipline    EM 06/16/21 -  Yvette Guajardo, PT Physical Therapist PT    BK 04/30/24 -  Nu Leon, RN Extern Registered Nurse Nurse                  PT Recommendation and Plan  Planned Therapy Interventions (PT): bed mobility training, gait training, home exercise program, patient/family education, transfer training, strengthening  Plan of Care Reviewed With: patient  Outcome Evaluation: Patient sleeping but arousable, agreeable to therapy. patient performed supine ther ex with assistance, bed mobility with modAx2, leaning heavily to L with initial sitting on EOB requiring modA to maintain sitting balance. Patient performed sit to stand x 2 trials with maxAx2 and rwx at the bedside, difficulty achieving full upright standing. Pt continues to be well below stated functional baseline, recommend SNU at d/c.     Time Calculation:         PT Charges       Row Name 06/06/24 1605             Time Calculation    Start Time 1319  -EM      Stop Time 1345  -EM      Time Calculation (min) 26 min  -EM      PT Received On 06/06/24  -EM      PT - Next  Appointment 06/07/24  -EM         Time Calculation- PT    Total Timed Code Minutes- PT 26 minute(s)  -EM         Timed Charges    24250 - PT Therapeutic Exercise Minutes 10  -EM      12496 - PT Therapeutic Activity Minutes 16  -EM         Total Minutes    Timed Charges Total Minutes 26  -EM       Total Minutes 26  -EM                User Key  (r) = Recorded By, (t) = Taken By, (c) = Cosigned By      Initials Name Provider Type    EM Yvette Guajardo, PT Physical Therapist                  Therapy Charges for Today       Code Description Service Date Service Provider Modifiers Qty    43386199389 HC PT THERAPEUTIC ACT EA 15 MIN 6/5/2024 Yvette Guajardo, PT GP 1    86436074234 HC PT EVAL MOD COMPLEXITY 2 6/5/2024 Yvette Guajardo, PT GP 1    84320394082 HC PT THER SUPP EA 15 MIN 6/5/2024 Yvette Guajardo, PT GP 1    34733735251 HC PT THER PROC EA 15 MIN 6/6/2024 Yvette Guajardo, PT GP 1    58494944259 HC PT THERAPEUTIC ACT EA 15 MIN 6/6/2024 Yvette Guajardo, PT GP 1    61965271936 HC PT THER SUPP EA 15 MIN 6/6/2024 Yvette Guajardo, PT GP 1            PT G-Codes  AM-PAC 6 Clicks Score (PT): 9  PT Discharge Summary  Anticipated Discharge Disposition (PT): skilled nursing facility    Yvette Guajardo, PT  6/6/2024

## 2024-06-06 NOTE — SIGNIFICANT NOTE
"   06/06/24 1556   Midline Catheter - Single Lumen 06/06/24 Left Cephalic   Placement date: If unknown, DO NOT use \"Add Comment\" note/Placement time: If unknown, DO NOT use \"Add Comment\" note: 06/06/24 1530   Hand Hygiene Completed: Yes  Site Prep: Chlorhexidine isopropyl alcohol  All 5 Sterile Barriers Used (Gloves, Gown, Ca...   Site Assessment Clean;Dry;Intact   Line Status Blood return noted;Capped;Flushed;Saline locked   Length kaitlin (cm) 0 cm   Extremity Circumference (cm) 37 cm   Dressing Type Border Dressing;Transparent;Securing device;Antimicrobial dressing/disc   Dressing Status Clean;Dry;Intact   Dressing Intervention New dressing   Liquid Adhesive Applied   Dressing Change Due 06/13/24       MIDLINE PLACED SUCCESSFULLY IN LEFT ARM   PRIMARY RN NOTIFIED MIDLINE IS READY TO BE USED     LOT - [id number]  EX - 06/30/2025    FINAL COUNT - 3 NEEDLES, 2 GUIDEWIRES, 1 SCALPEL ACCOUNTED FOR AT PROCEDURE END   "

## 2024-06-06 NOTE — PLAN OF CARE
Goal Outcome Evaluation:      A/O x 2, room air, NSR, potassium replaced per protocol, mid line placed. IV fluids and IV antibiotics continues, VSS

## 2024-06-07 LAB
ANION GAP SERPL CALCULATED.3IONS-SCNC: 7.1 MMOL/L (ref 5–15)
APTT PPP: 24.6 SECONDS (ref 22.7–35.4)
BASOPHILS # BLD MANUAL: 0 10*3/MM3 (ref 0–0.2)
BASOPHILS NFR BLD MANUAL: 0 % (ref 0–1.5)
BUN SERPL-MCNC: 22 MG/DL (ref 8–23)
BUN/CREAT SERPL: 24.4 (ref 7–25)
C3 FRG RBC-MCNC: ABNORMAL
CALCIUM SPEC-SCNC: 9.5 MG/DL (ref 8.6–10.5)
CHLORIDE SERPL-SCNC: 119 MMOL/L (ref 98–107)
CO2 SERPL-SCNC: 20.9 MMOL/L (ref 22–29)
CREAT SERPL-MCNC: 0.9 MG/DL (ref 0.57–1)
DACRYOCYTES BLD QL SMEAR: ABNORMAL
DEPRECATED RDW RBC AUTO: 42.5 FL (ref 37–54)
EGFRCR SERPLBLD CKD-EPI 2021: 62.8 ML/MIN/1.73
EOSINOPHIL # BLD MANUAL: 0 10*3/MM3 (ref 0–0.4)
EOSINOPHIL NFR BLD MANUAL: 0 % (ref 0.3–6.2)
ERYTHROCYTE [DISTWIDTH] IN BLOOD BY AUTOMATED COUNT: 13.6 % (ref 12.3–15.4)
GLUCOSE BLDC GLUCOMTR-MCNC: 104 MG/DL (ref 70–130)
GLUCOSE BLDC GLUCOMTR-MCNC: 114 MG/DL (ref 70–130)
GLUCOSE BLDC GLUCOMTR-MCNC: 148 MG/DL (ref 70–130)
GLUCOSE BLDC GLUCOMTR-MCNC: 97 MG/DL (ref 70–130)
GLUCOSE SERPL-MCNC: 105 MG/DL (ref 65–99)
HCT VFR BLD AUTO: 33.8 % (ref 34–46.6)
HGB BLD-MCNC: 11.4 G/DL (ref 12–15.9)
LYMPHOCYTES # BLD MANUAL: 1.84 10*3/MM3 (ref 0.7–3.1)
LYMPHOCYTES NFR BLD MANUAL: 4 % (ref 5–12)
MAGNESIUM SERPL-MCNC: 2 MG/DL (ref 1.6–2.4)
MCH RBC QN AUTO: 29.3 PG (ref 26.6–33)
MCHC RBC AUTO-ENTMCNC: 33.7 G/DL (ref 31.5–35.7)
MCV RBC AUTO: 86.9 FL (ref 79–97)
METAMYELOCYTES NFR BLD MANUAL: 2 % (ref 0–0)
MONOCYTES # BLD: 1.05 10*3/MM3 (ref 0.1–0.9)
NEUTROPHILS # BLD AUTO: 22.91 10*3/MM3 (ref 1.7–7)
NEUTROPHILS NFR BLD MANUAL: 87 % (ref 42.7–76)
PHOSPHATE SERPL-MCNC: 2.9 MG/DL (ref 2.5–4.5)
PLAT MORPH BLD: NORMAL
PLATELET # BLD AUTO: 61 10*3/MM3 (ref 140–450)
PMV BLD AUTO: 13 FL (ref 6–12)
POTASSIUM SERPL-SCNC: 3.8 MMOL/L (ref 3.5–5.2)
RBC # BLD AUTO: 3.89 10*6/MM3 (ref 3.77–5.28)
SODIUM SERPL-SCNC: 147 MMOL/L (ref 136–145)
VARIANT LYMPHS NFR BLD MANUAL: 7 % (ref 19.6–45.3)
WBC MORPH BLD: NORMAL
WBC NRBC COR # BLD AUTO: 26.33 10*3/MM3 (ref 3.4–10.8)

## 2024-06-07 PROCEDURE — 84100 ASSAY OF PHOSPHORUS: CPT | Performed by: HOSPITALIST

## 2024-06-07 PROCEDURE — 97110 THERAPEUTIC EXERCISES: CPT

## 2024-06-07 PROCEDURE — 85025 COMPLETE CBC W/AUTO DIFF WBC: CPT | Performed by: HOSPITALIST

## 2024-06-07 PROCEDURE — 97530 THERAPEUTIC ACTIVITIES: CPT

## 2024-06-07 PROCEDURE — 83735 ASSAY OF MAGNESIUM: CPT | Performed by: HOSPITALIST

## 2024-06-07 PROCEDURE — 0 DEXTROSE 5 % SOLUTION: Performed by: HOSPITALIST

## 2024-06-07 PROCEDURE — 85007 BL SMEAR W/DIFF WBC COUNT: CPT | Performed by: HOSPITALIST

## 2024-06-07 PROCEDURE — 92526 ORAL FUNCTION THERAPY: CPT | Performed by: SPEECH-LANGUAGE PATHOLOGIST

## 2024-06-07 PROCEDURE — 82948 REAGENT STRIP/BLOOD GLUCOSE: CPT

## 2024-06-07 PROCEDURE — 80048 BASIC METABOLIC PNL TOTAL CA: CPT | Performed by: HOSPITALIST

## 2024-06-07 PROCEDURE — 99233 SBSQ HOSP IP/OBS HIGH 50: CPT | Performed by: STUDENT IN AN ORGANIZED HEALTH CARE EDUCATION/TRAINING PROGRAM

## 2024-06-07 PROCEDURE — 25010000002 ERTAPENEM PER 500 MG: Performed by: INTERNAL MEDICINE

## 2024-06-07 PROCEDURE — 85730 THROMBOPLASTIN TIME PARTIAL: CPT | Performed by: HOSPITALIST

## 2024-06-07 PROCEDURE — 25010000002 FUROSEMIDE PER 20 MG: Performed by: HOSPITALIST

## 2024-06-07 RX ORDER — FUROSEMIDE 10 MG/ML
40 INJECTION INTRAMUSCULAR; INTRAVENOUS ONCE
Status: COMPLETED | OUTPATIENT
Start: 2024-06-07 | End: 2024-06-07

## 2024-06-07 RX ORDER — VALACYCLOVIR HYDROCHLORIDE 500 MG/1
1000 TABLET, FILM COATED ORAL EVERY 12 HOURS SCHEDULED
Status: COMPLETED | OUTPATIENT
Start: 2024-06-07 | End: 2024-06-08

## 2024-06-07 RX ORDER — SODIUM CHLORIDE 0.9 % (FLUSH) 0.9 %
20 SYRINGE (ML) INJECTION AS NEEDED
Status: DISCONTINUED | OUTPATIENT
Start: 2024-06-07 | End: 2024-07-08

## 2024-06-07 RX ORDER — DEXTROSE MONOHYDRATE 50 MG/ML
100 INJECTION, SOLUTION INTRAVENOUS CONTINUOUS
Status: DISCONTINUED | OUTPATIENT
Start: 2024-06-07 | End: 2024-06-19

## 2024-06-07 RX ADMIN — VALACYCLOVIR HYDROCHLORIDE 1000 MG: 500 TABLET, FILM COATED ORAL at 21:21

## 2024-06-07 RX ADMIN — Medication 10 ML: at 09:59

## 2024-06-07 RX ADMIN — FUROSEMIDE 40 MG: 10 INJECTION, SOLUTION INTRAMUSCULAR; INTRAVENOUS at 16:28

## 2024-06-07 RX ADMIN — MEMANTINE HYDROCHLORIDE 10 MG: 10 TABLET, FILM COATED ORAL at 09:50

## 2024-06-07 RX ADMIN — ERTAPENEM SODIUM 1000 MG: 1 INJECTION, POWDER, LYOPHILIZED, FOR SOLUTION INTRAMUSCULAR; INTRAVENOUS at 16:28

## 2024-06-07 RX ADMIN — DEXTROSE AND SODIUM CHLORIDE 75 ML/HR: 5; 450 INJECTION, SOLUTION INTRAVENOUS at 14:43

## 2024-06-07 RX ADMIN — DEXTROSE MONOHYDRATE 50 ML/HR: 50 INJECTION, SOLUTION INTRAVENOUS at 16:34

## 2024-06-07 RX ADMIN — DONEPEZIL HYDROCHLORIDE 10 MG: 10 TABLET, FILM COATED ORAL at 09:50

## 2024-06-07 RX ADMIN — MEMANTINE HYDROCHLORIDE 10 MG: 10 TABLET, FILM COATED ORAL at 21:21

## 2024-06-07 NOTE — PLAN OF CARE
Goal Outcome Evaluation:  Plan of Care Reviewed With: patient           Outcome Evaluation: Pt oriented to self, RA, NSR on monitor, IV fluids, IV antibiotics, duron in place, Q2 turns, VSS

## 2024-06-07 NOTE — PROGRESS NOTES
Continued Stay Note  [facility]     Patient Name: Kimberlee Urrutia  MRN: [id number]  Today's Date: 2024    Admit Date: 2024    Plan: Return to [facility] [address] [place]   Discharge Plan       Row Name 24 1522       Plan    Plan Return to [facility] [address]    Patient/Family in Agreement with Plan yes    Plan Comments Per Kian/Gilberto [facility] is unable to accept because currently the patient does not have insurance coverage.  Spoke with JOVI/Gi and she is working on patient's insurance coverage.  Patient was accidentally marked as  by insurance and they are working to get insurance reinstated.  Patient will likely remain in hospital through  for IV antibiotics and then return to [facility] [place].  CCP will continue to follow.  Dave COTA                         Expected Discharge Date and Time       Expected Discharge Date Expected Discharge Time    2024               Becky S. [place], RN

## 2024-06-07 NOTE — PROGRESS NOTES
Name: Kimberlee Urrutia ADMIT: 2024   : 1940  PCP: Meche Ji MD    MRN: [id number] LOS: 5 days   AGE/SEX: 84 y.o. female  ROOM: [facility]     Subjective   Subjective   No new events.  Patient lethargic but still awakens easily       Objective   Objective   Vital Signs  Temp:  [97.9 °F (36.6 °C)-98.4 °F (36.9 °C)] 98.4 °F (36.9 °C)  Heart Rate:  [83-86] 83  Resp:  [18] 18  BP: (139-146)/(78-83) 142/78  SpO2:  [98 %-99 %] 98 %  on   ;   Device (Oxygen Therapy): room air  Body mass index is 35.87 kg/m².  Physical Exam  Vitals and nursing note reviewed.   Constitutional:       General: She is not in acute distress.     Comments: Drowsy but arousable   HENT:      Head: Normocephalic and atraumatic.      Mouth/Throat:      Comments: Multiple scabbed/crusted lesions mostly on the lower lip  Eyes:      General: No scleral icterus.  Neck:      Vascular: No JVD.   Cardiovascular:      Rate and Rhythm: Normal rate and regular rhythm.      Pulses: Normal pulses.      Heart sounds: No murmur heard.  Pulmonary:      Effort: No respiratory distress.      Comments: Shallow resps  Abdominal:      General: There is no distension.      Palpations: Abdomen is soft.      Tenderness: There is no abdominal tenderness.   Musculoskeletal:         General: No tenderness.      Cervical back: Neck supple.      Right lower leg: Edema present.      Left lower leg: Edema present.   Skin:     General: Skin is warm and dry.      Coloration: Skin is not jaundiced.      Findings: Bruising present. No rash.   Psychiatric:         Mood and Affect: Mood normal.         Behavior: Behavior normal.       Results Review     I reviewed the patient's new clinical results.  Results from last 7 days   Lab Units 24  0556 24  0545 24  0728 24  0730   WBC 10*3/mm3 26.33* 27.75* 37.26* 21.15*   HEMOGLOBIN g/dL 11.4* 11.4* 12.5 12.2   PLATELETS 10*3/mm3 61* 42* 62* 41*     Results from last 7 days   Lab Units  06/07/24  0556 06/06/24  1458 06/06/24  0434 06/05/24  0635 06/04/24  0730   SODIUM mmol/L 147*  --  148* 147* 142   POTASSIUM mmol/L 3.8 4.4 3.6 3.9 4.2   CHLORIDE mmol/L 119*  --  120* 120* 114*   CO2 mmol/L 20.9*  --  21.0* 17.2* 18.4*   BUN mg/dL 22  --  25* 23 24*   CREATININE mg/dL 0.90  --  0.98 1.02* 1.31*   GLUCOSE mg/dL 105*  --  101* 87 140*   EGFR mL/min/1.73 62.8  --  56.7* 54.0* 40.0*     Results from last 7 days   Lab Units 06/04/24  0730 06/02/24  1356 06/01/24  2113   ALBUMIN g/dL 2.6* 3.0* 3.9   BILIRUBIN mg/dL 0.8 0.6 0.7   ALK PHOS U/L 167* 80 79   AST (SGOT) U/L 40* 84* 274*   ALT (SGPT) U/L 81* 140* 281*     Results from last 7 days   Lab Units 06/07/24  0556 06/06/24  0434 06/05/24  1713 06/05/24  0635 06/04/24  1855 06/04/24  0730 06/03/24  0636 06/02/24  1356 06/01/24  2113   CALCIUM mg/dL 9.5 8.8  --  8.6  --  8.5*   < > 7.4* 10.1   ALBUMIN g/dL  --   --   --   --   --  2.6*  --  3.0* 3.9   MAGNESIUM mg/dL 2.0 1.9  --  1.8  --  1.8   < >  --   --    PHOSPHORUS mg/dL 2.9 2.7 1.9* 1.8*   < > 2.2*   < >  --   --     < > = values in this interval not displayed.     Results from last 7 days   Lab Units 06/03/24  0636 06/03/24  0035 06/02/24  1722 06/02/24  1356 06/02/24  1354   PROCALCITONIN ng/mL  --   --   --  208.80*  --    LACTATE mmol/L 4.8* 5.9* 4.0*  --  5.6*     Glucose   Date/Time Value Ref Range Status   06/07/2024 1150 114 70 - 130 mg/dL Final   06/07/2024 0625 97 70 - 130 mg/dL Final   06/06/2024 2025 83 70 - 130 mg/dL Final   06/06/2024 1502 91 70 - 130 mg/dL Final   06/06/2024 1118 89 70 - 130 mg/dL Final   06/06/2024 0415 94 70 - 130 mg/dL Final   06/05/2024 2353 96 70 - 130 mg/dL Final       CT Abdomen Pelvis With Contrast    Result Date: 6/6/2024   1. New third spacing with small right greater than left pleural effusions and body wall edema. 2. Interval placement of right ureteral stent with resolution of hydronephrosis. Ureterolithiasis no longer seen 3. Colonic diverticulosis   This report was finalized on 6/6/2024 10:41 AM by Dr. Diony Lam M.D on Workstation: [id number]       I have personally reviewed all medications:  Scheduled Medications  donepezil, 10 mg, Oral, Daily  ertapenem, 1,000 mg, Intravenous, Q24H  lactated ringers, 1,000 mL, Intravenous, Once  memantine, 10 mg, Oral, BID  senna-docusate sodium, 2 tablet, Oral, BID  sodium chloride, 10 mL, Intravenous, Q12H    Infusions  dextrose 5 % and sodium chloride 0.45 %, 75 mL/hr, Last Rate: 75 mL/hr (06/07/24 1443)    Diet  Diet: Regular/House; Feeding Assistance - Nursing; Texture: Pureed (NDD 1); Fluid Consistency: Nectar Thick    I have personally reviewed:  [x]  Laboratory   []  Microbiology   []  Radiology   [x]  EKG/Telemetry  [x]  Cardiology/Vascular   []  Pathology    []  Records       Assessment/Plan     Active Hospital Problems    Diagnosis  POA    **Right ureteral stone [N20.1]  Yes    Acute UTI (urinary tract infection) [N39.0]  Yes    Hypoglycemia [E16.2]  Yes    Abnormal LFTs [R79.89]  Yes    Chronic bilateral low back pain without sciatica [M54.50, G89.29]  Yes    CAYLA (dementia of Alzheimer type) [G30.9, F02.80]  Yes    Lumbar spinal stenosis [M48.061]  Yes    Thrombocytopenia [D69.6]  Yes    Acute low back pain [M54.50]  Yes      Resolved Hospital Problems    Diagnosis Date Resolved POA    Shock, septic [A41.9, R65.21] 06/03/2024 Yes    PRINCE (acute kidney injury) [N17.9] 06/06/2024 Yes              Ms. Urrutia is a 84 y.o. female with history of dementia and spinal stenosis who presented with lower back pain, found to have sepsis most likely from urinary source with obstructing right ureteral stone now treated with stent placement along with possible osteomyelitis/discitis on lumbar CT which neurosurgery did not feel was significant/present     ESBL UTI/bacteremia - now on Invanz.  Repeat blood cultures negative  WBC continues to drift downward.  Repeat CT does not show any overt intra-abdominal infection with  stent in good place and appears to be functional.    Midline now in place due to poor IV access and need for IV antibiotics at least for a few more days  Probable metabolic encephalopathy with background Alzheimer dementia.  Worsened by sepsis and acute medical issues.  Hypoglycemia: Glucose stable but still receiving some dextrose fluids.  Hesitant to stop this altogether but will back the rate down.  Changing to D5W given mild hypernatremia   Anasarca, worsened today.  Will back to fluids down some and will give Lasix x 1 but I think some of this is third spacing due to poor intake/hypoalbuminemia.  Thrombocytopenia likely related to sepsis.  HIT antibody negative  Elevated transaminases downtrending at last check  ?Herpes labialis- Valtrex x2 per treatment protocol     VTE Prophylaxis -SCDs.  No pharmacologic prophylaxis given thrombocytopenia  Code Status -DNR  Disposition: Discussed with CCP.  SNF placement difficult due to insurance issues.  May need to stay here long enough to complete antibiotics.    Attempted to return call again to POA without answer.  Left VM    Mark Alvarez MD  [facility]  06/07/24  15:19 EDT

## 2024-06-07 NOTE — PLAN OF CARE
Goal Outcome Evaluation:  Plan of Care Reviewed With: patient           Outcome Evaluation: Pt seen for re-eval of swallow function. Pt able to tolerate puree foods and nectar thick liquids with no s/s aspiration. Poor intake. Pt trialed with thin liquids with L anterior loss with cup presentation and inconsistent coughing with thin by straw. Slow oral transit and clearance with puree, therfor, solids not trialed at this time.  Continue current diet of puree/nectar.

## 2024-06-07 NOTE — THERAPY TREATMENT NOTE
Acute Care - Speech Language Pathology   Swallow Treatment Note [facility]     Patient Name: Kimberlee Urrutia  : 1940  MRN: [id number]  Today's Date: 2024               Admit Date: 2024    Visit Dx:     ICD-10-CM ICD-9-CM   1. Right ureteral stone  N20.1 592.1   2. Acute UTI  N39.0 599.0   3. Severe sepsis  A41.9 038.9    R65.20 995.92   4. Elevated lactic acid level  R79.89 276.2   5. Osteomyelitis of lumbar spine  M46.26 730.28   6. Elevated AST (SGOT)  R74.01 790.4   7. Elevated ALT measurement  R74.01 790.4   8. Elevated C-reactive protein (CRP)  R79.82 790.95   9. PRINCE (acute kidney injury)  N17.9 584.9     Patient Active Problem List   Diagnosis    Arthritis    HLD (hyperlipidemia)    Primary hypertension    Health care maintenance    Knee pain, right    Hx of total knee arthroplasty    Seasonal allergies    Vitamin D deficiency    Pyuria    Transient alteration of awareness    Migraine without status migrainosus, not intractable    Leukocytosis    Viral pharyngitis    Multinodular thyroid    Osteopenia of multiple sites    Colon cancer screening    Serum calcium elevated    Acute low back pain    Elevated procalcitonin    Sepsis due to Escherichia coli with acute renal failure without septic shock    Thrombocytopenia    Recurrent oral herpes simplex    Lumbar spinal stenosis    Metabolic encephalopathy    Renal stones    Colon cancer screening    Sacroiliac joint pain    Lumbar stenosis without neurogenic claudication    Renal stone    Hyperlipidemia    History of total knee arthroplasty    Spinal stenosis of lumbar region    Multinodular goiter    Osteopenia    Pain in right knee    Sacroiliac joint pain    Hypercalcemia    CAYLA (dementia of Alzheimer type)    Chronic bilateral low back pain without sciatica    Right ureteral stone    Discitis of lumbar region    Acute UTI (urinary tract infection)    Hypoglycemia    Abnormal LFTs     Past Medical History:   Diagnosis Date    Allergic      Arthritis     Colon cancer screening 01/29/2020    Colon cancer screening 01/29/2020    Cough     Diarrhea     Hx of migraine headaches     Hyperlipidemia     Irregular heart beat     Osteoarthritis     Renal stones 02/2021    Vitamin D deficiency      Past Surgical History:   Procedure Laterality Date    COLONOSCOPY      CYSTOSCOPY W/ URETERAL STENT PLACEMENT Right 6/2/2024    Procedure: CYSTOSCOPY, RIGHT URETERAL STENT PLACEMENT;  Surgeon: Efra Schofield MD;  Location: [facility] MAIN OR;  Service: Urology;  Laterality: Right;    LUMBAR EPIDURAL INJECTION N/A 8/2/2021    Procedure: LUMBAR EPIDURAL 1ST VISIT 5-1;  Surgeon: Nu Partida MD;  Location: [facility] MAIN OR;  Service: Pain Management;  Laterality: N/A;    REPLACEMENT TOTAL KNEE Right 01/2015    Josue Wilson MD    SACROILIAC JOINT INJECTION Left 6/16/2021    Procedure: SACROILIAC INJECTION left;  Surgeon: Nu Partida MD;  Location: [facility] MAIN OR;  Service: Pain Management;  Laterality: Left;    TUBAL ABDOMINAL LIGATION      URETEROSCOPY LASER LITHOTRIPSY WITH STENT INSERTION Left 2/23/2021    Procedure: LT.URETEROSCOPY LASER LITHOTRIPSY WITH STENT  FOR STONE;  Surgeon: Arnaud Lu MD;  Location: [facility] MAIN OR;  Service: Urology;  Laterality: Left;    WISDOM TOOTH EXTRACTION         SLP Recommendation and Plan                                                                               Plan of Care Reviewed With: patient  Outcome Evaluation: Pt seen for re-eval of swallow function. Pt able to tolerate puree foods and nectar thick liquids with no s/s aspiration. Poor intake. Pt trialed with thin liquids with L anterior loss with cup presentation and inconsistent coughing with thin by straw. Slow oral transit and clearance with puree, therfor, solids not trialed at this time.  Continue current diet of puree/nectar.      SWALLOW EVALUATION (Last 72 Hours)       SLP Adult Swallow Evaluation       Row Name 06/07/24 1200 06/05/24 1100                 Rehab Evaluation    Document Type therapy note (daily note)  - re-evaluation  -       Subjective Information -- no complaints  -       Patient Effort -- adequate  -          General Information    Patient Profile Reviewed -- yes  -       Current Method of Nutrition -- NPO  -          Pain    Additional Documentation -- Pain Scale: FACES Pre/Post-Treatment (Group)  -          Pain Scale: FACES Pre/Post-Treatment    Pain: FACES Scale, Pretreatment -- 0-->no hurt  -          Oral Musculature and Cranial Nerve Assessment    Oral Motor General Assessment -- generalized oral motor weakness;unable to assess;other (see comments)  pt would not follow all commands for OME  -          (LTG) Patient will demonstrate functional swallow for    Diet Texture (Demonstrate functional swallow) soft to chew (ground) textures  -SA --       Liquid viscosity (Demonstrate functional swallow) thin liquids  -SA --       [place] (Demonstrate functional swallow) independently (over 90% accuracy)  -SA --       Time Frame (Demonstrate functional swallow) by discharge  -SA --       Progress/Outcomes (Demonstrate functional swallow) goal ongoing  -SA --       Comment (Demonstrate functional swallow) Pt seen for re-eval of swallow function.  Pt able to tolerate puree foods and nectar thick liquids with no s/s aspiration.  Poor intake.  Pt trialed with thin liquids with L anterior loss with cup presentation and inconsistent coughing with thin by straw.  Slow oral transit and clearance with puree, therfor, solids not trialed at this time.  -SA --                 User Key  (r) = Recorded By, (t) = Taken By, (c) = Cosigned By      Initials Name Effective Dates     Kavya Coreas SLP 01/11/24 -      Amaya Rowe SLP 01/05/24 -                     EDUCATION  The patient has been educated in the following areas:   Dysphagia (Swallowing Impairment) Oral Care/Hydration Modified Diet Instruction.        SLP GOALS        Row Name 06/07/24 1200 06/05/24 1200          (LTG) Patient will demonstrate functional swallow for    Diet Texture (Demonstrate functional swallow) soft to chew (ground) textures  - soft to chew (ground) textures  -     Liquid viscosity (Demonstrate functional swallow) thin liquids  - thin liquids  -     [place] (Demonstrate functional swallow) independently (over 90% accuracy)  - independently (over 90% accuracy)  -     Time Frame (Demonstrate functional swallow) by discharge  - by discharge  -     Progress/Outcomes (Demonstrate functional swallow) goal ongoing  - new goal  -     Comment (Demonstrate functional swallow) Pt seen for re-eval of swallow function.  Pt able to tolerate puree foods and nectar thick liquids with no s/s aspiration.  Poor intake.  Pt trialed with thin liquids with L anterior loss with cup presentation and inconsistent coughing with thin by straw.  Slow oral transit and clearance with puree, therfor, solids not trialed at this time.  - Patient seen for clinical swallow assessment. No family present. Oral [facility] exam revealed no upper dentition, some missing lower. Voice strong and clear. No overt s/s of aspiration with ice or thins via spoon. Subtle throat clearing with thins via cup and straw. No overt s/s of aspiration with nectar via straw or puree. Prolonged bolus manipulation and transit, feel soft not appropriate this date. SLP recs puree and nectar, straws okay. Meds whole or crushed with nectar or puree as matthew. Can allow free water protocol with ice. Total feed.  -               User Key  (r) = Recorded By, (t) = Taken By, (c) = Cosigned By      Initials Name Provider Type     Kavya Coreas, SLP Speech and Language Pathologist     Amaya Rowe, SLP Speech and Language Pathologist                         Time Calculation:    Time Calculation- SLP       Row Name 06/07/24 1517             Time Calculation- SLP    SLP Start Time 1200  -      SLP  Received On 06/07/24  -                User Key  (r) = Recorded By, (t) = Taken By, (c) = Cosigned By      Initials Name Provider Type    Kavya Pradhan SLP Speech and Language Pathologist                    Therapy Charges for Today       Code Description Service Date Service Provider Modifiers Qty    32830814066  ST TREATMENT SWALLOW 4 6/7/2024 Kavya Coreas SLP GN 1                 JOSUÉ Ruiz  6/7/2024

## 2024-06-07 NOTE — THERAPY TREATMENT NOTE
Patient Name: Kimberlee Urrutia  : 1940    MRN: [id number]                              Today's Date: 2024       Admit Date: 2024    Visit Dx:     ICD-10-CM ICD-9-CM   1. Right ureteral stone  N20.1 592.1   2. Acute UTI  N39.0 599.0   3. Severe sepsis  A41.9 038.9    R65.20 995.92   4. Elevated lactic acid level  R79.89 276.2   5. Osteomyelitis of lumbar spine  M46.26 730.28   6. Elevated AST (SGOT)  R74.01 790.4   7. Elevated ALT measurement  R74.01 790.4   8. Elevated C-reactive protein (CRP)  R79.82 790.95   9. PRINCE (acute kidney injury)  N17.9 584.9     Patient Active Problem List   Diagnosis    Arthritis    HLD (hyperlipidemia)    Primary hypertension    Health care maintenance    Knee pain, right    Hx of total knee arthroplasty    Seasonal allergies    Vitamin D deficiency    Pyuria    Transient alteration of awareness    Migraine without status migrainosus, not intractable    Leukocytosis    Viral pharyngitis    Multinodular thyroid    Osteopenia of multiple sites    Colon cancer screening    Serum calcium elevated    Acute low back pain    Elevated procalcitonin    Sepsis due to Escherichia coli with acute renal failure without septic shock    Thrombocytopenia    Recurrent oral herpes simplex    Lumbar spinal stenosis    Metabolic encephalopathy    Renal stones    Colon cancer screening    Sacroiliac joint pain    Lumbar stenosis without neurogenic claudication    Renal stone    Hyperlipidemia    History of total knee arthroplasty    Spinal stenosis of lumbar region    Multinodular goiter    Osteopenia    Pain in right knee    Sacroiliac joint pain    Hypercalcemia    CAYLA (dementia of Alzheimer type)    Chronic bilateral low back pain without sciatica    Right ureteral stone    Discitis of lumbar region    Acute UTI (urinary tract infection)    Hypoglycemia    Abnormal LFTs     Past Medical History:   Diagnosis Date    Allergic     Arthritis     Colon cancer screening 2020    Colon  cancer screening 01/29/2020    Cough     Diarrhea     Hx of migraine headaches     Hyperlipidemia     Irregular heart beat     Osteoarthritis     Renal stones 02/2021    Vitamin D deficiency      Past Surgical History:   Procedure Laterality Date    COLONOSCOPY      CYSTOSCOPY W/ URETERAL STENT PLACEMENT Right 6/2/2024    Procedure: CYSTOSCOPY, RIGHT URETERAL STENT PLACEMENT;  Surgeon: Efra Schofield MD;  Location: [facility] OR;  Service: Urology;  Laterality: Right;    LUMBAR EPIDURAL INJECTION N/A 8/2/2021    Procedure: LUMBAR EPIDURAL 1ST VISIT 5-1;  Surgeon: Nu Partida MD;  Location: [facility] MAIN OR;  Service: Pain Management;  Laterality: N/A;    REPLACEMENT TOTAL KNEE Right 01/2015    Josue Wilson MD    SACROILIAC JOINT INJECTION Left 6/16/2021    Procedure: SACROILIAC INJECTION left;  Surgeon: Nu Partida MD;  Location: [facility] MAIN OR;  Service: Pain Management;  Laterality: Left;    TUBAL ABDOMINAL LIGATION      URETEROSCOPY LASER LITHOTRIPSY WITH STENT INSERTION Left 2/23/2021    Procedure: LT.URETEROSCOPY LASER LITHOTRIPSY WITH STENT  FOR STONE;  Surgeon: Arnaud Lu MD;  Location: [facility] MAIN OR;  Service: Urology;  Laterality: Left;    WISDOM TOOTH EXTRACTION        General Information       Row Name 06/07/24 1611          Physical Therapy Time and Intention    Document Type therapy note (daily note)  -EM     Mode of Treatment individual therapy;physical therapy  -EM       Row Name 06/07/24 1611          General Information    Existing Precautions/Restrictions fall  -EM               User Key  (r) = Recorded By, (t) = Taken By, (c) = Cosigned By      Initials Name Provider Type    EM Yvette Guajardo PT Physical Therapist                   Mobility       Row Name 06/07/24 1612          Bed Mobility    Supine-Sit [place] (Bed Mobility) moderate assist (50% patient effort);2 person assist  -EM     Sit-Supine [place] (Bed Mobility) moderate assist (50%  patient effort);2 person assist  -EM     Assistive Device (Bed Mobility) head of bed elevated;draw sheet  -EM       Row Name 06/07/24 1612          Sit-Stand Transfer    Sit-Stand [place] (Transfers) maximum assist (25% patient effort);2 person assist  -EM     Assistive Device (Sit-Stand Transfers) walker, front-wheeled  -EM     Comment, (Sit-Stand Transfer) 2 standing trials at the bedside, able to clear buttocks but very flexed posture, unable to achieve full upright standing  -EM               User Key  (r) = Recorded By, (t) = Taken By, (c) = Cosigned By      Initials Name Provider Type    Yvette Helms PT Physical Therapist                   Obj/Interventions       Row Name 06/07/24 1616          Motor Skills    Therapeutic Exercise other (see comments)  in supine: AP, SAQ, heel slides with assist to complete full ROM x 10 reps, malena UE AAROM x 5 reps, in sitting: LAQ x 5 reps with assist, very limited ROM  -EM               User Key  (r) = Recorded By, (t) = Taken By, (c) = Cosigned By      Initials Name Provider Type    Yvette Helms PT Physical Therapist                   Goals/Plan    No documentation.                  Clinical Impression       Row Name 06/07/24 1617          Pain    Pretreatment Pain Rating 0/10 - no pain  -EM     Pre/Posttreatment Pain Comment pt c/o back pain when performing bed mobility, did not rate on numeric scale  -EM     Pain Intervention(s) Repositioned  -EM       Row Name 06/07/24 1617          Plan of Care Review    Plan of Care Reviewed With patient  -EM     Outcome Evaluation Pt sleeping but arousable. Pt performed some supine ROM exercises with LEs and UEs, up to EOB with modAx2, sit to stand with maxAx2 and use of rwx. Pt unable to achieve full upright standing on 2 attempts. Pt continues to demonstrate significant weakness, recommend SNU at d/c.  -EM       Row Name 06/07/24 1167          Positioning and Restraints    Pre-Treatment Position in bed   -EM     Post Treatment Position bed  -EM     In Bed supine;call light within reach;exit alarm on  -EM               User Key  (r) = Recorded By, (t) = Taken By, (c) = Cosigned By      Initials Name Provider Type    Yvette Helms, PT Physical Therapist                   Outcome Measures       Row Name 06/07/24 1622 06/07/24 0950       How much help from another person do you currently need...    Turning from your back to your side while in flat bed without using bedrails? 2  -EM 1  -ES    Moving from lying on back to sitting on the side of a flat bed without bedrails? 2  -EM 1  -ES    Moving to and from a bed to a chair (including a wheelchair)? 1  -EM 1  -ES    Standing up from a chair using your arms (e.g., wheelchair, bedside chair)? 2  -EM 1  -ES    Climbing 3-5 steps with a railing? 1  -EM 1  -ES    To walk in hospital room? 1  -EM 1  -ES    AM-PAC 6 Clicks Score (PT) 9  -EM 6  -ES    Highest Level of Mobility Goal 3 --> Sit at edge of bed  -EM 2 --> Bed activities/dependent transfer  -ES              User Key  (r) = Recorded By, (t) = Taken By, (c) = Cosigned By      Initials Name Provider Type    Yvette Helms, PT Physical Therapist    Yvette Wells, RN Registered Nurse                                 Physical Therapy Education       Title: PT OT SLP Therapies (In Progress)       Topic: Physical Therapy (In Progress)       Point: Mobility training (Done)       Learning Progress Summary             Patient Acceptance, E, VU by EM at 6/7/2024 1622    Acceptance, E, NR by EM at 6/6/2024 1605    Acceptance, E,TB, VU by BK at 6/5/2024 1700    Acceptance, E, NR by EM at 6/5/2024 1640                         Point: Home exercise program (Done)       Learning Progress Summary             Patient Acceptance, E, VU by EM at 6/7/2024 1622    Acceptance, E, NR by EM at 6/6/2024 1605                         Point: Body mechanics (Not Started)       Learner Progress:  Not documented in this  visit.              Point: Precautions (Not Started)       Learner Progress:  Not documented in this visit.                              User Key       Initials Effective Dates Name Provider Type Discipline    EM 06/16/21 -  Yvette Guajardo PT Physical Therapist PT    BK 04/30/24 -  Nu Leon RN Extern Registered Nurse Nurse                  PT Recommendation and Plan  Planned Therapy Interventions (PT): bed mobility training, gait training, home exercise program, patient/family education, transfer training, strengthening  Plan of Care Reviewed With: patient  Outcome Evaluation: Pt sleeping but arousable. Pt performed some supine ROM exercises with LEs and UEs, up to EOB with modAx2, sit to stand with maxAx2 and use of rwx. Pt unable to achieve full upright standing on 2 attempts. Pt continues to demonstrate significant weakness, recommend SNU at d/c.     Time Calculation:         PT Charges       Row Name 06/07/24 1623             Time Calculation    Start Time 1350  -EM      Stop Time 1415  -EM      Time Calculation (min) 25 min  -EM      PT Received On 06/07/24  -EM      PT - Next Appointment 06/10/24  -EM         Time Calculation- PT    Total Timed Code Minutes- PT 25 minute(s)  -EM         Timed Charges    09794 - PT Therapeutic Exercise Minutes 10  -EM      23461 - PT Therapeutic Activity Minutes 15  -EM         Total Minutes    Timed Charges Total Minutes 25  -EM       Total Minutes 25  -EM                User Key  (r) = Recorded By, (t) = Taken By, (c) = Cosigned By      Initials Name Provider Type    EM Yvette Guajardo PT Physical Therapist                  Therapy Charges for Today       Code Description Service Date Service Provider Modifiers Qty    74015118283 HC PT THER PROC EA 15 MIN 6/6/2024 Yvette Guajardo PT GP 1    31345434848 HC PT THERAPEUTIC ACT EA 15 MIN 6/6/2024 Yvette Guajardo, PT GP 1    01882421921 HC PT THER SUPP EA 15 MIN 6/6/2024 Yvette Guajardo PT GP 1     94072973032 HC PT THER PROC EA 15 MIN 6/7/2024 Yvette Guajardo, PT GP 1    17355922587 HC PT THERAPEUTIC ACT EA 15 MIN 6/7/2024 Yvette Guajardo, PT GP 1    20132607492 HC PT THER SUPP EA 15 MIN 6/7/2024 Yvette Guajardo, PT GP 1            PT G-Codes  AM-PAC 6 Clicks Score (PT): 9  PT Discharge Summary  Anticipated Discharge Disposition (PT): skilled nursing facility    Yvette Guajardo, PT  6/7/2024

## 2024-06-07 NOTE — PROGRESS NOTES
LOS: 5 days     Chief Complaint: Sepsis    Interval History: Patient confused this morning unclear where she is at.  Remains afebrile.  Slowly downtrending WBC.  Tolerating antibiotics.    Vital Signs  Temp:  [97.7 °F (36.5 °C)-98.1 °F (36.7 °C)] 97.9 °F (36.6 °C)  Heart Rate:  [83-89] 83  Resp:  [18] 18  BP: (139-146)/(78-83) 146/78    Physical Exam:  General: In no acute distress  HEENT: Oropharynx clear  Respiratory: Normal work of breathing  GI: Soft, NT/ND  Skin: No rashes or lesions in exposed areas of the extremities  Access: Peripheral IV    Antibiotics:  Anti-Infectives (From admission, onward)      Ordered     Dose/Rate Route Frequency Start Stop    06/04/24 0824  ertapenem (INVanz) 1,000 mg in sodium chloride 0.9 % 100 mL MBP        Ordering Provider: Devyn Corley MD    1,000 mg  200 mL/hr over 30 Minutes Intravenous Every 24 Hours 06/04/24 1600 06/10/24 2359    06/01/24 2325  vancomycin IVPB 1500 mg in 0.9% NaCl (Premix) 500 mL        Ordering Provider: Symoen Chao APRN    20 mg/kg × 81 kg  333.3 mL/hr over 90 Minutes Intravenous Once 06/01/24 2345 06/02/24 0420    06/01/24 2325  cefTRIAXone (ROCEPHIN) 2,000 mg in sodium chloride 0.9 % 100 mL MBP        Ordering Provider: Symone Chao APRN    2,000 mg  200 mL/hr over 30 Minutes Intravenous Once 06/01/24 2341 06/02/24 0200             Results Review:     I reviewed the patient's new clinical results.    Lab Results   Component Value Date    WBC 26.33 (H) 06/07/2024    HGB 11.4 (L) 06/07/2024    HCT 33.8 (L) 06/07/2024    MCV 86.9 06/07/2024    PLT 61 (L) 06/07/2024     Lab Results   Component Value Date    GLUCOSE 105 (H) 06/07/2024    BUN 22 06/07/2024    CREATININE 0.90 06/07/2024    EGFRIFNONA 65 07/22/2021    EGFRIFAFRI >60 11/09/2022    BCR 24.4 06/07/2024    CO2 20.9 (L) 06/07/2024    CALCIUM 9.5 06/07/2024    PROTENTOTREF 6.7 09/26/2023    ALBUMIN 2.6 (L) 06/04/2024    LABIL2 1.9 09/26/2023    AST 40 (H) 06/04/2024    ALT 81  (H) 06/04/2024       Microbiology:  6/2 BCx 2/2 ESBL e coli  6/2 UCx E coli  6/4 Bcx no growth to date    Assessment    #ESBL E. coli septicemia  #Right pyelonephritis with obstructing ureteral stone status post stenting  #PRINCE, resolved  #Transaminitis  (All new to me today)    Leukocytosis slowly improving.  Repeat CT abdomen yesterday with resolution of hydronephrosis and no abscess.  Patient has had a slow improvement on antibiotics with stenting.  Plan to continue ertapenem 1 g daily through end date 6/11.    Thank you for allowing me to be involved in the care of this patient. Infectious diseases will sign off at this time with antibiotics plan in place, but please call me at [phone] if any further ID questions or new ID concerns.

## 2024-06-07 NOTE — PLAN OF CARE
Goal Outcome Evaluation:  Plan of Care Reviewed With: patient           Outcome Evaluation: Pt sleeping but arousable. Pt performed some supine ROM exercises with LEs and UEs, up to EOB with modAx2, sit to stand with maxAx2 and use of rwx. Pt unable to achieve full upright standing on 2 attempts. Pt continues to demonstrate significant weakness, recommend SNU at d/c.      Anticipated Discharge Disposition (PT): skilled nursing facility

## 2024-06-07 NOTE — PROGRESS NOTES
F/U for hydronephrosis, urosepsis. S/p cystoscopy, stent placement 6/2.    CT reviewed. Stent well positioned. No evidence of abscess.    Plan:  - antibiotics per ID  - No further urologic intervention warranted at this time. Urology will sign off. Dr. Schofield's office will contact the patient to arrange further stone management procedure in 1-2 weeks.

## 2024-06-08 LAB
ALBUMIN SERPL-MCNC: 2.5 G/DL (ref 3.5–5.2)
ALBUMIN/GLOB SERPL: 1 G/DL
ALP SERPL-CCNC: 308 U/L (ref 39–117)
ALT SERPL W P-5'-P-CCNC: 50 U/L (ref 1–33)
ANION GAP SERPL CALCULATED.3IONS-SCNC: 8.3 MMOL/L (ref 5–15)
AST SERPL-CCNC: 17 U/L (ref 1–32)
BACTERIA ISLT: NORMAL
BASOPHILS # BLD AUTO: 0.08 10*3/MM3 (ref 0–0.2)
BASOPHILS NFR BLD AUTO: 0.4 % (ref 0–1.5)
BILIRUB SERPL-MCNC: 0.6 MG/DL (ref 0–1.2)
BUN SERPL-MCNC: 23 MG/DL (ref 8–23)
BUN/CREAT SERPL: 24.2 (ref 7–25)
CALCIUM SPEC-SCNC: 9.4 MG/DL (ref 8.6–10.5)
CHLORIDE SERPL-SCNC: 114 MMOL/L (ref 98–107)
CO2 SERPL-SCNC: 23.7 MMOL/L (ref 22–29)
CREAT SERPL-MCNC: 0.95 MG/DL (ref 0.57–1)
DEPRECATED RDW RBC AUTO: 44.4 FL (ref 37–54)
EGFRCR SERPLBLD CKD-EPI 2021: 58.8 ML/MIN/1.73
EOSINOPHIL # BLD AUTO: 0 10*3/MM3 (ref 0–0.4)
EOSINOPHIL NFR BLD AUTO: 0 % (ref 0.3–6.2)
ERYTHROCYTE [DISTWIDTH] IN BLOOD BY AUTOMATED COUNT: 13.5 % (ref 12.3–15.4)
GLOBULIN UR ELPH-MCNC: 2.6 GM/DL
GLUCOSE BLDC GLUCOMTR-MCNC: 100 MG/DL (ref 70–130)
GLUCOSE BLDC GLUCOMTR-MCNC: 103 MG/DL (ref 70–130)
GLUCOSE BLDC GLUCOMTR-MCNC: 118 MG/DL (ref 70–130)
GLUCOSE BLDC GLUCOMTR-MCNC: 92 MG/DL (ref 70–130)
GLUCOSE SERPL-MCNC: 100 MG/DL (ref 65–99)
HCT VFR BLD AUTO: 34.8 % (ref 34–46.6)
HGB BLD-MCNC: 11.2 G/DL (ref 12–15.9)
IMM GRANULOCYTES # BLD AUTO: 0.64 10*3/MM3 (ref 0–0.05)
IMM GRANULOCYTES NFR BLD AUTO: 3 % (ref 0–0.5)
LYMPHOCYTES # BLD AUTO: 2.1 10*3/MM3 (ref 0.7–3.1)
LYMPHOCYTES NFR BLD AUTO: 9.8 % (ref 19.6–45.3)
MAGNESIUM SERPL-MCNC: 1.9 MG/DL (ref 1.6–2.4)
MCH RBC QN AUTO: 29 PG (ref 26.6–33)
MCHC RBC AUTO-ENTMCNC: 32.2 G/DL (ref 31.5–35.7)
MCV RBC AUTO: 90.2 FL (ref 79–97)
MONOCYTES # BLD AUTO: 1.76 10*3/MM3 (ref 0.1–0.9)
MONOCYTES NFR BLD AUTO: 8.2 % (ref 5–12)
NEUTROPHILS NFR BLD AUTO: 16.86 10*3/MM3 (ref 1.7–7)
NEUTROPHILS NFR BLD AUTO: 78.6 % (ref 42.7–76)
PHOSPHATE SERPL-MCNC: 3.6 MG/DL (ref 2.5–4.5)
PLATELET # BLD AUTO: 91 10*3/MM3 (ref 140–450)
PMV BLD AUTO: 13.6 FL (ref 6–12)
POTASSIUM SERPL-SCNC: 3.5 MMOL/L (ref 3.5–5.2)
POTASSIUM SERPL-SCNC: 4.5 MMOL/L (ref 3.5–5.2)
PROT SERPL-MCNC: 5.1 G/DL (ref 6–8.5)
RBC # BLD AUTO: 3.86 10*6/MM3 (ref 3.77–5.28)
SODIUM SERPL-SCNC: 146 MMOL/L (ref 136–145)
WBC NRBC COR # BLD AUTO: 21.44 10*3/MM3 (ref 3.4–10.8)

## 2024-06-08 PROCEDURE — 84100 ASSAY OF PHOSPHORUS: CPT | Performed by: HOSPITALIST

## 2024-06-08 PROCEDURE — 85025 COMPLETE CBC W/AUTO DIFF WBC: CPT | Performed by: HOSPITALIST

## 2024-06-08 PROCEDURE — 82948 REAGENT STRIP/BLOOD GLUCOSE: CPT

## 2024-06-08 PROCEDURE — 25010000002 ERTAPENEM PER 500 MG: Performed by: INTERNAL MEDICINE

## 2024-06-08 PROCEDURE — 80053 COMPREHEN METABOLIC PANEL: CPT | Performed by: HOSPITALIST

## 2024-06-08 PROCEDURE — 84132 ASSAY OF SERUM POTASSIUM: CPT | Performed by: STUDENT IN AN ORGANIZED HEALTH CARE EDUCATION/TRAINING PROGRAM

## 2024-06-08 PROCEDURE — 0 DEXTROSE 5 % SOLUTION: Performed by: HOSPITALIST

## 2024-06-08 PROCEDURE — 83735 ASSAY OF MAGNESIUM: CPT | Performed by: HOSPITALIST

## 2024-06-08 RX ORDER — POTASSIUM CHLORIDE 1.5 G/1.58G
40 POWDER, FOR SOLUTION ORAL EVERY 4 HOURS
Status: COMPLETED | OUTPATIENT
Start: 2024-06-08 | End: 2024-06-08

## 2024-06-08 RX ADMIN — VALACYCLOVIR HYDROCHLORIDE 1000 MG: 500 TABLET, FILM COATED ORAL at 08:05

## 2024-06-08 RX ADMIN — POTASSIUM CHLORIDE 40 MEQ: 1.5 POWDER, FOR SOLUTION ORAL at 08:11

## 2024-06-08 RX ADMIN — DEXTROSE MONOHYDRATE 50 ML/HR: 50 INJECTION, SOLUTION INTRAVENOUS at 12:53

## 2024-06-08 RX ADMIN — ERTAPENEM SODIUM 1000 MG: 1 INJECTION, POWDER, LYOPHILIZED, FOR SOLUTION INTRAMUSCULAR; INTRAVENOUS at 15:08

## 2024-06-08 RX ADMIN — MEMANTINE HYDROCHLORIDE 10 MG: 10 TABLET, FILM COATED ORAL at 08:05

## 2024-06-08 RX ADMIN — SENNOSIDES AND DOCUSATE SODIUM 2 TABLET: 50; 8.6 TABLET ORAL at 08:05

## 2024-06-08 RX ADMIN — Medication 10 ML: at 08:05

## 2024-06-08 RX ADMIN — DONEPEZIL HYDROCHLORIDE 10 MG: 10 TABLET, FILM COATED ORAL at 08:05

## 2024-06-08 RX ADMIN — MEMANTINE HYDROCHLORIDE 10 MG: 10 TABLET, FILM COATED ORAL at 20:16

## 2024-06-08 RX ADMIN — POTASSIUM CHLORIDE 40 MEQ: 1.5 POWDER, FOR SOLUTION ORAL at 12:53

## 2024-06-08 RX ADMIN — Medication 10 ML: at 20:16

## 2024-06-08 NOTE — PROGRESS NOTES
Name: Kimberlee Urrutia ADMIT: 2024   : 1940  PCP: Meche Ji MD    MRN: [id number] LOS: 6 days   AGE/SEX: 84 y.o. female  ROOM: [facility]     Subjective   Subjective   Patient seen and examined this morning.  She is awake and resting in bed, no acute complaints at present, no acute events overnight.       Objective   Objective   Vital Signs  Temp:  [97.9 °F (36.6 °C)-98.2 °F (36.8 °C)] 98 °F (36.7 °C)  Heart Rate:  [80-81] 80  Resp:  [16-18] 16  BP: (124-146)/(65-77) 146/77  SpO2:  [91 %-97 %] 91 %  on   ;   Device (Oxygen Therapy): room air  Body mass index is 33.8 kg/m².  Physical Exam  Vitals and nursing note reviewed.   Constitutional:       General: She is not in acute distress.     Appearance: She is overweight.      Comments: Chronically ill appearing   Cardiovascular:      Rate and Rhythm: Normal rate and regular rhythm.      Pulses: Normal pulses.      Heart sounds: Normal heart sounds.   Pulmonary:      Effort: Pulmonary effort is normal. No respiratory distress.      Breath sounds: Normal breath sounds.   Abdominal:      Palpations: Abdomen is soft.      Tenderness: There is no abdominal tenderness.   Skin:     General: Skin is warm and dry.   Neurological:      Mental Status: She is alert.       Results Review     I reviewed the patient's new clinical results.  Results from last 7 days   Lab Units 24  0525 24  0556 24  0545 24  0728   WBC 10*3/mm3 21.44* 26.33* 27.75* 37.26*   HEMOGLOBIN g/dL 11.2* 11.4* 11.4* 12.5   PLATELETS 10*3/mm3 91* 61* 42* 62*     Results from last 7 days   Lab Units 24  0525 24  0556 24  1458 24  0434 24  0635   SODIUM mmol/L 146* 147*  --  148* 147*   POTASSIUM mmol/L 3.5 3.8 4.4 3.6 3.9   CHLORIDE mmol/L 114* 119*  --  120* 120*   CO2 mmol/L 23.7 20.9*  --  21.0* 17.2*   BUN mg/dL 23 22  --  25* 23   CREATININE mg/dL 0.95 0.90  --  0.98 1.02*   GLUCOSE mg/dL 100* 105*  --  101* 87   EGFR mL/min/1.73  58.8* 62.8  --  56.7* 54.0*     Results from last 7 days   Lab Units 06/08/24  0525 06/04/24  0730 06/02/24  1356 06/01/24  2113   ALBUMIN g/dL 2.5* 2.6* 3.0* 3.9   BILIRUBIN mg/dL 0.6 0.8 0.6 0.7   ALK PHOS U/L 308* 167* 80 79   AST (SGOT) U/L 17 40* 84* 274*   ALT (SGPT) U/L 50* 81* 140* 281*     Results from last 7 days   Lab Units 06/08/24  0525 06/07/24  0556 06/06/24  0434 06/05/24  1713 06/05/24  0635 06/04/24  1855 06/04/24  0730 06/03/24  0636 06/02/24  1356 06/01/24  2113   CALCIUM mg/dL 9.4 9.5 8.8  --  8.6  --  8.5*   < > 7.4* 10.1   ALBUMIN g/dL 2.5*  --   --   --   --   --  2.6*  --  3.0* 3.9   MAGNESIUM mg/dL 1.9 2.0 1.9  --  1.8  --  1.8   < >  --   --    PHOSPHORUS mg/dL 3.6 2.9 2.7 1.9* 1.8*   < > 2.2*   < >  --   --     < > = values in this interval not displayed.     Results from last 7 days   Lab Units 06/03/24  0636 06/03/24  0035 06/02/24  1722 06/02/24  1356 06/02/24  1354   PROCALCITONIN ng/mL  --   --   --  208.80*  --    LACTATE mmol/L 4.8* 5.9* 4.0*  --  5.6*     Glucose   Date/Time Value Ref Range Status   06/08/2024 1157 100 70 - 130 mg/dL Final   06/08/2024 0547 92 70 - 130 mg/dL Final   06/07/2024 2018 148 (H) 70 - 130 mg/dL Final   06/07/2024 1654 104 70 - 130 mg/dL Final   06/07/2024 1150 114 70 - 130 mg/dL Final   06/07/2024 0625 97 70 - 130 mg/dL Final   06/06/2024 2025 83 70 - 130 mg/dL Final       No radiology results for the last day    I have personally reviewed all medications:  Scheduled Medications  donepezil, 10 mg, Oral, Daily  ertapenem, 1,000 mg, Intravenous, Q24H  lactated ringers, 1,000 mL, Intravenous, Once  memantine, 10 mg, Oral, BID  senna-docusate sodium, 2 tablet, Oral, BID  sodium chloride, 10 mL, Intravenous, Q12H    Infusions  dextrose, 50 mL/hr, Last Rate: 50 mL/hr (06/08/24 1253)    Diet  Diet: Regular/House; Feeding Assistance - Nursing; Texture: Pureed (NDD 1); Fluid Consistency: Nectar Thick    I have personally reviewed:  [x]  Laboratory   [x]   "Microbiology   [x]  Radiology   [x]  EKG/Telemetry  [x]  Cardiology/Vascular   []  Pathology    []  Records       Assessment/Plan     Active Hospital Problems    Diagnosis  POA    **Right ureteral stone [N20.1]  Yes    Acute UTI (urinary tract infection) [N39.0]  Yes    Hypoglycemia [E16.2]  Yes    Abnormal LFTs [R79.89]  Yes    Chronic bilateral low back pain without sciatica [M54.50, G89.29]  Yes    CAYLA (dementia of Alzheimer type) [G30.9, F02.80]  Yes    Lumbar spinal stenosis [M48.061]  Yes    Thrombocytopenia [D69.6]  Yes    Acute low back pain [M54.50]  Yes      Resolved Hospital Problems    Diagnosis Date Resolved POA    Shock, septic [A41.9, R65.21] 06/03/2024 Yes    PRINCE (acute kidney injury) [N17.9] 06/06/2024 Yes     Ms. Urrutia is a 84 y.o. female with history of dementia and spinal stenosis who presented with lower back pain, found to have sepsis most likely from urinary source with obstructing right ureteral stone now treated with stent placement along with possible osteomyelitis/discitis on lumbar CT which neurosurgery did not feel was significant/present     ESBL UTI/bacteremia  Urosepsis, hydronephrosis, ureteral stone  - Urology has followed, patient underwent cystoscopy with stent placement on 06/02/24. ID has followed, initial blood cultures + for E. Coli, urine culture + for E. Coli. patient now on Invanz. WBC remains elevated but improving. Repeat blood cultures negative. Repeat CT showing \"Interval placement of right ureteral stent with resolution of hydronephrosis.\"   - Continue treatments as prescribed for now, monitor WBC count.    Transient alteration of awareness  Alzheimer's dementia  - Worsening mental status changes likely 2/2 to acute infection, medical co-morbidities, prolonged hospital stay. She is pleasantly confused, no evidence of agitation. Continue to monitor, treat contributing etiologies.    Prediabetes  Hypoglycemia  - Noted previously, now improved and glucose actually elevated " "at times. Most recent A1C 5.70% (09/26/23) consistent with prediabetes, continue to monitor.    Anasarca  - Third spacing with small R > L pleural effusions noted on CT (06/06). She received x1 dose of IV Lasix. Suspect third spacing due to hypoalbuminemia, poor intake. Will hold off on further diuresis for now and work on improving sodium. Monitor volume status to guide management.     Hypernatremia  - Sodium remains elevated at 146, relatively unchanged from prior value of 147. She is receiving D5W at 50 cc/hr, cautious with fluids due to anasarca as above. Will bump this up to 75 cc/hr and monitor response. Order repeat CMP in AM for reassessment.    Transaminitis  - Noted on labs, but improving, continue to monitor for now with CMP.    Anemia and thrombocytopenia  - Hemoglobin and platelets low on most recent labs, however, stable from prior. HIT antibody negative. Continue monitoring for now.   - Order repeat CBC in AM for reassessment, transfuse for hemoglobin <7.    Abnormal findings on MRI spine  - \"Multilevel degenerative disc disease with degree of canal stenosis greatest at L3-4 where there is 8 mm anterolisthesis L3 on L4. Uncovered disc bulge is present and there is bilateral facet arthritis with severe central canal lateral recess narrowing as well as moderate right and mild left foraminal narrowing.Partial bony fusion of the vertebral bodies at L4-5. At T12-L1, there is degenerative disc disease with disc space narrowing and there is a disc bulge eccentric to the left extending into the inferior aspect of the left neural foramen contributing to moderate to severe left foraminal narrowing and disc appears to contact the undersurface exiting left T12 nerve.\"  - Neurosurgery evaluated, no acute intervention warranted at present, noted plans for outpatient follow up.    ?Herpes labialis  - Valtrex x2 per treatment protocol       All workup, problems and plans new to me today. First day taking care of " "patient.    SCDs for DVT prophylaxis.  Limited code (no CPR, no intubation).  Discussed with patient and nursing staff.  Anticipate discharge to SNU facility timing yet to be determined. Per prior physician: \"SNF placement difficult due to insurance issues.  May need to stay here long enough to complete antibiotics.\"  Expected Discharge Date: 6/11/2024; Expected Discharge Time:       Jasvir Tamayo DO  [facility]  06/08/24        "

## 2024-06-08 NOTE — PLAN OF CARE
Goal Outcome Evaluation:  Plan of Care Reviewed With: patient           Outcome Evaluation: increased D5 to 75, pt not eating but a few bites each meal. oriented to self , time, . will cont to monitor                               Problem: Adult Inpatient Plan of Care  Goal: Plan of Care Review  Outcome: Ongoing, Progressing  Flowsheets (Taken 6/8/2024 1540)  Plan of Care Reviewed With: patient  Outcome Evaluation: increased D5 to 75, pt not eating but a few bites each meal. oriented to self , time, . will cont to monitor  Goal: Patient-Specific Goal (Individualized)  Outcome: Ongoing, Progressing  Goal: Absence of Hospital-Acquired Illness or Injury  Outcome: Ongoing, Progressing  Intervention: Identify and Manage Fall Risk  Recent Flowsheet Documentation  Taken 6/8/2024 1447 by Chelo Flores, RN  Safety Promotion/Fall Prevention:   activity supervised   assistive device/personal items within reach   clutter free environment maintained   fall prevention program maintained   nonskid shoes/slippers when out of bed   room organization consistent   safety round/check completed  Taken 6/8/2024 1239 by Chelo Flores, RN  Safety Promotion/Fall Prevention:   activity supervised   assistive device/personal items within reach   clutter free environment maintained   fall prevention program maintained   nonskid shoes/slippers when out of bed   room organization consistent   safety round/check completed  Taken 6/8/2024 1018 by Chelo Flores, RN  Safety Promotion/Fall Prevention:   activity supervised   assistive device/personal items within reach   clutter free environment maintained   fall prevention program maintained   nonskid shoes/slippers when out of bed   room organization consistent   safety round/check completed  Taken 6/8/2024 0800 by Chelo Flores, RN  Safety Promotion/Fall Prevention:   activity supervised   assistive device/personal items within reach   clutter free environment maintained   fall prevention  program maintained   nonskid shoes/slippers when out of bed   room organization consistent   safety round/check completed  Intervention: Prevent Skin Injury  Recent Flowsheet Documentation  Taken 6/8/2024 1447 by Chelo Flores RN  Skin Protection: adhesive use limited  Taken 6/8/2024 0800 by Chelo Flores RN  Skin Protection: adhesive use limited  Intervention: Prevent and Manage VTE (Venous Thromboembolism) Risk  Recent Flowsheet Documentation  Taken 6/8/2024 1447 by Chelo Flores RN  Activity Management: activity encouraged  Taken 6/8/2024 1239 by Chelo Flores RN  Activity Management: activity encouraged  Taken 6/8/2024 1018 by Chelo Flores RN  Activity Management: activity encouraged  Taken 6/8/2024 0800 by Chelo Flores RN  Activity Management: activity encouraged  VTE Prevention/Management:   bilateral   sequential compression devices off  Intervention: Prevent Infection  Recent Flowsheet Documentation  Taken 6/8/2024 1447 by Chelo Flores RN  Infection Prevention: single patient room provided  Taken 6/8/2024 1239 by Chelo Flores RN  Infection Prevention: single patient room provided  Taken 6/8/2024 1018 by Chelo Flores RN  Infection Prevention: single patient room provided  Taken 6/8/2024 0800 by Chelo Flores RN  Infection Prevention: single patient room provided  Goal: Optimal Comfort and Wellbeing  Outcome: Ongoing, Progressing  Intervention: Provide Person-Centered Care  Recent Flowsheet Documentation  Taken 6/8/2024 1447 by Chelo Flores RN  Trust Relationship/Rapport:   care explained   thoughts/feelings acknowledged  Taken 6/8/2024 0800 by Chelo Flores RN  Trust Relationship/Rapport:   care explained   thoughts/feelings acknowledged  Goal: Readiness for Transition of Care  Outcome: Ongoing, Progressing

## 2024-06-09 LAB
ALBUMIN SERPL-MCNC: 2.7 G/DL (ref 3.5–5.2)
ALBUMIN/GLOB SERPL: 1.1 G/DL
ALP SERPL-CCNC: 236 U/L (ref 39–117)
ALT SERPL W P-5'-P-CCNC: 34 U/L (ref 1–33)
ANION GAP SERPL CALCULATED.3IONS-SCNC: 8 MMOL/L (ref 5–15)
AST SERPL-CCNC: 16 U/L (ref 1–32)
BACTERIA SPEC AEROBE CULT: NORMAL
BACTERIA SPEC AEROBE CULT: NORMAL
BASOPHILS # BLD AUTO: 0.06 10*3/MM3 (ref 0–0.2)
BASOPHILS NFR BLD AUTO: 0.3 % (ref 0–1.5)
BILIRUB SERPL-MCNC: 0.6 MG/DL (ref 0–1.2)
BUN SERPL-MCNC: 26 MG/DL (ref 8–23)
BUN/CREAT SERPL: 29.2 (ref 7–25)
CALCIUM SPEC-SCNC: 9.5 MG/DL (ref 8.6–10.5)
CHLORIDE SERPL-SCNC: 112 MMOL/L (ref 98–107)
CO2 SERPL-SCNC: 23 MMOL/L (ref 22–29)
CREAT SERPL-MCNC: 0.89 MG/DL (ref 0.57–1)
DEPRECATED RDW RBC AUTO: 44.7 FL (ref 37–54)
EGFRCR SERPLBLD CKD-EPI 2021: 63.6 ML/MIN/1.73
EOSINOPHIL # BLD AUTO: 0 10*3/MM3 (ref 0–0.4)
EOSINOPHIL NFR BLD AUTO: 0 % (ref 0.3–6.2)
ERYTHROCYTE [DISTWIDTH] IN BLOOD BY AUTOMATED COUNT: 13.5 % (ref 12.3–15.4)
GLOBULIN UR ELPH-MCNC: 2.5 GM/DL
GLUCOSE BLDC GLUCOMTR-MCNC: 173 MG/DL (ref 70–130)
GLUCOSE BLDC GLUCOMTR-MCNC: 91 MG/DL (ref 70–130)
GLUCOSE BLDC GLUCOMTR-MCNC: 93 MG/DL (ref 70–130)
GLUCOSE BLDC GLUCOMTR-MCNC: 93 MG/DL (ref 70–130)
GLUCOSE SERPL-MCNC: 104 MG/DL (ref 65–99)
HCT VFR BLD AUTO: 35 % (ref 34–46.6)
HGB BLD-MCNC: 11.1 G/DL (ref 12–15.9)
IMM GRANULOCYTES # BLD AUTO: 0.51 10*3/MM3 (ref 0–0.05)
IMM GRANULOCYTES NFR BLD AUTO: 2.7 % (ref 0–0.5)
LYMPHOCYTES # BLD AUTO: 1.92 10*3/MM3 (ref 0.7–3.1)
LYMPHOCYTES NFR BLD AUTO: 10.2 % (ref 19.6–45.3)
MAGNESIUM SERPL-MCNC: 2 MG/DL (ref 1.6–2.4)
MCH RBC QN AUTO: 28.5 PG (ref 26.6–33)
MCHC RBC AUTO-ENTMCNC: 31.7 G/DL (ref 31.5–35.7)
MCV RBC AUTO: 90 FL (ref 79–97)
MONOCYTES # BLD AUTO: 1.22 10*3/MM3 (ref 0.1–0.9)
MONOCYTES NFR BLD AUTO: 6.5 % (ref 5–12)
NEUTROPHILS NFR BLD AUTO: 15.08 10*3/MM3 (ref 1.7–7)
NEUTROPHILS NFR BLD AUTO: 80.3 % (ref 42.7–76)
NRBC BLD AUTO-RTO: 0 /100 WBC (ref 0–0.2)
PHOSPHATE SERPL-MCNC: 3.4 MG/DL (ref 2.5–4.5)
PLATELET # BLD AUTO: 136 10*3/MM3 (ref 140–450)
PMV BLD AUTO: 12.1 FL (ref 6–12)
POTASSIUM SERPL-SCNC: 4 MMOL/L (ref 3.5–5.2)
PROT SERPL-MCNC: 5.2 G/DL (ref 6–8.5)
RBC # BLD AUTO: 3.89 10*6/MM3 (ref 3.77–5.28)
SODIUM SERPL-SCNC: 143 MMOL/L (ref 136–145)
WBC NRBC COR # BLD AUTO: 18.79 10*3/MM3 (ref 3.4–10.8)

## 2024-06-09 PROCEDURE — 84100 ASSAY OF PHOSPHORUS: CPT | Performed by: HOSPITALIST

## 2024-06-09 PROCEDURE — 82948 REAGENT STRIP/BLOOD GLUCOSE: CPT

## 2024-06-09 PROCEDURE — 85025 COMPLETE CBC W/AUTO DIFF WBC: CPT | Performed by: HOSPITALIST

## 2024-06-09 PROCEDURE — 25010000002 ERTAPENEM PER 500 MG: Performed by: INTERNAL MEDICINE

## 2024-06-09 PROCEDURE — 83735 ASSAY OF MAGNESIUM: CPT | Performed by: HOSPITALIST

## 2024-06-09 PROCEDURE — 80053 COMPREHEN METABOLIC PANEL: CPT | Performed by: STUDENT IN AN ORGANIZED HEALTH CARE EDUCATION/TRAINING PROGRAM

## 2024-06-09 RX ADMIN — SENNOSIDES AND DOCUSATE SODIUM 2 TABLET: 50; 8.6 TABLET ORAL at 21:59

## 2024-06-09 RX ADMIN — MEMANTINE HYDROCHLORIDE 10 MG: 10 TABLET, FILM COATED ORAL at 09:17

## 2024-06-09 RX ADMIN — SENNOSIDES AND DOCUSATE SODIUM 2 TABLET: 50; 8.6 TABLET ORAL at 09:17

## 2024-06-09 RX ADMIN — ERTAPENEM SODIUM 1000 MG: 1 INJECTION, POWDER, LYOPHILIZED, FOR SOLUTION INTRAMUSCULAR; INTRAVENOUS at 15:24

## 2024-06-09 RX ADMIN — Medication 10 ML: at 09:17

## 2024-06-09 RX ADMIN — DONEPEZIL HYDROCHLORIDE 10 MG: 10 TABLET, FILM COATED ORAL at 09:17

## 2024-06-09 RX ADMIN — MEMANTINE HYDROCHLORIDE 10 MG: 10 TABLET, FILM COATED ORAL at 22:00

## 2024-06-09 RX ADMIN — Medication 10 ML: at 22:00

## 2024-06-09 NOTE — PLAN OF CARE
Goal Outcome Evaluation:  Plan of Care Reviewed With: patient           Outcome Evaluation: RA, D5 @50, iv antibotics continued, f/c in place q2 hour turns. will cont to monitor                               Problem: Adult Inpatient Plan of Care  Goal: Plan of Care Review  Outcome: Ongoing, Progressing  Flowsheets (Taken 6/9/2024 1649)  Plan of Care Reviewed With: patient  Outcome Evaluation: RA, D5 @50, iv antibotics continued, f/c in place q2 hour turns. will cont to monitor  Goal: Patient-Specific Goal (Individualized)  Outcome: Ongoing, Progressing  Goal: Absence of Hospital-Acquired Illness or Injury  Outcome: Ongoing, Progressing  Intervention: Identify and Manage Fall Risk  Recent Flowsheet Documentation  Taken 6/9/2024 1646 by Chelo Flores, RN  Safety Promotion/Fall Prevention:   activity supervised   assistive device/personal items within reach   clutter free environment maintained   fall prevention program maintained   nonskid shoes/slippers when out of bed   room organization consistent   safety round/check completed  Taken 6/9/2024 1400 by Chelo Flores, RN  Safety Promotion/Fall Prevention:   activity supervised   assistive device/personal items within reach   clutter free environment maintained   fall prevention program maintained   nonskid shoes/slippers when out of bed   room organization consistent   safety round/check completed  Taken 6/9/2024 1239 by Chelo Flores, RN  Safety Promotion/Fall Prevention:   activity supervised   assistive device/personal items within reach   clutter free environment maintained   fall prevention program maintained   nonskid shoes/slippers when out of bed   room organization consistent   safety round/check completed  Taken 6/9/2024 1010 by Chelo Flores, RN  Safety Promotion/Fall Prevention:   activity supervised   assistive device/personal items within reach   clutter free environment maintained   fall prevention program maintained   nonskid shoes/slippers  when out of bed   room organization consistent   safety round/check completed  Taken 6/9/2024 0830 by Chelo Flores RN  Safety Promotion/Fall Prevention:   activity supervised   assistive device/personal items within reach   clutter free environment maintained   fall prevention program maintained   nonskid shoes/slippers when out of bed   room organization consistent   safety round/check completed  Intervention: Prevent Skin Injury  Recent Flowsheet Documentation  Taken 6/9/2024 1400 by Chelo Flores RN  Skin Protection: adhesive use limited  Taken 6/9/2024 0830 by Chelo Flores RN  Skin Protection: adhesive use limited  Intervention: Prevent and Manage VTE (Venous Thromboembolism) Risk  Recent Flowsheet Documentation  Taken 6/9/2024 1646 by Chelo Flores RN  Activity Management: activity encouraged  Taken 6/9/2024 1400 by Chelo Flores RN  Activity Management: activity encouraged  VTE Prevention/Management:   bilateral   sequential compression devices off  Taken 6/9/2024 1239 by Chelo Flores RN  Activity Management: activity encouraged  Taken 6/9/2024 1010 by Chelo Flores RN  Activity Management: activity encouraged  Taken 6/9/2024 0830 by Chelo Flores RN  Activity Management: activity encouraged  VTE Prevention/Management:   bilateral   sequential compression devices off  Intervention: Prevent Infection  Recent Flowsheet Documentation  Taken 6/9/2024 1646 by Chelo Flores RN  Infection Prevention: single patient room provided  Taken 6/9/2024 1400 by Chelo Flores RN  Infection Prevention: single patient room provided  Taken 6/9/2024 1239 by Chelo Flores RN  Infection Prevention: single patient room provided  Taken 6/9/2024 1010 by Chelo Flores RN  Infection Prevention: single patient room provided  Goal: Optimal Comfort and Wellbeing  Outcome: Ongoing, Progressing  Intervention: Provide Person-Centered Care  Recent Flowsheet Documentation  Taken 6/9/2024 1400 by Sandra  ICPRIANO Whitney  Trust Relationship/Rapport:   care explained   thoughts/feelings acknowledged  Taken 6/9/2024 0830 by Chelo Flores RN  Trust Relationship/Rapport:   care explained   thoughts/feelings acknowledged  Goal: Readiness for Transition of Care  Outcome: Ongoing, Progressing

## 2024-06-09 NOTE — PROGRESS NOTES
Name: Kimberlee Urrutia ADMIT: 2024   : 1940  PCP: Meche Ji MD    MRN: [id number] LOS: 7 days   AGE/SEX: 84 y.o. female  ROOM: [facility]     Subjective   Subjective   Patient seen and examined this morning. She is awake and resting comfortably, no acute distress.      Objective   Objective   Vital Signs  Temp:  [97.9 °F (36.6 °C)-99.3 °F (37.4 °C)] 97.9 °F (36.6 °C)  Heart Rate:  [67-84] 67  Resp:  [16-18] 18  BP: (115-146)/(73-82) 115/82  SpO2:  [97 %-100 %] 100 %  on   ;   Device (Oxygen Therapy): room air  Body mass index is 33.65 kg/m².  Physical Exam  Vitals and nursing note reviewed.   Constitutional:       General: She is not in acute distress.     Appearance: She is overweight.      Comments: Chronically ill appearing   Cardiovascular:      Rate and Rhythm: Normal rate and regular rhythm.      Pulses: Normal pulses.      Heart sounds: Normal heart sounds.   Pulmonary:      Effort: Pulmonary effort is normal. No respiratory distress.      Breath sounds: Normal breath sounds. No wheezing.   Abdominal:      Palpations: Abdomen is soft.      Tenderness: There is no abdominal tenderness.   Skin:     General: Skin is warm and dry.   Neurological:      Mental Status: She is alert. She is confused.       Results Review     I reviewed the patient's new clinical results.  Results from last 7 days   Lab Units 24  0609 24  0525 24  0556 24  0545   WBC 10*3/mm3 18.79* 21.44* 26.33* 27.75*   HEMOGLOBIN g/dL 11.1* 11.2* 11.4* 11.4*   PLATELETS 10*3/mm3 136* 91* 61* 42*     Results from last 7 days   Lab Units 24  0609 24  1648 24  0525 24  0556 24  1458 24  0434   SODIUM mmol/L 143  --  146* 147*  --  148*   POTASSIUM mmol/L 4.0 4.5 3.5 3.8   < > 3.6   CHLORIDE mmol/L 112*  --  114* 119*  --  120*   CO2 mmol/L 23.0  --  23.7 20.9*  --  21.0*   BUN mg/dL 26*  --  23 22  --  25*   CREATININE mg/dL 0.89  --  0.95 0.90  --  0.98   GLUCOSE  mg/dL 104*  --  100* 105*  --  101*   EGFR mL/min/1.73 63.6  --  58.8* 62.8  --  56.7*    < > = values in this interval not displayed.     Results from last 7 days   Lab Units 06/09/24  0609 06/08/24  0525 06/04/24  0730 06/02/24  1356   ALBUMIN g/dL 2.7* 2.5* 2.6* 3.0*   BILIRUBIN mg/dL 0.6 0.6 0.8 0.6   ALK PHOS U/L 236* 308* 167* 80   AST (SGOT) U/L 16 17 40* 84*   ALT (SGPT) U/L 34* 50* 81* 140*     Results from last 7 days   Lab Units 06/09/24  0609 06/08/24  0525 06/07/24  0556 06/06/24  0434 06/04/24  1855 06/04/24  0730 06/03/24  0636 06/02/24  1356   CALCIUM mg/dL 9.5 9.4 9.5 8.8   < > 8.5*   < > 7.4*   ALBUMIN g/dL 2.7* 2.5*  --   --   --  2.6*  --  3.0*   MAGNESIUM mg/dL 2.0 1.9 2.0 1.9   < > 1.8   < >  --    PHOSPHORUS mg/dL 3.4 3.6 2.9 2.7   < > 2.2*   < >  --     < > = values in this interval not displayed.     Results from last 7 days   Lab Units 06/03/24  0636 06/03/24  0035 06/02/24  1722 06/02/24  1356 06/02/24  1354   PROCALCITONIN ng/mL  --   --   --  208.80*  --    LACTATE mmol/L 4.8* 5.9* 4.0*  --  5.6*     Glucose   Date/Time Value Ref Range Status   06/09/2024 1114 173 (H) 70 - 130 mg/dL Final   06/09/2024 0614 93 70 - 130 mg/dL Final   06/08/2024 2008 118 70 - 130 mg/dL Final   06/08/2024 1605 103 70 - 130 mg/dL Final   06/08/2024 1157 100 70 - 130 mg/dL Final   06/08/2024 0547 92 70 - 130 mg/dL Final   06/07/2024 2018 148 (H) 70 - 130 mg/dL Final       No radiology results for the last day    I have personally reviewed all medications:  Scheduled Medications  donepezil, 10 mg, Oral, Daily  ertapenem, 1,000 mg, Intravenous, Q24H  lactated ringers, 1,000 mL, Intravenous, Once  memantine, 10 mg, Oral, BID  senna-docusate sodium, 2 tablet, Oral, BID  sodium chloride, 10 mL, Intravenous, Q12H    Infusions  dextrose, 75 mL/hr, Last Rate: 75 mL/hr (06/08/24 1536)    Diet  Diet: Regular/House; Feeding Assistance - Nursing; Texture: Pureed (NDD 1); Fluid Consistency: Nectar Thick    I have  "personally reviewed:  [x]  Laboratory   [x]  Microbiology   [x]  Radiology   [x]  EKG/Telemetry  [x]  Cardiology/Vascular   []  Pathology    []  Records       Assessment/Plan     Active Hospital Problems    Diagnosis  POA    **Right ureteral stone [N20.1]  Yes    Acute UTI (urinary tract infection) [N39.0]  Yes    Hypoglycemia [E16.2]  Yes    Abnormal LFTs [R79.89]  Yes    Chronic bilateral low back pain without sciatica [M54.50, G89.29]  Yes    CAYLA (dementia of Alzheimer type) [G30.9, F02.80]  Yes    Lumbar spinal stenosis [M48.061]  Yes    Thrombocytopenia [D69.6]  Yes    Acute low back pain [M54.50]  Yes      Resolved Hospital Problems    Diagnosis Date Resolved POA    Shock, septic [A41.9, R65.21] 06/03/2024 Yes    PRINCE (acute kidney injury) [N17.9] 06/06/2024 Yes     Ms. Urrutia is a 84 y.o. female with history of dementia and spinal stenosis who presented with lower back pain, found to have sepsis most likely from urinary source with obstructing right ureteral stone now treated with stent placement along with possible osteomyelitis/discitis on lumbar CT which neurosurgery did not feel was significant/present     ESBL UTI/bacteremia  Urosepsis, hydronephrosis, ureteral stone  - Urology has followed, patient underwent cystoscopy with stent placement on 06/02/24. ID has followed, initial blood cultures + for E. Coli, urine culture + for E. Coli. patient now on Invanz, ID noting plans to continue this through end date of 06/11.  - WBC remains elevated but improving. Repeat blood cultures negative. Repeat CT showing \"Interval placement of right ureteral stent with resolution of hydronephrosis.\"   - Continue treatments as prescribed for now, monitor WBC count.    Transient alteration of awareness  Alzheimer's dementia  - Worsening mental status changes likely 2/2 to acute infection, medical co-morbidities, prolonged hospital stay. She remains pleasantly confused, but no evidence of acute behavioral disturbance at this " "time. Continue to monitor, treat contributing etiologies.    Prediabetes  Hypoglycemia  - Glucose elevated, but acceptable at present. Most recent A1C 5.70% (09/26/23) consistent with prediabetes, continue to monitor for now.    Anasarca  - Third spacing with small R > L pleural effusions noted on CT (06/06). She received x1 dose of IV Lasix. Suspect third spacing due to hypoalbuminemia, poor intake. Her volume status appears slightly improved, she does have some edema ongoing, but will plan to continue monitoring for now without further IV diuresis at this time.  - Monitor volume status, renal function and electrolytes to help guide ongoing management decisions.     Hypernatremia  - Sodium previously elevated, she was started on D5W, which was increased on 06/08 due to persistent hypernatremia.  - Update (06/09): Sodium has improved and is now within normal limits, albeit, on the higher end of normal range at 143 on most recent labs. Will back down on D5W back to 50 cc/hr for now and plan for repeating labs in AM to guide further management.    Transaminitis  - Noted on labs, overall improved, relatively stable from prior, abdominal exam benign, no acute intervention warranted at present, but will continue monitoring.  - Order repeat CMP in AM for reassessment.    Anemia and thrombocytopenia  - Hemoglobin and platelets low on most recent labs, however, relatively stable from prior, Platelets have actually improved. HIT antibody negative. Continue monitoring for now.   - Order repeat CBC in AM for reassessment, transfuse for hemoglobin <7.    Abnormal findings on MRI spine  - \"Multilevel degenerative disc disease with degree of canal stenosis greatest at L3-4 where there is 8 mm anterolisthesis L3 on L4. Uncovered disc bulge is present and there is bilateral facet arthritis with severe central canal lateral recess narrowing as well as moderate right and mild left foraminal narrowing.Partial bony fusion of the " "vertebral bodies at L4-5. At T12-L1, there is degenerative disc disease with disc space narrowing and there is a disc bulge eccentric to the left extending into the inferior aspect of the left neural foramen contributing to moderate to severe left foraminal narrowing and disc appears to contact the undersurface exiting left T12 nerve.\"  - Neurosurgery evaluated, no acute intervention warranted at present, noted plans for outpatient follow up.    ?Herpes labialis  - Valtrex x2 per treatment protocol       SCDs for DVT prophylaxis.  Limited code (no CPR, no intubation).  Discussed with patient and nursing staff.  Anticipate discharge to SNU facility timing yet to be determined. Per prior physician: \"SNF placement difficult due to insurance issues.  May need to stay here long enough to complete antibiotics.\"  Expected Discharge Date: 6/11/2024; Expected Discharge Time:       Jasvir Tamayo DO  [facility]  06/09/24        "

## 2024-06-09 NOTE — PLAN OF CARE
Goal Outcome Evaluation:           Progress: improving  Outcome Evaluation: vss overnight on RA.  D5 @ 75ml/hr.  f/c completed.  Q2 turn.

## 2024-06-10 LAB
ALBUMIN SERPL-MCNC: 2.6 G/DL (ref 3.5–5.2)
ALBUMIN/GLOB SERPL: 0.9 G/DL
ALP SERPL-CCNC: 206 U/L (ref 39–117)
ALT SERPL W P-5'-P-CCNC: 24 U/L (ref 1–33)
ANION GAP SERPL CALCULATED.3IONS-SCNC: 8.9 MMOL/L (ref 5–15)
AST SERPL-CCNC: 15 U/L (ref 1–32)
BASOPHILS # BLD AUTO: 0.08 10*3/MM3 (ref 0–0.2)
BASOPHILS NFR BLD AUTO: 0.5 % (ref 0–1.5)
BILIRUB SERPL-MCNC: 0.7 MG/DL (ref 0–1.2)
BUN SERPL-MCNC: 22 MG/DL (ref 8–23)
BUN/CREAT SERPL: 24.2 (ref 7–25)
CALCIUM SPEC-SCNC: 9.9 MG/DL (ref 8.6–10.5)
CHLORIDE SERPL-SCNC: 108 MMOL/L (ref 98–107)
CO2 SERPL-SCNC: 22.1 MMOL/L (ref 22–29)
CREAT SERPL-MCNC: 0.91 MG/DL (ref 0.57–1)
DEPRECATED RDW RBC AUTO: 43.2 FL (ref 37–54)
EGFRCR SERPLBLD CKD-EPI 2021: 62 ML/MIN/1.73
EOSINOPHIL # BLD AUTO: 0 10*3/MM3 (ref 0–0.4)
EOSINOPHIL NFR BLD AUTO: 0 % (ref 0.3–6.2)
ERYTHROCYTE [DISTWIDTH] IN BLOOD BY AUTOMATED COUNT: 13 % (ref 12.3–15.4)
GLOBULIN UR ELPH-MCNC: 3 GM/DL
GLUCOSE BLDC GLUCOMTR-MCNC: 78 MG/DL (ref 70–130)
GLUCOSE BLDC GLUCOMTR-MCNC: 81 MG/DL (ref 70–130)
GLUCOSE BLDC GLUCOMTR-MCNC: 94 MG/DL (ref 70–130)
GLUCOSE BLDC GLUCOMTR-MCNC: 97 MG/DL (ref 70–130)
GLUCOSE SERPL-MCNC: 80 MG/DL (ref 65–99)
HCT VFR BLD AUTO: 35.8 % (ref 34–46.6)
HGB BLD-MCNC: 11.4 G/DL (ref 12–15.9)
IMM GRANULOCYTES # BLD AUTO: 0.3 10*3/MM3 (ref 0–0.05)
IMM GRANULOCYTES NFR BLD AUTO: 1.8 % (ref 0–0.5)
LYMPHOCYTES # BLD AUTO: 1.79 10*3/MM3 (ref 0.7–3.1)
LYMPHOCYTES NFR BLD AUTO: 10.9 % (ref 19.6–45.3)
MAGNESIUM SERPL-MCNC: 2.2 MG/DL (ref 1.6–2.4)
MCH RBC QN AUTO: 29 PG (ref 26.6–33)
MCHC RBC AUTO-ENTMCNC: 31.8 G/DL (ref 31.5–35.7)
MCV RBC AUTO: 91.1 FL (ref 79–97)
MONOCYTES # BLD AUTO: 0.97 10*3/MM3 (ref 0.1–0.9)
MONOCYTES NFR BLD AUTO: 5.9 % (ref 5–12)
NEUTROPHILS NFR BLD AUTO: 13.3 10*3/MM3 (ref 1.7–7)
NEUTROPHILS NFR BLD AUTO: 80.9 % (ref 42.7–76)
NRBC BLD AUTO-RTO: 0 /100 WBC (ref 0–0.2)
PHOSPHATE SERPL-MCNC: 3.6 MG/DL (ref 2.5–4.5)
PLATELET # BLD AUTO: 179 10*3/MM3 (ref 140–450)
PMV BLD AUTO: 12 FL (ref 6–12)
POTASSIUM SERPL-SCNC: 4 MMOL/L (ref 3.5–5.2)
PROT SERPL-MCNC: 5.6 G/DL (ref 6–8.5)
RBC # BLD AUTO: 3.93 10*6/MM3 (ref 3.77–5.28)
SODIUM SERPL-SCNC: 139 MMOL/L (ref 136–145)
WBC NRBC COR # BLD AUTO: 16.44 10*3/MM3 (ref 3.4–10.8)

## 2024-06-10 PROCEDURE — 83735 ASSAY OF MAGNESIUM: CPT | Performed by: HOSPITALIST

## 2024-06-10 PROCEDURE — 84100 ASSAY OF PHOSPHORUS: CPT | Performed by: HOSPITALIST

## 2024-06-10 PROCEDURE — 99232 SBSQ HOSP IP/OBS MODERATE 35: CPT | Performed by: INTERNAL MEDICINE

## 2024-06-10 PROCEDURE — 25010000002 ERTAPENEM PER 500 MG: Performed by: INTERNAL MEDICINE

## 2024-06-10 PROCEDURE — 97166 OT EVAL MOD COMPLEX 45 MIN: CPT

## 2024-06-10 PROCEDURE — 80053 COMPREHEN METABOLIC PANEL: CPT | Performed by: STUDENT IN AN ORGANIZED HEALTH CARE EDUCATION/TRAINING PROGRAM

## 2024-06-10 PROCEDURE — 0 DEXTROSE 5 % SOLUTION: Performed by: STUDENT IN AN ORGANIZED HEALTH CARE EDUCATION/TRAINING PROGRAM

## 2024-06-10 PROCEDURE — 97110 THERAPEUTIC EXERCISES: CPT

## 2024-06-10 PROCEDURE — 82948 REAGENT STRIP/BLOOD GLUCOSE: CPT

## 2024-06-10 PROCEDURE — 97530 THERAPEUTIC ACTIVITIES: CPT

## 2024-06-10 PROCEDURE — 85025 COMPLETE CBC W/AUTO DIFF WBC: CPT | Performed by: STUDENT IN AN ORGANIZED HEALTH CARE EDUCATION/TRAINING PROGRAM

## 2024-06-10 RX ADMIN — Medication 10 ML: at 21:09

## 2024-06-10 RX ADMIN — DEXTROSE MONOHYDRATE 50 ML/HR: 50 INJECTION, SOLUTION INTRAVENOUS at 00:53

## 2024-06-10 RX ADMIN — SENNOSIDES AND DOCUSATE SODIUM 2 TABLET: 50; 8.6 TABLET ORAL at 21:09

## 2024-06-10 RX ADMIN — SENNOSIDES AND DOCUSATE SODIUM 2 TABLET: 50; 8.6 TABLET ORAL at 08:50

## 2024-06-10 RX ADMIN — ERTAPENEM SODIUM 1000 MG: 1 INJECTION, POWDER, LYOPHILIZED, FOR SOLUTION INTRAMUSCULAR; INTRAVENOUS at 16:01

## 2024-06-10 RX ADMIN — MEMANTINE HYDROCHLORIDE 10 MG: 10 TABLET, FILM COATED ORAL at 21:09

## 2024-06-10 RX ADMIN — DONEPEZIL HYDROCHLORIDE 10 MG: 10 TABLET, FILM COATED ORAL at 08:50

## 2024-06-10 RX ADMIN — Medication 10 ML: at 09:04

## 2024-06-10 RX ADMIN — MEMANTINE HYDROCHLORIDE 10 MG: 10 TABLET, FILM COATED ORAL at 08:50

## 2024-06-10 NOTE — PLAN OF CARE
Goal Outcome Evaluation:  Plan of Care Reviewed With: patient        Progress: no change  Outcome Evaluation: Patient seen this AM for PT. She favors R side visually and difficulty turning head to L. She required mod-maxAx2 for supine<>sitting EOB and R lateral lean noted in sitting requiring CGA-maxA for sitting balance. Pt able to clear buttocks off bed during attempted STS requiring maxAx2 but unable to come to full upright position. Pt with BUE edema noted and difficulty with ROM/strength in BUE. Pt unable to feed herself. Notified RN pt would benefit from acute OT and worked with PT on UE ROM/ strength. RN notified of favoring R side and R lateral lean. Will continue to progress as pt tolerates.      Anticipated Discharge Disposition (PT): skilled nursing facility

## 2024-06-10 NOTE — PLAN OF CARE
Goal Outcome Evaluation:  Plan of Care Reviewed With: patient        Progress: no change  Outcome Evaluation: Pt alert to self, on RA. BUE swollen, IVF on hold, APRN notified. Rodriguez in place, catheter care provided. Q2 turns. VSS.

## 2024-06-10 NOTE — THERAPY EVALUATION
Patient Name: Kimberlee Urrutia  : 1940    MRN: [id number]                              Today's Date: 6/10/2024       Admit Date: 2024    Visit Dx:     ICD-10-CM ICD-9-CM   1. Right ureteral stone  N20.1 592.1   2. Acute UTI  N39.0 599.0   3. Severe sepsis  A41.9 038.9    R65.20 995.92   4. Elevated lactic acid level  R79.89 276.2   5. Osteomyelitis of lumbar spine  M46.26 730.28   6. Elevated AST (SGOT)  R74.01 790.4   7. Elevated ALT measurement  R74.01 790.4   8. Elevated C-reactive protein (CRP)  R79.82 790.95   9. PRINCE (acute kidney injury)  N17.9 584.9     Patient Active Problem List   Diagnosis    Arthritis    HLD (hyperlipidemia)    Primary hypertension    Health care maintenance    Knee pain, right    Hx of total knee arthroplasty    Seasonal allergies    Vitamin D deficiency    Pyuria    Transient alteration of awareness    Migraine without status migrainosus, not intractable    Leukocytosis    Viral pharyngitis    Multinodular thyroid    Osteopenia of multiple sites    Colon cancer screening    Serum calcium elevated    Acute low back pain    Elevated procalcitonin    Sepsis due to Escherichia coli with acute renal failure without septic shock    Thrombocytopenia    Recurrent oral herpes simplex    Lumbar spinal stenosis    Metabolic encephalopathy    Renal stones    Colon cancer screening    Sacroiliac joint pain    Lumbar stenosis without neurogenic claudication    Renal stone    Hyperlipidemia    History of total knee arthroplasty    Spinal stenosis of lumbar region    Multinodular goiter    Osteopenia    Pain in right knee    Sacroiliac joint pain    Hypercalcemia    CAYLA (dementia of Alzheimer type)    Chronic bilateral low back pain without sciatica    Right ureteral stone    Discitis of lumbar region    Acute UTI (urinary tract infection)    Hypoglycemia    Abnormal LFTs     Past Medical History:   Diagnosis Date    Allergic     Arthritis     Colon cancer screening 2020    Colon  cancer screening 01/29/2020    Cough     Diarrhea     Hx of migraine headaches     Hyperlipidemia     Irregular heart beat     Osteoarthritis     Renal stones 02/2021    Vitamin D deficiency      Past Surgical History:   Procedure Laterality Date    COLONOSCOPY      CYSTOSCOPY W/ URETERAL STENT PLACEMENT Right 6/2/2024    Procedure: CYSTOSCOPY, RIGHT URETERAL STENT PLACEMENT;  Surgeon: Efra Schofield MD;  Location: [facility] MAIN OR;  Service: Urology;  Laterality: Right;    LUMBAR EPIDURAL INJECTION N/A 8/2/2021    Procedure: LUMBAR EPIDURAL 1ST VISIT 5-1;  Surgeon: uN Partida MD;  Location: [facility] MAIN OR;  Service: Pain Management;  Laterality: N/A;    REPLACEMENT TOTAL KNEE Right 01/2015    Josue Wilson MD    SACROILIAC JOINT INJECTION Left 6/16/2021    Procedure: SACROILIAC INJECTION left;  Surgeon: Nu Partida MD;  Location: [facility] MAIN OR;  Service: Pain Management;  Laterality: Left;    TUBAL ABDOMINAL LIGATION      URETEROSCOPY LASER LITHOTRIPSY WITH STENT INSERTION Left 2/23/2021    Procedure: LT.URETEROSCOPY LASER LITHOTRIPSY WITH STENT  FOR STONE;  Surgeon: Arnaud Lu MD;  Location: [facility] MAIN OR;  Service: Urology;  Laterality: Left;    WISDOM TOOTH EXTRACTION        General Information       Row Name 06/10/24 1447          OT Time and Intention    Document Type evaluation (P)   -     Mode of Treatment occupational therapy;individual therapy (P)   -       Row Name 06/10/24 1448          General Information    Patient Profile Reviewed yes (P)   -     Prior Level of Function independent: (P)   -     Existing Precautions/Restrictions fall (P)   -     Barriers to Rehab medically complex;cognitive status (P)   -       Row Name 06/10/24 1445          Living Environment    People in Home alone;facility resident (P)   -     Name(s) of People in Home facility resident (P)   -       Row Name 06/10/24 1448          Cognition    Orientation Status (Cognition) oriented  x 3 (P)   -       Row Name 06/10/24 1447          Safety Issues, Functional Mobility    Safety Issues Affecting Function (Mobility) insight into deficits/self-awareness;judgment;problem-solving;safety precaution awareness;safety precautions follow-through/compliance;sequencing abilities (P)   -     Impairments Affecting Function (Mobility) balance;endurance/activity tolerance;strength;postural/trunk control;range of motion (ROM);grasp;cognition (P)   -     Cognitive Impairments, Mobility Safety/Performance attention;judgment;problem-solving/reasoning;safety precaution follow-through;sequencing abilities (P)   -               User Key  (r) = Recorded By, (t) = Taken By, (c) = Cosigned By      Initials Name Provider Type     Nereyda Rothman OT Student OT Student                     Mobility/ADL's       Row Name 06/10/24 1449          Bed Mobility    Bed Mobility bed mobility (all) activities (P)   -     All Activities, [place] (Bed Mobility) maximum assist (25% patient effort);2 person assist (P)   -     Supine-Sit [place] (Bed Mobility) maximum assist (25% patient effort);2 person assist (P)   -     Sit-Supine [place] (Bed Mobility) maximum assist (25% patient effort);2 person assist (P)   -     Bed Mobility, Safety Issues decreased use of arms for pushing/pulling;decreased use of legs for bridging/pushing;cognitive deficits limit understanding (P)   -     Assistive Device (Bed Mobility) bed rails;draw sheet;head of bed elevated (P)   -       Row Name 06/10/24 1449          Transfers    Comment, (Transfers) unable to attempt to stand this date (P)   -       Row Name 06/10/24 1449          Functional Mobility    Patient was able to Ambulate no, other medical factors prevent ambulation (P)   -       Row Name 06/10/24 1449          Activities of Daily Living    BADL Assessment/Intervention lower body dressing;toileting;grooming (P)   -       Row Name 06/10/24 1449           Lower Body Dressing Assessment/Training    [place] Level (Lower Body Dressing) lower body dressing skills;dependent (less than 25% patient effort) (P)   -MF       Row Name 06/10/24 1449          Toileting Assessment/Training    [place] Level (Toileting) toileting skills;dependent (less than 25% patient effort) (P)   -     Comment, (Toileting) duron cath (P)   -MF       Row Name 06/10/24 1449          Grooming Assessment/Training    [place] Level (Grooming) grooming skills;minimum assist (75% patient effort);set up;wash face, hands (P)   -               User Key  (r) = Recorded By, (t) = Taken By, (c) = Cosigned By      Initials Name Provider Type    Nereyda Streeter OT Student OT Student                   Obj/Interventions       Row Name 06/10/24 1451          Sensory Assessment (Somatosensory)    Sensory Assessment (Somatosensory) sensation intact (P)   -MF       Row Name 06/10/24 1451          Vision Assessment/Intervention    Visual Impairment/Limitations WFL (P)   -MF       Row Name 06/10/24 1451          Range of Motion Comprehensive    General Range of Motion upper extremity range of motion deficits identified (P)   -     Comment, General Range of Motion dec ROM on R side (P)   -MF       Row Name 06/10/24 1451          Strength Comprehensive (MMT)    General Manual Muscle Testing (MMT) Assessment upper extremity strength deficits identified (P)   -     Comment, General Manual Muscle Testing (MMT) Assessment generalized weakness BUE grossly 3+/5 MMT;  strength weak (P)   -MF       Row Name 06/10/24 1451          Motor Skills    Motor Skills functional endurance;coordination (P)   -     Coordination gross motor deficit;upper extremity;bilateral;minimal impairment (P)   -     Functional Endurance poor d/t fatigue (P)   -MF       Row Name 06/10/24 1451          Balance    Balance Assessment sitting static balance;sitting dynamic balance (P)   -     Static Sitting Balance  contact guard;moderate assist (P)   at best CGA  -MF     Dynamic Sitting Balance moderate assist;verbal cues (P)   -     Position, Sitting Balance sitting edge of bed;supported (P)   -     Balance Interventions sitting;supported;static;dynamic;moderate challenge (P)   -     Comment, Balance R lateral lean and posterior lean with sitting balance; able to correct 50% of time (P)   -       Row Name 06/10/24 1451          General Upper Extremity Assessment (Range of Motion)    Upper Extremity: Range of Motion LUE ROM WFL (P)   -               User Key  (r) = Recorded By, (t) = Taken By, (c) = Cosigned By      Initials Name Provider Type    Nereyda Streeter OT Student OT Student                   Goals/Plan       [facility] Name 06/10/24 1504          Bed Mobility Goal 1 (OT)    Activity/Assistive Device (Bed Mobility Goal 1, OT) bed mobility activities, all (P)   -MF     [place] Level/Cues Needed (Bed Mobility Goal 1, OT) minimum assist (75% or more patient effort) (P)   -MF     Time Frame (Bed Mobility Goal 1, OT) short term goal (STG);2 weeks (P)   -MF     Progress/Outcomes (Bed Mobility Goal 1, OT) goal ongoing (P)   -       Row Name 06/10/24 1504          Transfer Goal 1 (OT)    Activity/Assistive Device (Transfer Goal 1, OT) transfers, all (P)   -MF     [place] Level/Cues Needed (Transfer Goal 1, OT) minimum assist (75% or more patient effort) (P)   -     Time Frame (Transfer Goal 1, OT) short term goal (STG);2 weeks (P)   -MF     Progress/Outcome (Transfer Goal 1, OT) goal ongoing (P)   -       Row Name 06/10/24 1504          Bathing Goal 1 (OT)    Activity/Device (Bathing Goal 1, OT) bathing skills, all (P)   -MF     [place] Level/Cues Needed (Bathing Goal 1, OT) minimum assist (75% or more patient effort) (P)   -     Time Frame (Bathing Goal 1, OT) short term goal (STG);2 weeks (P)   -MF     Progress/Outcomes (Bathing Goal 1, OT) goal ongoing (P)   -       Row Name 06/10/24  1504          Dressing Goal 1 (OT)    Activity/Device (Dressing Goal 1, OT) dressing skills, all (P)   -MF     [place]/Cues Needed (Dressing Goal 1, OT) minimum assist (75% or more patient effort) (P)   -MF     Time Frame (Dressing Goal 1, OT) short term goal (STG);2 weeks (P)   -MF     Progress/Outcome (Dressing Goal 1, OT) goal ongoing (P)   -       Row Name 06/10/24 1504          Toileting Goal 1 (OT)    Activity/Device (Toileting Goal 1, OT) toileting skills, all (P)   -MF     [place] Level/Cues Needed (Toileting Goal 1, OT) minimum assist (75% or more patient effort) (P)   -MF     Time Frame (Toileting Goal 1, OT) short term goal (STG);2 weeks (P)   -MF     Progress/Outcome (Toileting Goal 1, OT) goal ongoing (P)   -       Row Name 06/10/24 1509          Therapy Assessment/Plan (OT)    Planned Therapy Interventions (OT) activity tolerance training;BADL retraining;functional balance retraining;neuromuscular control/coordination retraining;occupation/activity based interventions;passive ROM/stretching;patient/caregiver education/training;ROM/therapeutic exercise;strengthening exercise;transfer/mobility retraining (P)   -               User Key  (r) = Recorded By, (t) = Taken By, (c) = Cosigned By      Initials Name Provider Type     Nereyda Rothman OT Student OT Student                   Clinical Impression       Row Name 06/10/24 1766          Pain Assessment    Pretreatment Pain Rating 5/10 (P)   -     Posttreatment Pain Rating 5/10 (P)   -     Pre/Posttreatment Pain Comment pt reports no pain but grimacing and moaning with movement (P)   -       Row Name 06/10/24 9698          Plan of Care Review    Plan of Care Reviewed With patient (P)   -     Progress no change (P)   -     Outcome Evaluation Pt is a 84 year old F with hx of arthritis and dementia admitted with lower back pain and septic shock secondary to UTI. Pt is a facility resident and reports at baseline she is  "primarily (I) but has available staff to assist with ADLs as needed. She does not use an AD for functional mobility and reports no hx of falls. Pt demo'd max A x2 bed mobility and sat EOB for ~8 minutes. Pt displayed strong R and posterior lean and was able to self correct with verbal cues about 50% of the time. Pt is currently dep for most ADLs includinh LBD and toileting (duron cath). Pt reported no pain but later stated \"her whole body hurts\". Pt ROM is decreased on R side and required increased time to complete g/h task EOB. Pt presents with poor activity tolerance and impaired (I) with ADLs and functional mobility. Pt cognitve status also raises concern for safety awareness. Continued OT services would benefit pt to address noted deficits. Rec d/c SNF (P)   -       Row Name 06/10/24 1456          Therapy Assessment/Plan (OT)    Rehab Potential (OT) good, to achieve stated therapy goals (P)   -     Criteria for Skilled Therapeutic Interventions Met (OT) yes (P)   -     Therapy Frequency (OT) 5 times/wk (P)   -       Row Name 06/10/24 1456          Therapy Plan Review/Discharge Plan (OT)    Anticipated Discharge Disposition (OT) skilled nursing facility (P)   -       Row Name 06/10/24 1456          Vital Signs    O2 Delivery Pre Treatment room air (P)   -     O2 Delivery Intra Treatment room air (P)   -     O2 Delivery Post Treatment room air (P)   -       Row Name 06/10/24 1454          Positioning and Restraints    Pre-Treatment Position in bed (P)   -     Post Treatment Position bed (P)   -     In Bed notified nsg;supine;exit alarm on;call light within reach (P)   -               User Key  (r) = Recorded By, (t) = Taken By, (c) = Cosigned By      Initials Name Provider Type    Nereyda Streeter, OT Student OT Student                   Outcome Measures       Row Name 06/10/24 1873          How much help from another is currently needed...    Putting on and taking off regular lower " body clothing? 1 (P)   -MF     Bathing (including washing, rinsing, and drying) 1 (P)   -MF     Toileting (which includes using toilet bed pan or urinal) 1 (P)   -MF     Putting on and taking off regular upper body clothing 2 (P)   -MF     Taking care of personal grooming (such as brushing teeth) 2 (P)   -MF     Eating meals 2 (P)   -MF     AM-PAC 6 Clicks Score (OT) 9 (P)   -MF       Row Name 06/10/24 1414 06/10/24 1146       How much help from another person do you currently need...    Turning from your back to your side while in flat bed without using bedrails? --  -KE 2  -CB    Moving from lying on back to sitting on the side of a flat bed without bedrails? --  -KE 2  -CB    Moving to and from a bed to a chair (including a wheelchair)? --  -KE 1  -CB    Standing up from a chair using your arms (e.g., wheelchair, bedside chair)? --  -KE 1  -CB    Climbing 3-5 steps with a railing? --  -KE 1  -CB    To walk in hospital room? --  -KE 1  -CB    AM-PAC 6 Clicks Score (PT) --  -KE 8  -CB    Highest Level of Mobility Goal --  -KE 3 --> Sit at edge of bed  -CB      Row Name 06/10/24 0900          How much help from another person do you currently need...    Turning from your back to your side while in flat bed without using bedrails? 2  -KE     Moving from lying on back to sitting on the side of a flat bed without bedrails? 2  -KE     Moving to and from a bed to a chair (including a wheelchair)? 1  -KE (r) BK (t) KE (c)     Standing up from a chair using your arms (e.g., wheelchair, bedside chair)? 1  -KE (r) BK (t) KE (c)     Climbing 3-5 steps with a railing? 1  -KE (r) BK (t) KE (c)     To walk in hospital room? 1  -KE (r) BK (t) KE (c)     AM-PAC 6 Clicks Score (PT) 8  -KE     Highest Level of Mobility Goal 3 --> Sit at edge of bed  -KE       Row Name 06/10/24 1505 06/10/24 1146       Modified [place] Scale    Modified [place] Scale 4 - Moderately severe disability.  Unable to walk without assistance, and unable to  attend to own bodily needs without assistance. (P)   - 5 - Severe disability.  Bedridden, incontinent, and requiring constant nursing care and attention.  -      Row Name 06/10/24 1505 06/10/24 1146       Functional Assessment    Outcome Measure Options AM-PAC 6 Clicks Daily Activity (OT);Modified [place] (P)   -MF AM-PAC 6 Clicks Basic Mobility (PT);Modified [place]  -CB              User Key  (r) = Recorded By, (t) = Taken By, (c) = Cosigned By      Initials Name Provider Type    CB Kaitlin Cunningham, PT Physical Therapist    Keyona Huertas, RN Registered Nurse    Nu Baumann, RN Extern Registered Nurse    Nereyda Streeter, OT Student OT Student                    Occupational Therapy Education       Title: PT OT SLP Therapies (In Progress)       Topic: Occupational Therapy (In Progress)       Point: ADL training (In Progress)       Description:   Instruct learner(s) on proper safety adaptation and remediation techniques during self care or transfers.   Instruct in proper use of assistive devices.                  Learning Progress Summary             Patient Acceptance, E, NR by  at 6/10/2024 1505                         Point: Home exercise program (In Progress)       Description:   Instruct learner(s) on appropriate technique for monitoring, assisting and/or progressing therapeutic exercises/activities.                  Learning Progress Summary             Patient Acceptance, E, NR by  at 6/10/2024 1505                         Point: Precautions (In Progress)       Description:   Instruct learner(s) on prescribed precautions during self-care and functional transfers.                  Learning Progress Summary             Patient Acceptance, E, NR by  at 6/10/2024 1505                         Point: Body mechanics (In Progress)       Description:   Instruct learner(s) on proper positioning and spine alignment during self-care, functional mobility activities and/or exercises.              "     Learning Progress Summary             Patient Acceptance, E, NR by  at 6/10/2024 1505                                         User Key       Initials Effective Dates Name Provider Type Discipline     04/30/24 -  Nereyda Rothman, AB Student OT Student OT                  OT Recommendation and Plan  Planned Therapy Interventions (OT): (P) activity tolerance training, BADL retraining, functional balance retraining, neuromuscular control/coordination retraining, occupation/activity based interventions, passive ROM/stretching, patient/caregiver education/training, ROM/therapeutic exercise, strengthening exercise, transfer/mobility retraining  Therapy Frequency (OT): (P) 5 times/wk  Plan of Care Review  Plan of Care Reviewed With: (P) patient  Progress: (P) no change  Outcome Evaluation: (P) Pt is a 84 year old F with hx of arthritis and dementia admitted with lower back pain and septic shock secondary to UTI. Pt is a facility resident and reports at baseline she is primarily (I) but has available staff to assist with ADLs as needed. She does not use an AD for functional mobility and reports no hx of falls. Pt demo'd max A x2 bed mobility and sat EOB for ~8 minutes. Pt displayed strong R and posterior lean and was able to self correct with verbal cues about 50% of the time. Pt is currently dep for most ADLs includinh LBD and toileting (duron cath). Pt reported no pain but later stated \"her whole body hurts\". Pt ROM is decreased on R side and required increased time to complete g/h task EOB. Pt presents with poor activity tolerance and impaired (I) with ADLs and functional mobility. Pt cognitve status also raises concern for safety awareness. Continued OT services would benefit pt to address noted deficits. Rec d/c SNF     Time Calculation:   Evaluation Complexity (OT)  Review Occupational Profile/Medical/Therapy History Complexity: (P) expanded/moderate complexity  Assessment, Occupational " Performance/Identification of Deficit Complexity: (P) 3-5 performance deficits  Clinical Decision Making Complexity (OT): (P) detailed assessment/moderate complexity  Overall Complexity of Evaluation (OT): (P) moderate complexity     Time Calculation- OT       Row Name 06/10/24 1506             Time Calculation- OT    OT Start Time 1427 (P)   -      OT Stop Time 1444 (P)   -      OT Time Calculation (min) 17 min (P)   -      OT Received On 06/10/24 (P)   -      OT - Next Appointment 06/11/24 (P)   -      OT Goal Re-Cert Due Date 06/24/24 (P)   -         Untimed Charges    OT Eval/Re-eval Minutes 17 (P)   -         Total Minutes    Untimed Charges Total Minutes 17 (P)   -       Total Minutes 17 (P)   -                User Key  (r) = Recorded By, (t) = Taken By, (c) = Cosigned By      Initials Name Provider Type    [facility], OT Student OT Student                  Therapy Charges for Today       Code Description Service Date Service Provider Modifiers Qty    [id number]  OT EVAL MOD COMPLEXITY 3 [facility] GO 1    [id number]  OT THER SUPP EA 15 MIN [facility]                 [facility]  [facility]/10/[facility]24

## 2024-06-10 NOTE — PROGRESS NOTES
Name: Kimberlee Urruita ADMIT: 2024   : 1940  PCP: Meche Ji MD    MRN: [id number] LOS: 8 days   AGE/SEX: 84 y.o. female  ROOM: [facility]     Subjective   Subjective   No fever or chills.  Poor p.o. intake.  No abdominal pain.  No nausea or vomiting.  Clear urine in the Rodriguez cath.    Review of Systems  Cardiovascular/respiratory.  No chest pain/no shortness of breath/no cough     Objective   Objective   Vital Signs  Temp:  [97.7 °F (36.5 °C)-99 °F (37.2 °C)] 97.7 °F (36.5 °C)  Heart Rate:  [69-80] 74  Resp:  [18] 18  BP: (119-133)/(54-73) 119/54  SpO2:  [100 %] 100 %  on   ;   Device (Oxygen Therapy): room air    Intake/Output Summary (Last 24 hours) at 6/10/2024 1733  Last data filed at 6/10/2024 1600  Gross per 24 hour   Intake --   Output 2600 ml   Net -2600 ml     Body mass index is 33.81 kg/m².      24  0415 24  0300 06/10/24  0534   Weight: 85.2 kg (187 lb 12.8 oz) 84.8 kg (186 lb 15.2 oz) 85.2 kg (187 lb 13.3 oz)     Physical Exam  General.  Elderly female.  She is alert and oriented times 3 out of 4.  No apparent pain/distress/diaphoresis.  Affect is flat.  Eyes.  Pupils equal round and reactive.  Intact extraocular musculature.  No pallor or jaundice.  Oral cavity.  Moist mucous membrane.  Neck.  Supple.  No JVD.  No lymphadenopathy or thyromegaly  Cardiovascular.  Regular rate and rhythm and grade 2 systolic murmur  Chest.  Clear to auscultation bilaterally with no added sounds.  Soft lax.  No tenderness.  No organomegaly.  No guarding or rebound  .  Rodriguez catheter in place with clear urine.  No CVA tenderness  Extremities.  No clubbing/cyanosis/edema  CNS.  No acute focal neurological deficits    Results Review:      Results from last 7 days   Lab Units 06/10/24  0732 24  0609 24  1648 24  0525 24  0556 24  1458 24  0434 24  0635 24  0730   SODIUM mmol/L 139 143  --  146* 147*  --  148* 147* 142   POTASSIUM mmol/L 4.0  "4.0 4.5 3.5 3.8 4.4 3.6 3.9 4.2   CHLORIDE mmol/L 108* 112*  --  114* 119*  --  120* 120* 114*   CO2 mmol/L 22.1 23.0  --  23.7 20.9*  --  21.0* 17.2* 18.4*   BUN mg/dL 22 26*  --  23 22  --  25* 23 24*   CREATININE mg/dL 0.91 0.89  --  0.95 0.90  --  0.98 1.02* 1.31*   GLUCOSE mg/dL 80 104*  --  100* 105*  --  101* 87 140*   CALCIUM mg/dL 9.9 9.5  --  9.4 9.5  --  8.8 8.6 8.5*   AST (SGOT) U/L 15 16  --  17  --   --   --   --  40*   ALT (SGPT) U/L 24 34*  --  50*  --   --   --   --  81*     Estimated Creatinine Clearance: 46.3 mL/min (by C-G formula based on SCr of 0.91 mg/dL).      Results from last 7 days   Lab Units 06/10/24  1641 06/10/24  1120 06/10/24  0628 06/09/24  2009 06/09/24  1614 06/09/24  1114 06/09/24  0614 06/08/24 2008   GLUCOSE mg/dL 78 97 94 93 91 173* 93 118                 Results from last 7 days   Lab Units 06/10/24  0732 06/09/24  0609 06/08/24  0525 06/07/24  0556 06/06/24  0434 06/05/24  1713 06/05/24  0635 06/04/24  1855 06/04/24  0730   MAGNESIUM mg/dL 2.2 2.0 1.9 2.0 1.9  --  1.8  --  1.8   PHOSPHORUS mg/dL 3.6 3.4 3.6 2.9 2.7   < > 1.8*   < > 2.2*    < > = values in this interval not displayed.           Invalid input(s): \"LDLCALC\"  Results from last 7 days   Lab Units 06/10/24  0732 06/09/24  0609 06/08/24  0525 06/07/24  0556 06/07/24  0556 06/06/24  0545 06/05/24  0728 06/04/24  0730 06/04/24  0730   WBC 10*3/mm3 16.44* 18.79* 21.44*  --  26.33* 27.75* 37.26*  --  21.15*   HEMOGLOBIN g/dL 11.4* 11.1* 11.2*  --  11.4* 11.4* 12.5  --  12.2   HEMATOCRIT % 35.8 35.0 34.8  --  33.8* 34.3 38.1  --  35.4   PLATELETS 10*3/mm3 179 136* 91*  --  61* 42* 62*  --  41*   MCV fL 91.1 90.0 90.2  --  86.9 87.5 88.4  --  86.1   MCH pg 29.0 28.5 29.0  --  29.3 29.1 29.0  --  29.7   MCHC g/dL 31.8 31.7 32.2  --  33.7 33.2 32.8  --  34.5   RDW % 13.0 13.5 13.5  --  13.6 13.5 14.3  --  13.1   RDW-SD fl 43.2 44.7 44.4  --  42.5 42.4 46.4  --  41.1   MPV fL 12.0 12.1* 13.6*  --  13.0*  --  13.4*  --  " 12.2*   NEUTROPHIL % % 80.9* 80.3* 78.6*   < >  --   --   --   --   --    LYMPHOCYTE % % 10.9* 10.2* 9.8*   < >  --   --   --   --   --    MONOCYTES % % 5.9 6.5 8.2   < >  --   --   --   --   --    EOSINOPHIL % % 0.0* 0.0* 0.0*   < >  --   --   --   --   --    BASOPHIL % % 0.5 0.3 0.4   < >  --   --   --   --   --    IMM GRAN % % 1.8* 2.7* 3.0*   < >  --   --   --   --   --    NEUTROS ABS 10*3/mm3 13.30* 15.08* 16.86*   < > 22.91* 25.53* 35.02*  --  19.04*   LYMPHS ABS 10*3/mm3 1.79 1.92 2.10   < >  --   --   --   --   --    MONOS ABS 10*3/mm3 0.97* 1.22* 1.76*   < >  --   --   --   --   --    EOS ABS 10*3/mm3 0.00 0.00 0.00   < > 0.00  --   --   --  0.00   BASOS ABS 10*3/mm3 0.08 0.06 0.08   < > 0.00  --   --   --  0.21*   IMMATURE GRANS (ABS) 10*3/mm3 0.30* 0.51* 0.64*   < >  --   --   --   --   --    NRBC /100 WBC 0.0 0.0  --   --   --   --  0.1   < >  --     < > = values in this interval not displayed.     Results from last 7 days   Lab Units 06/07/24  0556 06/06/24  0608 06/05/24  0635   APTT seconds 24.6 23.8 23.1                     Results from last 7 days   Lab Units 06/04/24  1128 06/04/24  0730   BLOODCX  No growth at 5 days No growth at 5 days                   Imaging:  Imaging Results (Last 24 Hours)       ** No results found for the last 24 hours. **               I reviewed the patient's new clinical results / labs / tests / procedures      Assessment/Plan     Active Hospital Problems    Diagnosis  POA    **Right ureteral stone [N20.1]  Yes    Acute UTI (urinary tract infection) [N39.0]  Yes    Hypoglycemia [E16.2]  Yes    Abnormal LFTs [R79.89]  Yes    Chronic bilateral low back pain without sciatica [M54.50, G89.29]  Yes    CAYLA (dementia of Alzheimer type) [G30.9, F02.80]  Yes    Lumbar spinal stenosis [M48.061]  Yes    Thrombocytopenia [D69.6]  Yes    Acute low back pain [M54.50]  Yes      Resolved Hospital Problems    Diagnosis Date Resolved POA    Shock, septic [A41.9, R65.21] 06/03/2024 Yes     PRINCE (acute kidney injury) [N17.9] 06/06/2024 Yes           Urosepsis secondary to ESBL E. coli pyelonephritis complicated by right hydronephrosis and obstructive ureteric stone and ESBL E. coli septicemia patient's status post cystoscopy and right stent placement on 6/2/2024.  Urology recommends keeping Rodriguez catheter until the patient is ambulatory (not the case at this time).  ID have the patient on Invanz (today  the last dose).  Leukocytosis improved.  No fever.  Repeat blood culture are negative.  Metabolic encephalopathy in a patient with a history of Alzheimer's dementia.  Improved metabolic encephalopathy.  Negative CNS examination.  Continue Aricept and Namenda.  Prediabetes.  A1c 5.7.  Hypernatremia.  Resolved with  D5 water.  Patient volume status is normal.  Elevated liver function test secondary to sepsis.  Improved.  Anemia/thrombocytopenia.  Anemia is mild and is stable.  Resolved thrombocytopenia which is most sepsis.  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  Status post neurosurgery consult.  And for conservative treatment.  Dysphagia.  Tolerating puréed and nectar thickened liquids well.  VTE prophylaxis.  Sequential compression.  DNR status.    Discussed my findings and plan of treatment with the patient/nurse.  Disposition.  Per physical therapy patient will need skilled facility.  Difficult to find a place since the patient has no insurance and [facility] is working on that.            Robert Almeida MD  [facility]  06/10/24  17:33 EDT

## 2024-06-10 NOTE — PLAN OF CARE
Goal Outcome Evaluation:         A/O x 2, NSR, no complaints of pain, turn Q 2 hours, IV fluids and IV antibiotics continue, VSS.

## 2024-06-10 NOTE — THERAPY TREATMENT NOTE
Patient Name: Kimberlee Urrutia  : 1940    MRN: [id number]                              Today's Date: 6/10/2024       Admit Date: 2024    Visit Dx:     ICD-10-CM ICD-9-CM   1. Right ureteral stone  N20.1 592.1   2. Acute UTI  N39.0 599.0   3. Severe sepsis  A41.9 038.9    R65.20 995.92   4. Elevated lactic acid level  R79.89 276.2   5. Osteomyelitis of lumbar spine  M46.26 730.28   6. Elevated AST (SGOT)  R74.01 790.4   7. Elevated ALT measurement  R74.01 790.4   8. Elevated C-reactive protein (CRP)  R79.82 790.95   9. PRINCE (acute kidney injury)  N17.9 584.9     Patient Active Problem List   Diagnosis    Arthritis    HLD (hyperlipidemia)    Primary hypertension    Health care maintenance    Knee pain, right    Hx of total knee arthroplasty    Seasonal allergies    Vitamin D deficiency    Pyuria    Transient alteration of awareness    Migraine without status migrainosus, not intractable    Leukocytosis    Viral pharyngitis    Multinodular thyroid    Osteopenia of multiple sites    Colon cancer screening    Serum calcium elevated    Acute low back pain    Elevated procalcitonin    Sepsis due to Escherichia coli with acute renal failure without septic shock    Thrombocytopenia    Recurrent oral herpes simplex    Lumbar spinal stenosis    Metabolic encephalopathy    Renal stones    Colon cancer screening    Sacroiliac joint pain    Lumbar stenosis without neurogenic claudication    Renal stone    Hyperlipidemia    History of total knee arthroplasty    Spinal stenosis of lumbar region    Multinodular goiter    Osteopenia    Pain in right knee    Sacroiliac joint pain    Hypercalcemia    CAYLA (dementia of Alzheimer type)    Chronic bilateral low back pain without sciatica    Right ureteral stone    Discitis of lumbar region    Acute UTI (urinary tract infection)    Hypoglycemia    Abnormal LFTs     Past Medical History:   Diagnosis Date    Allergic     Arthritis     Colon cancer screening 2020    Colon  cancer screening 01/29/2020    Cough     Diarrhea     Hx of migraine headaches     Hyperlipidemia     Irregular heart beat     Osteoarthritis     Renal stones 02/2021    Vitamin D deficiency      Past Surgical History:   Procedure Laterality Date    COLONOSCOPY      CYSTOSCOPY W/ URETERAL STENT PLACEMENT Right 6/2/2024    Procedure: CYSTOSCOPY, RIGHT URETERAL STENT PLACEMENT;  Surgeon: Efra Schofield MD;  Location: [facility];  Service: Urology;  Laterality: Right;    LUMBAR EPIDURAL INJECTION N/A 8/2/2021    Procedure: LUMBAR EPIDURAL 1ST VISIT 5-1;  Surgeon: Nu Partida MD;  [place] [facility] [place];  Service: Pain Management;  Laterality: N/A;    REPLACEMENT TOTAL KNEE Right 01/2015    Josue Wilson MD    SACROILIAC JOINT INJECTION Left 6/16/2021    Procedure: SACROILIAC INJECTION left;  Surgeon: Nu Partida MD;  [place] [facility] [place];  Service: Pain Management;  Laterality: Left;    TUBAL ABDOMINAL LIGATION      URETEROSCOPY LASER LITHOTRIPSY WITH STENT INSERTION Left 2/23/2021    Procedure: LT.URETEROSCOPY LASER LITHOTRIPSY WITH STENT  FOR STONE;  Surgeon: Arnaud Lu MD;  Location[facility];  Service: Urology;  Laterality: Left;    WISDOM TOOTH EXTRACTION        General Information       Row Name 06/10/24 1142          Physical Therapy Time and Intention    Document Type therapy note (daily note)  -CB     Mode of Treatment individual therapy;physical therapy  -CB       Row Name 06/10/24 1142          General Information    Existing Precautions/Restrictions fall  -CB       Row Name 06/10/24 1142          Cognition    Orientation Status (Cognition) oriented x 3  -CB       Row Name 06/10/24 1142          Safety Issues, Functional Mobility    Impairments Affecting Function (Mobility) balance;endurance/activity tolerance;strength;postural/trunk control;range of motion (ROM);grasp  -CB               User Key  (r) = Recorded By, (t) = Taken By, (c) = Cosigned By       Initials Name Provider Type    CB Kaitlin Cunningham, KALEY Physical Therapist                   Mobility       Row Name 06/10/24 1142          Bed Mobility    Bed Mobility supine-sit;sit-supine  -CB     Supine-Sit Hernando (Bed Mobility) moderate assist (50% patient effort);maximum assist (25% patient effort);2 person assist;verbal cues  -CB     Sit-Supine Hernando (Bed Mobility) maximum assist (25% patient effort);2 person assist;verbal cues  -CB     Assistive Device (Bed Mobility) head of bed elevated;draw sheet;leg ;bed rails  -CB       Row Name 06/10/24 1142          Sit-Stand Transfer    Sit-Stand Hernando (Transfers) maximum assist (25% patient effort);2 person assist  -CB     Assistive Device (Sit-Stand Transfers) walker, front-wheeled  -CB     Comment, (Sit-Stand Transfer) buttocks cleared bed but unable to come to full upright posture  -CB               User Key  (r) = Recorded By, (t) = Taken By, (c) = Cosigned By      Initials Name Provider Type    CB Kaitlin Cunningham PT Physical Therapist                   Obj/Interventions       Row Name 06/10/24 1143          Motor Skills    Therapeutic Exercise --  AP and LAQ x5 reps; AAROM /open-close hand, elbow flexion, shoulder flexion x5-10reps  -CB       Row Name 06/10/24 1143          Balance    Balance Assessment sitting static balance;sitting dynamic balance;standing static balance  -CB     Static Sitting Balance minimal assist;moderate assist;maximum assist;verbal cues;contact guard  -CB     Dynamic Sitting Balance moderate assist;verbal cues  -CB     Position, Sitting Balance sitting edge of bed  -CB     Static Standing Balance maximum assist;2-person assist;verbal cues  -CB     Position/Device Used, Standing Balance supported;walker, front-wheeled  -CB     Balance Interventions sitting;standing;sit to stand;supported;static;dynamic;minimal challenge  -CB     Comment, Balance R lateral lean in sitting and looks R during session with VC for  cervical rotation L with assist; sat EOB at least 12min  -CB               User Key  (r) = Recorded By, (t) = Taken By, (c) = Cosigned By      Initials Name Provider Type    Kaitlin Matthew PT Physical Therapist                   Goals/Plan    No documentation.                  Clinical Impression       Row Name 06/10/24 1145          Pain    Pre/Posttreatment Pain Comment pt reports back pain but does not state numerical value  -CB     Pain Intervention(s) Repositioned;Rest  -CB       Row Name 06/10/24 1145          Plan of Care Review    Plan of Care Reviewed With patient  -CB     Progress no change  -CB     Outcome Evaluation Patient seen this AM for PT. She favors R side visually and difficulty turning head to L. She required mod-maxAx2 for supine<>sitting EOB and R lateral lean noted in sitting requiring CGA-maxA for sitting balance. Pt able to clear buttocks off bed during attempted STS requiring maxAx2 but unable to come to full upright position. Pt with BUE edema noted and difficulty with ROM/strength in BUE. Pt unable to feed herself. Notified RN pt would benefit from acute OT and worked with PT on UE ROM/ strength. RN notified of favoring R side and R lateral lean. Will continue to progress as pt tolerates.  -CB       Row Name 06/10/24 1145          Positioning and Restraints    Pre-Treatment Position in bed  -CB     Post Treatment Position bed  -CB     In Bed notified nsg;fowlers;call light within reach;encouraged to call for assist;exit alarm on;side rails up x3;RUE elevated;LUE elevated;legs elevated  educated RN to elevated BUE as she has edema in BUE  -CB               User Key  (r) = Recorded By, (t) = Taken By, (c) = Cosigned By      Initials Name Provider Type    Kaitlin Matthew, PT Physical Therapist                   Outcome Measures       Row Name 06/10/24 1146 06/10/24 0900       How much help from another person do you currently need...    Turning from your back to your side while in  flat bed without using bedrails? 2  -CB 2  -KE    Moving from lying on back to sitting on the side of a flat bed without bedrails? 2  -CB 2  -KE    Moving to and from a bed to a chair (including a wheelchair)? 1  -CB 1  -KE (r) BK (t) KE (c)    Standing up from a chair using your arms (e.g., wheelchair, bedside chair)? 1  -CB 1  -KE (r) BK (t) KE (c)    Climbing 3-5 steps with a railing? 1  -CB 1  -KE (r) BK (t) KE (c)    To walk in hospital room? 1  -CB 1  -KE (r) BK (t) KE (c)    AM-PAC 6 Clicks Score (PT) 8  -CB 8  -KE    Highest Level of Mobility Goal 3 --> Sit at edge of bed  -CB 3 --> Sit at edge of bed  -KE      Row Name 06/10/24 1146          Modified Fishers Scale    Modified Adriana Scale 5 - Severe disability.  Bedridden, incontinent, and requiring constant nursing care and attention.  -CB       Row Name 06/10/24 1146          Functional Assessment    Outcome Measure Options AM-PAC 6 Clicks Basic Mobility (PT);Modified Fishers  -CB               User Key  (r) = Recorded By, (t) = Taken By, (c) = Cosigned By      Initials Name Provider Type    Kaitlin Matthew, PT Physical Therapist    Keyona Huertas, RN Registered Nurse    Nu Baumann, RN Extern Registered Nurse                                 Physical Therapy Education       Title: PT OT SLP Therapies (In Progress)       Topic: Physical Therapy (Done)       Point: Mobility training (Done)       Learning Progress Summary             Patient Acceptance, E,TB, VU,NR by CB at 6/10/2024 1149    Acceptance, E, VU by EM at 6/7/2024 1622    Acceptance, E, NR by EM at 6/6/2024 1605    Acceptance, E,TB, VU by BK at 6/5/2024 1700    Acceptance, E, NR by EM at 6/5/2024 1640                         Point: Home exercise program (Done)       Learning Progress Summary             Patient Acceptance, E,TB, VU,NR by CB at 6/10/2024 1149    Acceptance, E, VU by EM at 6/7/2024 1622    Acceptance, E, NR by EM at 6/6/2024 1605                         Point: Body  mechanics (Done)       Learning Progress Summary             Patient Acceptance, E,TB, VU,NR by CB at 6/10/2024 1149                         Point: Precautions (Done)       Learning Progress Summary             Patient Acceptance, E,TB, VU,NR by CB at 6/10/2024 1149                                         User Key       Initials Effective Dates Name Provider Type Discipline    EM 06/16/21 -  Yvette Guajardo, PT Physical Therapist PT    CB 10/22/21 -  Kaitlin Cunningham, PT Physical Therapist PT    BK 04/30/24 -  Nu Leon, RN Extern Registered Nurse Nurse                  PT Recommendation and Plan     Plan of Care Reviewed With: patient  Progress: no change  Outcome Evaluation: Patient seen this AM for PT. She favors R side visually and difficulty turning head to L. She required mod-maxAx2 for supine<>sitting EOB and R lateral lean noted in sitting requiring CGA-maxA for sitting balance. Pt able to clear buttocks off bed during attempted STS requiring maxAx2 but unable to come to full upright position. Pt with BUE edema noted and difficulty with ROM/strength in BUE. Pt unable to feed herself. Notified RN pt would benefit from acute OT and worked with PT on UE ROM/ strength. RN notified of favoring R side and R lateral lean. Will continue to progress as pt tolerates.     Time Calculation:         PT Charges       Row Name 06/10/24 1153             Time Calculation    Start Time 0936  -CB      Stop Time 1005  -CB      Time Calculation (min) 29 min  -CB      PT Received On 06/10/24  -CB      PT - Next Appointment 06/11/24  -CB         Time Calculation- PT    Total Timed Code Minutes- PT 29 minute(s)  -CB         Timed Charges    97484 - PT Therapeutic Exercise Minutes 14  -CB      77580 - PT Therapeutic Activity Minutes 15  -CB         Total Minutes    Timed Charges Total Minutes 29  -CB       Total Minutes 29  -CB                User Key  (r) = Recorded By, (t) = Taken By, (c) = Cosigned By      Initials  Name Provider Type    CB Kaitlin Cunningham, PT Physical Therapist                  Therapy Charges for Today       Code Description Service Date Service Provider Modifiers Qty    02968786559 HC PT THER PROC EA 15 MIN 6/10/2024 Kaitlin Cunningham, PT GP 1    07026499723 HC PT THERAPEUTIC ACT EA 15 MIN 6/10/2024 Kaitlin Cunningham, PT GP 1    82951474496 HC PT THER SUPP EA 15 MIN 6/10/2024 Kaitlin Cunningham, PT GP 1            PT G-Codes  Outcome Measure Options: AM-PAC 6 Clicks Basic Mobility (PT), Modified [place]  AM-PAC 6 Clicks Score (PT): 8  Modified Adriana Scale: 5 - Severe disability.  Bedridden, incontinent, and requiring constant nursing care and attention.  PT Discharge Summary  Anticipated Discharge Disposition (PT): skilled nursing facility    Kaitlin Cunningham PT  6/10/2024

## 2024-06-10 NOTE — PROGRESS NOTES
ID note for sepsis  S: Denies any pain.  Afebrile.  Tolerating antibiotics.    Physical Exam:   Vital Signs   Temp:  [97.9 °F (36.6 °C)-99 °F (37.2 °C)] 97.9 °F (36.6 °C)  Heart Rate:  [69-80] 74  Resp:  [18] 18  BP: (117-133)/(63-87) 129/69    GENERAL: Awake and alert, sickly  HEENT: Oropharynx is clear. Hearing is grossly normal.   EYES: . No conjunctival injection. No lid lag.   LUNGS:normal respiratory effort.   SKIN: no cutaneous eruptions in exposed areas  PSYCHIATRIC:following simple commands but very slow to respond    Results Review:  White count 16  Creatinine 0.91  Glucose 80 through 173  6/2 BCx 2/2 ESBL e coli  6/2 UCx E coli  6/4 Bcx no growth     A/p  1.  ESBL E. coli septicemia  2.  Right pyelonephritis with obstructing ureteral stone status post stenting  3.  Acute kidney injury, Creatinine of about 0.8-1 as baseline, resolved  4.  Elevated liver function test, likely related to sepsis, resolved    Continue ertapenem 1 g IV every 24 hours, stop date 6/11/2024.  CT was negative for abscess.  White count slowly downtrending and clinically much improved.  Repeat blood cultures no growth

## 2024-06-10 NOTE — PROGRESS NOTES
Continued Stay Note  [facility]     Patient Name: Kimberlee Urrutia  MRN: [id number]  Today's Date: 6/10/2024    Admit Date: 6/1/2024    Plan: SNF vs. return to [facility] [address] [place].   Discharge Plan       Row Name 06/10/24 1452       Plan    Plan SNF vs. return to [facility] [address] [place].    Patient/Family in Agreement with Plan yes    Plan Comments Per PT note - max assist of 2 in attempt to stand.  IV antibiotics will finish on 6/11.  She is also unable to feed herself.  Currently not appropriate for AL - call to [facility] but unable to reach anyone. Will likely need SNF.  Call to [facility] - she has appointment with  office today @ 2:30.  CCP continues to follow.   Dave COTA                      Expected Discharge Date and Time       Expected Discharge Date Expected Discharge Time    Jun 11, 2024               Becky S. [place], RN

## 2024-06-10 NOTE — PLAN OF CARE
"Goal Outcome Evaluation:  Plan of Care Reviewed With: (P) patient        Progress: (P) no change  Outcome Evaluation: (P) Pt is a 84 year old F with hx of arthritis and dementia admitted with lower back pain and septic shock secondary to UTI. Pt is a facility resident and reports at baseline she is primarily (I) but has available staff to assist with ADLs as needed. She does not use an AD for functional mobility and reports no hx of falls. Pt demo'd max A x2 bed mobility and sat EOB for ~8 minutes. Pt displayed strong R and posterior lean and was able to self correct with verbal cues about 50% of the time. Pt is currently dep for most ADLs includinh LBD and toileting (duron cath). Pt reported no pain but later stated \"her whole body hurts\". Pt ROM is decreased on R side and required increased time to complete g/h task EOB. Pt presents with poor activity tolerance and impaired (I) with ADLs and functional mobility. Pt cognitve status also raises concern for safety awareness. Continued OT services would benefit pt to address noted deficits. Rec d/c SNF      Anticipated Discharge Disposition (OT): (P) skilled nursing facility                        "

## 2024-06-11 LAB
ALBUMIN SERPL-MCNC: 2.4 G/DL (ref 3.5–5.2)
ALBUMIN/GLOB SERPL: 0.8 G/DL
ALP SERPL-CCNC: 172 U/L (ref 39–117)
ALT SERPL W P-5'-P-CCNC: 20 U/L (ref 1–33)
ANION GAP SERPL CALCULATED.3IONS-SCNC: 8.5 MMOL/L (ref 5–15)
AST SERPL-CCNC: 10 U/L (ref 1–32)
BASOPHILS # BLD AUTO: 0.07 10*3/MM3 (ref 0–0.2)
BASOPHILS NFR BLD AUTO: 0.5 % (ref 0–1.5)
BILIRUB SERPL-MCNC: 0.7 MG/DL (ref 0–1.2)
BUN SERPL-MCNC: 20 MG/DL (ref 8–23)
BUN/CREAT SERPL: 23.5 (ref 7–25)
CALCIUM SPEC-SCNC: 9.6 MG/DL (ref 8.6–10.5)
CHLORIDE SERPL-SCNC: 108 MMOL/L (ref 98–107)
CO2 SERPL-SCNC: 23.5 MMOL/L (ref 22–29)
CREAT SERPL-MCNC: 0.85 MG/DL (ref 0.57–1)
DEPRECATED RDW RBC AUTO: 41.9 FL (ref 37–54)
EGFRCR SERPLBLD CKD-EPI 2021: 67.2 ML/MIN/1.73
EOSINOPHIL # BLD AUTO: 0 10*3/MM3 (ref 0–0.4)
EOSINOPHIL NFR BLD AUTO: 0 % (ref 0.3–6.2)
ERYTHROCYTE [DISTWIDTH] IN BLOOD BY AUTOMATED COUNT: 12.9 % (ref 12.3–15.4)
GLOBULIN UR ELPH-MCNC: 2.9 GM/DL
GLUCOSE BLDC GLUCOMTR-MCNC: 86 MG/DL (ref 70–130)
GLUCOSE BLDC GLUCOMTR-MCNC: 93 MG/DL (ref 70–130)
GLUCOSE BLDC GLUCOMTR-MCNC: 95 MG/DL (ref 70–130)
GLUCOSE BLDC GLUCOMTR-MCNC: 98 MG/DL (ref 70–130)
GLUCOSE SERPL-MCNC: 94 MG/DL (ref 65–99)
HCT VFR BLD AUTO: 34.2 % (ref 34–46.6)
HGB BLD-MCNC: 11 G/DL (ref 12–15.9)
IMM GRANULOCYTES # BLD AUTO: 0.26 10*3/MM3 (ref 0–0.05)
IMM GRANULOCYTES NFR BLD AUTO: 1.9 % (ref 0–0.5)
LYMPHOCYTES # BLD AUTO: 1.53 10*3/MM3 (ref 0.7–3.1)
LYMPHOCYTES NFR BLD AUTO: 11.4 % (ref 19.6–45.3)
MAGNESIUM SERPL-MCNC: 2 MG/DL (ref 1.6–2.4)
MCH RBC QN AUTO: 28.6 PG (ref 26.6–33)
MCHC RBC AUTO-ENTMCNC: 32.2 G/DL (ref 31.5–35.7)
MCV RBC AUTO: 89.1 FL (ref 79–97)
MONOCYTES # BLD AUTO: 0.72 10*3/MM3 (ref 0.1–0.9)
MONOCYTES NFR BLD AUTO: 5.4 % (ref 5–12)
NEUTROPHILS NFR BLD AUTO: 10.83 10*3/MM3 (ref 1.7–7)
NEUTROPHILS NFR BLD AUTO: 80.8 % (ref 42.7–76)
NRBC BLD AUTO-RTO: 0 /100 WBC (ref 0–0.2)
PHOSPHATE SERPL-MCNC: 3.3 MG/DL (ref 2.5–4.5)
PLATELET # BLD AUTO: 228 10*3/MM3 (ref 140–450)
PMV BLD AUTO: 11.4 FL (ref 6–12)
POTASSIUM SERPL-SCNC: 3.8 MMOL/L (ref 3.5–5.2)
PROT SERPL-MCNC: 5.3 G/DL (ref 6–8.5)
RBC # BLD AUTO: 3.84 10*6/MM3 (ref 3.77–5.28)
SODIUM SERPL-SCNC: 140 MMOL/L (ref 136–145)
WBC NRBC COR # BLD AUTO: 13.41 10*3/MM3 (ref 3.4–10.8)

## 2024-06-11 PROCEDURE — 97530 THERAPEUTIC ACTIVITIES: CPT

## 2024-06-11 PROCEDURE — 85025 COMPLETE CBC W/AUTO DIFF WBC: CPT | Performed by: STUDENT IN AN ORGANIZED HEALTH CARE EDUCATION/TRAINING PROGRAM

## 2024-06-11 PROCEDURE — 92526 ORAL FUNCTION THERAPY: CPT

## 2024-06-11 PROCEDURE — 82948 REAGENT STRIP/BLOOD GLUCOSE: CPT

## 2024-06-11 PROCEDURE — 25010000002 ERTAPENEM PER 500 MG: Performed by: INTERNAL MEDICINE

## 2024-06-11 PROCEDURE — 99232 SBSQ HOSP IP/OBS MODERATE 35: CPT | Performed by: INTERNAL MEDICINE

## 2024-06-11 PROCEDURE — 83735 ASSAY OF MAGNESIUM: CPT | Performed by: HOSPITALIST

## 2024-06-11 PROCEDURE — 97110 THERAPEUTIC EXERCISES: CPT

## 2024-06-11 PROCEDURE — 80053 COMPREHEN METABOLIC PANEL: CPT | Performed by: STUDENT IN AN ORGANIZED HEALTH CARE EDUCATION/TRAINING PROGRAM

## 2024-06-11 PROCEDURE — 0 DEXTROSE 5 % SOLUTION: Performed by: STUDENT IN AN ORGANIZED HEALTH CARE EDUCATION/TRAINING PROGRAM

## 2024-06-11 PROCEDURE — 84100 ASSAY OF PHOSPHORUS: CPT | Performed by: HOSPITALIST

## 2024-06-11 RX ADMIN — SENNOSIDES AND DOCUSATE SODIUM 2 TABLET: 50; 8.6 TABLET ORAL at 10:04

## 2024-06-11 RX ADMIN — DONEPEZIL HYDROCHLORIDE 10 MG: 10 TABLET, FILM COATED ORAL at 10:04

## 2024-06-11 RX ADMIN — DEXTROSE MONOHYDRATE 50 ML/HR: 50 INJECTION, SOLUTION INTRAVENOUS at 22:46

## 2024-06-11 RX ADMIN — ERTAPENEM SODIUM 1000 MG: 1 INJECTION, POWDER, LYOPHILIZED, FOR SOLUTION INTRAMUSCULAR; INTRAVENOUS at 16:14

## 2024-06-11 RX ADMIN — Medication 10 ML: at 21:53

## 2024-06-11 RX ADMIN — MEMANTINE HYDROCHLORIDE 10 MG: 10 TABLET, FILM COATED ORAL at 10:04

## 2024-06-11 RX ADMIN — MEMANTINE HYDROCHLORIDE 10 MG: 10 TABLET, FILM COATED ORAL at 21:53

## 2024-06-11 RX ADMIN — Medication 10 ML: at 10:04

## 2024-06-11 RX ADMIN — DEXTROSE MONOHYDRATE 50 ML/HR: 50 INJECTION, SOLUTION INTRAVENOUS at 02:49

## 2024-06-11 NOTE — PROGRESS NOTES
ID note for sepsis  S: Denies any pain.  Afebrile.  Tolerating antibiotics.    Physical Exam:   Vital Signs   Temp:  [97.7 °F (36.5 °C)-98.2 °F (36.8 °C)] 98.1 °F (36.7 °C)  Heart Rate:  [72-74] 72  Resp:  [18] 18  BP: (105-127)/(49-58) 127/50    GENERAL: Awake and alert, sickly  HEENT: Oropharynx is clear. Hearing is grossly normal.   EYES: . No conjunctival injection. No lid lag.   LUNGS:normal respiratory effort.   SKIN: no cutaneous eruptions in exposed areas  PSYCHIATRIC:following simple commands but very slow to respond    Results Review:  White count 13  Creatinine 0.85  Glucose 78-97  6/2 BCx 2/2 ESBL e coli  6/2 UCx E coli  6/4 Bcx no growth     A/p  1.  ESBL E. coli septicemia  2.  Right pyelonephritis with obstructing ureteral stone status post stenting  3.  Acute kidney injury, Creatinine of about 0.8-1 as baseline, resolved  4.  Elevated liver function test, likely related to sepsis, resolved    Stop ertapenem after today's dose.  Repeat blood cultures negative.  White count continues to slowly improve.

## 2024-06-11 NOTE — PROGRESS NOTES
Name: Kimberlee Urrutia ADMIT: 2024   : 1940  PCP: Meche Ji MD    MRN: [id number] LOS: 9 days   AGE/SEX: 84 y.o. female  ROOM: [facility]     Subjective   Subjective   No new complaints.  Continues with weakness.  Improving appetite.  No fever or chills.  No abdominal pain.  No nausea or vomiting.  Clear urine in the Rodriguez cath.    Review of Systems  Cardiovascular/respiratory.  No chest pain/no shortness of breath/no cough     Objective   Objective   Vital Signs  Temp:  [97.7 °F (36.5 °C)-98.2 °F (36.8 °C)] 98.1 °F (36.7 °C)  Heart Rate:  [72-74] 72  Resp:  [18] 18  BP: (105-127)/(49-58) 127/50  SpO2:  [98 %-100 %] 98 %  on   ;   Device (Oxygen Therapy): room air    Intake/Output Summary (Last 24 hours) at 2024 1158  Last data filed at 2024 1003  Gross per 24 hour   Intake 236 ml   Output 1800 ml   Net -1564 ml     Body mass index is 34.28 kg/m².      24  0300 06/10/24  0534 24  0417   Weight: 84.8 kg (186 lb 15.2 oz) 85.2 kg (187 lb 13.3 oz) 86.4 kg (190 lb 7.6 oz)     Physical Exam  General.  Elderly female.  She is alert and oriented times 3 out of 4.  No apparent pain/distress/diaphoresis.  Affect is flat.  Eyes.  Pupils equal round and reactive.  Intact extraocular musculature.  No pallor or jaundice.  Oral cavity.  Moist mucous membrane.  Neck.  Supple.  No JVD.  No lymphadenopathy or thyromegaly  Cardiovascular.  Regular rate and rhythm and grade 2 systolic murmur  Chest.  Clear to auscultation bilaterally with no added sounds.  Soft lax.  No tenderness.  No organomegaly.  No guarding or rebound  .  Rodriguez catheter in place with clear urine.   Extremities.  No clubbing/cyanosis/edema  CNS.  No acute focal neurological deficits    Results Review:      Results from last 7 days   Lab Units 24  0518 06/10/24  0732 24  0609 24  1648 24  0525 24  0556 24  1458 24  0434 24  0635   SODIUM mmol/L 140 139 143  --  146* 147*  " --  148* 147*   POTASSIUM mmol/L 3.8 4.0 4.0 4.5 3.5 3.8 4.4 3.6 3.9   CHLORIDE mmol/L 108* 108* 112*  --  114* 119*  --  120* 120*   CO2 mmol/L 23.5 22.1 23.0  --  23.7 20.9*  --  21.0* 17.2*   BUN mg/dL 20 22 26*  --  23 22  --  25* 23   CREATININE mg/dL 0.85 0.91 0.89  --  0.95 0.90  --  0.98 1.02*   GLUCOSE mg/dL 94 80 104*  --  100* 105*  --  101* 87   CALCIUM mg/dL 9.6 9.9 9.5  --  9.4 9.5  --  8.8 8.6   AST (SGOT) U/L 10 15 16  --  17  --   --   --   --    ALT (SGPT) U/L 20 24 34*  --  50*  --   --   --   --      Estimated Creatinine Clearance: 49.9 mL/min (by C-G formula based on SCr of 0.85 mg/dL).      Results from last 7 days   Lab Units 06/11/24  1110 06/11/24  0621 06/10/24  2249 06/10/24  1641 06/10/24  1120 06/10/24  0628 06/09/24  2009 06/09/24  1614   GLUCOSE mg/dL 93 86 81 78 97 94 93 91                 Results from last 7 days   Lab Units 06/11/24  0518 06/10/24  0732 06/09/24  0609 06/08/24  0525 06/07/24  0556 06/06/24  0434 06/05/24  1713 06/05/24  0635   MAGNESIUM mg/dL 2.0 2.2 2.0 1.9 2.0 1.9  --  1.8   PHOSPHORUS mg/dL 3.3 3.6 3.4 3.6 2.9 2.7   < > 1.8*    < > = values in this interval not displayed.           Invalid input(s): \"LDLCALC\"  Results from last 7 days   Lab Units 06/11/24  0518 06/10/24  0732 06/09/24  0609 06/08/24  0525 06/07/24  0556 06/07/24  0556 06/06/24  0545 06/05/24  0728   WBC 10*3/mm3 13.41* 16.44* 18.79* 21.44*  --  26.33* 27.75* 37.26*   HEMOGLOBIN g/dL 11.0* 11.4* 11.1* 11.2*  --  11.4* 11.4* 12.5   HEMATOCRIT % 34.2 35.8 35.0 34.8  --  33.8* 34.3 38.1   PLATELETS 10*3/mm3 228 179 136* 91*  --  61* 42* 62*   MCV fL 89.1 91.1 90.0 90.2  --  86.9 87.5 88.4   MCH pg 28.6 29.0 28.5 29.0  --  29.3 29.1 29.0   MCHC g/dL 32.2 31.8 31.7 32.2  --  33.7 33.2 32.8   RDW % 12.9 13.0 13.5 13.5  --  13.6 13.5 14.3   RDW-SD fl 41.9 43.2 44.7 44.4  --  42.5 42.4 46.4   MPV fL 11.4 12.0 12.1* 13.6*  --  13.0*  --  13.4*   NEUTROPHIL % % 80.8* 80.9* 80.3* 78.6*   < >  --   --   --  "   LYMPHOCYTE % % 11.4* 10.9* 10.2* 9.8*   < >  --   --   --    MONOCYTES % % 5.4 5.9 6.5 8.2   < >  --   --   --    EOSINOPHIL % % 0.0* 0.0* 0.0* 0.0*   < >  --   --   --    BASOPHIL % % 0.5 0.5 0.3 0.4   < >  --   --   --    IMM GRAN % % 1.9* 1.8* 2.7* 3.0*   < >  --   --   --    NEUTROS ABS 10*3/mm3 10.83* 13.30* 15.08* 16.86*   < > 22.91* 25.53* 35.02*   LYMPHS ABS 10*3/mm3 1.53 1.79 1.92 2.10   < >  --   --   --    MONOS ABS 10*3/mm3 0.72 0.97* 1.22* 1.76*   < >  --   --   --    EOS ABS 10*3/mm3 0.00 0.00 0.00 0.00   < > 0.00  --   --    BASOS ABS 10*3/mm3 0.07 0.08 0.06 0.08   < > 0.00  --   --    IMMATURE GRANS (ABS) 10*3/mm3 0.26* 0.30* 0.51* 0.64*   < >  --   --   --    NRBC /100 WBC 0.0 0.0 0.0  --   --   --   --  0.1    < > = values in this interval not displayed.     Results from last 7 days   Lab Units 06/07/24  0556 06/06/24  0608 06/05/24  0635   APTT seconds 24.6 23.8 23.1                                         Imaging:  Imaging Results (Last 24 Hours)       ** No results found for the last 24 hours. **               I reviewed the patient's new clinical results / labs / tests / procedures      Assessment/Plan     Active Hospital Problems    Diagnosis  POA    **Right ureteral stone [N20.1]  Yes    Acute UTI (urinary tract infection) [N39.0]  Yes    Hypoglycemia [E16.2]  Yes    Abnormal LFTs [R79.89]  Yes    Chronic bilateral low back pain without sciatica [M54.50, G89.29]  Yes    CAYLA (dementia of Alzheimer type) [G30.9, F02.80]  Yes    Lumbar spinal stenosis [M48.061]  Yes    Thrombocytopenia [D69.6]  Yes    Acute low back pain [M54.50]  Yes      Resolved Hospital Problems    Diagnosis Date Resolved POA    Shock, septic [A41.9, R65.21] 06/03/2024 Yes    PRINCE (acute kidney injury) [N17.9] 06/06/2024 Yes           Urosepsis secondary to ESBL E. coli pyelonephritis complicated by right hydronephrosis and obstructive ureteric stone and ESBL E. coli septicemia patient's status post cystoscopy and right  stent placement on 6/2/2024.  Urology recommends keeping Rodriguez catheter until the patient is ambulatory (not the case at this time).  Status post adequate course of Invanz treatment per ID.  Leukocytosis improved.  No fever.  Repeat blood culture are negative.  Metabolic encephalopathy in a patient with a history of Alzheimer's dementia.  Improved metabolic encephalopathy.  Negative CNS examination.  Continue Aricept and Namenda.  Prediabetes.  A1c 5.7.  Hypernatremia/acute kidney.  Both resolved with  D5 water.  Patient volume status is normal.  Elevated liver function test secondary to sepsis.  Improved.  GI examination is benign.  Anemia/thrombocytopenia.  Anemia is mild and is stable.  Resolved thrombocytopenia which is mostly secondary to sepsis.  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  Status post neurosurgery consult.  For conservative treatment.  On PT.  Dysphagia.  Tolerating puréed and nectar thickened liquids well.  VTE prophylaxis.  Sequential compression.  DNR status.    Discussed my findings and plan of treatment with the patient/nurse.  Disposition.  Per physical therapy patient will need skilled facility.  Difficult to find a place since the patient has no insurance and [facility] is working on that.            Robert Almeida MD  [facility]  06/11/24  11:58 EDT

## 2024-06-11 NOTE — THERAPY TREATMENT NOTE
Patient Name: Kimberlee Urrutia  : 1940    MRN: [id number]                              Today's Date: 2024       Admit Date: 2024    Visit Dx:     ICD-10-CM ICD-9-CM   1. Right ureteral stone  N20.1 592.1   2. Acute UTI  N39.0 599.0   3. Severe sepsis  A41.9 038.9    R65.20 995.92   4. Elevated lactic acid level  R79.89 276.2   5. Osteomyelitis of lumbar spine  M46.26 730.28   6. Elevated AST (SGOT)  R74.01 790.4   7. Elevated ALT measurement  R74.01 790.4   8. Elevated C-reactive protein (CRP)  R79.82 790.95   9. PRINCE (acute kidney injury)  N17.9 584.9     Patient Active Problem List   Diagnosis    Arthritis    HLD (hyperlipidemia)    Primary hypertension    Health care maintenance    Knee pain, right    Hx of total knee arthroplasty    Seasonal allergies    Vitamin D deficiency    Pyuria    Transient alteration of awareness    Migraine without status migrainosus, not intractable    Leukocytosis    Viral pharyngitis    Multinodular thyroid    Osteopenia of multiple sites    Colon cancer screening    Serum calcium elevated    Acute low back pain    Elevated procalcitonin    Sepsis due to Escherichia coli with acute renal failure without septic shock    Thrombocytopenia    Recurrent oral herpes simplex    Lumbar spinal stenosis    Metabolic encephalopathy    Renal stones    Colon cancer screening    Sacroiliac joint pain    Lumbar stenosis without neurogenic claudication    Renal stone    Hyperlipidemia    History of total knee arthroplasty    Spinal stenosis of lumbar region    Multinodular goiter    Osteopenia    Pain in right knee    Sacroiliac joint pain    Hypercalcemia    CAYLA (dementia of Alzheimer type)    Chronic bilateral low back pain without sciatica    Right ureteral stone    Discitis of lumbar region    Acute UTI (urinary tract infection)    Hypoglycemia    Abnormal LFTs     Past Medical History:   Diagnosis Date    Allergic     Arthritis     Colon cancer screening 2020    Colon  cancer screening 01/29/2020    Cough     Diarrhea     Hx of migraine headaches     Hyperlipidemia     Irregular heart beat     Osteoarthritis     Renal stones 02/2021    Vitamin D deficiency      Past Surgical History:   Procedure Laterality Date    COLONOSCOPY      CYSTOSCOPY W/ URETERAL STENT PLACEMENT Right 6/2/2024    Procedure: CYSTOSCOPY, RIGHT URETERAL STENT PLACEMENT;  Surgeon: Efra Schofield MD;  Location: [facility] OR;  Service: Urology;  Laterality: Right;    LUMBAR EPIDURAL INJECTION N/A 8/2/2021    Procedure: LUMBAR EPIDURAL 1ST VISIT 5-1;  Surgeon: Nu Partida MD;  Location: [facility] [place];  Service: Pain Management;  Laterality: N/A;    REPLACEMENT TOTAL KNEE Right 01/2015    Josue Wilson MD    SACROILIAC JOINT INJECTION Left 6/16/2021    Procedure: SACROILIAC INJECTION left;  Surgeon: Nu Partida MD;  Location: [facility] [place] [facility] [place];  Service: Pain Management;  Laterality: Left;    TUBAL ABDOMINAL LIGATION      URETEROSCOPY LASER LITHOTRIPSY WITH STENT INSERTION Left 2/23/2021    Procedure: LT.URETEROSCOPY LASER LITHOTRIPSY WITH STENT  FOR STONE;  Surgeon: Arnaud Lu MD;  Location: [facility] OR;  Service: Urology;  Laterality: Left;    WISDOM TOOTH EXTRACTION        General Information       Row Name 06/11/24 0958          Physical Therapy Time and Intention    Document Type therapy note (daily note)  -RD     Mode of Treatment physical therapy  -RD               User Key  (r) = Recorded By, (t) = Taken By, (c) = Cosigned By      Initials Name Provider Type    RD Leonela Acuna, PT Physical Therapist                   Mobility       Row Name 06/11/24 0959          Bed Mobility    Bed Mobility rolling right  -RD     Rolling Right [place] (Bed Mobility) moderate assist (50% patient effort)  -RD     Supine-Sit [place] (Bed Mobility) moderate assist (50% patient effort)  -RD     Sit-Supine [place] (Bed Mobility) maximum assist (25% patient effort);2 person  assist  -RD     Assistive Device (Bed Mobility) bed rails;draw sheet  -RD       Row Name 06/11/24 0959          Sit-Stand Transfer    Sit-Stand [place] (Transfers) maximum assist (25% patient effort);2 person assist  -RD     Assistive Device (Sit-Stand Transfers) walker, front-wheeled  -RD     Comment, (Sit-Stand Transfer) acheived standing with max 2  -RD               User Key  (r) = Recorded By, (t) = Taken By, (c) = Cosigned By      Initials Name Provider Type    Leonela Yoder, PT Physical Therapist                   Obj/Interventions       Row Name 06/11/24 1001          Balance    Static Sitting Balance supervision;moderate assist  once bal gained with mod asst, pt was able to maintain with supervision for 3 min, then req'd mod asst to regain  -RD     Dynamic Sitting Balance moderate assist  -RD     Position, Sitting Balance unsupported;sitting edge of bed  -RD     Static Standing Balance maximum assist;2-person assist  -RD     Comment, Balance L lateral lean today and posterior lean with sitting balance.  Able to correct with assist  -RD               User Key  (r) = Recorded By, (t) = Taken By, (c) = Cosigned By      Initials Name Provider Type    Leonela Yoder, PT Physical Therapist                   Goals/Plan       Row Name 06/11/24 1010          Bed Mobility Goal 1 (PT)    Time Frame (Bed Mobility Goal 1, PT) 1 week  -RD       Row Name 06/11/24 1010          Transfer Goal 1 (PT)    Time Frame (Transfer Goal 1, PT) 1 week  -RD       Row Name 06/11/24 1010          Gait Training Goal 1 (PT)    Time Frame (Gait Training Goal 1, PT) 1 week  -RD               User Key  (r) = Recorded By, (t) = Taken By, (c) = Cosigned By      Initials Name Provider Type    Leonela Yoder, PT Physical Therapist                   Clinical Impression       Row Name 06/11/24 1003          Pain    Pretreatment Pain Rating 3/10  -RD     Posttreatment Pain Rating 3/10  -RD     Pre/Posttreatment Pain  Comment one c/o pain with sitting balance.  -RD       Row Name 06/11/24 1003          Plan of Care Review    Plan of Care Reviewed With patient  -RD     Progress improving  -RD     Outcome Evaluation Patient made improvement with PT today.  She was able to decrease assistance needed for bed mobility and supine to sit (used rolling technique to get to EOB and then sit up from sidelying).  Pt was able to sat with mod asst to initial achieve balance but then sit with supervision.  Sat a total of 10 min.  Patient did perform sit to stand transfers times 3 reps with max asst 2 and walker.  Patient was able to stand upright on first attempt.  PT chose not to transfer to chair due to difficulty nursing would have in returning patient back to bed from chair available.  Patient was very motivated to participate with PT and should be able to return to baseline activity and personal care level of care with intensive PT/OT.  Currently recommend acute rehab for this level of care as well as nursing/medical coverage to return to prior functional status.  -RD       Row Name 06/11/24 1003          Therapy Assessment/Plan (PT)    Therapy Frequency (PT) 6 times/wk  -RD       Row Name 06/11/24 1003          Positioning and Restraints    Pre-Treatment Position in bed  -RD     Post Treatment Position bed  -RD     In Bed notified nsg;call light within reach;exit alarm on  -RD               User Key  (r) = Recorded By, (t) = Taken By, (c) = Cosigned By      Initials Name Provider Type    Leonela Yoder, PT Physical Therapist                   Outcome Measures       Row Name 06/11/24 1011          How much help from another person do you currently need...    Turning from your back to your side while in flat bed without using bedrails? 2  -RD     Moving from lying on back to sitting on the side of a flat bed without bedrails? 2  -RD     Moving to and from a bed to a chair (including a wheelchair)? 1  -RD     Standing up from a chair  using your arms (e.g., wheelchair, bedside chair)? 2  -RD     Climbing 3-5 steps with a railing? 1  -RD     To walk in hospital room? 1  -RD     AM-PAC 6 Clicks Score (PT) 9  -RD     Highest Level of Mobility Goal 3 --> Sit at edge of bed  -RD               User Key  (r) = Recorded By, (t) = Taken By, (c) = Cosigned By      Initials Name Provider Type    RD Leonela Acuna, PT Physical Therapist                                 Physical Therapy Education       Title: PT OT SLP Therapies (In Progress)       Topic: Physical Therapy (Done)       Point: Mobility training (Done)       Learning Progress Summary             Patient Acceptance, E,TB, VU by RD at 6/11/2024 1011    Acceptance, E,TB, VU,NR by CB at 6/10/2024 1149    Acceptance, E, VU by EM at 6/7/2024 1622    Acceptance, E, NR by EM at 6/6/2024 1605    Acceptance, E,TB, VU by BK at 6/5/2024 1700    Acceptance, E, NR by EM at 6/5/2024 1640                         Point: Home exercise program (Done)       Learning Progress Summary             Patient Acceptance, E,TB, VU by RD at 6/11/2024 1011    Acceptance, E,TB, VU,NR by CB at 6/10/2024 1149    Acceptance, E, VU by EM at 6/7/2024 1622    Acceptance, E, NR by EM at 6/6/2024 1605                         Point: Body mechanics (Done)       Learning Progress Summary             Patient Acceptance, E,TB, VU by RD at 6/11/2024 1011    Acceptance, E,TB, VU,NR by CB at 6/10/2024 1149                         Point: Precautions (Done)       Learning Progress Summary             Patient Acceptance, E,TB, VU by RD at 6/11/2024 1011    Acceptance, E,TB, VU,NR by CB at 6/10/2024 1149                                         User Key       Initials Effective Dates Name Provider Type Discipline    RD 06/16/21 -  Leonela Acuna, PT Physical Therapist PT    EM 06/16/21 -  Yvette Guajardo PT Physical Therapist PT    CB 10/22/21 -  Kaitlin Cunningham, PT Physical Therapist PT    BK 04/30/24 -  Nu Leon, CIPRIANO Extern  Registered Nurse Nurse                  PT Recommendation and Plan     Plan of Care Reviewed With: patient  Progress: improving  Outcome Evaluation: Patient made improvement with PT today.  She was able to decrease assistance needed for bed mobility and supine to sit (used rolling technique to get to EOB and then sit up from sidelying).  Pt was able to sat with mod asst to initial achieve balance but then sit with supervision.  Sat a total of 10 min.  Patient did perform sit to stand transfers times 3 reps with max asst 2 and walker.  Patient was able to stand upright on first attempt.  PT chose not to transfer to chair due to difficulty nursing would have in returning patient back to bed from chair available.  Patient was very motivated to participate with PT and should be able to return to baseline activity and personal care level of care with intensive PT/OT.  Currently recommend acute rehab for this level of care as well as nursing/medical coverage to return to prior functional status.     Time Calculation:         PT Charges       Row Name 06/11/24 1013             Time Calculation    Start Time 0955  -RD      Stop Time 1020  -RD      Time Calculation (min) 25 min  -RD      PT Received On 06/11/24  -RD      PT - Next Appointment 06/12/24  -RD      PT Goal Re-Cert Due Date 06/18/24  -RD                User Key  (r) = Recorded By, (t) = Taken By, (c) = Cosigned By      Initials Name Provider Type    Leonela Yoder PT Physical Therapist                  Therapy Charges for Today       Code Description Service Date Service Provider Modifiers Qty    45174990917  PT THER PROC EA 15 MIN 6/11/2024 Leonela Acuna, PT GP 1    92487906751 HC PT THERAPEUTIC ACT EA 15 MIN 6/11/2024 Leonela Acuna, PT GP 1    [id number]  PT THER SUPP EA 15 MIN 6/11/2024 Leonela Acuna, PT GP 2            PT G-Codes  Outcome Measure Options: AM-PAC 6 Clicks Daily Activity (OT), Modified [place]  AM-PAC 6 Clicks Score  (PT): 9  AM-PAC 6 Clicks Score (OT): 9  Modified [place] Scale: 4 - Moderately severe disability.  Unable to walk without assistance, and unable to attend to own bodily needs without assistance.  PT Discharge Summary  Anticipated Discharge Disposition (PT): inpatient rehabilitation facility    Leonela Acuna, PT  6/11/2024

## 2024-06-11 NOTE — PLAN OF CARE
Goal Outcome Evaluation:  Plan of Care Reviewed With: patient        Progress: improving  Outcome Evaluation: Patient made improvement with PT today.  She was able to decrease assistance needed for bed mobility and supine to sit (used rolling technique to get to EOB and then sit up from sidelying).  Pt was able to sat with mod asst to initial achieve balance but then sit with supervision.  Sat a total of 10 min.  Patient did perform sit to stand transfers times 3 reps with max asst 2 and walker.  Patient was able to stand upright on first attempt.  PT chose not to transfer to chair due to difficulty nursing would have in returning patient back to bed from chair available.  Patient was very motivated to participate with PT and should be able to return to baseline activity and personal care level of care with intensive PT/OT.  Currently recommend acute rehab for this level of care as well as nursing/medical coverage to return to prior functional status.      Anticipated Discharge Disposition (PT): inpatient rehabilitation facility

## 2024-06-11 NOTE — PROGRESS NOTES
Continued Stay Note  [facility]     Patient Name: Kimberlee Urrutia  MRN: [id number]  Today's Date: 6/11/2024    Admit Date: 6/1/2024    Plan: Needs SNF vs. return to [facility]   Discharge Plan       Row Name 06/11/24 1536       Plan    Plan Needs SNF vs. return to [facility]    Patient/Family in Agreement with Plan yes    Plan Comments Received call from Gi/JOVI.  She did go to  office but no progress in reinstating insurance.  She is also planning to speak with Medicare rep today.  Spoke with Manager/Radha and she is aware of situation. Needs insurance for SNF and current activity level is unable to return to [place].  CCP continues to follow.  Dave COTA                    Expected Discharge Date and Time       Expected Discharge Date Expected Discharge Time    Jun 13, 2024               Becky S. [place], RN

## 2024-06-11 NOTE — PLAN OF CARE
Goal Outcome Evaluation:  Plan of Care Reviewed With: patient           Outcome Evaluation: Swallow reevaluated. Pt took trials of thin (spoon/cup/straw), pureed, soft solids, and mixed consistencies. No overt s/s were noted. Mastication and bolus transit was slow, therefore, regular solids were not trialed. Oral cavity was clear post swallow. Vocal quality remained clear throughout the eval. Laryngeal elevation was adequate with palpation. Recommend diet advancement to soft, ground and thin; meds whole/crushed in pureed; upright for meals and 30 min after; slow rate; small bites/sips; check oral cavity after meals; assist with feeding. ST to follow.      Anticipated Discharge Disposition (SLP): unknown

## 2024-06-11 NOTE — THERAPY TREATMENT NOTE
Acute Care - Speech Language Pathology   Swallow Treatment Note [facility]     Patient Name: Kimberlee Urrutia  : 1940  MRN: [id number]  Today's Date: 2024               Admit Date: 2024    Visit Dx:     ICD-10-CM ICD-9-CM   1. Right ureteral stone  N20.1 592.1   2. Acute UTI  N39.0 599.0   3. Severe sepsis  A41.9 038.9    R65.20 995.92   4. Elevated lactic acid level  R79.89 276.2   5. Osteomyelitis of lumbar spine  M46.26 730.28   6. Elevated AST (SGOT)  R74.01 790.4   7. Elevated ALT measurement  R74.01 790.4   8. Elevated C-reactive protein (CRP)  R79.82 790.95   9. PRINCE (acute kidney injury)  N17.9 584.9     Patient Active Problem List   Diagnosis    Arthritis    HLD (hyperlipidemia)    Primary hypertension    Health care maintenance    Knee pain, right    Hx of total knee arthroplasty    Seasonal allergies    Vitamin D deficiency    Pyuria    Transient alteration of awareness    Migraine without status migrainosus, not intractable    Leukocytosis    Viral pharyngitis    Multinodular thyroid    Osteopenia of multiple sites    Colon cancer screening    Serum calcium elevated    Acute low back pain    Elevated procalcitonin    Sepsis due to Escherichia coli with acute renal failure without septic shock    Thrombocytopenia    Recurrent oral herpes simplex    Lumbar spinal stenosis    Metabolic encephalopathy    Renal stones    Colon cancer screening    Sacroiliac joint pain    Lumbar stenosis without neurogenic claudication    Renal stone    Hyperlipidemia    History of total knee arthroplasty    Spinal stenosis of lumbar region    Multinodular goiter    Osteopenia    Pain in right knee    Sacroiliac joint pain    Hypercalcemia    CAYLA (dementia of Alzheimer type)    Chronic bilateral low back pain without sciatica    Right ureteral stone    Discitis of lumbar region    Acute UTI (urinary tract infection)    Hypoglycemia    Abnormal LFTs     Past Medical History:   Diagnosis Date    Allergic      Arthritis     Colon cancer screening 01/29/2020    Colon cancer screening 01/29/2020    Cough     Diarrhea     Hx of migraine headaches     Hyperlipidemia     Irregular heart beat     Osteoarthritis     Renal stones 02/2021    Vitamin D deficiency      Past Surgical History:   Procedure Laterality Date    COLONOSCOPY      CYSTOSCOPY W/ URETERAL STENT PLACEMENT Right 6/2/2024    Procedure: CYSTOSCOPY, RIGHT URETERAL STENT PLACEMENT;  Surgeon: Efra Schofield MD;  Location: [facility] MAIN OR;  Service: Urology;  Laterality: Right;    LUMBAR EPIDURAL INJECTION N/A 8/2/2021    Procedure: LUMBAR EPIDURAL 1ST VISIT 5-1;  Surgeon: Nu Partida MD;  Location: [facility] MAIN OR;  Service: Pain Management;  Laterality: N/A;    REPLACEMENT TOTAL KNEE Right 01/2015    Josue Wilson MD    SACROILIAC JOINT INJECTION Left 6/16/2021    Procedure: SACROILIAC INJECTION left;  Surgeon: Nu Partida MD;  Location: [facility] MAIN OR;  Service: Pain Management;  Laterality: Left;    TUBAL ABDOMINAL LIGATION      URETEROSCOPY LASER LITHOTRIPSY WITH STENT INSERTION Left 2/23/2021    Procedure: LT.URETEROSCOPY LASER LITHOTRIPSY WITH STENT  FOR STONE;  Surgeon: Arnaud Lu MD;  Location: [facility] MAIN OR;  Service: Urology;  Laterality: Left;    WISDOM TOOTH EXTRACTION         SLP Recommendation and Plan     SLP Diet Recommendation: soft to chew textures, ground, thin liquids (06/11/24 0945)  Recommended Precautions and Strategies: upright posture during/after eating, small bites of food and sips of liquid, check mouth frequently for oral residue/pocketing, assist with feeding (06/11/24 0945)  SLP Rec. for Method of Medication Administration: meds whole, meds crushed, with puree, as tolerated (06/11/24 0945)     Monitor for Signs of Aspiration: yes, notify SLP if any concerns (06/11/24 0945)        Anticipated Discharge Disposition (SLP): unknown (06/11/24 0945)     Therapy Frequency (Swallow): PRN (06/11/24 0945)  Predicted  Duration Therapy Intervention (Days): until discharge (06/11/24 0945)  Oral Care Recommendations: Oral Care BID/PRN (06/11/24 0945)                                        Plan of Care Reviewed With: patient  Outcome Evaluation: Swallow reevaluated. Pt took trials of thin (spoon/cup/straw), pureed, soft solids, and mixed consistencies. No overt s/s were noted. Mastication and bolus transit was slow, therefore, regular solids were not trialed. Oral cavity was clear post swallow. Vocal quality remained clear throughout the eval. Laryngeal elevation was adequate with palpation. Recommend diet advancement to soft, ground and thin; meds whole/crushed in pureed; upright for meals and 30 min after; slow rate; small bites/sips; check oral cavity after meals; assist with feeding. ST to follow.      SWALLOW EVALUATION (Last 72 Hours)       SLP Adult Swallow Evaluation       Row Name 06/11/24 0945                   Rehab Evaluation    Document Type therapy note (daily note)  -AW        Subjective Information no complaints  -AW        Patient Observations alert;cooperative;agree to therapy  -AW        Patient Effort good  -AW        Symptoms Noted During/After Treatment none  -AW           Recommendations    Therapy Frequency (Swallow) PRN  -AW        Predicted Duration Therapy Intervention (Days) until discharge  -AW        SLP Diet Recommendation soft to chew textures;ground;thin liquids  -AW        Recommended Precautions and Strategies upright posture during/after eating;small bites of food and sips of liquid;check mouth frequently for oral residue/pocketing;assist with feeding  -AW        Oral Care Recommendations Oral Care BID/PRN  -AW        SLP Rec. for Method of Medication Administration meds whole;meds crushed;with puree;as tolerated  -AW        Monitor for Signs of Aspiration yes;notify SLP if any concerns  -AW        Anticipated Discharge Disposition (SLP) unknown  -AW           (LTG) Patient will demonstrate  functional swallow for    Diet Texture (Demonstrate functional swallow) soft to chew (ground) textures  -AW        Liquid viscosity (Demonstrate functional swallow) thin liquids  -AW        Progress/Outcomes (Demonstrate functional swallow) good progress toward goal  -AW        Comment (Demonstrate functional swallow) Swallow reevaluated. Pt took trials of thin (spoon/cup/straw), pureed, soft solids, and mixed consistencies. No overt s/s were noted. Mastication and bolus transit was slow, therefore, regular solids were not trialed. Oral cavity was clear post swallow. Vocal quality remained clear throughout the eval. Laryngeal elevation was adequate with palpation. Recommend diet advancement to soft, ground and thin; meds whole/crushed in pureed; upright for meals and 30 min after; slow rate; small bites/sips; assist with feeding. ST to follow.  -AW                  User Key  (r) = Recorded By, (t) = Taken By, (c) = Cosigned By      Initials Name Effective Dates    Eden Last, SLP 08/28/23 -                     EDUCATION  The patient has been educated in the following areas:   Dysphagia (Swallowing Impairment) Oral Care/Hydration Modified Diet Instruction.        SLP GOALS       Row Name 06/11/24 0945             (LTG) Patient will demonstrate functional swallow for    Diet Texture (Demonstrate functional swallow) soft to chew (ground) textures  -AW      Liquid viscosity (Demonstrate functional swallow) thin liquids  -AW      Progress/Outcomes (Demonstrate functional swallow) good progress toward goal  -AW      Comment (Demonstrate functional swallow) Swallow reevaluated. Pt took trials of thin (spoon/cup/straw), pureed, soft solids, and mixed consistencies. No overt s/s were noted. Mastication and bolus transit was slow, therefore, regular solids were not trialed. Oral cavity was clear post swallow. Vocal quality remained clear throughout the eval. Laryngeal elevation was adequate with palpation. Recommend diet  advancement to soft, ground and thin; meds whole/crushed in pureed; upright for meals and 30 min after; slow rate; small bites/sips; assist with feeding. ST to follow.  -                User Key  (r) = Recorded By, (t) = Taken By, (c) = Cosigned By      Initials Name Provider Type    Eden Last SLP Speech and Language Pathologist                         Time Calculation:    Time Calculation- SLP       Row Name 06/11/24 1112             Time Calculation- SLP    SLP Start Time 0945  -      SLP Received On 06/11/24  -                User Key  (r) = Recorded By, (t) = Taken By, (c) = Cosigned By      Initials Name Provider Type    Eden Last SLP Speech and Language Pathologist                    Therapy Charges for Today       Code Description Service Date Service Provider Modifiers Qty    [id number]  ST TREATMENT SWALLOW 4 6/11/2024 Eden Pink SLP GN 1                 JOSUÉ Jasmine  6/11/2024

## 2024-06-11 NOTE — PLAN OF CARE
Problem: Adult Inpatient Plan of Care  Goal: Optimal Comfort and Wellbeing  Outcome: Ongoing, Progressing  Intervention: Provide Person-Centered Care  Recent Flowsheet Documentation  Taken 6/11/2024 1412 by Yvette Espino RN  Trust Relationship/Rapport:   care explained   choices provided   thoughts/feelings acknowledged  Taken 6/11/2024 1003 by Yvette Espino RN  Trust Relationship/Rapport:   care explained   choices provided   thoughts/feelings acknowledged     Problem: Skin Injury Risk Increased  Goal: Skin Health and Integrity  Outcome: Ongoing, Progressing  Intervention: Optimize Skin Protection  Recent Flowsheet Documentation  Taken 6/11/2024 1621 by Yvette Espino RN  Head of Bed (HOB) Positioning: HOB elevated  Taken 6/11/2024 1412 by Yvette Espino RN  Pressure Reduction Techniques: weight shift assistance provided  Head of Bed (HOB) Positioning: HOB lowered  Pressure Reduction Devices: alternating pressure pump (ADD)  Skin Protection: adhesive use limited  Taken 6/11/2024 1200 by Yvette Espino RN  Head of Bed (HOB) Positioning: HOB elevated  Taken 6/11/2024 1003 by Yvette Espino RN  Pressure Reduction Techniques: frequent weight shift encouraged  Head of Bed (HOB) Positioning: HOB elevated  Pressure Reduction Devices: alternating pressure pump (ADD)  Skin Protection: adhesive use limited     Problem: Fall Injury Risk  Goal: Absence of Fall and Fall-Related Injury  Outcome: Ongoing, Progressing  Intervention: Identify and Manage Contributors  Recent Flowsheet Documentation  Taken 6/11/2024 1621 by Yvette Espino RN  Medication Review/Management: medications reviewed  Taken 6/11/2024 1412 by Yvette Espino RN  Medication Review/Management: medications reviewed  Taken 6/11/2024 1200 by Yvette Espino RN  Medication Review/Management: medications reviewed  Taken 6/11/2024 1003 by Yvette Espino RN  Medication Review/Management:  medications reviewed  Taken 6/11/2024 0815 by Yvette Espino RN  Medication Review/Management: medications reviewed  Intervention: Promote Injury-Free Environment  Recent Flowsheet Documentation  Taken 6/11/2024 1621 by Yvette Espino RN  Safety Promotion/Fall Prevention:   activity supervised   assistive device/personal items within reach   clutter free environment maintained   fall prevention program maintained   nonskid shoes/slippers when out of bed   room organization consistent   safety round/check completed  Taken 6/11/2024 1412 by Yvette Espino RN  Safety Promotion/Fall Prevention:   assistive device/personal items within reach   activity supervised   clutter free environment maintained   fall prevention program maintained   nonskid shoes/slippers when out of bed   room organization consistent   safety round/check completed  Taken 6/11/2024 1200 by Yvette Espino RN  Safety Promotion/Fall Prevention:   activity supervised   assistive device/personal items within reach   clutter free environment maintained   fall prevention program maintained   nonskid shoes/slippers when out of bed   safety round/check completed   room organization consistent  Taken 6/11/2024 1003 by Yvette Espino RN  Safety Promotion/Fall Prevention:   activity supervised   assistive device/personal items within reach   clutter free environment maintained   fall prevention program maintained   nonskid shoes/slippers when out of bed   safety round/check completed   room organization consistent  Taken 6/11/2024 0815 by Yvette Espino RN  Safety Promotion/Fall Prevention:   activity supervised   assistive device/personal items within reach   clutter free environment maintained   fall prevention program maintained   nonskid shoes/slippers when out of bed   room organization consistent   safety round/check completed   Goal Outcome Evaluation:

## 2024-06-12 LAB
BASOPHILS # BLD AUTO: 0.06 10*3/MM3 (ref 0–0.2)
BASOPHILS NFR BLD AUTO: 0.5 % (ref 0–1.5)
DEPRECATED RDW RBC AUTO: 41 FL (ref 37–54)
EOSINOPHIL # BLD AUTO: 0 10*3/MM3 (ref 0–0.4)
EOSINOPHIL NFR BLD AUTO: 0 % (ref 0.3–6.2)
ERYTHROCYTE [DISTWIDTH] IN BLOOD BY AUTOMATED COUNT: 12.8 % (ref 12.3–15.4)
GLUCOSE BLDC GLUCOMTR-MCNC: 110 MG/DL (ref 70–130)
GLUCOSE BLDC GLUCOMTR-MCNC: 87 MG/DL (ref 70–130)
GLUCOSE BLDC GLUCOMTR-MCNC: 87 MG/DL (ref 70–130)
GLUCOSE BLDC GLUCOMTR-MCNC: 91 MG/DL (ref 70–130)
HCT VFR BLD AUTO: 31.7 % (ref 34–46.6)
HGB BLD-MCNC: 10.6 G/DL (ref 12–15.9)
IMM GRANULOCYTES # BLD AUTO: 0.21 10*3/MM3 (ref 0–0.05)
IMM GRANULOCYTES NFR BLD AUTO: 1.6 % (ref 0–0.5)
LYMPHOCYTES # BLD AUTO: 1.81 10*3/MM3 (ref 0.7–3.1)
LYMPHOCYTES NFR BLD AUTO: 14.2 % (ref 19.6–45.3)
MCH RBC QN AUTO: 29.8 PG (ref 26.6–33)
MCHC RBC AUTO-ENTMCNC: 33.4 G/DL (ref 31.5–35.7)
MCV RBC AUTO: 89 FL (ref 79–97)
MONOCYTES # BLD AUTO: 0.94 10*3/MM3 (ref 0.1–0.9)
MONOCYTES NFR BLD AUTO: 7.4 % (ref 5–12)
NEUTROPHILS NFR BLD AUTO: 76.3 % (ref 42.7–76)
NEUTROPHILS NFR BLD AUTO: 9.72 10*3/MM3 (ref 1.7–7)
NRBC BLD AUTO-RTO: 0 /100 WBC (ref 0–0.2)
PLATELET # BLD AUTO: 251 10*3/MM3 (ref 140–450)
PMV BLD AUTO: 11.7 FL (ref 6–12)
RBC # BLD AUTO: 3.56 10*6/MM3 (ref 3.77–5.28)
WBC NRBC COR # BLD AUTO: 12.74 10*3/MM3 (ref 3.4–10.8)

## 2024-06-12 PROCEDURE — 97530 THERAPEUTIC ACTIVITIES: CPT

## 2024-06-12 PROCEDURE — 85025 COMPLETE CBC W/AUTO DIFF WBC: CPT | Performed by: STUDENT IN AN ORGANIZED HEALTH CARE EDUCATION/TRAINING PROGRAM

## 2024-06-12 PROCEDURE — 0 DEXTROSE 5 % SOLUTION: Performed by: STUDENT IN AN ORGANIZED HEALTH CARE EDUCATION/TRAINING PROGRAM

## 2024-06-12 PROCEDURE — 97110 THERAPEUTIC EXERCISES: CPT

## 2024-06-12 PROCEDURE — 82948 REAGENT STRIP/BLOOD GLUCOSE: CPT

## 2024-06-12 PROCEDURE — 99232 SBSQ HOSP IP/OBS MODERATE 35: CPT | Performed by: INTERNAL MEDICINE

## 2024-06-12 RX ADMIN — MEMANTINE HYDROCHLORIDE 10 MG: 10 TABLET, FILM COATED ORAL at 09:42

## 2024-06-12 RX ADMIN — Medication 10 ML: at 20:06

## 2024-06-12 RX ADMIN — DONEPEZIL HYDROCHLORIDE 10 MG: 10 TABLET, FILM COATED ORAL at 09:42

## 2024-06-12 RX ADMIN — MEMANTINE HYDROCHLORIDE 10 MG: 10 TABLET, FILM COATED ORAL at 20:05

## 2024-06-12 RX ADMIN — DEXTROSE MONOHYDRATE 50 ML/HR: 50 INJECTION, SOLUTION INTRAVENOUS at 17:56

## 2024-06-12 NOTE — PLAN OF CARE
Goal Outcome Evaluation:  Plan of Care Reviewed With: patient        Progress: (P) no change  Outcome Evaluation: (P) Pt seen for OT treatment this date. Pt resting in bed and agreeable to treatment. Pt A&O x3 and very lethargic. Pt demo'd max A x2 bed mobility and STS transfer and max A x1 sitting balance. At best, pt displayed CGA static sitting balance however required verbal cues to maintain. Pt was unable to self correct R lean. Pt attempted x1 STS transfer with assist x2 however was unable to remain standing or take any side steps d/t weakness and excessive fatigue. Overall, pt presents with poor activity tolerance and is currently dep for most ADL's. Pt would benefit from OT to address (I) and safety with ADL's and functional mobility. Rec d/c SNF      Anticipated Discharge Disposition (OT): (P) skilled nursing facility

## 2024-06-12 NOTE — PROGRESS NOTES
ID note for sepsis  S: no pain.  Afebrile.  Tolerating antibiotics. [facility] note reviewed and trouble with placement due to insurance troubles.     Physical Exam:   Vital Signs   Temp:  [97.5 °F (36.4 °C)-98.8 °F (37.1 °C)] 97.7 °F (36.5 °C)  Heart Rate:  [64-74] 64  Resp:  [18] 18  BP: ()/(49-63) 121/61    GENERAL: Awake and alert, sickly  HEENT: Oropharynx is clear. Hearing is grossly normal.   EYES: . No conjunctival injection. No lid lag.   LUNGS:normal respiratory effort.   SKIN: no cutaneous eruptions in exposed areas  PSYCHIATRIC:following simple commands but very slow to respond    Results Review:  White count 12.7  Glc     6/2 BCx 2/2 ESBL e coli  6/2 UCx E coli  6/4 Bcx no growth     A/p  1.  ESBL E. coli septicemia  2.  Right pyelonephritis with obstructing ureteral stone status post stenting  3.  Acute kidney injury, Creatinine of about 0.8-1 as baseline, resolved  4.  Elevated liver function test, likely related to sepsis, resolved    Stable off ertapenem. Repeat blood cultures negative.  White count continues to slowly improve.  Nothing more to add and we will sing off.

## 2024-06-12 NOTE — THERAPY TREATMENT NOTE
Patient Name: Kimberlee Urrutia  : 1940    MRN: [id number]                              Today's Date: 2024       Admit Date: 2024    Visit Dx:     ICD-10-CM ICD-9-CM   1. Right ureteral stone  N20.1 592.1   2. Acute UTI  N39.0 599.0   3. Severe sepsis  A41.9 038.9    R65.20 995.92   4. Elevated lactic acid level  R79.89 276.2   5. Osteomyelitis of lumbar spine  M46.26 730.28   6. Elevated AST (SGOT)  R74.01 790.4   7. Elevated ALT measurement  R74.01 790.4   8. Elevated C-reactive protein (CRP)  R79.82 790.95   9. PRINCE (acute kidney injury)  N17.9 584.9     Patient Active Problem List   Diagnosis    Arthritis    HLD (hyperlipidemia)    Primary hypertension    Health care maintenance    Knee pain, right    Hx of total knee arthroplasty    Seasonal allergies    Vitamin D deficiency    Pyuria    Transient alteration of awareness    Migraine without status migrainosus, not intractable    Leukocytosis    Viral pharyngitis    Multinodular thyroid    Osteopenia of multiple sites    Colon cancer screening    Serum calcium elevated    Acute low back pain    Elevated procalcitonin    Sepsis due to Escherichia coli with acute renal failure without septic shock    Thrombocytopenia    Recurrent oral herpes simplex    Lumbar spinal stenosis    Metabolic encephalopathy    Renal stones    Colon cancer screening    Sacroiliac joint pain    Lumbar stenosis without neurogenic claudication    Renal stone    Hyperlipidemia    History of total knee arthroplasty    Spinal stenosis of lumbar region    Multinodular goiter    Osteopenia    Pain in right knee    Sacroiliac joint pain    Hypercalcemia    CAYLA (dementia of Alzheimer type)    Chronic bilateral low back pain without sciatica    Right ureteral stone    Discitis of lumbar region    Acute UTI (urinary tract infection)    Hypoglycemia    Abnormal LFTs     Past Medical History:   Diagnosis Date    Allergic     Arthritis     Colon cancer screening 2020    Colon  cancer screening 01/29/2020    Cough     Diarrhea     Hx of migraine headaches     Hyperlipidemia     Irregular heart beat     Osteoarthritis     Renal stones 02/2021    Vitamin D deficiency      Past Surgical History:   Procedure Laterality Date    COLONOSCOPY      CYSTOSCOPY W/ URETERAL STENT PLACEMENT Right 6/2/2024    Procedure: CYSTOSCOPY, RIGHT URETERAL STENT PLACEMENT;  Surgeon: Efra Schofield MD;  Location: [facility] OR;  Service: Urology;  Laterality: Right;    LUMBAR EPIDURAL INJECTION N/A 8/2/2021    Procedure: LUMBAR EPIDURAL 1ST VISIT 5-1;  Surgeon: Nu Partida MD;  [place] [facility] [place];  Service: Pain Management;  Laterality: N/A;    REPLACEMENT TOTAL KNEE Right 01/2015    Josue Wilson MD    SACROILIAC JOINT INJECTION Left 6/16/2021    Procedure: SACROILIAC INJECTION left;  Surgeon: Nu Partida MD;  [place] [facility] [place];  Service: Pain Management;  Laterality: Left;    TUBAL ABDOMINAL LIGATION      URETEROSCOPY LASER LITHOTRIPSY WITH STENT INSERTION Left 2/23/2021    Procedure: LT.URETEROSCOPY LASER LITHOTRIPSY WITH STENT  FOR STONE;  Surgeon: Arnaud Lu MD;  Location: [facility] MAIN OR;  Service: Urology;  Laterality: Left;    WISDOM TOOTH EXTRACTION        General Information       Row Name 06/12/24 1501          OT Time and Intention    Document Type therapy note (daily note) (P)   -     Mode of Treatment co-treatment;occupational therapy;physical therapy (P)   co treatment medically necessary d/t medical status and safety of pt  -       Row Name 06/12/24 1501          General Information    Barriers to Rehab medically complex;cognitive status (P)   -MF       Row Name 06/12/24 1501          Cognition    Orientation Status (Cognition) oriented x 3 (P)   -MF       Row Name 06/12/24 1501          Safety Issues, Functional Mobility    Safety Issues Affecting Function (Mobility) ability to follow commands;insight into  deficits/self-awareness;judgment;problem-solving;safety precaution awareness;safety precautions follow-through/compliance;sequencing abilities (P)   -     Impairments Affecting Function (Mobility) balance;endurance/activity tolerance;strength;postural/trunk control;range of motion (ROM);grasp;cognition (P)   -     Cognitive Impairments, Mobility Safety/Performance attention;awareness, need for assistance;insight into deficits/self-awareness;judgment;problem-solving/reasoning;safety precaution awareness;safety precaution follow-through;sequencing abilities (P)   -     Comment, Safety Issues/Impairments (Mobility) pt is high falls risk d/t weakness (P)   -               User Key  (r) = Recorded By, (t) = Taken By, (c) = Cosigned By      Initials Name Provider Type     Nereyda Rothman OT Student OT Student                     Mobility/ADL's       Row Name 06/12/24 1502          Bed Mobility    Bed Mobility bed mobility (all) activities;supine-sit;sit-supine;rolling right (P)   -     All Activities, [place] (Bed Mobility) maximum assist (25% patient effort);2 person assist (P)   -     Rolling Right [place] (Bed Mobility) moderate assist (50% patient effort);verbal cues (P)   -     Supine-Sit [place] (Bed Mobility) maximum assist (25% patient effort);2 person assist (P)   -     Sit-Supine [place] (Bed Mobility) maximum assist (25% patient effort);2 person assist (P)   -     Bed Mobility, Safety Issues decreased use of arms for pushing/pulling;decreased use of legs for bridging/pushing;cognitive deficits limit understanding (P)   -     Assistive Device (Bed Mobility) bed rails;draw sheet;head of bed elevated (P)   -       Row Name 06/12/24 1502          Transfers    Transfers sit-stand transfer;stand-sit transfer (P)   -       Row Name 06/12/24 1502          Sit-Stand Transfer    Sit-Stand [place] (Transfers) dependent (less than 25% patient effort);2 person  assist;maximum assist (25% patient effort) (P)   -MF     Comment, (Sit-Stand Transfer) x1 from EOB; unable to fully stand, legs not extended (P)   -MF       Row Name 06/12/24 1502          Stand-Sit Transfer    Stand-Sit [place] (Transfers) dependent (less than 25% patient effort);2 person assist (P)   -       Row Name 06/12/24 1502          Functional Mobility    Functional Mobility- Comment unable to ambulate this date (P)   -MF     Patient was able to Ambulate no, other medical factors prevent ambulation (P)   -MF     Reason Patient was unable to Ambulate Excessive Weakness (P)   -MF       Row Name 06/12/24 1502          Activities of Daily Living    BADL Assessment/Intervention lower body dressing;toileting (P)   -       Row Name 06/12/24 1502          Lower Body Dressing Assessment/Training    [place] Level (Lower Body Dressing) lower body dressing skills;dependent (less than 25% patient effort);socks (P)   -     Position (Lower Body Dressing) supine (P)   -       Row Name 06/12/24 1502          Toileting Assessment/Training    [place] Level (Toileting) toileting skills;dependent (less than 25% patient effort) (P)   -MF     Comment, (Toileting) duron cath (P)   -               User Key  (r) = Recorded By, (t) = Taken By, (c) = Cosigned By      Initials Name Provider Type    Nereyda Streeter OT Student OT Student                   Obj/Interventions       Row Name 06/12/24 1505          Range of Motion Comprehensive    General Range of Motion upper extremity range of motion deficits identified (P)   -MF     Comment, General Range of Motion dec R side ROM (P)   -MF       Row Name 06/12/24 1505          Strength Comprehensive (MMT)    General Manual Muscle Testing (MMT) Assessment upper extremity strength deficits identified (P)   -MF     Comment, General Manual Muscle Testing (MMT) Assessment generalized weakness (P)   -       Row Name 06/12/24 1505          Motor Skills    Motor  Skills functional endurance (P)   -     Functional Endurance poor d/t fatigue and weakness (P)   -       Row Name 06/12/24 1500          Balance    Balance Assessment sitting static balance;sitting dynamic balance;sit to stand dynamic balance;standing static balance (P)   -     Static Sitting Balance maximum assist;1-person assist (P)   -     Dynamic Sitting Balance maximum assist;2-person assist (P)   -     Position, Sitting Balance supported;sitting edge of bed (P)   -     Sit to Stand Dynamic Balance maximum assist;2-person assist (P)   -     Static Standing Balance maximum assist;2-person assist (P)   -     Balance Interventions sitting;standing;sit to stand;static;supported;dynamic;highly challenging (P)   -     Comment, Balance R lateral lean during sitting balance - unable to self correct. required assist nearly entire task (P)   -               User Key  (r) = Recorded By, (t) = Taken By, (c) = Cosigned By      Initials Name Provider Type     Nereyda Rothman OT Student OT Student                   Goals/Plan    No documentation.                  Clinical Impression       Row Name 06/12/24 1509          Pain Assessment    Pretreatment Pain Rating 0/10 - no pain (P)   -     Posttreatment Pain Rating 0/10 - no pain (P)   -       Row Name 06/12/24 150          Plan of Care Review    Progress no change (P)   -     Outcome Evaluation Pt seen for OT treatment this date. Pt resting in bed and agreeable to treatment. Pt A&O x3 and very lethargic. Pt demo'd max A x2 bed mobility and STS transfer and max A x1 sitting balance. At best, pt displayed CGA static sitting balance however required verbal cues to maintain. Pt was unable to self correct R lean. Pt attempted x1 STS transfer with assist x2 however was unable to remain standing or take any side steps d/t weakness and excessive fatigue. Overall, pt presents with poor activity tolerance and is currently dep for most ADL's. Pt would  benefit from OT to address (I) and safety with ADL's and functional mobility. Rec d/c SNF (P)   -       Row Name 06/12/24 1507          Therapy Assessment/Plan (OT)    Rehab Potential (OT) fair, will monitor progress closely (P)   -     Criteria for Skilled Therapeutic Interventions Met (OT) yes;skilled treatment is necessary (P)   -     Therapy Frequency (OT) 5 times/wk (P)   -       Row Name 06/12/24 1507          Therapy Plan Review/Discharge Plan (OT)    Anticipated Discharge Disposition (OT) skilled nursing facility (P)   -       Row Name 06/12/24 1507          Vital Signs    O2 Delivery Pre Treatment room air (P)   -     O2 Delivery Intra Treatment room air (P)   -     O2 Delivery Post Treatment room air (P)   -       Row Name 06/12/24 1507          Positioning and Restraints    Pre-Treatment Position in bed (P)   -     Post Treatment Position bed (P)   -     In Bed notified nsg;supine;call light within reach;exit alarm on (P)   -               User Key  (r) = Recorded By, (t) = Taken By, (c) = Cosigned By      Initials Name Provider Type     Nereyda Rothman, OT Student OT Student                   Outcome Measures       Row Name 06/12/24 0942          How much help from another person do you currently need...    Turning from your back to your side while in flat bed without using bedrails? 2  -CT     Moving from lying on back to sitting on the side of a flat bed without bedrails? 2  -CT     Moving to and from a bed to a chair (including a wheelchair)? 1  -CT     Standing up from a chair using your arms (e.g., wheelchair, bedside chair)? 2  -CT     Climbing 3-5 steps with a railing? 1  -CT     To walk in hospital room? 1  -CT     AM-PAC 6 Clicks Score (PT) 9  -CT     Highest Level of Mobility Goal 3 --> Sit at edge of bed  -CT               User Key  (r) = Recorded By, (t) = Taken By, (c) = Cosigned By      Initials Name Provider Type    CT Kiki Ramos, RN Registered Nurse                     Occupational Therapy Education       Title: PT OT SLP Therapies (In Progress)       Topic: Occupational Therapy (In Progress)       Point: ADL training (In Progress)       Description:   Instruct learner(s) on proper safety adaptation and remediation techniques during self care or transfers.   Instruct in proper use of assistive devices.                  Learning Progress Summary             Patient Acceptance, E, NR by  at 6/10/2024 1505                         Point: Home exercise program (In Progress)       Description:   Instruct learner(s) on appropriate technique for monitoring, assisting and/or progressing therapeutic exercises/activities.                  Learning Progress Summary             Patient Acceptance, E, NR by  at 6/10/2024 1505                         Point: Precautions (In Progress)       Description:   Instruct learner(s) on prescribed precautions during self-care and functional transfers.                  Learning Progress Summary             Patient Acceptance, E, NR by  at 6/10/2024 1505                         Point: Body mechanics (In Progress)       Description:   Instruct learner(s) on proper positioning and spine alignment during self-care, functional mobility activities and/or exercises.                  Learning Progress Summary             Patient Acceptance, E, NR by  at 6/10/2024 1505                                         User Key       Initials Effective Dates Name Provider Type Discipline     04/30/24 -  Nereyda Rothman OT Student OT Student OT                  OT Recommendation and Plan  Planned Therapy Interventions (OT): activity tolerance training, BADL retraining, functional balance retraining, neuromuscular control/coordination retraining, occupation/activity based interventions, passive ROM/stretching, patient/caregiver education/training, ROM/therapeutic exercise, strengthening exercise, transfer/mobility retraining  Therapy Frequency  (OT): (P) 5 times/wk  Plan of Care Review  Plan of Care Reviewed With: patient  Progress: (P) no change  Outcome Evaluation: (P) Pt seen for OT treatment this date. Pt resting in bed and agreeable to treatment. Pt A&O x3 and very lethargic. Pt demo'd max A x2 bed mobility and STS transfer and max A x1 sitting balance. At best, pt displayed CGA static sitting balance however required verbal cues to maintain. Pt was unable to self correct R lean. Pt attempted x1 STS transfer with assist x2 however was unable to remain standing or take any side steps d/t weakness and excessive fatigue. Overall, pt presents with poor activity tolerance and is currently dep for most ADL's. Pt would benefit from OT to address (I) and safety with ADL's and functional mobility. Rec d/c SNF     Time Calculation:   Evaluation Complexity (OT)  Review Occupational Profile/Medical/Therapy History Complexity: expanded/moderate complexity  Assessment, Occupational Performance/Identification of Deficit Complexity: 3-5 performance deficits  Clinical Decision Making Complexity (OT): detailed assessment/moderate complexity  Overall Complexity of Evaluation (OT): moderate complexity     Time Calculation- OT       Row Name 06/12/24 1512             Time Calculation- OT    OT Start Time 1424 (P)   -      OT Stop Time 1443 (P)   -      OT Time Calculation (min) 19 min (P)   -      Total Timed Code Minutes- OT 19 minute(s) (P)   -      OT Received On 06/12/24 (P)   -      OT - Next Appointment 06/13/24 (P)   -         Timed Charges    05957 - OT Therapeutic Activity Minutes 19 (P)   -         Total Minutes    Timed Charges Total Minutes 19 (P)   -       Total Minutes 19 (P)   -                User Key  (r) = Recorded By, (t) = Taken By, (c) = Cosigned By      Initials Name Provider Type    Nereyda Streeter, OT Student OT Student                  Therapy Charges for Today       Code Description Service Date Service Provider  Modifiers Qty    [id number]  OT THERAPEUTIC ACT EA 15 MIN 6/12/2024 Nereyda Rothman OT Student GO 1                 Nereyda Rothman OT Student  6/12/2024

## 2024-06-12 NOTE — PROGRESS NOTES
Name: Kimberlee Urrutia ADMIT: 2024   : 1940  PCP: Meche Ji MD    MRN: [id number] LOS: 10 days   AGE/SEX: 84 y.o. female  ROOM: [facility]     Subjective   Subjective   No new complaints.  No overnight events.  Positive weakness.  Still with suboptimal appetite.  No fever or chills.  No abdominal pain.  No nausea or vomiting.  Clear urine in the Rodriguez cath.    Review of Systems  Cardiovascular/respiratory.  No chest pain/no shortness of breath/no cough     Objective   Objective   Vital Signs  Temp:  [97.7 °F (36.5 °C)-98.8 °F (37.1 °C)] 98.2 °F (36.8 °C)  Heart Rate:  [64-74] 64  Resp:  [16-18] 16  BP: ()/(49-63) 117/58  SpO2:  [91 %-97 %] 97 %  on   ;   Device (Oxygen Therapy): room air    Intake/Output Summary (Last 24 hours) at 2024 1614  Last data filed at 2024 1300  Gross per 24 hour   Intake 100 ml   Output 1875 ml   Net -1775 ml     Body mass index is 33.53 kg/m².      06/10/24  0534 24  0417 24  0250   Weight: 85.2 kg (187 lb 13.3 oz) 86.4 kg (190 lb 7.6 oz) 84.5 kg (186 lb 4.6 oz)     Physical Exam  General.  Elderly female.  She is alert and oriented times 3 out of 4.  No apparent pain/distress/diaphoresis.  Affect is flat.  Eyes.  Pupils equal round and reactive.  Intact extraocular musculature.  No pallor or jaundice.  Oral cavity.  Moist mucous membrane.  Neck.  Supple.  No JVD.  No lymphadenopathy or thyromegaly  Cardiovascular.  Regular rate and rhythm and grade 2 systolic murmur  Chest.  Clear to auscultation bilaterally with no added sounds.  Soft lax.  No tenderness.  No organomegaly.  No guarding or rebound  .  Rodriguez catheter in place with clear urine.   Extremities.  No clubbing/cyanosis/edema  CNS.  No acute focal neurological deficits  No change in the exam from the last 2 days.    Results Review:      Results from last 7 days   Lab Units 24  0518 06/10/24  0732 24  0609 24  1648 24  0525 24  0556  "06/06/24  1458 06/06/24  0434   SODIUM mmol/L 140 139 143  --  146* 147*  --  148*   POTASSIUM mmol/L 3.8 4.0 4.0 4.5 3.5 3.8 4.4 3.6   CHLORIDE mmol/L 108* 108* 112*  --  114* 119*  --  120*   CO2 mmol/L 23.5 22.1 23.0  --  23.7 20.9*  --  21.0*   BUN mg/dL 20 22 26*  --  23 22  --  25*   CREATININE mg/dL 0.85 0.91 0.89  --  0.95 0.90  --  0.98   GLUCOSE mg/dL 94 80 104*  --  100* 105*  --  101*   CALCIUM mg/dL 9.6 9.9 9.5  --  9.4 9.5  --  8.8   AST (SGOT) U/L 10 15 16  --  17  --   --   --    ALT (SGPT) U/L 20 24 34*  --  50*  --   --   --      Estimated Creatinine Clearance: 49.3 mL/min (by C-G formula based on SCr of 0.85 mg/dL).      Results from last 7 days   Lab Units 06/12/24  1605 06/12/24  1115 06/12/24  0600 06/11/24  2153 06/11/24  1636 06/11/24  1110 06/11/24  0621 06/10/24  2249   GLUCOSE mg/dL 87 87 110 98 95 93 86 81                 Results from last 7 days   Lab Units 06/11/24  0518 06/10/24  0732 06/09/24  0609 06/08/24  0525 06/07/24  0556 06/06/24  0434   MAGNESIUM mg/dL 2.0 2.2 2.0 1.9 2.0 1.9   PHOSPHORUS mg/dL 3.3 3.6 3.4 3.6 2.9 2.7           Invalid input(s): \"LDLCALC\"  Results from last 7 days   Lab Units 06/12/24  0329 06/11/24  0518 06/10/24  0732 06/09/24  0609 06/08/24  0525 06/08/24  0525 06/07/24  0556 06/07/24  0556 06/06/24  0545 06/06/24  0545   WBC 10*3/mm3 12.74* 13.41* 16.44* 18.79*  --  21.44*  --  26.33*  --  27.75*   HEMOGLOBIN g/dL 10.6* 11.0* 11.4* 11.1*  --  11.2*  --  11.4*  --  11.4*   HEMATOCRIT % 31.7* 34.2 35.8 35.0  --  34.8  --  33.8*  --  34.3   PLATELETS 10*3/mm3 251 228 179 136*  --  91*  --  61*  --  42*   MCV fL 89.0 89.1 91.1 90.0  --  90.2  --  86.9  --  87.5   MCH pg 29.8 28.6 29.0 28.5  --  29.0  --  29.3  --  29.1   MCHC g/dL 33.4 32.2 31.8 31.7  --  32.2  --  33.7  --  33.2   RDW % 12.8 12.9 13.0 13.5  --  13.5  --  13.6  --  13.5   RDW-SD fl 41.0 41.9 43.2 44.7  --  44.4  --  42.5  --  42.4   MPV fL 11.7 11.4 12.0 12.1*  --  13.6*  --  13.0*   < >  --  "   NEUTROPHIL % % 76.3* 80.8* 80.9* 80.3*  --  78.6*   < >  --   --   --    LYMPHOCYTE % % 14.2* 11.4* 10.9* 10.2*  --  9.8*   < >  --   --   --    MONOCYTES % % 7.4 5.4 5.9 6.5  --  8.2   < >  --   --   --    EOSINOPHIL % % 0.0* 0.0* 0.0* 0.0*  --  0.0*   < >  --   --   --    BASOPHIL % % 0.5 0.5 0.5 0.3  --  0.4   < >  --   --   --    IMM GRAN % % 1.6* 1.9* 1.8* 2.7*  --  3.0*   < >  --   --   --    NEUTROS ABS 10*3/mm3 9.72* 10.83* 13.30* 15.08*  --  16.86*   < > 22.91*  --  25.53*   LYMPHS ABS 10*3/mm3 1.81 1.53 1.79 1.92  --  2.10   < >  --   --   --    MONOS ABS 10*3/mm3 0.94* 0.72 0.97* 1.22*  --  1.76*   < >  --   --   --    EOS ABS 10*3/mm3 0.00 0.00 0.00 0.00  --  0.00   < > 0.00  --   --    BASOS ABS 10*3/mm3 0.06 0.07 0.08 0.06  --  0.08   < > 0.00  --   --    IMMATURE GRANS (ABS) 10*3/mm3 0.21* 0.26* 0.30* 0.51*  --  0.64*   < >  --   --   --    NRBC /100 WBC 0.0 0.0 0.0 0.0   < >  --   --   --   --   --     < > = values in this interval not displayed.     Results from last 7 days   Lab Units 06/07/24  0556 06/06/24  0608   APTT seconds 24.6 23.8                                         Imaging:  Imaging Results (Last 24 Hours)       ** No results found for the last 24 hours. **               I reviewed the patient's new clinical results / labs / tests / procedures      Assessment/Plan     Active Hospital Problems    Diagnosis  POA    **Right ureteral stone [N20.1]  Yes    Acute UTI (urinary tract infection) [N39.0]  Yes    Hypoglycemia [E16.2]  Yes    Abnormal LFTs [R79.89]  Yes    Chronic bilateral low back pain without sciatica [M54.50, G89.29]  Yes    CAYLA (dementia of Alzheimer type) [G30.9, F02.80]  Yes    Lumbar spinal stenosis [M48.061]  Yes    Thrombocytopenia [D69.6]  Yes    Acute low back pain [M54.50]  Yes      Resolved Hospital Problems    Diagnosis Date Resolved POA    Shock, septic [A41.9, R65.21] 06/03/2024 Yes    PRINCE (acute kidney injury) [N17.9] 06/06/2024 Yes           Urosepsis  secondary to ESBL E. coli pyelonephritis complicated by right hydronephrosis and obstructive ureteric stone and ESBL E. coli septicemia patient's status post cystoscopy and right stent placement on 6/2/2024.  Urology recommends keeping Rodriguez catheter until the patient is ambulatory (not the case at this time).  Status post adequate course of Invanz treatment per ID.  Leukocytosis improved.  No fever.  Repeat blood culture are negative.  Metabolic encephalopathy in a patient with a history of Alzheimer's dementia.  Improved metabolic encephalopathy.  Negative CNS examination.  Continue Aricept and Namenda.  Prediabetes.  A1c 5.7.  Hypernatremia/acute kidney.  Both resolved with  D5 water.  Patient volume status is normal.  Elevated liver function test secondary to sepsis.  Improved.  GI examination is benign.  Anemia/thrombocytopenia.  Anemia is mild and is stable.  Resolved thrombocytopenia which is mostly secondary to sepsis.  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  Status post neurosurgery consult.  For conservative treatment.  On PT.  Dysphagia.  Tolerating puréed and nectar thickened liquids well.  VTE prophylaxis.  Sequential compression.  DNR status.    Discussed my findings and plan of treatment with the patient/nurse.  Disposition.  Per physical therapy patient will need skilled facility.  Difficult to find a place since the patient has no insurance and [facility] is working on that.            oRbert Almeida MD  [facility]  06/12/24  16:14 EDT

## 2024-06-12 NOTE — PLAN OF CARE
Goal Outcome Evaluation:  Plan of Care Reviewed With: patient        Progress: improving  Outcome Evaluation: Pt disoriented to time. Rodriguez in place, catheter care provided. IVF continues. Q2 turns. Rested well. Waiting for placement.

## 2024-06-12 NOTE — PLAN OF CARE
Goal Outcome Evaluation:  Plan of Care Reviewed With: patient        Progress: no change  Outcome Evaluation: Pt seen for PT tx today. Co-tx with OT d/t pt's decreased activity tolerance and for pt's safety. Pt initiated little movement when with bed mobility. Pt required MaxA of 2 and sequencing cues. Once sitting EOB, pt required assist with scooting  to get feet on the floor. Pt was able to sit CGA at EOB but after a couple of minutes, needed increased assist of support d/t pt leaning to right side d/t fatigue. Attempted 1 stand with pt needing Max A of 2 with pt able to clear bottom off the bed but unable to stand fully upright. Will continue to progress pt as tolerated.

## 2024-06-12 NOTE — PLAN OF CARE
Problem: Adult Inpatient Plan of Care  Goal: Plan of Care Review  Outcome: Ongoing, Progressing  Flowsheets  Taken 6/12/2024 1626 by Kiki Ramos, RN  Progress: no change  Taken 6/12/2024 0601 by Shantell Guardado, RN  Plan of Care Reviewed With: patient   Goal Outcome Evaluation:           Progress: no change

## 2024-06-12 NOTE — THERAPY TREATMENT NOTE
Patient Name: Kimberlee Urrutia  : 1940    MRN: [id number]                              Today's Date: 2024       Admit Date: 2024    Visit Dx:     ICD-10-CM ICD-9-CM   1. Right ureteral stone  N20.1 592.1   2. Acute UTI  N39.0 599.0   3. Severe sepsis  A41.9 038.9    R65.20 995.92   4. Elevated lactic acid level  R79.89 276.2   5. Osteomyelitis of lumbar spine  M46.26 730.28   6. Elevated AST (SGOT)  R74.01 790.4   7. Elevated ALT measurement  R74.01 790.4   8. Elevated C-reactive protein (CRP)  R79.82 790.95   9. PRINCE (acute kidney injury)  N17.9 584.9     Patient Active Problem List   Diagnosis    Arthritis    HLD (hyperlipidemia)    Primary hypertension    Health care maintenance    Knee pain, right    Hx of total knee arthroplasty    Seasonal allergies    Vitamin D deficiency    Pyuria    Transient alteration of awareness    Migraine without status migrainosus, not intractable    Leukocytosis    Viral pharyngitis    Multinodular thyroid    Osteopenia of multiple sites    Colon cancer screening    Serum calcium elevated    Acute low back pain    Elevated procalcitonin    Sepsis due to Escherichia coli with acute renal failure without septic shock    Thrombocytopenia    Recurrent oral herpes simplex    Lumbar spinal stenosis    Metabolic encephalopathy    Renal stones    Colon cancer screening    Sacroiliac joint pain    Lumbar stenosis without neurogenic claudication    Renal stone    Hyperlipidemia    History of total knee arthroplasty    Spinal stenosis of lumbar region    Multinodular goiter    Osteopenia    Pain in right knee    Sacroiliac joint pain    Hypercalcemia    CAYLA (dementia of Alzheimer type)    Chronic bilateral low back pain without sciatica    Right ureteral stone    Discitis of lumbar region    Acute UTI (urinary tract infection)    Hypoglycemia    Abnormal LFTs     Past Medical History:   Diagnosis Date    Allergic     Arthritis     Colon cancer screening 2020    Colon  cancer screening 01/29/2020    Cough     Diarrhea     Hx of migraine headaches     Hyperlipidemia     Irregular heart beat     Osteoarthritis     Renal stones 02/2021    Vitamin D deficiency      Past Surgical History:   Procedure Laterality Date    COLONOSCOPY      CYSTOSCOPY W/ URETERAL STENT PLACEMENT Right 6/2/2024    Procedure: CYSTOSCOPY, RIGHT URETERAL STENT PLACEMENT;  Surgeon: Efra Schofield MD;  Location: [facility] OR;  Service: Urology;  Laterality: Right;    LUMBAR EPIDURAL INJECTION N/A 8/2/2021    Procedure: LUMBAR EPIDURAL 1ST VISIT 5-1;  Surgeon: Nu Partida MD;  Location: [facility] MAIN OR;  Service: Pain Management;  Laterality: N/A;    REPLACEMENT TOTAL KNEE Right 01/2015    Josue Wilson MD    SACROILIAC JOINT INJECTION Left 6/16/2021    Procedure: SACROILIAC INJECTION left;  Surgeon: Nu Partida MD;  Location: [facility] [place] MAIN OR;  Service: Pain Management;  Laterality: Left;    TUBAL ABDOMINAL LIGATION      URETEROSCOPY LASER LITHOTRIPSY WITH STENT INSERTION Left 2/23/2021    Procedure: LT.URETEROSCOPY LASER LITHOTRIPSY WITH STENT  FOR STONE;  Surgeon: Arnaud Lu MD;  Location: [facility] OR;  Service: Urology;  Laterality: Left;    WISDOM TOOTH EXTRACTION        General Information       Row Name 06/12/24 1636          Physical Therapy Time and Intention    Document Type therapy note (daily note)  -EB     Mode of Treatment co-treatment;occupational therapy;physical therapy  -EB       Row Name 06/12/24 1636          General Information    Patient Profile Reviewed yes  -EB     Existing Precautions/Restrictions fall  -EB       Row Name 06/12/24 1636          Cognition    Orientation Status (Cognition) oriented x 3  -EB       Row Name 06/12/24 1636          Safety Issues, Functional Mobility    Safety Issues Affecting Function (Mobility) ability to follow commands;awareness of need for assistance;insight into deficits/self-awareness;judgment;problem-solving;sequencing  "abilities;safety precaution awareness  -EB     Impairments Affecting Function (Mobility) balance;endurance/activity tolerance;strength;postural/trunk control;range of motion (ROM);grasp;cognition  -EB     Comment, Safety Issues/Impairments (Mobility) Co treatment medically appropriate and necessary due to patient acuity level, activity tolerance and safety of patient and staff. Treatment is focusing on progression of care and goals established in the POC.  -EB               User Key  (r) = Recorded By, (t) = Taken By, (c) = Cosigned By      Initials Name Provider Type    Nancy Obrien PTA Physical Therapist Assistant                   Mobility       Row Name 06/12/24 1636          Bed Mobility    Supine-Sit [place] (Bed Mobility) maximum assist (25% patient effort);2 person assist  -EB     Sit-Supine [place] (Bed Mobility) maximum assist (25% patient effort);2 person assist  -EB     Assistive Device (Bed Mobility) draw sheet;bed rails;head of bed elevated  -EB     Comment, (Bed Mobility) pt initiated a little movement at the beginning of bed mobility but then stopped and said \"I can't move my legs any further\" sequencing cues needed.  -EB       Row Name 06/12/24 1636          Sit-Stand Transfer    Sit-Stand [place] (Transfers) maximum assist (25% patient effort);2 person assist;verbal cues  -EB     Comment, (Sit-Stand Transfer) able to clear bottom off bed, unable to stand fully upright despite cues to straighten knees.  -EB               User Key  (r) = Recorded By, (t) = Taken By, (c) = Cosigned By      Initials Name Provider Type    Nancy Obrien PTA Physical Therapist Assistant                   Obj/Interventions       Row Name 06/12/24 1638          Motor Skills    Therapeutic Exercise --  BLE: AP (X10)  -EB       Row Name 06/12/24 1642          Balance    Static Sitting Balance contact guard;maximum assist  -EB     Position, Sitting Balance supported;sitting edge of bed  -EB     Static " Standing Balance maximum assist;2-person assist  -               User Key  (r) = Recorded By, (t) = Taken By, (c) = Cosigned By      Initials Name Provider Type    Nancy Obrien PTA Physical Therapist Assistant                   Goals/Plan    No documentation.                  Clinical Impression       Row Name 06/12/24 1639          Plan of Care Review    Plan of Care Reviewed With patient  -EB     Progress no change  -EB     Outcome Evaluation Pt seen for PT tx today. Co-tx with OT d/t pt's decreased activity tolerance and for pt's safety. Pt initiated little movement when with bed mobility. Pt required MaxA of 2 and sequencing cues. Once sitting EOB, pt required assist with scooting  to get feet on the floor. Pt was able to sit CGA at EOB but after a couple of minutes, needed increased assist of support d/t pt leaning to right side d/t fatigue. Attempted 1 stand with pt needing Max A of 2 with pt able to clear bottom off the bed but unable to stand fully upright. Will continue to progress pt as tolerated.  -       Row Name 06/12/24 1639          Therapy Assessment/Plan (PT)    Therapy Frequency (PT) 6 times/wk  -       Row Name 06/12/24 1639          Positioning and Restraints    Pre-Treatment Position in bed  -EB     Post Treatment Position bed  -EB     In Bed supine;call light within reach;encouraged to call for assist;exit alarm on  -               User Key  (r) = Recorded By, (t) = Taken By, (c) = Cosigned By      Initials Name Provider Type    Nancy Obrien PTA Physical Therapist Assistant                   Outcome Measures       Row Name 06/12/24 1638 06/12/24 0942       How much help from another person do you currently need...    Turning from your back to your side while in flat bed without using bedrails? 2  -EB 2  -CT    Moving from lying on back to sitting on the side of a flat bed without bedrails? 2  -EB 2  -CT    Moving to and from a bed to a chair (including a wheelchair)? 1  -EB 1   -CT    Standing up from a chair using your arms (e.g., wheelchair, bedside chair)? 2  -EB 2  -CT    Climbing 3-5 steps with a railing? 1  -EB 1  -CT    To walk in hospital room? 1  -EB 1  -CT    AM-PAC 6 Clicks Score (PT) 9  -EB 9  -CT    Highest Level of Mobility Goal 3 --> Sit at edge of bed  -EB 3 --> Sit at edge of bed  -CT              User Key  (r) = Recorded By, (t) = Taken By, (c) = Cosigned By      Initials Name Provider Type    EB Nancy Bishop PTA Physical Therapist Assistant    CT Kiki Ramos, RN Registered Nurse                                 Physical Therapy Education       Title: PT OT SLP Therapies (In Progress)       Topic: Physical Therapy (In Progress)       Point: Mobility training (In Progress)       Learning Progress Summary             Patient Acceptance, E,D, NR by EB at 6/12/2024 1643    Acceptance, E,TB, VU by RD at 6/11/2024 1011    Acceptance, E,TB, VU,NR by CB at 6/10/2024 1149    Acceptance, E, VU by EM at 6/7/2024 1622    Acceptance, E, NR by EM at 6/6/2024 1605    Acceptance, E,TB, VU by BK at 6/5/2024 1700    Acceptance, E, NR by EM at 6/5/2024 1640                         Point: Home exercise program (Done)       Learning Progress Summary             Patient Acceptance, E,TB, VU by RD at 6/11/2024 1011    Acceptance, E,TB, VU,NR by CB at 6/10/2024 1149    Acceptance, E, VU by EM at 6/7/2024 1622    Acceptance, E, NR by EM at 6/6/2024 1605                         Point: Body mechanics (In Progress)       Learning Progress Summary             Patient Acceptance, E,D, NR by EB at 6/12/2024 1643    Acceptance, E,TB, VU by RD at 6/11/2024 1011    Acceptance, E,TB, VU,NR by CB at 6/10/2024 1149                         Point: Precautions (Done)       Learning Progress Summary             Patient Acceptance, E,TB, VU by RD at 6/11/2024 1011    Acceptance, E,TB, VU,NR by CB at 6/10/2024 1149                                         User Key       Initials Effective Dates Name  Provider Type Discipline    RD 06/16/21 -  Leonela Acuna, PT Physical Therapist PT    EM 06/16/21 -  Yvette Guajardo PT Physical Therapist PT    EB 02/14/23 -  Nancy Bishop PTA Physical Therapist Assistant PT    CB 10/22/21 -  Kaitlin Cunningham, PT Physical Therapist PT    BK 04/30/24 -  Nu Leon, RN Extern Registered Nurse Nurse                  PT Recommendation and Plan     Plan of Care Reviewed With: patient  Progress: no change  Outcome Evaluation: Pt seen for PT tx today. Co-tx with OT d/t pt's decreased activity tolerance and for pt's safety. Pt initiated little movement when with bed mobility. Pt required MaxA of 2 and sequencing cues. Once sitting EOB, pt required assist with scooting  to get feet on the floor. Pt was able to sit CGA at EOB but after a couple of minutes, needed increased assist of support d/t pt leaning to right side d/t fatigue. Attempted 1 stand with pt needing Max A of 2 with pt able to clear bottom off the bed but unable to stand fully upright. Will continue to progress pt as tolerated.     Time Calculation:         PT Charges       Row Name 06/12/24 1643             Time Calculation    Start Time 1421  -EB      Stop Time 1444  -EB      Time Calculation (min) 23 min  -EB      PT Received On 06/12/24  -EB      PT - Next Appointment 06/13/24  -EB         Time Calculation- PT    Total Timed Code Minutes- PT 23 minute(s)  -EB                User Key  (r) = Recorded By, (t) = Taken By, (c) = Cosigned By      Initials Name Provider Type    EB Nancy Bishop PTA Physical Therapist Assistant                  Therapy Charges for Today       Code Description Service Date Service Provider Modifiers Qty    51231215410 HC PT THERAPEUTIC ACT EA 15 MIN 6/12/2024 Nancy Bishop PTA GP 1    92170050550 HC PT THER PROC EA 15 MIN 6/12/2024 Nancy Bishop PTA GP 1            PT G-Codes  Outcome Measure Options: AM-PAC 6 Clicks Daily Activity (OT), Modified [place]  AM-PAC 6 Clicks Score  (PT): 9  AM-PAC 6 Clicks Score (OT): 9  Modified [place] Scale: 4 - Moderately severe disability.  Unable to walk without assistance, and unable to attend to own bodily needs without assistance.       Nancy Bishop, PTA  6/12/2024

## 2024-06-13 LAB
BASOPHILS # BLD AUTO: 0.06 10*3/MM3 (ref 0–0.2)
BASOPHILS NFR BLD AUTO: 0.6 % (ref 0–1.5)
DEPRECATED RDW RBC AUTO: 42.6 FL (ref 37–54)
EOSINOPHIL # BLD AUTO: 0 10*3/MM3 (ref 0–0.4)
EOSINOPHIL NFR BLD AUTO: 0 % (ref 0.3–6.2)
ERYTHROCYTE [DISTWIDTH] IN BLOOD BY AUTOMATED COUNT: 12.9 % (ref 12.3–15.4)
GLUCOSE BLDC GLUCOMTR-MCNC: 101 MG/DL (ref 70–130)
GLUCOSE BLDC GLUCOMTR-MCNC: 80 MG/DL (ref 70–130)
GLUCOSE BLDC GLUCOMTR-MCNC: 88 MG/DL (ref 70–130)
GLUCOSE BLDC GLUCOMTR-MCNC: 92 MG/DL (ref 70–130)
HCT VFR BLD AUTO: 33.2 % (ref 34–46.6)
HGB BLD-MCNC: 10.7 G/DL (ref 12–15.9)
IMM GRANULOCYTES # BLD AUTO: 0.1 10*3/MM3 (ref 0–0.05)
IMM GRANULOCYTES NFR BLD AUTO: 1 % (ref 0–0.5)
LYMPHOCYTES # BLD AUTO: 1.42 10*3/MM3 (ref 0.7–3.1)
LYMPHOCYTES NFR BLD AUTO: 14.7 % (ref 19.6–45.3)
MCH RBC QN AUTO: 29.2 PG (ref 26.6–33)
MCHC RBC AUTO-ENTMCNC: 32.2 G/DL (ref 31.5–35.7)
MCV RBC AUTO: 90.5 FL (ref 79–97)
MONOCYTES # BLD AUTO: 0.69 10*3/MM3 (ref 0.1–0.9)
MONOCYTES NFR BLD AUTO: 7.1 % (ref 5–12)
NEUTROPHILS NFR BLD AUTO: 7.4 10*3/MM3 (ref 1.7–7)
NEUTROPHILS NFR BLD AUTO: 76.6 % (ref 42.7–76)
NRBC BLD AUTO-RTO: 0 /100 WBC (ref 0–0.2)
PLATELET # BLD AUTO: 280 10*3/MM3 (ref 140–450)
PMV BLD AUTO: 10.9 FL (ref 6–12)
RBC # BLD AUTO: 3.67 10*6/MM3 (ref 3.77–5.28)
WBC NRBC COR # BLD AUTO: 9.67 10*3/MM3 (ref 3.4–10.8)

## 2024-06-13 PROCEDURE — 97530 THERAPEUTIC ACTIVITIES: CPT

## 2024-06-13 PROCEDURE — 0 DEXTROSE 5 % SOLUTION: Performed by: STUDENT IN AN ORGANIZED HEALTH CARE EDUCATION/TRAINING PROGRAM

## 2024-06-13 PROCEDURE — 85025 COMPLETE CBC W/AUTO DIFF WBC: CPT | Performed by: STUDENT IN AN ORGANIZED HEALTH CARE EDUCATION/TRAINING PROGRAM

## 2024-06-13 PROCEDURE — 97110 THERAPEUTIC EXERCISES: CPT

## 2024-06-13 PROCEDURE — 82948 REAGENT STRIP/BLOOD GLUCOSE: CPT

## 2024-06-13 RX ADMIN — Medication 10 ML: at 21:52

## 2024-06-13 RX ADMIN — SENNOSIDES AND DOCUSATE SODIUM 2 TABLET: 50; 8.6 TABLET ORAL at 09:15

## 2024-06-13 RX ADMIN — Medication 10 ML: at 09:19

## 2024-06-13 RX ADMIN — MEMANTINE HYDROCHLORIDE 10 MG: 10 TABLET, FILM COATED ORAL at 21:51

## 2024-06-13 RX ADMIN — SENNOSIDES AND DOCUSATE SODIUM 2 TABLET: 50; 8.6 TABLET ORAL at 21:51

## 2024-06-13 RX ADMIN — MEMANTINE HYDROCHLORIDE 10 MG: 10 TABLET, FILM COATED ORAL at 09:15

## 2024-06-13 RX ADMIN — DONEPEZIL HYDROCHLORIDE 10 MG: 10 TABLET, FILM COATED ORAL at 09:15

## 2024-06-13 RX ADMIN — DEXTROSE MONOHYDRATE 50 ML/HR: 50 INJECTION, SOLUTION INTRAVENOUS at 14:34

## 2024-06-13 NOTE — PLAN OF CARE
Goal Outcome Evaluation:      A/O x 3, NSR, Room air, Q 2 turn, disoriented to situation, duron in place, ACHS, waiting for placement VSS.

## 2024-06-13 NOTE — PLAN OF CARE
Goal Outcome Evaluation:  Plan of Care Reviewed With: patient        Progress: no change  Outcome Evaluation: pt see this date for OT/PT to maximize therapeutic benefit. She is agreeable, confusion noted throughout session, difficulty with sequencing and command following. She requires max Ax2 for bed mobility and STS this date, completed x2 STS from EOB, second stand improved posture noted, however she remains very quick to fatigue with short static standing trials. She continues to require total A for LBD/toileting, not safe to progress to chair/BSC still at this time, continue to progress as able. rec SNF      Anticipated Discharge Disposition (OT): skilled nursing facility

## 2024-06-13 NOTE — PROGRESS NOTES
"Nutrition Services    Patient Name:  Kimberlee Urrutia  YOB: 1940  MRN: [id number]  Admit Date:  6/1/2024    Assessment Date:  06/13/24    NUTRITION SCREENING      Reason for Encounter LOS x 11 days   Diagnosis/Problem Right urethral stone  Alzheimer's Dementia  Prediabetes   Current Issues Swallow re-eval completed on 6/11, SLP reccommended diet upgrade to soft, ground with thin liquids. Pt is a total feed.   PO Diet Diet: Regular/House; Feeding Assistance - Nursing; Texture: Soft to Chew (NDD 3); Soft to Chew: Ground Meat; Fluid Consistency: Thin (IDDSI 0)   Supplements    PO Intake % 25% - pt reports poor appetite today but was able to eat some of her breakfast       Medications MAR reviewed by RD   Labs  Listed below, reviewed   Physical Findings Alert, disoriented, room air, tooth/teeth missing, 2+ (mild) edema   GI Function Fecal incontinence, Last BM 6/11   Skin Status Dry, bruised       Height  Weight  BMI  Weight Trend     Height: 158.8 cm (62.5\")  Weight: 85.5 kg (188 lb 7.9 oz) (06/13/24 0300)  Body mass index is 33.93 kg/m².  Stable       Nutrition Problem (PES) Inadequate oral intake related to decreased appetite as evidenced by report of poor po intakes.       Intervention/Plan Addition of Boost Plus BID  Encourage good po intakes    RD to follow up per protocol.     Results from last 7 days   Lab Units 06/11/24  0518 06/10/24  0732 06/09/24  0609   SODIUM mmol/L 140 139 143   POTASSIUM mmol/L 3.8 4.0 4.0   CHLORIDE mmol/L 108* 108* 112*   CO2 mmol/L 23.5 22.1 23.0   BUN mg/dL 20 22 26*   CREATININE mg/dL 0.85 0.91 0.89   CALCIUM mg/dL 9.6 9.9 9.5   BILIRUBIN mg/dL 0.7 0.7 0.6   ALK PHOS U/L 172* 206* 236*   ALT (SGPT) U/L 20 24 34*   AST (SGOT) U/L 10 15 16   GLUCOSE mg/dL 94 80 104*     Results from last 7 days   Lab Units 06/13/24  0658 06/12/24  0329 06/11/24  0518 06/10/24  0732 06/09/24  0609   MAGNESIUM mg/dL  --   --  2.0 2.2 2.0   PHOSPHORUS mg/dL  --   --  3.3 3.6 3.4 "   HEMOGLOBIN g/dL 10.7*   < > 11.0* 11.4* 11.1*   HEMATOCRIT % 33.2*   < > 34.2 35.8 35.0    < > = values in this interval not displayed.     Lab Results   Component Value Date    HGBA1C 5.7 (H) 09/26/2023         Electronically signed by:  Nereyda Hernandez  06/13/24 09:31 EDT

## 2024-06-13 NOTE — THERAPY TREATMENT NOTE
Patient Name: Kimberlee Urrutia  : 1940    MRN: [id number]                              Today's Date: 2024       Admit Date: 2024    Visit Dx:     ICD-10-CM ICD-9-CM   1. Right ureteral stone  N20.1 592.1   2. Acute UTI  N39.0 599.0   3. Severe sepsis  A41.9 038.9    R65.20 995.92   4. Elevated lactic acid level  R79.89 276.2   5. Osteomyelitis of lumbar spine  M46.26 730.28   6. Elevated AST (SGOT)  R74.01 790.4   7. Elevated ALT measurement  R74.01 790.4   8. Elevated C-reactive protein (CRP)  R79.82 790.95   9. PRINCE (acute kidney injury)  N17.9 584.9     Patient Active Problem List   Diagnosis    Arthritis    HLD (hyperlipidemia)    Primary hypertension    Health care maintenance    Knee pain, right    Hx of total knee arthroplasty    Seasonal allergies    Vitamin D deficiency    Pyuria    Transient alteration of awareness    Migraine without status migrainosus, not intractable    Leukocytosis    Viral pharyngitis    Multinodular thyroid    Osteopenia of multiple sites    Colon cancer screening    Serum calcium elevated    Acute low back pain    Elevated procalcitonin    Sepsis due to Escherichia coli with acute renal failure without septic shock    Thrombocytopenia    Recurrent oral herpes simplex    Lumbar spinal stenosis    Metabolic encephalopathy    Renal stones    Colon cancer screening    Sacroiliac joint pain    Lumbar stenosis without neurogenic claudication    Renal stone    Hyperlipidemia    History of total knee arthroplasty    Spinal stenosis of lumbar region    Multinodular goiter    Osteopenia    Pain in right knee    Sacroiliac joint pain    Hypercalcemia    CAYLA (dementia of Alzheimer type)    Chronic bilateral low back pain without sciatica    Right ureteral stone    Discitis of lumbar region    Acute UTI (urinary tract infection)    Hypoglycemia    Abnormal LFTs     Past Medical History:   Diagnosis Date    Allergic     Arthritis     Colon cancer screening 2020    Colon  cancer screening 01/29/2020    Cough     Diarrhea     Hx of migraine headaches     Hyperlipidemia     Irregular heart beat     Osteoarthritis     Renal stones 02/2021    Vitamin D deficiency      Past Surgical History:   Procedure Laterality Date    COLONOSCOPY      CYSTOSCOPY W/ URETERAL STENT PLACEMENT Right 6/2/2024    Procedure: CYSTOSCOPY, RIGHT URETERAL STENT PLACEMENT;  Surgeon: Efra Schofield MD;  Location: [facility] OR;  Service: Urology;  Laterality: Right;    LUMBAR EPIDURAL INJECTION N/A 8/2/2021    Procedure: LUMBAR EPIDURAL 1ST VISIT 5-1;  Surgeon: Nu Partida MD;  Location: [facility] MAIN OR;  Service: Pain Management;  Laterality: N/A;    REPLACEMENT TOTAL KNEE Right 01/2015    Josue Wilson MD    SACROILIAC JOINT INJECTION Left 6/16/2021    Procedure: SACROILIAC INJECTION left;  Surgeon: Nu Partida MD;  Location: [facility] MAIN OR;  Service: Pain Management;  Laterality: Left;    TUBAL ABDOMINAL LIGATION      URETEROSCOPY LASER LITHOTRIPSY WITH STENT INSERTION Left 2/23/2021    Procedure: LT.URETEROSCOPY LASER LITHOTRIPSY WITH STENT  FOR STONE;  Surgeon: Arnaud Lu MD;  Location: [facility] OR;  Service: Urology;  Laterality: Left;    WISDOM TOOTH EXTRACTION        General Information       Row Name 06/13/24 1518          OT Time and Intention    Document Type therapy note (daily note)  -MM     Mode of Treatment co-treatment;physical therapy;occupational therapy  -MM       Row Name 06/13/24 1518          General Information    Patient Profile Reviewed yes  -MM     Existing Precautions/Restrictions fall  -MM       Row Name 06/13/24 1518          Cognition    Orientation Status (Cognition) oriented x 3  -MM       Row Name 06/13/24 1518          Safety Issues, Functional Mobility    Safety Issues Affecting Function (Mobility) insight into deficits/self-awareness;safety precautions follow-through/compliance;problem-solving;safety precaution awareness;sequencing  abilities;judgment  -MM     Impairments Affecting Function (Mobility) balance;endurance/activity tolerance;strength;postural/trunk control;range of motion (ROM);grasp;cognition  -MM     Comment, Safety Issues/Impairments (Mobility) Co-treatment medically necessary and appropriate d/t pt's acuity level, decreased endurance and activity tolerance, and safety of patient and staff. Treatment focused on progression of care and goals established in POC. Gait belt and non-skid socks worn.  -MM               User Key  (r) = Recorded By, (t) = Taken By, (c) = Cosigned By      Initials Name Provider Type    MM Nereyda Maddox OT Occupational Therapist                     Mobility/ADL's       Row Name 06/13/24 1518          Bed Mobility    Bed Mobility supine-sit;sit-supine  -MM     Supine-Sit [place] (Bed Mobility) maximum assist (25% patient effort);2 person assist  -MM     Sit-Supine [place] (Bed Mobility) maximum assist (25% patient effort);2 person assist  -MM     Assistive Device (Bed Mobility) bed rails;head of bed elevated;draw sheet  -MM     Comment, (Bed Mobility) increased time and max cues required for sequencing to EOB, poor carryover  -MM       Row Name 06/13/24 1518          Transfers    Transfers sit-stand transfer  -MM       Row Name 06/13/24 1518          Sit-Stand Transfer    Sit-Stand Emmalena (Transfers) maximum assist (25% patient effort);2 person assist;nonverbal cues (demo/gesture);verbal cues  -MM     Assistive Device (Sit-Stand Transfers) walker, front-wheeled  -MM     Comment, (Sit-Stand Transfer) x2 STS from EOB, bed elevated, improved posture on second stand, pt fatigues quickly with static standing activity, forward flexed and max cues required  -MM       Row Name 06/13/24 1518          Functional Mobility    Functional Mobility- Ind. Level unable to perform  -MM       Row Name 06/13/24 1518          Activities of Daily Living    BADL Assessment/Intervention lower body  dressing;toileting  -MM       Row Name 06/13/24 1518          Lower Body Dressing Assessment/Training    [place] Level (Lower Body Dressing) lower body dressing skills;dependent (less than 25% patient effort);socks  -MM     Position (Lower Body Dressing) supine  -MM       Row Name 06/13/24 1518          Toileting Assessment/Training    [place] Level (Toileting) toileting skills;dependent (less than 25% patient effort)  -MM     Comment, (Toileting) duron  -MM               User Key  (r) = Recorded By, (t) = Taken By, (c) = Cosigned By      Initials Name Provider Type    Nereyda Hill OT Occupational Therapist                   Obj/Interventions       Row Name 06/13/24 1520          Motor Skills    Motor Skills functional endurance;motor control/coordination interventions  -MM     Functional Endurance poor  -MM     Motor Control/Coordination Interventions neuro-muscular re-education  functional reaching at EOB to facilitate improved posture at EOB  -MM       Row Name 06/13/24 1520          Balance    Balance Assessment sitting static balance;sitting dynamic balance;sit to stand dynamic balance;standing static balance;standing dynamic balance  -MM     Static Sitting Balance standby assist;minimal assist  -MM     Dynamic Sitting Balance minimal assist;moderate assist  -MM     Position, Sitting Balance supported;sitting edge of bed  -MM     Sit to Stand Dynamic Balance maximum assist;2-person assist  -MM     Static Standing Balance maximum assist;2-person assist  -MM     Dynamic Standing Balance not tested  -MM     Position/Device Used, Standing Balance supported;other (see comments)  HHAx2  -MM     Comment, Balance posterior leaning noted in sitting  -MM               User Key  (r) = Recorded By, (t) = Taken By, (c) = Cosigned By      Initials Name Provider Type    Nereyda Hill OT Occupational Therapist                   Goals/Plan    No documentation.                  Clinical Impression       Row  Name 06/13/24 1522          Pain Assessment    Pretreatment Pain Rating 0/10 - no pain  -MM     Posttreatment Pain Rating 0/10 - no pain  -MM       Row Name 06/13/24 1522          Plan of Care Review    Plan of Care Reviewed With patient  -MM     Progress no change  -MM     Outcome Evaluation pt see this date for OT/PT to maximize therapeutic benefit. She is agreeable, confusion noted throughout session, difficulty with sequencing and command following. She requires max Ax2 for bed mobility and STS this date, completed x2 STS from EOB, second stand improved posture noted, however she remains very quick to fatigue with short static standing trials. Pt engaged in functional reaching at EOB, x4-5 reps with BUE in order to faciliate improved posture and trunk support required for increased (I) with func transfers. She continues to require total A for LBD/toileting, not safe to progress to chair/BSC still at this time, continue to progress as able. rec SNF  -MM       Row Name 06/13/24 1522          Therapy Plan Review/Discharge Plan (OT)    Anticipated Discharge Disposition (OT) skilled nursing facility  -MM       Row Name 06/13/24 1522          Vital Signs    O2 Delivery Pre Treatment room air  -MM       Row Name 06/13/24 1522          Positioning and Restraints    Pre-Treatment Position in bed  -MM     Post Treatment Position bed  -MM     In Bed notified nsg;fowlers;call light within reach;encouraged to call for assist;exit alarm on;side rails up x3  -MM               User Key  (r) = Recorded By, (t) = Taken By, (c) = Cosigned By      Initials Name Provider Type    Nereyda Hill OT Occupational Therapist                   Outcome Measures       Row Name 06/13/24 1524          How much help from another is currently needed...    Putting on and taking off regular lower body clothing? 1  -MM     Bathing (including washing, rinsing, and drying) 1  -MM     Toileting (which includes using toilet bed pan or urinal) 1  -MM      Putting on and taking off regular upper body clothing 2  -MM     Taking care of personal grooming (such as brushing teeth) 2  -MM     Eating meals 2  -MM     AM-PAC 6 Clicks Score (OT) 9  -MM       Row Name 06/13/24 1513 06/13/24 1407       How much help from another person do you currently need...    Turning from your back to your side while in flat bed without using bedrails? 2  -EB 2  -KE (r) BK (t) KE (c)    Moving from lying on back to sitting on the side of a flat bed without bedrails? 2  -EB 2  -KE (r) BK (t) KE (c)    Moving to and from a bed to a chair (including a wheelchair)? 1  -EB 1  -KE (r) BK (t) KE (c)    Standing up from a chair using your arms (e.g., wheelchair, bedside chair)? 2  -EB 1  -KE (r) BK (t) KE (c)    Climbing 3-5 steps with a railing? 1  -EB 1  -KE (r) BK (t) KE (c)    To walk in hospital room? 1  -EB 1  -KE (r) BK (t) KE (c)    AM-PAC 6 Clicks Score (PT) 9  -EB 8  -KE (r) BK (t)    Highest Level of Mobility Goal 3 --> Sit at edge of bed  -EB 3 --> Sit at edge of bed  -KE (r) BK (t)      Row Name 06/13/24 0927          How much help from another person do you currently need...    Turning from your back to your side while in flat bed without using bedrails? 2  -KE (r) BK (t) KE (c)     Moving from lying on back to sitting on the side of a flat bed without bedrails? 2  -KE (r) BK (t) KE (c)     Moving to and from a bed to a chair (including a wheelchair)? 1  -KE (r) BK (t) KE (c)     Standing up from a chair using your arms (e.g., wheelchair, bedside chair)? 1  -KE (r) BK (t) KE (c)     Climbing 3-5 steps with a railing? 1  -KE (r) BK (t) KE (c)     To walk in hospital room? 1  -KE (r) BK (t) KE (c)     AM-PAC 6 Clicks Score (PT) 8  -KE (r) BK (t)     Highest Level of Mobility Goal 3 --> Sit at edge of bed  -KE (r) BK (t)       Row Name 06/13/24 1524          Functional Assessment    Outcome Measure Options AM-PAC 6 Clicks Daily Activity (OT)  -MM               User Key  (r) =  Recorded By, (t) = Taken By, (c) = Cosigned By      Initials Name Provider Type    Nancy Obrien PTA Physical Therapist Assistant    Keyona Huertas, RN Registered Nurse    Nereyda Hill OT Occupational Therapist    Nu Baumann, RN Extern Registered Nurse                    Occupational Therapy Education       Title: PT OT SLP Therapies (In Progress)       Topic: Occupational Therapy (In Progress)       Point: ADL training (In Progress)       Description:   Instruct learner(s) on proper safety adaptation and remediation techniques during self care or transfers.   Instruct in proper use of assistive devices.                  Learning Progress Summary             Patient Acceptance, E, NR by  at 6/10/2024 1505                         Point: Home exercise program (In Progress)       Description:   Instruct learner(s) on appropriate technique for monitoring, assisting and/or progressing therapeutic exercises/activities.                  Learning Progress Summary             Patient Acceptance, E, NR by  at 6/10/2024 1505                         Point: Precautions (In Progress)       Description:   Instruct learner(s) on prescribed precautions during self-care and functional transfers.                  Learning Progress Summary             Patient Acceptance, E, NR by  at 6/10/2024 1505                         Point: Body mechanics (In Progress)       Description:   Instruct learner(s) on proper positioning and spine alignment during self-care, functional mobility activities and/or exercises.                  Learning Progress Summary             Patient Acceptance, E, NR by  at 6/10/2024 1505                                         User Key       Initials Effective Dates Name Provider Type Discipline     04/30/24 -  Nereyda Rothman OT Student OT Student OT                  OT Recommendation and Plan     Plan of Care Review  Plan of Care Reviewed With: patient  Progress: no  change  Outcome Evaluation: pt see this date for OT/PT to maximize therapeutic benefit. She is agreeable, confusion noted throughout session, difficulty with sequencing and command following. She requires max Ax2 for bed mobility and STS this date, completed x2 STS from EOB, second stand improved posture noted, however she remains very quick to fatigue with short static standing trials. Pt engaged in functional reaching at EOB, x4-5 reps with BUE in order to faciliate improved posture and trunk support required for increased (I) with func transfers. She continues to require total A for LBD/toileting, not safe to progress to chair/BSC still at this time, continue to progress as able. rec SNF     Time Calculation:         Time Calculation- OT       Row Name 06/13/24 1525             Time Calculation- OT    OT Start Time 1326  -MM      OT Stop Time 1346  -MM      OT Time Calculation (min) 20 min  -MM      Total Timed Code Minutes- OT 20 minute(s)  -MM      OT Received On 06/13/24  -MM      OT - Next Appointment 06/14/24  -MM         Timed Charges    05538 - OT Therapeutic Activity Minutes 20  -MM         Total Minutes    Timed Charges Total Minutes 20  -MM       Total Minutes 20  -MM                User Key  (r) = Recorded By, (t) = Taken By, (c) = Cosigned By      Initials Name Provider Type    Nereyda Hill OT Occupational Therapist                  Therapy Charges for Today       Code Description Service Date Service Provider Modifiers Qty    [id number]  OT THERAPEUTIC ACT EA 15 MIN 6/13/2024 Nereyda Maddox OT GO 1                 Nereyda Maddox OT  6/13/2024

## 2024-06-13 NOTE — THERAPY TREATMENT NOTE
Patient Name: Kimberlee Urrutia  : 1940    MRN: [id number]                              Today's Date: 2024       Admit Date: 2024    Visit Dx:     ICD-10-CM ICD-9-CM   1. Right ureteral stone  N20.1 592.1   2. Acute UTI  N39.0 599.0   3. Severe sepsis  A41.9 038.9    R65.20 995.92   4. Elevated lactic acid level  R79.89 276.2   5. Osteomyelitis of lumbar spine  M46.26 730.28   6. Elevated AST (SGOT)  R74.01 790.4   7. Elevated ALT measurement  R74.01 790.4   8. Elevated C-reactive protein (CRP)  R79.82 790.95   9. PRINCE (acute kidney injury)  N17.9 584.9     Patient Active Problem List   Diagnosis    Arthritis    HLD (hyperlipidemia)    Primary hypertension    Health care maintenance    Knee pain, right    Hx of total knee arthroplasty    Seasonal allergies    Vitamin D deficiency    Pyuria    Transient alteration of awareness    Migraine without status migrainosus, not intractable    Leukocytosis    Viral pharyngitis    Multinodular thyroid    Osteopenia of multiple sites    Colon cancer screening    Serum calcium elevated    Acute low back pain    Elevated procalcitonin    Sepsis due to Escherichia coli with acute renal failure without septic shock    Thrombocytopenia    Recurrent oral herpes simplex    Lumbar spinal stenosis    Metabolic encephalopathy    Renal stones    Colon cancer screening    Sacroiliac joint pain    Lumbar stenosis without neurogenic claudication    Renal stone    Hyperlipidemia    History of total knee arthroplasty    Spinal stenosis of lumbar region    Multinodular goiter    Osteopenia    Pain in right knee    Sacroiliac joint pain    Hypercalcemia    CAYLA (dementia of Alzheimer type)    Chronic bilateral low back pain without sciatica    Right ureteral stone    Discitis of lumbar region    Acute UTI (urinary tract infection)    Hypoglycemia    Abnormal LFTs     Past Medical History:   Diagnosis Date    Allergic     Arthritis     Colon cancer screening 2020    Colon  cancer screening 01/29/2020    Cough     Diarrhea     Hx of migraine headaches     Hyperlipidemia     Irregular heart beat     Osteoarthritis     Renal stones 02/2021    Vitamin D deficiency      Past Surgical History:   Procedure Laterality Date    COLONOSCOPY      CYSTOSCOPY W/ URETERAL STENT PLACEMENT Right 6/2/2024    Procedure: CYSTOSCOPY, RIGHT URETERAL STENT PLACEMENT;  Surgeon: Efra Schofield MD;  Location: [facility] OR;  Service: Urology;  Laterality: Right;    LUMBAR EPIDURAL INJECTION N/A 8/2/2021    Procedure: LUMBAR EPIDURAL 1ST VISIT 5-1;  Surgeon: Nu Partida MD;  Location: [facility] MAIN OR;  Service: Pain Management;  Laterality: N/A;    REPLACEMENT TOTAL KNEE Right 01/2015    Josue Wilson MD    SACROILIAC JOINT INJECTION Left 6/16/2021    Procedure: SACROILIAC INJECTION left;  Surgeon: Nu Partida MD;  Location: [facility] MAIN OR;  Service: Pain Management;  Laterality: Left;    TUBAL ABDOMINAL LIGATION      URETEROSCOPY LASER LITHOTRIPSY WITH STENT INSERTION Left 2/23/2021    Procedure: LT.URETEROSCOPY LASER LITHOTRIPSY WITH STENT  FOR STONE;  Surgeon: Arnaud Lu MD;  Location: [facility] OR;  Service: Urology;  Laterality: Left;    WISDOM TOOTH EXTRACTION        General Information       Row Name 06/13/24 1501          Physical Therapy Time and Intention    Document Type therapy note (daily note)  -EB     Mode of Treatment co-treatment;occupational therapy;physical therapy  -EB       Row Name 06/13/24 1501          General Information    Patient Profile Reviewed yes  -EB     Existing Precautions/Restrictions fall  -EB       Row Name 06/13/24 1501          Cognition    Orientation Status (Cognition) oriented x 3  -EB       Row Name 06/13/24 1501          Safety Issues, Functional Mobility    Safety Issues Affecting Function (Mobility) ability to follow commands;awareness of need for assistance;insight into deficits/self-awareness  -EB     Impairments Affecting  Function (Mobility) balance;endurance/activity tolerance;strength;postural/trunk control;range of motion (ROM);grasp;cognition  -EB     Comment, Safety Issues/Impairments (Mobility) Co treatment medically appropriate and necessary due to patient acuity level, activity tolerance and safety of patient and staff. Treatment is focusing on progression of care and goals established in the POC.  -EB               User Key  (r) = Recorded By, (t) = Taken By, (c) = Cosigned By      Initials Name Provider Type    Nancy Obrien PTA Physical Therapist Assistant                   Mobility       Row Name 06/13/24 1505          Bed Mobility    Supine-Sit [place] (Bed Mobility) maximum assist (25% patient effort);2 person assist  -EB     Sit-Supine [place] (Bed Mobility) maximum assist (25% patient effort);2 person assist  -EB     Assistive Device (Bed Mobility) bed rails;head of bed elevated;draw sheet  -EB     Comment, (Bed Mobility) pt initiated a little more movement today but still requires assist of 2. sequencing cues needed.  -EB       Row Name 06/13/24 150          Sit-Stand Transfer    Sit-Stand [place] (Transfers) maximum assist (25% patient effort);2 person assist;nonverbal cues (demo/gesture);verbal cues  -EB     Assistive Device (Sit-Stand Transfers) walker, front-wheeled  -EB     Comment, (Sit-Stand Transfer) 2 stands from EOB. Pt able to clear bottom off bed but continues to have difficulty standing fully upright despite max cues. Very forward flexed.  -EB               User Key  (r) = Recorded By, (t) = Taken By, (c) = Cosigned By      Initials Name Provider Type    Nancy Obrien PTA Physical Therapist Assistant                   Obj/Interventions       Row Name 06/13/24 1508          Motor Skills    Therapeutic Exercise --  BLE: LAQs and AP (X10)  -EB       Row Name 06/13/24 1507          Balance    Balance Assessment standing static balance;sitting static balance;sitting dynamic balance   -EB     Static Sitting Balance standby assist;minimal assist  -EB     Dynamic Sitting Balance minimal assist;moderate assist  -EB     Position, Sitting Balance sitting edge of bed  -EB     Static Standing Balance maximum assist;2-person assist  -EB               User Key  (r) = Recorded By, (t) = Taken By, (c) = Cosigned By      Initials Name Provider Type    Nancy Obrien PTA Physical Therapist Assistant                   Goals/Plan    No documentation.                  Clinical Impression       Row Name 06/13/24 1510          Plan of Care Review    Plan of Care Reviewed With patient  -     Progress no change  -     Outcome Evaluation Pt seen for PT tx today. Pt continues to have decreased activity tolerance and weakness. Pt also has difficulty with following commands. Pt required MaxA of 2 with bed mobility, sequencing cues provided. Pt improved with sitting balance with SBA/Peggy for static sitting. Pt stood twice at EOB with MaxAX2. Pt unable to stand fully upright despite cues and only briefly with assist of 2 for balance. Will continue to follow pt for d/c needs and progress as able.  -       Row Name 06/13/24 1510          Therapy Assessment/Plan (PT)    Therapy Frequency (PT) 6 times/wk  -       Row Name 06/13/24 1510          Positioning and Restraints    Pre-Treatment Position in bed  -EB     Post Treatment Position bed  -EB     In Bed supine;encouraged to call for assist;exit alarm on;call light within reach  -               User Key  (r) = Recorded By, (t) = Taken By, (c) = Cosigned By      Initials Name Provider Type    Nancy Obrien PTA Physical Therapist Assistant                   Outcome Measures       Row Name 06/13/24 1513 06/13/24 1407       How much help from another person do you currently need...    Turning from your back to your side while in flat bed without using bedrails? 2  -EB 2  -KE (r) BK (t) KE (c)    Moving from lying on back to sitting on the side of a flat bed  without bedrails? 2  -EB 2  -KE (r) BK (t) KE (c)    Moving to and from a bed to a chair (including a wheelchair)? 1  -EB 1  -KE (r) BK (t) KE (c)    Standing up from a chair using your arms (e.g., wheelchair, bedside chair)? 2  -EB 1  -KE (r) BK (t) KE (c)    Climbing 3-5 steps with a railing? 1  -EB 1  -KE (r) BK (t) KE (c)    To walk in hospital room? 1  -EB 1  -KE (r) BK (t) KE (c)    AM-PAC 6 Clicks Score (PT) 9  -EB 8  -KE (r) BK (t)    Highest Level of Mobility Goal 3 --> Sit at edge of bed  -EB 3 --> Sit at edge of bed  -KE (r) BK (t)      Row Name 06/13/24 0927          How much help from another person do you currently need...    Turning from your back to your side while in flat bed without using bedrails? 2  -KE (r) BK (t) KE (c)     Moving from lying on back to sitting on the side of a flat bed without bedrails? 2  -KE (r) BK (t) KE (c)     Moving to and from a bed to a chair (including a wheelchair)? 1  -KE (r) BK (t) KE (c)     Standing up from a chair using your arms (e.g., wheelchair, bedside chair)? 1  -KE (r) BK (t) KE (c)     Climbing 3-5 steps with a railing? 1  -KE (r) BK (t) KE (c)     To walk in hospital room? 1  -KE (r) BK (t) KE (c)     AM-PAC 6 Clicks Score (PT) 8  -KE (r) BK (t)     Highest Level of Mobility Goal 3 --> Sit at edge of bed  -KE (r) BK (t)               User Key  (r) = Recorded By, (t) = Taken By, (c) = Cosigned By      Initials Name Provider Type    Nancy Obrien PTA Physical Therapist Assistant    Keyona Huertas, RN Registered Nurse    Nu Baumann, RN Extern Registered Nurse                                 Physical Therapy Education       Title: PT OT SLP Therapies (In Progress)       Topic: Physical Therapy (In Progress)       Point: Mobility training (In Progress)       Learning Progress Summary             Patient Acceptance, E,D, NR by EB at 6/13/2024 1514    Acceptance, E,D, NR by EB at 6/12/2024 1643    Acceptance, E,TB, VU by RD at 6/11/2024 1011     Acceptance, E,TB, VU,NR by CB at 6/10/2024 1149    Acceptance, E, VU by EM at 6/7/2024 1622    Acceptance, E, NR by EM at 6/6/2024 1605    Acceptance, E,TB, VU by BK at 6/5/2024 1700    Acceptance, E, NR by EM at 6/5/2024 1640                         Point: Home exercise program (Done)       Learning Progress Summary             Patient Acceptance, E,TB, VU by RD at 6/11/2024 1011    Acceptance, E,TB, VU,NR by CB at 6/10/2024 1149    Acceptance, E, VU by EM at 6/7/2024 1622    Acceptance, E, NR by EM at 6/6/2024 1605                         Point: Body mechanics (In Progress)       Learning Progress Summary             Patient Acceptance, E,D, NR by EB at 6/13/2024 1514    Acceptance, E,D, NR by EB at 6/12/2024 1643    Acceptance, E,TB, VU by RD at 6/11/2024 1011    Acceptance, E,TB, VU,NR by CB at 6/10/2024 1149                         Point: Precautions (In Progress)       Learning Progress Summary             Patient Acceptance, E,D, NR by EB at 6/13/2024 1514    Acceptance, E,TB, VU by RD at 6/11/2024 1011    Acceptance, E,TB, VU,NR by CB at 6/10/2024 1149                                         User Key       Initials Effective Dates Name Provider Type Discipline    RD 06/16/21 -  Leonela Acuna, PT Physical Therapist PT    EM 06/16/21 -  Yvette Guajardo PT Physical Therapist PT    EB 02/14/23 -  Nancy Bishop PTA Physical Therapist Assistant PT    CB 10/22/21 -  Kaitlin Cunningham, PT Physical Therapist PT    BK 04/30/24 -  Nu Leon, RN Extern Registered Nurse Nurse                  PT Recommendation and Plan     Plan of Care Reviewed With: patient  Progress: no change  Outcome Evaluation: Pt seen for PT tx today. Pt continues to have decreased activity tolerance and weakness. Pt also has difficulty with following commands. Pt required MaxA of 2 with bed mobility, sequencing cues provided. Pt improved with sitting balance with SBA/Peggy for static sitting. Pt stood twice at EOB with MaxAX2. Pt  unable to stand fully upright despite cues and only briefly with assist of 2 for balance. Will continue to follow pt for d/c needs and progress as able.     Time Calculation:         PT Charges       Row Name 06/13/24 1514             Time Calculation    Start Time 1324  -EB      Stop Time 1347  -EB      Time Calculation (min) 23 min  -EB      PT Received On 06/13/24  -EB      PT - Next Appointment 06/14/24  -EB         Time Calculation- PT    Total Timed Code Minutes- PT 23 minute(s)  -EB                User Key  (r) = Recorded By, (t) = Taken By, (c) = Cosigned By      Initials Name Provider Type    EB Nancy Bishop PTA Physical Therapist Assistant                  Therapy Charges for Today       Code Description Service Date Service Provider Modifiers Qty    28232372277 HC PT THERAPEUTIC ACT EA 15 MIN 6/12/2024 Nancy Bishop, JOEY GP 1    34375062117 HC PT THER PROC EA 15 MIN 6/12/2024 Nancy Bishop, JOEY GP 1    39574387574 HC PT THERAPEUTIC ACT EA 15 MIN 6/13/2024 Nancy Bishop, JOEY GP 1    61637202769 HC PT THER PROC EA 15 MIN 6/13/2024 Nancy Bishop, JOEY GP 1            PT G-Codes  Outcome Measure Options: AM-PAC 6 Clicks Daily Activity (OT), Modified Adriana  AM-PAC 6 Clicks Score (PT): 9  AM-PAC 6 Clicks Score (OT): 9  Modified [place] Scale: 4 - Moderately severe disability.  Unable to walk without assistance, and unable to attend to own bodily needs without assistance.       Nancy Bishop PTA  6/13/2024

## 2024-06-13 NOTE — PLAN OF CARE
Goal Outcome Evaluation:  Plan of Care Reviewed With: patient        Progress: no change  Outcome Evaluation: Pt seen for PT tx today. Pt continues to have decreased activity tolerance and weakness. Pt also has difficulty with following commands. Pt required MaxA of 2 with bed mobility, sequencing cues provided. Pt improved with sitting balance with SBA/Peggy for static sitting. Pt stood twice at EOB with MaxAX2. Pt unable to stand fully upright despite cues and only briefly with assist of 2 for balance. Will continue to follow pt for d/c needs and progress as able.

## 2024-06-13 NOTE — PLAN OF CARE
Goal Outcome Evaluation:  Plan of Care Reviewed With: patient        Progress: improving  Outcome Evaluation: Pt alert, disoriented to time, RA, NSR on monitor, duron in place, catheter care complete, ACHS, waiting for placement, rested well tonight.

## 2024-06-13 NOTE — CASE MANAGEMENT/SOCIAL WORK
Continued Stay Note  [facility]     Patient Name: Kimberlee Urrutia  MRN: [id number]  Today's Date: 2024    Admit Date: 2024    Plan: Needs SNF placement, continue to wait on insurance re-instatement   Discharge Plan       Row Name 24 1438       Plan    Plan Needs SNF placement, continue to wait on insurance re-instatement    Plan Comments CCP spoke to the patient's niece Sam who said she is still waiting for the patient's insurance to be re-instated due to her being mistakenly documented as . CCP informed manager Radha PARADA of discussion with Sam. CCP to follow. CD, CSW.                   Discharge Codes    No documentation.                 Expected Discharge Date and Time       Expected Discharge Date Expected Discharge Time    2024

## 2024-06-13 NOTE — PROGRESS NOTES
Name: Kimberlee Urrutia ADMIT: 2024   : 1940  PCP: Meche Ji MD    MRN: [id number] LOS: 11 days   AGE/SEX: 84 y.o. female  ROOM: [facility]     Subjective   Subjective   Patient reports no complaint.  Still with weakness and poor appetite.  No chest pain.  No shortness of breath.  No cough.  No abdominal pain.  No nausea or vomiting.  No fever or chills.       Objective   Objective   Vital Signs  Temp:  [97.7 °F (36.5 °C)-98.2 °F (36.8 °C)] 98.1 °F (36.7 °C)  Heart Rate:  [65-71] 65  Resp:  [16-18] 16  BP: ()/(62-74) 94/74  SpO2:  [97 %-100 %] 99 %  on   ;   Device (Oxygen Therapy): room air    Intake/Output Summary (Last 24 hours) at 2024 1432  Last data filed at 2024 0924  Gross per 24 hour   Intake 210 ml   Output 2650 ml   Net -2440 ml     Body mass index is 33.93 kg/m².      24  0417 24  0250 24  0300   Weight: 86.4 kg (190 lb 7.6 oz) 84.5 kg (186 lb 4.6 oz) 85.5 kg (188 lb 7.9 oz)     Physical Exam  General.  Elderly female.  She is alert and oriented times 3 out of 4.  No apparent pain/distress/diaphoresis.  Affect is flat.  Eyes.  Pupils equal round and reactive.  Intact extraocular musculature.  No pallor or jaundice.  Oral cavity.  Moist mucous membrane.  Neck.  Supple.  No JVD.  No lymphadenopathy or thyromegaly  Cardiovascular.  Regular rate and rhythm and grade 2 systolic murmur  Chest.  Clear to auscultation bilaterally with no added sounds.  Poor bilateral air entry  Soft lax.  No tenderness.  No organomegaly.  No guarding or rebound  .  Rodriguez catheter in place with clear urine.   Extremities.  No clubbing/cyanosis/edema  CNS.  No acute focal neurological deficits      Results Review:      Results from last 7 days   Lab Units 24  0518 06/10/24  0732 24  0609 24  1648 24  0525 24  0556 24  1458   SODIUM mmol/L 140 139 143  --  146* 147*  --    POTASSIUM mmol/L 3.8 4.0 4.0 4.5 3.5 3.8 4.4   CHLORIDE mmol/L  "108* 108* 112*  --  114* 119*  --    CO2 mmol/L 23.5 22.1 23.0  --  23.7 20.9*  --    BUN mg/dL 20 22 26*  --  23 22  --    CREATININE mg/dL 0.85 0.91 0.89  --  0.95 0.90  --    GLUCOSE mg/dL 94 80 104*  --  100* 105*  --    CALCIUM mg/dL 9.6 9.9 9.5  --  9.4 9.5  --    AST (SGOT) U/L 10 15 16  --  17  --   --    ALT (SGPT) U/L 20 24 34*  --  50*  --   --      Estimated Creatinine Clearance: 49.7 mL/min (by C-G formula based on SCr of 0.85 mg/dL).      Results from last 7 days   Lab Units 06/13/24  1007 06/13/24  0739 06/12/24  2121 06/12/24  1605 06/12/24  1115 06/12/24  0600 06/11/24  2153 06/11/24  1636   GLUCOSE mg/dL 101 92 91 87 87 110 98 95                 Results from last 7 days   Lab Units 06/11/24  0518 06/10/24  0732 06/09/24  0609 06/08/24  0525 06/07/24  0556   MAGNESIUM mg/dL 2.0 2.2 2.0 1.9 2.0   PHOSPHORUS mg/dL 3.3 3.6 3.4 3.6 2.9           Invalid input(s): \"LDLCALC\"  Results from last 7 days   Lab Units 06/13/24  0658 06/12/24  0329 06/11/24  0518 06/10/24  0732 06/09/24  0609 06/08/24  0525 06/08/24  0525 06/07/24  0556 06/07/24  0556   WBC 10*3/mm3 9.67 12.74* 13.41* 16.44* 18.79*  --  21.44*  --  26.33*   HEMOGLOBIN g/dL 10.7* 10.6* 11.0* 11.4* 11.1*  --  11.2*  --  11.4*   HEMATOCRIT % 33.2* 31.7* 34.2 35.8 35.0  --  34.8  --  33.8*   PLATELETS 10*3/mm3 280 251 228 179 136*  --  91*  --  61*   MCV fL 90.5 89.0 89.1 91.1 90.0  --  90.2  --  86.9   MCH pg 29.2 29.8 28.6 29.0 28.5  --  29.0  --  29.3   MCHC g/dL 32.2 33.4 32.2 31.8 31.7  --  32.2  --  33.7   RDW % 12.9 12.8 12.9 13.0 13.5  --  13.5  --  13.6   RDW-SD fl 42.6 41.0 41.9 43.2 44.7  --  44.4  --  42.5   MPV fL 10.9 11.7 11.4 12.0 12.1*  --  13.6*  --  13.0*   NEUTROPHIL % % 76.6* 76.3* 80.8* 80.9* 80.3*  --  78.6*   < >  --    LYMPHOCYTE % % 14.7* 14.2* 11.4* 10.9* 10.2*  --  9.8*   < >  --    MONOCYTES % % 7.1 7.4 5.4 5.9 6.5  --  8.2   < >  --    EOSINOPHIL % % 0.0* 0.0* 0.0* 0.0* 0.0*  --  0.0*   < >  --    BASOPHIL % % 0.6 0.5 " 0.5 0.5 0.3  --  0.4   < >  --    IMM GRAN % % 1.0* 1.6* 1.9* 1.8* 2.7*  --  3.0*   < >  --    NEUTROS ABS 10*3/mm3 7.40* 9.72* 10.83* 13.30* 15.08*  --  16.86*   < > 22.91*   LYMPHS ABS 10*3/mm3 1.42 1.81 1.53 1.79 1.92  --  2.10   < >  --    MONOS ABS 10*3/mm3 0.69 0.94* 0.72 0.97* 1.22*  --  1.76*   < >  --    EOS ABS 10*3/mm3 0.00 0.00 0.00 0.00 0.00  --  0.00   < > 0.00   BASOS ABS 10*3/mm3 0.06 0.06 0.07 0.08 0.06  --  0.08   < > 0.00   IMMATURE GRANS (ABS) 10*3/mm3 0.10* 0.21* 0.26* 0.30* 0.51*  --  0.64*   < >  --    NRBC /100 WBC 0.0 0.0 0.0 0.0 0.0   < >  --   --   --     < > = values in this interval not displayed.     Results from last 7 days   Lab Units 06/07/24  0556   APTT seconds 24.6                                       Imaging:  Imaging Results (Last 24 Hours)       ** No results found for the last 24 hours. **               I reviewed the patient's new clinical results / labs / tests / procedures      Assessment/Plan     Active Hospital Problems    Diagnosis  POA    **Right ureteral stone [N20.1]  Yes    Acute UTI (urinary tract infection) [N39.0]  Yes    Hypoglycemia [E16.2]  Yes    Abnormal LFTs [R79.89]  Yes    Chronic bilateral low back pain without sciatica [M54.50, G89.29]  Yes    CAYLA (dementia of Alzheimer type) [G30.9, F02.80]  Yes    Lumbar spinal stenosis [M48.061]  Yes    Thrombocytopenia [D69.6]  Yes    Acute low back pain [M54.50]  Yes      Resolved Hospital Problems    Diagnosis Date Resolved POA    Shock, septic [A41.9, R65.21] 06/03/2024 Yes    PRINCE (acute kidney injury) [N17.9] 06/06/2024 Yes       Urosepsis secondary to ESBL E. coli pyelonephritis complicated by right hydronephrosis and obstructive ureteric stone and ESBL E. coli septicemia patient's status post cystoscopy and right stent placement on 6/2/2024.  Urology recommends keeping Rodriguez catheter until the patient is ambulatory (not the case at this time).  Status post adequate course of Invanz treatment per ID.   Leukocytosis resolved.  No fever.  Repeat blood culture are negative.  Metabolic encephalopathy in a patient with a history of Alzheimer's dementia.  Resolved metabolic encephalopathy.  Negative CNS examination.  Continue Aricept and Namenda.  Prediabetes.  A1c 5.7.  Hypernatremia/acute kidney.  Both resolved with  D5 water.  Patient volume status is normal.  Elevated liver function test secondary to sepsis.  Improved.  GI examination is benign.  Anemia/thrombocytopenia.  Anemia is mild and is stable.  Resolved thrombocytopenia which is mostly secondary to sepsis.  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  Status post neurosurgery consult.  For conservative treatment.  On PT.  Dysphagia.  Tolerating puréed and nectar thickened liquids well.  VTE prophylaxis.  Sequential compression.  DNR status.     Discussed my findings and plan of treatment with the patient  Disposition.  Per physical therapy patient will need skilled facility.  Difficult to find a place since the patient has no insurance (government record showed that the patient is dead) [facility] is working on that.       Stable medically for discharge      Robert Almeida MD  [facility]  06/13/24  14:32 EDT

## 2024-06-14 LAB
GLUCOSE BLDC GLUCOMTR-MCNC: 90 MG/DL (ref 70–130)
GLUCOSE BLDC GLUCOMTR-MCNC: 91 MG/DL (ref 70–130)
GLUCOSE BLDC GLUCOMTR-MCNC: 94 MG/DL (ref 70–130)
GLUCOSE BLDC GLUCOMTR-MCNC: 97 MG/DL (ref 70–130)

## 2024-06-14 PROCEDURE — 92526 ORAL FUNCTION THERAPY: CPT

## 2024-06-14 PROCEDURE — 97530 THERAPEUTIC ACTIVITIES: CPT

## 2024-06-14 PROCEDURE — 0 DEXTROSE 5 % SOLUTION: Performed by: STUDENT IN AN ORGANIZED HEALTH CARE EDUCATION/TRAINING PROGRAM

## 2024-06-14 PROCEDURE — 82948 REAGENT STRIP/BLOOD GLUCOSE: CPT

## 2024-06-14 RX ADMIN — Medication 10 ML: at 20:47

## 2024-06-14 RX ADMIN — MEMANTINE HYDROCHLORIDE 10 MG: 10 TABLET, FILM COATED ORAL at 20:46

## 2024-06-14 RX ADMIN — Medication 10 ML: at 09:05

## 2024-06-14 RX ADMIN — MEMANTINE HYDROCHLORIDE 10 MG: 10 TABLET, FILM COATED ORAL at 09:04

## 2024-06-14 RX ADMIN — SENNOSIDES AND DOCUSATE SODIUM 2 TABLET: 50; 8.6 TABLET ORAL at 20:47

## 2024-06-14 RX ADMIN — SENNOSIDES AND DOCUSATE SODIUM 2 TABLET: 50; 8.6 TABLET ORAL at 09:04

## 2024-06-14 RX ADMIN — DEXTROSE MONOHYDRATE 50 ML/HR: 50 INJECTION, SOLUTION INTRAVENOUS at 09:04

## 2024-06-14 RX ADMIN — DONEPEZIL HYDROCHLORIDE 10 MG: 10 TABLET, FILM COATED ORAL at 09:04

## 2024-06-14 NOTE — PROGRESS NOTES
Name: Kimberlee Urrutia ADMIT: 2024   : 1940  PCP: Meche Ji MD    MRN: [id number] LOS: 12 days   AGE/SEX: 84 y.o. female  ROOM: [facility]     Subjective   Subjective   No overnight events.  No complaints.  Still with weakness and poor appetite.  No chest pain.  No shortness of breath.  No cough.  No abdominal pain.  No nausea or vomiting.  No fever or chills.       Objective   Objective   Vital Signs  Temp:  [98.1 °F (36.7 °C)-98.2 °F (36.8 °C)] 98.1 °F (36.7 °C)  Heart Rate:  [64-69] 69  Resp:  [16-18] 16  BP: (105-130)/(57-91) 105/91  SpO2:  [100 %] 100 %  on   ;   Device (Oxygen Therapy): room air    Intake/Output Summary (Last 24 hours) at 2024 1752  Last data filed at 2024 1510  Gross per 24 hour   Intake 347 ml   Output 3025 ml   Net -2678 ml     Body mass index is 33.73 kg/m².      24  0250 24  0300 24  0300   Weight: 84.5 kg (186 lb 4.6 oz) 85.5 kg (188 lb 7.9 oz) 85 kg (187 lb 6.3 oz)     Physical Exam  General.  Elderly female.  She is alert and oriented times 3 out of 4.  No apparent pain/distress/diaphoresis.  Affect is flat.  Eyes.  Pupils equal round and reactive.  Intact extraocular musculature.  No pallor or jaundice.  Oral cavity.  Moist mucous membrane.  Neck.  Supple.  No JVD.  No lymphadenopathy or thyromegaly  Cardiovascular.  Regular rate and rhythm and grade 2 systolic murmur  Chest.  Clear to auscultation bilaterally with no added sounds.  Poor bilateral air entry  Soft lax.  No tenderness.  No organomegaly.  No guarding or rebound  .  Rodriguez catheter in place with clear urine.   Extremities.  No clubbing/cyanosis/edema  CNS.  No acute focal neurological deficits      Results Review:      Results from last 7 days   Lab Units 24  0518 06/10/24  0732 24  0609 24  1648 24  0525   SODIUM mmol/L 140 139 143  --  146*   POTASSIUM mmol/L 3.8 4.0 4.0 4.5 3.5   CHLORIDE mmol/L 108* 108* 112*  --  114*   CO2 mmol/L 23.5 22.1  "23.0  --  23.7   BUN mg/dL 20 22 26*  --  23   CREATININE mg/dL 0.85 0.91 0.89  --  0.95   GLUCOSE mg/dL 94 80 104*  --  100*   CALCIUM mg/dL 9.6 9.9 9.5  --  9.4   AST (SGOT) U/L 10 15 16  --  17   ALT (SGPT) U/L 20 24 34*  --  50*     Estimated Creatinine Clearance: 49.5 mL/min (by C-G formula based on SCr of 0.85 mg/dL).      Results from last 7 days   Lab Units 06/14/24  1626 06/14/24  1152 06/14/24  0657 06/13/24  2054 06/13/24  1709 06/13/24  1007 06/13/24  0739 06/12/24  2121   GLUCOSE mg/dL 90 97 94 80 88 101 92 91                 Results from last 7 days   Lab Units 06/11/24  0518 06/10/24  0732 06/09/24  0609 06/08/24  0525   MAGNESIUM mg/dL 2.0 2.2 2.0 1.9   PHOSPHORUS mg/dL 3.3 3.6 3.4 3.6           Invalid input(s): \"LDLCALC\"  Results from last 7 days   Lab Units 06/13/24  0658 06/12/24  0329 06/11/24  0518 06/10/24  0732 06/09/24  0609 06/08/24  0525 06/08/24  0525   WBC 10*3/mm3 9.67 12.74* 13.41* 16.44* 18.79*  --  21.44*   HEMOGLOBIN g/dL 10.7* 10.6* 11.0* 11.4* 11.1*  --  11.2*   HEMATOCRIT % 33.2* 31.7* 34.2 35.8 35.0  --  34.8   PLATELETS 10*3/mm3 280 251 228 179 136*  --  91*   MCV fL 90.5 89.0 89.1 91.1 90.0  --  90.2   MCH pg 29.2 29.8 28.6 29.0 28.5  --  29.0   MCHC g/dL 32.2 33.4 32.2 31.8 31.7  --  32.2   RDW % 12.9 12.8 12.9 13.0 13.5  --  13.5   RDW-SD fl 42.6 41.0 41.9 43.2 44.7  --  44.4   MPV fL 10.9 11.7 11.4 12.0 12.1*  --  13.6*   NEUTROPHIL % % 76.6* 76.3* 80.8* 80.9* 80.3*  --  78.6*   LYMPHOCYTE % % 14.7* 14.2* 11.4* 10.9* 10.2*  --  9.8*   MONOCYTES % % 7.1 7.4 5.4 5.9 6.5  --  8.2   EOSINOPHIL % % 0.0* 0.0* 0.0* 0.0* 0.0*  --  0.0*   BASOPHIL % % 0.6 0.5 0.5 0.5 0.3  --  0.4   IMM GRAN % % 1.0* 1.6* 1.9* 1.8* 2.7*  --  3.0*   NEUTROS ABS 10*3/mm3 7.40* 9.72* 10.83* 13.30* 15.08*  --  16.86*   LYMPHS ABS 10*3/mm3 1.42 1.81 1.53 1.79 1.92  --  2.10   MONOS ABS 10*3/mm3 0.69 0.94* 0.72 0.97* 1.22*  --  1.76*   EOS ABS 10*3/mm3 0.00 0.00 0.00 0.00 0.00  --  0.00   BASOS ABS " 10*3/mm3 0.06 0.06 0.07 0.08 0.06  --  0.08   IMMATURE GRANS (ABS) 10*3/mm3 0.10* 0.21* 0.26* 0.30* 0.51*  --  0.64*   NRBC /100 WBC 0.0 0.0 0.0 0.0 0.0   < >  --     < > = values in this interval not displayed.                                             Imaging:  Imaging Results (Last 24 Hours)       ** No results found for the last 24 hours. **               I reviewed the patient's new clinical results / labs / tests / procedures      Assessment/Plan     Active Hospital Problems    Diagnosis  POA    **Right ureteral stone [N20.1]  Yes    Acute UTI (urinary tract infection) [N39.0]  Yes    Hypoglycemia [E16.2]  Yes    Abnormal LFTs [R79.89]  Yes    Chronic bilateral low back pain without sciatica [M54.50, G89.29]  Yes    CAYLA (dementia of Alzheimer type) [G30.9, F02.80]  Yes    Lumbar spinal stenosis [M48.061]  Yes    Thrombocytopenia [D69.6]  Yes    Acute low back pain [M54.50]  Yes      Resolved Hospital Problems    Diagnosis Date Resolved POA    Shock, septic [A41.9, R65.21] 06/03/2024 Yes    PRINCE (acute kidney injury) [N17.9] 06/06/2024 Yes       Urosepsis secondary to ESBL E. coli pyelonephritis complicated by right hydronephrosis and obstructive ureteric stone and ESBL E. coli septicemia patient's status post cystoscopy and right stent placement on 6/2/2024.  Urology recommends keeping Rodriguez catheter until the patient is ambulatory (not the case at this time).  Status post adequate course of Invanz treatment per ID.  Leukocytosis resolved.  No fever.  Repeat blood culture are negative.  Metabolic encephalopathy in a patient with a history of Alzheimer's dementia.  Resolved metabolic encephalopathy.  Negative CNS examination.  Continue Aricept and Namenda.  Prediabetes.  A1c 5.7.  Hypernatremia/acute kidney.  Both resolved with  D5 water.  Patient volume status is normal.  Elevated liver function test secondary to sepsis.  Improved.  GI examination is benign.  Anemia/thrombocytopenia.  Anemia is mild and is  stable.  Resolved thrombocytopenia which is mostly secondary to sepsis.  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  Status post neurosurgery consult.  For conservative treatment.  On PT.  Dysphagia.  Tolerating puréed and nectar thickened liquids well.  VTE prophylaxis.  Sequential compression.  DNR status.     Discussed my findings and plan of treatment with the patient  Disposition.  Per physical therapy patient will need skilled facility.  Difficult to find a place since the patient has no insurance (government record showed that the patient is dead) [facility] is working on that.       Stable medically for discharge    I copied and pasted most of the progress note.  The note has been fully reviewed today and updated to reflect today's exam      Robert Almeida MD  [facility]  06/14/24  17:52 EDT

## 2024-06-14 NOTE — THERAPY TREATMENT NOTE
Patient Name: Kimberlee Urrutia  : 1940    MRN: [id number]                              Today's Date: 2024       Admit Date: 2024    Visit Dx:     ICD-10-CM ICD-9-CM   1. Right ureteral stone  N20.1 592.1   2. Acute UTI  N39.0 599.0   3. Severe sepsis  A41.9 038.9    R65.20 995.92   4. Elevated lactic acid level  R79.89 276.2   5. Osteomyelitis of lumbar spine  M46.26 730.28   6. Elevated AST (SGOT)  R74.01 790.4   7. Elevated ALT measurement  R74.01 790.4   8. Elevated C-reactive protein (CRP)  R79.82 790.95   9. PRINCE (acute kidney injury)  N17.9 584.9     Patient Active Problem List   Diagnosis    Arthritis    HLD (hyperlipidemia)    Primary hypertension    Health care maintenance    Knee pain, right    Hx of total knee arthroplasty    Seasonal allergies    Vitamin D deficiency    Pyuria    Transient alteration of awareness    Migraine without status migrainosus, not intractable    Leukocytosis    Viral pharyngitis    Multinodular thyroid    Osteopenia of multiple sites    Colon cancer screening    Serum calcium elevated    Acute low back pain    Elevated procalcitonin    Sepsis due to Escherichia coli with acute renal failure without septic shock    Thrombocytopenia    Recurrent oral herpes simplex    Lumbar spinal stenosis    Metabolic encephalopathy    Renal stones    Colon cancer screening    Sacroiliac joint pain    Lumbar stenosis without neurogenic claudication    Renal stone    Hyperlipidemia    History of total knee arthroplasty    Spinal stenosis of lumbar region    Multinodular goiter    Osteopenia    Pain in right knee    Sacroiliac joint pain    Hypercalcemia    CAYLA (dementia of Alzheimer type)    Chronic bilateral low back pain without sciatica    Right ureteral stone    Discitis of lumbar region    Acute UTI (urinary tract infection)    Hypoglycemia    Abnormal LFTs     Past Medical History:   Diagnosis Date    Allergic     Arthritis     Colon cancer screening 2020    Colon  cancer screening 01/29/2020    Cough     Diarrhea     Hx of migraine headaches     Hyperlipidemia     Irregular heart beat     Osteoarthritis     Renal stones 02/2021    Vitamin D deficiency      Past Surgical History:   Procedure Laterality Date    COLONOSCOPY      CYSTOSCOPY W/ URETERAL STENT PLACEMENT Right 6/2/2024    Procedure: CYSTOSCOPY, RIGHT URETERAL STENT PLACEMENT;  Surgeon: Efra Schofield MD;  Location: [facility] OR;  Service: Urology;  Laterality: Right;    LUMBAR EPIDURAL INJECTION N/A 8/2/2021    Procedure: LUMBAR EPIDURAL 1ST VISIT 5-1;  Surgeon: Nu Partida MD;  [place] [facility] [place];  Service: Pain Management;  Laterality: N/A;    REPLACEMENT TOTAL KNEE Right 01/2015    Josue Wilson MD    SACROILIAC JOINT INJECTION Left 6/16/2021    Procedure: SACROILIAC INJECTION left;  Surgeon: Nu Partida MD;  Location: [facility] [place];  Service: Pain Management;  Laterality: Left;    TUBAL ABDOMINAL LIGATION      URETEROSCOPY LASER LITHOTRIPSY WITH STENT INSERTION Left 2/23/2021    Procedure: LT.URETEROSCOPY LASER LITHOTRIPSY WITH STENT  FOR STONE;  Surgeon: Arnaud Lu MD;  Location: [facility] OR;  Service: Urology;  Laterality: Left;    WISDOM TOOTH EXTRACTION        General Information       Row Name 06/14/24 1632          Physical Therapy Time and Intention    Document Type therapy note (daily note)  -EB     Mode of Treatment physical therapy  -EB       Row Name 06/14/24 1632          General Information    Patient Profile Reviewed yes  -EB     Existing Precautions/Restrictions fall  -EB       Row Name 06/14/24 1632          Cognition    Orientation Status (Cognition) oriented x 3  -EB       Row Name 06/14/24 1632          Safety Issues, Functional Mobility    Safety Issues Affecting Function (Mobility) sequencing abilities;insight into deficits/self-awareness;judgment;problem-solving  -EB     Impairments Affecting Function (Mobility) balance;endurance/activity  tolerance;strength;postural/trunk control;cognition  -EB               User Key  (r) = Recorded By, (t) = Taken By, (c) = Cosigned By      Initials Name Provider Type    Nancy Obrien PTA Physical Therapist Assistant                   Mobility       Row Name 06/14/24 1633          Bed Mobility    Supine-Sit [place] (Bed Mobility) moderate assist (50% patient effort);2 person assist;verbal cues;nonverbal cues (demo/gesture)  -EB     Sit-Supine [place] (Bed Mobility) moderate assist (50% patient effort);2 person assist;verbal cues;nonverbal cues (demo/gesture)  -EB     Assistive Device (Bed Mobility) bed rails;head of bed elevated  -EB     Comment, (Bed Mobility) improvement today compared to previous. Pt much more alert this PM. Increased initiation to mobility. still needs sequencing cues throughout.  -EB       Row Name 06/14/24 1633          Sit-Stand Transfer    Sit-Stand [place] (Transfers) moderate assist (50% patient effort);2 person assist  -EB     Assistive Device (Sit-Stand Transfers) walker, front-wheeled  -EB     Comment, (Sit-Stand Transfer) 3Xs for functional strengthening cues for posture.  -EB               User Key  (r) = Recorded By, (t) = Taken By, (c) = Cosigned By      Initials Name Provider Type    Nancy Obrien PTA Physical Therapist Assistant                   Obj/Interventions       Row Name 06/14/24 1634          Balance    Balance Assessment sitting static balance;standing static balance  -EB     Static Sitting Balance minimal assist;standby assist  initially needing Peggy/ModA d/t pt leaning to left side, verbal and tactile cues to correct posture. Had pt lean far right then orient to pt to midline. Then SBA for the rest of tx.  -EB     Position, Sitting Balance sitting edge of bed  -EB     Static Standing Balance maximum assist;2-person assist;moderate assist  -EB     Comment, Balance improved standing balance. still needs assist of 2 for support. unable to take steps   -EB               User Key  (r) = Recorded By, (t) = Taken By, (c) = Cosigned By      Initials Name Provider Type    Nancy Obrien PTA Physical Therapist Assistant                   Goals/Plan    No documentation.                  Clinical Impression       Row Name 06/14/24 1637          Plan of Care Review    Plan of Care Reviewed With patient  -EB     Progress improving  -EB     Outcome Evaluation Pt seen for PT tx today. Pt much more alert this PM compared to previous sessions. Pt's niece present during PT tx. Pt was able to improve with bed mobility needing ModAX2 and sequencing cues. Once pt was sitting EOB, pt needed increased time and cueing to correct sitting balance as pt was leaning to her left but once oriented to midline, pt was able to sit with SBA for the rest of tx. Pt stood 3 times with MaxAX2/ModAX2 from EOB. Cues needed for foot and hand placement and posture. Pt continues to need assist of 2 for standing balance and pt only able to stand for about 10-15seconds before needing to sit. Will continue to follow pt for d/c needs and progress as able.  -EB       Row Name 06/14/24 1637          Therapy Assessment/Plan (PT)    Therapy Frequency (PT) 6 times/wk  -EB       Row Name 06/14/24 1637          Positioning and Restraints    Pre-Treatment Position in bed  -EB     Post Treatment Position bed  -EB     In Bed supine;call light within reach;encouraged to call for assist;exit alarm on  -EB               User Key  (r) = Recorded By, (t) = Taken By, (c) = Cosigned By      Initials Name Provider Type    Nancy Obrien PTA Physical Therapist Assistant                   Outcome Measures       Row Name 06/14/24 1641 06/14/24 1402       How much help from another person do you currently need...    Turning from your back to your side while in flat bed without using bedrails? 3  -EB 3  -KE (r) BK (t) KE (c)    Moving from lying on back to sitting on the side of a flat bed without bedrails? 2  -EB 3  -KE  (r) BK (t) KE (c)    Moving to and from a bed to a chair (including a wheelchair)? 1  -EB 3  -KE (r) BK (t) KE (c)    Standing up from a chair using your arms (e.g., wheelchair, bedside chair)? 2  -EB 3  -KE (r) BK (t) KE (c)    Climbing 3-5 steps with a railing? 1  -EB 2  -KE (r) BK (t) KE (c)    To walk in hospital room? 1  -EB 2  -KE (r) BK (t) KE (c)    AM-PAC 6 Clicks Score (PT) 10  -EB 16  -KE (r) BK (t)    Highest Level of Mobility Goal 4 --> Transfer to chair/commode  -EB 5 --> Static standing  -KE (r) BK (t)      Row Name 06/14/24 0909          How much help from another person do you currently need...    Turning from your back to your side while in flat bed without using bedrails? 2  -KE (r) BK (t) KE (c)     Moving from lying on back to sitting on the side of a flat bed without bedrails? 2  -KE (r) BK (t) KE (c)     Moving to and from a bed to a chair (including a wheelchair)? 2  -KE (r) BK (t) KE (c)     Standing up from a chair using your arms (e.g., wheelchair, bedside chair)? 2  -KE (r) BK (t) KE (c)     Climbing 3-5 steps with a railing? 1  -KE (r) BK (t) KE (c)     To walk in hospital room? 1  -KE (r) BK (t) KE (c)     AM-PAC 6 Clicks Score (PT) 10  -KE (r) BK (t)     Highest Level of Mobility Goal 4 --> Transfer to chair/commode  -KE (r) BK (t)       Row Name 06/14/24 1641          Modified [place] Scale    Modified [place] Scale 4 - Moderately severe disability.  Unable to walk without assistance, and unable to attend to own bodily needs without assistance.  -EB               User Key  (r) = Recorded By, (t) = Taken By, (c) = Cosigned By      Initials Name Provider Type    Nancy Obrien PTA Physical Therapist Assistant    Keyona Huertas, RN Registered Nurse    Nu Baumann, RN Extern Registered Nurse                                 Physical Therapy Education       Title: PT OT SLP Therapies (In Progress)       Topic: Physical Therapy (In Progress)       Point: Mobility training (Done)        Learning Progress Summary             Patient Acceptance, E,D, VU,NR by EB at 6/14/2024 1641    Acceptance, E,D, NR by EB at 6/13/2024 1514    Acceptance, E,D, NR by EB at 6/12/2024 1643    Acceptance, E,TB, VU by RD at 6/11/2024 1011    Acceptance, E,TB, VU,NR by CB at 6/10/2024 1149    Acceptance, E, VU by EM at 6/7/2024 1622    Acceptance, E, NR by EM at 6/6/2024 1605    Acceptance, E,TB, VU by BK at 6/5/2024 1700    Acceptance, E, NR by EM at 6/5/2024 1640                         Point: Home exercise program (Done)       Learning Progress Summary             Patient Acceptance, E,TB, VU by BK at 6/13/2024 1655    Acceptance, E,TB, VU by RD at 6/11/2024 1011    Acceptance, E,TB, VU,NR by CB at 6/10/2024 1149    Acceptance, E, VU by EM at 6/7/2024 1622    Acceptance, E, NR by EM at 6/6/2024 1605                         Point: Body mechanics (Done)       Learning Progress Summary             Patient Acceptance, E,D, VU,NR by EB at 6/14/2024 1641    Acceptance, E,D, NR by EB at 6/13/2024 1514    Acceptance, E,D, NR by EB at 6/12/2024 1643    Acceptance, E,TB, VU by RD at 6/11/2024 1011    Acceptance, E,TB, VU,NR by CB at 6/10/2024 1149                         Point: Precautions (In Progress)       Learning Progress Summary             Patient Acceptance, E,D, NR by EB at 6/13/2024 1514    Acceptance, E,TB, VU by RD at 6/11/2024 1011    Acceptance, E,TB, VU,NR by CB at 6/10/2024 1149                                         User Key       Initials Effective Dates Name Provider Type Discipline    RD 06/16/21 -  Leonela Acuna, PT Physical Therapist PT    EM 06/16/21 -  Yvette Guajardo, PT Physical Therapist PT    EB 02/14/23 -  Nancy Bishop PTA Physical Therapist Assistant PT    CB 10/22/21 -  Kaitlin Cunningham, PT Physical Therapist PT    BK 04/30/24 -  Nu Leon RN Extern Registered Nurse Nurse                  PT Recommendation and Plan     Plan of Care Reviewed With: patient  Progress:  improving  Outcome Evaluation: Pt seen for PT tx today. Pt much more alert this PM compared to previous sessions. Pt's niece present during PT tx. Pt was able to improve with bed mobility needing ModAX2 and sequencing cues. Once pt was sitting EOB, pt needed increased time and cueing to correct sitting balance as pt was leaning to her left but once oriented to midline, pt was able to sit with SBA for the rest of tx. Pt stood 3 times with MaxAX2/ModAX2 from EOB. Cues needed for foot and hand placement and posture. Pt continues to need assist of 2 for standing balance and pt only able to stand for about 10-15seconds before needing to sit. Will continue to follow pt for d/c needs and progress as able.     Time Calculation:         PT Charges       Row Name 06/14/24 1642             Time Calculation    Start Time 1523  -EB      Stop Time 1546  -EB      Time Calculation (min) 23 min  -EB      PT Received On 06/14/24  -EB      PT - Next Appointment 06/15/24  -EB         Time Calculation- PT    Total Timed Code Minutes- PT 23 minute(s)  -EB                User Key  (r) = Recorded By, (t) = Taken By, (c) = Cosigned By      Initials Name Provider Type    EB Nancy Bishop PTA Physical Therapist Assistant                  Therapy Charges for Today       Code Description Service Date Service Provider Modifiers Qty    02574351778  PT THERAPEUTIC ACT EA 15 MIN 6/13/2024 Nancy Bishop, JOEY GP 1    [id number]  PT THER PROC EA 15 MIN 6/13/2024 Nancy Bishop, JOEY GP 1    [id number] HC PT THER SUPP EA 15 MIN 6/14/2024 Nancy Bishop PTA GP 2    [id number]  PT THERAPEUTIC ACT EA 15 MIN 6/14/2024 Nancy Bishop PTA GP 2            PT G-Codes  Outcome Measure Options: AM-PAC 6 Clicks Daily Activity (OT)  AM-PAC 6 Clicks Score (PT): 10  AM-PAC 6 Clicks Score (OT): 9  Modified [place] Scale: 4 - Moderately severe disability.  Unable to walk without assistance, and unable to attend to own bodily needs without assistance.        Nancy Bishop, PTA  6/14/2024

## 2024-06-14 NOTE — PLAN OF CARE
Goal Outcome Evaluation:  Plan of Care Reviewed With: patient           Outcome Evaluation: Pt seen for diet tolerance and reeval. Pt took trials of thin (straw), pureed, soft, mixed, and regular solids. No overt s/s were noted. Mastication was slow, but functional with oral cavity clear post swallow. Vocal quality remained clear throughout the session. Pt c/o poor appetite. Discussed need for nutrition and supplements. Recommend advance diet to regular and thin; meds whole w/ pureed or thin; upright for meals and 30 min after; slow rate; small bites/sips. ST to follow.      Anticipated Discharge Disposition (SLP): unknown

## 2024-06-14 NOTE — THERAPY TREATMENT NOTE
Acute Care - Speech Language Pathology   Swallow Treatment Note [facility]     Patient Name: Kimberlee Urrutia  : 1940  MRN: [id number]  Today's Date: 2024               Admit Date: 2024    Visit Dx:     ICD-10-CM ICD-9-CM   1. Right ureteral stone  N20.1 592.1   2. Acute UTI  N39.0 599.0   3. Severe sepsis  A41.9 038.9    R65.20 995.92   4. Elevated lactic acid level  R79.89 276.2   5. Osteomyelitis of lumbar spine  M46.26 730.28   6. Elevated AST (SGOT)  R74.01 790.4   7. Elevated ALT measurement  R74.01 790.4   8. Elevated C-reactive protein (CRP)  R79.82 790.95   9. PRINCE (acute kidney injury)  N17.9 584.9     Patient Active Problem List   Diagnosis    Arthritis    HLD (hyperlipidemia)    Primary hypertension    Health care maintenance    Knee pain, right    Hx of total knee arthroplasty    Seasonal allergies    Vitamin D deficiency    Pyuria    Transient alteration of awareness    Migraine without status migrainosus, not intractable    Leukocytosis    Viral pharyngitis    Multinodular thyroid    Osteopenia of multiple sites    Colon cancer screening    Serum calcium elevated    Acute low back pain    Elevated procalcitonin    Sepsis due to Escherichia coli with acute renal failure without septic shock    Thrombocytopenia    Recurrent oral herpes simplex    Lumbar spinal stenosis    Metabolic encephalopathy    Renal stones    Colon cancer screening    Sacroiliac joint pain    Lumbar stenosis without neurogenic claudication    Renal stone    Hyperlipidemia    History of total knee arthroplasty    Spinal stenosis of lumbar region    Multinodular goiter    Osteopenia    Pain in right knee    Sacroiliac joint pain    Hypercalcemia    CAYLA (dementia of Alzheimer type)    Chronic bilateral low back pain without sciatica    Right ureteral stone    Discitis of lumbar region    Acute UTI (urinary tract infection)    Hypoglycemia    Abnormal LFTs     Past Medical History:   Diagnosis Date    Allergic      Arthritis     Colon cancer screening 01/29/2020    Colon cancer screening 01/29/2020    Cough     Diarrhea     Hx of migraine headaches     Hyperlipidemia     Irregular heart beat     Osteoarthritis     Renal stones 02/2021    Vitamin D deficiency      Past Surgical History:   Procedure Laterality Date    COLONOSCOPY      CYSTOSCOPY W/ URETERAL STENT PLACEMENT Right 6/2/2024    Procedure: CYSTOSCOPY, RIGHT URETERAL STENT PLACEMENT;  Surgeon: Efra Schofield MD;  Location: [facility] MAIN OR;  Service: Urology;  Laterality: Right;    LUMBAR EPIDURAL INJECTION N/A 8/2/2021    Procedure: LUMBAR EPIDURAL 1ST VISIT 5-1;  Surgeon: Nu Partida MD;  [place] [facility] [place];  Service: Pain Management;  Laterality: N/A;    REPLACEMENT TOTAL KNEE Right 01/2015    Josue Wilson MD    SACROILIAC JOINT INJECTION Left 6/16/2021    Procedure: SACROILIAC INJECTION left;  Surgeon: Nu Partida MD;  [place] [facility] [place];  Service: Pain Management;  Laterality: Left;    TUBAL ABDOMINAL LIGATION      URETEROSCOPY LASER LITHOTRIPSY WITH STENT INSERTION Left 2/23/2021    Procedure: LT.URETEROSCOPY LASER LITHOTRIPSY WITH STENT  FOR STONE;  Surgeon: Arnaud Lu MD;  Location: [facility] OR;  Service: Urology;  Laterality: Left;    WISDOM TOOTH EXTRACTION         SLP Recommendation and Plan                                                                               Plan of Care Reviewed With: patient  Outcome Evaluation: Pt seen for diet tolerance and reeval. Pt took trials of thin (straw), pureed, soft, mixed, and regular solids. No overt s/s were noted. Mastication was slow, but functional with oral cavity clear post swallow. Vocal quality remained clear throughout the session. Pt c/o poor appetite. Discussed need for nutrition and supplements. Recommend advance diet to regular and thin; meds whole w/ pureed or thin; upright for meals and 30 min after; slow rate; small bites/sips. ST to follow.      SWALLOW  EVALUATION (Last 72 Hours)       SLP Adult Swallow Evaluation       Row Name 06/14/24 1300                   Rehab Evaluation    Document Type therapy note (daily note)  -AW        Subjective Information no complaints  -AW        Patient Observations alert;cooperative;agree to therapy  -AW        Patient Effort good  -AW        Symptoms Noted During/After Treatment none  -AW           (LTG) Patient will demonstrate functional swallow for    Diet Texture (Demonstrate functional swallow) soft to chew (chopped) textures  -AW        Liquid viscosity (Demonstrate functional swallow) thin liquids  -AW        [place] (Demonstrate functional swallow) independently (over 90% accuracy)  -AW        Time Frame (Demonstrate functional swallow) by discharge  -AW        Progress/Outcomes (Demonstrate functional swallow) good progress toward goal  -AW        Comment (Demonstrate functional swallow) Pt seen for diet tolerance and reeval. Pt took trials of thin (straw), pureed, soft, mixed, and regular solids. No overt s/s were noted. Mastication was slow, but functional with oral cavity clear post swallow. Vocal quality remained clear throughout the session. Pt c/o poor appetite. Discussed need for nutrition and supplements. Recommend advance diet to regular and thin; meds whole w/ pureed or thin; upright for meals and 30 min after; slow rate; small bites/sips. ST to follow.  -AW                  User Key  (r) = Recorded By, (t) = Taken By, (c) = Cosigned By      Initials Name Effective Dates    Eden Last, SLP 08/28/23 -                     EDUCATION  The patient has been educated in the following areas:   Dysphagia (Swallowing Impairment) Oral Care/Hydration Modified Diet Instruction.        SLP GOALS       Row Name 06/14/24 1300             (LTG) Patient will demonstrate functional swallow for    Diet Texture (Demonstrate functional swallow) soft to chew (chopped) textures  -AW      Liquid viscosity (Demonstrate  functional swallow) thin liquids  -AW      [place] (Demonstrate functional swallow) independently (over 90% accuracy)  -AW      Time Frame (Demonstrate functional swallow) by discharge  -AW      Progress/Outcomes (Demonstrate functional swallow) good progress toward goal  -AW      Comment (Demonstrate functional swallow) Pt seen for diet tolerance and reeval. Pt took trials of thin (straw), pureed, soft, mixed, and regular solids. No overt s/s were noted. Mastication was slow, but functional with oral cavity clear post swallow. Vocal quality remained clear throughout the session. Pt c/o poor appetite. Discussed need for nutrition and supplements. Recommend advance diet to regular and thin; meds whole w/ pureed or thin; upright for meals and 30 min after; slow rate; small bites/sips. ST to follow.  -AW                User Key  (r) = Recorded By, (t) = Taken By, (c) = Cosigned By      Initials Name Provider Type    Eden Last SLP Speech and Language Pathologist                         Time Calculation:    Time Calculation- SLP       Row Name 06/14/24 1403             Time Calculation- SLP    SLP Start Time 1230  -AW      SLP Received On 06/14/24  -AW                User Key  (r) = Recorded By, (t) = Taken By, (c) = Cosigned By      Initials Name Provider Type    Eden Last SLP Speech and Language Pathologist                    Therapy Charges for Today       Code Description Service Date Service Provider Modifiers Qty    [id number] [facility] TREATMENT SWALLOW 4 6/14/2024 Eden Pink SLP GN 1                 JOSUÉ Jasmine  6/14/2024

## 2024-06-14 NOTE — PLAN OF CARE
Goal Outcome Evaluation:  Plan of Care Reviewed With: patient        Progress: improving  Outcome Evaluation: Pt seen for PT tx today. Pt much more alert this PM compared to previous sessions. Pt's niece present during PT tx. Pt was able to improve with bed mobility needing ModAX2 and sequencing cues. Once pt was sitting EOB, pt needed increased time and cueing to correct sitting balance as pt was leaning to her left but once oriented to midline, pt was able to sit with SBA for the rest of tx. Pt stood 3 times with MaxAX2/ModAX2 from EOB. Cues needed for foot and hand placement and posture. Pt continues to need assist of 2 for standing balance and pt only able to stand for about 10-15seconds before needing to sit. Will continue to follow pt for d/c needs and progress as able.

## 2024-06-14 NOTE — PLAN OF CARE
Goal Outcome Evaluation:      A/O x 3, disoriented to situation, Room air, NSR on monitor, Q2 turn, Rodriguez in place, ACHS, waiting for placement, changed to regular diet, VSS.

## 2024-06-15 LAB
GLUCOSE BLDC GLUCOMTR-MCNC: 116 MG/DL (ref 70–130)
GLUCOSE BLDC GLUCOMTR-MCNC: 126 MG/DL (ref 70–130)
GLUCOSE BLDC GLUCOMTR-MCNC: 92 MG/DL (ref 70–130)
GLUCOSE BLDC GLUCOMTR-MCNC: 96 MG/DL (ref 70–130)

## 2024-06-15 PROCEDURE — 0 DEXTROSE 5 % SOLUTION: Performed by: STUDENT IN AN ORGANIZED HEALTH CARE EDUCATION/TRAINING PROGRAM

## 2024-06-15 PROCEDURE — 82948 REAGENT STRIP/BLOOD GLUCOSE: CPT

## 2024-06-15 RX ADMIN — SENNOSIDES AND DOCUSATE SODIUM 2 TABLET: 50; 8.6 TABLET ORAL at 20:23

## 2024-06-15 RX ADMIN — Medication 10 ML: at 20:23

## 2024-06-15 RX ADMIN — DONEPEZIL HYDROCHLORIDE 10 MG: 10 TABLET, FILM COATED ORAL at 09:06

## 2024-06-15 RX ADMIN — Medication 10 ML: at 09:07

## 2024-06-15 RX ADMIN — MEMANTINE HYDROCHLORIDE 10 MG: 10 TABLET, FILM COATED ORAL at 09:06

## 2024-06-15 RX ADMIN — MEMANTINE HYDROCHLORIDE 10 MG: 10 TABLET, FILM COATED ORAL at 20:23

## 2024-06-15 RX ADMIN — SENNOSIDES AND DOCUSATE SODIUM 2 TABLET: 50; 8.6 TABLET ORAL at 09:07

## 2024-06-15 RX ADMIN — DEXTROSE MONOHYDRATE 50 ML/HR: 50 INJECTION, SOLUTION INTRAVENOUS at 04:15

## 2024-06-15 NOTE — PROGRESS NOTES
Name: Kimberlee Urrutia ADMIT: 2024   : 1940  PCP: Meche Ji MD    MRN: [id number] LOS: 13 days   AGE/SEX: 84 y.o. female  ROOM: [facility]     Subjective   Subjective   No overnight events.  No new complaints.  Still with weakness and poor appetite.  No chest pain.  No shortness of breath.  No cough.  No abdominal pain.  No nausea or vomiting.  No fever or chills.       Objective   Objective   Vital Signs  Temp:  [97.5 °F (36.4 °C)-98.1 °F (36.7 °C)] 97.9 °F (36.6 °C)  Heart Rate:  [61-72] 72  Resp:  [16-18] 16  BP: (111-141)/(59-72) 121/66  SpO2:  [99 %-100 %] 100 %  on   ;   Device (Oxygen Therapy): room air    Intake/Output Summary (Last 24 hours) at 6/15/2024 1754  Last data filed at 6/15/2024 1556  Gross per 24 hour   Intake 950 ml   Output 2100 ml   Net -1150 ml     Body mass index is 33.93 kg/m².      24  0300 24  0300 06/15/24  0500   Weight: 85.5 kg (188 lb 7.9 oz) 85 kg (187 lb 6.3 oz) 85.5 kg (188 lb 7.9 oz)     Physical Exam  General.  Elderly female.  She is alert and oriented times 3 out of 4.  No apparent pain/distress/diaphoresis.  Affect is flat.  Eyes.  Pupils equal round and reactive.  Intact extraocular musculature.  No pallor or jaundice.  Oral cavity.  Moist mucous membrane.  Neck.  Supple.  No JVD.  No lymphadenopathy or thyromegaly  Cardiovascular.  Regular rate and rhythm and grade 2 systolic murmur  Chest.  Clear to auscultation bilaterally with no added sounds.  Poor bilateral air entry  Soft lax.  No tenderness.  No organomegaly.  No guarding or rebound  .  Rodriguez catheter in place with clear urine.   Extremities.  No clubbing/cyanosis/edema  CNS.  No acute focal neurological deficits      Results Review:      Results from last 7 days   Lab Units 24  0518 06/10/24  0732 24  0609   SODIUM mmol/L 140 139 143   POTASSIUM mmol/L 3.8 4.0 4.0   CHLORIDE mmol/L 108* 108* 112*   CO2 mmol/L 23.5 22.1 23.0   BUN mg/dL 20 22 26*   CREATININE mg/dL  "0.85 0.91 0.89   GLUCOSE mg/dL 94 80 104*   CALCIUM mg/dL 9.6 9.9 9.5   AST (SGOT) U/L 10 15 16   ALT (SGPT) U/L 20 24 34*     Estimated Creatinine Clearance: 49.7 mL/min (by C-G formula based on SCr of 0.85 mg/dL).      Results from last 7 days   Lab Units 06/15/24  1601 06/15/24  1105 06/15/24  0615 06/14/24  2028 06/14/24  1626 06/14/24  1152 06/14/24  0657 06/13/24  2054   GLUCOSE mg/dL 126 96 92 91 90 97 94 80                 Results from last 7 days   Lab Units 06/11/24  0518 06/10/24  0732 06/09/24  0609   MAGNESIUM mg/dL 2.0 2.2 2.0   PHOSPHORUS mg/dL 3.3 3.6 3.4           Invalid input(s): \"LDLCALC\"  Results from last 7 days   Lab Units 06/13/24  0658 06/12/24  0329 06/11/24  0518 06/10/24  0732 06/09/24  0609   WBC 10*3/mm3 9.67 12.74* 13.41* 16.44* 18.79*   HEMOGLOBIN g/dL 10.7* 10.6* 11.0* 11.4* 11.1*   HEMATOCRIT % 33.2* 31.7* 34.2 35.8 35.0   PLATELETS 10*3/mm3 280 251 228 179 136*   MCV fL 90.5 89.0 89.1 91.1 90.0   MCH pg 29.2 29.8 28.6 29.0 28.5   MCHC g/dL 32.2 33.4 32.2 31.8 31.7   RDW % 12.9 12.8 12.9 13.0 13.5   RDW-SD fl 42.6 41.0 41.9 43.2 44.7   MPV fL 10.9 11.7 11.4 12.0 12.1*   NEUTROPHIL % % 76.6* 76.3* 80.8* 80.9* 80.3*   LYMPHOCYTE % % 14.7* 14.2* 11.4* 10.9* 10.2*   MONOCYTES % % 7.1 7.4 5.4 5.9 6.5   EOSINOPHIL % % 0.0* 0.0* 0.0* 0.0* 0.0*   BASOPHIL % % 0.6 0.5 0.5 0.5 0.3   IMM GRAN % % 1.0* 1.6* 1.9* 1.8* 2.7*   NEUTROS ABS 10*3/mm3 7.40* 9.72* 10.83* 13.30* 15.08*   LYMPHS ABS 10*3/mm3 1.42 1.81 1.53 1.79 1.92   MONOS ABS 10*3/mm3 0.69 0.94* 0.72 0.97* 1.22*   EOS ABS 10*3/mm3 0.00 0.00 0.00 0.00 0.00   BASOS ABS 10*3/mm3 0.06 0.06 0.07 0.08 0.06   IMMATURE GRANS (ABS) 10*3/mm3 0.10* 0.21* 0.26* 0.30* 0.51*   NRBC /100 WBC 0.0 0.0 0.0 0.0 0.0                                             Imaging:  Imaging Results (Last 24 Hours)       ** No results found for the last 24 hours. **               I reviewed the patient's new clinical results / labs / tests / " procedures      Assessment/Plan     Active Hospital Problems    Diagnosis  POA    **Right ureteral stone [N20.1]  Yes    Acute UTI (urinary tract infection) [N39.0]  Yes    Hypoglycemia [E16.2]  Yes    Abnormal LFTs [R79.89]  Yes    Chronic bilateral low back pain without sciatica [M54.50, G89.29]  Yes    CAYLA (dementia of Alzheimer type) [G30.9, F02.80]  Yes    Lumbar spinal stenosis [M48.061]  Yes    Thrombocytopenia [D69.6]  Yes    Acute low back pain [M54.50]  Yes      Resolved Hospital Problems    Diagnosis Date Resolved POA    Shock, septic [A41.9, R65.21] 06/03/2024 Yes    PRINCE (acute kidney injury) [N17.9] 06/06/2024 Yes       Urosepsis secondary to ESBL E. coli pyelonephritis complicated by right hydronephrosis and obstructive ureteric stone and ESBL E. coli septicemia patient's status post cystoscopy and right stent placement on 6/2/2024.  Urology recommends keeping Rodriguez catheter until the patient is ambulatory (not the case at this time).  Status post adequate course of Invanz treatment per ID.  Leukocytosis resolved.  No fever.  Repeat blood culture are negative.  Metabolic encephalopathy in a patient with a history of Alzheimer's dementia.  Resolved metabolic encephalopathy.  Negative CNS examination.  Continue Aricept and Namenda.  Prediabetes.  A1c 5.7.  Hypernatremia/acute kidney.  Both resolved with  D5 water.  Patient volume status is normal.  Elevated liver function test secondary to sepsis.  Improved.  GI examination is benign.  Anemia/thrombocytopenia.  Anemia is mild and is stable.  Resolved thrombocytopenia which is mostly secondary to sepsis.  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  Status post neurosurgery consult.  For conservative treatment.  On PT.  Dysphagia.  Tolerating puréed and nectar thickened liquids well.  VTE prophylaxis.  Sequential compression.  DNR status.     Discussed my findings and plan of treatment with the patient  Disposition.  Per  physical therapy patient will need skilled facility.  Difficult to find a place since the patient has no insurance (government record showed that the patient is dead) [facility] is working on that.       Stable medically for discharge    I copied and pasted most of the progress note.  The note has been fully reviewed today and updated to reflect today's exam      Robert Almeida MD  [facility]  06/15/24  17:54 EDT

## 2024-06-15 NOTE — PLAN OF CARE
Goal Outcome Evaluation:  Plan of Care Reviewed With: patient        Progress: improving  Pt alert and oriented x3, unsure of exact date but knows month and year. She is on RA, NSR, duron in place and catheter care done. Pt has midline left upper arm and CHG bath completed. IVF D5 at 50 ml/hr continue. Pt assisted with all meals and requires a lot of prompting and encouragement to eat. Made her a Boost shake with vanilla ice cream for lunch and she had half of the cup but refused anymore. Turn every two hours and PRN and continue to monitor.

## 2024-06-15 NOTE — PLAN OF CARE
Goal Outcome Evaluation:  Plan of Care Reviewed With: patient        Progress: improving      Outcome Evaluation: Pt A&Ox3 to 4, RA, NSR on monitor, duron in place, catheter care done, Q2 turns, small open area on bottom, optifoam applied, regular diet, IV fluids, ACHS, waiting for placement, VSS, rested well tonight.                            No lesions; no rash

## 2024-06-16 PROBLEM — K59.00 CONSTIPATION: Status: ACTIVE | Noted: 2024-06-16

## 2024-06-16 PROBLEM — E83.52 HYPERCALCEMIA: Status: ACTIVE | Noted: 2024-06-16

## 2024-06-16 LAB
ALBUMIN SERPL-MCNC: 3.1 G/DL (ref 3.5–5.2)
ALBUMIN/GLOB SERPL: 0.9 G/DL
ALP SERPL-CCNC: 126 U/L (ref 39–117)
ALT SERPL W P-5'-P-CCNC: 11 U/L (ref 1–33)
ANION GAP SERPL CALCULATED.3IONS-SCNC: 9.8 MMOL/L (ref 5–15)
AST SERPL-CCNC: 13 U/L (ref 1–32)
BASOPHILS # BLD AUTO: 0.09 10*3/MM3 (ref 0–0.2)
BASOPHILS NFR BLD AUTO: 1 % (ref 0–1.5)
BILIRUB SERPL-MCNC: 0.5 MG/DL (ref 0–1.2)
BUN SERPL-MCNC: 14 MG/DL (ref 8–23)
BUN/CREAT SERPL: 17.3 (ref 7–25)
CALCIUM SPEC-SCNC: 10.8 MG/DL (ref 8.6–10.5)
CHLORIDE SERPL-SCNC: 105 MMOL/L (ref 98–107)
CO2 SERPL-SCNC: 22.2 MMOL/L (ref 22–29)
CREAT SERPL-MCNC: 0.81 MG/DL (ref 0.57–1)
DEPRECATED RDW RBC AUTO: 41.4 FL (ref 37–54)
EGFRCR SERPLBLD CKD-EPI 2021: 71.2 ML/MIN/1.73
EOSINOPHIL # BLD AUTO: 0 10*3/MM3 (ref 0–0.4)
EOSINOPHIL NFR BLD AUTO: 0 % (ref 0.3–6.2)
ERYTHROCYTE [DISTWIDTH] IN BLOOD BY AUTOMATED COUNT: 12.7 % (ref 12.3–15.4)
GLOBULIN UR ELPH-MCNC: 3.3 GM/DL
GLUCOSE BLDC GLUCOMTR-MCNC: 110 MG/DL (ref 70–130)
GLUCOSE BLDC GLUCOMTR-MCNC: 110 MG/DL (ref 70–130)
GLUCOSE BLDC GLUCOMTR-MCNC: 121 MG/DL (ref 70–130)
GLUCOSE BLDC GLUCOMTR-MCNC: 122 MG/DL (ref 70–130)
GLUCOSE SERPL-MCNC: 100 MG/DL (ref 65–99)
HCT VFR BLD AUTO: 32.6 % (ref 34–46.6)
HGB BLD-MCNC: 10.6 G/DL (ref 12–15.9)
IMM GRANULOCYTES # BLD AUTO: 0.03 10*3/MM3 (ref 0–0.05)
IMM GRANULOCYTES NFR BLD AUTO: 0.3 % (ref 0–0.5)
LYMPHOCYTES # BLD AUTO: 1.7 10*3/MM3 (ref 0.7–3.1)
LYMPHOCYTES NFR BLD AUTO: 18.8 % (ref 19.6–45.3)
MCH RBC QN AUTO: 29.4 PG (ref 26.6–33)
MCHC RBC AUTO-ENTMCNC: 32.5 G/DL (ref 31.5–35.7)
MCV RBC AUTO: 90.3 FL (ref 79–97)
MONOCYTES # BLD AUTO: 0.77 10*3/MM3 (ref 0.1–0.9)
MONOCYTES NFR BLD AUTO: 8.5 % (ref 5–12)
NEUTROPHILS NFR BLD AUTO: 6.44 10*3/MM3 (ref 1.7–7)
NEUTROPHILS NFR BLD AUTO: 71.4 % (ref 42.7–76)
NRBC BLD AUTO-RTO: 0 /100 WBC (ref 0–0.2)
PLATELET # BLD AUTO: 315 10*3/MM3 (ref 140–450)
PMV BLD AUTO: 10.6 FL (ref 6–12)
POTASSIUM SERPL-SCNC: 4 MMOL/L (ref 3.5–5.2)
PROT SERPL-MCNC: 6.4 G/DL (ref 6–8.5)
RBC # BLD AUTO: 3.61 10*6/MM3 (ref 3.77–5.28)
SODIUM SERPL-SCNC: 137 MMOL/L (ref 136–145)
WBC NRBC COR # BLD AUTO: 9.03 10*3/MM3 (ref 3.4–10.8)

## 2024-06-16 PROCEDURE — 80053 COMPREHEN METABOLIC PANEL: CPT | Performed by: INTERNAL MEDICINE

## 2024-06-16 PROCEDURE — 82948 REAGENT STRIP/BLOOD GLUCOSE: CPT

## 2024-06-16 PROCEDURE — 97530 THERAPEUTIC ACTIVITIES: CPT

## 2024-06-16 PROCEDURE — 85025 COMPLETE CBC W/AUTO DIFF WBC: CPT | Performed by: INTERNAL MEDICINE

## 2024-06-16 PROCEDURE — 0 DEXTROSE 5 % SOLUTION: Performed by: STUDENT IN AN ORGANIZED HEALTH CARE EDUCATION/TRAINING PROGRAM

## 2024-06-16 RX ORDER — BISACODYL 10 MG
10 SUPPOSITORY, RECTAL RECTAL DAILY
Status: DISCONTINUED | OUTPATIENT
Start: 2024-06-16 | End: 2024-07-16 | Stop reason: HOSPADM

## 2024-06-16 RX ORDER — POLYETHYLENE GLYCOL 3350 17 G/17G
17 POWDER, FOR SOLUTION ORAL 2 TIMES DAILY
Status: DISCONTINUED | OUTPATIENT
Start: 2024-06-16 | End: 2024-06-26

## 2024-06-16 RX ADMIN — MEMANTINE HYDROCHLORIDE 10 MG: 10 TABLET, FILM COATED ORAL at 21:52

## 2024-06-16 RX ADMIN — DEXTROSE MONOHYDRATE 50 ML/HR: 50 INJECTION, SOLUTION INTRAVENOUS at 14:57

## 2024-06-16 RX ADMIN — MEMANTINE HYDROCHLORIDE 10 MG: 10 TABLET, FILM COATED ORAL at 09:48

## 2024-06-16 RX ADMIN — Medication 10 ML: at 09:49

## 2024-06-16 RX ADMIN — SENNOSIDES AND DOCUSATE SODIUM 2 TABLET: 50; 8.6 TABLET ORAL at 09:49

## 2024-06-16 RX ADMIN — DONEPEZIL HYDROCHLORIDE 10 MG: 10 TABLET, FILM COATED ORAL at 09:49

## 2024-06-16 RX ADMIN — Medication 10 ML: at 23:02

## 2024-06-16 NOTE — PROGRESS NOTES
Name: Kimberlee Urrutia ADMIT: 2024   : 1940  PCP: Meche Ji MD    MRN: [id number] LOS: 14 days   AGE/SEX: 84 y.o. female  ROOM: [facility]     Subjective   Subjective   Nursing staff reports constipation and no bowel movement for several days.  Patient reports no new complaints and cannot tell me when was her last bowel movement..  Still with weakness and poor appetite (improving)..  No chest pain.  No shortness of breath.  No cough.  No abdominal pain.  No nausea or vomiting.  No fever or chills.  No dysuria or hematuria.       Objective   Objective   Vital Signs  Temp:  [97.5 °F (36.4 °C)-98.4 °F (36.9 °C)] 98.2 °F (36.8 °C)  Heart Rate:  [63-85] 85  Resp:  [16-18] 16  BP: ()/(54-85) 123/56  SpO2:  [98 %-100 %] 99 %  on   ;   Device (Oxygen Therapy): room air    Intake/Output Summary (Last 24 hours) at 2024 1642  Last data filed at 2024 1457  Gross per 24 hour   Intake 1946.99 ml   Output 1450 ml   Net 496.99 ml     Body mass index is 33.89 kg/m².      24  0300 06/15/24  0500 24  0500   Weight: 85 kg (187 lb 6.3 oz) 85.5 kg (188 lb 7.9 oz) 85.4 kg (188 lb 4.4 oz)     Physical Exam  General.  Elderly female.  She is alert and oriented times 3 out of 4.  No apparent pain/distress/diaphoresis.  Affect is flat.  Eyes.  Pupils equal round and reactive.  Intact extraocular musculature.  No pallor or jaundice.  Oral cavity.  Moist mucous membrane.  Neck.  Supple.  No JVD.  No lymphadenopathy or thyromegaly  Cardiovascular.  Regular rate and rhythm with no murmur.    Chest.  Clear to auscultation bilaterally with no added sounds.  Poor bilateral air entry  Soft lax.  No tenderness.  No organomegaly.  No guarding or rebound  .  Rodriguez catheter in place with clear urine.   Extremities.  No clubbing/cyanosis/edema  CNS.  No acute focal neurological deficits      Results Review:      Results from last 7 days   Lab Units 24  0647 24  0518 06/10/24  0732   SODIUM  "mmol/L 137 140 139   POTASSIUM mmol/L 4.0 3.8 4.0   CHLORIDE mmol/L 105 108* 108*   CO2 mmol/L 22.2 23.5 22.1   BUN mg/dL 14 20 22   CREATININE mg/dL 0.81 0.85 0.91   GLUCOSE mg/dL 100* 94 80   CALCIUM mg/dL 10.8* 9.6 9.9   AST (SGOT) U/L 13 10 15   ALT (SGPT) U/L 11 20 24     Estimated Creatinine Clearance: 52 mL/min (by C-G formula based on SCr of 0.81 mg/dL).      Results from last 7 days   Lab Units 06/16/24  1555 06/16/24  1153 06/16/24  0614 06/15/24  2049 06/15/24  1601 06/15/24  1105 06/15/24  0615 06/14/24 2028   GLUCOSE mg/dL 110 122 110 116 126 96 92 91                 Results from last 7 days   Lab Units 06/11/24  0518 06/10/24  0732   MAGNESIUM mg/dL 2.0 2.2   PHOSPHORUS mg/dL 3.3 3.6           Invalid input(s): \"LDLCALC\"  Results from last 7 days   Lab Units 06/16/24  0647 06/13/24  0658 06/12/24  0329 06/11/24  0518 06/10/24  0732   WBC 10*3/mm3 9.03 9.67 12.74* 13.41* 16.44*   HEMOGLOBIN g/dL 10.6* 10.7* 10.6* 11.0* 11.4*   HEMATOCRIT % 32.6* 33.2* 31.7* 34.2 35.8   PLATELETS 10*3/mm3 315 280 251 228 179   MCV fL 90.3 90.5 89.0 89.1 91.1   MCH pg 29.4 29.2 29.8 28.6 29.0   MCHC g/dL 32.5 32.2 33.4 32.2 31.8   RDW % 12.7 12.9 12.8 12.9 13.0   RDW-SD fl 41.4 42.6 41.0 41.9 43.2   MPV fL 10.6 10.9 11.7 11.4 12.0   NEUTROPHIL % % 71.4 76.6* 76.3* 80.8* 80.9*   LYMPHOCYTE % % 18.8* 14.7* 14.2* 11.4* 10.9*   MONOCYTES % % 8.5 7.1 7.4 5.4 5.9   EOSINOPHIL % % 0.0* 0.0* 0.0* 0.0* 0.0*   BASOPHIL % % 1.0 0.6 0.5 0.5 0.5   IMM GRAN % % 0.3 1.0* 1.6* 1.9* 1.8*   NEUTROS ABS 10*3/mm3 6.44 7.40* 9.72* 10.83* 13.30*   LYMPHS ABS 10*3/mm3 1.70 1.42 1.81 1.53 1.79   MONOS ABS 10*3/mm3 0.77 0.69 0.94* 0.72 0.97*   EOS ABS 10*3/mm3 0.00 0.00 0.00 0.00 0.00   BASOS ABS 10*3/mm3 0.09 0.06 0.06 0.07 0.08   IMMATURE GRANS (ABS) 10*3/mm3 0.03 0.10* 0.21* 0.26* 0.30*   NRBC /100 WBC 0.0 0.0 0.0 0.0 0.0                                             Imaging:  Imaging Results (Last 24 Hours)       ** No results found for the " last 24 hours. **               I reviewed the patient's new clinical results / labs / tests / procedures      Assessment/Plan     Active Hospital Problems    Diagnosis  POA    **Right ureteral stone [N20.1]  Yes    Hypercalcemia [E83.52]  No    Constipation [K59.00]  Clinically Undetermined    Acute UTI (urinary tract infection) [N39.0]  Yes    Hypoglycemia [E16.2]  Yes    Abnormal LFTs [R79.89]  Yes    Chronic bilateral low back pain without sciatica [M54.50, G89.29]  Yes    CAYLA (dementia of Alzheimer type) [G30.9, F02.80]  Yes    Lumbar spinal stenosis [M48.061]  Yes    Thrombocytopenia [D69.6]  Yes    Acute low back pain [M54.50]  Yes      Resolved Hospital Problems    Diagnosis Date Resolved POA    Shock, septic [A41.9, R65.21] 06/03/2024 Yes    PRINCE (acute kidney injury) [N17.9] 06/06/2024 Yes       Urosepsis secondary to ESBL E. coli pyelonephritis complicated by right hydronephrosis and obstructive ureteric stone and ESBL E. coli septicemia patient's status post cystoscopy and right stent placement on 6/2/2024.  Urology recommends keeping Rodriguez catheter until the patient is ambulatory (not the case at this time).  Status post adequate course of Invanz treatment per ID.  Leukocytosis resolved.  No fever.  Repeat blood culture are negative.  Constipation.  Will intensify stool regimen  Hypercalcemia.  Patient appears to be euvolemic.  Will increase IV fluid and monitor.  Metabolic encephalopathy in a patient with a history of Alzheimer's dementia.  Resolved metabolic encephalopathy.  Negative CNS examination.  Continue Aricept and Namenda.  Prediabetes.  A1c 5.7.  Hypernatremia/acute kidney.  Both resolved with  D5 water.  Patient volume status is normal.  Elevated liver function test secondary to sepsis.  Improved.  GI examination is benign.  Anemia/thrombocytopenia.  Anemia is mild and is stable.  Resolved thrombocytopenia which is mostly secondary to sepsis.  Severe degenerative disc disease of the lumbar  spine/mobility disorder.  Status post neurosurgery consult.  For conservative treatment.  On PT.  Dysphagia.  Tolerating puréed and nectar thickened liquids well.  VTE prophylaxis.  Sequential compression.  DNR status.     Discussed my findings and plan of treatment with the patient  Disposition.  Per physical therapy patient will need skilled facility.  Difficult to find a place since the patient has no insurance (government record showed that the patient is dead) [facility] is working on that.       Stable medically for discharge when SNF bed is available.          Robert Almeida MD  [facility]  06/16/24  16:42 EDT             Review of Systems

## 2024-06-16 NOTE — PLAN OF CARE
"Goal Outcome Evaluation:         Pt alert and agreeable to PT today. Pt moved to eob with mod/max of 1. She sat initially with min asst due to posterior lean. Chair placed in front of pt for her to hold onto the back and she was able to sit with cga. Several reps of df/pf and knee ext in sitting with vc. STS pulling up from braced chair with mod/max of 1. She was able to maintain partial erect standing with BUE support for 10 sec x3. BLE buckle with fatigue. Max asst for sup to sit . Scooted up in bed with max x1 pt attempting to push with BLE. Pt very pleasant and cooperative today.Pt is confused. After session she questioned: \" Is PT coming. I explained that we just performed PT today.  \" Thank you for helping me.\"                                     "

## 2024-06-16 NOTE — PLAN OF CARE
Goal Outcome Evaluation:  Plan of Care Reviewed With: patient        Progress: improving  Outcome Evaluation: Pt A&Ox3, RA, NSR on monitor, duron in place, catheter care done, IV fluid, ACHS, blood sugar stable, VSS, waiting for placement, rested well tonight.

## 2024-06-16 NOTE — THERAPY TREATMENT NOTE
Patient Name: Kimberlee Urrutia  : 1940    MRN: [id number]                              Today's Date: 2024       Admit Date: 2024    Visit Dx:     ICD-10-CM ICD-9-CM   1. Right ureteral stone  N20.1 592.1   2. Acute UTI  N39.0 599.0   3. Severe sepsis  A41.9 038.9    R65.20 995.92   4. Elevated lactic acid level  R79.89 276.2   5. Osteomyelitis of lumbar spine  M46.26 730.28   6. Elevated AST (SGOT)  R74.01 790.4   7. Elevated ALT measurement  R74.01 790.4   8. Elevated C-reactive protein (CRP)  R79.82 790.95   9. PRINCE (acute kidney injury)  N17.9 584.9     Patient Active Problem List   Diagnosis    Arthritis    HLD (hyperlipidemia)    Primary hypertension    Health care maintenance    Knee pain, right    Hx of total knee arthroplasty    Seasonal allergies    Vitamin D deficiency    Pyuria    Transient alteration of awareness    Migraine without status migrainosus, not intractable    Leukocytosis    Viral pharyngitis    Multinodular thyroid    Osteopenia of multiple sites    Colon cancer screening    Serum calcium elevated    Acute low back pain    Elevated procalcitonin    Sepsis due to Escherichia coli with acute renal failure without septic shock    Thrombocytopenia    Recurrent oral herpes simplex    Lumbar spinal stenosis    Metabolic encephalopathy    Renal stones    Colon cancer screening    Sacroiliac joint pain    Lumbar stenosis without neurogenic claudication    Renal stone    Hyperlipidemia    History of total knee arthroplasty    Spinal stenosis of lumbar region    Multinodular goiter    Osteopenia    Pain in right knee    Sacroiliac joint pain    Hypercalcemia    CAYLA (dementia of Alzheimer type)    Chronic bilateral low back pain without sciatica    Right ureteral stone    Discitis of lumbar region    Acute UTI (urinary tract infection)    Hypoglycemia    Abnormal LFTs     Past Medical History:   Diagnosis Date    Allergic     Arthritis     Colon cancer screening 2020    Colon  cancer screening 01/29/2020    Cough     Diarrhea     Hx of migraine headaches     Hyperlipidemia     Irregular heart beat     Osteoarthritis     Renal stones 02/2021    Vitamin D deficiency      Past Surgical History:   Procedure Laterality Date    COLONOSCOPY      CYSTOSCOPY W/ URETERAL STENT PLACEMENT Right 6/2/2024    Procedure: CYSTOSCOPY, RIGHT URETERAL STENT PLACEMENT;  Surgeon: Efra Schofield MD;  Location: [facility];  Service: Urology;  Laterality: Right;    LUMBAR EPIDURAL INJECTION N/A 8/2/2021    Procedure: LUMBAR EPIDURAL 1ST VISIT 5-1;  Surgeon: Nu Partida MD;  Location: [facility] MAIN OR;  Service: Pain Management;  Laterality: N/A;    REPLACEMENT TOTAL KNEE Right 01/2015    Josue Wilson MD    SACROILIAC JOINT INJECTION Left 6/16/2021    Procedure: SACROILIAC INJECTION left;  Surgeon: Nu Partida MD;  Location: [facility] MAIN OR;  Service: Pain Management;  Laterality: Left;    TUBAL ABDOMINAL LIGATION      URETEROSCOPY LASER LITHOTRIPSY WITH STENT INSERTION Left 2/23/2021    Procedure: LT.URETEROSCOPY LASER LITHOTRIPSY WITH STENT  FOR STONE;  Surgeon: Arnaud Lu MD;  Location: [facility] OR;  Service: Urology;  Laterality: Left;    WISDOM TOOTH EXTRACTION        General Information       Row Name 06/16/24 1500          Physical Therapy Time and Intention    Document Type therapy note (daily note)  -     Mode of Treatment individual therapy;physical therapy  -       Row Name 06/16/24 1500          General Information    Patient Profile Reviewed yes  -SV               User Key  (r) = Recorded By, (t) = Taken By, (c) = Cosigned By      Initials Name Provider Type    SV Velma Georges, PT Physical Therapist                   Mobility       Row Name 06/16/24 1527          Bed Mobility    Supine-Sit [place] (Bed Mobility) moderate assist (50% patient effort);maximum assist (25% patient effort)  -SV     Sit-Supine [place] (Bed Mobility) maximum assist (25%  patient effort);1 person assist  -SV     Assistive Device (Bed Mobility) draw sheet;head of bed elevated  -SV       Row Name 06/16/24 1527          Sit-Stand Transfer    Sit-Stand [place] (Transfers) moderate assist (50% patient effort);maximum assist (25% patient effort);1 person assist  -SV     Comment, (Sit-Stand Transfer) chair placed and braced by PT,  STS x3 pulled up from chair, able to hold stand for up to 10 seconds with min x1, dropped to bed due to fatigue  -SV               User Key  (r) = Recorded By, (t) = Taken By, (c) = Cosigned By      Initials Name Provider Type    Velma Styles, PT Physical Therapist                   Obj/Interventions       Row Name 06/16/24 1529          Motor Skills    Therapeutic Exercise --  ankle DF x5 malena, laq x5 malena  -SV       Row Name 06/16/24 1529          Balance    Balance Assessment sitting static balance  -SV     Static Sitting Balance minimal assist  -SV     Static Standing Balance minimal assist;moderate assist  -SV     Position/Device Used, Standing Balance other (see comments)  chair braced  -SV               User Key  (r) = Recorded By, (t) = Taken By, (c) = Cosigned By      Initials Name Provider Type    Velma Styles, PT Physical Therapist                   Goals/Plan    No documentation.                  Clinical Impression       Row Name 06/16/24 1529          Pain    Posttreatment Pain Rating 0/10 - no pain  -SV       Row Name 06/16/24 1529          Pain Scale: FACES Pre/Post-Treatment    Pain: FACES Scale, Pretreatment 0-->no hurt  -SV               User Key  (r) = Recorded By, (t) = Taken By, (c) = Cosigned By      Initials Name Provider Type    Velma Styles, PT Physical Therapist                   Outcome Measures       Row Name 06/16/24 1531          How much help from another person do you currently need...    Turning from your back to your side while in flat bed without using bedrails? 3  -SV     Moving from lying on back to  sitting on the side of a flat bed without bedrails? 2  -SV     Moving to and from a bed to a chair (including a wheelchair)? 1  -SV     Standing up from a chair using your arms (e.g., wheelchair, bedside chair)? 2  -SV     Climbing 3-5 steps with a railing? 1  -SV     To walk in hospital room? 1  -SV     AM-PAC 6 Clicks Score (PT) 10  -SV     Highest Level of Mobility Goal 4 --> Transfer to chair/commode  -SV               User Key  (r) = Recorded By, (t) = Taken By, (c) = Cosigned By      Initials Name Provider Type    SV Velma Georges, PT Physical Therapist                                 Physical Therapy Education       Title: PT OT SLP Therapies (In Progress)       Topic: Physical Therapy (In Progress)       Point: Mobility training (Done)       Learning Progress Summary             Patient Acceptance, E, VU,NR by SV at 6/16/2024 1531    Acceptance, E,TB, NR by BK at 6/14/2024 1715    Acceptance, E,D, VU,NR by EB at 6/14/2024 1641    Acceptance, E,D, NR by EB at 6/13/2024 1514    Acceptance, E,D, NR by EB at 6/12/2024 1643    Acceptance, E,TB, VU by RD at 6/11/2024 1011    Acceptance, E,TB, VU,NR by CB at 6/10/2024 1149    Acceptance, E, VU by EM at 6/7/2024 1622    Acceptance, E, NR by EM at 6/6/2024 1605    Acceptance, E,TB, VU by BK at 6/5/2024 1700    Acceptance, E, NR by EM at 6/5/2024 1640                         Point: Home exercise program (Done)       Learning Progress Summary             Patient Acceptance, E, VU,NR by SV at 6/16/2024 1531    Acceptance, E,TB, NR by BK at 6/14/2024 1715    Acceptance, E,TB, VU by BK at 6/13/2024 1655    Acceptance, E,TB, VU by RD at 6/11/2024 1011    Acceptance, E,TB, VU,NR by CB at 6/10/2024 1149    Acceptance, E, VU by EM at 6/7/2024 1622    Acceptance, E, NR by EM at 6/6/2024 1605                         Point: Body mechanics (In Progress)       Learning Progress Summary             Patient Acceptance, E,TB, NR by BK at 6/14/2024 1715    Acceptance, E,D, VU,NR by  EB at 6/14/2024 1641    Acceptance, E,D, NR by EB at 6/13/2024 1514    Acceptance, E,D, NR by EB at 6/12/2024 1643    Acceptance, E,TB, VU by RD at 6/11/2024 1011    Acceptance, E,TB, VU,NR by CB at 6/10/2024 1149                         Point: Precautions (In Progress)       Learning Progress Summary             Patient Acceptance, E,D, NR by EB at 6/13/2024 1514    Acceptance, E,TB, VU by RD at 6/11/2024 1011    Acceptance, E,TB, VU,NR by CB at 6/10/2024 1149                                         User Key       Initials Effective Dates Name Provider Type Discipline    RD 06/16/21 -  Leonela Acuna, PT Physical Therapist PT    EM 06/16/21 -  Yvette Guajardo, PT Physical Therapist PT    SV 07/11/23 -  Velma Georges, PT Physical Therapist PT    EB 02/14/23 -  Nancy Bishop PTA Physical Therapist Assistant PT    CB 10/22/21 -  Kaitlin Cunningham, PT Physical Therapist PT    BK 04/30/24 -  Nu Leon, RN Extern Registered Nurse Nurse                  PT Recommendation and Plan           Time Calculation:         PT Charges       Row Name 06/16/24 1535             Time Calculation    Start Time 1500  -SV      Stop Time 1523  -SV      Time Calculation (min) 23 min  -SV      PT Received On 06/16/24  -SV      PT - Next Appointment 06/17/24  -SV                User Key  (r) = Recorded By, (t) = Taken By, (c) = Cosigned By      Initials Name Provider Type    SV Velma Georges, PT Physical Therapist                  Therapy Charges for Today       Code Description Service Date Service Provider Modifiers Qty    60348096140 HC PT THERAPEUTIC ACT EA 15 MIN 6/16/2024 Velma Georges, PT GP 2            PT G-Codes  Outcome Measure Options: AM-PAC 6 Clicks Daily Activity (OT)  AM-PAC 6 Clicks Score (PT): 10  AM-PAC 6 Clicks Score (OT): 9  Modified Adriana Scale: 4 - Moderately severe disability.  Unable to walk without assistance, and unable to attend to own bodily needs without assistance.       Velma Georges,  PT  6/16/2024

## 2024-06-17 LAB
CALCIUM SPEC-SCNC: 10.2 MG/DL (ref 8.6–10.5)
GLUCOSE BLDC GLUCOMTR-MCNC: 111 MG/DL (ref 70–130)
GLUCOSE BLDC GLUCOMTR-MCNC: 112 MG/DL (ref 70–130)
GLUCOSE BLDC GLUCOMTR-MCNC: 81 MG/DL (ref 70–130)
GLUCOSE BLDC GLUCOMTR-MCNC: 95 MG/DL (ref 70–130)

## 2024-06-17 PROCEDURE — 97530 THERAPEUTIC ACTIVITIES: CPT

## 2024-06-17 PROCEDURE — 82310 ASSAY OF CALCIUM: CPT | Performed by: INTERNAL MEDICINE

## 2024-06-17 PROCEDURE — 82948 REAGENT STRIP/BLOOD GLUCOSE: CPT

## 2024-06-17 PROCEDURE — 0 DEXTROSE 5 % SOLUTION: Performed by: INTERNAL MEDICINE

## 2024-06-17 RX ADMIN — Medication 10 ML: at 08:30

## 2024-06-17 RX ADMIN — MEMANTINE HYDROCHLORIDE 10 MG: 10 TABLET, FILM COATED ORAL at 21:00

## 2024-06-17 RX ADMIN — DONEPEZIL HYDROCHLORIDE 10 MG: 10 TABLET, FILM COATED ORAL at 08:31

## 2024-06-17 RX ADMIN — SENNOSIDES AND DOCUSATE SODIUM 2 TABLET: 50; 8.6 TABLET ORAL at 08:31

## 2024-06-17 RX ADMIN — MEMANTINE HYDROCHLORIDE 10 MG: 10 TABLET, FILM COATED ORAL at 08:30

## 2024-06-17 RX ADMIN — DEXTROSE MONOHYDRATE 100 ML/HR: 50 INJECTION, SOLUTION INTRAVENOUS at 01:22

## 2024-06-17 RX ADMIN — DEXTROSE MONOHYDRATE 100 ML/HR: 50 INJECTION, SOLUTION INTRAVENOUS at 11:31

## 2024-06-17 NOTE — PROGRESS NOTES
Name: Kimberlee Urrutia ADMIT: 2024   : 1940  PCP: Meche Ji MD    MRN: [id number] LOS: 15 days   AGE/SEX: 84 y.o. female  ROOM: [facility]     Subjective   Subjective   Patient is frustrated that she is and not able to go to her nursing facility.  No complaints or overnight events.  Nurses reports that she is not constipated and having regular bowel movement.  No chest pain/shortness of breath/cough/nausea or vomiting.     Objective   Objective   Vital Signs  Temp:  [98.1 °F (36.7 °C)-98.8 °F (37.1 °C)] 98.8 °F (37.1 °C)  Heart Rate:  [68-75] 69  Resp:  [16-18] 16  BP: (103-130)/(59-70) 128/70  SpO2:  [98 %] 98 %  on   ;   Device (Oxygen Therapy): (P) room air    Intake/Output Summary (Last 24 hours) at 2024 1542  Last data filed at 2024 1400  Gross per 24 hour   Intake 482.5 ml   Output 2200 ml   Net -1717.5 ml     Body mass index is 33.41 kg/m².      06/15/24  0500 24  0500 24  0457   Weight: 85.5 kg (188 lb 7.9 oz) 85.4 kg (188 lb 4.4 oz) 84.2 kg (185 lb 10 oz)     Physical Exam  General.  Elderly female.  She is alert and oriented times 3 out of 4.  No apparent pain/distress/diaphoresis.  Affect is flat.  Eyes.  Pupils equal round and reactive.  Intact extraocular musculature.  No pallor or jaundice.  Oral cavity.  Moist mucous membrane.  Neck.  Supple.  No JVD.  No lymphadenopathy or thyromegaly  Cardiovascular.  Regular rate and rhythm with no murmur.    Chest.  Clear to auscultation bilaterally with no added sounds.  Poor bilateral air entry  Soft lax.  No tenderness.  No organomegaly.  No guarding or rebound  .  Rodriguez catheter in place with clear urine.   Extremities.  No clubbing/cyanosis/edema  CNS.  No acute focal neurological deficits      Results Review:      Results from last 7 days   Lab Units 24  0522 24  0647 24  0518   SODIUM mmol/L  --  137 140   POTASSIUM mmol/L  --  4.0 3.8   CHLORIDE mmol/L  --  105 108*   CO2 mmol/L  --  22.2  "23.5   BUN mg/dL  --  14 20   CREATININE mg/dL  --  0.81 0.85   GLUCOSE mg/dL  --  100* 94   CALCIUM mg/dL 10.2 10.8* 9.6   AST (SGOT) U/L  --  13 10   ALT (SGPT) U/L  --  11 20     Estimated Creatinine Clearance: 51.7 mL/min (by C-G formula based on SCr of 0.81 mg/dL).      Results from last 7 days   Lab Units 06/17/24  1117 06/17/24  0618 06/16/24  2120 06/16/24  1555 06/16/24  1153 06/16/24  0614 06/15/24  2049 06/15/24  1601   GLUCOSE mg/dL 81 111 121 110 122 110 116 126                 Results from last 7 days   Lab Units 06/11/24  0518   MAGNESIUM mg/dL 2.0   PHOSPHORUS mg/dL 3.3           Invalid input(s): \"LDLCALC\"  Results from last 7 days   Lab Units 06/16/24  0647 06/13/24  0658 06/12/24  0329 06/11/24  0518   WBC 10*3/mm3 9.03 9.67 12.74* 13.41*   HEMOGLOBIN g/dL 10.6* 10.7* 10.6* 11.0*   HEMATOCRIT % 32.6* 33.2* 31.7* 34.2   PLATELETS 10*3/mm3 315 280 251 228   MCV fL 90.3 90.5 89.0 89.1   MCH pg 29.4 29.2 29.8 28.6   MCHC g/dL 32.5 32.2 33.4 32.2   RDW % 12.7 12.9 12.8 12.9   RDW-SD fl 41.4 42.6 41.0 41.9   MPV fL 10.6 10.9 11.7 11.4   NEUTROPHIL % % 71.4 76.6* 76.3* 80.8*   LYMPHOCYTE % % 18.8* 14.7* 14.2* 11.4*   MONOCYTES % % 8.5 7.1 7.4 5.4   EOSINOPHIL % % 0.0* 0.0* 0.0* 0.0*   BASOPHIL % % 1.0 0.6 0.5 0.5   IMM GRAN % % 0.3 1.0* 1.6* 1.9*   NEUTROS ABS 10*3/mm3 6.44 7.40* 9.72* 10.83*   LYMPHS ABS 10*3/mm3 1.70 1.42 1.81 1.53   MONOS ABS 10*3/mm3 0.77 0.69 0.94* 0.72   EOS ABS 10*3/mm3 0.00 0.00 0.00 0.00   BASOS ABS 10*3/mm3 0.09 0.06 0.06 0.07   IMMATURE GRANS (ABS) 10*3/mm3 0.03 0.10* 0.21* 0.26*   NRBC /100 WBC 0.0 0.0 0.0 0.0                                             Imaging:  Imaging Results (Last 24 Hours)       ** No results found for the last 24 hours. **               I reviewed the patient's new clinical results / labs / tests / procedures      Assessment/Plan     Active Hospital Problems    Diagnosis  POA    **Right ureteral stone [N20.1]  Yes    Hypercalcemia [E83.52]  No    " Constipation [K59.00]  Clinically Undetermined    Acute UTI (urinary tract infection) [N39.0]  Yes    Hypoglycemia [E16.2]  Yes    Abnormal LFTs [R79.89]  Yes    Chronic bilateral low back pain without sciatica [M54.50, G89.29]  Yes    CAYLA (dementia of Alzheimer type) [G30.9, F02.80]  Yes    Lumbar spinal stenosis [M48.061]  Yes    Thrombocytopenia [D69.6]  Yes    Acute low back pain [M54.50]  Yes      Resolved Hospital Problems    Diagnosis Date Resolved POA    Shock, septic [A41.9, R65.21] 06/03/2024 Yes    PRINCE (acute kidney injury) [N17.9] 06/06/2024 Yes       Urosepsis secondary to ESBL E. coli pyelonephritis complicated by right hydronephrosis and obstructive ureteric stone and ESBL E. coli septicemia patient's status post cystoscopy and right stent placement on 6/2/2024.  Urology recommends keeping Rodriguez catheter until the patient is ambulatory (not the case at this time).  Status post adequate course of Invanz treatment per ID.  Leukocytosis resolved.  No fever.  Repeat blood culture are negative.  Constipation.  Will intensify stool regimen  Hypercalcemia.  Patient appears to be euvolemic.  Will increase IV fluid and monitor.  Metabolic encephalopathy in a patient with a history of Alzheimer's dementia.  Resolved metabolic encephalopathy.  Negative CNS examination.  Continue Aricept and Namenda.  Prediabetes.  A1c 5.7.  Hypernatremia/acute kidney.  Both resolved with  D5 water.  Patient volume status is normal.  Elevated liver function test secondary to sepsis.  Improved.  GI examination is benign.  Anemia/thrombocytopenia.  Anemia is mild and is stable.  Resolved thrombocytopenia which is mostly secondary to sepsis.  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  Status post neurosurgery consult.  For conservative treatment.  On PT.  Dysphagia.  Tolerating puréed and nectar thickened liquids well.  VTE prophylaxis.  Sequential compression.  DNR status.     Discussed my findings and  plan of treatment with the patient  Disposition.  Per physical therapy patient will need skilled facility.  Difficult to find a place since the patient has no insurance (government record showed that the patient is dead) [facility] is working on that.  We are waiting the answer.       Stable medically for discharge when SNF bed is available.          Robert Almeida MD  [facility]  06/17/24  15:42 EDT             Review of Systems

## 2024-06-17 NOTE — PLAN OF CARE
Goal Outcome Evaluation:  Plan of Care Reviewed With: (P) patient        Progress: (P) improving  Outcome Evaluation: (P) Pt seen for OT treatment this date. Pt resting in bed and agreeable to treatment upon arrival. Pt demo'd improved min A x1 bed mobility and mod A x2 STS transfer from EOB. Pt tolerated ~10 min of EOB activity with CGA-min A sitting balance with limited fatigue noted Pt also completed g/h task at EOB with cues. Overall, pt activity tolerance and (I) with ADLs is improving. Pt would benefit from continued OT services to address (I) and safety with ADLs and functional mobility d/t weakness and fatigue. Rec d/c SNF      Anticipated Discharge Disposition (OT): (P) skilled nursing facility

## 2024-06-17 NOTE — THERAPY TREATMENT NOTE
Patient Name: Kimberlee Urrutia  : 1940    MRN: [id number]                              Today's Date: 2024       Admit Date: 2024    Visit Dx:     ICD-10-CM ICD-9-CM   1. Right ureteral stone  N20.1 592.1   2. Acute UTI  N39.0 599.0   3. Severe sepsis  A41.9 038.9    R65.20 995.92   4. Elevated lactic acid level  R79.89 276.2   5. Osteomyelitis of lumbar spine  M46.26 730.28   6. Elevated AST (SGOT)  R74.01 790.4   7. Elevated ALT measurement  R74.01 790.4   8. Elevated C-reactive protein (CRP)  R79.82 790.95   9. PRINCE (acute kidney injury)  N17.9 584.9     Patient Active Problem List   Diagnosis    Arthritis    HLD (hyperlipidemia)    Primary hypertension    Health care maintenance    Knee pain, right    Hx of total knee arthroplasty    Seasonal allergies    Vitamin D deficiency    Pyuria    Transient alteration of awareness    Migraine without status migrainosus, not intractable    Leukocytosis    Viral pharyngitis    Multinodular thyroid    Osteopenia of multiple sites    Colon cancer screening    Serum calcium elevated    Acute low back pain    Elevated procalcitonin    Sepsis due to Escherichia coli with acute renal failure without septic shock    Thrombocytopenia    Recurrent oral herpes simplex    Lumbar spinal stenosis    Metabolic encephalopathy    Renal stones    Colon cancer screening    Sacroiliac joint pain    Lumbar stenosis without neurogenic claudication    Renal stone    Hyperlipidemia    History of total knee arthroplasty    Spinal stenosis of lumbar region    Multinodular goiter    Osteopenia    Pain in right knee    Sacroiliac joint pain    Hypercalcemia    CAYLA (dementia of Alzheimer type)    Chronic bilateral low back pain without sciatica    Right ureteral stone    Discitis of lumbar region    Acute UTI (urinary tract infection)    Hypoglycemia    Abnormal LFTs    Hypercalcemia    Constipation     Past Medical History:   Diagnosis Date    Allergic     Arthritis     Colon cancer  screening 01/29/2020    Colon cancer screening 01/29/2020    Cough     Diarrhea     Hx of migraine headaches     Hyperlipidemia     Irregular heart beat     Osteoarthritis     Renal stones 02/2021    Vitamin D deficiency      Past Surgical History:   Procedure Laterality Date    COLONOSCOPY      CYSTOSCOPY W/ URETERAL STENT PLACEMENT Right 6/2/2024    Procedure: CYSTOSCOPY, RIGHT URETERAL STENT PLACEMENT;  Surgeon: Efra Schofield MD;  Location: [facility] MAIN OR;  Service: Urology;  Laterality: Right;    LUMBAR EPIDURAL INJECTION N/A 8/2/2021    Procedure: LUMBAR EPIDURAL 1ST VISIT 5-1;  Surgeon: Nu Partida MD;  Location: [facility] MAIN OR;  Service: Pain Management;  Laterality: N/A;    REPLACEMENT TOTAL KNEE Right 01/2015    Josue Wilson MD    SACROILIAC JOINT INJECTION Left 6/16/2021    Procedure: SACROILIAC INJECTION left;  Surgeon: Nu Partida MD;  Location: [facility] MAIN OR;  Service: Pain Management;  Laterality: Left;    TUBAL ABDOMINAL LIGATION      URETEROSCOPY LASER LITHOTRIPSY WITH STENT INSERTION Left 2/23/2021    Procedure: LT.URETEROSCOPY LASER LITHOTRIPSY WITH STENT  FOR STONE;  Surgeon: Arnaud Lu MD;  Location: [facility] OR;  Service: Urology;  Laterality: Left;    WISDOM TOOTH EXTRACTION        General Information       Row Name 06/17/24 1534          OT Time and Intention    Document Type therapy note (daily note) (P)   -     Mode of Treatment individual therapy;occupational therapy (P)   -       Row Name 06/17/24 1534          General Information    Patient Profile Reviewed yes (P)   -     Existing Precautions/Restrictions fall (P)   -     Barriers to Rehab cognitive status (P)   -       Row Name 06/17/24 1534          Cognition    Orientation Status (Cognition) oriented x 3 (P)   -       Row Name 06/17/24 1534          Safety Issues, Functional Mobility    Safety Issues Affecting Function (Mobility) awareness of need for assistance;judgment;insight into  deficits/self-awareness;problem-solving;impulsivity;safety precaution awareness;sequencing abilities (P)   -     Impairments Affecting Function (Mobility) balance;endurance/activity tolerance;strength;postural/trunk control;cognition (P)   -     Cognitive Impairments, Mobility Safety/Performance attention;awareness, need for assistance;insight into deficits/self-awareness;judgment;problem-solving/reasoning;safety precaution awareness;safety precaution follow-through;sequencing abilities (P)   -     Comment, Safety Issues/Impairments (Mobility) high falls risk d/t weakness and limited safety awareness (P)   -               User Key  (r) = Recorded By, (t) = Taken By, (c) = Cosigned By      Initials Name Provider Type     Nereyda Rothman OT Student OT Student                     Mobility/ADL's       Row Name 06/17/24 1536          Bed Mobility    Bed Mobility bed mobility (all) activities (P)   -     All Activities, [place] (Bed Mobility) minimum assist (75% patient effort);2 person assist (P)   -     Supine-Sit [place] (Bed Mobility) minimum assist (75% patient effort);2 person assist (P)   -     Sit-Supine [place] (Bed Mobility) minimum assist (75% patient effort);2 person assist (P)   -     Bed Mobility, Safety Issues decreased use of arms for pushing/pulling;decreased use of legs for bridging/pushing;cognitive deficits limit understanding (P)   -     Assistive Device (Bed Mobility) bed rails;draw sheet;head of bed elevated (P)   -     Comment, (Bed Mobility) decreased time spent to get EOB (P)   -       Row Name 06/17/24 1536          Transfers    Transfers sit-stand transfer;stand-sit transfer (P)   -     Comment, (Transfers) x2 from EOB (P)   -       Row Name 06/17/24 1536          Sit-Stand Transfer    Sit-Stand [place] (Transfers) moderate assist (50% patient effort);2 person assist (P)   -       Row Name 06/17/24 1536          Stand-Sit Transfer     Stand-Sit [place] (Transfers) moderate assist (50% patient effort);2 person assist (P)   -[facility] Name 06/17/24 1536          Functional Mobility    Functional Mobility- Ind. Level unable to perform (P)   -     Functional Mobility- Comment unable to ambulate this date (P)   -     Patient was able to Ambulate no, other medical factors prevent ambulation (P)   -[facility] Name 06/17/24 1536          Activities of Daily Living    BADL Assessment/Intervention toileting;grooming (P)   -MF       Row Name 06/17/24 1536          Toileting Assessment/Training    [place] Level (Toileting) toileting skills;dependent (less than 25% patient effort) (P)   -     Comment, (Toileting) domi hernandez donned (P)   -[facility] Name 06/17/24 1536          Grooming Assessment/Training    [place] Level (Grooming) grooming skills;minimum assist (75% patient effort);set up;wash face, hands (P)   -     Position (Grooming) edge of bed sitting;unsupported sitting (P)   -               User Key  (r) = Recorded By, (t) = Taken By, (c) = Cosigned By      Initials Name Provider Type     Nereyda Rothman OT Student OT Student                   Obj/Interventions       [facility] Name 06/17/24 1538          Strength Comprehensive (MMT)    General Manual Muscle Testing (MMT) Assessment upper extremity strength deficits identified (P)   -     Comment, General Manual Muscle Testing (MMT) Assessment generalized weakness (P)   -MF       Row Name 06/17/24 1538          Motor Skills    Motor Skills functional endurance (P)   -     Functional Endurance poor d/t fatigue and weakness but improving (P)   -MF       Row Name 06/17/24 1538          Balance    Balance Assessment sitting static balance;sitting dynamic balance;sit to stand dynamic balance;standing static balance (P)   -     Static Sitting Balance contact guard;standby assist;1-person assist (P)   -     Dynamic Sitting Balance minimal assist;1-person assist (P)    -MF     Position, Sitting Balance sitting edge of bed (P)   -MF     Sit to Stand Dynamic Balance moderate assist;2-person assist (P)   -MF     Static Standing Balance moderate assist;2-person assist (P)   -MF     Balance Interventions sitting;standing;sit to stand;supported;static;dynamic;moderate challenge (P)   -     Comment, Balance improved standing balance and increase tolerance. sitting balance EOB majorly improved, min cues needed (P)   -               User Key  (r) = Recorded By, (t) = Taken By, (c) = Cosigned By      Initials Name Provider Type    Nereyda Streeter, OT Student OT Student                   Goals/Plan    No documentation.                  Clinical Impression       Row Name 06/17/24 1540          Pain Assessment    Pretreatment Pain Rating 3/10 (P)   -     Posttreatment Pain Rating 3/10 (P)   -     Pre/Posttreatment Pain Comment generalized pain all over but did not rate (P)   -       Row Name 06/17/24 1540          Plan of Care Review    Plan of Care Reviewed With patient (P)   -     Progress improving (P)   -     Outcome Evaluation Pt seen for OT treatment this date. Pt resting in bed and agreeable to treatment upon arrival. Pt demo'd improved min A x1 bed mobility and mod A x2 STS transfer from EOB. Pt tolerated ~10 min of EOB activity with CGA-min A sitting balance with limited fatigue noted Pt also completed g/h task at EOB with cues. Overall, pt activity tolerance and (I) with ADLs is improving. Pt would benefit from continued OT services to address (I) and safety with ADLs and functional mobility d/t weakness and fatigue. Rec d/c SNF (P)   -       Row Name 06/17/24 1540          Therapy Assessment/Plan (OT)    Rehab Potential (OT) fair, will monitor progress closely (P)   -     Criteria for Skilled Therapeutic Interventions Met (OT) yes (P)   -     Therapy Frequency (OT) 3 times/wk (P)   -       Row Name 06/17/24 1540          Therapy Plan Review/Discharge  Plan (OT)    Anticipated Discharge Disposition (OT) skilled nursing facility (P)   -       Row Name 06/17/24 1540          Vital Signs    O2 Delivery Pre Treatment room air (P)   -MF     O2 Delivery Intra Treatment room air (P)   -MF     O2 Delivery Post Treatment room air (P)   -MF       Row Name 06/17/24 1540          Positioning and Restraints    Pre-Treatment Position in bed (P)   -MF     Post Treatment Position bed (P)   -MF     In Bed notified nsg;supine;call light within reach;exit alarm on (P)   -MF               User Key  (r) = Recorded By, (t) = Taken By, (c) = Cosigned By      Initials Name Provider Type    MF Nereyda Rothman, OT Student OT Student                   Outcome Measures       Row Name 06/17/24 1400 06/17/24 0825       How much help from another person do you currently need...    Turning from your back to your side while in flat bed without using bedrails? 3 (P)   -EO 3 (P)   -EO    Moving from lying on back to sitting on the side of a flat bed without bedrails? 2 (P)   -EO 2 (P)   -EO    Moving to and from a bed to a chair (including a wheelchair)? 1 (P)   -EO 1 (P)   -EO    Standing up from a chair using your arms (e.g., wheelchair, bedside chair)? 2 (P)   -EO 2 (P)   -EO    Climbing 3-5 steps with a railing? 1 (P)   -EO 1 (P)   -EO    To walk in hospital room? 1 (P)   -EO 1 (P)   -EO    AM-PAC 6 Clicks Score (PT) 10 (P)   -EO 10 (P)   -EO    Highest Level of Mobility Goal 4 --> Transfer to chair/commode (P)   -EO 4 --> Transfer to chair/commode (P)   -EO              User Key  (r) = Recorded By, (t) = Taken By, (c) = Cosigned By      Initials Name Provider Type    Marci Springer, Nursing Student Nursing Student                    Occupational Therapy Education       Title: PT OT SLP Therapies (In Progress)       Topic: Occupational Therapy (In Progress)       Point: ADL training (In Progress)       Description:   Instruct learner(s) on proper safety adaptation and remediation  techniques during self care or transfers.   Instruct in proper use of assistive devices.                  Learning Progress Summary             Patient Acceptance, E, NR by  at 6/10/2024 1505                         Point: Home exercise program (In Progress)       Description:   Instruct learner(s) on appropriate technique for monitoring, assisting and/or progressing therapeutic exercises/activities.                  Learning Progress Summary             Patient Acceptance, E, NR by  at 6/10/2024 1505                         Point: Precautions (In Progress)       Description:   Instruct learner(s) on prescribed precautions during self-care and functional transfers.                  Learning Progress Summary             Patient Acceptance, E, NR by  at 6/10/2024 1505                         Point: Body mechanics (In Progress)       Description:   Instruct learner(s) on proper positioning and spine alignment during self-care, functional mobility activities and/or exercises.                  Learning Progress Summary             Patient Acceptance, E, NR by  at 6/10/2024 1505                                         User Key       Initials Effective Dates Name Provider Type Discipline     04/30/24 -  Nereyda Rothman OT Student OT Student OT                  OT Recommendation and Plan  Planned Therapy Interventions (OT): activity tolerance training, BADL retraining, functional balance retraining, neuromuscular control/coordination retraining, occupation/activity based interventions, passive ROM/stretching, patient/caregiver education/training, ROM/therapeutic exercise, strengthening exercise, transfer/mobility retraining  Therapy Frequency (OT): (P) 3 times/wk  Plan of Care Review  Plan of Care Reviewed With: (P) patient  Progress: (P) improving  Outcome Evaluation: (P) Pt seen for OT treatment this date. Pt resting in bed and agreeable to treatment upon arrival. Pt demo'd improved min A x1 bed mobility  and mod A x2 STS transfer from EOB. Pt tolerated ~10 min of EOB activity with CGA-min A sitting balance with limited fatigue noted Pt also completed g/h task at EOB with cues. Overall, pt activity tolerance and (I) with ADLs is improving. Pt would benefit from continued OT services to address (I) and safety with ADLs and functional mobility d/t weakness and fatigue. Rec d/c SNF     Time Calculation:   Evaluation Complexity (OT)  Review Occupational Profile/Medical/Therapy History Complexity: expanded/moderate complexity  Assessment, Occupational Performance/Identification of Deficit Complexity: 3-5 performance deficits  Clinical Decision Making Complexity (OT): detailed assessment/moderate complexity  Overall Complexity of Evaluation (OT): moderate complexity     Time Calculation- OT       Row Name 06/17/24 1545             Time Calculation- OT    OT Start Time 1439 (P)   -      OT Stop Time 1454 (P)   -      OT Time Calculation (min) 15 min (P)   -      Total Timed Code Minutes- OT 15 minute(s) (P)   -      OT Received On 06/17/24 (P)   -      OT - Next Appointment 06/19/24 (P)   -         Timed Charges    23011 - OT Therapeutic Activity Minutes 15 (P)   -         Total Minutes    Timed Charges Total Minutes 15 (P)   -       Total Minutes 15 (P)   -                User Key  (r) = Recorded By, (t) = Taken By, (c) = Cosigned By      Initials Name Provider Type     Nereyda Rothman OT Student OT Student                  Therapy Charges for Today       Code Description Service Date Service Provider Modifiers Qty    [id number]  OT THERAPEUTIC ACT EA 15 MIN 6/17/2024 Nereyda Rothman OT Student GO 1                 Nereyda Rothman OT Student  6/17/2024

## 2024-06-17 NOTE — PLAN OF CARE
Goal Outcome Evaluation:  Plan of Care Reviewed With: patient        Progress: improving  Outcome Evaluation: No complaints of pain overnight. A&Ox3. Rodriguez in place, catheter care completed. BM overnight. D5 infusing. Awaiting placement. Pills whole with water one at a time.

## 2024-06-17 NOTE — PLAN OF CARE
Goal Outcome Evaluation:              Outcome Evaluation: (P) A&O x3, disoriented to situation. Pt NSR and on RA, D5 infusing, duron still in place, docusate given during shift, still awaiting placement.

## 2024-06-18 LAB
GLUCOSE BLDC GLUCOMTR-MCNC: 100 MG/DL (ref 70–130)
GLUCOSE BLDC GLUCOMTR-MCNC: 102 MG/DL (ref 70–130)
GLUCOSE BLDC GLUCOMTR-MCNC: 117 MG/DL (ref 70–130)
GLUCOSE BLDC GLUCOMTR-MCNC: 125 MG/DL (ref 70–130)

## 2024-06-18 PROCEDURE — 82948 REAGENT STRIP/BLOOD GLUCOSE: CPT

## 2024-06-18 PROCEDURE — 97530 THERAPEUTIC ACTIVITIES: CPT

## 2024-06-18 PROCEDURE — 0 DEXTROSE 5 % SOLUTION: Performed by: INTERNAL MEDICINE

## 2024-06-18 RX ADMIN — MEMANTINE HYDROCHLORIDE 10 MG: 10 TABLET, FILM COATED ORAL at 10:06

## 2024-06-18 RX ADMIN — DONEPEZIL HYDROCHLORIDE 10 MG: 10 TABLET, FILM COATED ORAL at 10:05

## 2024-06-18 RX ADMIN — Medication 10 ML: at 20:38

## 2024-06-18 RX ADMIN — DEXTROSE MONOHYDRATE 100 ML/HR: 50 INJECTION, SOLUTION INTRAVENOUS at 14:57

## 2024-06-18 RX ADMIN — Medication 10 ML: at 07:45

## 2024-06-18 RX ADMIN — Medication 10 ML: at 10:06

## 2024-06-18 RX ADMIN — MEMANTINE HYDROCHLORIDE 10 MG: 10 TABLET, FILM COATED ORAL at 20:38

## 2024-06-18 NOTE — THERAPY EVALUATION
Patient Name: Kimberlee Urrutia  : 1940    MRN: [id number]                              Today's Date: 2024       Admit Date: 2024    Visit Dx:     ICD-10-CM ICD-9-CM   1. Right ureteral stone  N20.1 592.1   2. Acute UTI  N39.0 599.0   3. Severe sepsis  A41.9 038.9    R65.20 995.92   4. Elevated lactic acid level  R79.89 276.2   5. Osteomyelitis of lumbar spine  M46.26 730.28   6. Elevated AST (SGOT)  R74.01 790.4   7. Elevated ALT measurement  R74.01 790.4   8. Elevated C-reactive protein (CRP)  R79.82 790.95   9. PRINCE (acute kidney injury)  N17.9 584.9     Patient Active Problem List   Diagnosis    Arthritis    HLD (hyperlipidemia)    Primary hypertension    Health care maintenance    Knee pain, right    Hx of total knee arthroplasty    Seasonal allergies    Vitamin D deficiency    Pyuria    Transient alteration of awareness    Migraine without status migrainosus, not intractable    Leukocytosis    Viral pharyngitis    Multinodular thyroid    Osteopenia of multiple sites    Colon cancer screening    Serum calcium elevated    Acute low back pain    Elevated procalcitonin    Sepsis due to Escherichia coli with acute renal failure without septic shock    Thrombocytopenia    Recurrent oral herpes simplex    Lumbar spinal stenosis    Metabolic encephalopathy    Renal stones    Colon cancer screening    Sacroiliac joint pain    Lumbar stenosis without neurogenic claudication    Renal stone    Hyperlipidemia    History of total knee arthroplasty    Spinal stenosis of lumbar region    Multinodular goiter    Osteopenia    Pain in right knee    Sacroiliac joint pain    Hypercalcemia    CAYLA (dementia of Alzheimer type)    Chronic bilateral low back pain without sciatica    Right ureteral stone    Discitis of lumbar region    Acute UTI (urinary tract infection)    Hypoglycemia    Abnormal LFTs    Hypercalcemia    Constipation     Past Medical History:   Diagnosis Date    Allergic     Arthritis     Colon cancer  screening 01/29/2020    Colon cancer screening 01/29/2020    Cough     Diarrhea     Hx of migraine headaches     Hyperlipidemia     Irregular heart beat     Osteoarthritis     Renal stones 02/2021    Vitamin D deficiency      Past Surgical History:   Procedure Laterality Date    COLONOSCOPY      CYSTOSCOPY W/ URETERAL STENT PLACEMENT Right 6/2/2024    Procedure: CYSTOSCOPY, RIGHT URETERAL STENT PLACEMENT;  Surgeon: Efra Schofield MD;  Location: [facility] MAIN OR;  Service: Urology;  Laterality: Right;    LUMBAR EPIDURAL INJECTION N/A 8/2/2021    Procedure: LUMBAR EPIDURAL 1ST VISIT 5-1;  Surgeon: Nu Partida MD;  Location: [facility] MAIN OR;  Service: Pain Management;  Laterality: N/A;    REPLACEMENT TOTAL KNEE Right 01/2015    Josue Wilson MD    SACROILIAC JOINT INJECTION Left 6/16/2021    Procedure: SACROILIAC INJECTION left;  Surgeon: Nu Partida MD;  Location: [facility] MAIN OR;  Service: Pain Management;  Laterality: Left;    TUBAL ABDOMINAL LIGATION      URETEROSCOPY LASER LITHOTRIPSY WITH STENT INSERTION Left 2/23/2021    Procedure: LT.URETEROSCOPY LASER LITHOTRIPSY WITH STENT  FOR STONE;  Surgeon: Arnaud Lu MD;  Location: [facility] OR;  Service: Urology;  Laterality: Left;    WISDOM TOOTH EXTRACTION        General Information       Row Name 06/18/24 2841          Physical Therapy Time and Intention    Document Type therapy note (daily note)  -SV     Mode of Treatment individual therapy;physical therapy  -SV       Row Name 06/18/24 1348          General Information    Patient Profile Reviewed yes  -SV     Existing Precautions/Restrictions fall  -SV     Barriers to Rehab cognitive status  -SV       Row Name 06/18/24 0467          Cognition    Orientation Status (Cognition) oriented to;person  -SV       Row Name 06/18/24 9472          Safety Issues, Functional Mobility    Impairments Affecting Function (Mobility) balance;endurance/activity tolerance;strength  -SV               User  Key  (r) = Recorded By, (t) = Taken By, (c) = Cosigned By      Initials Name Provider Type    SV Velma Georges, PT Physical Therapist                   Mobility       Row Name 06/18/24 1612          Bed Mobility    Supine-Sit Rooks (Bed Mobility) minimum assist (75% patient effort)  -SV     Sit-Supine Rooks (Bed Mobility) minimum assist (75% patient effort);moderate assist (50% patient effort);2 person assist  -SV     Assistive Device (Bed Mobility) bed rails;head of bed elevated  -SV       Row Name 06/18/24 1612          Sit-Stand Transfer    Sit-Stand Rooks (Transfers) moderate assist (50% patient effort);2 person assist  -SV     Assistive Device (Sit-Stand Transfers) walker, front-wheeled  -SV     Comment, (Sit-Stand Transfer) STS x4 to rwx , able to maintain standing with min/mod x2 for 15 sec x4  right  left lean  -SV               User Key  (r) = Recorded By, (t) = Taken By, (c) = Cosigned By      Initials Name Provider Type    Velma Styles, PT Physical Therapist                   Obj/Interventions       [facility] Name 06/18/24 1613          Motor Skills    Therapeutic Exercise --  hs aarom x5 malena, encouraged df/pf 5 reps  -SV       [facility] Name 06/18/24 1613          Balance    Static Sitting Balance contact guard  -SV     Static Standing Balance minimal assist;moderate assist  -SV               User Key  (r) = Recorded By, (t) = Taken By, (c) = Cosigned By      Initials Name Provider Type     Velma Georges, PT Physical Therapist                   Goals/Plan    No documentation.                  Clinical Impression       Row Name 06/18/24 1614          Pain    Additional Documentation Pain Scale: FACES Pre/Post-Treatment (Group)  -SV       Row Name 06/18/24 1614          Pain Scale: FACES Pre/Post-Treatment    Pain: FACES Scale, Pretreatment 0-->no hurt  -SV     Posttreatment Pain Rating 0-->no hurt  -SV       Row Name 06/18/24 1614          Vital Signs    Pre SpO2 (%) 94  -SV     O2  Delivery Pre Treatment room air  -SV     O2 Delivery Intra Treatment room air  -SV     Post SpO2 (%) 94  -SV     O2 Delivery Post Treatment room air  -SV     Pre Patient Position Supine  -SV     Intra Patient Position Standing  -SV     Post Patient Position Supine  -SV       Row Name 06/18/24 1614          Positioning and Restraints    Pre-Treatment Position in bed  -SV     Post Treatment Position bed  -SV     In Bed call light within reach;encouraged to call for assist;fowlers;exit alarm on;notified nsg  -SV               User Key  (r) = Recorded By, (t) = Taken By, (c) = Cosigned By      Initials Name Provider Type    SV Velma Georges, PT Physical Therapist                   Outcome Measures       Row Name 06/18/24 1000          How much help from another person do you currently need...    Turning from your back to your side while in flat bed without using bedrails? 3  -LT     Moving from lying on back to sitting on the side of a flat bed without bedrails? 2  -LT     Moving to and from a bed to a chair (including a wheelchair)? 1  -LT     Standing up from a chair using your arms (e.g., wheelchair, bedside chair)? 2  -LT     Climbing 3-5 steps with a railing? 1  -LT     To walk in hospital room? 1  -LT     AM-PAC 6 Clicks Score (PT) 10  -LT     Highest Level of Mobility Goal 4 --> Transfer to chair/commode  -LT               User Key  (r) = Recorded By, (t) = Taken By, (c) = Cosigned By      Initials Name Provider Type    LT Enma Ramos, RN Registered Nurse                                 Physical Therapy Education       Title: PT OT SLP Therapies (In Progress)       Topic: Physical Therapy (In Progress)       Point: Mobility training (Done)       Learning Progress Summary             Patient Acceptance, E, VU,NR by SV at 6/16/2024 1531    Acceptance, E,TB, NR by BK at 6/14/2024 1715    Acceptance, E,D, VU,NR by EB at 6/14/2024 1641    Acceptance, E,D, NR by EB at 6/13/2024 1514    Acceptance, E,D, NR by  EB at 6/12/2024 1643    Acceptance, E,TB, VU by RD at 6/11/2024 1011    Acceptance, E,TB, VU,NR by CB at 6/10/2024 1149    Acceptance, E, VU by EM at 6/7/2024 1622    Acceptance, E, NR by EM at 6/6/2024 1605    Acceptance, E,TB, VU by BK at 6/5/2024 1700    Acceptance, E, NR by EM at 6/5/2024 1640                         Point: Home exercise program (Done)       Learning Progress Summary             Patient Acceptance, E, VU,NR by SV at 6/16/2024 1531    Acceptance, E,TB, NR by BK at 6/14/2024 1715    Acceptance, E,TB, VU by BK at 6/13/2024 1655    Acceptance, E,TB, VU by RD at 6/11/2024 1011    Acceptance, E,TB, VU,NR by CB at 6/10/2024 1149    Acceptance, E, VU by EM at 6/7/2024 1622    Acceptance, E, NR by EM at 6/6/2024 1605                         Point: Body mechanics (In Progress)       Learning Progress Summary             Patient Acceptance, E,TB, NR by BK at 6/14/2024 1715    Acceptance, E,D, VU,NR by EB at 6/14/2024 1641    Acceptance, E,D, NR by EB at 6/13/2024 1514    Acceptance, E,D, NR by EB at 6/12/2024 1643    Acceptance, E,TB, VU by RD at 6/11/2024 1011    Acceptance, E,TB, VU,NR by CB at 6/10/2024 1149                         Point: Precautions (In Progress)       Learning Progress Summary             Patient Acceptance, E,D, NR by EB at 6/13/2024 1514    Acceptance, E,TB, VU by RD at 6/11/2024 1011    Acceptance, E,TB, VU,NR by CB at 6/10/2024 1149                                         User Key       Initials Effective Dates Name Provider Type Discipline    RD 06/16/21 -  Leonela Acuna, PT Physical Therapist PT    EM 06/16/21 -  Yvette Guajardo, PT Physical Therapist PT    SV 07/11/23 -  Velma Georges, PT Physical Therapist PT    EB 02/14/23 -  Nancy Bishop PTA Physical Therapist Assistant PT    CB 10/22/21 -  Kaitlin Cunningham PT Physical Therapist PT    BK 04/30/24 -  Nu Leon, RN Extern Registered Nurse Nurse                  PT Recommendation and Plan           Time  Calculation:         PT Charges       Row Name 06/18/24 1619 06/18/24 1615          Time Calculation    Start Time 1435  -SV 1415  -SV     Stop Time 1458  -SV 1438  -SV     Time Calculation (min) 23 min  -SV 23 min  -SV     PT Received On 06/18/24  -SV 06/18/24  -SV     PT - Next Appointment -- 06/19/24  -SV               User Key  (r) = Recorded By, (t) = Taken By, (c) = Cosigned By      Initials Name Provider Type    Velma Styles, PT Physical Therapist                  Therapy Charges for Today       Code Description Service Date Service Provider Modifiers Qty    82400090314 HC PT THERAPEUTIC ACT EA 15 MIN 6/18/2024 Velma Georges, PT GP 2    52935672203 HC PT THER SUPP EA 15 MIN 6/18/2024 Velma Georges, PT GP 2            PT G-Codes  Outcome Measure Options: AM-PAC 6 Clicks Daily Activity (OT)  AM-PAC 6 Clicks Score (PT): 10  AM-PAC 6 Clicks Score (OT): 9  Modified [place] Scale: 4 - Moderately severe disability.  Unable to walk without assistance, and unable to attend to own bodily needs without assistance.       Velma Georges PT  6/18/2024

## 2024-06-18 NOTE — PROGRESS NOTES
"DAILY PROGRESS NOTE  [facility]    Patient Identification:  Name: Kimberlee Urrutia  Age: 84 y.o.  Sex: female  :  1940  MRN: [id number]         Primary Care Physician: Meche Ji MD    Subjective:  Interval History: She is frustrated that she cannot go to the nursing facility.    Objective:    Scheduled Meds:bisacodyl, 10 mg, Rectal, Daily  donepezil, 10 mg, Oral, Daily  lactated ringers, 1,000 mL, Intravenous, Once  memantine, 10 mg, Oral, BID  polyethylene glycol, 17 g, Oral, BID  senna-docusate sodium, 2 tablet, Oral, BID  sodium chloride, 10 mL, Intravenous, Q12H      Continuous Infusions:dextrose, 100 mL/hr, Last Rate: 100 mL/hr (24 1457)        Vital signs in last 24 hours:  Temp:  [97.5 °F (36.4 °C)-98.2 °F (36.8 °C)] 97.7 °F (36.5 °C)  Heart Rate:  [65-73] 65  Resp:  [18-20] 20  BP: (116-127)/(47-66) 123/51    Intake/Output:    Intake/Output Summary (Last 24 hours) at 2024 1547  Last data filed at 2024 1300  Gross per 24 hour   Intake 358 ml   Output 2120 ml   Net -1762 ml       Exam:  /51 (BP Location: Right arm, Patient Position: Lying)   Pulse 65   Temp 97.7 °F (36.5 °C) (Oral)   Resp 20   Ht 158.8 cm (62.5\")   Wt 82.5 kg (181 lb 14.1 oz)   LMP  (LMP Unknown)   SpO2 96%   BMI 32.74 kg/m²     General Appearance:    Alert, cooperative, no distress   Head:    Normocephalic, without obvious abnormality, atraumatic   Eyes:       Throat:   Lips, tongue, gums normal   Neck:   Supple, symmetrical, trachea midline, no JVD   Lungs:     Clear to auscultation bilaterally, respirations unlabored   Chest Wall:    No tenderness or deformity    Heart:    Regular rate and rhythm, S1 and S2 normal, no murmur,no  Rub or gallop   Abdomen:     Soft, nontender, bowel sounds active, no masses, no organomegaly    Extremities:   Extremities normal, atraumatic, no cyanosis or edema   Pulses:      Skin:   Skin is warm and dry,  no rashes or palpable lesions "   Neurologic:   no focal deficits noted      Lab Results (last 72 hours)       Procedure Component Value Units Date/Time    POC Glucose Once [[id number]]  (Normal) Collected: 06/18/24 1113    Specimen: Blood Updated: 06/18/24 1114     Glucose 100 mg/dL     POC Glucose Once [[id number]]  (Normal) Collected: 06/18/24 0629    Specimen: Blood Updated: 06/18/24 0630     Glucose 102 mg/dL     POC Glucose Once [[id number]]  (Normal) Collected: 06/17/24 1958    Specimen: Blood Updated: 06/17/24 1959     Glucose 112 mg/dL     POC Glucose Once [[id number]]  (Normal) Collected: 06/17/24 1600    Specimen: Blood Updated: 06/17/24 1601     Glucose 95 mg/dL     POC Glucose Once [[id number]]  (Normal) Collected: 06/17/24 1117    Specimen: Blood Updated: 06/17/24 1118     Glucose 81 mg/dL     Calcium [[id number]]  (Normal) Collected: 06/17/24 0522    Specimen: Blood Updated: 06/17/24 0654     Calcium 10.2 mg/dL     POC Glucose Once [[id number]]  (Normal) Collected: 06/17/24 0618    Specimen: Blood Updated: 06/17/24 0619     Glucose 111 mg/dL     POC Glucose Once [[id number]]  (Normal) Collected: 06/16/24 2120    Specimen: Blood Updated: 06/16/24 2121     Glucose 121 mg/dL     POC Glucose Once [[id number]]  (Normal) Collected: 06/16/24 1555    Specimen: Blood Updated: 06/16/24 1557     Glucose 110 mg/dL     POC Glucose Once [[id number]]  (Normal) Collected: 06/16/24 1153    Specimen: Blood Updated: 06/16/24 1154     Glucose 122 mg/dL     Comprehensive Metabolic Panel [[id number]]  (Abnormal) Collected: 06/16/24 0647    Specimen: Blood Updated: 06/16/24 0809     Glucose 100 mg/dL      BUN 14 mg/dL      Creatinine 0.81 mg/dL      Sodium 137 mmol/L      Potassium 4.0 mmol/L      Chloride 105 mmol/L      CO2 22.2 mmol/L      Calcium 10.8 mg/dL      Total Protein 6.4 g/dL      Albumin 3.1 g/dL      ALT (SGPT) 11 U/L      AST (SGOT) 13 U/L      Alkaline Phosphatase 126 U/L      Total Bilirubin 0.5 mg/dL      Globulin 3.3 gm/dL      A/G Ratio 0.9 g/dL       BUN/Creatinine Ratio 17.3     Anion Gap 9.8 mmol/L      eGFR 71.2 mL/min/1.73     Narrative:      GFR Normal >60  Chronic Kidney Disease <60  Kidney Failure <15    The GFR formula is only valid for adults with stable renal function between ages 18 and 70.    CBC & Differential [[id number]]  (Abnormal) Collected: 06/16/24 0647    Specimen: Blood Updated: 06/16/24 0751    Narrative:      The following orders were created for panel order CBC & Differential.  Procedure                               Abnormality         Status                     ---------                               -----------         ------                     CBC Auto Differential[[id number]]        Abnormal            Final result                 Please view results for these tests on the individual orders.    CBC Auto Differential [[id number]]  (Abnormal) Collected: 06/16/24 0647    Specimen: Blood Updated: 06/16/24 0751     WBC 9.03 10*3/mm3      RBC 3.61 10*6/mm3      Hemoglobin 10.6 g/dL      Hematocrit 32.6 %      MCV 90.3 fL      MCH 29.4 pg      MCHC 32.5 g/dL      RDW 12.7 %      RDW-SD 41.4 fl      MPV 10.6 fL      Platelets 315 10*3/mm3      Neutrophil % 71.4 %      Lymphocyte % 18.8 %      Monocyte % 8.5 %      Eosinophil % 0.0 %      Basophil % 1.0 %      Immature Grans % 0.3 %      Neutrophils, Absolute 6.44 10*3/mm3      Lymphocytes, Absolute 1.70 10*3/mm3      Monocytes, Absolute 0.77 10*3/mm3      Eosinophils, Absolute 0.00 10*3/mm3      Basophils, Absolute 0.09 10*3/mm3      Immature Grans, Absolute 0.03 10*3/mm3      nRBC 0.0 /100 WBC     POC Glucose Once [[id number]]  (Normal) Collected: 06/16/24 0614    Specimen: Blood Updated: 06/16/24 0616     Glucose 110 mg/dL     POC Glucose Once [[id number]]  (Normal) Collected: 06/15/24 2049    Specimen: Blood Updated: 06/15/24 2051     Glucose 116 mg/dL     POC Glucose Once [[id number]]  (Normal) Collected: 06/15/24 1601    Specimen: Blood Updated: 06/15/24 1602     Glucose 126 mg/dL            Data Review:  Results from last 7 days   Lab Units 06/17/24  0522 06/16/24  0647   SODIUM mmol/L  --  137   POTASSIUM mmol/L  --  4.0   CHLORIDE mmol/L  --  105   CO2 mmol/L  --  22.2   BUN mg/dL  --  14   CREATININE mg/dL  --  0.81   GLUCOSE mg/dL  --  100*   CALCIUM mg/dL 10.2 10.8*     Results from last 7 days   Lab Units 06/16/24  0647 06/13/24  0658 06/12/24  0329   WBC 10*3/mm3 9.03 9.67 12.74*   HEMOGLOBIN g/dL 10.6* 10.7* 10.6*   HEMATOCRIT % 32.6* 33.2* 31.7*   PLATELETS 10*3/mm3 315 280 251             Lab Results   Lab Value Date/Time    TROPONINT <0.010 09/14/2017 2019         Results from last 7 days   Lab Units 06/16/24  0647   ALK PHOS U/L 126*   BILIRUBIN mg/dL 0.5   ALT (SGPT) U/L 11   AST (SGOT) U/L 13             Glucose   Date/Time Value Ref Range Status   06/18/2024 1113 100 70 - 130 mg/dL Final   06/18/2024 0629 102 70 - 130 mg/dL Final   06/17/2024 1958 112 70 - 130 mg/dL Final   06/17/2024 1600 95 70 - 130 mg/dL Final   06/17/2024 1117 81 70 - 130 mg/dL Final   06/17/2024 0618 111 70 - 130 mg/dL Final   06/16/2024 2120 121 70 - 130 mg/dL Final   06/16/2024 1555 110 70 - 130 mg/dL Final           Past Medical History:   Diagnosis Date    Allergic     Arthritis     Colon cancer screening 01/29/2020    Colon cancer screening 01/29/2020    Cough     Diarrhea     Hx of migraine headaches     Hyperlipidemia     Irregular heart beat     Osteoarthritis     Renal stones 02/2021    Vitamin D deficiency        Assessment:  Active Hospital Problems    Diagnosis  POA    **Right ureteral stone [N20.1]  Yes    Hypercalcemia [E83.52]  No    Constipation [K59.00]  Clinically Undetermined    Acute UTI (urinary tract infection) [N39.0]  Yes    Hypoglycemia [E16.2]  Yes    Abnormal LFTs [R79.89]  Yes    Chronic bilateral low back pain without sciatica [M54.50, G89.29]  Yes    CAYLA (dementia of Alzheimer type) [G30.9, F02.80]  Yes    Lumbar spinal stenosis [M48.061]  Yes    Thrombocytopenia [D69.6]  Yes     Acute low back pain [M54.50]  Yes      Resolved Hospital Problems    Diagnosis Date Resolved POA    Shock, septic [A41.9, R65.21] 06/03/2024 Yes    PRINCE (acute kidney injury) [N17.9] 06/06/2024 Yes       Plan:  Await reinstatement of insurance for placement at nursing facility.    Mauro Cantu MD  6/18/2024  15:47 EDT

## 2024-06-18 NOTE — PLAN OF CARE
Goal Outcome Evaluation:      Pt alert and agreeable . MIn/mod asst to eob with rail and HOB elevated. Pt STS with mod of 2 x4 to rwx. Pt able to maintain standing with mod/min x2 for 15 sec x4. Right lean able to correct partially. Pt unable to take steps today. Unable to move her feet. Recommend OT/PT coordinate up to chair and back to chair next session. Pt cooperative but very slow progress. Recommend [facility] if able to coordinate.

## 2024-06-18 NOTE — PLAN OF CARE
Goal Outcome Evaluation:           Progress: no change  Outcome Evaluation: A&Ox3, disoriented to situation. RA. IVF, blood sugars WDL. Rodriguez in place, catheter care completed. Multiple BMs this shift. Q2 turns, barrier cream applied to coccyx. Awaiting placement. VSS.

## 2024-06-19 LAB
ANION GAP SERPL CALCULATED.3IONS-SCNC: 7 MMOL/L (ref 5–15)
BUN SERPL-MCNC: 9 MG/DL (ref 8–23)
BUN/CREAT SERPL: 11.4 (ref 7–25)
CALCIUM SPEC-SCNC: 10.5 MG/DL (ref 8.6–10.5)
CHLORIDE SERPL-SCNC: 105 MMOL/L (ref 98–107)
CO2 SERPL-SCNC: 25 MMOL/L (ref 22–29)
CREAT SERPL-MCNC: 0.79 MG/DL (ref 0.57–1)
DEPRECATED RDW RBC AUTO: 42 FL (ref 37–54)
EGFRCR SERPLBLD CKD-EPI 2021: 73.4 ML/MIN/1.73
ERYTHROCYTE [DISTWIDTH] IN BLOOD BY AUTOMATED COUNT: 12.6 % (ref 12.3–15.4)
GLUCOSE BLDC GLUCOMTR-MCNC: 103 MG/DL (ref 70–130)
GLUCOSE BLDC GLUCOMTR-MCNC: 110 MG/DL (ref 70–130)
GLUCOSE BLDC GLUCOMTR-MCNC: 88 MG/DL (ref 70–130)
GLUCOSE BLDC GLUCOMTR-MCNC: 95 MG/DL (ref 70–130)
GLUCOSE SERPL-MCNC: 107 MG/DL (ref 65–99)
HCT VFR BLD AUTO: 33.1 % (ref 34–46.6)
HGB BLD-MCNC: 10.5 G/DL (ref 12–15.9)
MAGNESIUM SERPL-MCNC: 2.3 MG/DL (ref 1.6–2.4)
MCH RBC QN AUTO: 28.9 PG (ref 26.6–33)
MCHC RBC AUTO-ENTMCNC: 31.7 G/DL (ref 31.5–35.7)
MCV RBC AUTO: 91.2 FL (ref 79–97)
PHOSPHATE SERPL-MCNC: 3 MG/DL (ref 2.5–4.5)
PLATELET # BLD AUTO: 291 10*3/MM3 (ref 140–450)
PMV BLD AUTO: 10.3 FL (ref 6–12)
POTASSIUM SERPL-SCNC: 3.8 MMOL/L (ref 3.5–5.2)
RBC # BLD AUTO: 3.63 10*6/MM3 (ref 3.77–5.28)
SODIUM SERPL-SCNC: 137 MMOL/L (ref 136–145)
WBC NRBC COR # BLD AUTO: 7.16 10*3/MM3 (ref 3.4–10.8)

## 2024-06-19 PROCEDURE — 97110 THERAPEUTIC EXERCISES: CPT

## 2024-06-19 PROCEDURE — 82948 REAGENT STRIP/BLOOD GLUCOSE: CPT

## 2024-06-19 PROCEDURE — 84100 ASSAY OF PHOSPHORUS: CPT | Performed by: STUDENT IN AN ORGANIZED HEALTH CARE EDUCATION/TRAINING PROGRAM

## 2024-06-19 PROCEDURE — 83735 ASSAY OF MAGNESIUM: CPT | Performed by: STUDENT IN AN ORGANIZED HEALTH CARE EDUCATION/TRAINING PROGRAM

## 2024-06-19 PROCEDURE — 97530 THERAPEUTIC ACTIVITIES: CPT

## 2024-06-19 PROCEDURE — 0 DEXTROSE 5 % SOLUTION: Performed by: INTERNAL MEDICINE

## 2024-06-19 PROCEDURE — 80048 BASIC METABOLIC PNL TOTAL CA: CPT | Performed by: STUDENT IN AN ORGANIZED HEALTH CARE EDUCATION/TRAINING PROGRAM

## 2024-06-19 PROCEDURE — 85027 COMPLETE CBC AUTOMATED: CPT | Performed by: STUDENT IN AN ORGANIZED HEALTH CARE EDUCATION/TRAINING PROGRAM

## 2024-06-19 RX ADMIN — MEMANTINE HYDROCHLORIDE 10 MG: 10 TABLET, FILM COATED ORAL at 09:58

## 2024-06-19 RX ADMIN — MEMANTINE HYDROCHLORIDE 10 MG: 10 TABLET, FILM COATED ORAL at 20:23

## 2024-06-19 RX ADMIN — Medication 10 ML: at 20:23

## 2024-06-19 RX ADMIN — DONEPEZIL HYDROCHLORIDE 10 MG: 10 TABLET, FILM COATED ORAL at 09:58

## 2024-06-19 RX ADMIN — DEXTROSE MONOHYDRATE 100 ML/HR: 50 INJECTION, SOLUTION INTRAVENOUS at 00:20

## 2024-06-19 RX ADMIN — Medication 10 ML: at 09:58

## 2024-06-19 NOTE — PLAN OF CARE
Goal Outcome Evaluation:  Plan of Care Reviewed With: (P) patient        Progress: (P) improving  Outcome Evaluation: (P) Pt seen for OT treatment this date. Pt resting in bed and agreeable to treatment. Pt demo'd mod A x1 sit-supine transfer, mod Ax2 STS transfer with HHA, and mod A x2 supine-sit transfer. Pt performed x1 STS from EOB and stood supported for about ~15 seconds before needed rest break. Pt engaged in dynamic reaching activty and 3x10 BUE strengthening exerices at EOB. Pt static and dynamic sitting balance are improving and was able to maintain without verbal cues. Pt presents with limited activity tolerance and is a high falls risk d/t weakness and balance impairments. Pt would benefit from continued OT services to address (I) and safety with ADLs and functional mobility. Rec d/c SNF      Anticipated Discharge Disposition (OT): (P) skilled nursing facility

## 2024-06-19 NOTE — THERAPY TREATMENT NOTE
Patient Name: Kimberlee Urrutia  : 1940    MRN: [id number]                              Today's Date: 2024       Admit Date: 2024    Visit Dx:     ICD-10-CM ICD-9-CM   1. Right ureteral stone  N20.1 592.1   2. Acute UTI  N39.0 599.0   3. Severe sepsis  A41.9 038.9    R65.20 995.92   4. Elevated lactic acid level  R79.89 276.2   5. Osteomyelitis of lumbar spine  M46.26 730.28   6. Elevated AST (SGOT)  R74.01 790.4   7. Elevated ALT measurement  R74.01 790.4   8. Elevated C-reactive protein (CRP)  R79.82 790.95   9. PRINCE (acute kidney injury)  N17.9 584.9     Patient Active Problem List   Diagnosis    Arthritis    HLD (hyperlipidemia)    Primary hypertension    Health care maintenance    Knee pain, right    Hx of total knee arthroplasty    Seasonal allergies    Vitamin D deficiency    Pyuria    Transient alteration of awareness    Migraine without status migrainosus, not intractable    Leukocytosis    Viral pharyngitis    Multinodular thyroid    Osteopenia of multiple sites    Colon cancer screening    Serum calcium elevated    Acute low back pain    Elevated procalcitonin    Sepsis due to Escherichia coli with acute renal failure without septic shock    Thrombocytopenia    Recurrent oral herpes simplex    Lumbar spinal stenosis    Metabolic encephalopathy    Renal stones    Colon cancer screening    Sacroiliac joint pain    Lumbar stenosis without neurogenic claudication    Renal stone    Hyperlipidemia    History of total knee arthroplasty    Spinal stenosis of lumbar region    Multinodular goiter    Osteopenia    Pain in right knee    Sacroiliac joint pain    Hypercalcemia    CAYLA (dementia of Alzheimer type)    Chronic bilateral low back pain without sciatica    Right ureteral stone    Discitis of lumbar region    Acute UTI (urinary tract infection)    Hypoglycemia    Abnormal LFTs    Hypercalcemia    Constipation     Past Medical History:   Diagnosis Date    Allergic     Arthritis     Colon cancer  screening 01/29/2020    Colon cancer screening 01/29/2020    Cough     Diarrhea     Hx of migraine headaches     Hyperlipidemia     Irregular heart beat     Osteoarthritis     Renal stones 02/2021    Vitamin D deficiency      Past Surgical History:   Procedure Laterality Date    COLONOSCOPY      CYSTOSCOPY W/ URETERAL STENT PLACEMENT Right 6/2/2024    Procedure: CYSTOSCOPY, RIGHT URETERAL STENT PLACEMENT;  Surgeon: Efra Schofield MD;  Location: [facility] MAIN OR;  Service: Urology;  Laterality: Right;    LUMBAR EPIDURAL INJECTION N/A 8/2/2021    Procedure: LUMBAR EPIDURAL 1ST VISIT 5-1;  Surgeon: Nu Partida MD;  Location: [facility] MAIN OR;  Service: Pain Management;  Laterality: N/A;    REPLACEMENT TOTAL KNEE Right 01/2015    Josue Wilson MD    SACROILIAC JOINT INJECTION Left 6/16/2021    Procedure: SACROILIAC INJECTION left;  Surgeon: Nu Partida MD;  Location: [facility] MAIN OR;  Service: Pain Management;  Laterality: Left;    TUBAL ABDOMINAL LIGATION      URETEROSCOPY LASER LITHOTRIPSY WITH STENT INSERTION Left 2/23/2021    Procedure: LT.URETEROSCOPY LASER LITHOTRIPSY WITH STENT  FOR STONE;  Surgeon: Arnaud Lu MD;  Location: [facility] MAIN OR;  Service: Urology;  Laterality: Left;    WISDOM TOOTH EXTRACTION        General Information       Row Name 06/19/24 1509          OT Time and Intention    Document Type therapy note (daily note) (P)   -     Mode of Treatment individual therapy;occupational therapy (P)   -       Row Name 06/19/24 1501          General Information    Patient Profile Reviewed yes (P)   -     Existing Precautions/Restrictions fall (P)   -     Barriers to Rehab cognitive status (P)   -       Row Name 06/19/24 1509          Cognition    Orientation Status (Cognition) oriented to;person;place (P)   -       Row Name 06/19/24 1509          Safety Issues, Functional Mobility    Safety Issues Affecting Function (Mobility) awareness of need for assistance;insight  into deficits/self-awareness;safety precaution awareness;sequencing abilities;problem-solving (P)   -MF     Impairments Affecting Function (Mobility) balance;cognition;endurance/activity tolerance;strength (P)   -     Cognitive Impairments, Mobility Safety/Performance attention;awareness, need for assistance;insight into deficits/self-awareness;judgment;problem-solving/reasoning;safety precaution awareness;safety precaution follow-through;sequencing abilities (P)   -MF     Comment, Safety Issues/Impairments (Mobility) high falls risk d/t weakness (P)   -MF               User Key  (r) = Recorded By, (t) = Taken By, (c) = Cosigned By      Initials Name Provider Type     Nereyda Rothman OT Student OT Student                     Mobility/ADL's       Row Name 06/19/24 1510          Bed Mobility    Bed Mobility supine-sit;sit-supine (P)   -     Supine-Sit [place] (Bed Mobility) moderate assist (50% patient effort);1 person assist (P)   -     Sit-Supine [place] (Bed Mobility) moderate assist (50% patient effort);2 person assist (P)   -     Bed Mobility, Safety Issues decreased use of arms for pushing/pulling;decreased use of legs for bridging/pushing;cognitive deficits limit understanding (P)   -     Assistive Device (Bed Mobility) bed rails;draw sheet;head of bed elevated (P)   -       Row Name 06/19/24 1510          Transfers    Transfers sit-stand transfer;stand-sit transfer (P)   -     Comment, (Transfers) x1 from EOB (P)   -       Row Name 06/19/24 1510          Sit-Stand Transfer    Sit-Stand [place] (Transfers) moderate assist (50% patient effort);2 person assist (P)   -     Comment, (Sit-Stand Transfer) HHA x2 (P)   -       Row Name 06/19/24 1510          Stand-Sit Transfer    Stand-Sit [place] (Transfers) moderate assist (50% patient effort);2 person assist (P)   -       Row Name 06/19/24 1510          Functional Mobility    Functional Mobility- Ind. Level unable  to perform (P)   -     Functional Mobility- Comment unable to take steps this date (P)   -     Patient was able to Ambulate no, other medical factors prevent ambulation (P)   -       Row Name 06/19/24 1510          Activities of Daily Living    BADL Assessment/Intervention grooming (P)   -[facility] Name 06/19/24 1510          Toileting Assessment/Training    Barry Level (Toileting) toileting skills;dependent (less than 25% patient effort) (P)   -     Comment, (Toileting) duron cath (P)   -[facility] Name 06/19/24 1510          Grooming Assessment/Training    Barry Level (Grooming) grooming skills;oral care regimen;standby assist (P)   -     Oral Care swabbed with sterile water (P)   -     Position (Grooming) edge of bed sitting;unsupported sitting (P)   -               User Key  (r) = Recorded By, (t) = Taken By, (c) = Cosigned By      Initials Name Provider Type     Nereyda Rothman OT Student OT Student                   Obj/Interventions       Row Name 06/19/24 1515          Range of Motion Comprehensive    General Range of Motion bilateral upper extremity ROM WFL (P)   -[facility] Name 06/19/24 1515          Shoulder (Therapeutic Exercise)    Shoulder (Therapeutic Exercise) strengthening exercise (P)   -     Shoulder Strengthening (Therapeutic Exercise) bilateral;flexion;extension;aBduction;aDduction;10 repetitions;2 sets (P)   -[facility] Name 06/19/24 1515          Elbow/Forearm (Therapeutic Exercise)    Elbow/Forearm (Therapeutic Exercise) strengthening exercise (P)   -     Elbow/Forearm Strengthening (Therapeutic Exercise) bilateral;flexion;extension;10 repetitions (P)   -MF       Row Name 06/19/24 1515          Motor Skills    Motor Skills coordination;functional endurance (P)   -     Functional Endurance improving but still limited d/t weakness (P)   -     Motor Control/Coordination Interventions therapeutic exercise/ROM (P)   -     Therapeutic Exercise  shoulder;elbow/forearm (P)   -       Row Name 06/19/24 1515          Balance    Balance Assessment sitting static balance;sitting dynamic balance;sit to stand dynamic balance;standing static balance (P)   -     Static Sitting Balance standby assist;1-person assist (P)   -MF     Dynamic Sitting Balance contact guard;standby assist;1-person assist (P)   -     Position, Sitting Balance unsupported;sitting edge of bed (P)   -     Sit to Stand Dynamic Balance moderate assist;2-person assist (P)   -MF     Static Standing Balance moderate assist;1-person assist (P)   -MF     Dynamic Standing Balance moderate assist;2-person assist (P)   -     Position/Device Used, Standing Balance supported (P)   HHA x2  -     Balance Interventions sitting;standing;sit to stand;supported;static;dynamic;moderate challenge (P)   -     Comment, Balance forward flexed posture (P)   -               User Key  (r) = Recorded By, (t) = Taken By, (c) = Cosigned By      Initials Name Provider Type    Nereyda Streeter OT Student OT Student                   Goals/Plan    No documentation.                  Clinical Impression       Row Name 06/19/24 1518          Pain Assessment    Pretreatment Pain Rating 0/10 - no pain (P)   -     Posttreatment Pain Rating 0/10 - no pain (P)   -       Row Name 06/19/24 1518          Plan of Care Review    Plan of Care Reviewed With patient (P)   -     Progress improving (P)   -     Outcome Evaluation Pt seen for OT treatment this date. Pt resting in bed and agreeable to treatment. Pt demo'd mod A x1 sit-supine transfer, mod Ax2 STS transfer with HHA, and mod A x2 supine-sit transfer. Pt performed x1 STS from EOB and stood supported for about ~15 seconds before needed rest break. Pt engaged in dynamic reaching activty and 3x10 BUE strengthening exerices at EOB. Pt static and dynamic sitting balance are improving and was able to maintain without verbal cues. Pt presents with limited  activity tolerance and is a high falls risk d/t weakness and balance impairments. Pt would benefit from continued OT services to address (I) and safety with ADLs and functional mobility. Rec d/c SNF (P)   -       Row Name 06/19/24 1518          Therapy Assessment/Plan (OT)    Rehab Potential (OT) good, to achieve stated therapy goals (P)   -     Criteria for Skilled Therapeutic Interventions Met (OT) yes;skilled treatment is necessary (P)   -     Therapy Frequency (OT) 3 times/wk (P)   -       Row Name 06/19/24 1518          Therapy Plan Review/Discharge Plan (OT)    Anticipated Discharge Disposition (OT) skilled nursing facility (P)   -       Row Name 06/19/24 1518          Vital Signs    O2 Delivery Pre Treatment room air (P)   -     O2 Delivery Intra Treatment room air (P)   -     O2 Delivery Post Treatment room air (P)   -       Row Name 06/19/24 1518          Positioning and Restraints    Pre-Treatment Position in bed (P)   -     Post Treatment Position bed (P)   -     In Bed notified nsg;supine;call light within reach;exit alarm on (P)   -               User Key  (r) = Recorded By, (t) = Taken By, (c) = Cosigned By      Initials Name Provider Type    Nereyda Streeter, OT Student OT Student                   Outcome Measures       Row Name 06/19/24 1041 06/19/24 1006       How much help from another person do you currently need...    Turning from your back to your side while in flat bed without using bedrails? 2  -EF 3  -LT    Moving from lying on back to sitting on the side of a flat bed without bedrails? 2  -EF 2  -LT    Moving to and from a bed to a chair (including a wheelchair)? 2  -EF 1  -LT    Standing up from a chair using your arms (e.g., wheelchair, bedside chair)? 2  -EF 1  -LT    Climbing 3-5 steps with a railing? 1  -EF 1  -LT    To walk in hospital room? 1  -EF 1  -LT    AM-PAC 6 Clicks Score (PT) 10  -EF 9  -LT    Highest Level of Mobility Goal 4 --> Transfer to  chair/commode  -EF 3 --> Sit at edge of bed  -              User Key  (r) = Recorded By, (t) = Taken By, (c) = Cosigned By      Initials Name Provider Type    Marci Haro, PT Physical Therapist    LT Enma Ramos, RN Registered Nurse                    Occupational Therapy Education       Title: PT OT SLP Therapies (In Progress)       Topic: Occupational Therapy (In Progress)       Point: ADL training (In Progress)       Description:   Instruct learner(s) on proper safety adaptation and remediation techniques during self care or transfers.   Instruct in proper use of assistive devices.                  Learning Progress Summary             Patient Acceptance, E, NR by  at 6/10/2024 1505                         Point: Home exercise program (In Progress)       Description:   Instruct learner(s) on appropriate technique for monitoring, assisting and/or progressing therapeutic exercises/activities.                  Learning Progress Summary             Patient Acceptance, E, NR by  at 6/10/2024 1505                         Point: Precautions (In Progress)       Description:   Instruct learner(s) on prescribed precautions during self-care and functional transfers.                  Learning Progress Summary             Patient Acceptance, E, NR by  at 6/10/2024 1505                         Point: Body mechanics (In Progress)       Description:   Instruct learner(s) on proper positioning and spine alignment during self-care, functional mobility activities and/or exercises.                  Learning Progress Summary             Patient Acceptance, E, NR by  at 6/10/2024 1505                                         User Key       Initials Effective Dates Name Provider Type [facility] 04/30/24 -  Nereyda Rothman OT Student OT Student OT                  OT Recommendation and Plan  Planned Therapy Interventions (OT): activity tolerance training, BADL retraining, functional balance  retraining, neuromuscular control/coordination retraining, occupation/activity based interventions, passive ROM/stretching, patient/caregiver education/training, ROM/therapeutic exercise, strengthening exercise, transfer/mobility retraining  Therapy Frequency (OT): (P) 3 times/wk  Plan of Care Review  Plan of Care Reviewed With: (P) patient  Progress: (P) improving  Outcome Evaluation: (P) Pt seen for OT treatment this date. Pt resting in bed and agreeable to treatment. Pt demo'd mod A x1 sit-supine transfer, mod Ax2 STS transfer with HHA, and mod A x2 supine-sit transfer. Pt performed x1 STS from EOB and stood supported for about ~15 seconds before needed rest break. Pt engaged in dynamic reaching activty and 3x10 BUE strengthening exerices at EOB. Pt static and dynamic sitting balance are improving and was able to maintain without verbal cues. Pt presents with limited activity tolerance and is a high falls risk d/t weakness and balance impairments. Pt would benefit from continued OT services to address (I) and safety with ADLs and functional mobility. Rec d/c SNF     Time Calculation:   Evaluation Complexity (OT)  Review Occupational Profile/Medical/Therapy History Complexity: expanded/moderate complexity  Assessment, Occupational Performance/Identification of Deficit Complexity: 3-5 performance deficits  Clinical Decision Making Complexity (OT): detailed assessment/moderate complexity  Overall Complexity of Evaluation (OT): moderate complexity     Time Calculation- OT       Row Name 06/19/24 1522             Time Calculation- OT    OT Start Time 1404 (P)   -      OT Stop Time 1427 (P)   -      OT Time Calculation (min) 23 min (P)   -      Total Timed Code Minutes- OT 23 minute(s) (P)   -      OT Received On 06/19/24 (P)   -      OT - Next Appointment 06/21/24 (P)   -         Timed Charges    51489 - OT Therapeutic Exercise Minutes 8 (P)   -      21106 - OT Therapeutic Activity Minutes 15 (P)   -          Total Minutes    Timed Charges Total Minutes 23 (P)   -       Total Minutes 23 (P)   -                User Key  (r) = Recorded By, (t) = Taken By, (c) = Cosigned By      Initials Name Provider Type    Nereyda Streeter OT Student OT Student                  Therapy Charges for Today       Code Description Service Date Service Provider Modifiers Qty    20286690566  OT THER PROC EA 15 MIN 6/19/2024 Nereyda Rothman OT Student GO 1    [id number]  OT THERAPEUTIC ACT EA 15 MIN 6/19/2024 Nereyda Rothman OT Student GO 1                 AB Levi  6/19/2024

## 2024-06-19 NOTE — PROGRESS NOTES
Name: Kimberlee Urrutia ADMIT: 2024   : 1940  PCP: Meche Ji MD    MRN: [id number] LOS: 17 days   AGE/SEX: 84 y.o. female  ROOM: [facility]     Subjective   Subjective   Patient seen this morning, laying in bed, negative is overnight.  No new complaints.    Review of Systems   As above  Objective   Objective   Vital Signs  Temp:  [97.9 °F (36.6 °C)-98.2 °F (36.8 °C)] 98.2 °F (36.8 °C)  Heart Rate:  [71-75] 71  Resp:  [18] 18  BP: ()/(63-87) 127/63  SpO2:  [100 %] 100 %  on   ;   Device (Oxygen Therapy): room air  Body mass index is 32.7 kg/m².  Physical Exam    General: Alert, laying in bed, chronically ill-appearing   HEENT: Normocephalic, atraumatic  CV: Regular rate and rhythm, no murmurs rubs or gallops  Lungs: Clear to auscultation bilaterally, no crackles or wheezes  Abdomen: Soft, nontender, nondistended  Extremities: No significant peripheral edema , no cyanosis     Results Review     I reviewed the patient's new clinical results.  Results from last 7 days   Lab Units 24  0640 24  0647 24  0658   WBC 10*3/mm3 7.16 9.03 9.67   HEMOGLOBIN g/dL 10.5* 10.6* 10.7*   PLATELETS 10*3/mm3 291 315 280     Results from last 7 days   Lab Units 24  0640 24  0647   SODIUM mmol/L 137 137   POTASSIUM mmol/L 3.8 4.0   CHLORIDE mmol/L 105 105   CO2 mmol/L 25.0 22.2   BUN mg/dL 9 14   CREATININE mg/dL 0.79 0.81   GLUCOSE mg/dL 107* 100*   Estimated Creatinine Clearance: 52.4 mL/min (by C-G formula based on SCr of 0.79 mg/dL).  Results from last 7 days   Lab Units 24  0647   ALBUMIN g/dL 3.1*   BILIRUBIN mg/dL 0.5   ALK PHOS U/L 126*   AST (SGOT) U/L 13   ALT (SGPT) U/L 11     Results from last 7 days   Lab Units 24  0640 24  0522 24  0647   CALCIUM mg/dL 10.5 10.2 10.8*   ALBUMIN g/dL  --   --  3.1*   MAGNESIUM mg/dL 2.3  --   --    PHOSPHORUS mg/dL 3.0  --   --        COVID19   Date Value Ref Range Status   2021 Not Detected Not Detected  - Ref. Range Final   02/17/2021 Not Detected Not Detected - Ref. Range Final     Glucose   Date/Time Value Ref Range Status   06/19/2024 1613 95 70 - 130 mg/dL Final   06/19/2024 1108 110 70 - 130 mg/dL Final   06/19/2024 0610 103 70 - 130 mg/dL Final   06/18/2024 2047 117 70 - 130 mg/dL Final   06/18/2024 1617 125 70 - 130 mg/dL Final   06/18/2024 1113 100 70 - 130 mg/dL Final   06/18/2024 0629 102 70 - 130 mg/dL Final           CT Abdomen Pelvis With Contrast  Narrative: CT ABDOMEN PELVIS W CONTRAST-     INDICATION: Sepsis, ureteral stent     COMPARISON: CT abdomen pelvis June 1, 2024     TECHNIQUE:  Routine CT abdomen/pelvis with IV contrast. Coronal and sagittal  reformats. Radiation dose reduction techniques were utilized, including  automated exposure control and exposure modulation based on body size.     FINDINGS:      Small right pleural effusion. Small loculated left pleural effusion with  pleural calcification and pleural thickening. Suspect subsegmental  atelectasis at the bases.     ABDOMEN: Beam hardening and streak artifact from patient's arms at side  and body wall against the gantry. Normal liver, gallbladder, spleen,  pancreas and adrenal glands. Symmetric perinephric edema. Right ureteral  stent resolution of right hydronephrosis. Fluid attenuating left renal  cyst. Lobular left renal contour with some cortical loss, suspect  scarring. No solid-appearing renal mass or hydronephrosis.     Pelvis: Rodriguez in the bladder. Bladder is collapsed. Anteverted uterus.  Calcifications in the uterus may represent small calcified leiomyomas.  No adnexal mass. Trace fluid in the cul-de-sac.     Bowel: No obstruction. Colonic diverticulosis. Normal appendix.     Abdominal wall: Rectus diastases. Periumbilical abdominal wall scarring.  Small fat-containing umbilical hernia. Small fat-containing inguinal  hernias. Body wall edema.     Retroperitoneum: No lymphadenopathy.     Vasculature: Patent. No abdominal aortic  aneurysm.     Osseous structures: No destructive osseous lesions. Lumbar facet  degenerative arthropathy with grade 1/2 anterolisthesis of L3 on L4.  Decreased bone mineralization..     Impression:    1. New third spacing with small right greater than left pleural  effusions and body wall edema.  2. Interval placement of right ureteral stent with resolution of  hydronephrosis. Ureterolithiasis no longer seen  3. Colonic diverticulosis     This report was finalized on 6/6/2024 10:41 AM by Dr. Diony Lam M.D on Workstation: [id number]       Scheduled Medications  bisacodyl, 10 mg, Rectal, Daily  donepezil, 10 mg, Oral, Daily  lactated ringers, 1,000 mL, Intravenous, Once  memantine, 10 mg, Oral, BID  polyethylene glycol, 17 g, Oral, BID  senna-docusate sodium, 2 tablet, Oral, BID  sodium chloride, 10 mL, Intravenous, Q12H    Infusions   Diet  Diet: Regular/House; Feeding Assistance - Nursing; Texture: Regular (IDDSI 7); Fluid Consistency: Thin (IDDSI 0)    I have personally reviewed     [x]  Laboratory   [x]  Microbiology   []  Radiology   []  EKG/Telemetry  []  Cardiology/Vascular   []  Pathology    []  Records       Assessment/Plan     Active Hospital Problems    Diagnosis  POA    **Right ureteral stone [N20.1]  Yes    Hypercalcemia [E83.52]  No    Constipation [K59.00]  Clinically Undetermined    Acute UTI (urinary tract infection) [N39.0]  Yes    Hypoglycemia [E16.2]  Yes    Abnormal LFTs [R79.89]  Yes    Chronic bilateral low back pain without sciatica [M54.50, G89.29]  Yes    CAYLA (dementia of Alzheimer type) [G30.9, F02.80]  Yes    Lumbar spinal stenosis [M48.061]  Yes    Thrombocytopenia [D69.6]  Yes    Acute low back pain [M54.50]  Yes      Resolved Hospital Problems    Diagnosis Date Resolved POA    Shock, septic [A41.9, R65.21] 06/03/2024 Yes    PRINEC (acute kidney injury) [N17.9] 06/06/2024 Yes       Urosepsis secondary to ESBL E. coli pyelonephritis complicated by right hydronephrosis and  obstructive ureteric stone and ESBL E. coli septicemia   -patient's status post cystoscopy and right stent placement on 6/2/2024.  -Urology recommends keeping Rodriguez catheter until the patient is ambulatory   -Status post adequate course of Invanz treatment per ID.    -Leukocytosis resolved.  No fever.  Repeat blood culture are negative.    Constipation.    Continue bowel regimen    Hypercalcemia.    Resolved    Metabolic encephalopathy in a patient with a history of Alzheimer's dementia.    Resolved metabolic encephalopathy.  Negative CNS examination.  Continue Aricept and Namenda.    Prediabetes.  A1c 5.7.    Hypernatremia/acute kidney.    Both resolved with  D5 water.  Patient volume status is normal.    Elevated liver function gutierrez  secondary to sepsis.  Improved.  GI examination is benign.    Anemia/thrombocytopenia.   Hemoglobin stable  Resolved thrombocytopenia which is mostly secondary to sepsis.    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    Status post neurosurgery consult.  For conservative treatment.  On PT.    Dysphagia.  Tolerating puréed and nectar thickened liquids well.    VTE prophylaxis.  Sequential compression.    DNR status.  Disposition.  Pending placement to SNF when insurance  reinstated        Copied text in this note has been reviewed and is accurate as of 06/19/24.         Dictated utilizing Dragon dictation        Jaren Benson MD  [facility]  06/19/24  19:31 EDT

## 2024-06-19 NOTE — PLAN OF CARE
Goal Outcome Evaluation:  Plan of Care Reviewed With: patient        Progress: no change  Outcome Evaluation: Pt agreeable to PT today; continues to demo B LE weakness, impaired trunk control, impaired balance, and decreased endurance limiting independence with mobility. Pt required mod A x1-2 for bed mobility. Pt was able to perform STS with mod A, and required mod/max A x2 to perform stand pivot transfer to [facility] [place]. Pt able to maintain static standing balance with mod A but demos B knees buckling when pivoting which limits progression of activity and ambulation. Pt remains at increased risk of falls and will continue to benefit from PT to address impairments. Recommend DC to SNF.      Anticipated Discharge Disposition (PT): skilled nursing facility

## 2024-06-19 NOTE — PLAN OF CARE
Goal Outcome Evaluation:  Plan of Care Reviewed With: (P) patient        Progress: (P) improving  Outcome Evaluation: (P) A&Ox3, disorient to situation. VSS. D/c fluids. ACHS. Q2 turn. Placement pending.                                RX refill sent in while Dr Lr was out was incorrect. Please advise pended refill.     Send pt [facility] message about sched EPP

## 2024-06-19 NOTE — THERAPY PROGRESS REPORT/RE-CERT
Patient Name: Kimberlee Urrutia  : 1940    MRN: [id number]                              Today's Date: 2024       Admit Date: 2024    Visit Dx:     ICD-10-CM ICD-9-CM   1. Right ureteral stone  N20.1 592.1   2. Acute UTI  N39.0 599.0   3. Severe sepsis  A41.9 038.9    R65.20 995.92   4. Elevated lactic acid level  R79.89 276.2   5. Osteomyelitis of lumbar spine  M46.26 730.28   6. Elevated AST (SGOT)  R74.01 790.4   7. Elevated ALT measurement  R74.01 790.4   8. Elevated C-reactive protein (CRP)  R79.82 790.95   9. PRINCE (acute kidney injury)  N17.9 584.9     Patient Active Problem List   Diagnosis    Arthritis    HLD (hyperlipidemia)    Primary hypertension    Health care maintenance    Knee pain, right    Hx of total knee arthroplasty    Seasonal allergies    Vitamin D deficiency    Pyuria    Transient alteration of awareness    Migraine without status migrainosus, not intractable    Leukocytosis    Viral pharyngitis    Multinodular thyroid    Osteopenia of multiple sites    Colon cancer screening    Serum calcium elevated    Acute low back pain    Elevated procalcitonin    Sepsis due to Escherichia coli with acute renal failure without septic shock    Thrombocytopenia    Recurrent oral herpes simplex    Lumbar spinal stenosis    Metabolic encephalopathy    Renal stones    Colon cancer screening    Sacroiliac joint pain    Lumbar stenosis without neurogenic claudication    Renal stone    Hyperlipidemia    History of total knee arthroplasty    Spinal stenosis of lumbar region    Multinodular goiter    Osteopenia    Pain in right knee    Sacroiliac joint pain    Hypercalcemia    CAYLA (dementia of Alzheimer type)    Chronic bilateral low back pain without sciatica    Right ureteral stone    Discitis of lumbar region    Acute UTI (urinary tract infection)    Hypoglycemia    Abnormal LFTs    Hypercalcemia    Constipation     Past Medical History:   Diagnosis Date    Allergic     Arthritis     Colon cancer  screening 01/29/2020    Colon cancer screening 01/29/2020    Cough     Diarrhea     Hx of migraine headaches     Hyperlipidemia     Irregular heart beat     Osteoarthritis     Renal stones 02/2021    Vitamin D deficiency      Past Surgical History:   Procedure Laterality Date    COLONOSCOPY      CYSTOSCOPY W/ URETERAL STENT PLACEMENT Right 6/2/2024    Procedure: CYSTOSCOPY, RIGHT URETERAL STENT PLACEMENT;  Surgeon: Efra Schofield MD;  Location: [facility] MAIN OR;  Service: Urology;  Laterality: Right;    LUMBAR EPIDURAL INJECTION N/A 8/2/2021    Procedure: LUMBAR EPIDURAL 1ST VISIT 5-1;  Surgeon: Nu Partida MD;  [place] [facility] [place]  Service: Pain Management;  Laterality: N/A;    REPLACEMENT TOTAL KNEE Right 01/2015    Josue Wilson MD    SACROILIAC JOINT INJECTION Left 6/16/2021    Procedure: SACROILIAC INJECTION left;  Surgeon: Nu Partida MD;  [place] [facility] [place];  Service: Pain Management;  Laterality: Left;    TUBAL ABDOMINAL LIGATION      URETEROSCOPY LASER LITHOTRIPSY WITH STENT INSERTION Left 2/23/2021    Procedure: LT.URETEROSCOPY LASER LITHOTRIPSY WITH STENT  FOR STONE;  Surgeon: Arnaud Lu MD;  Location: [facility] MAIN OR;  Service: Urology;  Laterality: Left;    WISDOM TOOTH EXTRACTION        General Information       Row Name 06/19/24 0928          Physical Therapy Time and Intention    Document Type progress note/recertification  -EF     Mode of Treatment individual therapy;physical therapy  -EF       Row Name 06/19/24 0928          General Information    Existing Precautions/Restrictions fall  -EF     Barriers to Rehab cognitive status  -EF       Row Name 06/19/24 0928          Cognition    Orientation Status (Cognition) oriented to;person;place  -EF       Row Name 06/19/24 0928          Safety Issues, Functional Mobility    Safety Issues Affecting Function (Mobility) awareness of need for assistance;insight into deficits/self-awareness;safety precaution  awareness;sequencing abilities;problem-solving  -EF     Impairments Affecting Function (Mobility) balance;cognition;endurance/activity tolerance;strength  -EF               User Key  (r) = Recorded By, (t) = Taken By, (c) = Cosigned By      Initials Name Provider Type    EF Marci Mckenzie PT Physical Therapist                   Mobility       Row Name 06/19/24 0928          Bed Mobility    Bed Mobility rolling right;scooting/bridging  -EF     All Activities, [place] (Bed Mobility) moderate assist (50% patient effort);verbal cues  -EF     Rolling Right [place] (Bed Mobility) moderate assist (50% patient effort);verbal cues  -EF     Scooting/Bridging [place] (Bed Mobility) dependent (less than 25% patient effort);2 person assist  -EF     Supine-Sit [place] (Bed Mobility) moderate assist (50% patient effort);verbal cues  -EF     Sit-Supine [place] (Bed Mobility) moderate assist (50% patient effort);2 person assist;verbal cues  -EF     Assistive Device (Bed Mobility) head of bed elevated;bed rails  -EF     Comment, (Bed Mobility) increased time required with cues for sequencing  -EF       Row Name 06/19/24 0928          Bed-Chair Transfer    Bed-Chair [place] (Transfers) moderate assist (50% patient effort);maximum assist (25% patient effort);2 person assist;verbal cues  -EF     Comment, (Bed-Chair Transfer) stand pivot bed <> [facility] with HHA; B knees blocked to prevent buckling and cues for sequencing  -EF       Row Name 06/19/24 0928          Sit-Stand Transfer    Sit-Stand [place] (Transfers) moderate assist (50% patient effort);verbal cues  -EF     Assistive Device (Sit-Stand Transfers) walker, front-wheeled  -EF     Comment, (Sit-Stand Transfer) STS x2 from EOB, x1 with rwx and x1 with HHA. STS x1 from [facility] with HHA and B knees blocked. Cues for upright posture and hand placement.  -EF       Row Name 06/19/24 0928          Gait/Stairs (Locomotion)    [place] Level (Gait)  unable to assess  not appropriate to assess due to B LE weakness and knees buckling during stand pivot transfer  -EF               User Key  (r) = Recorded By, (t) = Taken By, (c) = Cosigned By      Initials Name Provider Type    Marci Haro PT Physical Therapist                   Obj/Interventions       Row Name 06/19/24 0930          Range of Motion Comprehensive    General Range of Motion bilateral lower extremity ROM WFL  -EF       Row Name 06/19/24 0930          Strength Comprehensive (MMT)    General Manual Muscle Testing (MMT) Assessment lower extremity strength deficits identified  -EF     Comment, General Manual Muscle Testing (MMT) Assessment B LEs grossly 3/5  -EF       Row Name 06/19/24 0930          Motor Skills    Therapeutic Exercise --  B LAQ x5 reps  -EF       Row Name 06/19/24 0930          Balance    Static Sitting Balance standby assist  -EF     Position, Sitting Balance unsupported;sitting edge of bed  -EF     Static Standing Balance moderate assist  -EF     Dynamic Standing Balance moderate assist;2-person assist  -EF     Position/Device Used, Standing Balance supported  HHA and rwx  -EF     Comment, Balance Increased forward flexed posture and B knee flexion noted when fatigued while standing  -EF               User Key  (r) = Recorded By, (t) = Taken By, (c) = Cosigned By      Initials Name Provider Type    Marci Haro PT Physical Therapist                   Goals/Plan       Row Name 06/19/24 1040          Bed Mobility Goal 1 (PT)    Activity/Assistive Device (Bed Mobility Goal 1, PT) bed mobility activities, all  -EF     [place] Level/Cues Needed (Bed Mobility Goal 1, PT) minimum assist (75% or more patient effort)  -EF     Time Frame (Bed Mobility Goal 1, PT) 2 weeks;long term goal (LTG)  -EF     Progress/Outcomes (Bed Mobility Goal 1, PT) goal ongoing;progress slower than expected;continuing progress toward goal  -EF       Row Name 06/19/24 1040          Transfer  Goal 1 (PT)    Activity/Assistive Device (Transfer Goal 1, PT) transfers, all;walker, rolling  -EF     [place] Level/Cues Needed (Transfer Goal 1, PT) minimum assist (75% or more patient effort)  -EF     Time Frame (Transfer Goal 1, PT) 2 weeks;long term goal (LTG)  -EF     Progress/Outcome (Transfer Goal 1, PT) goal ongoing;progress slower than expected;continuing progress toward goal  -EF       Row Name 06/19/24 1040          Gait Training Goal 1 (PT)    Activity/Assistive Device (Gait Training Goal 1, PT) gait (walking locomotion);walker, rolling  -EF     [place] Level (Gait Training Goal 1, PT) minimum assist (75% or more patient effort)  -EF     Distance (Gait Training Goal 1, PT) 25  -EF     Time Frame (Gait Training Goal 1, PT) 2 weeks;long term goal (LTG)  -EF     Progress/Outcome (Gait Training Goal 1, PT) goal ongoing;continuing progress toward goal;progress slower than expected  -EF       Row Name 06/19/24 1040          Therapy Assessment/Plan (PT)    Planned Therapy Interventions (PT) balance training;bed mobility training;gait training;home exercise program;patient/family education;strengthening;ROM (range of motion);transfer training;postural re-education  -EF               User Key  (r) = Recorded By, (t) = Taken By, (c) = Cosigned By      Initials Name Provider Type    Marci Haro, PT Physical Therapist                   Clinical Impression       Row Name 06/19/24 1038          Pain    Pretreatment Pain Rating 0/10 - no pain  -EF     Posttreatment Pain Rating 0/10 - no pain  -EF       Row Name 06/19/24 1038          Plan of Care Review    Plan of Care Reviewed With patient  -EF     Progress no change  -EF     Outcome Evaluation Pt agreeable to PT today; continues to demo B LE weakness, impaired trunk control, impaired balance, and decreased endurance limiting independence with mobility. Pt required mod A x1-2 for bed mobility. Pt was able to perform STS with mod A, and required  mod/max A x2 [place] [facility] [place] [facility] [place] able to maintain static standing balance with mod A but demos B knees buckling when pivoting which limits progression of activity and ambulation. Pt remains at increased risk of falls and will continue to benefit from PT to address impairments. Recommend DC to SNF.  -EF       Row Name 06/19/24 1038          Therapy Assessment/Plan (PT)    Therapy Frequency (PT) 5 times/wk  -EF       Row Name 06/19/24 1038          Positioning and Restraints    Pre-Treatment Position in bed  -EF     Post Treatment Position bed  -EF     In Bed fowlers;call light within reach;encouraged to call for assist;exit alarm on;notified nsg;heels elevated  -EF               User Key  (r) = Recorded By, (t) = Taken By, (c) = Cosigned By      Initials Name Provider Type    Marci Haro PT Physical Therapist                   Outcome Measures       Row Name 06/19/24 1041 06/19/24 1006       How much help from another person do you currently need...    Turning from your back to your side while in flat bed without using bedrails? 2  -EF 3  -LT    Moving from lying on back to sitting on the side of a flat bed without bedrails? 2  -EF 2  -LT    Moving to and from a bed to a chair (including a wheelchair)? 2  -EF 1  -LT    Standing up from a chair using your arms (e.g., wheelchair, bedside chair)? 2  -EF 1  -LT    Climbing 3-5 steps with a railing? 1  -EF 1  -LT    To walk in hospital room? 1  -EF 1  -LT    AM-PAC 6 Clicks Score (PT) 10  -EF 9  -LT    Highest Level of Mobility Goal 4 --> Transfer to chair/commode  -EF 3 --> Sit at edge of bed  -LT              User Key  (r) = Recorded By, (t) = Taken By, (c) = Cosigned By      Initials Name Provider Type    Marci Haro PT Physical Therapist    Enma Gibbs, RN Registered Nurse                                 Physical Therapy Education       Title: PT OT SLP Therapies (In Progress)       Topic: Physical Therapy (In  Progress)       Point: Mobility training (In Progress)       Learning Progress Summary             Patient Acceptance, E,D, NR by EF at 6/19/2024 0931    Acceptance, E, VU,NR by SV at 6/16/2024 1531    Acceptance, E,TB, NR by BK at 6/14/2024 1715    Acceptance, E,D, VU,NR by EB at 6/14/2024 1641    Acceptance, E,D, NR by EB at 6/13/2024 1514    Acceptance, E,D, NR by EB at 6/12/2024 1643    Acceptance, E,TB, VU by RD at 6/11/2024 1011    Acceptance, E,TB, VU,NR by CB at 6/10/2024 1149    Acceptance, E, VU by EM at 6/7/2024 1622    Acceptance, E, NR by EM at 6/6/2024 1605    Acceptance, E,TB, VU by BK at 6/5/2024 1700    Acceptance, E, NR by EM at 6/5/2024 1640                         Point: Home exercise program (In Progress)       Learning Progress Summary             Patient Acceptance, E,D, NR by EF at 6/19/2024 0931    Acceptance, E, VU,NR by SV at 6/16/2024 1531    Acceptance, E,TB, NR by BK at 6/14/2024 1715    Acceptance, E,TB, VU by BK at 6/13/2024 1655    Acceptance, E,TB, VU by RD at 6/11/2024 1011    Acceptance, E,TB, VU,NR by CB at 6/10/2024 1149    Acceptance, E, VU by EM at 6/7/2024 1622    Acceptance, E, NR by EM at 6/6/2024 1605                         Point: Body mechanics (In Progress)       Learning Progress Summary             Patient Acceptance, E,D, NR by EF at 6/19/2024 0931    Acceptance, E,TB, NR by BK at 6/14/2024 1715    Acceptance, E,D, VU,NR by EB at 6/14/2024 1641    Acceptance, E,D, NR by EB at 6/13/2024 1514    Acceptance, E,D, NR by EB at 6/12/2024 1643    Acceptance, E,TB, VU by RD at 6/11/2024 1011    Acceptance, E,TB, VU,NR by CB at 6/10/2024 1149                         Point: Precautions (In Progress)       Learning Progress Summary             Patient Acceptance, E,D, NR by EB at 6/13/2024 1514    Acceptance, E,TB, VU by RD at 6/11/2024 1011    Acceptance, E,TB, VU,NR by CB at 6/10/2024 1149                                         User Key       Initials Effective Dates Name  Provider Type Discipline    RD 06/16/21 -  Leonela Acuna, PT Physical Therapist PT    EF 05/31/24 -  Marci Mckenzie, PT Physical Therapist PT    EM 06/16/21 -  Yvette Guajardo, PT Physical Therapist PT    SV 07/11/23 -  Velma Georges, PT Physical Therapist PT    EB 02/14/23 -  aNncy Bishop, JOEY Physical Therapist Assistant PT    CB 10/22/21 -  Kaitlin Cunningham, PT Physical Therapist PT    BK 04/30/24 -  Nu Leon, RN Extern Registered Nurse Nurse                  PT Recommendation and Plan  Planned Therapy Interventions (PT): balance training, bed mobility training, gait training, home exercise program, patient/family education, strengthening, ROM (range of motion), transfer training, postural re-education  Plan of Care Reviewed With: patient  Progress: no change  Outcome Evaluation: Pt agreeable to PT today; continues to demo B LE weakness, impaired trunk control, impaired balance, and decreased endurance limiting independence with mobility. Pt required mod A x1-2 for bed mobility. Pt was able to perform STS with mod A, and required mod/max A x2 to perform [place] [facility] [place] able to maintain static standing balance with mod A but demos B knees buckling when pivoting which limits progression of activity and ambulation. Pt remains at increased risk of falls and will continue to benefit from PT to address impairments. Recommend DC to SNF.     Time Calculation:         PT Charges       Row Name 06/19/24 1041             Time Calculation    Start Time 0912  -EF      Stop Time 0946  -EF      Time Calculation (min) 34 min  -EF      PT Received On 06/19/24  -EF      PT - Next Appointment 06/20/24  -EF      PT Goal Re-Cert Due Date 07/03/24  -EF         Time Calculation- PT    Total Timed Code Minutes- PT 34 minute(s)  -EF         Timed Charges    28227 - PT Therapeutic Activity Minutes 34  -EF         Total Minutes    Timed Charges Total Minutes 34  -EF       Total Minutes 34  -EF                 User Key  (r) = Recorded By, (t) = Taken By, (c) = Cosigned By      Initials Name Provider Type     Marci Mckenzie, PT Physical Therapist                  Therapy Charges for Today       Code Description Service Date Service Provider Modifiers Qty    [id number]  PT THERAPEUTIC ACT EA 15 MIN 6/19/2024 Marci Mckenzie, PT GP 2            PT G-Codes  Outcome Measure Options: AM-PAC 6 Clicks Daily Activity (OT)  AM-PAC 6 Clicks Score (PT): 10  AM-PAC 6 Clicks Score (OT): 9  Modified [place] Scale: 4 - Moderately severe disability.  Unable to walk without assistance, and unable to attend to own bodily needs without assistance.  PT Discharge Summary  Anticipated Discharge Disposition (PT): skilled nursing facility    Marci Mckenzie PT  6/19/2024

## 2024-06-19 NOTE — PLAN OF CARE
Goal Outcome Evaluation:  Plan of Care Reviewed With: patient        Progress: no change     VSS. Pt turned q2h. FC. Care done. IVF. [facility] ACHS. No c/o pain. DC TBD d/t CCP issues.

## 2024-06-19 NOTE — PROGRESS NOTES
Continued Stay Note  [facility]     Patient Name: Kimberlee Urrutia  MRN: [id number]  Today's Date: 6/19/2024    Admit Date: 6/1/2024    Plan: [facility] when insurance is reinstated   Discharge Plan       Row Name 06/19/24 1510       Plan    Plan [facility] when insurance is reinstated    Patient/Family in Agreement with Plan yes    Plan Comments Spoke with niece/Gi/POA.  Still waiting for insurance to be reinstated.  Kian/Salvador Stewart continues to follow.  CCP following.  Dave COTA                         Expected Discharge Date and Time       Expected Discharge Date Expected Discharge Time    Jun 20, 2024               Becky S. [place], RN

## 2024-06-20 LAB
GLUCOSE BLDC GLUCOMTR-MCNC: 106 MG/DL (ref 70–130)
GLUCOSE BLDC GLUCOMTR-MCNC: 107 MG/DL (ref 70–130)
GLUCOSE BLDC GLUCOMTR-MCNC: 131 MG/DL (ref 70–130)

## 2024-06-20 PROCEDURE — 82948 REAGENT STRIP/BLOOD GLUCOSE: CPT

## 2024-06-20 RX ADMIN — SENNOSIDES AND DOCUSATE SODIUM 2 TABLET: 50; 8.6 TABLET ORAL at 20:14

## 2024-06-20 RX ADMIN — SENNOSIDES AND DOCUSATE SODIUM 2 TABLET: 50; 8.6 TABLET ORAL at 08:28

## 2024-06-20 RX ADMIN — MEMANTINE HYDROCHLORIDE 10 MG: 10 TABLET, FILM COATED ORAL at 08:28

## 2024-06-20 RX ADMIN — Medication 10 ML: at 08:27

## 2024-06-20 RX ADMIN — MEMANTINE HYDROCHLORIDE 10 MG: 10 TABLET, FILM COATED ORAL at 20:14

## 2024-06-20 RX ADMIN — DONEPEZIL HYDROCHLORIDE 10 MG: 10 TABLET, FILM COATED ORAL at 08:28

## 2024-06-20 RX ADMIN — Medication 10 ML: at 20:14

## 2024-06-20 NOTE — PLAN OF CARE
Goal Outcome Evaluation:  Plan of Care Reviewed With: patient        Progress: no change   VSS. Pt turned q2h. No c/o pain. [facility] ACHS. Snack of PB crackers and juice given at bedtime to keep BS from going too low. Drsg changed to midline. Fc care done. Good UOP. DC TBD-depending on CCP/insurance issue.

## 2024-06-20 NOTE — PLAN OF CARE
Goal Outcome Evaluation:  Plan of Care Reviewed With: (P) patient        Progress: (P) no change  Outcome Evaluation: (P) A&O x3, disorient to time. VSS. ACHS. RA. Catheter d/c per urology. Still awaiting placement.

## 2024-06-20 NOTE — PROGRESS NOTES
Name: Kimberlee Urrutia ADMIT: 2024   : 1940  PCP: Meche Ji MD    MRN: [id number] LOS: 18 days   AGE/SEX: 84 y.o. female  ROOM: [facility]     Subjective   Subjective   Laying in bed.  No acute events overnight.  Denies any complaints.  Denies urinary symptoms.    Review of Systems   As above  Objective   Objective   Vital Signs  Temp:  [97.5 °F (36.4 °C)-98.2 °F (36.8 °C)] 97.5 °F (36.4 °C)  Heart Rate:  [73-76] 73  Resp:  [16-18] 18  BP: (111-136)/(58-76) 111/76  SpO2:  [98 %-100 %] 98 %  on   ;   Device (Oxygen Therapy): room air  Body mass index is 32.38 kg/m².  Physical Exam    General: Alert, laying in bed, chronically ill-appearing   HEENT: Normocephalic, atraumatic  CV: RRR, no murmurs lungs  Lungs: CTA, no wheezing s  Abdomen: Soft, nontender, nondistended  Extremities: No significant peripheral edema , no cyanosis     Results Review     I reviewed the patient's new clinical results.  Results from last 7 days   Lab Units 24  0640 24  0647   WBC 10*3/mm3 7.16 9.03   HEMOGLOBIN g/dL 10.5* 10.6*   PLATELETS 10*3/mm3 291 315     Results from last 7 days   Lab Units 24  0640 24  0647   SODIUM mmol/L 137 137   POTASSIUM mmol/L 3.8 4.0   CHLORIDE mmol/L 105 105   CO2 mmol/L 25.0 22.2   BUN mg/dL 9 14   CREATININE mg/dL 0.79 0.81   GLUCOSE mg/dL 107* 100*   Estimated Creatinine Clearance: 52.1 mL/min (by C-G formula based on SCr of 0.79 mg/dL).  Results from last 7 days   Lab Units 24  0647   ALBUMIN g/dL 3.1*   BILIRUBIN mg/dL 0.5   ALK PHOS U/L 126*   AST (SGOT) U/L 13   ALT (SGPT) U/L 11     Results from last 7 days   Lab Units 24  0640 24  0522 24  0647   CALCIUM mg/dL 10.5 10.2 10.8*   ALBUMIN g/dL  --   --  3.1*   MAGNESIUM mg/dL 2.3  --   --    PHOSPHORUS mg/dL 3.0  --   --        COVID19   Date Value Ref Range Status   2021 Not Detected Not Detected - Ref. Range Final   2021 Not Detected Not Detected - Ref. Range Final      Glucose   Date/Time Value Ref Range Status   06/20/2024 1106 107 70 - 130 mg/dL Final   06/20/2024 0639 106 70 - 130 mg/dL Final   06/19/2024 2029 88 70 - 130 mg/dL Final   06/19/2024 1613 95 70 - 130 mg/dL Final   06/19/2024 1108 110 70 - 130 mg/dL Final   06/19/2024 0610 103 70 - 130 mg/dL Final   06/18/2024 2047 117 70 - 130 mg/dL Final           CT Abdomen Pelvis With Contrast  Narrative: CT ABDOMEN PELVIS W CONTRAST-     INDICATION: Sepsis, ureteral stent     COMPARISON: CT abdomen pelvis June 1, 2024     TECHNIQUE:  Routine CT abdomen/pelvis with IV contrast. Coronal and sagittal  reformats. Radiation dose reduction techniques were utilized, including  automated exposure control and exposure modulation based on body size.     FINDINGS:      Small right pleural effusion. Small loculated left pleural effusion with  pleural calcification and pleural thickening. Suspect subsegmental  atelectasis at the bases.     ABDOMEN: Beam hardening and streak artifact from patient's arms at side  and body wall against the gantry. Normal liver, gallbladder, spleen,  pancreas and adrenal glands. Symmetric perinephric edema. Right ureteral  stent resolution of right hydronephrosis. Fluid attenuating left renal  cyst. Lobular left renal contour with some cortical loss, suspect  scarring. No solid-appearing renal mass or hydronephrosis.     Pelvis: Rodriguez in the bladder. Bladder is collapsed. Anteverted uterus.  Calcifications in the uterus may represent small calcified leiomyomas.  No adnexal mass. Trace fluid in the cul-de-sac.     Bowel: No obstruction. Colonic diverticulosis. Normal appendix.     Abdominal wall: Rectus diastases. Periumbilical abdominal wall scarring.  Small fat-containing umbilical hernia. Small fat-containing inguinal  hernias. Body wall edema.     Retroperitoneum: No lymphadenopathy.     Vasculature: Patent. No abdominal aortic aneurysm.     Osseous structures: No destructive osseous lesions. Lumbar  facet  degenerative arthropathy with grade 1/2 anterolisthesis of L3 on L4.  Decreased bone mineralization..     Impression:    1. New third spacing with small right greater than left pleural  effusions and body wall edema.  2. Interval placement of right ureteral stent with resolution of  hydronephrosis. Ureterolithiasis no longer seen  3. Colonic diverticulosis     This report was finalized on 6/6/2024 10:41 AM by Dr. Diony Lam M.D on Workstation: [id number]       Scheduled Medications  bisacodyl, 10 mg, Rectal, Daily  donepezil, 10 mg, Oral, Daily  lactated ringers, 1,000 mL, Intravenous, Once  memantine, 10 mg, Oral, BID  polyethylene glycol, 17 g, Oral, BID  senna-docusate sodium, 2 tablet, Oral, BID  sodium chloride, 10 mL, Intravenous, Q12H    Infusions   Diet  Diet: Regular/House; Feeding Assistance - Nursing; Texture: Regular (IDDSI 7); Fluid Consistency: Thin (IDDSI 0)    I have personally reviewed     [x]  Laboratory   [x]  Microbiology   []  Radiology   []  EKG/Telemetry  []  Cardiology/Vascular   []  Pathology    []  Records       Assessment/Plan     Active Hospital Problems    Diagnosis  POA    **Right ureteral stone [N20.1]  Yes    Hypercalcemia [E83.52]  No    Constipation [K59.00]  Clinically Undetermined    Acute UTI (urinary tract infection) [N39.0]  Yes    Hypoglycemia [E16.2]  Yes    Abnormal LFTs [R79.89]  Yes    Chronic bilateral low back pain without sciatica [M54.50, G89.29]  Yes    CAYLA (dementia of Alzheimer type) [G30.9, F02.80]  Yes    Lumbar spinal stenosis [M48.061]  Yes    Thrombocytopenia [D69.6]  Yes    Acute low back pain [M54.50]  Yes      Resolved Hospital Problems    Diagnosis Date Resolved POA    Shock, septic [A41.9, R65.21] 06/03/2024 Yes    PRINCE (acute kidney injury) [N17.9] 06/06/2024 Yes       Urosepsis secondary to ESBL E. coli pyelonephritis complicated by right hydronephrosis and obstructive ureteric stone and ESBL E. coli septicemia   - status post cystoscopy  and right stent placement on 6/2/2024.  -Urology recommends keeping Rodriguez catheter until the patient is ambulatory   -Status post adequate course of Invanz treatment per ID.    -Leukocytosis resolved.  No fever.  Repeat blood culture are negative.  -Urology plan for stone removal in 1 to 2 weeks, since remains in the hospital, reconsulted urology 06/20 for stone removal    Constipation.    Continue bowel regimen    Hypercalcemia.    Resolved    Metabolic encephalopathy in a patient with a history of Alzheimer's dementia.    Resolved metabolic encephalopathy.  Negative CNS examination.    Continue Aricept and Namenda.    Prediabetes.    A1c 5.7.  -Discontinue SSI 06/20 blood glucose has been stable and patient did not require insulin in the last 48 hours    Hypernatremia/acute kidney.    Resolved    Transaminitis   secondary to sepsis.  LFTs normalized.    Anemia/thrombocytopenia.   Hemoglobin stable  Resolved thrombocytopenia which is mostly secondary to sepsis.    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    Status post neurosurgery consult., conservative treatment.    -Pending placement to rehab    Dysphagia.   -Improved, tolerating advance diet          DNR status.  Disposition.  Pending placement to SNF when insurance  reinstated        Copied text in this note has been reviewed and is accurate as of 06/20/24.         Dictated utilizing Dragon dictation        Jaren Benson MD  [facility]  06/20/24  13:31 EDT

## 2024-06-20 NOTE — PROGRESS NOTES
First Urology Note  6/20/2024  14:08 EDT    Non-visit consult. Urology consulted for guidance on indwelling duron catheter and stone management. Patient has had an indwelling catheter x 18 days. Has been up with PT ambulating but still not to baseline ambulation. Okay for voiding trial from urology standpoint. In the event that patient is unable to urinate or with PVR >250, duron will need to be replaced or can opt for intermitting straight catheterization. For management of stone, plan is to bring patient back as an outpatient for scheduled ureteroscopy, laser lithotripsy. This is appropriate in the outpatient setting. Our office will call patient directly to schedule.     MICHELE Solano  06/20/24  14:08 EDT

## 2024-06-21 PROCEDURE — 97110 THERAPEUTIC EXERCISES: CPT

## 2024-06-21 PROCEDURE — 97530 THERAPEUTIC ACTIVITIES: CPT

## 2024-06-21 RX ADMIN — MEMANTINE HYDROCHLORIDE 10 MG: 10 TABLET, FILM COATED ORAL at 23:23

## 2024-06-21 RX ADMIN — SENNOSIDES AND DOCUSATE SODIUM 2 TABLET: 50; 8.6 TABLET ORAL at 21:57

## 2024-06-21 RX ADMIN — Medication 10 ML: at 12:09

## 2024-06-21 RX ADMIN — DONEPEZIL HYDROCHLORIDE 10 MG: 10 TABLET, FILM COATED ORAL at 12:09

## 2024-06-21 RX ADMIN — Medication 10 ML: at 21:57

## 2024-06-21 RX ADMIN — MEMANTINE HYDROCHLORIDE 10 MG: 10 TABLET, FILM COATED ORAL at 12:09

## 2024-06-21 NOTE — THERAPY TREATMENT NOTE
Patient Name: Kimberlee Urrutia  : 1940    MRN: [id number]                              Today's Date: 2024       Admit Date: 2024    Visit Dx:     ICD-10-CM ICD-9-CM   1. Right ureteral stone  N20.1 592.1   2. Acute UTI  N39.0 599.0   3. Severe sepsis  A41.9 038.9    R65.20 995.92   4. Elevated lactic acid level  R79.89 276.2   5. Osteomyelitis of lumbar spine  M46.26 730.28   6. Elevated AST (SGOT)  R74.01 790.4   7. Elevated ALT measurement  R74.01 790.4   8. Elevated C-reactive protein (CRP)  R79.82 790.95   9. PIRNCE (acute kidney injury)  N17.9 584.9     Patient Active Problem List   Diagnosis    Arthritis    HLD (hyperlipidemia)    Primary hypertension    Health care maintenance    Knee pain, right    Hx of total knee arthroplasty    Seasonal allergies    Vitamin D deficiency    Pyuria    Transient alteration of awareness    Migraine without status migrainosus, not intractable    Leukocytosis    Viral pharyngitis    Multinodular thyroid    Osteopenia of multiple sites    Colon cancer screening    Serum calcium elevated    Acute low back pain    Elevated procalcitonin    Sepsis due to Escherichia coli with acute renal failure without septic shock    Thrombocytopenia    Recurrent oral herpes simplex    Lumbar spinal stenosis    Metabolic encephalopathy    Renal stones    Colon cancer screening    Sacroiliac joint pain    Lumbar stenosis without neurogenic claudication    Renal stone    Hyperlipidemia    History of total knee arthroplasty    Spinal stenosis of lumbar region    Multinodular goiter    Osteopenia    Pain in right knee    Sacroiliac joint pain    Hypercalcemia    CAYLA (dementia of Alzheimer type)    Chronic bilateral low back pain without sciatica    Right ureteral stone    Discitis of lumbar region    Acute UTI (urinary tract infection)    Hypoglycemia    Abnormal LFTs    Hypercalcemia    Constipation     Past Medical History:   Diagnosis Date    Allergic     Arthritis     Colon cancer  screening 01/29/2020    Colon cancer screening 01/29/2020    Cough     Diarrhea     Hx of migraine headaches     Hyperlipidemia     Irregular heart beat     Osteoarthritis     Renal stones 02/2021    Vitamin D deficiency      Past Surgical History:   Procedure Laterality Date    COLONOSCOPY      CYSTOSCOPY W/ URETERAL STENT PLACEMENT Right 6/2/2024    Procedure: CYSTOSCOPY, RIGHT URETERAL STENT PLACEMENT;  Surgeon: Efra Schofield MD;  Location: [facility] OR;  Service: Urology;  Laterality: Right;    LUMBAR EPIDURAL INJECTION N/A 8/2/2021    Procedure: LUMBAR EPIDURAL 1ST VISIT 5-1;  Surgeon: Nu Partida MD;  [place] [facility];  Service: Pain Management;  Laterality: N/A;    REPLACEMENT TOTAL KNEE Right 01/2015    Josue Wilson MD    SACROILIAC JOINT INJECTION Left 6/16/2021    Procedure: SACROILIAC INJECTION left;  Surgeon: Nu Partida MD;  [place] [facility];  Service: Pain Management;  Laterality: Left;    TUBAL ABDOMINAL LIGATION      URETEROSCOPY LASER LITHOTRIPSY WITH STENT INSERTION Left 2/23/2021    Procedure: LT.URETEROSCOPY LASER LITHOTRIPSY WITH STENT  FOR STONE;  Surgeon: Arnaud Lu MD;  Location: [facility] OR;  Service: Urology;  Laterality: Left;    WISDOM TOOTH EXTRACTION        General Information       Row Name 06/21/24 1117          Physical Therapy Time and Intention    Document Type therapy note (daily note)  -EB     Mode of Treatment individual therapy;physical therapy  -EB       Row Name 06/21/24 1117          General Information    Patient Profile Reviewed yes  -EB     Existing Precautions/Restrictions fall  -EB       Row Name 06/21/24 1117          Cognition    Orientation Status (Cognition) oriented to;person;place  -EB       Row Name 06/21/24 1117          Safety Issues, Functional Mobility    Impairments Affecting Function (Mobility) balance;cognition;endurance/activity tolerance;strength  -EB               User Key  (r) = Recorded By, (t) =  Taken By, (c) = Cosigned By      Initials Name Provider Type    Nancy Obrien PTA Physical Therapist Assistant                   Mobility       Row Name 06/21/24 1117          Bed Mobility    Supine-Sit [place] (Bed Mobility) minimum assist (75% patient effort)  increased time and sequencing cues.  -EB     Sit-Supine [place] (Bed Mobility) moderate assist (50% patient effort);2 person assist  -EB     Assistive Device (Bed Mobility) bed rails;head of bed elevated  -EB       Row Name 06/21/24 1117          Sit-Stand Transfer    Sit-Stand [place] (Transfers) moderate assist (50% patient effort);2 person assist;verbal cues;nonverbal cues (demo/gesture)  -EB     Assistive Device (Sit-Stand Transfers) walker, front-wheeled  -EB     Comment, (Sit-Stand Transfer) 3Xs for functional strengthening.  -EB               User Key  (r) = Recorded By, (t) = Taken By, (c) = Cosigned By      Initials Name Provider Type    Nancy Obrien PTA Physical Therapist Assistant                   Obj/Interventions       Row Name 06/21/24 1118          Motor Skills    Therapeutic Exercise --  BLE: AP and LAQs (X10)  -EB       Row Name 06/21/24 1118          Balance    Balance Assessment sitting static balance;sitting dynamic balance  -EB     Static Sitting Balance standby assist  -EB     Dynamic Sitting Balance standby assist  -EB     Position, Sitting Balance supported;sitting edge of bed  -EB     Static Standing Balance minimal assist;moderate assist;2-person assist  -EB     Comment, Balance posterior lean. back of pt's legs leaning against the bed. Pt cued for posture, initial standing balance ModAX2, last stand improved with MinAX2 standing balance. Pt able to stand for at least 20seconds with each stand.  -EB               User Key  (r) = Recorded By, (t) = Taken By, (c) = Cosigned By      Initials Name Provider Type    Nancy Obrien PTA Physical Therapist Assistant                   Goals/Plan    No  documentation.                  Clinical Impression       Row Name 06/21/24 1123          Plan of Care Review    Plan of Care Reviewed With patient  -EB     Progress improving  -EB     Outcome Evaluation Pt seen for PT tx this AM and agreeable to partcipate. Pt is slowly progressing with mobility with pt working on bed mobility and standing in order to improve strength for transfers and progress towards independence. Pt completed supine to sit with Peggy. Pt needed verbal and sequencing cues to complete. Pt then sat EOB with SBA with pt completing malena LE exercises. Pt stood 3Xs needing ModAX2. Pt initially needed MOdAX2 for standing balance and only briefly stood but pt did improve with standing balance with the last 2 stands. Pt also worked on correcting posterior lean with standing. Pt fatigues quickly and needed rest breaks in between. Pt returned to supine with ModAX2. Will continue to follow and progress pt as able.  -EB       Row Name 06/21/24 1123          Therapy Assessment/Plan (PT)    Therapy Frequency (PT) 5 times/wk  -EB       Row Name 06/21/24 1123          Positioning and Restraints    Pre-Treatment Position in bed  -EB     Post Treatment Position bed  -EB     In Bed supine;call light within reach;encouraged to call for assist;exit alarm on  -EB               User Key  (r) = Recorded By, (t) = Taken By, (c) = Cosigned By      Initials Name Provider Type    Nancy Obrien PTA Physical Therapist Assistant                   Outcome Measures       Row Name 06/21/24 1122 06/21/24 1017       How much help from another person do you currently need...    Turning from your back to your side while in flat bed without using bedrails? 3  -EB 2  -LT (r) [place] (t) LT (c)    Moving from lying on back to sitting on the side of a flat bed without bedrails? 2  -EB 2  -LT (r) [place] (t) LT (c)    Moving to and from a bed to a chair (including a wheelchair)? 1  -EB 2  -LT (r) [place] (t) LT (c)    Standing up from a chair using  your arms (e.g., wheelchair, bedside chair)? 2  -EB 2  -LT (r) [place] (t) LT (c)    Climbing 3-5 steps with a railing? 1  -EB 1  -LT (r) [place] (t) LT (c)    To walk in hospital room? 1  -EB 1  -LT (r) [place] (t) LT (c)    AM-PAC 6 Clicks Score (PT) 10  -EB 10  -LT (r) [place] (t)    Highest Level of Mobility Goal 4 --> Transfer to chair/commode  -EB 4 --> Transfer to chair/commode  -LT (r) [place] (t)              User Key  (r) = Recorded By, (t) = Taken By, (c) = Cosigned By      Initials Name Provider Type    Nancy Obrien PTA Physical Therapist Assistant    Enma iGbbs RN Registered Nurse    Jada Singleton RN Extern Registered Nurse                                 Physical Therapy Education       Title: PT OT SLP Therapies (In Progress)       Topic: Physical Therapy (In Progress)       Point: Mobility training (Done)       Learning Progress Summary             Patient Acceptance, E,D, VU,NR by EB at 6/21/2024 1122    Acceptance, E,D, NR by EF at 6/19/2024 0931    Acceptance, E, VU,NR by SV at 6/16/2024 1531    Acceptance, E,TB, NR by BK at 6/14/2024 1715    Acceptance, E,D, VU,NR by EB at 6/14/2024 1641    Acceptance, E,D, NR by EB at 6/13/2024 1514    Acceptance, E,D, NR by EB at 6/12/2024 1643    Acceptance, E,TB, VU by RD at 6/11/2024 1011    Acceptance, E,TB, VU,NR by CB at 6/10/2024 1149    Acceptance, E, VU by EM at 6/7/2024 1622    Acceptance, E, NR by EM at 6/6/2024 1605    Acceptance, E,TB, VU by BK at 6/5/2024 1700    Acceptance, E, NR by EM at 6/5/2024 1640                         Point: Home exercise program (Done)       Learning Progress Summary             Patient Acceptance, E,D, VU,NR by EB at 6/21/2024 1122    Acceptance, E,D, NR by EF at 6/19/2024 0931    Acceptance, E, VU,NR by SV at 6/16/2024 1531    Acceptance, E,TB, NR by BK at 6/14/2024 1715    Acceptance, E,TB, VU by BK at 6/13/2024 1655    Acceptance, E,TB, VU by RD at 6/11/2024 1011    Acceptance, E,TB, VU,NR by CB at 6/10/2024 1149     Acceptance, E, VU by EM at 6/7/2024 1622    Acceptance, E, NR by EM at 6/6/2024 1605                         Point: Body mechanics (Done)       Learning Progress Summary             Patient Acceptance, E,D, VU,NR by EB at 6/21/2024 1122    Acceptance, E,D, NR by EF at 6/19/2024 0931    Acceptance, E,TB, NR by BK at 6/14/2024 1715    Acceptance, E,D, VU,NR by EB at 6/14/2024 1641    Acceptance, E,D, NR by EB at 6/13/2024 1514    Acceptance, E,D, NR by EB at 6/12/2024 1643    Acceptance, E,TB, VU by RD at 6/11/2024 1011    Acceptance, E,TB, VU,NR by CB at 6/10/2024 1149                         Point: Precautions (In Progress)       Learning Progress Summary             Patient Acceptance, E,D, NR by EB at 6/13/2024 1514    Acceptance, E,TB, VU by RD at 6/11/2024 1011    Acceptance, E,TB, VU,NR by CB at 6/10/2024 1149                                         User Key       Initials Effective Dates Name Provider Type Discipline    RD 06/16/21 -  Leonela Acuna, PT Physical Therapist PT    EF 05/31/24 -  Marci Mckenzie, PT Physical Therapist PT    EM 06/16/21 -  Yvette Guajardo, PT Physical Therapist PT    SV 07/11/23 -  Velma Georges, PT Physical Therapist PT    EB 02/14/23 -  Nancy Bishop PTA Physical Therapist Assistant PT    CB 10/22/21 -  Kaitlin Cunningham, PT Physical Therapist PT    BK 04/30/24 -  Nu Leon, RN Extern Registered Nurse Nurse                  PT Recommendation and Plan     Plan of Care Reviewed With: patient  Progress: improving  Outcome Evaluation: Pt seen for PT tx this AM and agreeable to partcipate. Pt is slowly progressing with mobility with pt working on bed mobility and standing in order to improve strength for transfers and progress towards independence. Pt completed supine to sit with Peggy. Pt needed verbal and sequencing cues to complete. Pt then sat EOB with SBA with pt completing malena LE exercises. Pt stood 3Xs needing ModAX2. Pt initially needed MOdAX2 for standing  balance and only briefly stood but pt did improve with standing balance with the last 2 stands. Pt also worked on correcting posterior lean with standing. Pt fatigues quickly and needed rest breaks in between. Pt returned to supine with ModAX2. Will continue to follow and progress pt as able.     Time Calculation:         PT Charges       Row Name 06/21/24 1116             Time Calculation    Start Time 0844  -EB      Stop Time 0907  -EB      Time Calculation (min) 23 min  -EB      PT Received On 06/21/24  -EB      PT - Next Appointment 06/22/24  -EB         Time Calculation- PT    Total Timed Code Minutes- PT 23 minute(s)  -EB                User Key  (r) = Recorded By, (t) = Taken By, (c) = Cosigned By      Initials Name Provider Type    Nancy Obrien PTA Physical Therapist Assistant                  Therapy Charges for Today       Code Description Service Date Service Provider Modifiers Qty    01608294513  PT THERAPEUTIC ACT EA 15 MIN 6/21/2024 Nancy Bishop, JOEY GP 1    47429718718 HC PT THER PROC EA 15 MIN 6/21/2024 Nancy Bishop, JOEY GP 1    17962943377 HC PT THER SUPP EA 15 MIN 6/21/2024 Nancy Bishop, JOEY GP 1            PT G-Codes  Outcome Measure Options: AM-PAC 6 Clicks Daily Activity (OT)  AM-PAC 6 Clicks Score (PT): 10  AM-PAC 6 Clicks Score (OT): 9  Modified Adriana Scale: 4 - Moderately severe disability.  Unable to walk without assistance, and unable to attend to own bodily needs without assistance.       Nancy Bishop PTA  6/21/2024

## 2024-06-21 NOTE — PLAN OF CARE
Goal Outcome Evaluation:  Plan of Care Reviewed With: patient        Progress: improving  Outcome Evaluation: Pt seen for PT tx this AM and agreeable to partcipate. Pt is slowly progressing with mobility with pt working on bed mobility and standing in order to improve strength for transfers and progress towards independence. Pt completed supine to sit with Peggy. Pt needed verbal and sequencing cues to complete. Pt then sat EOB with SBA with pt completing malena LE exercises. Pt stood 3Xs needing ModAX2. Pt initially needed MOdAX2 for standing balance and only briefly stood but pt did improve with standing balance with the last 2 stands. Pt also worked on correcting posterior lean with standing. Pt fatigues quickly and needed rest breaks in between. Pt returned to supine with ModAX2. Will continue to follow and progress pt as able.

## 2024-06-21 NOTE — PLAN OF CARE
Goal Outcome Evaluation:  Plan of Care Reviewed With: (P) patient        Progress: (P) improving       Outcome Evaluation: (P) A&Ox2-3, disoriented to situation and time at times. Purewick in place, catheter care completed.  RA. VSS. Q2 turn. Transfer to [facility]. Pending placement.

## 2024-06-21 NOTE — PLAN OF CARE
Goal Outcome Evaluation:  Plan of Care Reviewed With: patient        Progress: improving  Outcome Evaluation: Pt was a transfer from [facility], A/O X 4 upon arrival, daily weight, turns, contact and falls precautions maintained

## 2024-06-21 NOTE — CASE MANAGEMENT/SOCIAL WORK
Continued Stay Note  [facility]     Patient Name: Kimberlee Urrutia  MRN: [id number]  Today's Date: 6/21/2024    Admit Date: 6/1/2024    Plan: [facility] when insurance is reinstated   Discharge Plan       Row Name 06/21/24 1531       Plan    Plan [facility] when insurance is reinstated    Plan Comments S/W Kian/ Gilberto who states pt's insurance is still not showing as being reinstated at this time.  He continues to follow ..........Kavya JACOBSON/ CCP                   Discharge Codes    No documentation.                 Expected Discharge Date and Time       Expected Discharge Date Expected Discharge Time    Jun 24, 2024               Kavya Harrell RN

## 2024-06-21 NOTE — PLAN OF CARE
Goal Outcome Evaluation:  Plan of Care Reviewed With: patient           Outcome Evaluation: Pt. is AlertX 3, disorient to time, Adriana lsign, lab and medication reviewed, urinated with no issues, bladder scan done got 120ml, Plan of care continue.

## 2024-06-21 NOTE — PROGRESS NOTES
Name: Kimberlee Urrutia ADMIT: 2024   : 1940  PCP: Meche Ji MD    MRN: [id number] LOS: 19 days   AGE/SEX: 84 y.o. female  ROOM: [facility]     Subjective   Subjective   No acute events overnight.  Denies any new complaints.  Rodriguez catheter was removed and underwent voiding trial yesterday, able to urinate, PVR has been normal.    Review of Systems   As above  Objective   Objective   Vital Signs  Temp:  [97.5 °F (36.4 °C)-98.2 °F (36.8 °C)] 97.5 °F (36.4 °C)  Heart Rate:  [68] 68  Resp:  [18] 18  BP: (111-132)/(67-76) 132/73     on   ;   Device (Oxygen Therapy): room air  Body mass index is 31.82 kg/m².  Physical Exam    General: Alert,, laying in bed, not in distress  HEENT: Normocephalic, atraumatic  CV: RRR, no murmurs lungs  Lungs: CTA, no wheezing s  Abdomen: Soft, nontender, nondistended  Extremities: No significant peripheral edema , no cyanosis     Results Review     I reviewed the patient's new clinical results.  Results from last 7 days   Lab Units 24  0640 24  0647   WBC 10*3/mm3 7.16 9.03   HEMOGLOBIN g/dL 10.5* 10.6*   PLATELETS 10*3/mm3 291 315     Results from last 7 days   Lab Units 24  0640 24  0647   SODIUM mmol/L 137 137   POTASSIUM mmol/L 3.8 4.0   CHLORIDE mmol/L 105 105   CO2 mmol/L 25.0 22.2   BUN mg/dL 9 14   CREATININE mg/dL 0.79 0.81   GLUCOSE mg/dL 107* 100*   Estimated Creatinine Clearance: 51.7 mL/min (by C-G formula based on SCr of 0.79 mg/dL).  Results from last 7 days   Lab Units 24  0647   ALBUMIN g/dL 3.1*   BILIRUBIN mg/dL 0.5   ALK PHOS U/L 126*   AST (SGOT) U/L 13   ALT (SGPT) U/L 11     Results from last 7 days   Lab Units 24  0640 24  0522 24  0647   CALCIUM mg/dL 10.5 10.2 10.8*   ALBUMIN g/dL  --   --  3.1*   MAGNESIUM mg/dL 2.3  --   --    PHOSPHORUS mg/dL 3.0  --   --        COVID19   Date Value Ref Range Status   2021 Not Detected Not Detected - Ref. Range Final   2021 Not Detected Not  Detected - Ref. Range Final     Glucose   Date/Time Value Ref Range Status   06/20/2024 1630 131 (H) 70 - 130 mg/dL Final   06/20/2024 1106 107 70 - 130 mg/dL Final   06/20/2024 0639 106 70 - 130 mg/dL Final   06/19/2024 2029 88 70 - 130 mg/dL Final   06/19/2024 1613 95 70 - 130 mg/dL Final   06/19/2024 1108 110 70 - 130 mg/dL Final   06/19/2024 0610 103 70 - 130 mg/dL Final           CT Abdomen Pelvis With Contrast  Narrative: CT ABDOMEN PELVIS W CONTRAST-     INDICATION: Sepsis, ureteral stent     COMPARISON: CT abdomen pelvis June 1, 2024     TECHNIQUE:  Routine CT abdomen/pelvis with IV contrast. Coronal and sagittal  reformats. Radiation dose reduction techniques were utilized, including  automated exposure control and exposure modulation based on body size.     FINDINGS:      Small right pleural effusion. Small loculated left pleural effusion with  pleural calcification and pleural thickening. Suspect subsegmental  atelectasis at the bases.     ABDOMEN: Beam hardening and streak artifact from patient's arms at side  and body wall against the gantry. Normal liver, gallbladder, spleen,  pancreas and adrenal glands. Symmetric perinephric edema. Right ureteral  stent resolution of right hydronephrosis. Fluid attenuating left renal  cyst. Lobular left renal contour with some cortical loss, suspect  scarring. No solid-appearing renal mass or hydronephrosis.     Pelvis: Rodriguez in the bladder. Bladder is collapsed. Anteverted uterus.  Calcifications in the uterus may represent small calcified leiomyomas.  No adnexal mass. Trace fluid in the cul-de-sac.     Bowel: No obstruction. Colonic diverticulosis. Normal appendix.     Abdominal wall: Rectus diastases. Periumbilical abdominal wall scarring.  Small fat-containing umbilical hernia. Small fat-containing inguinal  hernias. Body wall edema.     Retroperitoneum: No lymphadenopathy.     Vasculature: Patent. No abdominal aortic aneurysm.     Osseous structures: No  destructive osseous lesions. Lumbar facet  degenerative arthropathy with grade 1/2 anterolisthesis of L3 on L4.  Decreased bone mineralization..     Impression:    1. New third spacing with small right greater than left pleural  effusions and body wall edema.  2. Interval placement of right ureteral stent with resolution of  hydronephrosis. Ureterolithiasis no longer seen  3. Colonic diverticulosis     This report was finalized on 6/6/2024 10:41 AM by Dr. Diony Lam M.D on Workstation: [id number]       Scheduled Medications  bisacodyl, 10 mg, Rectal, Daily  donepezil, 10 mg, Oral, Daily  lactated ringers, 1,000 mL, Intravenous, Once  memantine, 10 mg, Oral, BID  polyethylene glycol, 17 g, Oral, BID  senna-docusate sodium, 2 tablet, Oral, BID  sodium chloride, 10 mL, Intravenous, Q12H    Infusions   Diet  Diet: Regular/House; Feeding Assistance - Nursing; Texture: Regular (IDDSI 7); Fluid Consistency: Thin (IDDSI 0)    I have personally reviewed     [x]  Laboratory   []  Microbiology   []  Radiology   []  EKG/Telemetry  []  Cardiology/Vascular   []  Pathology    []  Records       Assessment/Plan     Active Hospital Problems    Diagnosis  POA    **Right ureteral stone [N20.1]  Yes    Hypercalcemia [E83.52]  No    Constipation [K59.00]  Clinically Undetermined    Acute UTI (urinary tract infection) [N39.0]  Yes    Hypoglycemia [E16.2]  Yes    Abnormal LFTs [R79.89]  Yes    Chronic bilateral low back pain without sciatica [M54.50, G89.29]  Yes    CAYLA (dementia of Alzheimer type) [G30.9, F02.80]  Yes    Lumbar spinal stenosis [M48.061]  Yes    Thrombocytopenia [D69.6]  Yes    Acute low back pain [M54.50]  Yes      Resolved Hospital Problems    Diagnosis Date Resolved POA    Shock, septic [A41.9, R65.21] 06/03/2024 Yes    PRINCE (acute kidney injury) [N17.9] 06/06/2024 Yes       Urosepsis secondary to ESBL E. coli pyelonephritis complicated by right hydronephrosis and obstructive ureteric stone and ESBL E. coli  septicemia   - status post cystoscopy and right stent placement on 6/2/2024.  -Urology recommends keeping Rodriguez catheter until the patient is ambulatory   -Status post adequate course of Invanz treatment per ID.    -Leukocytosis resolved.  No fever.  Repeat blood culture are negative.  -Urology was reconsulted on 06/20 as patient remains in the hospital, voiding trial was recommended, plan still for outpatient for scheduled ureteroscopy, laser llithotripsy after discharge    Constipation.    Continue bowel regimen    Hypercalcemia.    Resolved    Metabolic encephalopathy in a patient with a history of Alzheimer's dementia.    Resolved metabolic encephalopathy.  Negative CNS examination.    Continue Aricept and Namenda.    Prediabetes.    A1c 5.7.  -Discontinue SSI 06/20 blood glucose has been stable and patient did not require insulin in the last 48 hours    Hypernatremia/acute kidney.    Resolved    Transaminitis   secondary to sepsis.  LFTs normalized.    Anemia/thrombocytopenia.   Hemoglobin stable  Resolved thrombocytopenia which is mostly secondary to sepsis.    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    Status post neurosurgery consult., conservative treatment.    -Pending placement to rehab    Dysphagia.   -Improved, tolerating advance diet          CODE STATUS: DNR  Disposition.  Pending placement to SNF when insurance  reinstated.  Medically stable to be discharges.        Copied text in this note has been reviewed and is accurate as of 06/21/24.         Dictated utilizing Dragon dictation        Jaren Benson MD  [facility]  06/21/24  10:12 EDT

## 2024-06-22 RX ADMIN — MEMANTINE HYDROCHLORIDE 10 MG: 10 TABLET, FILM COATED ORAL at 09:07

## 2024-06-22 RX ADMIN — Medication 10 ML: at 09:08

## 2024-06-22 RX ADMIN — POLYETHYLENE GLYCOL 3350 17 G: 17 POWDER, FOR SOLUTION ORAL at 20:56

## 2024-06-22 RX ADMIN — SENNOSIDES AND DOCUSATE SODIUM 2 TABLET: 50; 8.6 TABLET ORAL at 20:56

## 2024-06-22 RX ADMIN — Medication 10 ML: at 20:57

## 2024-06-22 RX ADMIN — MEMANTINE HYDROCHLORIDE 10 MG: 10 TABLET, FILM COATED ORAL at 20:56

## 2024-06-22 RX ADMIN — DONEPEZIL HYDROCHLORIDE 10 MG: 10 TABLET, FILM COATED ORAL at 09:07

## 2024-06-22 NOTE — PLAN OF CARE
Goal Outcome Evaluation:  Plan of Care Reviewed With: patient        Progress: improving  Outcome Evaluation: Alert and oriented. Pleasant and cooperative. No c/o pain.  Medicines taken whoke with water. External catheter in place with adequate urine output. Midline @ JADE, flushing smoothly, no blood backflow. Turned to sides. Slept well in between care. Falls recaution maintained.

## 2024-06-22 NOTE — PROGRESS NOTES
Name: Kimberlee Urrutia ADMIT: 2024   : 1940  PCP: Meche Ji MD    MRN: [id number] LOS: 20 days   AGE/SEX: 84 y.o. female  ROOM: [facility]     Subjective   Subjective   No acute events overnight.  Transitioned to MedSur bed.  Laying in bed.  No complaints.  Pleasant  Review of Systems   As above  Objective   Objective   Vital Signs  Temp:  [97.2 °F (36.2 °C)-98.4 °F (36.9 °C)] 97.2 °F (36.2 °C)  Heart Rate:  [77-84] 84  Resp:  [18] 18  BP: (110-138)/(59-67) 110/59  SpO2:  [97 %-99 %] 97 %  on   ;   Device (Oxygen Therapy): room air  Body mass index is 31.78 kg/m².  Physical Exam    General: Alert,, laying in bed, not in distress  HEENT: Normocephalic, atraumatic  CV: RRR, no murmurs lungs  Lungs: CTA, no wheezing s  Abdomen: Soft, nontender, nondistended  Extremities: No significant peripheral edema , no cyanosis     Results Review     I reviewed the patient's new clinical results.  Results from last 7 days   Lab Units 24  0640 24  0647   WBC 10*3/mm3 7.16 9.03   HEMOGLOBIN g/dL 10.5* 10.6*   PLATELETS 10*3/mm3 291 315     Results from last 7 days   Lab Units 24  0640 24  0647   SODIUM mmol/L 137 137   POTASSIUM mmol/L 3.8 4.0   CHLORIDE mmol/L 105 105   CO2 mmol/L 25.0 22.2   BUN mg/dL 9 14   CREATININE mg/dL 0.79 0.81   GLUCOSE mg/dL 107* 100*   Estimated Creatinine Clearance: 51.6 mL/min (by C-G formula based on SCr of 0.79 mg/dL).  Results from last 7 days   Lab Units 24  0647   ALBUMIN g/dL 3.1*   BILIRUBIN mg/dL 0.5   ALK PHOS U/L 126*   AST (SGOT) U/L 13   ALT (SGPT) U/L 11     Results from last 7 days   Lab Units 24  0640 24  0522 24  0647   CALCIUM mg/dL 10.5 10.2 10.8*   ALBUMIN g/dL  --   --  3.1*   MAGNESIUM mg/dL 2.3  --   --    PHOSPHORUS mg/dL 3.0  --   --        COVID19   Date Value Ref Range Status   2021 Not Detected Not Detected - Ref. Range Final   2021 Not Detected Not Detected - Ref. Range Final     Glucose    Date/Time Value Ref Range Status   06/20/2024 1630 131 (H) 70 - 130 mg/dL Final   06/20/2024 1106 107 70 - 130 mg/dL Final   06/20/2024 0639 106 70 - 130 mg/dL Final   06/19/2024 2029 88 70 - 130 mg/dL Final   06/19/2024 1613 95 70 - 130 mg/dL Final           CT Abdomen Pelvis With Contrast  Narrative: CT ABDOMEN PELVIS W CONTRAST-     INDICATION: Sepsis, ureteral stent     COMPARISON: CT abdomen pelvis June 1, 2024     TECHNIQUE:  Routine CT abdomen/pelvis with IV contrast. Coronal and sagittal  reformats. Radiation dose reduction techniques were utilized, including  automated exposure control and exposure modulation based on body size.     FINDINGS:      Small right pleural effusion. Small loculated left pleural effusion with  pleural calcification and pleural thickening. Suspect subsegmental  atelectasis at the bases.     ABDOMEN: Beam hardening and streak artifact from patient's arms at side  and body wall against the gantry. Normal liver, gallbladder, spleen,  pancreas and adrenal glands. Symmetric perinephric edema. Right ureteral  stent resolution of right hydronephrosis. Fluid attenuating left renal  cyst. Lobular left renal contour with some cortical loss, suspect  scarring. No solid-appearing renal mass or hydronephrosis.     Pelvis: Rodriguez in the bladder. Bladder is collapsed. Anteverted uterus.  Calcifications in the uterus may represent small calcified leiomyomas.  No adnexal mass. Trace fluid in the cul-de-sac.     Bowel: No obstruction. Colonic diverticulosis. Normal appendix.     Abdominal wall: Rectus diastases. Periumbilical abdominal wall scarring.  Small fat-containing umbilical hernia. Small fat-containing inguinal  hernias. Body wall edema.     Retroperitoneum: No lymphadenopathy.     Vasculature: Patent. No abdominal aortic aneurysm.     Osseous structures: No destructive osseous lesions. Lumbar facet  degenerative arthropathy with grade 1/2 anterolisthesis of L3 on L4.  Decreased bone  mineralization..     Impression:    1. New third spacing with small right greater than left pleural  effusions and body wall edema.  2. Interval placement of right ureteral stent with resolution of  hydronephrosis. Ureterolithiasis no longer seen  3. Colonic diverticulosis     This report was finalized on 6/6/2024 10:41 AM by Dr. Diony Lam M.D on Workstation: [id number]       Scheduled Medications  bisacodyl, 10 mg, Rectal, Daily  donepezil, 10 mg, Oral, Daily  lactated ringers, 1,000 mL, Intravenous, Once  memantine, 10 mg, Oral, BID  polyethylene glycol, 17 g, Oral, BID  senna-docusate sodium, 2 tablet, Oral, BID  sodium chloride, 10 mL, Intravenous, Q12H    Infusions   Diet  Diet: Regular/House; Feeding Assistance - Nursing; Texture: Regular (IDDSI 7); Fluid Consistency: Thin (IDDSI 0)    I have personally reviewed     []  Laboratory   []  Microbiology   []  Radiology   []  EKG/Telemetry  []  Cardiology/Vascular   []  Pathology    []  Records       Assessment/Plan     Active Hospital Problems    Diagnosis  POA    **Right ureteral stone [N20.1]  Yes    Hypercalcemia [E83.52]  No    Constipation [K59.00]  Clinically Undetermined    Acute UTI (urinary tract infection) [N39.0]  Yes    Hypoglycemia [E16.2]  Yes    Abnormal LFTs [R79.89]  Yes    Chronic bilateral low back pain without sciatica [M54.50, G89.29]  Yes    CAYLA (dementia of Alzheimer type) [G30.9, F02.80]  Yes    Lumbar spinal stenosis [M48.061]  Yes    Thrombocytopenia [D69.6]  Yes    Acute low back pain [M54.50]  Yes      Resolved Hospital Problems    Diagnosis Date Resolved POA    Shock, septic [A41.9, R65.21] 06/03/2024 Yes    PRINCE (acute kidney injury) [N17.9] 06/06/2024 Yes       Urosepsis secondary to ESBL E. coli pyelonephritis complicated by right hydronephrosis and obstructive ureteric stone and ESBL E. coli septicemia   - status post cystoscopy and right stent placement on 6/2/2024.  -Urology recommends keeping Rodriguez catheter until the  patient is ambulatory   -Status post adequate course of Invanz treatment per ID.    -Leukocytosis resolved.  No fever.  Repeat blood culture are negative.  -Urology was reconsulted on 06/20 as patient remains in the hospital, voiding trial was recommended, plan still for outpatient for scheduled ureteroscopy, laser llithotripsy after discharge    Constipation.    Continue bowel regimen    Hypercalcemia.    Resolved    Metabolic encephalopathy in a patient with a history of Alzheimer's dementia.    Resolved metabolic encephalopathy.  Negative CNS examination.    Continue Aricept and Namenda.    Prediabetes.    A1c 5.7.  -Discontinue SSI 06/20 blood glucose has been stable and patient did not require insulin in the last 48 hours    Hypernatremia/acute kidney.    Resolved    Transaminitis   secondary to sepsis.  LFTs normalized.    Anemia/thrombocytopenia.   Hemoglobin stable  Resolved thrombocytopenia which is mostly secondary to sepsis.    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    Status post neurosurgery consult., conservative treatment.    -Pending placement to rehab    Dysphagia.   -Improved, tolerating advance diet          CODE STATUS: DNR  DVT prophylaxis: SCDs  Disposition.  Pending placement to SNF when insurance  reinstated.  Medically stable to be discharges.        Copied text in this note has been reviewed and is accurate as of 06/22/24.         Dictated utilizing Dragon dictation        Jaren Benson MD  [facility]  06/22/24  15:35 EDT

## 2024-06-22 NOTE — PLAN OF CARE
Goal Outcome Evaluation:           Progress: no change  Outcome Evaluation: VSS. Purewick in place. Midline to LUE, flushing with no issues.  Falls precautions in place. Waiting for insurance for placement. Pt A&O X4. Z guard to coccyx. Picture of coccyx placed in chart. Looks like healing wound with pink base.

## 2024-06-23 LAB
25(OH)D3 SERPL-MCNC: 34.2 NG/ML (ref 30–100)
ALBUMIN SERPL-MCNC: 3.2 G/DL (ref 3.5–5.2)
ANION GAP SERPL CALCULATED.3IONS-SCNC: 8.5 MMOL/L (ref 5–15)
BUN SERPL-MCNC: 18 MG/DL (ref 8–23)
BUN/CREAT SERPL: 18.8 (ref 7–25)
CA-I BLD-MCNC: 6.6 MG/DL (ref 4.6–5.4)
CA-I SERPL ISE-MCNC: 1.66 MMOL/L (ref 1.15–1.35)
CALCIUM 24H UR-MCNC: 16.9 MG/DL
CALCIUM SPEC-SCNC: 11.5 MG/DL (ref 8.6–10.5)
CHLORIDE SERPL-SCNC: 106 MMOL/L (ref 98–107)
CO2 SERPL-SCNC: 25.5 MMOL/L (ref 22–29)
CREAT SERPL-MCNC: 0.96 MG/DL (ref 0.57–1)
CREAT UR-MCNC: 60.6 MG/DL
DEPRECATED RDW RBC AUTO: 40.5 FL (ref 37–54)
EGFRCR SERPLBLD CKD-EPI 2021: 58.1 ML/MIN/1.73
ERYTHROCYTE [DISTWIDTH] IN BLOOD BY AUTOMATED COUNT: 12.4 % (ref 12.3–15.4)
GLUCOSE SERPL-MCNC: 90 MG/DL (ref 65–99)
HCT VFR BLD AUTO: 34.4 % (ref 34–46.6)
HGB BLD-MCNC: 11 G/DL (ref 12–15.9)
MCH RBC QN AUTO: 28.9 PG (ref 26.6–33)
MCHC RBC AUTO-ENTMCNC: 32 G/DL (ref 31.5–35.7)
MCV RBC AUTO: 90.3 FL (ref 79–97)
PLATELET # BLD AUTO: 318 10*3/MM3 (ref 140–450)
PMV BLD AUTO: 10 FL (ref 6–12)
POTASSIUM SERPL-SCNC: 4.2 MMOL/L (ref 3.5–5.2)
PTH-INTACT SERPL-MCNC: 62.4 PG/ML (ref 15–65)
RBC # BLD AUTO: 3.81 10*6/MM3 (ref 3.77–5.28)
SODIUM SERPL-SCNC: 140 MMOL/L (ref 136–145)
WBC NRBC COR # BLD AUTO: 7.9 10*3/MM3 (ref 3.4–10.8)

## 2024-06-23 PROCEDURE — 82310 ASSAY OF CALCIUM: CPT | Performed by: INTERNAL MEDICINE

## 2024-06-23 PROCEDURE — 82652 VIT D 1 25-DIHYDROXY: CPT | Performed by: INTERNAL MEDICINE

## 2024-06-23 PROCEDURE — 82330 ASSAY OF CALCIUM: CPT | Performed by: STUDENT IN AN ORGANIZED HEALTH CARE EDUCATION/TRAINING PROGRAM

## 2024-06-23 PROCEDURE — 83970 ASSAY OF PARATHORMONE: CPT | Performed by: STUDENT IN AN ORGANIZED HEALTH CARE EDUCATION/TRAINING PROGRAM

## 2024-06-23 PROCEDURE — 85027 COMPLETE CBC AUTOMATED: CPT | Performed by: STUDENT IN AN ORGANIZED HEALTH CARE EDUCATION/TRAINING PROGRAM

## 2024-06-23 PROCEDURE — 97530 THERAPEUTIC ACTIVITIES: CPT

## 2024-06-23 PROCEDURE — 82570 ASSAY OF URINE CREATININE: CPT | Performed by: INTERNAL MEDICINE

## 2024-06-23 PROCEDURE — 82040 ASSAY OF SERUM ALBUMIN: CPT | Performed by: STUDENT IN AN ORGANIZED HEALTH CARE EDUCATION/TRAINING PROGRAM

## 2024-06-23 PROCEDURE — 25810000003 SODIUM CHLORIDE 0.9 % SOLUTION: Performed by: STUDENT IN AN ORGANIZED HEALTH CARE EDUCATION/TRAINING PROGRAM

## 2024-06-23 PROCEDURE — 82306 VITAMIN D 25 HYDROXY: CPT | Performed by: STUDENT IN AN ORGANIZED HEALTH CARE EDUCATION/TRAINING PROGRAM

## 2024-06-23 PROCEDURE — 80048 BASIC METABOLIC PNL TOTAL CA: CPT | Performed by: STUDENT IN AN ORGANIZED HEALTH CARE EDUCATION/TRAINING PROGRAM

## 2024-06-23 RX ORDER — CINACALCET 30 MG/1
30 TABLET, FILM COATED ORAL
Status: DISCONTINUED | OUTPATIENT
Start: 2024-06-24 | End: 2024-06-30

## 2024-06-23 RX ORDER — SODIUM CHLORIDE 9 MG/ML
75 INJECTION, SOLUTION INTRAVENOUS CONTINUOUS
Status: DISCONTINUED | OUTPATIENT
Start: 2024-06-23 | End: 2024-06-24

## 2024-06-23 RX ADMIN — SENNOSIDES AND DOCUSATE SODIUM 2 TABLET: 50; 8.6 TABLET ORAL at 08:19

## 2024-06-23 RX ADMIN — POLYETHYLENE GLYCOL 3350 17 G: 17 POWDER, FOR SOLUTION ORAL at 21:08

## 2024-06-23 RX ADMIN — POLYETHYLENE GLYCOL 3350 17 G: 17 POWDER, FOR SOLUTION ORAL at 08:19

## 2024-06-23 RX ADMIN — ACETAMINOPHEN 325MG 650 MG: 325 TABLET ORAL at 21:08

## 2024-06-23 RX ADMIN — MEMANTINE HYDROCHLORIDE 10 MG: 10 TABLET, FILM COATED ORAL at 08:19

## 2024-06-23 RX ADMIN — Medication 10 ML: at 08:19

## 2024-06-23 RX ADMIN — MEMANTINE HYDROCHLORIDE 10 MG: 10 TABLET, FILM COATED ORAL at 21:08

## 2024-06-23 RX ADMIN — SODIUM CHLORIDE 75 ML/HR: 9 INJECTION, SOLUTION INTRAVENOUS at 14:19

## 2024-06-23 RX ADMIN — SENNOSIDES AND DOCUSATE SODIUM 2 TABLET: 50; 8.6 TABLET ORAL at 21:08

## 2024-06-23 RX ADMIN — BISACODYL 10 MG: 10 SUPPOSITORY RECTAL at 08:20

## 2024-06-23 RX ADMIN — DONEPEZIL HYDROCHLORIDE 10 MG: 10 TABLET, FILM COATED ORAL at 08:19

## 2024-06-23 NOTE — PLAN OF CARE
Goal Outcome Evaluation:  Plan of Care Reviewed With: patient        Progress: improving  Outcome Evaluation: VSS, turning Q2, contact precautions maintained, falls precautions maintained, worked with PT, ivf infusing per order, purewick in place, family came for a visit, midline CDI, waiting on insurance to reinstated her insurance

## 2024-06-23 NOTE — PLAN OF CARE
Goal Outcome Evaluation:  Plan of Care Reviewed With: patient        Progress: improving  Outcome Evaluation: Alert, oriented X3. Cooperative. Midline at JADE, saline locked .  External catheter in place, adequate urine output. Z guard cream to excoriation at coccyx Turned to sides. Falls precaution maintained. Slept well.

## 2024-06-23 NOTE — CONSULTS
Patient Care Team:  Meche Ji MD as PCP - General (Internal Medicine)  Josue Wilson MD as Consulting Physician (Orthopedic Surgery)  Yash Wilson MD as Consulting Physician (Gastroenterology)  Avi Ferris MD (Ophthalmology)  Tommy French MD (Inactive) as Consulting Physician (Otolaryngology)  Arnaud Lu MD as Consulting Physician (Urology)  Nu Partida MD as Consulting Physician (Pain Medicine)  Ki Small MD as Consulting Physician (Neurology)    Chief complaint: Hypercalcemia         History of Present Illness  Patient is a 83 yo female who was admitted on 6/02/24 when she presented with back pain and found to be in septic shock due to obstructing stone in her right ureter for which she underwent cystoscopy, stent placement.   She was admitted to ICU, was on pressors, abx and IVF. She had developed PRINCE, severe metabolic acidosis.   She has recovered fairly well and eventually moved out of the unit. PRINCE resolved and renal function has been stable for a bit.   However, she now has worsening hypercalcemia which was prompted this consult.     Review of Systems   Review of Systems - History obtained from chart review and the patient  General ROS: negative  Psychological ROS: negative  Ophthalmic ROS: negative  ENT ROS: negative  Allergy and Immunology ROS: negative  Hematological and Lymphatic ROS: negative  Endocrine ROS: negative  Respiratory ROS: no cough, shortness of breath, or wheezing  Cardiovascular ROS: no chest pain or dyspnea on exertion  Gastrointestinal ROS: no abdominal pain, change in bowel habits, or black or bloody stools  Genito-Urinary ROS: no dysuria, trouble voiding, or hematuria  Musculoskeletal ROS: negative  Neurological ROS: no TIA or stroke symptoms  Dermatological ROS: negative   Past Medical History:   Diagnosis Date    Allergic     Arthritis     Colon cancer screening 01/29/2020    Colon cancer screening 01/29/2020    Cough      Diarrhea     Hx of migraine headaches     Hyperlipidemia     Irregular heart beat     Osteoarthritis     Renal stones 2021    Vitamin D deficiency    ,   Past Surgical History:   Procedure Laterality Date    COLONOSCOPY      CYSTOSCOPY W/ URETERAL STENT PLACEMENT Right 2024    Procedure: CYSTOSCOPY, RIGHT URETERAL STENT PLACEMENT;  Surgeon: Efra Schofield MD;  Location: [facility] MAIN OR;  Service: Urology;  Laterality: Right;    LUMBAR EPIDURAL INJECTION N/A 2021    Procedure: LUMBAR EPIDURAL 1ST VISIT 5-1;  Surgeon: Nu Partida MD;  Location: [facility] [place] MAIN OR;  Service: Pain Management;  Laterality: N/A;    REPLACEMENT TOTAL KNEE Right 2015    Josue Wilson MD    SACROILIAC JOINT INJECTION Left 2021    Procedure: SACROILIAC INJECTION left;  Surgeon: Nu Partida MD;  Location: [facility] [place] MAIN OR;  Service: Pain Management;  Laterality: Left;    TUBAL ABDOMINAL LIGATION      URETEROSCOPY LASER LITHOTRIPSY WITH STENT INSERTION Left 2021    Procedure: LT.URETEROSCOPY LASER LITHOTRIPSY WITH STENT  FOR STONE;  Surgeon: Arnaud Lu MD;  Location: [facility] MAIN OR;  Service: Urology;  Laterality: Left;    WISDOM TOOTH EXTRACTION     ,   Family History   Problem Relation Age of Onset    Heart disease Mother     Sudden death Mother 48        MI    No Known Problems Father     Cancer Sister     Cancer Son    ,   Social History     Socioeconomic History    Marital status:    Tobacco Use    Smoking status: Former     Current packs/day: 0.00     Types: Cigarettes     Start date: 1/10/1953     Quit date: 1/10/1985     Years since quittin.4    Smokeless tobacco: Never    Tobacco comments:     smoked 4-5 cigarettes daily   Vaping Use    Vaping status: Never Used   Substance and Sexual Activity    Alcohol use: No     Comment: caffeine use coffee 3 cups daily    Drug use: No    Sexual activity: Defer     E-cigarette/Vaping    E-cigarette/Vaping Use Never User       E-cigarette/Vaping Substances     E-cigarette/Vaping Devices         ,   Medications Prior to Admission   Medication Sig Dispense Refill Last Dose    acetaminophen (TYLENOL) 325 MG tablet Take 2 tablets by mouth Every 6 (Six) Hours As Needed for Mild Pain.   6/1/2024 at 1800    amLODIPine (NORVASC) 2.5 MG tablet Take 1 tablet by mouth Daily. For BP 30 tablet 5 6/1/2024 at 0800    cholecalciferol (VITAMIN D3) 25 MCG (1000 UT) tablet Take 2 tablets by mouth Daily.   6/1/2024 at 0800    donepezil (ARICEPT) 10 MG tablet Take 1 tablet by mouth Daily.   5/31/2024 at 2200    meloxicam (MOBIC) 15 MG tablet Take 1 tablet by mouth Daily.   6/1/2024 at 0800    memantine (NAMENDA) 10 MG tablet Take 1 tablet by mouth 2 (Two) Times a Day.   6/1/2024 at 0800    Multiple Vitamins-Minerals (MULTI COMPLETE PO) Take  by mouth daily.   6/1/2024 at 0800    rosuvastatin (CRESTOR) 20 MG tablet Take 1 tablet by mouth Daily.   5/31/2024 at 2200   , Scheduled Meds:  bisacodyl, 10 mg, Rectal, Daily  donepezil, 10 mg, Oral, Daily  lactated ringers, 1,000 mL, Intravenous, Once  memantine, 10 mg, Oral, BID  polyethylene glycol, 17 g, Oral, BID  senna-docusate sodium, 2 tablet, Oral, BID  sodium chloride, 10 mL, Intravenous, Q12H   , Continuous Infusions:  sodium chloride, 75 mL/hr, Last Rate: 75 mL/hr (06/23/24 1419)   , PRN Meds:    acetaminophen **OR** acetaminophen    aluminum-magnesium hydroxide-simethicone    Calcium Replacement - Follow Nurse / BPA Driven Protocol    dextrose    dextrose    dextrose    glucagon (human recombinant)    Magnesium Standard Dose Replacement - Follow Nurse / BPA Driven Protocol    nitroglycerin    ondansetron ODT **OR** ondansetron    Phosphorus Replacement - Follow Nurse / BPA Driven Protocol    Potassium Replacement - Follow Nurse / BPA Driven Protocol    [COMPLETED] Insert Peripheral IV **AND** sodium chloride    sodium chloride    sodium chloride    sodium chloride    sodium chloride    sodium chloride,  "and Allergies:  Penicillins and Sulfa antibiotics    Objective     Vital Signs  Temp:  [97.5 °F (36.4 °C)-98.3 °F (36.8 °C)] 97.5 °F (36.4 °C)  Heart Rate:  [63-85] 85  Resp:  [16-18] 16  BP: (116-132)/(66-73) 116/72    No intake/output data recorded.  I/O last 3 completed shifts:  In: 1077 [P.O.:1077]  Out: 1700 [Urine:1700]    Physical Exam  Physical Examination: General appearance - alert, well appearing, and in no distress  Mental status - alert, oriented to person, place, and time  Chest - clear to auscultation, no wheezes, rales or rhonchi, symmetric air entry  Heart - normal rate, regular rhythm, normal S1, S2, no murmurs, rubs, clicks or gallops  Abdomen - soft, nontender, nondistended, no masses or organomegaly  Neurological - alert, oriented, normal speech, no focal findings or movement disorder noted  Extremities - peripheral pulses normal, no pedal edema, no clubbing or cyanosis   Results Review:    I reviewed the patient's new clinical results.    WBC WBC   Date Value Ref Range Status   06/23/2024 7.90 3.40 - 10.80 10*3/mm3 Final      HGB Hemoglobin   Date Value Ref Range Status   06/23/2024 11.0 (L) 12.0 - 15.9 g/dL Final      HCT Hematocrit   Date Value Ref Range Status   06/23/2024 34.4 34.0 - 46.6 % Final      Platlets No results found for: \"LABPLAT\"   MCV MCV   Date Value Ref Range Status   06/23/2024 90.3 79.0 - 97.0 fL Final          Sodium Sodium   Date Value Ref Range Status   06/23/2024 140 136 - 145 mmol/L Final      Potassium Potassium   Date Value Ref Range Status   06/23/2024 4.2 3.5 - 5.2 mmol/L Final      Chloride Chloride   Date Value Ref Range Status   06/23/2024 106 98 - 107 mmol/L Final      CO2 CO2   Date Value Ref Range Status   06/23/2024 25.5 22.0 - 29.0 mmol/L Final      BUN BUN   Date Value Ref Range Status   06/23/2024 18 8 - 23 mg/dL Final      Creatinine Creatinine   Date Value Ref Range Status   06/23/2024 0.96 0.57 - 1.00 mg/dL Final      Calcium Calcium   Date Value Ref " "Range Status   06/23/2024 11.5 (H) 8.6 - 10.5 mg/dL Final      PO4 No results found for: \"CAPO4\"   Albumin Albumin   Date Value Ref Range Status   06/23/2024 3.2 (L) 3.5 - 5.2 g/dL Final      Magnesium No results found for: \"MG\"   Uric Acid No results found for: \"URICACID\"         Assessment & Plan       Right ureteral stone    Acute low back pain    Thrombocytopenia    Lumbar spinal stenosis    CAYLA (dementia of Alzheimer type)    Chronic bilateral low back pain without sciatica    Acute UTI (urinary tract infection)    Hypoglycemia    Abnormal LFTs    Hypercalcemia    Constipation      Assessment & Plan  Hypercalcemia   CKD2  Hydronephrosis - resolved.   Constipation   PRINCE- resolved.   Septic shock - resolved.     Hypercalcemia in the setting of high normal iPTH is virtually diagnostic of primary hyperPTH. In theory, would need to distinguish between 1ry hypertPTH and FHH (familial hypocalciuric hypercalcemia), but I don't think this would .   Her vit D is normal. Will check 1.25, for completion.   Immobilization is unlikely as hypercalcemia in this case should suppress iPTH which is clearly not.   Her early hypocalcemia might have been due to IVF in the setting of septic shock early in her admission     At this point, would continue IVF and will start cinacalcet 30 mg daily (might increase to BID).   Her constipation might be due to hypercalcemia.continue bowel regimen.   Check labs in am   Will check urine calcium/cr   Will follow   Thanks for referral       I discussed the patients findings and my recommendations with patient    Tyra Rivero MD  06/23/24  14:44 EDT            "

## 2024-06-23 NOTE — THERAPY TREATMENT NOTE
Patient Name: Kimberlee Urrutia  : 1940    MRN: [id number]                              Today's Date: 2024       Admit Date: 2024    Visit Dx:     ICD-10-CM ICD-9-CM   1. Right ureteral stone  N20.1 592.1   2. Acute UTI  N39.0 599.0   3. Severe sepsis  A41.9 038.9    R65.20 995.92   4. Elevated lactic acid level  R79.89 276.2   5. Osteomyelitis of lumbar spine  M46.26 730.28   6. Elevated AST (SGOT)  R74.01 790.4   7. Elevated ALT measurement  R74.01 790.4   8. Elevated C-reactive protein (CRP)  R79.82 790.95   9. PRINCE (acute kidney injury)  N17.9 584.9     Patient Active Problem List   Diagnosis    Arthritis    HLD (hyperlipidemia)    Primary hypertension    Health care maintenance    Knee pain, right    Hx of total knee arthroplasty    Seasonal allergies    Vitamin D deficiency    Pyuria    Transient alteration of awareness    Migraine without status migrainosus, not intractable    Leukocytosis    Viral pharyngitis    Multinodular thyroid    Osteopenia of multiple sites    Colon cancer screening    Serum calcium elevated    Acute low back pain    Elevated procalcitonin    Sepsis due to Escherichia coli with acute renal failure without septic shock    Thrombocytopenia    Recurrent oral herpes simplex    Lumbar spinal stenosis    Metabolic encephalopathy    Renal stones    Colon cancer screening    Sacroiliac joint pain    Lumbar stenosis without neurogenic claudication    Renal stone    Hyperlipidemia    History of total knee arthroplasty    Spinal stenosis of lumbar region    Multinodular goiter    Osteopenia    Pain in right knee    Sacroiliac joint pain    Hypercalcemia    CAYLA (dementia of Alzheimer type)    Chronic bilateral low back pain without sciatica    Right ureteral stone    Discitis of lumbar region    Acute UTI (urinary tract infection)    Hypoglycemia    Abnormal LFTs    Hypercalcemia    Constipation     Past Medical History:   Diagnosis Date    Allergic     Arthritis     Colon cancer  screening 01/29/2020    Colon cancer screening 01/29/2020    Cough     Diarrhea     Hx of migraine headaches     Hyperlipidemia     Irregular heart beat     Osteoarthritis     Renal stones 02/2021    Vitamin D deficiency      Past Surgical History:   Procedure Laterality Date    COLONOSCOPY      CYSTOSCOPY W/ URETERAL STENT PLACEMENT Right 6/2/2024    Procedure: CYSTOSCOPY, RIGHT URETERAL STENT PLACEMENT;  Surgeon: Efra Schofield MD;  Location: [facility] OR;  Service: Urology;  Laterality: Right;    LUMBAR EPIDURAL INJECTION N/A 8/2/2021    Procedure: LUMBAR EPIDURAL 1ST VISIT 5-1;  Surgeon: Nu Partida MD;  Location: [facility] [place] MAIN OR;  Service: Pain Management;  Laterality: N/A;    REPLACEMENT TOTAL KNEE Right 01/2015    Josue Wilson MD    SACROILIAC JOINT INJECTION Left 6/16/2021    Procedure: SACROILIAC INJECTION left;  Surgeon: Nu Partida MD;  Location: [facility] [place] MAIN OR;  Service: Pain Management;  Laterality: Left;    TUBAL ABDOMINAL LIGATION      URETEROSCOPY LASER LITHOTRIPSY WITH STENT INSERTION Left 2/23/2021    Procedure: LT.URETEROSCOPY LASER LITHOTRIPSY WITH STENT  FOR STONE;  Surgeon: Arnaud Lu MD;  Location: [facility] OR;  Service: Urology;  Laterality: Left;    WISDOM TOOTH EXTRACTION        General Information       Row Name 06/23/24 1329          Physical Therapy Time and Intention    Document Type therapy note (daily note)  -CW     Mode of Treatment physical therapy  -CW       Row Name 06/23/24 1329          General Information    Existing Precautions/Restrictions fall  -CW       Row Name 06/23/24 1329          Cognition    Orientation Status (Cognition) oriented to;person;place  -CW       Row Name 06/23/24 1329          Safety Issues, Functional Mobility    Impairments Affecting Function (Mobility) balance;cognition;endurance/activity tolerance;strength  -CW               User Key  (r) = Recorded By, (t) = Taken By, (c) = Cosigned By      Initials Name Provider  Type    CW Mandeep Yeung PTA Physical Therapist Assistant                   Mobility       Row Name 06/23/24 1330          Bed Mobility    Bed Mobility supine-sit;sit-supine  -CW     Supine-Sit McKean (Bed Mobility) moderate assist (50% patient effort);1 person assist  -CW     Sit-Supine McKean (Bed Mobility) moderate assist (50% patient effort);2 person assist  -CW     Assistive Device (Bed Mobility) bed rails;draw sheet;head of bed elevated  -CW       Row Name 06/23/24 1330          Sit-Stand Transfer    Sit-Stand McKean (Transfers) moderate assist (50% patient effort);2 person assist  -CW       Row Name 06/23/24 1330          Gait/Stairs (Locomotion)    McKean Level (Gait) moderate assist (50% patient effort);2 person assist  -CW     Assistive Device (Gait) walker, front-wheeled  -CW     Distance in Feet (Gait) 3  side steps at HOB  -CW     Deviations/Abnormal Patterns (Gait) antalgic;weight shifting decreased  -CW               User Key  (r) = Recorded By, (t) = Taken By, (c) = Cosigned By      Initials Name Provider Type    CW Mandeep Yeung PTA Physical Therapist Assistant                   Obj/Interventions       Row Name 06/23/24 1331          Range of Motion Comprehensive    General Range of Motion bilateral lower extremity ROM WFL  -CW       Row Name 06/23/24 1331          Strength Comprehensive (MMT)    General Manual Muscle Testing (MMT) Assessment lower extremity strength deficits identified  -CW       Row Name 06/23/24 1331          Motor Skills    Therapeutic Exercise --  B LE bed ther ex 10 reps  -CW               User Key  (r) = Recorded By, (t) = Taken By, (c) = Cosigned By      Initials Name Provider Type    CW Mandeep Yeung PTA Physical Therapist Assistant                   Goals/Plan    No documentation.                  Clinical Impression       Row Name 06/23/24 1332          Pain    Pain Intervention(s) Medication (See  MAR);Repositioned;Ambulation/increased activity  -CW       Row Name 06/23/24 1332          Plan of Care Review    Plan of Care Reviewed With patient  -CW     Progress improving  -CW     Outcome Evaluation Pt is able to stand first attempt and take some side steps to the HOB.  -CW       Row Name 06/23/24 1332          Therapy Assessment/Plan (PT)    Rehab Potential (PT) good, to achieve stated therapy goals  -CW     Criteria for Skilled Interventions Met (PT) yes;skilled treatment is necessary  -CW     Therapy Frequency (PT) 5 times/wk  -CW       Row Name 06/23/24 1332          Positioning and Restraints    Pre-Treatment Position in bed  -CW     Post Treatment Position bed  -CW     In Bed supine;call light within reach;encouraged to call for assist;exit alarm on  -CW               User Key  (r) = Recorded By, (t) = Taken By, (c) = Cosigned By      Initials Name Provider Type    Mandeep Trent PTA Physical Therapist Assistant                   Outcome Measures       Row Name 06/23/24 1334 06/23/24 0818       How much help from another person do you currently need...    Turning from your back to your side while in flat bed without using bedrails? 3  -CW 3  -KS    Moving from lying on back to sitting on the side of a flat bed without bedrails? 2  -CW 2  -KS    Moving to and from a bed to a chair (including a wheelchair)? 2  -CW 1  -KS    Standing up from a chair using your arms (e.g., wheelchair, bedside chair)? 2  -CW 2  -KS    Climbing 3-5 steps with a railing? 1  -CW 1  -KS    To walk in hospital room? 2  -CW 1  -KS    AM-PAC 6 Clicks Score (PT) 12  -CW 10  -KS    Highest Level of Mobility Goal 4 --> Transfer to chair/commode  -CW 4 --> Transfer to chair/commode  -KS              User Key  (r) = Recorded By, (t) = Taken By, (c) = Cosigned By      Initials Name Provider Type    Mandeep Trent PTA Physical Therapist Assistant    America Mcclellan RN Registered Nurse                                  Physical Therapy Education       Title: PT OT SLP Therapies (In Progress)       Topic: Physical Therapy (In Progress)       Point: Mobility training (Done)       Learning Progress Summary             Patient Acceptance, E,D, VU,NR by EB at 6/21/2024 1122    Acceptance, E,D, NR by EF at 6/19/2024 0931    Acceptance, E, VU,NR by SV at 6/16/2024 1531    Acceptance, E,TB, NR by BK at 6/14/2024 1715    Acceptance, E,D, VU,NR by EB at 6/14/2024 1641    Acceptance, E,D, NR by EB at 6/13/2024 1514    Acceptance, E,D, NR by EB at 6/12/2024 1643    Acceptance, E,TB, VU by RD at 6/11/2024 1011    Acceptance, E,TB, VU,NR by CB at 6/10/2024 1149    Acceptance, E, VU by EM at 6/7/2024 1622    Acceptance, E, NR by EM at 6/6/2024 1605    Acceptance, E,TB, VU by BK at 6/5/2024 1700    Acceptance, E, NR by EM at 6/5/2024 1640                         Point: Home exercise program (Done)       Learning Progress Summary             Patient Acceptance, E,D, VU,NR by EB at 6/21/2024 1122    Acceptance, E,D, NR by EF at 6/19/2024 0931    Acceptance, E, VU,NR by SV at 6/16/2024 1531    Acceptance, E,TB, NR by BK at 6/14/2024 1715    Acceptance, E,TB, VU by BK at 6/13/2024 1655    Acceptance, E,TB, VU by RD at 6/11/2024 1011    Acceptance, E,TB, VU,NR by CB at 6/10/2024 1149    Acceptance, E, VU by EM at 6/7/2024 1622    Acceptance, E, NR by EM at 6/6/2024 1605                         Point: Body mechanics (Done)       Learning Progress Summary             Patient Acceptance, E,D, VU,NR by EB at 6/21/2024 1122    Acceptance, E,D, NR by EF at 6/19/2024 0931    Acceptance, E,TB, NR by BK at 6/14/2024 1715    Acceptance, E,D, VU,NR by EB at 6/14/2024 1641    Acceptance, E,D, NR by EB at 6/13/2024 1514    Acceptance, E,D, NR by EB at 6/12/2024 1643    Acceptance, E,TB, VU by RD at 6/11/2024 1011    Acceptance, E,TB, VU,NR by CB at 6/10/2024 1149                         Point: Precautions (In Progress)       Learning Progress Summary              Patient Acceptance, E,D, NR by EB at 6/13/2024 1514    Acceptance, E,TB, VU by RD at 6/11/2024 1011    Acceptance, E,TB, VU,NR by CB at 6/10/2024 1149                                         User Key       Initials Effective Dates Name Provider Type Discipline    RD 06/16/21 -  Leonela Acuna, PT Physical Therapist PT    EF 05/31/24 -  Marci Mckenzie, PT Physical Therapist PT    EM 06/16/21 -  Yvette Guajardo, PT Physical Therapist PT    SV 07/11/23 -  Velma Georges, PT Physical Therapist PT    EB 02/14/23 -  Nancy Bishop PTA Physical Therapist Assistant PT    CB 10/22/21 -  Kaitlin Cunningham, PT Physical Therapist PT    BK 04/30/24 -  Nu Leon, RN Extern Registered Nurse Nurse                  PT Recommendation and Plan     Plan of Care Reviewed With: patient  Progress: improving  Outcome Evaluation: Pt is able to stand first attempt and take some side steps to the HOB.     Time Calculation:         PT Charges       Row Name 06/23/24 1338             Time Calculation    Start Time 1320  -CW      Stop Time 1338  -CW      Time Calculation (min) 18 min  -CW      PT Received On 06/23/24  -CW      PT - Next Appointment 06/24/24  -CW         Timed Charges    28514 - PT Therapeutic Activity Minutes 18  -CW         Total Minutes    Timed Charges Total Minutes 18  -CW       Total Minutes 18  -CW                User Key  (r) = Recorded By, (t) = Taken By, (c) = Cosigned By      Initials Name Provider Type    CW Mandeep Yeung PTA Physical Therapist Assistant                  Therapy Charges for Today       Code Description Service Date Service Provider Modifiers Qty    [id number]  PT THERAPEUTIC ACT EA 15 MIN 6/23/2024 Mandeep Yeung PTA GP 1            PT G-Codes  Outcome Measure Options: AM-PAC 6 Clicks Daily Activity (OT)  AM-PAC 6 Clicks Score (PT): 12  AM-PAC 6 Clicks Score (OT): 9  Modified Adriana Scale: 4 - Moderately severe disability.  Unable to walk without assistance, and  unable to attend to own bodily needs without assistance.  PT Discharge Summary  Anticipated Discharge Disposition (PT): skilled nursing facility    Mandeep Yeung, PTA  6/23/2024

## 2024-06-23 NOTE — PLAN OF CARE
Goal Outcome Evaluation:  Plan of Care Reviewed With: patient        Progress: improving  Outcome Evaluation: Pt is able to stand first attempt and take some side steps to the HOB.      Anticipated Discharge Disposition (PT): skilled nursing facility                      Therapist used appropriate personal protective equipment.  Appropriate PPE was worn during the entire therapy session. Hand hygiene was completed before and after therapy session. Patient is not in enhanced precautions.

## 2024-06-23 NOTE — PROGRESS NOTES
Name: Kimberlee Urrutia ADMIT: 2024   : 1940  PCP: Meche Ji MD    MRN: [id number] LOS: 21 days   AGE/SEX: 84 y.o. female  ROOM: [facility]     Subjective   Subjective     No acute events overnight.  Alert, laying in bed, pleasant.  Denies complaints.  Routine labs were checked today, calcium trended back up.    Review of Systems   As above  Objective   Objective   Vital Signs  Temp:  [97.5 °F (36.4 °C)-98.3 °F (36.8 °C)] 97.5 °F (36.4 °C)  Heart Rate:  [63-85] 85  Resp:  [16-18] 16  BP: (116-132)/(66-73) 116/72  SpO2:  [95 %-98 %] 97 %  on   ;   Device (Oxygen Therapy): room air  Body mass index is 32.02 kg/m².  Physical Exam    General: Alert,, laying in bed, not in distress  HEENT: Normocephalic, atraumatic  CV: RRR, no murmurs lungs  Lungs: CTA, no wheezing   Abdomen: Soft, nontender, nondistended  Extremities: No significant peripheral edema , no cyanosis     Results Review     I reviewed the patient's new clinical results.  Results from last 7 days   Lab Units 24  0523 24  0640   WBC 10*3/mm3 7.90 7.16   HEMOGLOBIN g/dL 11.0* 10.5*   PLATELETS 10*3/mm3 318 291     Results from last 7 days   Lab Units 24  0523 24  0640   SODIUM mmol/L 140 137   POTASSIUM mmol/L 4.2 3.8   CHLORIDE mmol/L 106 105   CO2 mmol/L 25.5 25.0   BUN mg/dL 18 9   CREATININE mg/dL 0.96 0.79   GLUCOSE mg/dL 90 107*   Estimated Creatinine Clearance: 42.7 mL/min (by C-G formula based on SCr of 0.96 mg/dL).  Results from last 7 days   Lab Units 24  0523   ALBUMIN g/dL 3.2*     Results from last 7 days   Lab Units 24  0523 24  0640 24  0522   CALCIUM mg/dL 11.5* 10.5 10.2   ALBUMIN g/dL 3.2*  --   --    MAGNESIUM mg/dL  --  2.3  --    PHOSPHORUS mg/dL  --  3.0  --        COVID19   Date Value Ref Range Status   2021 Not Detected Not Detected - Ref. Range Final   2021 Not Detected Not Detected - Ref. Range Final     Glucose   Date/Time Value Ref Range Status    06/20/2024 1630 131 (H) 70 - 130 mg/dL Final           CT Abdomen Pelvis With Contrast  Narrative: CT ABDOMEN PELVIS W CONTRAST-     INDICATION: Sepsis, ureteral stent     COMPARISON: CT abdomen pelvis June 1, 2024     TECHNIQUE:  Routine CT abdomen/pelvis with IV contrast. Coronal and sagittal  reformats. Radiation dose reduction techniques were utilized, including  automated exposure control and exposure modulation based on body size.     FINDINGS:      Small right pleural effusion. Small loculated left pleural effusion with  pleural calcification and pleural thickening. Suspect subsegmental  atelectasis at the bases.     ABDOMEN: Beam hardening and streak artifact from patient's arms at side  and body wall against the gantry. Normal liver, gallbladder, spleen,  pancreas and adrenal glands. Symmetric perinephric edema. Right ureteral  stent resolution of right hydronephrosis. Fluid attenuating left renal  cyst. Lobular left renal contour with some cortical loss, suspect  scarring. No solid-appearing renal mass or hydronephrosis.     Pelvis: Rodriguez in the bladder. Bladder is collapsed. Anteverted uterus.  Calcifications in the uterus may represent small calcified leiomyomas.  No adnexal mass. Trace fluid in the cul-de-sac.     Bowel: No obstruction. Colonic diverticulosis. Normal appendix.     Abdominal wall: Rectus diastases. Periumbilical abdominal wall scarring.  Small fat-containing umbilical hernia. Small fat-containing inguinal  hernias. Body wall edema.     Retroperitoneum: No lymphadenopathy.     Vasculature: Patent. No abdominal aortic aneurysm.     Osseous structures: No destructive osseous lesions. Lumbar facet  degenerative arthropathy with grade 1/2 anterolisthesis of L3 on L4.  Decreased bone mineralization..     Impression:    1. New third spacing with small right greater than left pleural  effusions and body wall edema.  2. Interval placement of right ureteral stent with resolution  of  hydronephrosis. Ureterolithiasis no longer seen  3. Colonic diverticulosis     This report was finalized on 6/6/2024 10:41 AM by Dr. Diony Lam M.D on Workstation: [id number]       Scheduled Medications  bisacodyl, 10 mg, Rectal, Daily  donepezil, 10 mg, Oral, Daily  lactated ringers, 1,000 mL, Intravenous, Once  memantine, 10 mg, Oral, BID  polyethylene glycol, 17 g, Oral, BID  senna-docusate sodium, 2 tablet, Oral, BID  sodium chloride, 10 mL, Intravenous, Q12H    Infusions  sodium chloride, 75 mL/hr    Diet  Diet: Regular/House; Feeding Assistance - Nursing; Texture: Regular (IDDSI 7); Fluid Consistency: Thin (IDDSI 0)    I have personally reviewed     []  Laboratory   []  Microbiology   []  Radiology   []  EKG/Telemetry  []  Cardiology/Vascular   []  Pathology    []  Records       Assessment/Plan     Active Hospital Problems    Diagnosis  POA    **Right ureteral stone [N20.1]  Yes    Hypercalcemia [E83.52]  No    Constipation [K59.00]  Clinically Undetermined    Acute UTI (urinary tract infection) [N39.0]  Yes    Hypoglycemia [E16.2]  Yes    Abnormal LFTs [R79.89]  Yes    Chronic bilateral low back pain without sciatica [M54.50, G89.29]  Yes    CAYLA (dementia of Alzheimer type) [G30.9, F02.80]  Yes    Lumbar spinal stenosis [M48.061]  Yes    Thrombocytopenia [D69.6]  Yes    Acute low back pain [M54.50]  Yes      Resolved Hospital Problems    Diagnosis Date Resolved POA    Shock, septic [A41.9, R65.21] 06/03/2024 Yes    PRINCE (acute kidney injury) [N17.9] 06/06/2024 Yes       Urosepsis secondary to ESBL E. coli pyelonephritis complicated by right hydronephrosis and obstructive ureteric stone and ESBL E. coli septicemia   - status post cystoscopy and right stent placement on 6/2/2024.  -Urology recommends keeping Rodriguez catheter until the patient is ambulatory   -Status post adequate course of Invanz treatment per ID.    -Leukocytosis resolved.  No fever.  Repeat blood culture are negative.  -Urology was  reconsulted on 06/20 as patient remains in the hospital, voiding trial was recommended, plan still for outpatient for scheduled ureteroscopy, laser llithotripsy after discharge    Constipation.    Continue bowel regimen    Hypercalcemia.    Calcium back up to 11.5 this morning, corrected for albumin around 12  -Vitamin D and PTH normal  -Initiated on IV fluids  -Nephrology consulted    Metabolic encephalopathy in a patient with a history of Alzheimer's dementia.    Resolved metabolic encephalopathy.  Negative CNS examination.    Continue Aricept and Namenda.    Prediabetes.    A1c 5.7.  -Discontinue SSI 06/20 blood glucose has been stable and patient did not require insulin in the last 48 hours  -Multiple stable on BMP    Hypernatremia/acute kidney.    Resolved    Transaminitis   secondary to sepsis.  LFTs normalized.    Anemia/thrombocytopenia.   Hemoglobin stable  Resolved thrombocytopenia which is mostly secondary to sepsis.    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    Status post neurosurgery consult., conservative treatment.    -Pending placement to rehab    Dysphagia.   -Improved, tolerating advance diet          CODE STATUS: DNR  DVT prophylaxis: SCDs  Disposition.  Pending placement to SNF when insurance  reinstated.  Not medically stable to be discharged today pending hypercalcemia workup        Copied text in this note has been reviewed and is accurate as of 06/23/24.         Dictated utilizing Dragon dictation        Jaren Benson MD  [facility]  06/23/24  13:42 EDT

## 2024-06-24 LAB
ANION GAP SERPL CALCULATED.3IONS-SCNC: 10.1 MMOL/L (ref 5–15)
BUN SERPL-MCNC: 19 MG/DL (ref 8–23)
BUN/CREAT SERPL: 20.9 (ref 7–25)
CALCIUM SPEC-SCNC: 11.1 MG/DL (ref 8.6–10.5)
CHLORIDE SERPL-SCNC: 106 MMOL/L (ref 98–107)
CO2 SERPL-SCNC: 22.9 MMOL/L (ref 22–29)
CREAT SERPL-MCNC: 0.91 MG/DL (ref 0.57–1)
EGFRCR SERPLBLD CKD-EPI 2021: 62 ML/MIN/1.73
GLUCOSE SERPL-MCNC: 87 MG/DL (ref 65–99)
POTASSIUM SERPL-SCNC: 3.9 MMOL/L (ref 3.5–5.2)
SODIUM SERPL-SCNC: 139 MMOL/L (ref 136–145)

## 2024-06-24 PROCEDURE — 97535 SELF CARE MNGMENT TRAINING: CPT

## 2024-06-24 PROCEDURE — 80048 BASIC METABOLIC PNL TOTAL CA: CPT | Performed by: STUDENT IN AN ORGANIZED HEALTH CARE EDUCATION/TRAINING PROGRAM

## 2024-06-24 PROCEDURE — 25810000003 SODIUM CHLORIDE 0.9 % SOLUTION: Performed by: STUDENT IN AN ORGANIZED HEALTH CARE EDUCATION/TRAINING PROGRAM

## 2024-06-24 PROCEDURE — 97110 THERAPEUTIC EXERCISES: CPT

## 2024-06-24 RX ADMIN — DONEPEZIL HYDROCHLORIDE 10 MG: 10 TABLET, FILM COATED ORAL at 09:20

## 2024-06-24 RX ADMIN — POLYETHYLENE GLYCOL 3350 17 G: 17 POWDER, FOR SOLUTION ORAL at 09:20

## 2024-06-24 RX ADMIN — BISACODYL 10 MG: 10 SUPPOSITORY RECTAL at 09:21

## 2024-06-24 RX ADMIN — SODIUM CHLORIDE 75 ML/HR: 9 INJECTION, SOLUTION INTRAVENOUS at 04:39

## 2024-06-24 RX ADMIN — CINACALCET 30 MG: 30 TABLET ORAL at 14:10

## 2024-06-24 RX ADMIN — ACETAMINOPHEN 325MG 650 MG: 325 TABLET ORAL at 06:06

## 2024-06-24 RX ADMIN — MEMANTINE HYDROCHLORIDE 10 MG: 10 TABLET, FILM COATED ORAL at 20:56

## 2024-06-24 RX ADMIN — SENNOSIDES AND DOCUSATE SODIUM 2 TABLET: 50; 8.6 TABLET ORAL at 09:20

## 2024-06-24 RX ADMIN — MEMANTINE HYDROCHLORIDE 10 MG: 10 TABLET, FILM COATED ORAL at 09:20

## 2024-06-24 RX ADMIN — Medication 10 ML: at 20:56

## 2024-06-24 NOTE — PLAN OF CARE
Goal Outcome Evaluation:  VSS, purewick in use, blood tinged urine, worked with PT falls protocol, bed/chair alarm in use, up to chair, large BM, accumax in use,  Plan of Care Reviewed With: patient

## 2024-06-24 NOTE — THERAPY TREATMENT NOTE
Patient Name: Kimberlee Urrutia  : 1940    MRN: [id number]                              Today's Date: 2024       Admit Date: 2024    Visit Dx:     ICD-10-CM ICD-9-CM   1. Right ureteral stone  N20.1 592.1   2. Acute UTI  N39.0 599.0   3. Severe sepsis  A41.9 038.9    R65.20 995.92   4. Elevated lactic acid level  R79.89 276.2   5. Osteomyelitis of lumbar spine  M46.26 730.28   6. Elevated AST (SGOT)  R74.01 790.4   7. Elevated ALT measurement  R74.01 790.4   8. Elevated C-reactive protein (CRP)  R79.82 790.95   9. PRINCE (acute kidney injury)  N17.9 584.9     Patient Active Problem List   Diagnosis    Arthritis    HLD (hyperlipidemia)    Primary hypertension    Health care maintenance    Knee pain, right    Hx of total knee arthroplasty    Seasonal allergies    Vitamin D deficiency    Pyuria    Transient alteration of awareness    Migraine without status migrainosus, not intractable    Leukocytosis    Viral pharyngitis    Multinodular thyroid    Osteopenia of multiple sites    Colon cancer screening    Serum calcium elevated    Acute low back pain    Elevated procalcitonin    Sepsis due to Escherichia coli with acute renal failure without septic shock    Thrombocytopenia    Recurrent oral herpes simplex    Lumbar spinal stenosis    Metabolic encephalopathy    Renal stones    Colon cancer screening    Sacroiliac joint pain    Lumbar stenosis without neurogenic claudication    Renal stone    Hyperlipidemia    History of total knee arthroplasty    Spinal stenosis of lumbar region    Multinodular goiter    Osteopenia    Pain in right knee    Sacroiliac joint pain    Hypercalcemia    CAYLA (dementia of Alzheimer type)    Chronic bilateral low back pain without sciatica    Right ureteral stone    Discitis of lumbar region    Acute UTI (urinary tract infection)    Hypoglycemia    Abnormal LFTs    Hypercalcemia    Constipation     Past Medical History:   Diagnosis Date    Allergic     Arthritis     Colon cancer  screening 01/29/2020    Colon cancer screening 01/29/2020    Cough     Diarrhea     Hx of migraine headaches     Hyperlipidemia     Irregular heart beat     Osteoarthritis     Renal stones 02/2021    Vitamin D deficiency      Past Surgical History:   Procedure Laterality Date    COLONOSCOPY      CYSTOSCOPY W/ URETERAL STENT PLACEMENT Right 6/2/2024    Procedure: CYSTOSCOPY, RIGHT URETERAL STENT PLACEMENT;  Surgeon: Efra Schofield MD;  Location: [facility] MAIN OR;  Service: Urology;  Laterality: Right;    LUMBAR EPIDURAL INJECTION N/A 8/2/2021    Procedure: LUMBAR EPIDURAL 1ST VISIT 5-1;  Surgeon: Nu Partida MD;  [place] [facility] [place]  Service: Pain Management;  Laterality: N/A;    REPLACEMENT TOTAL KNEE Right 01/2015    Josue Wilson MD    SACROILIAC JOINT INJECTION Left 6/16/2021    Procedure: SACROILIAC INJECTION left;  Surgeon: Nu Partida MD;  [place] [facility] [place]  Service: Pain Management;  Laterality: Left;    TUBAL ABDOMINAL LIGATION      URETEROSCOPY LASER LITHOTRIPSY WITH STENT INSERTION Left 2/23/2021    Procedure: LT.URETEROSCOPY LASER LITHOTRIPSY WITH STENT  FOR STONE;  Surgeon: Arnaud Lu MD;  Location: [facility] OR;  Service: Urology;  Laterality: Left;    WISDOM TOOTH EXTRACTION        General Information       Row Name 06/24/24 1543          Physical Therapy Time and Intention    Document Type therapy note (daily note)  -MS     Mode of Treatment co-treatment;occupational therapy;physical therapy  -MS       Row Name 06/24/24 1543          General Information    Existing Precautions/Restrictions fall  -MS       Row Name 06/24/24 1543          Cognition    Orientation Status (Cognition) oriented to;person;place;verbal cues/prompts needed for orientation  -MS       Row Name 06/24/24 1545          Safety Issues, Functional Mobility    Impairments Affecting Function (Mobility) balance;cognition;endurance/activity tolerance;strength  -MS     Comment, Safety  Issues/Impairments (Mobility) Gait belt and non skid socks donned.  -MS               User Key  (r) = Recorded By, (t) = Taken By, (c) = Cosigned By      Initials Name Provider Type    MS BurksAnne, PT Physical Therapist                   Mobility       Row Name 06/24/24 1543          Bed Mobility    Supine-Sit Preston (Bed Mobility) standby assist;verbal cues  -MS     Assistive Device (Bed Mobility) bed rails  -MS     Comment, (Bed Mobility) SBA-CGA for sitting balance.  -MS       Row Name 06/24/24 1543          Transfers    Comment, (Transfers) Multiple STS from EOB and recliner for toileting needs and changing brief.  -MS       Row Name 06/24/24 1543          Bed-Chair Transfer    Bed-Chair Preston (Transfers) moderate assist (50% patient effort);2 person assist;verbal cues;nonverbal cues (demo/gesture)  -MS     Comment, (Bed-Chair Transfer) HHA x 2  -MS       Row Name 06/24/24 1543          Sit-Stand Transfer    Sit-Stand Preston (Transfers) moderate assist (50% patient effort);2 person assist;verbal cues;nonverbal cues (demo/gesture)  -MS     Assistive Device (Sit-Stand Transfers) walker, front-wheeled  -MS     Comment, (Sit-Stand Transfer) HHA x 2  -MS       Row Name 06/24/24 1543          Gait/Stairs (Locomotion)    Preston Level (Gait) moderate assist (50% patient effort);2 person assist;verbal cues;nonverbal cues (demo/gesture)  -MS     Assistive Device (Gait) --  HHA x 2  -MS     Patient was able to Ambulate yes  -MS     Distance in Feet (Gait) 3  -MS     Comment, (Gait/Stairs) Small steps from bed to recliner, mildly unsteady, VCs for posterior lean.  -MS               User Key  (r) = Recorded By, (t) = Taken By, (c) = Cosigned By      Initials Name Provider Type    MS UrbinaAnne, PT Physical Therapist                   Obj/Interventions       Row Name 06/24/24 1544          Balance    Static Sitting Balance standby assist  -MS     Dynamic Sitting Balance standby  assist  -MS     Position, Sitting Balance sitting edge of bed  -MS     Static Standing Balance contact guard  -MS     Dynamic Standing Balance contact guard  -MS     Position/Device Used, Standing Balance supported  -MS               User Key  (r) = Recorded By, (t) = Taken By, (c) = Cosigned By      Initials Name Provider Type    Anne Canales, PT Physical Therapist                   Goals/Plan    No documentation.                  Clinical Impression       Row Name 06/24/24 1545          Pain    Pretreatment Pain Rating 0/10 - no pain  -MS     Posttreatment Pain Rating 0/10 - no pain  -MS       Row Name 06/24/24 1545          Plan of Care Review    Plan of Care Reviewed With patient  -MS     Outcome Evaluation Patient resting in bed upon PT/OT arrival this afternoon. Less assist needed for bed mobility as able to achieve EOB with SBA and increased time. Multiple STS completed this date requiring modAx2 and able to take small shuffled steps from bed to recliner. No new complaints. Continue to recommend SNF for continued rehabilitation.  -MS       Row Name 06/24/24 1545          Positioning and Restraints    Pre-Treatment Position in bed  -MS     Post Treatment Position chair  -MS     In Chair notified nsg;reclined;call light within reach;encouraged to call for assist;exit alarm on  -MS               User Key  (r) = Recorded By, (t) = Taken By, (c) = Cosigned By      Initials Name Provider Type    Anne Canales, PT Physical Therapist                   Outcome Measures       Row Name 06/24/24 1547 06/24/24 0900       How much help from another person do you currently need...    Turning from your back to your side while in flat bed without using bedrails? 3  -MS 3  -MM    Moving from lying on back to sitting on the side of a flat bed without bedrails? 2  -MS 2  -MM    Moving to and from a bed to a chair (including a wheelchair)? 2  -MS 2  -MM    Standing up from a chair using your arms (e.g.,  wheelchair, bedside chair)? 2  -MS 2  -MM    Climbing 3-5 steps with a railing? 1  -MS 1  -MM    To walk in hospital room? 1  -MS 2  -MM    AM-PAC 6 Clicks Score (PT) 11  -MS 12  -MM    Highest Level of Mobility Goal 4 --> Transfer to chair/commode  -MS 4 --> Transfer to chair/commode  -MM              User Key  (r) = Recorded By, (t) = Taken By, (c) = Cosigned By      Initials Name Provider Type    Amalia Rondon, RN Registered Nurse    Anne Canales, KALEY Physical Therapist                                 Physical Therapy Education       Title: PT OT SLP Therapies (In Progress)       Topic: Physical Therapy (In Progress)       Point: Mobility training (Done)       Learning Progress Summary             Patient Acceptance, E,D, VU,NR by EB at 6/21/2024 1122    Acceptance, E,D, NR by EF at 6/19/2024 0931    Acceptance, E, VU,NR by SV at 6/16/2024 1531    Acceptance, E,TB, NR by BK at 6/14/2024 1715    Acceptance, E,D, VU,NR by EB at 6/14/2024 1641    Acceptance, E,D, NR by EB at 6/13/2024 1514    Acceptance, E,D, NR by EB at 6/12/2024 1643    Acceptance, E,TB, VU by RD at 6/11/2024 1011    Acceptance, E,TB, VU,NR by CB at 6/10/2024 1149    Acceptance, E, VU by EM at 6/7/2024 1622    Acceptance, E, NR by EM at 6/6/2024 1605    Acceptance, E,TB, VU by BK at 6/5/2024 1700    Acceptance, E, NR by EM at 6/5/2024 1640                         Point: Home exercise program (Done)       Learning Progress Summary             Patient Acceptance, E,D, VU,NR by EB at 6/21/2024 1122    Acceptance, E,D, NR by EF at 6/19/2024 0931    Acceptance, E, VU,NR by SV at 6/16/2024 1531    Acceptance, E,TB, NR by BK at 6/14/2024 1715    Acceptance, E,TB, VU by BK at 6/13/2024 1655    Acceptance, E,TB, VU by RD at 6/11/2024 1011    Acceptance, E,TB, VU,NR by CB at 6/10/2024 1149    Acceptance, E, VU by EM at 6/7/2024 1622    Acceptance, E, NR by EM at 6/6/2024 1605                         Point: Body mechanics (Done)       Learning  Progress Summary             Patient Acceptance, E,D, VU,NR by EB at 6/21/2024 1122    Acceptance, E,D, NR by EF at 6/19/2024 0931    Acceptance, E,TB, NR by BK at 6/14/2024 1715    Acceptance, E,D, VU,NR by EB at 6/14/2024 1641    Acceptance, E,D, NR by EB at 6/13/2024 1514    Acceptance, E,D, NR by EB at 6/12/2024 1643    Acceptance, E,TB, VU by RD at 6/11/2024 1011    Acceptance, E,TB, VU,NR by CB at 6/10/2024 1149                         Point: Precautions (In Progress)       Learning Progress Summary             Patient Acceptance, E,D, NR by EB at 6/13/2024 1514    Acceptance, E,TB, VU by RD at 6/11/2024 1011    Acceptance, E,TB, VU,NR by CB at 6/10/2024 1149                                         User Key       Initials Effective Dates Name Provider Type Discipline    RD 06/16/21 -  Leonela Acuna, PT Physical Therapist PT    EF 05/31/24 -  Marci Mckenzie, PT Physical Therapist PT    EM 06/16/21 -  Yvette Guajardo, PT Physical Therapist PT    SV 07/11/23 -  Velma Georges, PT Physical Therapist PT    EB 02/14/23 -  Nancy Bishop PTA Physical Therapist Assistant PT    CB 10/22/21 -  Kaitlin Cunningham, PT Physical Therapist PT    BK 04/30/24 -  Nu Leon, RN Extern Registered Nurse Nurse                  PT Recommendation and Plan     Plan of Care Reviewed With: patient  Outcome Evaluation: Patient resting in bed upon PT/OT arrival this afternoon. Less assist needed for bed mobility as able to achieve EOB with SBA and increased time. Multiple STS completed this date requiring modAx2 and able to take small shuffled steps from bed to recliner. No new complaints. Continue to recommend SNF for continued rehabilitation.     Time Calculation:         PT Charges       Row Name 06/24/24 1542             Time Calculation    Start Time 1444  -MS      Stop Time 1501  -MS      Time Calculation (min) 17 min  -MS      PT Received On 06/24/24  -MS      PT - Next Appointment 06/25/24  -MS                User  Key  (r) = Recorded By, (t) = Taken By, (c) = Cosigned By      Initials Name Provider Type    MS Anne Urbina, PT Physical Therapist                  Therapy Charges for Today       Code Description Service Date Service Provider Modifiers Qty    [id number]  PT THER PROC EA 15 MIN 6/24/2024 Anne Urbina, PT GP 1            PT G-Codes  Outcome Measure Options: AM-PAC 6 Clicks Daily Activity (OT)  AM-PAC 6 Clicks Score (PT): 11  AM-PAC 6 Clicks Score (OT): 9  Modified Chloe Scale: 4 - Moderately severe disability.  Unable to walk without assistance, and unable to attend to own bodily needs without assistance.       Anne Urbina, KALEY  6/24/2024

## 2024-06-24 NOTE — PROGRESS NOTES
"    Name: Kimberlee Urrutia ADMIT: 2024   : 1940  PCP: Meche Ji MD    MRN: [id number] LOS: 22 days   AGE/SEX: 84 y.o. female  ROOM: [facility]     Subjective   Subjective   No new events overnight.  No new complaints, laying in bed.  Calcium improved, 11.1 this morning    Review of Systems   As above  Objective   Objective   Vital Signs  Temp:  [96.7 °F (35.9 °C)-98 °F (36.7 °C)] 96.7 °F (35.9 °C)  Heart Rate:  [64-77] 64  Resp:  [16] 16  BP: (130-131)/(67-82) 131/67  SpO2:  [96 %-98 %] 97 %  on   ;   Device (Oxygen Therapy): room air  Body mass index is 30.99 kg/m².  Physical Exam    General: Alert,, laying in bed, not in distress  HEENT: Normocephalic, atraumatic  CV: RRR, no murmurs lungs  Lungs: CTA, no wheezing   Abdomen: Soft, nontender, nondistended  Extremities: No significant peripheral edema , no cyanosis     Results Review     I reviewed the patient's new clinical results.  Results from last 7 days   Lab Units 24  0523 24  0640   WBC 10*3/mm3 7.90 7.16   HEMOGLOBIN g/dL 11.0* 10.5*   PLATELETS 10*3/mm3 318 291     Results from last 7 days   Lab Units 24  0414 24  0523 24  0640   SODIUM mmol/L 139 140 137   POTASSIUM mmol/L 3.9 4.2 3.8   CHLORIDE mmol/L 106 106 105   CO2 mmol/L 22.9 25.5 25.0   BUN mg/dL 19 18 9   CREATININE mg/dL 0.91 0.96 0.79   GLUCOSE mg/dL 87 90 107*   Estimated Creatinine Clearance: 44.2 mL/min (by C-G formula based on SCr of 0.91 mg/dL).  Results from last 7 days   Lab Units 24  0523   ALBUMIN g/dL 3.2*     Results from last 7 days   Lab Units 24  0414 24  0523 24  0640   CALCIUM mg/dL 11.1* 11.5* 10.5   ALBUMIN g/dL  --  3.2*  --    MAGNESIUM mg/dL  --   --  2.3   PHOSPHORUS mg/dL  --   --  3.0       COVID19   Date Value Ref Range Status   2021 Not Detected Not Detected - Ref. Range Final   2021 Not Detected Not Detected - Ref. Range Final     No results found for: \"HGBA1C\", \"POCGLU\"          CT " Abdomen Pelvis With Contrast  Narrative: CT ABDOMEN PELVIS W CONTRAST-     INDICATION: Sepsis, ureteral stent     COMPARISON: CT abdomen pelvis June 1, 2024     TECHNIQUE:  Routine CT abdomen/pelvis with IV contrast. Coronal and sagittal  reformats. Radiation dose reduction techniques were utilized, including  automated exposure control and exposure modulation based on body size.     FINDINGS:      Small right pleural effusion. Small loculated left pleural effusion with  pleural calcification and pleural thickening. Suspect subsegmental  atelectasis at the bases.     ABDOMEN: Beam hardening and streak artifact from patient's arms at side  and body wall against the gantry. Normal liver, gallbladder, spleen,  pancreas and adrenal glands. Symmetric perinephric edema. Right ureteral  stent resolution of right hydronephrosis. Fluid attenuating left renal  cyst. Lobular left renal contour with some cortical loss, suspect  scarring. No solid-appearing renal mass or hydronephrosis.     Pelvis: Rodriguez in the bladder. Bladder is collapsed. Anteverted uterus.  Calcifications in the uterus may represent small calcified leiomyomas.  No adnexal mass. Trace fluid in the cul-de-sac.     Bowel: No obstruction. Colonic diverticulosis. Normal appendix.     Abdominal wall: Rectus diastases. Periumbilical abdominal wall scarring.  Small fat-containing umbilical hernia. Small fat-containing inguinal  hernias. Body wall edema.     Retroperitoneum: No lymphadenopathy.     Vasculature: Patent. No abdominal aortic aneurysm.     Osseous structures: No destructive osseous lesions. Lumbar facet  degenerative arthropathy with grade 1/2 anterolisthesis of L3 on L4.  Decreased bone mineralization..     Impression:    1. New third spacing with small right greater than left pleural  effusions and body wall edema.  2. Interval placement of right ureteral stent with resolution of  hydronephrosis. Ureterolithiasis no longer seen  3. Colonic  diverticulosis     This report was finalized on 6/6/2024 10:41 AM by Dr. Diony Lam M.D on Workstation: [id number]       Scheduled Medications  bisacodyl, 10 mg, Rectal, Daily  cinacalcet, 30 mg, Oral, Daily With Breakfast  donepezil, 10 mg, Oral, Daily  lactated ringers, 1,000 mL, Intravenous, Once  memantine, 10 mg, Oral, BID  polyethylene glycol, 17 g, Oral, BID  senna-docusate sodium, 2 tablet, Oral, BID  sodium chloride, 10 mL, Intravenous, Q12H    Infusions  sodium chloride, 75 mL/hr, Last Rate: 75 mL/hr (06/24/24 0432)    Diet  Diet: Regular/House; Feeding Assistance - Nursing; Texture: Regular (IDDSI 7); Fluid Consistency: Thin (IDDSI 0)    I have personally reviewed     [x]  Laboratory   []  Microbiology   []  Radiology   []  EKG/Telemetry  []  Cardiology/Vascular   []  Pathology    []  Records       Assessment/Plan     Active Hospital Problems    Diagnosis  POA    **Right ureteral stone [N20.1]  Yes    Hypercalcemia [E83.52]  No    Constipation [K59.00]  Clinically Undetermined    Acute UTI (urinary tract infection) [N39.0]  Yes    Hypoglycemia [E16.2]  Yes    Abnormal LFTs [R79.89]  Yes    Chronic bilateral low back pain without sciatica [M54.50, G89.29]  Yes    CAYLA (dementia of Alzheimer type) [G30.9, F02.80]  Yes    Lumbar spinal stenosis [M48.061]  Yes    Thrombocytopenia [D69.6]  Yes    Acute low back pain [M54.50]  Yes      Resolved Hospital Problems    Diagnosis Date Resolved POA    Shock, septic [A41.9, R65.21] 06/03/2024 Yes    PRINCE (acute kidney injury) [N17.9] 06/06/2024 Yes       Urosepsis secondary to ESBL E. coli pyelonephritis complicated by right hydronephrosis and obstructive ureteric stone and ESBL E. coli septicemia   - status post cystoscopy and right stent placement on 6/2/2024.  -Urology recommends keeping Rodriguez catheter until the patient is ambulatory   -Status post adequate course of Invanz treatment per ID.    -Leukocytosis resolved.  No fever.  Repeat blood culture are  negative.  -Urology was reconsulted on 06/20 as patient remains in the hospital, voiding trial was recommended, plan still for outpatient for scheduled ureteroscopy, laser llithotripsy after discharge    Constipation.    Continue bowel regimen    Hypercalcemia.    -Calcium trended back up to 11.5 on 06/23 corrected for albumin was around 12, ionized calcium was high at 1.66  -Vitamin D and PTH normal  -On IV fluids  -Nephrology following, initiated on cinacalcet, workup pending  -Calcium improved, 11.1      Metabolic encephalopathy in a patient with a history of Alzheimer's dementia.    Resolved metabolic encephalopathy.  Negative CNS examination.    Continue Aricept and Namenda.    Prediabetes.    A1c 5.7.  -Discontinue SSI 06/20 blood glucose has been stable and patient did not require insulin in the last 48 hours  -Blood glucose stable on BMP    Hypernatremia/acute kidney.    Resolved    Transaminitis   secondary to sepsis.  LFTs normalized 06/16    Anemia/thrombocytopenia.   Hemoglobin stable  Resolved thrombocytopenia which is mostly secondary to sepsis.    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    Status post neurosurgery consult., conservative treatment.    -Pending placement to rehab    Dysphagia.   -Improved, tolerating advance diet          CODE STATUS: DNR  DVT prophylaxis: SCDs  Disposition.  Pending placement to SNF when insurance  reinstated.  Patient stable to discharge from medical standpoint        Copied text in this note has been reviewed and is accurate as of 06/24/24.         Dictated utilizing Dragon dictation        Jaren Benson MD  [facility]  06/24/24  10:15 EDT

## 2024-06-24 NOTE — PROGRESS NOTES
"   LOS: 22 days     Chief Complaint/ Reason for encounter: Hypercalcemia    Subjective   06/24/24 : Patient is doing well today with no new complaints  Good appetite with no nausea or vomiting  No shortness of breath chest pain or edema  Voiding well with no dysuria  Denies new complaints        Medical history reviewed:  History of Present Illness    Subjective    History taken from: Patient and chart    Vital Signs  Temp:  [96.7 °F (35.9 °C)-98 °F (36.7 °C)] 96.7 °F (35.9 °C)  Heart Rate:  [64-77] 64  Resp:  [16] 16  BP: (130-131)/(67-82) 131/67       Wt Readings from Last 1 Encounters:   06/24/24 0507 78.1 kg (172 lb 2.9 oz)   06/23/24 0500 80.7 kg (177 lb 14.6 oz)   06/22/24 0500 80.1 kg (176 lb 9.4 oz)   06/21/24 0500 80.2 kg (176 lb 12.9 oz)   06/20/24 0549 81.6 kg (179 lb 14.3 oz)   06/19/24 0524 82.4 kg (181 lb 10.5 oz)   06/18/24 0500 82.5 kg (181 lb 14.1 oz)   06/17/24 0457 84.2 kg (185 lb 10 oz)   06/16/24 0500 85.4 kg (188 lb 4.4 oz)   06/15/24 0500 85.5 kg (188 lb 7.9 oz)   06/14/24 0300 85 kg (187 lb 6.3 oz)   06/13/24 0300 85.5 kg (188 lb 7.9 oz)   06/12/24 0250 84.5 kg (186 lb 4.6 oz)   06/11/24 0417 86.4 kg (190 lb 7.6 oz)   06/10/24 0534 85.2 kg (187 lb 13.3 oz)   06/09/24 0300 84.8 kg (186 lb 15.2 oz)   06/08/24 0415 85.2 kg (187 lb 12.8 oz)   06/07/24 0546 90.4 kg (199 lb 4.7 oz)   06/06/24 0500 90.1 kg (198 lb 10.2 oz)   06/05/24 0500 85.7 kg (188 lb 14.4 oz)   06/04/24 0452 86 kg (189 lb 9.5 oz)   06/03/24 0514 86.2 kg (190 lb 0.6 oz)   06/02/24 0850 83.9 kg (184 lb 15.5 oz)   06/02/24 0457 85.4 kg (188 lb 4.4 oz)   06/01/24 2030 81 kg (178 lb 9.2 oz)       Objective:  Vital signs: (most recent): Blood pressure 131/67, pulse 64, temperature 96.7 °F (35.9 °C), temperature source Oral, resp. rate 16, height 158.8 cm (62.5\"), weight 78.1 kg (172 lb 2.9 oz), SpO2 97%, not currently breastfeeding.                Objective:  General Appearance:  Comfortable, well-appearing, in no acute distress and " not in pain.  Awake, alert  HEENT: Mucous membranes moist, no injury, oropharynx clear  Lungs:  Normal effort and normal respiratory rate.  Breath sounds clear to auscultation.  No  respiratory distress.  No rales, decreased breath sounds or rhonchi.    Heart: Normal rate.  Regular rhythm.  S1, S2 normal.  No murmur.   Abdomen: Abdomen is soft.  Bowel sounds are normal, no abdominal tenderness.  There is no rebound or guarding  Extremities: Trace edema of bilateral lower extremities  Skin:  Warm and dry with no rashes      Results Review:    Intake/Output:     Intake/Output Summary (Last 24 hours) at 6/24/2024 1138  Last data filed at 6/24/2024 0920  Gross per 24 hour   Intake 1074 ml   Output 1250 ml   Net -176 ml         DATA:  Radiology and Labs:  The following labs independently reviewed by me. Additional labs ordered for tomorrow a.m.  Interval notes, chart personally reviewed by me.   Old records independently reviewed showing intermittent hypercalcemia with some mild hypocalcemia during an admission earlier this month  The following radiologic studies independently viewed by me, findings CT abdomen pelvis showing small effusions interval right ureteral stent placement with resolution of hydronephrosis  New problems include Hypercalcemia with inappropriately elevated PTH  Discussed with patient and Dr. Benson about discharge planning    Risk/ complexity of medical care/ medical decision making moderate complexity, hypercalcemia management    Labs:   Recent Results (from the past 24 hour(s))   Creatinine Urine Random (kidney function) GFR component - Straight Cath    Collection Time: 06/23/24  4:54 PM    Specimen: Straight Cath; Urine   Result Value Ref Range    Creatinine, Urine 60.6 mg/dL   Calcium, Urine, Random - Straight Cath    Collection Time: 06/23/24  4:54 PM    Specimen: Straight Cath; Urine   Result Value Ref Range    Calcium, Urine 16.9 mg/dL   Basic Metabolic Panel    Collection Time: 06/24/24   4:14 AM    Specimen: Blood   Result Value Ref Range    Glucose 87 65 - 99 mg/dL    BUN 19 8 - 23 mg/dL    Creatinine 0.91 0.57 - 1.00 mg/dL    Sodium 139 136 - 145 mmol/L    Potassium 3.9 3.5 - 5.2 mmol/L    Chloride 106 98 - 107 mmol/L    CO2 22.9 22.0 - 29.0 mmol/L    Calcium 11.1 (H) 8.6 - 10.5 mg/dL    BUN/Creatinine Ratio 20.9 7.0 - 25.0    Anion Gap 10.1 5.0 - 15.0 mmol/L    eGFR 62.0 >60.0 mL/min/1.73       Radiology:  Pertinent radiology studies were reviewed as described above      Medications have been reviewed separately in chart overview      ASSESSMENT:  Hypercalcemia, likely due to hyperparathyroidism  CKD stage II, at baseline  Hydronephrosis - resolved after stent  Constipation   PRINCE- resolved.   Septic shock - resolved.     Plan:  Renal function remained stable and near baseline  Euvolemic on exam  Calcium still elevated but improving with current therapy   continue Sensipar at current dosage at discharge  Discussed with Dr. Marcelino Chahal for discharge  from our standpoint later today  She should have another BMP checked in 1 week to evaluate calcium levels  Discontinue IV fluids       Frederick Kirkland MD  [facility]   Office phone number: [phone]  Answering service phone number: [phone]    06/24/24  11:38 EDT    Dictation performed using Dragon dictation software

## 2024-06-24 NOTE — PROGRESS NOTES
Continued Stay Note  [facility]     Patient Name: Kimberlee Urrutia  MRN: [id number]  Today's Date: 2024    Admit Date: 2024    Plan: [facility] when insurance is reinstated   Discharge Plan       Row Name 24 1639       Plan    Plan Comments Per Kian with [facility], it's still showing her as  in the system. Will continue ot follow and updated CCP      Row Name 24 1554       Plan    Plan [facility] when insurance is reinstated    Plan Comments Per Kian with [facility], when he chacked on Friday it was still inactive. He is having them to check again today. He is following and will updated CCP.                   Discharge Codes    No documentation.                 Expected Discharge Date and Time       Expected Discharge Date Expected Discharge Time    2024               Kavya Gilman RN

## 2024-06-24 NOTE — PLAN OF CARE
Goal Outcome Evaluation:           Progress: no change  Outcome Evaluation: oriented, but forgetful, pure wick, contact isolation, falls protocol, ivf via midline (no blood return), excoriated buttocks, waiting for insurance for placement

## 2024-06-24 NOTE — PLAN OF CARE
Goal Outcome Evaluation:  Plan of Care Reviewed With: patient           Outcome Evaluation: Patient resting in bed upon PT/OT arrival this afternoon. Less assist needed for bed mobility as able to achieve EOB with SBA and increased time. Multiple STS completed this date requiring modAx2 and able to take small shuffled steps from bed to recliner. No new complaints. Continue to recommend SNF for continued rehabilitation.

## 2024-06-24 NOTE — THERAPY TREATMENT NOTE
Patient Name: Kimberlee Urrutia  : 1940    MRN: [id number]                              Today's Date: 2024       Admit Date: 2024    Visit Dx:     ICD-10-CM ICD-9-CM   1. Right ureteral stone  N20.1 592.1   2. Acute UTI  N39.0 599.0   3. Severe sepsis  A41.9 038.9    R65.20 995.92   4. Elevated lactic acid level  R79.89 276.2   5. Osteomyelitis of lumbar spine  M46.26 730.28   6. Elevated AST (SGOT)  R74.01 790.4   7. Elevated ALT measurement  R74.01 790.4   8. Elevated C-reactive protein (CRP)  R79.82 790.95   9. PRINCE (acute kidney injury)  N17.9 584.9     Patient Active Problem List   Diagnosis    Arthritis    HLD (hyperlipidemia)    Primary hypertension    Health care maintenance    Knee pain, right    Hx of total knee arthroplasty    Seasonal allergies    Vitamin D deficiency    Pyuria    Transient alteration of awareness    Migraine without status migrainosus, not intractable    Leukocytosis    Viral pharyngitis    Multinodular thyroid    Osteopenia of multiple sites    Colon cancer screening    Serum calcium elevated    Acute low back pain    Elevated procalcitonin    Sepsis due to Escherichia coli with acute renal failure without septic shock    Thrombocytopenia    Recurrent oral herpes simplex    Lumbar spinal stenosis    Metabolic encephalopathy    Renal stones    Colon cancer screening    Sacroiliac joint pain    Lumbar stenosis without neurogenic claudication    Renal stone    Hyperlipidemia    History of total knee arthroplasty    Spinal stenosis of lumbar region    Multinodular goiter    Osteopenia    Pain in right knee    Sacroiliac joint pain    Hypercalcemia    CAYLA (dementia of Alzheimer type)    Chronic bilateral low back pain without sciatica    Right ureteral stone    Discitis of lumbar region    Acute UTI (urinary tract infection)    Hypoglycemia    Abnormal LFTs    Hypercalcemia    Constipation     Past Medical History:   Diagnosis Date    Allergic     Arthritis     Colon cancer  screening 01/29/2020    Colon cancer screening 01/29/2020    Cough     Diarrhea     Hx of migraine headaches     Hyperlipidemia     Irregular heart beat     Osteoarthritis     Renal stones 02/2021    Vitamin D deficiency      Past Surgical History:   Procedure Laterality Date    COLONOSCOPY      CYSTOSCOPY W/ URETERAL STENT PLACEMENT Right 6/2/2024    Procedure: CYSTOSCOPY, RIGHT URETERAL STENT PLACEMENT;  Surgeon: Efra Schofield MD;  Location: [facility] OR;  Service: Urology;  Laterality: Right;    LUMBAR EPIDURAL INJECTION N/A 8/2/2021    Procedure: LUMBAR EPIDURAL 1ST VISIT 5-1;  Surgeon: Nu Partida MD;  Location: [facility] MAIN OR;  Service: Pain Management;  Laterality: N/A;    REPLACEMENT TOTAL KNEE Right 01/2015    Josue Wilson MD    SACROILIAC JOINT INJECTION Left 6/16/2021    Procedure: SACROILIAC INJECTION left;  Surgeon: Nu Partida MD;  Location: [facility] MAIN OR;  Service: Pain Management;  Laterality: Left;    TUBAL ABDOMINAL LIGATION      URETEROSCOPY LASER LITHOTRIPSY WITH STENT INSERTION Left 2/23/2021    Procedure: LT.URETEROSCOPY LASER LITHOTRIPSY WITH STENT  FOR STONE;  Surgeon: Arnaud Lu MD;  Location: [facility] OR;  Service: Urology;  Laterality: Left;    WISDOM TOOTH EXTRACTION        General Information       Row Name 06/24/24 1522          OT Time and Intention    Document Type therapy note (daily note)  -RB     Mode of Treatment co-treatment;occupational therapy;physical therapy  -RB       Row Name 06/24/24 1522          General Information    Patient Profile Reviewed yes  -RB       Row Name 06/24/24 1522          Cognition    Orientation Status (Cognition) oriented to;person;place;verbal cues/prompts needed for orientation  -RB       Row Name 06/24/24 1522          Safety Issues, Functional Mobility    Safety Issues Affecting Function (Mobility) awareness of need for assistance;insight into deficits/self-awareness;judgment;problem-solving;safety precaution  awareness;safety precautions follow-through/compliance;sequencing abilities  -RB               User Key  (r) = Recorded By, (t) = Taken By, (c) = Cosigned By      Initials Name Provider Type    RB Janelle Viramontes OT Occupational Therapist                     Mobility/ADL's       Row Name 06/24/24 1514          Bed Mobility    Bed Mobility supine-sit  -RB     Supine-Sit [place] (Bed Mobility) standby assist;verbal cues  -RB     Assistive Device (Bed Mobility) bed rails  -RB       Row Name 06/24/24 1514          Transfers    Transfers sit-stand transfer;stand-sit transfer;bed-chair transfer  -RB       Row Name 06/24/24 1514          Bed-Chair Transfer    Bed-Chair [place] (Transfers) moderate assist (50% patient effort);2 person assist;verbal cues;nonverbal cues (demo/gesture)  -RB     Comment, (Bed-Chair Transfer) HHA x 2  -RB       Row Name 06/24/24 1514          Sit-Stand Transfer    Sit-Stand [place] (Transfers) moderate assist (50% patient effort);2 person assist;verbal cues;nonverbal cues (demo/gesture)  -RB     Comment, (Sit-Stand Transfer) hha x 2  -RB       Row Name 06/24/24 1514          Stand-Sit Transfer    Stand-Sit [place] (Transfers) moderate assist (50% patient effort);2 person assist;verbal cues;nonverbal cues (demo/gesture)  -RB     Comment, (Stand-Sit Transfer) hha x 2  -RB       Row Name 06/24/24 1514          Functional Mobility    Functional Mobility- Ind. Level not tested  -RB       Row Name 06/24/24 1514          Activities of Daily Living    BADL Assessment/Intervention toileting  -RB       Row Name 06/24/24 1514          Toileting Assessment/Training    [place] Level (Toileting) toileting skills;dependent (less than 25% patient effort)  -RB     Position (Toileting) supported sitting;supported standing  -RB     Comment, (Toileting) large BM  -RB               User Key  (r) = Recorded By, (t) = Taken By, (c) = Cosigned By      Initials Name Provider Type    RB  Janelle Viramontes OT Occupational Therapist                   Obj/Interventions       Row Name 06/24/24 1513          Balance    Comment, Balance SBA sitting balance, Mod A x1-2 for standing balance HHA X2  -RB               User Key  (r) = Recorded By, (t) = Taken By, (c) = Cosigned By      Initials Name Provider Type    Janelle Renee OT Occupational Therapist                   Goals/Plan    No documentation.                  Clinical Impression       Row Name 06/24/24 1512          Pain Assessment    Pretreatment Pain Rating 0/10 - no pain  -RB     Posttreatment Pain Rating 0/10 - no pain  -RB       Row Name 06/24/24 1512          Plan of Care Review    Plan of Care Reviewed With patient  -RB     Outcome Evaluation The pt participated in OT/PT this afternoon. SBA + increased time for bed mobility. Total A provided for rivka hygiene and brief management while standing. Mod A x1-2 for standing balance, small shuffled steps. She was resting in her bedside chair on exit from the room.  SNF recommended.  -RB       Row Name 06/24/24 1512          Therapy Assessment/Plan (OT)    Rehab Potential (OT) good, to achieve stated therapy goals  -RB     Criteria for Skilled Therapeutic Interventions Met (OT) yes;skilled treatment is necessary  -RB     Therapy Frequency (OT) 3 times/wk  -RB       Row Name 06/24/24 1512          Therapy Plan Review/Discharge Plan (OT)    Anticipated Discharge Disposition (OT) skilled nursing facility  -RB       Row Name 06/24/24 1512          Positioning and Restraints    Pre-Treatment Position in bed  -RB     Post Treatment Position chair  -RB     In Chair notified nsg;reclined;sitting;call light within reach;encouraged to call for assist;exit alarm on;with family/caregiver;legs elevated  -RB               User Key  (r) = Recorded By, (t) = Taken By, (c) = Cosigned By      Initials Name Provider Type    Janelle Renee OT Occupational Therapist                   Outcome Measures        Row Name 06/24/24 0900          How much help from another person do you currently need...    Turning from your back to your side while in flat bed without using bedrails? 3  -MM     Moving from lying on back to sitting on the side of a flat bed without bedrails? 2  -MM     Moving to and from a bed to a chair (including a wheelchair)? 2  -MM     Standing up from a chair using your arms (e.g., wheelchair, bedside chair)? 2  -MM     Climbing 3-5 steps with a railing? 1  -MM     To walk in hospital room? 2  -MM     AM-PAC 6 Clicks Score (PT) 12  -MM     Highest Level of Mobility Goal 4 --> Transfer to chair/commode  -MM               User Key  (r) = Recorded By, (t) = Taken By, (c) = Cosigned By      Initials Name Provider Type    Amalia Rondon, RN Registered Nurse                    Occupational Therapy Education       Title: PT OT SLP Therapies (In Progress)       Topic: Occupational Therapy (In Progress)       Point: ADL training (In Progress)       Description:   Instruct learner(s) on proper safety adaptation and remediation techniques during self care or transfers.   Instruct in proper use of assistive devices.                  Learning Progress Summary             Patient Acceptance, E, NR by  at 6/10/2024 1505                         Point: Home exercise program (In Progress)       Description:   Instruct learner(s) on appropriate technique for monitoring, assisting and/or progressing therapeutic exercises/activities.                  Learning Progress Summary             Patient Acceptance, E, NR by  at 6/10/2024 1505                         Point: Precautions (In Progress)       Description:   Instruct learner(s) on prescribed precautions during self-care and functional transfers.                  Learning Progress Summary             Patient Acceptance, E, NR by  at 6/10/2024 1505                         Point: Body mechanics (In Progress)       Description:   Instruct learner(s) on proper  positioning and spine alignment during self-care, functional mobility activities and/or exercises.                  Learning Progress Summary             Patient Acceptance, E, NR by  at 6/10/2024 1505                                         User Key       Initials Effective Dates Name Provider Type Discipline     04/30/24 -  Nreeyda Rothman OT Student OT Student OT                  OT Recommendation and Plan  Therapy Frequency (OT): 3 times/wk  Plan of Care Review  Plan of Care Reviewed With: patient  Outcome Evaluation: The pt participated in OT/PT this afternoon. SBA + increased time for bed mobility. Total A provided for rivka hygiene and brief management while standing. Mod A x1-2 for standing balance, small shuffled steps. She was resting in her bedside chair on exit from the room.  SNF recommended.     Time Calculation:   Evaluation Complexity (OT)  Review Occupational Profile/Medical/Therapy History Complexity: expanded/moderate complexity  Assessment, Occupational Performance/Identification of Deficit Complexity: 3-5 performance deficits  Clinical Decision Making Complexity (OT): detailed assessment/moderate complexity  Overall Complexity of Evaluation (OT): moderate complexity     Time Calculation- OT       Row Name 06/24/24 1512             Time Calculation- OT    OT Start Time 1444  -RB      OT Stop Time 1500  -RB      OT Time Calculation (min) 16 min  -RB      Total Timed Code Minutes- OT 16 minute(s)  -RB      OT Received On 06/24/24  -RB      OT - Next Appointment 06/26/24  -RB         Timed Charges    30110 - OT Self Care/Mgmt Minutes 16  -RB         Total Minutes    Timed Charges Total Minutes 16  -RB       Total Minutes 16  -RB                User Key  (r) = Recorded By, (t) = Taken By, (c) = Cosigned By      Initials Name Provider Type    Janelle Renee OT Occupational Therapist                  Therapy Charges for Today       Code Description Service Date Service Provider  Modifiers Qty    [id number] HC OT SELF CARE/MGMT/TRAIN EA 15 MIN 6/24/2024 Janelle Viramontes OT GO 1                 Janelle Viramontes OT  6/24/2024

## 2024-06-24 NOTE — PROGRESS NOTES
Continued Stay Note  [facility]     Patient Name: Kimberlee Urrutia  MRN: [id number]  Today's Date: 6/24/2024    Admit Date: 6/1/2024    Plan: [facility] when insurance is reinstated   Discharge Plan       Row Name 06/24/24 1559       Plan    Plan [facility] when insurance is reinstated    Plan Comments Per Kian with [facility], when he chacked on Friday it was still inactive. He is having them to check again today. He is following and will updated CCP.                   Discharge Codes    No documentation.                 Expected Discharge Date and Time       Expected Discharge Date Expected Discharge Time    Jun 27, 2024               Kavya Gilman RN

## 2024-06-24 NOTE — PLAN OF CARE
The pt participated in OT/PT this afternoon. SBA + increased time for bed mobility. Total A provided for rivka hygiene and brief management while standing. Mod A x1-2 for standing balance, small shuffled steps. She was resting in her bedside chair on exit from the room.  SNF recommended.

## 2024-06-25 LAB
ANION GAP SERPL CALCULATED.3IONS-SCNC: 7 MMOL/L (ref 5–15)
BUN SERPL-MCNC: 16 MG/DL (ref 8–23)
BUN/CREAT SERPL: 17.8 (ref 7–25)
CALCIUM SPEC-SCNC: 10 MG/DL (ref 8.6–10.5)
CHLORIDE SERPL-SCNC: 111 MMOL/L (ref 98–107)
CO2 SERPL-SCNC: 24 MMOL/L (ref 22–29)
CREAT SERPL-MCNC: 0.9 MG/DL (ref 0.57–1)
EGFRCR SERPLBLD CKD-EPI 2021: 62.8 ML/MIN/1.73
GLUCOSE SERPL-MCNC: 85 MG/DL (ref 65–99)
POTASSIUM SERPL-SCNC: 3.7 MMOL/L (ref 3.5–5.2)
SODIUM SERPL-SCNC: 142 MMOL/L (ref 136–145)

## 2024-06-25 PROCEDURE — 80048 BASIC METABOLIC PNL TOTAL CA: CPT | Performed by: STUDENT IN AN ORGANIZED HEALTH CARE EDUCATION/TRAINING PROGRAM

## 2024-06-25 PROCEDURE — 97110 THERAPEUTIC EXERCISES: CPT

## 2024-06-25 RX ADMIN — SENNOSIDES AND DOCUSATE SODIUM 2 TABLET: 50; 8.6 TABLET ORAL at 08:24

## 2024-06-25 RX ADMIN — MEMANTINE HYDROCHLORIDE 10 MG: 10 TABLET, FILM COATED ORAL at 08:25

## 2024-06-25 RX ADMIN — Medication 10 ML: at 08:27

## 2024-06-25 RX ADMIN — Medication 10 ML: at 20:19

## 2024-06-25 RX ADMIN — CINACALCET 30 MG: 30 TABLET ORAL at 08:25

## 2024-06-25 RX ADMIN — POLYETHYLENE GLYCOL 3350 17 G: 17 POWDER, FOR SOLUTION ORAL at 08:24

## 2024-06-25 RX ADMIN — DONEPEZIL HYDROCHLORIDE 10 MG: 10 TABLET, FILM COATED ORAL at 08:27

## 2024-06-25 RX ADMIN — MEMANTINE HYDROCHLORIDE 10 MG: 10 TABLET, FILM COATED ORAL at 20:17

## 2024-06-25 NOTE — PLAN OF CARE
Goal Outcome Evaluation:           Progress: no change  Outcome Evaluation: VSS. Worked with PT today, improving per therapy note. Waiting for insurance so pt can discharge to [facility]. SCDs in place. Midline flushed, dressing to be changed 6/27. No sticks to LUE. Falls precautions in place. Accumax in place.

## 2024-06-25 NOTE — THERAPY TREATMENT NOTE
Patient Name: Kimberlee Urrutia  : 1940    MRN: [id number]                              Today's Date: 2024       Admit Date: 2024    Visit Dx:     ICD-10-CM ICD-9-CM   1. Right ureteral stone  N20.1 592.1   2. Acute UTI  N39.0 599.0   3. Severe sepsis  A41.9 038.9    R65.20 995.92   4. Elevated lactic acid level  R79.89 276.2   5. Osteomyelitis of lumbar spine  M46.26 730.28   6. Elevated AST (SGOT)  R74.01 790.4   7. Elevated ALT measurement  R74.01 790.4   8. Elevated C-reactive protein (CRP)  R79.82 790.95   9. PRINCE (acute kidney injury)  N17.9 584.9     Patient Active Problem List   Diagnosis    Arthritis    HLD (hyperlipidemia)    Primary hypertension    Health care maintenance    Knee pain, right    Hx of total knee arthroplasty    Seasonal allergies    Vitamin D deficiency    Pyuria    Transient alteration of awareness    Migraine without status migrainosus, not intractable    Leukocytosis    Viral pharyngitis    Multinodular thyroid    Osteopenia of multiple sites    Colon cancer screening    Serum calcium elevated    Acute low back pain    Elevated procalcitonin    Sepsis due to Escherichia coli with acute renal failure without septic shock    Thrombocytopenia    Recurrent oral herpes simplex    Lumbar spinal stenosis    Metabolic encephalopathy    Renal stones    Colon cancer screening    Sacroiliac joint pain    Lumbar stenosis without neurogenic claudication    Renal stone    Hyperlipidemia    History of total knee arthroplasty    Spinal stenosis of lumbar region    Multinodular goiter    Osteopenia    Pain in right knee    Sacroiliac joint pain    Hypercalcemia    CAYLA (dementia of Alzheimer type)    Chronic bilateral low back pain without sciatica    Right ureteral stone    Discitis of lumbar region    Acute UTI (urinary tract infection)    Hypoglycemia    Abnormal LFTs    Hypercalcemia    Constipation     Past Medical History:   Diagnosis Date    Allergic     Arthritis     Colon cancer  screening 01/29/2020    Colon cancer screening 01/29/2020    Cough     Diarrhea     Hx of migraine headaches     Hyperlipidemia     Irregular heart beat     Osteoarthritis     Renal stones 02/2021    Vitamin D deficiency      Past Surgical History:   Procedure Laterality Date    COLONOSCOPY      CYSTOSCOPY W/ URETERAL STENT PLACEMENT Right 6/2/2024    Procedure: CYSTOSCOPY, RIGHT URETERAL STENT PLACEMENT;  Surgeon: Efra Schofield MD;  Location: [facility] MAIN OR;  Service: Urology;  Laterality: Right;    LUMBAR EPIDURAL INJECTION N/A 8/2/2021    Procedure: LUMBAR EPIDURAL 1ST VISIT 5-1;  Surgeon: Nu Partida MD;  [place] [facility] [place];  Service: Pain Management;  Laterality: N/A;    REPLACEMENT TOTAL KNEE Right 01/2015    Josue Wilson MD    SACROILIAC JOINT INJECTION Left 6/16/2021    Procedure: SACROILIAC INJECTION left;  Surgeon: Nu Partida MD;  [place] [facility] [place];  Service: Pain Management;  Laterality: Left;    TUBAL ABDOMINAL LIGATION      URETEROSCOPY LASER LITHOTRIPSY WITH STENT INSERTION Left 2/23/2021    Procedure: LT.URETEROSCOPY LASER LITHOTRIPSY WITH STENT  FOR STONE;  Surgeon: Arnaud Lu MD;  Location: [facility] MAIN OR;  Service: Urology;  Laterality: Left;    WISDOM TOOTH EXTRACTION        General Information       Row Name 06/25/24 1418          Physical Therapy Time and Intention    Document Type therapy note (daily note)  -MS     Mode of Treatment physical therapy  -MS       Row Name 06/25/24 1418          General Information    Existing Precautions/Restrictions fall  -MS       Row Name 06/25/24 1418          Cognition    Orientation Status (Cognition) oriented to;person;time;place  -MS       Row Name 06/25/24 1418          Safety Issues, Functional Mobility    Safety Issues Affecting Function (Mobility) insight into deficits/self-awareness;judgment;positioning of assistive device;sequencing abilities  -MS     Impairments Affecting Function (Mobility)  balance;cognition;endurance/activity tolerance;strength  -MS     Comment, Safety Issues/Impairments (Mobility) Gait belt and non skid socks donned.  -MS               User Key  (r) = Recorded By, (t) = Taken By, (c) = Cosigned By      Initials Name Provider Type    Anne Canales, PT Physical Therapist                   Mobility       Row Name 06/25/24 1421          Bed Mobility    Supine-Sit [place] (Bed Mobility) moderate assist (50% patient effort);verbal cues;nonverbal cues (demo/gesture)  -MS     Sit-Supine [place] (Bed Mobility) maximum assist (25% patient effort);verbal cues;nonverbal cues (demo/gesture)  -MS     Assistive Device (Bed Mobility) bed rails;head of bed elevated  -MS     Comment, (Bed Mobility) SBA-CGA for sitting balance.  -MS       Row Name 06/25/24 1421          Transfers    Comment, (Transfers) Sequencing and hand placement cues. 2 STS at EOB- able to stand for up to 15s.  -MS       Row Name 06/25/24 1421          Sit-Stand Transfer    Sit-Stand [place] (Transfers) moderate assist (50% patient effort);maximum assist (25% patient effort);verbal cues;nonverbal cues (demo/gesture)  -MS     Assistive Device (Sit-Stand Transfers) walker, front-wheeled  -MS       Row Name 06/25/24 1421          Gait/Stairs (Locomotion)    [place] Level (Gait) minimum assist (75% patient effort);verbal cues;nonverbal cues (demo/gesture)  -MS     Assistive Device (Gait) walker, front-wheeled  -MS     Patient was able to Ambulate yes  -MS     Distance in Feet (Gait) 3  -MS     Comment, (Gait/Stairs) Small steps towards HOB- fatigue limiting.  -MS               User Key  (r) = Recorded By, (t) = Taken By, (c) = Cosigned By      Initials Name Provider Type    Anne Canales, PT Physical Therapist                   Obj/Interventions       Row Name 06/25/24 1424          Motor Skills    Therapeutic Exercise --  Seated LAQs and MIPs x 10 reps  -MS       Row Name 06/25/24 1424           Balance    Static Sitting Balance standby assist  -MS     Dynamic Sitting Balance standby assist  -MS     Position, Sitting Balance sitting edge of bed  -MS     Static Standing Balance contact guard  -MS     Dynamic Standing Balance contact guard  -MS     Position/Device Used, Standing Balance supported;walker, rolling  -MS               User Key  (r) = Recorded By, (t) = Taken By, (c) = Cosigned By      Initials Name Provider Type    Anne Canales, PT Physical Therapist                   Goals/Plan    No documentation.                  Clinical Impression       Row Name 06/25/24 1424          Pain    Pretreatment Pain Rating 0/10 - no pain  -MS     Posttreatment Pain Rating 0/10 - no pain  -MS       Row Name 06/25/24 1424          Plan of Care Review    Plan of Care Reviewed With patient  -MS     Progress improving  -MS     Outcome Evaluation Patient pleasant and agreeable to PT this afternoon with encouragement. modA required to achieve EOB, stood with mod-maxA and ambulated a couple side steps towards HOB. Fatigue limiting mobility primarily as well as posterior lean when in standing. PT will continue to follow and advance as able.  -MS       Row Name 06/25/24 1424          Positioning and Restraints    Pre-Treatment Position in bed  -MS     Post Treatment Position bed  -MS     In Bed notified nsg;fowlers;call light within reach;encouraged to call for assist;exit alarm on  -MS               User Key  (r) = Recorded By, (t) = Taken By, (c) = Cosigned By      Initials Name Provider Type    Anne Canales, PT Physical Therapist                   Outcome Measures       Row Name 06/25/24 1426 06/25/24 0830       How much help from another person do you currently need...    Turning from your back to your side while in flat bed without using bedrails? 3  -MS 3  -MB    Moving from lying on back to sitting on the side of a flat bed without bedrails? 2  -MS 2  -MB    Moving to and from a bed to a chair  (including a wheelchair)? 2  -MS 2  -MB    Standing up from a chair using your arms (e.g., wheelchair, bedside chair)? 2  -MS 2  -MB    Climbing 3-5 steps with a railing? 1  -MS 1  -MB    To walk in hospital room? 2  -MS 1  -MB    AM-PAC 6 Clicks Score (PT) 12  -MS 11  -MB    Highest Level of Mobility Goal 4 --> Transfer to chair/commode  -MS 4 --> Transfer to chair/commode  -MB              User Key  (r) = Recorded By, (t) = Taken By, (c) = Cosigned By      Initials Name Provider Type    Isacc Canalessarah YOUNGBLOOD, PT Physical Therapist    Gayatri Velasquez, RN Registered Nurse                                 Physical Therapy Education       Title: PT OT SLP Therapies (In Progress)       Topic: Physical Therapy (In Progress)       Point: Mobility training (Done)       Learning Progress Summary             Patient Acceptance, E,D, VU,NR by EB at 6/21/2024 1122    Acceptance, E,D, NR by EF at 6/19/2024 0931    Acceptance, E, VU,NR by SV at 6/16/2024 1531    Acceptance, E,TB, NR by BK at 6/14/2024 1715    Acceptance, E,D, VU,NR by EB at 6/14/2024 1641    Acceptance, E,D, NR by EB at 6/13/2024 1514    Acceptance, E,D, NR by EB at 6/12/2024 1643    Acceptance, E,TB, VU by RD at 6/11/2024 1011    Acceptance, E,TB, VU,NR by CB at 6/10/2024 1149    Acceptance, E, VU by EM at 6/7/2024 1622    Acceptance, E, NR by EM at 6/6/2024 1605    Acceptance, E,TB, VU by BK at 6/5/2024 1700    Acceptance, E, NR by EM at 6/5/2024 1640                         Point: Home exercise program (Done)       Learning Progress Summary             Patient Acceptance, E,D, VU,NR by EB at 6/21/2024 1122    Acceptance, E,D, NR by EF at 6/19/2024 0931    Acceptance, E, VU,NR by SV at 6/16/2024 1531    Acceptance, E,TB, NR by BK at 6/14/2024 1715    Acceptance, E,TB, VU by BK at 6/13/2024 1655    Acceptance, E,TB, VU by RD at 6/11/2024 1011    Acceptance, E,TB, VU,NR by CB at 6/10/2024 1149    Acceptance, E, VU by EM at 6/7/2024 1622    Acceptance, E, NR  by EM at 6/6/2024 1605                         Point: Body mechanics (Done)       Learning Progress Summary             Patient Acceptance, E,D, VU,NR by EB at 6/21/2024 1122    Acceptance, E,D, NR by EF at 6/19/2024 0931    Acceptance, E,TB, NR by BK at 6/14/2024 1715    Acceptance, E,D, VU,NR by EB at 6/14/2024 1641    Acceptance, E,D, NR by EB at 6/13/2024 1514    Acceptance, E,D, NR by EB at 6/12/2024 1643    Acceptance, E,TB, VU by RD at 6/11/2024 1011    Acceptance, E,TB, VU,NR by CB at 6/10/2024 1149                         Point: Precautions (In Progress)       Learning Progress Summary             Patient Acceptance, E,D, NR by EB at 6/13/2024 1514    Acceptance, E,TB, VU by RD at 6/11/2024 1011    Acceptance, E,TB, VU,NR by CB at 6/10/2024 1149                                         User Key       Initials Effective Dates Name Provider Type Discipline    RD 06/16/21 -  Leonela Acuna, PT Physical Therapist PT    EF 05/31/24 -  Marci Mckenzie, PT Physical Therapist PT    EM 06/16/21 -  Yvette Guajardo, PT Physical Therapist PT    SV 07/11/23 -  Velma Georges, PT Physical Therapist PT    EB 02/14/23 -  Nancy Bishop, JOEY Physical Therapist Assistant PT    CB 10/22/21 -  Kaitlin Cunningham, PT Physical Therapist PT    BK 04/30/24 -  Nu Leon, RN Extern Registered Nurse Nurse                  PT Recommendation and Plan     Plan of Care Reviewed With: patient  Progress: improving  Outcome Evaluation: Patient pleasant and agreeable to PT this afternoon with encouragement. modA required to achieve EOB, stood with mod-maxA and ambulated a couple side steps towards HOB. Fatigue limiting mobility primarily as well as posterior lean when in standing. PT will continue to follow and advance as able.     Time Calculation:         PT Charges       Row Name 06/25/24 1417             Time Calculation    Start Time 1335  -MS      Stop Time 1352  -MS      Time Calculation (min) 17 min  -MS      PT Received  On 06/25/24  -MS      PT - Next Appointment 06/26/24  -MS                User Key  (r) = Recorded By, (t) = Taken By, (c) = Cosigned By      Initials Name Provider Type    MS BurksAnne, PT Physical Therapist                  Therapy Charges for Today       Code Description Service Date Service Provider Modifiers Qty    35460411064  PT THER PROC EA 15 MIN 6/24/2024 Anne Urbina, PT GP 1    [id number]  PT THER PROC EA 15 MIN 6/25/2024 Anne Urbina, PT GP 1            PT G-Codes  Outcome Measure Options: AM-PAC 6 Clicks Daily Activity (OT)  AM-PAC 6 Clicks Score (PT): 12  AM-PAC 6 Clicks Score (OT): 9  Modified Ignacio Scale: 4 - Moderately severe disability.  Unable to walk without assistance, and unable to attend to own bodily needs without assistance.       Anne Urbina, KALEY  6/25/2024

## 2024-06-25 NOTE — PROGRESS NOTES
LOS: 23 days     Chief Complaint/ Reason for encounter: Hypercalcemia    Subjective   06/24/24 : Patient is doing well today with no new complaints  Good appetite with no nausea or vomiting  No shortness of breath chest pain or edema  Voiding well with no dysuria  Denies new complaints    6/ 25: She looks and feels well today denies complaints, weight down a few pounds she has no edema  No shortness of breath chest pain fevers chills or edema  Possible discharge to rehab today      Medical history reviewed:  History of Present Illness    Subjective    History taken from: Patient and chart    Vital Signs  Temp:  [97.3 °F (36.3 °C)-97.9 °F (36.6 °C)] 97.5 °F (36.4 °C)  Heart Rate:  [61-76] 76  Resp:  [16-18] 16  BP: (115-143)/(65-74) 115/66       Wt Readings from Last 1 Encounters:   06/25/24 0500 77 kg (169 lb 12.1 oz)   06/24/24 0507 78.1 kg (172 lb 2.9 oz)   06/23/24 0500 80.7 kg (177 lb 14.6 oz)   06/22/24 0500 80.1 kg (176 lb 9.4 oz)   06/21/24 0500 80.2 kg (176 lb 12.9 oz)   06/20/24 0549 81.6 kg (179 lb 14.3 oz)   06/19/24 0524 82.4 kg (181 lb 10.5 oz)   06/18/24 0500 82.5 kg (181 lb 14.1 oz)   06/17/24 0457 84.2 kg (185 lb 10 oz)   06/16/24 0500 85.4 kg (188 lb 4.4 oz)   06/15/24 0500 85.5 kg (188 lb 7.9 oz)   06/14/24 0300 85 kg (187 lb 6.3 oz)   06/13/24 0300 85.5 kg (188 lb 7.9 oz)   06/12/24 0250 84.5 kg (186 lb 4.6 oz)   06/11/24 0417 86.4 kg (190 lb 7.6 oz)   06/10/24 0534 85.2 kg (187 lb 13.3 oz)   06/09/24 0300 84.8 kg (186 lb 15.2 oz)   06/08/24 0415 85.2 kg (187 lb 12.8 oz)   06/07/24 0546 90.4 kg (199 lb 4.7 oz)   06/06/24 0500 90.1 kg (198 lb 10.2 oz)   06/05/24 0500 85.7 kg (188 lb 14.4 oz)   06/04/24 0452 86 kg (189 lb 9.5 oz)   06/03/24 0514 86.2 kg (190 lb 0.6 oz)   06/02/24 0850 83.9 kg (184 lb 15.5 oz)   06/02/24 0457 85.4 kg (188 lb 4.4 oz)   06/01/24 2030 81 kg (178 lb 9.2 oz)       Objective:  Vital signs: (most recent): Blood pressure 115/66, pulse 76, temperature 97.5 °F (36.4 °C),  "temperature source Oral, resp. rate 16, height 158.8 cm (62.5\"), weight 77 kg (169 lb 12.1 oz), SpO2 96%, not currently breastfeeding.                Objective:  General Appearance:  Comfortable, well-appearing, in no acute distress and not in pain.  Awake, alert  HEENT: Mucous membranes moist, no injury, oropharynx clear  Lungs:  Normal effort and normal respiratory rate.  Breath sounds clear to auscultation.  No  respiratory distress.  No rales, decreased breath sounds or rhonchi.    Heart: Normal rate.  Regular rhythm.  S1, S2 normal.  No murmur.   Abdomen: Abdomen is soft.  Bowel sounds are normal, no abdominal tenderness.  There is no rebound or guarding  Extremities: Trace edema of bilateral lower extremities  Skin:  Warm and dry with no rashes      Results Review:    Intake/Output:     Intake/Output Summary (Last 24 hours) at 6/25/2024 1159  Last data filed at 6/25/2024 1055  Gross per 24 hour   Intake 360 ml   Output 1750 ml   Net -1390 ml         DATA:  Radiology and Labs:  The following labs independently reviewed by me. Additional labs ordered for tomorrow a.m.  Interval notes, chart personally reviewed by me.   Old records independently reviewed showing intermittent hypercalcemia with some mild hypocalcemia during an admission earlier this month    Discussed with patient about discharge planning  Risk/ complexity of medical care/ medical decision making moderate complexity, hypercalcemia management      Labs:   Recent Results (from the past 24 hour(s))   Basic Metabolic Panel    Collection Time: 06/25/24  6:52 AM    Specimen: Blood   Result Value Ref Range    Glucose 85 65 - 99 mg/dL    BUN 16 8 - 23 mg/dL    Creatinine 0.90 0.57 - 1.00 mg/dL    Sodium 142 136 - 145 mmol/L    Potassium 3.7 3.5 - 5.2 mmol/L    Chloride 111 (H) 98 - 107 mmol/L    CO2 24.0 22.0 - 29.0 mmol/L    Calcium 10.0 8.6 - 10.5 mg/dL    BUN/Creatinine Ratio 17.8 7.0 - 25.0    Anion Gap 7.0 5.0 - 15.0 mmol/L    eGFR 62.8 >60.0 " mL/min/1.73       Radiology:  Pertinent radiology studies were reviewed as described above      Medications have been reviewed separately in chart overview      ASSESSMENT:  Hypercalcemia, likely due to hyperparathyroidism, improved on Sensipar  CKD stage II, at baseline  Hydronephrosis - resolved after stent, outpatient urology follow-up  Constipation   PRINCE- resolved.   Septic shock - resolved.       Plan:  Renal function remains very stable and near baseline  Euvolemic on exam  Calcium continues to improve with Cinacalcet   continue Sensipar at current dosage at discharge  Discussed with Dr. Benson yesterday but discharge.  She remained stable for discharge from my standpoint  Recommend repeat BMP in 1 week         Frederick Kirkland MD  [facility]   Office phone number: [phone]  Answering service phone number: [phone]    06/25/24  11:59 EDT    Dictation performed using Dragon dictation software

## 2024-06-25 NOTE — PROGRESS NOTES
Name: Kimberlee Urrutia ADMIT: 2024   : 1940  PCP: Meche Ji MD    MRN: [id number] LOS: 23 days   AGE/SEX: 84 y.o. female  ROOM: formerly [facility]     Subjective   Subjective   Seen this morning.  Negative is overnight, calcium normalized.  Pending placement.    Review of Systems   As above  Objective   Objective   Vital Signs  Temp:  [97.3 °F (36.3 °C)-97.9 °F (36.6 °C)] 97.4 °F (36.3 °C)  Heart Rate:  [61-73] 61  Resp:  [18] 18  BP: (128-143)/(65-74) 139/69  SpO2:  [94 %-99 %] 94 %  on   ;   Device (Oxygen Therapy): room air  Body mass index is 30.55 kg/m².  Physical Exam    General: Alert,, laying in bed, not in distress  HEENT: Normocephalic, atraumatic  CV: RRR, no murmurs lungs  Lungs: CTA, no wheezing   Abdomen: Soft, nontender, nondistended  Extremities: No significant peripheral edema , no cyanosis     Results Review     I reviewed the patient's new clinical results.  Results from last 7 days   Lab Units 24  0523 24  0640   WBC 10*3/mm3 7.90 7.16   HEMOGLOBIN g/dL 11.0* 10.5*   PLATELETS 10*3/mm3 318 291     Results from last 7 days   Lab Units 24  0652 24  0414 24  0523 24  0640   SODIUM mmol/L 142 139 140 137   POTASSIUM mmol/L 3.7 3.9 4.2 3.8   CHLORIDE mmol/L 111* 106 106 105   CO2 mmol/L 24.0 22.9 25.5 25.0   BUN mg/dL 16 19 18 9   CREATININE mg/dL 0.90 0.91 0.96 0.79   GLUCOSE mg/dL 85 87 90 107*   Estimated Creatinine Clearance: 44.4 mL/min (by C-G formula based on SCr of 0.9 mg/dL).  Results from last 7 days   Lab Units 24  0523   ALBUMIN g/dL 3.2*     Results from last 7 days   Lab Units 24  0652 24  0414 24  0523 24  0640   CALCIUM mg/dL 10.0 11.1* 11.5* 10.5   ALBUMIN g/dL  --   --  3.2*  --    MAGNESIUM mg/dL  --   --   --  2.3   PHOSPHORUS mg/dL  --   --   --  3.0       COVID19   Date Value Ref Range Status   2021 Not Detected Not Detected - Ref. Range Final   2021 Not Detected Not Detected - Ref.  "Range Final     No results found for: \"HGBA1C\", \"POCGLU\"          CT Abdomen Pelvis With Contrast  Narrative: CT ABDOMEN PELVIS W CONTRAST-     INDICATION: Sepsis, ureteral stent     COMPARISON: CT abdomen pelvis June 1, 2024     TECHNIQUE:  Routine CT abdomen/pelvis with IV contrast. Coronal and sagittal  reformats. Radiation dose reduction techniques were utilized, including  automated exposure control and exposure modulation based on body size.     FINDINGS:      Small right pleural effusion. Small loculated left pleural effusion with  pleural calcification and pleural thickening. Suspect subsegmental  atelectasis at the bases.     ABDOMEN: Beam hardening and streak artifact from patient's arms at side  and body wall against the gantry. Normal liver, gallbladder, spleen,  pancreas and adrenal glands. Symmetric perinephric edema. Right ureteral  stent resolution of right hydronephrosis. Fluid attenuating left renal  cyst. Lobular left renal contour with some cortical loss, suspect  scarring. No solid-appearing renal mass or hydronephrosis.     Pelvis: Rodriguez in the bladder. Bladder is collapsed. Anteverted uterus.  Calcifications in the uterus may represent small calcified leiomyomas.  No adnexal mass. Trace fluid in the cul-de-sac.     Bowel: No obstruction. Colonic diverticulosis. Normal appendix.     Abdominal wall: Rectus diastases. Periumbilical abdominal wall scarring.  Small fat-containing umbilical hernia. Small fat-containing inguinal  hernias. Body wall edema.     Retroperitoneum: No lymphadenopathy.     Vasculature: Patent. No abdominal aortic aneurysm.     Osseous structures: No destructive osseous lesions. Lumbar facet  degenerative arthropathy with grade 1/2 anterolisthesis of L3 on L4.  Decreased bone mineralization..     Impression:    1. New third spacing with small right greater than left pleural  effusions and body wall edema.  2. Interval placement of right ureteral stent with resolution " of  hydronephrosis. Ureterolithiasis no longer seen  3. Colonic diverticulosis     This report was finalized on 6/6/2024 10:41 AM by Dr. Diony Lam M.D on Workstation: [id number]       Scheduled Medications  bisacodyl, 10 mg, Rectal, Daily  cinacalcet, 30 mg, Oral, Daily With Breakfast  donepezil, 10 mg, Oral, Daily  lactated ringers, 1,000 mL, Intravenous, Once  memantine, 10 mg, Oral, BID  polyethylene glycol, 17 g, Oral, BID  senna-docusate sodium, 2 tablet, Oral, BID  sodium chloride, 10 mL, Intravenous, Q12H    Infusions     Diet  Diet: Regular/House; Feeding Assistance - Nursing; Texture: Regular (IDDSI 7); Fluid Consistency: Thin (IDDSI 0)    I have personally reviewed     [x]  Laboratory   []  Microbiology   []  Radiology   []  EKG/Telemetry  []  Cardiology/Vascular   []  Pathology    []  Records       Assessment/Plan     Active Hospital Problems    Diagnosis  POA    **Right ureteral stone [N20.1]  Yes    Hypercalcemia [E83.52]  No    Constipation [K59.00]  Clinically Undetermined    Acute UTI (urinary tract infection) [N39.0]  Yes    Hypoglycemia [E16.2]  Yes    Abnormal LFTs [R79.89]  Yes    Chronic bilateral low back pain without sciatica [M54.50, G89.29]  Yes    CAYLA (dementia of Alzheimer type) [G30.9, F02.80]  Yes    Lumbar spinal stenosis [M48.061]  Yes    Thrombocytopenia [D69.6]  Yes    Acute low back pain [M54.50]  Yes      Resolved Hospital Problems    Diagnosis Date Resolved POA    Shock, septic [A41.9, R65.21] 06/03/2024 Yes    PRINCE (acute kidney injury) [N17.9] 06/06/2024 Yes       Urosepsis secondary to ESBL E. coli pyelonephritis complicated by right hydronephrosis and obstructive ureteric stone and ESBL E. coli septicemia   - status post cystoscopy and right stent placement on 6/2/2024.  -Status post Rodriguez catheter placement.  Removed after successful voiding trial..  -Status post adequate course of Invanz treatment per ID.    -Leukocytosis resolved.  No fever.  Repeat blood culture  are negative.  -Urology was reconsulted on 06/20 as patient remains in the hospital, plan remains for outpatient for scheduled ureteroscopy, laser llithotripsy after discharge    Constipation.    Continue bowel regimen    Hypercalcemia.    -Calcium trended back up to 11.5 on 06/23 corrected for albumin was around 12, ionized calcium was high at 1.66  -Vitamin D and PTH normal  -initiated on cinacalcet nephrology  - calcium Normalized  Cleared to be discharged from nephrology standpoint with BMP in 1 week    Metabolic encephalopathy in a patient with a history of Alzheimer's dementia.    Resolved metabolic encephalopathy.  Negative CNS examination.    Continue Aricept and Namenda.    Prediabetes.    A1c 5.7.  -Discontinue SSI 06/20 blood glucose has been stable and patient did not require insulin in the last 48 hours  -Blood glucose stable on BMP    Hypernatremia/acute kidney.    Resolved    Transaminitis   secondary to sepsis.  LFTs normalized 06/16    Anemia/thrombocytopenia.   Hemoglobin stable  Resolved thrombocytopenia which is mostly secondary to sepsis.    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    Status post neurosurgery consult., conservative treatment.    -Pending placement to rehab    Dysphagia.   -Improved, tolerating advance diet          CODE STATUS: DNR  DVT prophylaxis: SCDs  Disposition.  Pending placement to SNF when insurance  reinstated.  Patient stable to discharge from medical standpoint        Copied text in this note has been reviewed and is accurate as of 06/25/24.         Dictated utilizing Dragon dictation        Jaren Benson MD  [facility]  06/25/24  09:44 EDT

## 2024-06-25 NOTE — PLAN OF CARE
Goal Outcome Evaluation:  Plan of Care Reviewed With: patient        Progress: improving  Outcome Evaluation: No temp. VS stable. Denies pain. Pleasant but confused and has difficulty with memory. Purewick in place. Turned with assist. Continues in contact isolation for ESBL. Midline catheter in left arm waitinf okay for insurance to go to facility

## 2024-06-25 NOTE — PLAN OF CARE
Goal Outcome Evaluation:  Plan of Care Reviewed With: patient        Progress: improving  Outcome Evaluation: Patient pleasant and agreeable to PT this afternoon with encouragement. modA required to achieve EOB, stood with mod-maxA and ambulated a couple side steps towards HOB. Fatigue limiting mobility primarily as well as posterior lean when in standing. PT will continue to follow and advance as able.

## 2024-06-26 LAB
ALBUMIN SERPL-MCNC: 3.1 G/DL (ref 3.5–5.2)
ANION GAP SERPL CALCULATED.3IONS-SCNC: 8 MMOL/L (ref 5–15)
BUN SERPL-MCNC: 17 MG/DL (ref 8–23)
BUN/CREAT SERPL: 21.3 (ref 7–25)
CALCIUM SPEC-SCNC: 9.7 MG/DL (ref 8.6–10.5)
CHLORIDE SERPL-SCNC: 109 MMOL/L (ref 98–107)
CO2 SERPL-SCNC: 25 MMOL/L (ref 22–29)
CREAT SERPL-MCNC: 0.8 MG/DL (ref 0.57–1)
EGFRCR SERPLBLD CKD-EPI 2021: 72.3 ML/MIN/1.73
GLUCOSE SERPL-MCNC: 88 MG/DL (ref 65–99)
PHOSPHATE SERPL-MCNC: 2.4 MG/DL (ref 2.5–4.5)
POTASSIUM SERPL-SCNC: 3.6 MMOL/L (ref 3.5–5.2)
POTASSIUM SERPL-SCNC: 4.2 MMOL/L (ref 3.5–5.2)
SODIUM SERPL-SCNC: 142 MMOL/L (ref 136–145)

## 2024-06-26 PROCEDURE — 80069 RENAL FUNCTION PANEL: CPT | Performed by: INTERNAL MEDICINE

## 2024-06-26 PROCEDURE — 84132 ASSAY OF SERUM POTASSIUM: CPT | Performed by: HOSPITALIST

## 2024-06-26 PROCEDURE — 97530 THERAPEUTIC ACTIVITIES: CPT

## 2024-06-26 RX ORDER — POLYETHYLENE GLYCOL 3350 17 G/17G
17 POWDER, FOR SOLUTION ORAL 2 TIMES DAILY PRN
Status: DISCONTINUED | OUTPATIENT
Start: 2024-06-26 | End: 2024-07-16 | Stop reason: HOSPADM

## 2024-06-26 RX ORDER — AMOXICILLIN 250 MG
2 CAPSULE ORAL 2 TIMES DAILY PRN
Status: DISCONTINUED | OUTPATIENT
Start: 2024-06-26 | End: 2024-07-16 | Stop reason: HOSPADM

## 2024-06-26 RX ORDER — POTASSIUM CHLORIDE 750 MG/1
40 TABLET, FILM COATED, EXTENDED RELEASE ORAL EVERY 4 HOURS
Status: COMPLETED | OUTPATIENT
Start: 2024-06-26 | End: 2024-06-26

## 2024-06-26 RX ADMIN — Medication 10 ML: at 08:26

## 2024-06-26 RX ADMIN — MEMANTINE HYDROCHLORIDE 10 MG: 10 TABLET, FILM COATED ORAL at 08:26

## 2024-06-26 RX ADMIN — MEMANTINE HYDROCHLORIDE 10 MG: 10 TABLET, FILM COATED ORAL at 22:09

## 2024-06-26 RX ADMIN — CINACALCET 30 MG: 30 TABLET ORAL at 08:26

## 2024-06-26 RX ADMIN — POTASSIUM CHLORIDE 40 MEQ: 750 TABLET, EXTENDED RELEASE ORAL at 12:38

## 2024-06-26 RX ADMIN — DONEPEZIL HYDROCHLORIDE 10 MG: 10 TABLET, FILM COATED ORAL at 08:26

## 2024-06-26 RX ADMIN — POTASSIUM CHLORIDE 40 MEQ: 750 TABLET, EXTENDED RELEASE ORAL at 08:26

## 2024-06-26 NOTE — PLAN OF CARE
Problem: Malnutrition  Goal: Improved Nutritional Intake  Outcome: Ongoing, Progressing   Goal Outcome Evaluation:     Boost plus TID  Encourage intakes  Assist w/ feeding    RD to follow

## 2024-06-26 NOTE — THERAPY TREATMENT NOTE
Patient Name: Kimberlee Urrutia  : 1940    MRN: [id number]                              Today's Date: 2024       Admit Date: 2024    Visit Dx:     ICD-10-CM ICD-9-CM   1. Right ureteral stone  N20.1 592.1   2. Acute UTI  N39.0 599.0   3. Severe sepsis  A41.9 038.9    R65.20 995.92   4. Elevated lactic acid level  R79.89 276.2   5. Osteomyelitis of lumbar spine  M46.26 730.28   6. Elevated AST (SGOT)  R74.01 790.4   7. Elevated ALT measurement  R74.01 790.4   8. Elevated C-reactive protein (CRP)  R79.82 790.95   9. PRINCE (acute kidney injury)  N17.9 584.9     Patient Active Problem List   Diagnosis    Arthritis    HLD (hyperlipidemia)    Primary hypertension    Health care maintenance    Knee pain, right    Hx of total knee arthroplasty    Seasonal allergies    Vitamin D deficiency    Pyuria    Transient alteration of awareness    Migraine without status migrainosus, not intractable    Leukocytosis    Viral pharyngitis    Multinodular thyroid    Osteopenia of multiple sites    Colon cancer screening    Serum calcium elevated    Acute low back pain    Elevated procalcitonin    Sepsis due to Escherichia coli with acute renal failure without septic shock    Thrombocytopenia    Recurrent oral herpes simplex    Lumbar spinal stenosis    Metabolic encephalopathy    Renal stones    Colon cancer screening    Sacroiliac joint pain    Lumbar stenosis without neurogenic claudication    Renal stone    Hyperlipidemia    History of total knee arthroplasty    Spinal stenosis of lumbar region    Multinodular goiter    Osteopenia    Pain in right knee    Sacroiliac joint pain    Hypercalcemia    CAYLA (dementia of Alzheimer type)    Chronic bilateral low back pain without sciatica    Right ureteral stone    Discitis of lumbar region    Acute UTI (urinary tract infection)    Hypoglycemia    Abnormal LFTs    Hypercalcemia    Constipation     Past Medical History:   Diagnosis Date    Allergic     Arthritis     Colon cancer  screening 01/29/2020    Colon cancer screening 01/29/2020    Cough     Diarrhea     Hx of migraine headaches     Hyperlipidemia     Irregular heart beat     Osteoarthritis     Renal stones 02/2021    Vitamin D deficiency      Past Surgical History:   Procedure Laterality Date    COLONOSCOPY      CYSTOSCOPY W/ URETERAL STENT PLACEMENT Right 6/2/2024    Procedure: CYSTOSCOPY, RIGHT URETERAL STENT PLACEMENT;  Surgeon: Efra Schofield MD;  Location: [facility] MAIN OR;  Service: Urology;  Laterality: Right;    LUMBAR EPIDURAL INJECTION N/A 8/2/2021    Procedure: LUMBAR EPIDURAL 1ST VISIT 5-1;  Surgeon: Nu Partida MD;  Location: [facility] MAIN OR;  Service: Pain Management;  Laterality: N/A;    REPLACEMENT TOTAL KNEE Right 01/2015    Josue Wilson MD    SACROILIAC JOINT INJECTION Left 6/16/2021    Procedure: SACROILIAC INJECTION left;  Surgeon: Nu Partida MD;  Location: [facility] MAIN OR;  Service: Pain Management;  Laterality: Left;    TUBAL ABDOMINAL LIGATION      URETEROSCOPY LASER LITHOTRIPSY WITH STENT INSERTION Left 2/23/2021    Procedure: LT.URETEROSCOPY LASER LITHOTRIPSY WITH STENT  FOR STONE;  Surgeon: Arnaud Lu MD;  Location: [facility] OR;  Service: Urology;  Laterality: Left;    WISDOM TOOTH EXTRACTION        General Information       Row Name 06/26/24 1523          OT Time and Intention    Document Type therapy note (daily note)  -CE     Mode of Treatment co-treatment;physical therapy;occupational therapy  -CE       Row Name 06/26/24 1523          General Information    Patient Profile Reviewed yes  -CE     Existing Precautions/Restrictions fall  -CE       Row Name 06/26/24 1523          Cognition    Orientation Status (Cognition) oriented to;person  -CE       Row Name 06/26/24 1523          Safety Issues, Functional Mobility    Safety Issues Affecting Function (Mobility) insight into deficits/self-awareness;judgment;problem-solving;safety precaution awareness;safety precautions  follow-through/compliance;sequencing abilities  -CE     Impairments Affecting Function (Mobility) balance;cognition;endurance/activity tolerance;strength  -CE     Cognitive Impairments, Mobility Safety/Performance attention;awareness, need for assistance;insight into deficits/self-awareness;judgment;problem-solving/reasoning;safety precaution awareness;safety precaution follow-through;sequencing abilities  -CE     Comment, Safety Issues/Impairments (Mobility) co-tx medically appropriate to maintain pt and staff safety while advancing mobility  -CE               User Key  (r) = Recorded By, (t) = Taken By, (c) = Cosigned By      Initials Name Provider Type    CE Kiki Ghosh OT Occupational Therapist                     Mobility/ADL's       Row Name 06/26/24 1524          Bed Mobility    Bed Mobility supine-sit;sit-supine  -CE     Supine-Sit [place] (Bed Mobility) moderate assist (50% patient effort);verbal cues;nonverbal cues (demo/gesture)  -CE     Sit-Supine [place] (Bed Mobility) maximum assist (25% patient effort);verbal cues;nonverbal cues (demo/gesture)  -CE     Bed Mobility, Safety Issues decreased use of arms for pushing/pulling;decreased use of legs for bridging/pushing;cognitive deficits limit understanding  -CE     Assistive Device (Bed Mobility) bed rails;head of bed elevated  -       Row Name 06/26/24 1524          Transfers    Transfers sit-stand transfer;stand-sit transfer  -       Row Name 06/26/24 1524          Sit-Stand Transfer    Sit-Stand [place] (Transfers) minimum assist (75% patient effort);moderate assist (50% patient effort);2 person assist;verbal cues  -     Assistive Device (Sit-Stand Transfers) walker, front-wheeled  -     Comment, (Sit-Stand Transfer) X 2 trials  -       Row Name 06/26/24 1524          Functional Mobility    Functional Mobility- Comment Pt fatiguing after standing short duration and did not attempt taking steps to chair. Able to side step  laterally towards HOB with Gage x 2 and max cues for sequencing.  -CE               User Key  (r) = Recorded By, (t) = Taken By, (c) = Cosigned By      Initials Name Provider Type    CE Kiki Ghosh OT Occupational Therapist                   Obj/Interventions       Row Name 06/26/24 1527          Motor Skills    Functional Endurance fair; able to maintain seated position unsupported at EOB x ~10 min and stood 2x but fatigued after that  -CE               User Key  (r) = Recorded By, (t) = Taken By, (c) = Cosigned By      Initials Name Provider Type    Kiki Villanueva OT Occupational Therapist                   Goals/Plan    No documentation.                  Clinical Impression       Row Name 06/26/24 1527          Pain Assessment    Pretreatment Pain Rating 0/10 - no pain  -CE     Posttreatment Pain Rating 0/10 - no pain  -CE       Row Name 06/26/24 1527          Plan of Care Review    Plan of Care Reviewed With patient  -CE     Progress no change  -CE     Outcome Evaluation Pt seen for OT and PT this a.m. and focusing on functional bed mobility and transfers in prep for ADLs OOB. Pt able to complete STS with modA x 2 and required Gage x 2 for side steps laterally toward HOB. Pt requiring increased time and cues for sequencing this date and fatigued quickly. Will con't to follow for stated goals and recommend to snf.  -CE       Row Name 06/26/24 1527          Vital Signs    O2 Delivery Pre Treatment room air  -CE     O2 Delivery Intra Treatment room air  -CE     O2 Delivery Post Treatment room air  -CE     Pre Patient Position Supine  -CE     Intra Patient Position Standing  -CE     Post Patient Position Supine  -CE       Row Name 06/26/24 1527          Positioning and Restraints    Pre-Treatment Position in bed  -CE     Post Treatment Position bed  -CE     In Bed notified nsg;fowlers;call light within reach;encouraged to call for assist;exit alarm on  -CE               User Key  (r) = Recorded By, (t)  = Taken By, (c) = Cosigned By      Initials Name Provider Type    CE Kiki Ghosh OT Occupational Therapist                   Outcome Measures       Row Name 06/26/24 1529          How much help from another is currently needed...    Putting on and taking off regular lower body clothing? 1  -CE     Bathing (including washing, rinsing, and drying) 1  -CE     Toileting (which includes using toilet bed pan or urinal) 1  -CE     Putting on and taking off regular upper body clothing 2  -CE     Taking care of personal grooming (such as brushing teeth) 3  -CE     Eating meals 3  -CE     AM-PAC 6 Clicks Score (OT) 11  -CE       Row Name 06/26/24 1139 06/26/24 0830       How much help from another person do you currently need...    Turning from your back to your side while in flat bed without using bedrails? 3  -CB 3  -MA    Moving from lying on back to sitting on the side of a flat bed without bedrails? 2  -CB 2  -MA    Moving to and from a bed to a chair (including a wheelchair)? 1  -CB 2  -MA    Standing up from a chair using your arms (e.g., wheelchair, bedside chair)? 2  -CB 2  -MA    Climbing 3-5 steps with a railing? 1  -CB 1  -MA    To walk in hospital room? 1  -CB 2  -MA    AM-PAC 6 Clicks Score (PT) 10  -CB 12  -MA    Highest Level of Mobility Goal 4 --> Transfer to chair/commode  -CB 4 --> Transfer to chair/commode  -MA      Row Name 06/26/24 1529 06/26/24 1139       Functional Assessment    Outcome Measure Options AM-PAC 6 Clicks Daily Activity (OT)  -CE AM-PAC 6 Clicks Basic Mobility (PT)  -CB              User Key  (r) = Recorded By, (t) = Taken By, (c) = Cosigned By      Initials Name Provider Type    Amber Arteaga RN Registered Nurse    Kaitlin Matthew, PT Physical Therapist    Kiki Vlilanueva OT Occupational Therapist                    Occupational Therapy Education       Title: PT OT SLP Therapies (In Progress)       Topic: Occupational Therapy (In Progress)       Point: ADL  training (In Progress)       Description:   Instruct learner(s) on proper safety adaptation and remediation techniques during self care or transfers.   Instruct in proper use of assistive devices.                  Learning Progress Summary             Patient Acceptance, E, NR by  at 6/10/2024 1505                         Point: Home exercise program (In Progress)       Description:   Instruct learner(s) on appropriate technique for monitoring, assisting and/or progressing therapeutic exercises/activities.                  Learning Progress Summary             Patient Acceptance, E, NR by  at 6/10/2024 1505                         Point: Precautions (In Progress)       Description:   Instruct learner(s) on prescribed precautions during self-care and functional transfers.                  Learning Progress Summary             Patient Acceptance, E, NR by  at 6/10/2024 1505                         Point: Body mechanics (In Progress)       Description:   Instruct learner(s) on proper positioning and spine alignment during self-care, functional mobility activities and/or exercises.                  Learning Progress Summary             Patient Acceptance, E, NR by  at 6/10/2024 1505                                         User Key       Initials Effective Dates Name Provider Type Discipline     04/30/24 -  Nereyda Rothman OT Student OT Student OT                  OT Recommendation and Plan     Plan of Care Review  Plan of Care Reviewed With: patient  Progress: no change  Outcome Evaluation: Pt seen for OT and PT this a.m. and focusing on functional bed mobility and transfers in prep for ADLs OOB. Pt able to complete STS with modA x 2 and required Gage x 2 for side steps laterally toward HOB. Pt requiring increased time and cues for sequencing this date and fatigued quickly. Will con't to follow for stated goals and recommend to snf.     Time Calculation:         Time Calculation- OT       Row Name  06/26/24 1530             Time Calculation- OT    OT Start Time 1111  -CE      OT Stop Time 1128  -CE      OT Time Calculation (min) 17 min  -CE      Total Timed Code Minutes- OT 17 minute(s)  -CE      OT Received On 06/26/24  -CE      OT - Next Appointment 06/28/24  -CE         Timed Charges    10522 - OT Therapeutic Activity Minutes 17  -CE         Total Minutes    Timed Charges Total Minutes 17  -CE       Total Minutes 17  -CE                User Key  (r) = Recorded By, (t) = Taken By, (c) = Cosigned By      Initials Name Provider Type    Kiki Villanueva OT Occupational Therapist                  Therapy Charges for Today       Code Description Service Date Service Provider Modifiers Qty    [id number] HC OT THERAPEUTIC ACT EA 15 MIN 6/26/2024 Kiki Ghosh OT GO 1                 Kiki Ghosh OT  6/26/2024

## 2024-06-26 NOTE — PROGRESS NOTES
LOS: 24 days     Chief Complaint/ Reason for encounter: Hypercalcemia    Subjective   06/24/24 : Patient is doing well today with no new complaints  Good appetite with no nausea or vomiting  No shortness of breath chest pain or edema  Voiding well with no dysuria  Denies new complaints    6/ 25: She looks and feels well today denies complaints, weight down a few pounds she has no edema  No shortness of breath chest pain fevers chills or edema  Possible discharge to rehab today    6/26: She is resting feels well denies complaints  Just waiting on rehab placement pending insurance approval    Medical history reviewed:  History of Present Illness    Subjective    History taken from: Patient and chart    Vital Signs  Temp:  [97 °F (36.1 °C)-98.8 °F (37.1 °C)] 97 °F (36.1 °C)  Heart Rate:  [72-83] 81  Resp:  [16] 16  BP: (123-129)/(56-67) 127/60       Wt Readings from Last 1 Encounters:   06/25/24 0500 77 kg (169 lb 12.1 oz)   06/24/24 0507 78.1 kg (172 lb 2.9 oz)   06/23/24 0500 80.7 kg (177 lb 14.6 oz)   06/22/24 0500 80.1 kg (176 lb 9.4 oz)   06/21/24 0500 80.2 kg (176 lb 12.9 oz)   06/20/24 0549 81.6 kg (179 lb 14.3 oz)   06/19/24 0524 82.4 kg (181 lb 10.5 oz)   06/18/24 0500 82.5 kg (181 lb 14.1 oz)   06/17/24 0457 84.2 kg (185 lb 10 oz)   06/16/24 0500 85.4 kg (188 lb 4.4 oz)   06/15/24 0500 85.5 kg (188 lb 7.9 oz)   06/14/24 0300 85 kg (187 lb 6.3 oz)   06/13/24 0300 85.5 kg (188 lb 7.9 oz)   06/12/24 0250 84.5 kg (186 lb 4.6 oz)   06/11/24 0417 86.4 kg (190 lb 7.6 oz)   06/10/24 0534 85.2 kg (187 lb 13.3 oz)   06/09/24 0300 84.8 kg (186 lb 15.2 oz)   06/08/24 0415 85.2 kg (187 lb 12.8 oz)   06/07/24 0546 90.4 kg (199 lb 4.7 oz)   06/06/24 0500 90.1 kg (198 lb 10.2 oz)   06/05/24 0500 85.7 kg (188 lb 14.4 oz)   06/04/24 0452 86 kg (189 lb 9.5 oz)   06/03/24 0514 86.2 kg (190 lb 0.6 oz)   06/02/24 0850 83.9 kg (184 lb 15.5 oz)   06/02/24 0457 85.4 kg (188 lb 4.4 oz)   06/01/24 2030 81 kg (178 lb 9.2 oz)  "      Objective:  Vital signs: (most recent): Blood pressure 127/60, pulse 81, temperature 97 °F (36.1 °C), temperature source Oral, resp. rate 16, height 158.8 cm (62.5\"), weight 77 kg (169 lb 12.1 oz), SpO2 98%, not currently breastfeeding.                Objective:  General Appearance:  Comfortable, well-appearing, in no acute distress and not in pain.  Awake, alert  HEENT: Mucous membranes moist, no injury, oropharynx clear  Lungs:  Normal effort and normal respiratory rate.  Breath sounds clear to auscultation.  No  respiratory distress.  No rales, decreased breath sounds or rhonchi.    Heart: Normal rate.  Regular rhythm.  S1, S2 normal.  No murmur.   Abdomen: Abdomen is soft.  Bowel sounds are normal, no abdominal tenderness.  There is no rebound or guarding  Extremities: Trace edema of bilateral lower extremities  Skin:  Warm and dry with no rashes      Results Review:    Intake/Output:     Intake/Output Summary (Last 24 hours) at 6/26/2024 1223  Last data filed at 6/26/2024 1019  Gross per 24 hour   Intake 410 ml   Output 1050 ml   Net -640 ml         DATA:  Radiology and Labs:  The following labs independently reviewed by me. Additional labs ordered for tomorrow a.m.  Interval notes, chart personally reviewed by me.   Old records independently reviewed showing intermittent hypercalcemia with some mild hypocalcemia during an admission earlier this month    Discussed with patient about discharge planning  Risk/ complexity of medical care/ medical decision making moderate complexity, hypercalcemia management      Labs:   Recent Results (from the past 24 hour(s))   Renal Function Panel    Collection Time: 06/26/24  3:38 AM    Specimen: Hand, Right; Blood   Result Value Ref Range    Glucose 88 65 - 99 mg/dL    BUN 17 8 - 23 mg/dL    Creatinine 0.80 0.57 - 1.00 mg/dL    Sodium 142 136 - 145 mmol/L    Potassium 3.6 3.5 - 5.2 mmol/L    Chloride 109 (H) 98 - 107 mmol/L    CO2 25.0 22.0 - 29.0 mmol/L    Calcium 9.7 " 8.6 - 10.5 mg/dL    Albumin 3.1 (L) 3.5 - 5.2 g/dL    Phosphorus 2.4 (L) 2.5 - 4.5 mg/dL    Anion Gap 8.0 5.0 - 15.0 mmol/L    BUN/Creatinine Ratio 21.3 7.0 - 25.0    eGFR 72.3 >60.0 mL/min/1.73       Radiology:  Pertinent radiology studies were reviewed as described above      Medications have been reviewed separately in chart overview      ASSESSMENT:  Hypercalcemia, likely due to hyperparathyroidism, improved on Sensipar  CKD stage II, at baseline  Hydronephrosis - resolved after stent, outpatient urology follow-up  Constipation   PRINCE- resolved.   Septic shock - resolved.       Plan:  Renal function remains very stable and near baseline  Euvolemic on exam  Calcium continues to improve with Cinacalcet  Her albumin is 3.1 so her corrected calcium is still borderline high but trending down each day   continue Sensipar at current dosage now and at discharge  Stable for discharge at any time from a renal standpoint pending insurance approval/bed availability  Potassium is being replaced orally and will recheck in a.m.       Frederick Kirkland MD  [facility]   Office phone number: [phone]  Answering service phone number: [phone]    06/26/24  12:23 EDT    Dictation performed using Dragon dictation software

## 2024-06-26 NOTE — PROGRESS NOTES
[facility]     LOS: [facility] days     Subjective:    CC:Back Pain    DIET:  Diet Order   Procedures    Diet: Regular/House; Feeding Assistance - Nursing; Texture: Regular (IDDSI 7); Fluid Consistency: Thin (IDDSI 0)     Patient is pleasant, no complaints, discussed with nurse, patient was very surprised I told her she has been in the hospital for at least 25 days    Objective:    Vital Signs:  Temp:  [97 °F (36.1 °C)-98.8 °F (37.1 °C)] 97 °F (36.1 °C)  Heart Rate:  [72-83] 81  Resp:  [16] 16  BP: (123-129)/(56-67) 127/60    SpO2:  [98 %-100 %] 98 %  on   ;   Device (Oxygen Therapy): room air  Body mass index is 30.55 kg/m².    Physical Exam  HENT:      Head: Normocephalic and atraumatic.   Cardiovascular:      Heart sounds: No murmur heard.     No friction rub.   Pulmonary:      Effort: Pulmonary effort is normal.      Breath sounds: Normal breath sounds.   Abdominal:      General: Bowel sounds are normal. There is no distension.      Palpations: Abdomen is soft.      Tenderness: There is no abdominal tenderness.   Skin:     General: Skin is warm and dry.   Psychiatric:         Mood and Affect: Mood normal.         Behavior: Behavior normal.         Results Review:    Glucose   Date Value Ref Range Status   06/26/2024 88 65 - 99 mg/dL Final   06/25/2024 85 65 - 99 mg/dL Final   06/24/2024 87 65 - 99 mg/dL Final     Results from last 7 days   Lab Units 06/23/24  0523   WBC 10*3/mm3 7.90   HEMOGLOBIN g/dL 11.0*   HEMATOCRIT % 34.4   PLATELETS 10*3/mm3 318     Results from last 7 days   Lab Units 06/26/24  0338   SODIUM mmol/L 142   POTASSIUM mmol/L 3.6   CHLORIDE mmol/L 109*   CO2 mmol/L 25.0   BUN mg/dL 17   CREATININE mg/dL 0.80   CALCIUM mg/dL 9.7   GLUCOSE mg/dL 88                 Cultures:  No results found for: \"BLOODCX\", \"URINECX\", \"WOUNDCX\", \"MRSACX\", \"RESPCX\", \"STOOLCX\"    I have reviewed daily medications and changes in CPOE    Scheduled meds  bisacodyl, 10 mg, Rectal, " Daily  cinacalcet, 30 mg, Oral, Daily With Breakfast  donepezil, 10 mg, Oral, Daily  memantine, 10 mg, Oral, BID  polyethylene glycol, 17 g, Oral, BID  senna-docusate sodium, 2 tablet, Oral, BID  sodium chloride, 10 mL, Intravenous, Q12H           PRN meds    acetaminophen **OR** acetaminophen    aluminum-magnesium hydroxide-simethicone    Calcium Replacement - Follow Nurse / BPA Driven Protocol    dextrose    dextrose    dextrose    glucagon (human recombinant)    Magnesium Standard Dose Replacement - Follow Nurse / BPA Driven Protocol    nitroglycerin    ondansetron ODT **OR** ondansetron    Phosphorus Replacement - Follow Nurse / BPA Driven Protocol    Potassium Replacement - Follow Nurse / BPA Driven Protocol    [COMPLETED] Insert Peripheral IV **AND** sodium chloride    sodium chloride    sodium chloride    sodium chloride    sodium chloride    sodium chloride        Right ureteral stone    Acute low back pain    Thrombocytopenia    Lumbar spinal stenosis    CAYLA (dementia of Alzheimer type)    Chronic bilateral low back pain without sciatica    Acute UTI (urinary tract infection)    Hypoglycemia    Abnormal LFTs    Hypercalcemia    Constipation        Assessment/Plan:      Urosepsis secondary to ESBL E. coli pyelonephritis complicated by right hydronephrosis and obstructive ureteric stone and ESBL E. coli septicemia   - status post cystoscopy and right stent placement on 6/2/2024.  -Status post Rodriguez catheter placement.  Removed after successful voiding trial..  -Status post adequate course of Invanz treatment per ID.    -Leukocytosis resolved.  No fever.  Repeat blood culture are negative.  -Urology was reconsulted on 06/20 as patient remains in the hospital, plan remains for outpatient for scheduled ureteroscopy, laser llithotripsy after discharge     Constipation.    Continue bowel regimen     Hypercalcemia.    -Calcium trended back up to 11.5 on 06/23 corrected for albumin was around 12, ionized calcium was  high at 1.66  -Vitamin D and PTH normal  -Initiated on cinacalcet by nephrology  -Calcium Normalized  -Cleared to be discharged from nephrology standpoint with BMP in 1 week     Metabolic encephalopathy in a patient with a history of Alzheimer's dementia.    Resolved metabolic encephalopathy.  Negative CNS examination.    Continue Aricept and Namenda.     Prediabetes.    A1c 5.7.  -Discontinue SSI 06/20 blood glucose has been stable and patient did not require insulin in the last 48 hours  -Blood glucose stable on BMP     Hypernatremia/acute kidney.    Resolved     Transaminitis   secondary to sepsis.  LFTs normalized 06/16     Anemia/thrombocytopenia.   Hemoglobin stable  Resolved thrombocytopenia which is mostly secondary to sepsis.     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    Status post neurosurgery consult., conservative treatment.    -Pending placement to rehab     Dysphagia.   -Improved, tolerating advance diet          Josue Lazar MD  06/26/24  15:06 EDT

## 2024-06-26 NOTE — CASE MANAGEMENT/SOCIAL WORK
Continued Stay Note  [facility]     Patient Name: Kimberlee Urrutia  MRN: [id number]  Today's Date: 6/26/2024    Admit Date: 6/1/2024    Plan: [facility] when insurance reinstated   Discharge Plan       Row Name 06/26/24 1254       Plan    Plan [facility] when insurance reinstated    Patient/Family in Agreement with Plan yes    Plan Comments Spoke with Kian/Gilberto who stated he would check insurance in their system again to determine if they can accept for [facility] at this time. CCP to follow. Bennie PARKER RN                   Discharge Codes    No documentation.                 Expected Discharge Date and Time       Expected Discharge Date Expected Discharge Time    Jun 27, 2024               Bennie Davila RN

## 2024-06-26 NOTE — THERAPY TREATMENT NOTE
Patient Name: Kimberlee Urrutia  : 1940    MRN: [id number]                              Today's Date: 2024       Admit Date: 2024    Visit Dx:     ICD-10-CM ICD-9-CM   1. Right ureteral stone  N20.1 592.1   2. Acute UTI  N39.0 599.0   3. Severe sepsis  A41.9 038.9    R65.20 995.92   4. Elevated lactic acid level  R79.89 276.2   5. Osteomyelitis of lumbar spine  M46.26 730.28   6. Elevated AST (SGOT)  R74.01 790.4   7. Elevated ALT measurement  R74.01 790.4   8. Elevated C-reactive protein (CRP)  R79.82 790.95   9. PRINCE (acute kidney injury)  N17.9 584.9     Patient Active Problem List   Diagnosis    Arthritis    HLD (hyperlipidemia)    Primary hypertension    Health care maintenance    Knee pain, right    Hx of total knee arthroplasty    Seasonal allergies    Vitamin D deficiency    Pyuria    Transient alteration of awareness    Migraine without status migrainosus, not intractable    Leukocytosis    Viral pharyngitis    Multinodular thyroid    Osteopenia of multiple sites    Colon cancer screening    Serum calcium elevated    Acute low back pain    Elevated procalcitonin    Sepsis due to Escherichia coli with acute renal failure without septic shock    Thrombocytopenia    Recurrent oral herpes simplex    Lumbar spinal stenosis    Metabolic encephalopathy    Renal stones    Colon cancer screening    Sacroiliac joint pain    Lumbar stenosis without neurogenic claudication    Renal stone    Hyperlipidemia    History of total knee arthroplasty    Spinal stenosis of lumbar region    Multinodular goiter    Osteopenia    Pain in right knee    Sacroiliac joint pain    Hypercalcemia    CAYLA (dementia of Alzheimer type)    Chronic bilateral low back pain without sciatica    Right ureteral stone    Discitis of lumbar region    Acute UTI (urinary tract infection)    Hypoglycemia    Abnormal LFTs    Hypercalcemia    Constipation     Past Medical History:   Diagnosis Date    Allergic     Arthritis     Colon cancer  screening 01/29/2020    Colon cancer screening 01/29/2020    Cough     Diarrhea     Hx of migraine headaches     Hyperlipidemia     Irregular heart beat     Osteoarthritis     Renal stones 02/2021    Vitamin D deficiency      Past Surgical History:   Procedure Laterality Date    COLONOSCOPY      CYSTOSCOPY W/ URETERAL STENT PLACEMENT Right 6/2/2024    Procedure: CYSTOSCOPY, RIGHT URETERAL STENT PLACEMENT;  Surgeon: Efra Schofield MD;  Location: [facility] MAIN OR;  Service: Urology;  Laterality: Right;    LUMBAR EPIDURAL INJECTION N/A 8/2/2021    Procedure: LUMBAR EPIDURAL 1ST VISIT 5-1;  Surgeon: Nu Partida MD;  [place] [facility] [place];  Service: Pain Management;  Laterality: N/A;    REPLACEMENT TOTAL KNEE Right 01/2015    Josue Wilson MD    SACROILIAC JOINT INJECTION Left 6/16/2021    Procedure: SACROILIAC INJECTION left;  Surgeon: Nu Partida MD;  [place] [facility] [place];  Service: Pain Management;  Laterality: Left;    TUBAL ABDOMINAL LIGATION      URETEROSCOPY LASER LITHOTRIPSY WITH STENT INSERTION Left 2/23/2021    Procedure: LT.URETEROSCOPY LASER LITHOTRIPSY WITH STENT  FOR STONE;  Surgeon: Arnaud Lu MD;  Location: [facility] MAIN OR;  Service: Urology;  Laterality: Left;    WISDOM TOOTH EXTRACTION        General Information       Row Name 06/26/24 1133          Physical Therapy Time and Intention    Document Type therapy note (daily note)  -CB     Mode of Treatment co-treatment;physical therapy;occupational therapy  -CB       Row Name 06/26/24 1133          General Information    Existing Precautions/Restrictions fall  -CB       Row Name 06/26/24 1133          Cognition    Orientation Status (Cognition) oriented to;person  -CB       Row Name 06/26/24 1133          Safety Issues, Functional Mobility    Safety Issues Affecting Function (Mobility) insight into deficits/self-awareness;judgment;sequencing abilities  -CB     Impairments Affecting Function (Mobility)  balance;cognition;endurance/activity tolerance;strength  -CB     Comment, Safety Issues/Impairments (Mobility) Co treatment medically appropriate and necessary due to patient acuity level, activity tolerance and safety of patient and staff. Treatment is focusing on progression of care and goals established in the POC.  -CB               User Key  (r) = Recorded By, (t) = Taken By, (c) = Cosigned By      Initials Name Provider Type    CB Kaitlin Cunningham PT Physical Therapist                   Mobility       Row Name 06/26/24 1134          Bed Mobility    Bed Mobility supine-sit;sit-supine  -CB     Supine-Sit [place] (Bed Mobility) moderate assist (50% patient effort);verbal cues;nonverbal cues (demo/gesture)  -CB     Sit-Supine [place] (Bed Mobility) maximum assist (25% patient effort);verbal cues;nonverbal cues (demo/gesture)  -CB     Assistive Device (Bed Mobility) bed rails;head of bed elevated;leg   -CB       Row Name 06/26/24 1130          Sit-Stand Transfer    Sit-Stand [place] (Transfers) minimum assist (75% patient effort);moderate assist (50% patient effort);2 person assist;verbal cues  -CB     Assistive Device (Sit-Stand Transfers) walker, front-wheeled  -CB       Row Name 06/26/24 1136          Gait/Stairs (Locomotion)    [place] Level (Gait) minimum assist (75% patient effort);verbal cues;nonverbal cues (demo/gesture);2 person assist  -CB     Assistive Device (Gait) walker, front-wheeled  -CB     Distance in Feet (Gait) --  6 sidesteps along EOB  -CB     Comment, (Gait/Stairs) VC for sequencing for sidesteps along EOB  -CB               User Key  (r) = Recorded By, (t) = Taken By, (c) = Cosigned By      Initials Name Provider Type    Kaitlin Matthew PT Physical Therapist                   Obj/Interventions       Row Name 06/26/24 113          Balance    Balance Assessment standing static balance;standing dynamic balance  -CB     Static Standing Balance minimal assist;2-person  assist;verbal cues  -CB     Dynamic Standing Balance minimal assist;2-person assist;verbal cues  -CB     Position/Device Used, Standing Balance supported;walker, front-wheeled  -CB     Balance Interventions sitting;standing;sit to stand;supported;static;dynamic;minimal challenge  -CB               User Key  (r) = Recorded By, (t) = Taken By, (c) = Cosigned By      Initials Name Provider Type    Kaitlin Matthew, PT Physical Therapist                   Goals/Plan    No documentation.                  Clinical Impression       Row Name 06/26/24 1135          Pain    Pretreatment Pain Rating 0/10 - no pain  -CB     Posttreatment Pain Rating 0/10 - no pain  -CB       Row Name 06/26/24 1135          Plan of Care Review    Plan of Care Reviewed With patient  -CB     Progress no change  -CB     Outcome Evaluation Patient seen this AM for PT/OT. She is AO to self only. Today she completed supine<>sitting EOB requiring modA then required min-modAx2 for STS to rwx. VC and tactile cues for upright posture as she is very forward flexed. Pt very weak this date. She completed sidesteps along EOB requiring minAx2 with VC and sequencing for lateral steps. Pt in bed with all needs met. Will continue to progress as pt tolerates. Plan SNF at [place].  -CB       Row Name 06/26/24 1135          Positioning and Restraints    Pre-Treatment Position in bed  -CB     Post Treatment Position bed  -CB     In Bed notified nsg;fowlers;encouraged to call for assist;call light within reach;exit alarm on;side rails up x3;SCD pump applied  -CB               User Key  (r) = Recorded By, (t) = Taken By, (c) = Cosigned By      Initials Name Provider Type    Kaitlin Matthew, PT Physical Therapist                   Outcome Measures       Row Name 06/26/24 1139 06/26/24 0830       How much help from another person do you currently need...    Turning from your back to your side while in flat bed without using bedrails? 3  -CB 3  -MA    Moving from lying on  back to sitting on the side of a flat bed without bedrails? 2  -CB 2  -MA    Moving to and from a bed to a chair (including a wheelchair)? 1  -CB 2  -MA    Standing up from a chair using your arms (e.g., wheelchair, bedside chair)? 2  -CB 2  -MA    Climbing 3-5 steps with a railing? 1  -CB 1  -MA    To walk in hospital room? 1  -CB 2  -MA    AM-PAC 6 Clicks Score (PT) 10  -CB 12  -MA    Highest Level of Mobility Goal 4 --> Transfer to chair/commode  -CB 4 --> Transfer to chair/commode  -MA      Row Name 06/26/24 1139          Functional Assessment    Outcome Measure Options AM-PAC 6 Clicks Basic Mobility (PT)  -CB               User Key  (r) = Recorded By, (t) = Taken By, (c) = Cosigned By      Initials Name Provider Type    Amber Arteaga, RN Registered Nurse    Kaitlin Matthew, PT Physical Therapist                                 Physical Therapy Education       Title: PT OT SLP Therapies (In Progress)       Topic: Physical Therapy (In Progress)       Point: Mobility training (In Progress)       Learning Progress Summary             Patient Acceptance, E,TB,D, NR by CB at 6/26/2024 1139    Acceptance, E,D, VU,NR by EB at 6/21/2024 1122    Acceptance, E,D, NR by EF at 6/19/2024 0931    Acceptance, E, VU,NR by SV at 6/16/2024 1531    Acceptance, E,TB, NR by BK at 6/14/2024 1715    Acceptance, E,D, VU,NR by EB at 6/14/2024 1641    Acceptance, E,D, NR by EB at 6/13/2024 1514    Acceptance, E,D, NR by EB at 6/12/2024 1643    Acceptance, E,TB, VU by RD at 6/11/2024 1011    Acceptance, E,TB, VU,NR by CB at 6/10/2024 1149    Acceptance, E, VU by EM at 6/7/2024 1622    Acceptance, E, NR by EM at 6/6/2024 1605    Acceptance, E,TB, VU by BK at 6/5/2024 1700    Acceptance, E, NR by EM at 6/5/2024 1640                         Point: Home exercise program (Done)       Learning Progress Summary             Patient Acceptance, E,D, VU,NR by EB at 6/21/2024 1122    Acceptance, E,D, NR by EF at 6/19/2024 0915     Acceptance, E, VU,NR by SV at 6/16/2024 1531    Acceptance, E,TB, NR by BK at 6/14/2024 1715    Acceptance, E,TB, VU by BK at 6/13/2024 1655    Acceptance, E,TB, VU by RD at 6/11/2024 1011    Acceptance, E,TB, VU,NR by CB at 6/10/2024 1149    Acceptance, E, VU by EM at 6/7/2024 1622    Acceptance, E, NR by EM at 6/6/2024 1605                         Point: Body mechanics (In Progress)       Learning Progress Summary             Patient Acceptance, E,TB,D, NR by CB at 6/26/2024 1139    Acceptance, E,D, VU,NR by EB at 6/21/2024 1122    Acceptance, E,D, NR by EF at 6/19/2024 0931    Acceptance, E,TB, NR by BK at 6/14/2024 1715    Acceptance, E,D, VU,NR by EB at 6/14/2024 1641    Acceptance, E,D, NR by EB at 6/13/2024 1514    Acceptance, E,D, NR by EB at 6/12/2024 1643    Acceptance, E,TB, VU by RD at 6/11/2024 1011    Acceptance, E,TB, VU,NR by CB at 6/10/2024 1149                         Point: Precautions (In Progress)       Learning Progress Summary             Patient Acceptance, E,D, NR by EB at 6/13/2024 1514    Acceptance, E,TB, VU by RD at 6/11/2024 1011    Acceptance, E,TB, VU,NR by CB at 6/10/2024 1149                                         User Key       Initials Effective Dates Name Provider Type Discipline    RD 06/16/21 -  Leonela Acuna, PT Physical Therapist PT    EF 05/31/24 -  Marci Mckenzie, PT Physical Therapist PT    EM 06/16/21 -  Yvette Guajardo, PT Physical Therapist PT    SV 07/11/23 -  Velma Georges, PT Physical Therapist PT    EB 02/14/23 -  Nancy Bishop, PTA Physical Therapist Assistant PT    CB 10/22/21 -  Kaitlin Cunningham, PT Physical Therapist PT    BK 04/30/24 -  Kalmey, Nu, RN Extern Registered Nurse Nurse                  PT Recommendation and Plan     Plan of Care Reviewed With: patient  Progress: no change  Outcome Evaluation: Patient seen this AM for PT/OT. She is AO to self only. Today she completed supine<>sitting EOB requiring modA then required min-modAx2 for  STS to rwx. VC and tactile cues for upright posture as she is very forward flexed. Pt very weak this date. She completed sidesteps along EOB requiring minAx2 with VC and sequencing for lateral steps. Pt in bed with all needs met. Will continue to progress as pt tolerates. Plan SNF at [place].     Time Calculation:         PT Charges       Row Name 06/26/24 1139             Time Calculation    Start Time 1111  -CB      Stop Time 1128  -CB      Time Calculation (min) 17 min  -CB      PT Received On 06/26/24  -CB      PT - Next Appointment 06/27/24  -CB         Time Calculation- PT    Total Timed Code Minutes- PT 17 minute(s)  -CB         Timed Charges    45739 - PT Therapeutic Activity Minutes 17  -CB         Total Minutes    Timed Charges Total Minutes 17  -CB       Total Minutes 17  -CB                User Key  (r) = Recorded By, (t) = Taken By, (c) = Cosigned By      Initials Name Provider Type    Kaitlin Matthew, PT Physical Therapist                  Therapy Charges for Today       Code Description Service Date Service Provider Modifiers Qty    [id number]  PT THERAPEUTIC ACT EA 15 MIN 6/26/2024 Kaitlin Cunningham, PT GP 1            PT G-Codes  Outcome Measure Options: AM-PAC 6 Clicks Basic Mobility (PT)  AM-PAC 6 Clicks Score (PT): 10  AM-PAC 6 Clicks Score (OT): 9  Modified [place] Scale: 4 - Moderately severe disability.  Unable to walk without assistance, and unable to attend to own bodily needs without assistance.  PT Discharge Summary  Anticipated Discharge Disposition (PT): skilled nursing facility    Kaitlin Cunningham PT  6/26/2024

## 2024-06-26 NOTE — CASE MANAGEMENT/SOCIAL WORK
Continued Stay Note  [facility]     Patient Name: Kimberlee Urrutia  MRN: [id number]  Today's Date: 6/26/2024    Admit Date: 6/1/2024    Plan: [facility] when insurance reinstated.   Discharge Plan       Row Name 06/26/24 0843       Plan    Plan [facility] when insurance reinstated.    Patient/Family in Agreement with Plan yes    Plan Comments Spoke with JOVI/Gi who stated she is now working with the [employer] to get insurance reinstated. CCP to follow. Bennie PARKER RN                   Discharge Codes    No documentation.                 Expected Discharge Date and Time       Expected Discharge Date Expected Discharge Time    Jun 27, 2024               Bennie Davila RN

## 2024-06-26 NOTE — PLAN OF CARE
Goal Outcome Evaluation:  Plan of Care Reviewed With: patient        Progress: no change  Outcome Evaluation: Pt seen for OT and PT this a.m. and focusing on functional bed mobility and transfers in prep for ADLs OOB. Pt able to complete STS with modA x 2 and required [place] x 2 for side steps laterally toward HOB. Pt requiring increased time and cues for sequencing this date and fatigued quickly. Will con't to follow for stated goals and recommend to snf.

## 2024-06-26 NOTE — PLAN OF CARE
Goal Outcome Evaluation:  Plan of Care Reviewed With: patient        Progress: no change  Outcome Evaluation: Patient seen this AM for PT/OT. She is AO to self only. Today she completed supine<>sitting EOB requiring modA then required min-modAx2 for STS to rwx. VC and tactile cues for upright posture as she is very forward flexed. Pt very weak this date. She completed sidesteps along EOB requiring minAx2 with VC and sequencing for lateral steps. Pt in bed with all needs met. Will continue to progress as pt tolerates. Plan SNF at [place].      Anticipated Discharge Disposition (PT): skilled nursing facility

## 2024-06-26 NOTE — CONSULTS
Nutrition Services    Patient Name:  Kimberlee Urrutia  YOB: 1940  MRN: [id number]  Admit Date:  6/1/2024    Assessment Date:  06/26/24    Summary: chronic poor intake    Nutrition assessment initiated d/t chronic poor intake. Pt on nutrition supplement to help w/ PO intake. SLP re-evaluated on 6/14 and recommended regular diet, thin. Reports drinking boost plus twice a day. Per chart review, pt only eats a few bites to 25% of her meal. Reports no appetite during visit. Noted 15# wt gain overall within the last 2 years but has had a 19# wt loss since the beginning of this month per wt hx. No fat and muscle wasting seen per NFPE. PO intake encouraged at bedside to prevent wasting. Pt seems disinterested during diet edu. Will order TID for additional kcal and protein. Meds and labs reviewed.     Patient meets ASPEN/AND criteria for nutrition diagnosis of severe malnutrition of acute illness based on: inadequate energy intake, 19# (11%) x 3 weeks    Plan/recs:  Boost plus TID.  Encourage intakes. Assist w/ feeding.  Appropriate wt loss    RD to follow    CLINICAL NUTRITION ASSESSMENT      Reason for Assessment Chronic Poor Intake     Diagnosis/Problem   Right ureteral stone   Medical/Surgical History Past Medical History:   Diagnosis Date    Allergic     Arthritis     Colon cancer screening 01/29/2020    Colon cancer screening 01/29/2020    Cough     Diarrhea     Hx of migraine headaches     Hyperlipidemia     Irregular heart beat     Osteoarthritis     Renal stones 02/2021    Vitamin D deficiency        Past Surgical History:   Procedure Laterality Date    COLONOSCOPY      CYSTOSCOPY W/ URETERAL STENT PLACEMENT Right 6/2/2024    Procedure: CYSTOSCOPY, RIGHT URETERAL STENT PLACEMENT;  Surgeon: Efra Schofield MD;  Location: [facility];  Service: Urology;  Laterality: Right;    LUMBAR EPIDURAL INJECTION N/A 8/2/2021    Procedure: LUMBAR EPIDURAL 1ST VISIT 5-1;  Surgeon: Nu Partida  "MD;  Location: [facility] [place] MAIN OR;  Service: Pain Management;  Laterality: N/A;    REPLACEMENT TOTAL KNEE Right 01/2015    Josue Wilson MD    SACROILIAC JOINT INJECTION Left 6/16/2021    Procedure: SACROILIAC INJECTION left;  Surgeon: Nu Partida MD;  Location: [facility] [place] MAIN OR;  Service: Pain Management;  Laterality: Left;    TUBAL ABDOMINAL LIGATION      URETEROSCOPY LASER LITHOTRIPSY WITH STENT INSERTION Left 2/23/2021    Procedure: LT.URETEROSCOPY LASER LITHOTRIPSY WITH STENT  FOR STONE;  Surgeon: Arnaud Lu MD;  Location: [facility] MAIN OR;  Service: Urology;  Laterality: Left;    WISDOM TOOTH EXTRACTION          Anthropometrics        Current Height  Current Weight  BMI kg/m2 Height: 158.8 cm (62.5\")  Weight: 77 kg (169 lb 12.1 oz) (06/25/24 0500)  Body mass index is 30.55 kg/m².   Adjusted BMI (if applicable)    BMI Category Obese, Class I (30 - 34.9)   Ideal Body Weight (IBW) 112#   Usual Body Weight (UBW) 170-180s   Weight Trend Loss   Weight History Wt Readings from Last 30 Encounters:   06/25/24 0500 77 kg (169 lb 12.1 oz)   06/24/24 0507 78.1 kg (172 lb 2.9 oz)   06/23/24 0500 80.7 kg (177 lb 14.6 oz)   06/22/24 0500 80.1 kg (176 lb 9.4 oz)   06/21/24 0500 80.2 kg (176 lb 12.9 oz)   06/20/24 0549 81.6 kg (179 lb 14.3 oz)   06/19/24 0524 82.4 kg (181 lb 10.5 oz)   06/18/24 0500 82.5 kg (181 lb 14.1 oz)   06/17/24 0457 84.2 kg (185 lb 10 oz)   06/16/24 0500 85.4 kg (188 lb 4.4 oz)   06/15/24 0500 85.5 kg (188 lb 7.9 oz)   06/14/24 0300 85 kg (187 lb 6.3 oz)   06/13/24 0300 85.5 kg (188 lb 7.9 oz)   06/12/24 0250 84.5 kg (186 lb 4.6 oz)   06/11/24 0417 86.4 kg (190 lb 7.6 oz)   06/10/24 0534 85.2 kg (187 lb 13.3 oz)   06/09/24 0300 84.8 kg (186 lb 15.2 oz)   06/08/24 0415 85.2 kg (187 lb 12.8 oz)   06/07/24 0546 90.4 kg (199 lb 4.7 oz)   06/06/24 0500 90.1 kg (198 lb 10.2 oz)   06/05/24 0500 85.7 kg (188 lb 14.4 oz)   06/04/24 0452 86 kg (189 lb 9.5 oz)   06/03/24 0514 86.2 kg (190 lb 0.6 oz) "   06/02/24 0850 83.9 kg (184 lb 15.5 oz)   06/02/24 0457 85.4 kg (188 lb 4.4 oz)   06/01/24 2030 81 kg (178 lb 9.2 oz)   11/30/23 0947 81 kg (178 lb 9.6 oz)   09/26/23 1015 80.7 kg (178 lb)   04/11/23 1156 75 kg (165 lb 6.4 oz)   01/18/23 1403 73 kg (161 lb)   07/28/22 1410 69.9 kg (154 lb 3.2 oz)   05/03/22 1238 69.4 kg (153 lb)   04/04/22 1018 69.4 kg (153 lb)   03/31/22 1336 69.9 kg (154 lb)   03/24/22 0941 69.5 kg (153 lb 3.2 oz)   02/21/22 1303 68.5 kg (151 lb)   10/26/21 0941 67.9 kg (149 lb 9.6 oz)   10/19/21 1612 67.1 kg (148 lb)   08/30/21 0932 66.3 kg (146 lb 3.2 oz)   08/02/21 1036 63.5 kg (140 lb)   07/22/21 1130 64.7 kg (142 lb 9.6 oz)   07/14/21 0923 63.8 kg (140 lb 9.6 oz)   06/16/21 1334 60.3 kg (133 lb)   05/27/21 0859 62.3 kg (137 lb 6.4 oz)   04/07/21 1008 64.8 kg (142 lb 12.8 oz)   03/23/21 1333 66.2 kg (146 lb)   03/15/21 1143 77.6 kg (171 lb)   03/09/21 1354 77.6 kg (171 lb 1.2 oz)   02/21/21 0535 77.6 kg (171 lb 1.2 oz)   02/17/21 2302 71.4 kg (157 lb 6.5 oz)   02/17/21 1831 71.7 kg (158 lb)   12/24/20 0755 71.7 kg (158 lb)   09/02/20 1129 74.4 kg (164 lb)   08/10/20 0843 75.8 kg (167 lb)   09/11/19 1026 72.7 kg (160 lb 3.2 oz)   07/31/19 1025 73.9 kg (163 lb)   10/10/18 0922 76.7 kg (169 lb)        Estimated Requirements         Weight used  77 kg    Calories  1155, 1386 kcal (15-18 kcal/kg)    Protein  77 - 92 g (1.0 - 1.2 gm/kg)    Fluid   (1 mL/kcal)     Labs       Pertinent Labs    Results from last 7 days   Lab Units 06/26/24  0338 06/25/24  0652 06/24/24  0414   SODIUM mmol/L 142 142 139   POTASSIUM mmol/L 3.6 3.7 3.9   CHLORIDE mmol/L 109* 111* 106   CO2 mmol/L 25.0 24.0 22.9   BUN mg/dL 17 16 19   CREATININE mg/dL 0.80 0.90 0.91   CALCIUM mg/dL 9.7 10.0 11.1*   GLUCOSE mg/dL 88 85 87     Results from last 7 days   Lab Units 06/26/24  0338 06/23/24  0523   PHOSPHORUS mg/dL 2.4*  --    HEMOGLOBIN g/dL  --  11.0*   HEMATOCRIT %  --  34.4   WBC 10*3/mm3  --  7.90   ALBUMIN g/dL 3.1* 3.2*      Results from last 7 days   Lab Units 06/23/24  0523   PLATELETS 10*3/mm3 318     COVID19   Date Value Ref Range Status   02/23/2021 Not Detected Not Detected - Ref. Range Final     Lab Results   Component Value Date    HGBA1C 5.7 (H) 09/26/2023          Medications           Scheduled Medications bisacodyl, 10 mg, Rectal, Daily  cinacalcet, 30 mg, Oral, Daily With Breakfast  donepezil, 10 mg, Oral, Daily  memantine, 10 mg, Oral, BID  polyethylene glycol, 17 g, Oral, BID  potassium chloride ER, 40 mEq, Oral, Q4H  senna-docusate sodium, 2 tablet, Oral, BID  sodium chloride, 10 mL, Intravenous, Q12H       Infusions     PRN Medications   acetaminophen **OR** acetaminophen    aluminum-magnesium hydroxide-simethicone    Calcium Replacement - Follow Nurse / BPA Driven Protocol    dextrose    dextrose    dextrose    glucagon (human recombinant)    Magnesium Standard Dose Replacement - Follow Nurse / BPA Driven Protocol    nitroglycerin    ondansetron ODT **OR** ondansetron    Phosphorus Replacement - Follow Nurse / BPA Driven Protocol    Potassium Replacement - Follow Nurse / BPA Driven Protocol    [COMPLETED] Insert Peripheral IV **AND** sodium chloride    sodium chloride    sodium chloride    sodium chloride    sodium chloride    sodium chloride     Physical Findings          General Findings alert, appeared asleep, obese   Oral/Mouth Cavity tooth or teeth missing   Edema  lower extremity , 1+ (trace)   Gastrointestinal fecal incontinence, last bowel movement: 6/26   Skin  skin intact   Tubes/Drains/Lines none   NFPE See Malnutrition Severity Assessment, Date Completed: 6/26 GR     Malnutrition Severity Assessment      Patient meets criteria for : Severe Malnutrition  Malnutrition Type (Last 8 Hours)       Malnutrition Severity Assessment       Row Name 06/26/24 1107       Malnutrition Severity Assessment    Malnutrition Type Acute Disease or Injury - Related Malnutrition      Row Name 06/26/24 1107       Insufficient  Energy Intake     Insufficient Energy Intake Findings Severe    Insufficient Energy Intake  < or equal to 50% of est. energy requirement for > or equal to 5d)      Row Name 06/26/24 1107       Unintentional Weight Loss     Unintentional Weight Loss Findings Severe    Unintentional Weight Loss  Weight loss greater than 2% in one week      Row Name 06/26/24 1107       Fluid Accumulation (Edema)    Fluid Acumulation Findings Mild    Fluid Accumulation  Mild equals 1+ pitting edema      Row Name 06/26/24 1107       Criteria Met (Must meet criteria for severity in at least 2 of these categories: M Wasting, Fat Loss, Fluid, Secondary Signs, Wt. Status, Intake)    Patient meets criteria for  Severe Malnutrition                       Current Nutrition Orders & Evaluation of Intake       Oral Nutrition     Food Allergies NKFA   Current PO Diet Diet: Regular/House; Feeding Assistance - Nursing; Texture: Regular (IDDSI 7); Fluid Consistency: Thin (IDDSI 0)   Supplement Boost Plus, BID   PO Evaluation     % PO Intake Bites - 25%    Factors Affecting Intake: decreased appetite   --  PES STATEMENT / NUTRITION DIAGNOSIS      Nutrition Dx Problem  Problem: Malnutrition (severe)  Etiology: Medical Diagnosis - right ureteral stone    Signs/Symptoms: PO intake and Unintended Weight Change     NUTRITION INTERVENTION / PLAN OF CARE      Intervention Goal(s) Establish goals of care, Meet estimated needs, Disease management/therapy, Increase intake, Accepts oral nutrition supplement, Appropriate weight loss, and PO intake goal %: 75         RD Intervention/Action Adjusted supplement, Encourage intake, Continue to monitor, Care plan reviewed, and Recommend/order: boost plus TID   --      Prescription/Orders:       PO Diet       Supplements Boost plus TID      Enteral Nutrition       Parenteral Nutrition    New Prescription Ordered? Yes   --      Monitor/Evaluation Per protocol   Discharge Plan/Needs Pending clinical course   --    RD to  follow per protocol.      Electronically signed by:  Katerin Dorantes RD  06/26/24 10:38 EDT

## 2024-06-26 NOTE — PLAN OF CARE
Goal Outcome Evaluation:  Plan of Care Reviewed With: patient        Progress: improving  Outcome Evaluation: Denies pain. Afebrile and VS stable. Cooperative and pleasant. forgetful. Turned. Incontinet of stool tonight. Purewick in place. Bed alarm for safety. Waiting fo rInsurance. Plan is to discharge her to [facility]

## 2024-06-27 LAB
1,25(OH)2D SERPL-MCNC: 20.6 PG/ML (ref 24.8–81.5)
ALBUMIN SERPL-MCNC: 3.1 G/DL (ref 3.5–5.2)
ANION GAP SERPL CALCULATED.3IONS-SCNC: 7.5 MMOL/L (ref 5–15)
BUN SERPL-MCNC: 15 MG/DL (ref 8–23)
BUN/CREAT SERPL: 17.9 (ref 7–25)
CALCIUM SPEC-SCNC: 10.1 MG/DL (ref 8.6–10.5)
CHLORIDE SERPL-SCNC: 111 MMOL/L (ref 98–107)
CO2 SERPL-SCNC: 22.5 MMOL/L (ref 22–29)
CREAT SERPL-MCNC: 0.84 MG/DL (ref 0.57–1)
EGFRCR SERPLBLD CKD-EPI 2021: 68.2 ML/MIN/1.73
GLUCOSE SERPL-MCNC: 91 MG/DL (ref 65–99)
PHOSPHATE SERPL-MCNC: 2.1 MG/DL (ref 2.5–4.5)
PHOSPHATE SERPL-MCNC: 3.3 MG/DL (ref 2.5–4.5)
POTASSIUM SERPL-SCNC: 4.4 MMOL/L (ref 3.5–5.2)
SODIUM SERPL-SCNC: 141 MMOL/L (ref 136–145)

## 2024-06-27 PROCEDURE — 92526 ORAL FUNCTION THERAPY: CPT

## 2024-06-27 PROCEDURE — 80069 RENAL FUNCTION PANEL: CPT | Performed by: INTERNAL MEDICINE

## 2024-06-27 PROCEDURE — 84100 ASSAY OF PHOSPHORUS: CPT | Performed by: HOSPITALIST

## 2024-06-27 RX ADMIN — MEMANTINE HYDROCHLORIDE 10 MG: 10 TABLET, FILM COATED ORAL at 21:35

## 2024-06-27 RX ADMIN — MEMANTINE HYDROCHLORIDE 10 MG: 10 TABLET, FILM COATED ORAL at 08:12

## 2024-06-27 RX ADMIN — CINACALCET 30 MG: 30 TABLET ORAL at 08:12

## 2024-06-27 RX ADMIN — DONEPEZIL HYDROCHLORIDE 10 MG: 10 TABLET, FILM COATED ORAL at 08:12

## 2024-06-27 RX ADMIN — POTASSIUM, SODIUM PHOSPHATES 280 MG-160 MG-250 MG ORAL POWDER PACKET 2 PACKET: POWDER IN PACKET at 11:42

## 2024-06-27 NOTE — PLAN OF CARE
Goal Outcome Evaluation:  Plan of Care Reviewed With: patient        Progress: no change  Outcome Evaluation: Denies pain. Bed alarm for safety. Sl confused--cooperative. Turn q2hrs. Purewick in place. Awaiting insurance for [facility]

## 2024-06-27 NOTE — PLAN OF CARE
Goal Outcome Evaluation:  Plan of Care Reviewed With: patient           Outcome Evaluation: Pt seen for diet tolerance. Pt upgraded to regular diet previous tx session and reports tolerating well. Pt self fed 2 whole ileana crackers with peanut butter and approx 8 oz of orange juice by cup. No overt s/s of aspiration appreciated. Recommend continue regular diet/thin liquids. General aspiration precautions, assist with tray set up and positioning as needed. No skilled ST warranted. SLP to sign off. Please re consult as indicated.      Anticipated Discharge Disposition (SLP): No further SLP services warranted

## 2024-06-27 NOTE — THERAPY TREATMENT NOTE
Acute Care - Speech Language Pathology   Swallow Treatment Note [facility]     Patient Name: Kimberlee Urrutia  : 1940  MRN: [id number]  Today's Date: 2024               Admit Date: 2024    Visit Dx:     ICD-10-CM ICD-9-CM   1. Right ureteral stone  N20.1 592.1   2. Acute UTI  N39.0 599.0   3. Severe sepsis  A41.9 038.9    R65.20 995.92   4. Elevated lactic acid level  R79.89 276.2   5. Osteomyelitis of lumbar spine  M46.26 730.28   6. Elevated AST (SGOT)  R74.01 790.4   7. Elevated ALT measurement  R74.01 790.4   8. Elevated C-reactive protein (CRP)  R79.82 790.95   9. PRINCE (acute kidney injury)  N17.9 584.9     Patient Active Problem List   Diagnosis    Arthritis    HLD (hyperlipidemia)    Primary hypertension    Health care maintenance    Knee pain, right    Hx of total knee arthroplasty    Seasonal allergies    Vitamin D deficiency    Pyuria    Transient alteration of awareness    Migraine without status migrainosus, not intractable    Leukocytosis    Viral pharyngitis    Multinodular thyroid    Osteopenia of multiple sites    Colon cancer screening    Serum calcium elevated    Acute low back pain    Elevated procalcitonin    Sepsis due to Escherichia coli with acute renal failure without septic shock    Thrombocytopenia    Recurrent oral herpes simplex    Lumbar spinal stenosis    Metabolic encephalopathy    Renal stones    Colon cancer screening    Sacroiliac joint pain    Lumbar stenosis without neurogenic claudication    Renal stone    Hyperlipidemia    History of total knee arthroplasty    Spinal stenosis of lumbar region    Multinodular goiter    Osteopenia    Pain in right knee    Sacroiliac joint pain    Hypercalcemia    CAYLA (dementia of Alzheimer type)    Chronic bilateral low back pain without sciatica    Right ureteral stone    Discitis of lumbar region    Acute UTI (urinary tract infection)    Hypoglycemia    Abnormal LFTs    Hypercalcemia    Constipation     Past Medical History:    Diagnosis Date    Allergic     Arthritis     Colon cancer screening 01/29/2020    Colon cancer screening 01/29/2020    Cough     Diarrhea     Hx of migraine headaches     Hyperlipidemia     Irregular heart beat     Osteoarthritis     Renal stones 02/2021    Vitamin D deficiency      Past Surgical History:   Procedure Laterality Date    COLONOSCOPY      CYSTOSCOPY W/ URETERAL STENT PLACEMENT Right 6/2/2024    Procedure: CYSTOSCOPY, RIGHT URETERAL STENT PLACEMENT;  Surgeon: Efra Schofield MD;  Location: [facility] MAIN OR;  Service: Urology;  Laterality: Right;    LUMBAR EPIDURAL INJECTION N/A 8/2/2021    Procedure: LUMBAR EPIDURAL 1ST VISIT 5-1;  Surgeon: Nu Partida MD;  Location[facility];  Service: Pain Management;  Laterality: N/A;    REPLACEMENT TOTAL KNEE Right 01/2015    Josue Wilson MD    SACROILIAC JOINT INJECTION Left 6/16/2021    Procedure: SACROILIAC INJECTION left;  Surgeon: Nu Partida MD;  Location: [facility];  Service: Pain Management;  Laterality: Left;    TUBAL ABDOMINAL LIGATION      URETEROSCOPY LASER LITHOTRIPSY WITH STENT INSERTION Left 2/23/2021    Procedure: LT.URETEROSCOPY LASER LITHOTRIPSY WITH STENT  FOR STONE;  Surgeon: Arnaud Lu MD;  Location: [facility] OR;  Service: Urology;  Laterality: Left;    WISDOM TOOTH EXTRACTION         SLP Recommendation and Plan           Anticipated Discharge Disposition (SLP): No further SLP services warranted (06/27/24 1145)      Reason for Discharge: all goals and outcomes met, no further needs identified (06/27/24 1145)    Plan of Care Reviewed With: patient  Outcome Evaluation: Pt seen for diet tolerance. Pt upgraded to regular diet previous tx session and reports tolerating well. Pt self fed 2 whole ileana crackers with peanut butter and approx 8 oz of orange juice by cup. No overt s/s of aspiration appreciated. Recommend continue regular diet/thin liquids. General aspiration precautions, assist with tray set  up and positioning as needed. No skilled ST warranted. SLP to sign off. Please re consult as indicated.      SWALLOW EVALUATION (Last 72 Hours)       SLP Adult Swallow Evaluation       Row Name 06/27/24 1145       Rehab Evaluation    Document Type therapy note (daily note)  -HF    Subjective Information no complaints  -HF    Patient Observations alert;cooperative;agree to therapy  -HF    Patient Effort good  -HF       General Information    Patient Profile Reviewed yes  -HF    Pertinent History Of Current Problem 83 y/o female presented with low back pain. Cystoscopy performed 6/2/24. Septic shock secondary to UTI. CT abdomen states basilar atelectasis. Hx includes dementia.  -HF    Current Method of Nutrition regular textures;thin liquids  -HF       Pain    Additional Documentation Pain Scale: FACES Pre/Post-Treatment (Group)  -HF       Pain Scale: Numbers Pre/Post-Treatment    Pretreatment Pain Rating 0/10 - no pain  -HF              User Key  (r) = Recorded By, (t) = Taken By, (c) = Cosigned By      Initials Name Effective Dates    HF Vanessa Shah SLP 08/01/23 -                     EDUCATION  The patient has been educated in the following areas:   Dysphagia (Swallowing Impairment) Oral Care/Hydration.        SLP GOALS       Row Name 06/27/24 1100       (LTG) Patient will demonstrate functional swallow for    Diet Texture (Demonstrate functional swallow) soft to chew (chopped) textures  -HF    Liquid viscosity (Demonstrate functional swallow) thin liquids  -HF    [place] (Demonstrate functional swallow) independently (over 90% accuracy)  -HF    Time Frame (Demonstrate functional swallow) by discharge  -HF    Progress/Outcomes (Demonstrate functional swallow) goal met  -HF    Comment (Demonstrate functional swallow) Pt seen for diet tolerance. Pt upgraded to regular diet previous tx session and reports tolerating well. Pt self fed 2 whole ileana crackers with peanut butter and approx 8 oz of orange juice by  cup. Oral phase WFL. No oral residue. No overt s/s of aspiration such as cough, throat clear, or wet vocal quality appreciated. Recommend continue regular diet/thin liquids. General aspiration precautions, assist with tray set up and positioning as needed. No skilled ST warranted. SLP to sign off. Please re consult as indicated.  -HF              User Key  (r) = Recorded By, (t) = Taken By, (c) = Cosigned By      Initials Name Provider Type    Vanessa Meneses SLP Speech and Language Pathologist                         Time Calculation:    Time Calculation- SLP       Row Name 06/27/24 1150             Time Calculation- SLP    SLP Start Time 1100  -HF      SLP Received On 06/27/24  -HF                User Key  (r) = Recorded By, (t) = Taken By, (c) = Cosigned By      Initials Name Provider Type    Vanessa Meneses SLP Speech and Language Pathologist                    Therapy Charges for Today       Code Description Service Date Service Provider Modifiers Qty    [id number] HC ST TREATMENT SWALLOW 3 6/27/2024 Vanessa Shah SLP GN 1                 JOSUÉ Barbosa  6/27/2024

## 2024-06-27 NOTE — PROGRESS NOTES
[facility]     LOS: 25 days     Subjective:    CC:Back Pain    DIET:  Diet Order   Procedures    Diet: Regular/House; Feeding Assistance - Nursing; Texture: Regular (IDDSI 7); Fluid Consistency: Thin (IDDSI 0)     Patient is pleasant, no complaints, friend is at the bedside, discussed with nurse and patient refused an IV     Objective:    Vital Signs:  Temp:  [97 °F (36.1 °C)-97.5 °F (36.4 °C)] 97.3 °F (36.3 °C)  Heart Rate:  [67-85] 85  Resp:  [15-16] 15  BP: (126-132)/(67-73) 128/73    SpO2:  [96 %-99 %] 96 %  on   ;   Device (Oxygen Therapy): room air  Body mass index is 31.98 kg/m².    Physical Exam  HENT:      Head: Normocephalic and atraumatic.   Cardiovascular:      Heart sounds: No murmur heard.     No friction rub.   Pulmonary:      Effort: Pulmonary effort is normal.      Breath sounds: Normal breath sounds.   Abdominal:      General: Bowel sounds are normal. There is no distension.      Palpations: Abdomen is soft.      Tenderness: There is no abdominal tenderness.   Skin:     General: Skin is warm and dry.   Psychiatric:         Mood and Affect: Mood normal.         Behavior: Behavior normal.         Results Review:    Glucose   Date Value Ref Range Status   06/27/2024 91 65 - 99 mg/dL Final   06/26/2024 88 65 - 99 mg/dL Final   06/25/2024 85 65 - 99 mg/dL Final     Results from last 7 days   Lab Units 06/23/24  0523   WBC 10*3/mm3 7.90   HEMOGLOBIN g/dL 11.0*   HEMATOCRIT % 34.4   PLATELETS 10*3/mm3 318     Results from last 7 days   Lab Units 06/27/24  0359   SODIUM mmol/L 141   POTASSIUM mmol/L 4.4   CHLORIDE mmol/L 111*   CO2 mmol/L 22.5   BUN mg/dL 15   CREATININE mg/dL 0.84   CALCIUM mg/dL 10.1   GLUCOSE mg/dL 91                 Cultures:  No results found for: \"BLOODCX\", \"URINECX\", \"WOUNDCX\", \"MRSACX\", \"RESPCX\", \"STOOLCX\"    I have reviewed daily medications and changes in CPOE    Scheduled meds  bisacodyl, 10 mg, Rectal, Daily  cinacalcet, 30 mg, Oral, Daily With " Breakfast  donepezil, 10 mg, Oral, Daily  memantine, 10 mg, Oral, BID  sodium chloride, 10 mL, Intravenous, Q12H  sodium phosphate, 15 mmol, Intravenous, Once           PRN meds    acetaminophen **OR** acetaminophen    aluminum-magnesium hydroxide-simethicone    Calcium Replacement - Follow Nurse / BPA Driven Protocol    dextrose    dextrose    dextrose    glucagon (human recombinant)    Magnesium Standard Dose Replacement - Follow Nurse / BPA Driven Protocol    nitroglycerin    ondansetron ODT **OR** ondansetron    Phosphorus Replacement - Follow Nurse / BPA Driven Protocol    polyethylene glycol    Potassium Replacement - Follow Nurse / BPA Driven Protocol    senna-docusate sodium **AND** [DISCONTINUED] polyethylene glycol **AND** [DISCONTINUED] bisacodyl **AND** [DISCONTINUED] bisacodyl    sodium chloride    sodium chloride    sodium chloride    sodium chloride    sodium chloride        Right ureteral stone    Acute low back pain    Thrombocytopenia    Lumbar spinal stenosis    CAYLA (dementia of Alzheimer type)    Chronic bilateral low back pain without sciatica    Acute UTI (urinary tract infection)    Hypoglycemia    Abnormal LFTs    Hypercalcemia    Constipation        Assessment/Plan:      Urosepsis secondary to ESBL E. coli pyelonephritis complicated by right hydronephrosis and obstructive ureteric stone and ESBL E. coli septicemia   - status post cystoscopy and right stent placement on 6/2/2024.  -Status post Rodriguez catheter placement.  Removed after successful voiding trial..  -Status post adequate course of Invanz treatment per ID.    -Leukocytosis resolved.  No fever.  Repeat blood culture are negative.  -Urology was reconsulted on 06/20 as patient remains in the hospital, plan remains for outpatient for scheduled ureteroscopy, laser llithotripsy after discharge     Constipation.    Continue bowel regimen     Hypercalcemia.    -Calcium trended back up to 11.5 on 06/23 corrected for albumin was around 12,  ionized calcium was high at 1.66  -Vitamin D and PTH normal  -Initiated on cinacalcet by nephrology  -Calcium Normalized  -Cleared to be discharged from nephrology standpoint with BMP in 1 week     Metabolic encephalopathy in a patient with a history of Alzheimer's dementia.    Resolved metabolic encephalopathy.  Negative CNS examination.    Continue Aricept and Namenda.     Prediabetes.    A1c 5.7.  -Discontinue SSI 06/20 blood glucose has been stable   -Blood glucose stable on BMP     Hypernatremia/acute kidney.    Resolved     Transaminitis   secondary to sepsis.  LFTs normalized 06/16     Anemia/thrombocytopenia.   Hemoglobin stable  Resolved thrombocytopenia which is mostly secondary to sepsis.     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    Status post neurosurgery consult., conservative treatment.    -Pending placement to rehab     Dysphagia.   -Improved, tolerating advance diet          Josue Lazar MD  06/27/24  14:19 EDT

## 2024-06-27 NOTE — PROGRESS NOTES
LOS: 25 days     Chief Complaint/ Reason for encounter: Hypercalcemia    Subjective   06/24/24 : Patient is doing well today with no new complaints  Good appetite with no nausea or vomiting  No shortness of breath chest pain or edema  Voiding well with no dysuria  Denies new complaints    6/ 25: She looks and feels well today denies complaints, weight down a few pounds she has no edema  No shortness of breath chest pain fevers chills or edema  Possible discharge to rehab today    6/26: She is resting feels well denies complaints  Just waiting on rehab placement pending insurance approval    6/27: No new complaints, somewhat irritable.  Denies nausea vomiting shortness of breath or chest pain    Medical history reviewed:  History of Present Illness    Subjective    History taken from: Patient and chart    Vital Signs  Temp:  [97 °F (36.1 °C)-97.5 °F (36.4 °C)] 97.3 °F (36.3 °C)  Heart Rate:  [67-85] 85  Resp:  [15-16] 15  BP: (126-132)/(67-73) 128/73       Wt Readings from Last 1 Encounters:   06/27/24 0555 80.6 kg (177 lb 11.1 oz)   06/25/24 0500 77 kg (169 lb 12.1 oz)   06/24/24 0507 78.1 kg (172 lb 2.9 oz)   06/23/24 0500 80.7 kg (177 lb 14.6 oz)   06/22/24 0500 80.1 kg (176 lb 9.4 oz)   06/21/24 0500 80.2 kg (176 lb 12.9 oz)   06/20/24 0549 81.6 kg (179 lb 14.3 oz)   06/19/24 0524 82.4 kg (181 lb 10.5 oz)   06/18/24 0500 82.5 kg (181 lb 14.1 oz)   06/17/24 0457 84.2 kg (185 lb 10 oz)   06/16/24 0500 85.4 kg (188 lb 4.4 oz)   06/15/24 0500 85.5 kg (188 lb 7.9 oz)   06/14/24 0300 85 kg (187 lb 6.3 oz)   06/13/24 0300 85.5 kg (188 lb 7.9 oz)   06/12/24 0250 84.5 kg (186 lb 4.6 oz)   06/11/24 0417 86.4 kg (190 lb 7.6 oz)   06/10/24 0534 85.2 kg (187 lb 13.3 oz)   06/09/24 0300 84.8 kg (186 lb 15.2 oz)   06/08/24 0415 85.2 kg (187 lb 12.8 oz)   06/07/24 0546 90.4 kg (199 lb 4.7 oz)   06/06/24 0500 90.1 kg (198 lb 10.2 oz)   06/05/24 0500 85.7 kg (188 lb 14.4 oz)   06/04/24 0452 86 kg (189 lb 9.5 oz)   06/03/24 0514  "86.2 kg (190 lb 0.6 oz)   06/02/24 0850 83.9 kg (184 lb 15.5 oz)   06/02/24 0457 85.4 kg (188 lb 4.4 oz)   06/01/24 2030 81 kg (178 lb 9.2 oz)       Objective:  Vital signs: (most recent): Blood pressure 128/73, pulse 85, temperature 97.3 °F (36.3 °C), temperature source Oral, resp. rate 15, height 158.8 cm (62.5\"), weight 80.6 kg (177 lb 11.1 oz), SpO2 96%, not currently breastfeeding.                Objective:  General Appearance:  Comfortable, well-appearing, in no acute distress and not in pain.  Awake, alert  HEENT: Mucous membranes moist, no injury, oropharynx clear  Lungs:  Normal effort and normal respiratory rate.  Breath sounds clear to auscultation.  No  respiratory distress.  No rales, decreased breath sounds or rhonchi.    Heart: Normal rate.  Regular rhythm.  S1, S2 normal.  No murmur.   Abdomen: Abdomen is soft.  Bowel sounds are normal, no abdominal tenderness.  There is no rebound or guarding  Extremities: Trace edema of bilateral lower extremities  Skin:  Warm and dry with no rashes      Results Review:    Intake/Output:     Intake/Output Summary (Last 24 hours) at 6/27/2024 1405  Last data filed at 6/27/2024 1330  Gross per 24 hour   Intake 410 ml   Output 1000 ml   Net -590 ml         DATA:  Radiology and Labs:  The following labs independently reviewed by me. Additional labs ordered for tomorrow a.m.  Interval notes, chart personally reviewed by me.   Old records independently reviewed showing intermittent hypercalcemia with some mild hypocalcemia during an admission earlier this month    Discussed with patient about discharge planning  Risk/ complexity of medical care/ medical decision making moderate complexity, hypercalcemia management      Labs:   Recent Results (from the past 24 hour(s))   Potassium    Collection Time: 06/26/24  3:43 PM    Specimen: Blood   Result Value Ref Range    Potassium 4.2 3.5 - 5.2 mmol/L   Renal Function Panel    Collection Time: 06/27/24  3:59 AM    Specimen: " Blood   Result Value Ref Range    Glucose 91 65 - 99 mg/dL    BUN 15 8 - 23 mg/dL    Creatinine 0.84 0.57 - 1.00 mg/dL    Sodium 141 136 - 145 mmol/L    Potassium 4.4 3.5 - 5.2 mmol/L    Chloride 111 (H) 98 - 107 mmol/L    CO2 22.5 22.0 - 29.0 mmol/L    Calcium 10.1 8.6 - 10.5 mg/dL    Albumin 3.1 (L) 3.5 - 5.2 g/dL    Phosphorus 2.1 (L) 2.5 - 4.5 mg/dL    Anion Gap 7.5 5.0 - 15.0 mmol/L    BUN/Creatinine Ratio 17.9 7.0 - 25.0    eGFR 68.2 >60.0 mL/min/1.73       Radiology:  Pertinent radiology studies were reviewed as described above      Medications have been reviewed separately in chart overview      ASSESSMENT:  Hypercalcemia, likely due to hyperparathyroidism, improved on Sensipar  CKD stage II, at baseline  Hydronephrosis - resolved after stent, outpatient urology follow-up  Constipation   PRINCE- resolved.   Septic shock - resolved.       Plan:  Renal function remains very stable and near baseline  Euvolemic on exam  Calcium up slightly today but we will plan to continue current dose of Cinacalcet as it has improved her calcium since admission  Stable for discharge at any time from a renal standpoint pending insurance approval/bed availability  Potassium is being replaced orally and will recheck in a.m.  Phosphorus is being replaced orally since we have lost IV access.  Okay to leave without IV access from my standpoint pending discharge       Frederick Kirkland MD  [facility]   Office phone number: [phone]  Answering service phone number: [phone]    06/27/24  14:05 EDT    Dictation performed using Dragon dictation software

## 2024-06-27 NOTE — PLAN OF CARE
Goal Outcome Evaluation:  Plan of Care Reviewed With: patient        Progress: no change  Outcome Evaluation: Patient phosphorus 2.1 this AM, required IV replacement but patient refused replacing IV, so PO replacement ordered, re-check Phosphorus this evening. Patient A&O x 4. Fall precautions maintained. Contact precautions maintained. Purewick in place. Turn Q2H. VSS. Awaiting return to facility.

## 2024-06-27 NOTE — PROGRESS NOTES
Continued Stay Note  [facility]     Patient Name: Kimberlee Urrutia  MRN: [id number]  Today's Date: 2024    Admit Date: 2024    Plan: [facility] when insurance reinstated   Discharge Plan       Row Name 24 1547       Plan    Plan Comments Recieved call from Kian with [facility] stating they checked today and still showing inactive/. Will FU with Gi/[facility]                   Discharge Codes    No documentation.                 Expected Discharge Date and Time       Expected Discharge Date Expected Discharge Time    2024               Kavya Gilman RN

## 2024-06-27 NOTE — NURSING NOTE
IV therapy was called to place an IV for electrolyte replacement. Pt stated she has not had labs drawn for anyone to know what her labs are. Suggested I stick the IV up the doctors nose. Nurse informed.

## 2024-06-28 LAB
ALBUMIN SERPL-MCNC: 3.3 G/DL (ref 3.5–5.2)
ANION GAP SERPL CALCULATED.3IONS-SCNC: 7 MMOL/L (ref 5–15)
BUN SERPL-MCNC: 16 MG/DL (ref 8–23)
BUN/CREAT SERPL: 18.6 (ref 7–25)
CALCIUM SPEC-SCNC: 9.9 MG/DL (ref 8.6–10.5)
CHLORIDE SERPL-SCNC: 108 MMOL/L (ref 98–107)
CO2 SERPL-SCNC: 24 MMOL/L (ref 22–29)
CREAT SERPL-MCNC: 0.86 MG/DL (ref 0.57–1)
EGFRCR SERPLBLD CKD-EPI 2021: 66.3 ML/MIN/1.73
GLUCOSE SERPL-MCNC: 92 MG/DL (ref 65–99)
MAGNESIUM SERPL-MCNC: 2 MG/DL (ref 1.6–2.4)
PHOSPHATE SERPL-MCNC: 3.7 MG/DL (ref 2.5–4.5)
POTASSIUM SERPL-SCNC: 4.3 MMOL/L (ref 3.5–5.2)
SODIUM SERPL-SCNC: 139 MMOL/L (ref 136–145)

## 2024-06-28 PROCEDURE — 83735 ASSAY OF MAGNESIUM: CPT | Performed by: HOSPITALIST

## 2024-06-28 PROCEDURE — 97530 THERAPEUTIC ACTIVITIES: CPT

## 2024-06-28 PROCEDURE — 80069 RENAL FUNCTION PANEL: CPT | Performed by: INTERNAL MEDICINE

## 2024-06-28 RX ADMIN — DONEPEZIL HYDROCHLORIDE 10 MG: 10 TABLET, FILM COATED ORAL at 12:55

## 2024-06-28 RX ADMIN — MEMANTINE HYDROCHLORIDE 10 MG: 10 TABLET, FILM COATED ORAL at 21:36

## 2024-06-28 RX ADMIN — MEMANTINE HYDROCHLORIDE 10 MG: 10 TABLET, FILM COATED ORAL at 09:31

## 2024-06-28 RX ADMIN — CINACALCET 30 MG: 30 TABLET ORAL at 09:31

## 2024-06-28 NOTE — THERAPY TREATMENT NOTE
Patient Name: Kimberlee Urrutia  : 1940    MRN: [id number]                              Today's Date: 2024       Admit Date: 2024    Visit Dx:     ICD-10-CM ICD-9-CM   1. Right ureteral stone  N20.1 592.1   2. Acute UTI  N39.0 599.0   3. Severe sepsis  A41.9 038.9    R65.20 995.92   4. Elevated lactic acid level  R79.89 276.2   5. Osteomyelitis of lumbar spine  M46.26 730.28   6. Elevated AST (SGOT)  R74.01 790.4   7. Elevated ALT measurement  R74.01 790.4   8. Elevated C-reactive protein (CRP)  R79.82 790.95   9. PRINCE (acute kidney injury)  N17.9 584.9     Patient Active Problem List   Diagnosis    Arthritis    HLD (hyperlipidemia)    Primary hypertension    Health care maintenance    Knee pain, right    Hx of total knee arthroplasty    Seasonal allergies    Vitamin D deficiency    Pyuria    Transient alteration of awareness    Migraine without status migrainosus, not intractable    Leukocytosis    Viral pharyngitis    Multinodular thyroid    Osteopenia of multiple sites    Colon cancer screening    Serum calcium elevated    Acute low back pain    Elevated procalcitonin    Sepsis due to Escherichia coli with acute renal failure without septic shock    Thrombocytopenia    Recurrent oral herpes simplex    Lumbar spinal stenosis    Metabolic encephalopathy    Renal stones    Colon cancer screening    Sacroiliac joint pain    Lumbar stenosis without neurogenic claudication    Renal stone    Hyperlipidemia    History of total knee arthroplasty    Spinal stenosis of lumbar region    Multinodular goiter    Osteopenia    Pain in right knee    Sacroiliac joint pain    Hypercalcemia    CAYLA (dementia of Alzheimer type)    Chronic bilateral low back pain without sciatica    Right ureteral stone    Discitis of lumbar region    Acute UTI (urinary tract infection)    Hypoglycemia    Abnormal LFTs    Hypercalcemia    Constipation     Past Medical History:   Diagnosis Date    Allergic     Arthritis     Colon cancer  screening 01/29/2020    Colon cancer screening 01/29/2020    Cough     Diarrhea     Hx of migraine headaches     Hyperlipidemia     Irregular heart beat     Osteoarthritis     Renal stones 02/2021    Vitamin D deficiency      Past Surgical History:   Procedure Laterality Date    COLONOSCOPY      CYSTOSCOPY W/ URETERAL STENT PLACEMENT Right 6/2/2024    Procedure: CYSTOSCOPY, RIGHT URETERAL STENT PLACEMENT;  Surgeon: Efra Schofield MD;  Location: [facility] MAIN OR;  Service: Urology;  Laterality: Right;    LUMBAR EPIDURAL INJECTION N/A 8/2/2021    Procedure: LUMBAR EPIDURAL 1ST VISIT 5-1;  Surgeon: Nu Partida MD;  Location: [facility] [place] MAIN OR;  Service: Pain Management;  Laterality: N/A;    REPLACEMENT TOTAL KNEE Right 01/2015    Josue Wilson MD    SACROILIAC JOINT INJECTION Left 6/16/2021    Procedure: SACROILIAC INJECTION left;  Surgeon: Nu Partida MD;  Location: [facility] [place] MAIN OR;  Service: Pain Management;  Laterality: Left;    TUBAL ABDOMINAL LIGATION      URETEROSCOPY LASER LITHOTRIPSY WITH STENT INSERTION Left 2/23/2021    Procedure: LT.URETEROSCOPY LASER LITHOTRIPSY WITH STENT  FOR STONE;  Surgeon: Arnaud Lu MD;  Location: [facility] MAIN OR;  Service: Urology;  Laterality: Left;    WISDOM TOOTH EXTRACTION        General Information       Row Name 06/28/24 1402          Physical Therapy Time and Intention    Document Type therapy note (daily note)  -CB     Mode of Treatment individual therapy;physical therapy  -CB       Row Name 06/28/24 1402          General Information    Existing Precautions/Restrictions fall  -CB       Row Name 06/28/24 1402          Cognition    Orientation Status (Cognition) oriented to;person  -CB       Row Name 06/28/24 1402          Safety Issues, Functional Mobility    Safety Issues Affecting Function (Mobility) insight into deficits/self-awareness;judgment;problem-solving;safety precaution awareness;safety precautions follow-through/compliance  -CB      Impairments Affecting Function (Mobility) balance;cognition;endurance/activity tolerance;strength  -CB               User Key  (r) = Recorded By, (t) = Taken By, (c) = Cosigned By      Initials Name Provider Type    Kaitlin Matthew PT Physical Therapist                   Mobility       Row Name 06/28/24 1402          Bed Mobility    Bed Mobility sit-supine  -CB     Sit-Supine [place] (Bed Mobility) minimum assist (75% patient effort);2 person assist;verbal cues  -CB     Assistive Device (Bed Mobility) bed rails;head of bed elevated;leg   -CB       Row Name 06/28/24 1402          Bed-Chair Transfer    Bed-Chair [place] (Transfers) moderate assist (50% patient effort);2 person assist;verbal cues  -CB     Assistive Device (Bed-Chair Transfers) --  B HHA  -CB     Comment, (Bed-Chair Transfer) VC for LE sequencing  -CB       Row Name 06/28/24 1402          Sit-Stand Transfer    Sit-Stand [place] (Transfers) minimum assist (75% patient effort);moderate assist (50% patient effort);2 person assist;verbal cues  -CB     Assistive Device (Sit-Stand Transfers) walker, front-wheeled  -CB     Comment, (Sit-Stand Transfer) x2  -CB       Row Name 06/28/24 1402          Gait/Stairs (Locomotion)    [place] Level (Gait) moderate assist (50% patient effort);2 person assist  -CB     Assistive Device (Gait) --  HHA  -CB     Distance in Feet (Gait) --  a few sidesteps with VC for LE sequencing  -CB               User Key  (r) = Recorded By, (t) = Taken By, (c) = Cosigned By      Initials Name Provider Type    Kaitlin Matthew PT Physical Therapist                   Obj/Interventions       Row Name 06/28/24 1403          Balance    Balance Assessment standing static balance;standing dynamic balance;sitting static balance;sitting dynamic balance  -CB     Static Sitting Balance standby assist  -CB     Dynamic Sitting Balance standby assist  -CB     Position, Sitting Balance sitting edge of bed  -CB     Static  Standing Balance minimal assist;2-person assist;verbal cues  -CB     Dynamic Standing Balance moderate assist;2-person assist;verbal cues  -CB     Position/Device Used, Standing Balance supported  -CB     Balance Interventions sitting;standing;sit to stand;supported;static;dynamic;minimal challenge  -CB               User Key  (r) = Recorded By, (t) = Taken By, (c) = Cosigned By      Initials Name Provider Type    CB Kaitlin Cunningham, PT Physical Therapist                   Goals/Plan    No documentation.                  Clinical Impression       Row Name 06/28/24 1404          Pain    Pretreatment Pain Rating 0/10 - no pain  -CB       Row Name 06/28/24 1404          Plan of Care Review    Plan of Care Reviewed With patient  -CB     Progress improving  -CB     Outcome Evaluation Patient is agreeable to PT this date and sitting UI. She completed STSx2 requiring min-modAx2 and tx to chair using HHA requiring modAx2. VC for LE sequencing during tx to chair. Pt was able to take a few sidesteps along EOB as well this date. Pt returned to supine in bed with all needs met. Will progress as pt tolerates.  -CB       Row Name 06/28/24 1404          Positioning and Restraints    Pre-Treatment Position sitting in chair/recliner  -CB     Post Treatment Position bed  -CB     In Bed notified nsg;fowlers;call light within reach;encouraged to call for assist;exit alarm on;side rails up x3;legs elevated  -CB               User Key  (r) = Recorded By, (t) = Taken By, (c) = Cosigned By      Initials Name Provider Type    Kaitlin Mtathew, PT Physical Therapist                   Outcome Measures       Row Name 06/28/24 1406 06/28/24 0931       How much help from another person do you currently need...    Turning from your back to your side while in flat bed without using bedrails? 3  -CB 3  -CD    Moving from lying on back to sitting on the side of a flat bed without bedrails? 2  -CB 2  -CD    Moving to and from a bed to a chair  (including a wheelchair)? 2  -CB 1  -CD    Standing up from a chair using your arms (e.g., wheelchair, bedside chair)? 2  -CB 2  -CD    Climbing 3-5 steps with a railing? 1  -CB 1  -CD    To walk in hospital room? 1  -CB 1  -CD    AM-PAC 6 Clicks Score (PT) 11  -CB 10  -CD    Highest Level of Mobility Goal 4 --> Transfer to chair/commode  -CB 4 --> Transfer to chair/commode  -CD      Row Name 06/28/24 1406          Functional Assessment    Outcome Measure Options AM-PAC 6 Clicks Basic Mobility (PT)  -CB               User Key  (r) = Recorded By, (t) = Taken By, (c) = Cosigned By      Initials Name Provider Type    CD Karena Murphy RN Registered Nurse    Kaitlin Matthew, PT Physical Therapist                                 Physical Therapy Education       Title: PT OT SLP Therapies (In Progress)       Topic: Physical Therapy (Done)       Point: Mobility training (Done)       Learning Progress Summary             Patient Acceptance, E,TB, VU,NR by CB at 6/28/2024 1406    Acceptance, E,TB,D, NR by CB at 6/26/2024 1139    Acceptance, E,D, VU,NR by EB at 6/21/2024 1122    Acceptance, E,D, NR by EF at 6/19/2024 0931    Acceptance, E, VU,NR by SV at 6/16/2024 1531    Acceptance, E,TB, NR by BK at 6/14/2024 1715    Acceptance, E,D, VU,NR by EB at 6/14/2024 1641    Acceptance, E,D, NR by EB at 6/13/2024 1514    Acceptance, E,D, NR by EB at 6/12/2024 1643    Acceptance, E,TB, VU by RD at 6/11/2024 1011    Acceptance, E,TB, VU,NR by CB at 6/10/2024 1149    Acceptance, E, VU by EM at 6/7/2024 1622    Acceptance, E, NR by EM at 6/6/2024 1605    Acceptance, E,TB, VU by BK at 6/5/2024 1700    Acceptance, E, NR by EM at 6/5/2024 1640                         Point: Home exercise program (Done)       Learning Progress Summary             Patient Acceptance, E,D, VU,NR by EB at 6/21/2024 1122    Acceptance, E,D, NR by EF at 6/19/2024 0931    Acceptance, E, VU,NR by SV at 6/16/2024 1531    Acceptance, E,TB, NR by BK at 6/14/2024  1715    Acceptance, E,TB, VU by BK at 6/13/2024 1655    Acceptance, E,TB, VU by RD at 6/11/2024 1011    Acceptance, E,TB, VU,NR by CB at 6/10/2024 1149    Acceptance, E, VU by EM at 6/7/2024 1622    Acceptance, E, NR by EM at 6/6/2024 1605                         Point: Body mechanics (Done)       Learning Progress Summary             Patient Acceptance, E,TB, VU,NR by CB at 6/28/2024 1406    Acceptance, E,TB,D, NR by CB at 6/26/2024 1139    Acceptance, E,D, VU,NR by EB at 6/21/2024 1122    Acceptance, E,D, NR by EF at 6/19/2024 0931    Acceptance, E,TB, NR by BK at 6/14/2024 1715    Acceptance, E,D, VU,NR by EB at 6/14/2024 1641    Acceptance, E,D, NR by EB at 6/13/2024 1514    Acceptance, E,D, NR by EB at 6/12/2024 1643    Acceptance, E,TB, VU by RD at 6/11/2024 1011    Acceptance, E,TB, VU,NR by CB at 6/10/2024 1149                         Point: Precautions (Done)       Learning Progress Summary             Patient Acceptance, E,TB, VU,NR by CB at 6/28/2024 1406    Acceptance, E,D, NR by EB at 6/13/2024 1514    Acceptance, E,TB, VU by RD at 6/11/2024 1011    Acceptance, E,TB, VU,NR by CB at 6/10/2024 1149                                         User Key       Initials Effective Dates Name Provider Type Discipline    RD 06/16/21 -  Leonela Acuna, PT Physical Therapist PT    EF 05/31/24 -  Marci Mckenzie, PT Physical Therapist PT    EM 06/16/21 -  Yvette Guajardo, PT Physical Therapist PT    SV 07/11/23 -  Velma Georges, PT Physical Therapist PT    EB 02/14/23 -  Nancy Bishop, PTA Physical Therapist Assistant PT    CB 10/22/21 -  Kaitlin Cunningham, PT Physical Therapist PT    BK 04/30/24 -  Nu Leon, RN Extern Registered Nurse Nurse                  PT Recommendation and Plan     Plan of Care Reviewed With: patient  Progress: improving  Outcome Evaluation: Patient is agreeable to PT this date and sitting UIC. She completed STSx2 requiring min-modAx2 and tx to chair using HHA requiring modAx2.  VC for LE sequencing during tx to chair. Pt was able to take a few sidesteps along EOB as well this date. Pt returned to supine in bed with all needs met. Will progress as pt tolerates.     Time Calculation:         PT Charges       Row Name 06/28/24 1406             Time Calculation    Start Time 1346  -CB      Stop Time 1357  -CB      Time Calculation (min) 11 min  -CB      PT Received On 06/28/24  -CB      PT - Next Appointment 07/01/24  -CB         Time Calculation- PT    Total Timed Code Minutes- PT 11 minute(s)  -CB         Timed Charges    89655 - PT Therapeutic Activity Minutes 11  -CB         Total Minutes    Timed Charges Total Minutes 11  -CB       Total Minutes 11  -CB                User Key  (r) = Recorded By, (t) = Taken By, (c) = Cosigned By      Initials Name Provider Type    CB Kaitlin Cunningham PT Physical Therapist                  Therapy Charges for Today       Code Description Service Date Service Provider Modifiers Qty    28862805217  PT THERAPEUTIC ACT EA 15 MIN 6/28/2024 Kaitlin Cunningham, PT GP 1    [id number]  PT THER SUPP EA 15 MIN 6/28/2024 Kaitlin Cunningham, PT GP 1            PT G-Codes  Outcome Measure Options: AM-PAC 6 Clicks Basic Mobility (PT)  AM-PAC 6 Clicks Score (PT): 11  AM-PAC 6 Clicks Score (OT): 11  Modified Adriana Scale: 4 - Moderately severe disability.  Unable to walk without assistance, and unable to attend to own bodily needs without assistance.  PT Discharge Summary  Anticipated Discharge Disposition (PT): skilled nursing facility    Kaitlin Cunningham PT  6/28/2024

## 2024-06-28 NOTE — PLAN OF CARE
Goal Outcome Evaluation:  Plan of Care Reviewed With: patient        Progress: improving  Outcome Evaluation: VSS. to monitor electrolytes per protocol.  Purewick in place.  denies pain.  for discharge once placement is arranged per CCP.  Falls and contact precaution maintained per protocol

## 2024-06-28 NOTE — PROGRESS NOTES
Continued Stay Note  [facility]     Patient Name: Kimberlee Urrutia  MRN: [id number]  Today's Date: 6/28/2024    Admit Date: 6/1/2024    Plan: [facility] when Insurance reinstated   Discharge Plan       Row Name 06/28/24 1040       Plan    Plan [facility] when Insurance reinstated    Plan Comments Spoke to Gi ESPANA/Bruce at [phone] who stated she is still waiting on paperwork from [employer] that needs to be completed. Updated Gi RAMOS/Manager                   Discharge Codes    No documentation.                 Expected Discharge Date and Time       Expected Discharge Date Expected Discharge Time    Jun 29, 2024               Kavya Gilman, RN

## 2024-06-28 NOTE — PROGRESS NOTES
LOS: 26 days     Chief Complaint/ Reason for encounter: Hypercalcemia    Subjective   06/24/24 : Patient is doing well today with no new complaints  Good appetite with no nausea or vomiting  No shortness of breath chest pain or edema  Voiding well with no dysuria  Denies new complaints    6/ 25: She looks and feels well today denies complaints, weight down a few pounds she has no edema  No shortness of breath chest pain fevers chills or edema  Possible discharge to rehab today    6/26: She is resting feels well denies complaints  Just waiting on rehab placement pending insurance approval    6/27: No new complaints, somewhat irritable.  Denies nausea vomiting shortness of breath or chest pain    6/28: No new complaints or events.  Good appetite no nausea    Medical history reviewed:  History of Present Illness    Subjective    History taken from: Patient and chart    Vital Signs  Temp:  [97 °F (36.1 °C)-97.8 °F (36.6 °C)] 97.6 °F (36.4 °C)  Heart Rate:  [67-74] 67  Resp:  [16] 16  BP: (112-127)/(64-78) 122/64       Wt Readings from Last 1 Encounters:   06/28/24 0525 78.2 kg (172 lb 6.4 oz)   06/27/24 0555 80.6 kg (177 lb 11.1 oz)   06/25/24 0500 77 kg (169 lb 12.1 oz)   06/24/24 0507 78.1 kg (172 lb 2.9 oz)   06/23/24 0500 80.7 kg (177 lb 14.6 oz)   06/22/24 0500 80.1 kg (176 lb 9.4 oz)   06/21/24 0500 80.2 kg (176 lb 12.9 oz)   06/20/24 0549 81.6 kg (179 lb 14.3 oz)   06/19/24 0524 82.4 kg (181 lb 10.5 oz)   06/18/24 0500 82.5 kg (181 lb 14.1 oz)   06/17/24 0457 84.2 kg (185 lb 10 oz)   06/16/24 0500 85.4 kg (188 lb 4.4 oz)   06/15/24 0500 85.5 kg (188 lb 7.9 oz)   06/14/24 0300 85 kg (187 lb 6.3 oz)   06/13/24 0300 85.5 kg (188 lb 7.9 oz)   06/12/24 0250 84.5 kg (186 lb 4.6 oz)   06/11/24 0417 86.4 kg (190 lb 7.6 oz)   06/10/24 0534 85.2 kg (187 lb 13.3 oz)   06/09/24 0300 84.8 kg (186 lb 15.2 oz)   06/08/24 0415 85.2 kg (187 lb 12.8 oz)   06/07/24 0546 90.4 kg (199 lb 4.7 oz)   06/06/24 0500 90.1 kg (198 lb  "10.2 oz)   06/05/24 0500 85.7 kg (188 lb 14.4 oz)   06/04/24 0452 86 kg (189 lb 9.5 oz)   06/03/24 0514 86.2 kg (190 lb 0.6 oz)   06/02/24 0850 83.9 kg (184 lb 15.5 oz)   06/02/24 0457 85.4 kg (188 lb 4.4 oz)   06/01/24 2030 81 kg (178 lb 9.2 oz)       Objective:  Vital signs: (most recent): Blood pressure 122/64, pulse 67, temperature 97.6 °F (36.4 °C), temperature source Oral, resp. rate 16, height 158.8 cm (62.5\"), weight 78.2 kg (172 lb 6.4 oz), SpO2 96%, not currently breastfeeding.                Objective:  General Appearance:  Comfortable, well-appearing, in no acute distress and not in pain.  Awake, alert  HEENT: Mucous membranes moist, no injury, oropharynx clear  Lungs:  Normal effort and normal respiratory rate.  Breath sounds clear to auscultation.  No  respiratory distress.  No rales, decreased breath sounds or rhonchi.    Heart: Normal rate.  Regular rhythm.  S1, S2 normal.  No murmur.   Abdomen: Abdomen is soft.  Bowel sounds are normal, no abdominal tenderness.  There is no rebound or guarding  Extremities: Trace edema of bilateral lower extremities  Skin:  Warm and dry with no rashes      Results Review:    Intake/Output:     Intake/Output Summary (Last 24 hours) at 6/28/2024 1039  Last data filed at 6/28/2024 0525  Gross per 24 hour   Intake 360 ml   Output 650 ml   Net -290 ml         DATA:  Radiology and Labs:  The following labs independently reviewed by me. Additional labs ordered for tomorrow a.m.  Interval notes, chart personally reviewed by me.   Old records independently reviewed showing intermittent hypercalcemia with some mild hypocalcemia during an admission earlier this month    Discussed with patient about discharge planning  Risk/ complexity of medical care/ medical decision making moderate complexity, hypercalcemia management      Labs:   Recent Results (from the past 24 hour(s))   Phosphorus    Collection Time: 06/27/24  5:07 PM    Specimen: Blood   Result Value Ref Range    " Phosphorus 3.3 2.5 - 4.5 mg/dL   Renal Function Panel    Collection Time: 06/28/24  4:10 AM    Specimen: Blood   Result Value Ref Range    Glucose 92 65 - 99 mg/dL    BUN 16 8 - 23 mg/dL    Creatinine 0.86 0.57 - 1.00 mg/dL    Sodium 139 136 - 145 mmol/L    Potassium 4.3 3.5 - 5.2 mmol/L    Chloride 108 (H) 98 - 107 mmol/L    CO2 24.0 22.0 - 29.0 mmol/L    Calcium 9.9 8.6 - 10.5 mg/dL    Albumin 3.3 (L) 3.5 - 5.2 g/dL    Phosphorus 3.7 2.5 - 4.5 mg/dL    Anion Gap 7.0 5.0 - 15.0 mmol/L    BUN/Creatinine Ratio 18.6 7.0 - 25.0    eGFR 66.3 >60.0 mL/min/1.73   Magnesium    Collection Time: 06/28/24  4:10 AM    Specimen: Blood   Result Value Ref Range    Magnesium 2.0 1.6 - 2.4 mg/dL       Radiology:  Pertinent radiology studies were reviewed as described above      Medications have been reviewed separately in chart overview      ASSESSMENT:  Hypercalcemia, likely due to hyperparathyroidism, improved on Sensipar  CKD stage II, at baseline  Hydronephrosis - resolved after stent, outpatient urology follow-up  Constipation   PRINCE- resolved.   Septic shock - resolved.       Plan:  Renal function remains very stable and near baseline  Euvolemic on exam  Calcium level stable on oral Sensipar, continue current dose    Stable for discharge at any time from a renal standpoint pending insurance approval/bed availability  Electrolyte replacement per protocol as needed.  Encourage nutrition  Okay to remain without IV access from renal standpoint    Await skilled nursing facility bed approval.  Will follow peripherally over the weekend and see again on Monday if she still inpatient         Frederick Kirkland MD  [facility]   Office phone number: [phone]  Answering service phone number: [phone]    06/28/24  10:39 EDT    Dictation performed using Dragon dictation software

## 2024-06-28 NOTE — PROGRESS NOTES
Continued Stay Note  [facility]     Patient Name: Kimberlee Urrutia  MRN: [id number]  Today's Date: 6/28/2024    Admit Date: 6/1/2024    Plan: [facility] (Poss PVt Pay)   Discharge Plan       Row Name 06/28/24 1634       Plan    Plan [facility] (Poss PVt Pay)    Plan Comments Spoke to Gi ESPANA/maren at [phone] who stated money not issue to care for patient. Kian with [facility] stated patient could come to [facility] as pvt Pay. Stated he would have building contact POA with cost and etc. Kian will updated CCP after conversation takes place.                   Discharge Codes    No documentation.                 Expected Discharge Date and Time       Expected Discharge Date Expected Discharge Time    Jun 29, 2024               Kavya Gilman RN

## 2024-06-28 NOTE — PLAN OF CARE
Goal Outcome Evaluation:  Plan of Care Reviewed With: patient        Progress: improving  Outcome Evaluation: Patient is agreeable to PT this date and sitting UIC. She completed STSx2 requiring min-modAx2 and tx to chair using HHA requiring modAx2. VC for LE sequencing during tx to chair. Pt was able to take a few sidesteps along EOB as well this date. Pt returned to supine in bed with all needs met. Will progress as pt tolerates.      Anticipated Discharge Disposition (PT): skilled nursing facility

## 2024-06-28 NOTE — PROGRESS NOTES
[facility]     LOS: 26 days     Subjective:    CC:Back Pain    DIET:  Diet Order   Procedures    Diet: Regular/House; Feeding Assistance - Nursing; Texture: Regular (IDDSI 7); Fluid Consistency: Thin (IDDSI 0)     Patient is reasonably pleasant.  She is currently sitting up in a chair    Objective:    Vital Signs:  Temp:  [97 °F (36.1 °C)-97.8 °F (36.6 °C)] 97.6 °F (36.4 °C)  Heart Rate:  [67-74] 67  Resp:  [16] 16  BP: (112-127)/(64-78) 122/64    SpO2:  [96 %] 96 %  on   ;   Device (Oxygen Therapy): room air  Body mass index is 31.03 kg/m².    Physical Exam  HENT:      Head: Normocephalic and atraumatic.   Cardiovascular:      Heart sounds: No murmur heard.     No friction rub.   Pulmonary:      Effort: Pulmonary effort is normal.      Breath sounds: Normal breath sounds.   Abdominal:      General: Bowel sounds are normal. There is no distension.      Palpations: Abdomen is soft.      Tenderness: There is no abdominal tenderness.   Skin:     General: Skin is warm and dry.   Psychiatric:         Mood and Affect: Mood normal.         Behavior: Behavior normal.         Results Review:    Glucose   Date Value Ref Range Status   06/28/2024 92 65 - 99 mg/dL Final   06/27/2024 91 65 - 99 mg/dL Final   06/26/2024 88 65 - 99 mg/dL Final     Results from last 7 days   Lab Units 06/23/24  0523   WBC 10*3/mm3 7.90   HEMOGLOBIN g/dL 11.0*   HEMATOCRIT % 34.4   PLATELETS 10*3/mm3 318     Results from last 7 days   Lab Units 06/28/24  0410   SODIUM mmol/L 139   POTASSIUM mmol/L 4.3   CHLORIDE mmol/L 108*   CO2 mmol/L 24.0   BUN mg/dL 16   CREATININE mg/dL 0.86   CALCIUM mg/dL 9.9   GLUCOSE mg/dL 92         Results from last 7 days   Lab Units 06/28/24  0410   MAGNESIUM mg/dL 2.0         Cultures:  No results found for: \"BLOODCX\", \"URINECX\", \"WOUNDCX\", \"MRSACX\", \"RESPCX\", \"STOOLCX\"    I have reviewed daily medications and changes in CPOE    Scheduled meds  bisacodyl, 10 mg, Rectal, Daily  cinacalcet, " 30 mg, Oral, Daily With Breakfast  donepezil, 10 mg, Oral, Daily  memantine, 10 mg, Oral, BID  sodium phosphate, 15 mmol, Intravenous, Once           PRN meds    acetaminophen **OR** acetaminophen    aluminum-magnesium hydroxide-simethicone    Calcium Replacement - Follow Nurse / BPA Driven Protocol    dextrose    dextrose    dextrose    glucagon (human recombinant)    Magnesium Standard Dose Replacement - Follow Nurse / BPA Driven Protocol    nitroglycerin    ondansetron ODT **OR** ondansetron    Phosphorus Replacement - Follow Nurse / BPA Driven Protocol    polyethylene glycol    Potassium Replacement - Follow Nurse / BPA Driven Protocol    senna-docusate sodium **AND** [DISCONTINUED] polyethylene glycol **AND** [DISCONTINUED] bisacodyl **AND** [DISCONTINUED] bisacodyl    sodium chloride    sodium chloride    sodium chloride        Right ureteral stone    Acute low back pain    Thrombocytopenia    Lumbar spinal stenosis    CAYLA (dementia of Alzheimer type)    Chronic bilateral low back pain without sciatica    Acute UTI (urinary tract infection)    Hypoglycemia    Abnormal LFTs    Hypercalcemia    Constipation        Assessment/Plan:      Urosepsis secondary to ESBL E. coli pyelonephritis complicated by right hydronephrosis and obstructive ureteric stone and ESBL E. coli septicemia   -status post cystoscopy and right stent placement on 6/2/2024.  -Status post Rodriguez catheter placement.  Removed after successful voiding trial..  -Status post adequate course of Invanz treatment per ID.    -Leukocytosis resolved.  No fever.  Repeat blood culture are negative.  -Urology was reconsulted on 06/20 as patient remains in the hospital, plan remains for outpatient for scheduled ureteroscopy, laser llithotripsy after discharge     Constipation.    Continue bowel regimen     Hypercalcemia.    -Calcium trended back up to 11.5 on 06/23 corrected for albumin was around 12, ionized calcium was high at 1.66  -Vitamin D and PTH  normal  -Initiated on cinacalcet by nephrology  -Calcium Normalized  -Cleared to be discharged from nephrology standpoint with BMP in 1 week     Metabolic encephalopathy in a patient with a history of Alzheimer's dementia.    Resolved metabolic encephalopathy.  Negative CNS examination.    Continue Aricept and Namenda.     Prediabetes.    A1c 5.7.  -Discontinue SSI 06/20 blood glucose has been stable   -Blood glucose stable on BMP     Hypernatremia/acute kidney.    Resolved     Transaminitis   secondary to sepsis.  LFTs normalized 06/16     Anemia/thrombocytopenia.   Hemoglobin stable  Resolved thrombocytopenia which is mostly secondary to sepsis.     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    Status post neurosurgery consult., conservative treatment.    -Pending placement to rehab     Dysphagia.        Discussed with CCP today, awaiting family to get her insurance reinstated, plan for subacute.  Patient currently cooperative    Josue Lazar MD  06/28/24  13:17 EDT

## 2024-06-29 PROBLEM — E43 SEVERE MALNUTRITION: Status: ACTIVE | Noted: 2024-06-29

## 2024-06-29 LAB
ALBUMIN SERPL-MCNC: 3.2 G/DL (ref 3.5–5.2)
ANION GAP SERPL CALCULATED.3IONS-SCNC: 8 MMOL/L (ref 5–15)
BUN SERPL-MCNC: 17 MG/DL (ref 8–23)
BUN/CREAT SERPL: 19.3 (ref 7–25)
CALCIUM SPEC-SCNC: 10.6 MG/DL (ref 8.6–10.5)
CHLORIDE SERPL-SCNC: 106 MMOL/L (ref 98–107)
CO2 SERPL-SCNC: 25 MMOL/L (ref 22–29)
CREAT SERPL-MCNC: 0.88 MG/DL (ref 0.57–1)
EGFRCR SERPLBLD CKD-EPI 2021: 64.5 ML/MIN/1.73
GLUCOSE SERPL-MCNC: 95 MG/DL (ref 65–99)
PHOSPHATE SERPL-MCNC: 3.4 MG/DL (ref 2.5–4.5)
POTASSIUM SERPL-SCNC: 4.4 MMOL/L (ref 3.5–5.2)
SODIUM SERPL-SCNC: 139 MMOL/L (ref 136–145)

## 2024-06-29 PROCEDURE — 80069 RENAL FUNCTION PANEL: CPT | Performed by: INTERNAL MEDICINE

## 2024-06-29 RX ADMIN — MEMANTINE HYDROCHLORIDE 10 MG: 10 TABLET, FILM COATED ORAL at 20:10

## 2024-06-29 RX ADMIN — CINACALCET 30 MG: 30 TABLET ORAL at 08:33

## 2024-06-29 RX ADMIN — DONEPEZIL HYDROCHLORIDE 10 MG: 10 TABLET, FILM COATED ORAL at 08:33

## 2024-06-29 RX ADMIN — MEMANTINE HYDROCHLORIDE 10 MG: 10 TABLET, FILM COATED ORAL at 08:33

## 2024-06-29 NOTE — PLAN OF CARE
Goal Outcome Evaluation:              Outcome Evaluation: VSS. Turned 2Q. Awake most of the day. not much appetite. Waiting for placement

## 2024-06-29 NOTE — PROGRESS NOTES
"[facility]:     LOS: 27 days    Patient Care Team:  Meche Ji MD as PCP - General (Internal Medicine)  Josue Wilson MD as Consulting Physician (Orthopedic Surgery)  Yash Wilson MD as Consulting Physician (Gastroenterology)  Avi Ferris MD (Ophthalmology)  Tommy French MD (Inactive) as Consulting Physician (Otolaryngology)  Arnaud Lu MD as Consulting Physician (Urology)  Nu Partida MD as Consulting Physician (Pain Medicine)  Ki Small MD as Consulting Physician (Neurology)    Chief Complaint:  Hypercalcemia    Subjective     Interval History:   Chart reviewed  No new complaints    Objective     Vital Sign Min/Max for last 24 hours  Temp  Min: 97 °F (36.1 °C)  Max: 97.9 °F (36.6 °C)   BP  Min: 102/66  Max: 122/64   Pulse  Min: 67  Max: 84   Resp  Min: 16  Max: 16   SpO2  Min: 93 %  Max: 97 %   No data recorded   Weight  Min: 78.2 kg (172 lb 6.4 oz)  Max: 78.2 kg (172 lb 6.4 oz)     Flowsheet Rows      Flowsheet Row First Filed Value   Admission Height 158.8 cm (62.5\") Documented at 06/01/2024 2030   Admission Weight 81 kg (178 lb 9.2 oz) Documented at 06/01/2024 2030            I/O this shift:  In: -   Out: 200 [Urine:200]  I/O last 3 completed shifts:  In: 720 [P.O.:720]  Out: 850 [Urine:850]    Physical Exam:  GEN: Awake, NAD  ENT: PERRL, EOMI, MMM  NECK: Supple, no JVD  CHEST: CTAB, no W/R/C  CV: RRR, no M/G/R  ABD: Soft, NT, +BS  SKIN: Warm and Dry  NEURO: CN's intact      WBC No results found for: \"WBC\"   HGB No results found for: \"HGB\"   HCT No results found for: \"HCT\"   Platlets No results found for: \"LABPLAT\"   MCV No results found for: \"MCV\"       Sodium Sodium   Date Value Ref Range Status   06/29/2024 139 136 - 145 mmol/L Final   06/28/2024 139 136 - 145 mmol/L Final   06/27/2024 141 136 - 145 mmol/L Final      Potassium Potassium   Date Value Ref Range Status   06/29/2024 4.4 3.5 - 5.2 mmol/L Final   06/28/2024 4.3 3.5 - 5.2 mmol/L Final " "  06/27/2024 4.4 3.5 - 5.2 mmol/L Final   06/26/2024 4.2 3.5 - 5.2 mmol/L Final      Chloride Chloride   Date Value Ref Range Status   06/29/2024 106 98 - 107 mmol/L Final   06/28/2024 108 (H) 98 - 107 mmol/L Final   06/27/2024 111 (H) 98 - 107 mmol/L Final      CO2 CO2   Date Value Ref Range Status   06/29/2024 25.0 22.0 - 29.0 mmol/L Final   06/28/2024 24.0 22.0 - 29.0 mmol/L Final   06/27/2024 22.5 22.0 - 29.0 mmol/L Final      BUN BUN   Date Value Ref Range Status   06/29/2024 17 8 - 23 mg/dL Final   06/28/2024 16 8 - 23 mg/dL Final   06/27/2024 15 8 - 23 mg/dL Final      Creatinine Creatinine   Date Value Ref Range Status   06/29/2024 0.88 0.57 - 1.00 mg/dL Final   06/28/2024 0.86 0.57 - 1.00 mg/dL Final   06/27/2024 0.84 0.57 - 1.00 mg/dL Final      Calcium Calcium   Date Value Ref Range Status   06/29/2024 10.6 (H) 8.6 - 10.5 mg/dL Final   06/28/2024 9.9 8.6 - 10.5 mg/dL Final   06/27/2024 10.1 8.6 - 10.5 mg/dL Final      PO4 No results found for: \"CAPO4\"   Albumin Albumin   Date Value Ref Range Status   06/29/2024 3.2 (L) 3.5 - 5.2 g/dL Final   06/28/2024 3.3 (L) 3.5 - 5.2 g/dL Final   06/27/2024 3.1 (L) 3.5 - 5.2 g/dL Final      Magnesium Magnesium   Date Value Ref Range Status   06/28/2024 2.0 1.6 - 2.4 mg/dL Final      Uric Acid No results found for: \"URICACID\"        Results Review:     I reviewed the patient's new clinical results.    bisacodyl, 10 mg, Rectal, Daily  cinacalcet, 30 mg, Oral, Daily With Breakfast  donepezil, 10 mg, Oral, Daily  memantine, 10 mg, Oral, BID  sodium phosphate, 15 mmol, Intravenous, Once           Medication Review: Reviewed    Assessment & Plan     Hypercalcemia, likely due to hyperparathyroidism, improved on Sensipar but slightly higher today at 10.6  CKD stage II, at baseline  Hydronephrosis - resolved after stent, outpatient urology follow-up  Constipation   PRINCE- resolved.   Septic shock - resolved.     Plan: Ca 10.6.  Monitor for now but may need up titration in " Sensipar if increases further.  Discussed good PO fluid intake.  Will follow.      Easton Richmond MD  [facility]  06/29/24  07:00 EDT

## 2024-06-29 NOTE — PROGRESS NOTES
[facility]     LOS: 27 days     Subjective:    CC:Back Pain    DIET:  Diet Order   Procedures    Diet: Regular/House; Feeding Assistance - Nursing; Texture: Regular (IDDSI 7); Fluid Consistency: Thin (IDDSI 0)     Patient remains reasonably pleasant, no new complaints    Objective:    Vital Signs:  Temp:  [97 °F (36.1 °C)-97.9 °F (36.6 °C)] 97.7 °F (36.5 °C)  Heart Rate:  [68-87] 87  Resp:  [16] 16  BP: (102-137)/(52-72) 109/52    SpO2:  [93 %-99 %] 99 %  on   ;   Device (Oxygen Therapy): room air  Body mass index is 31.03 kg/m².    Physical Exam  HENT:      Head: Normocephalic and atraumatic.   Cardiovascular:      Heart sounds: No murmur heard.     No friction rub.   Pulmonary:      Effort: Pulmonary effort is normal.      Breath sounds: Normal breath sounds.   Abdominal:      General: Bowel sounds are normal. There is no distension.      Palpations: Abdomen is soft.      Tenderness: There is no abdominal tenderness.   Skin:     General: Skin is warm and dry.   Psychiatric:         Mood and Affect: Mood normal.         Behavior: Behavior normal.         Results Review:    Glucose   Date Value Ref Range Status   06/29/2024 95 65 - 99 mg/dL Final   06/28/2024 92 65 - 99 mg/dL Final   06/27/2024 91 65 - 99 mg/dL Final     Results from last 7 days   Lab Units 06/23/24  0523   WBC 10*3/mm3 7.90   HEMOGLOBIN g/dL 11.0*   HEMATOCRIT % 34.4   PLATELETS 10*3/mm3 318     Results from last 7 days   Lab Units 06/29/24  0550   SODIUM mmol/L 139   POTASSIUM mmol/L 4.4   CHLORIDE mmol/L 106   CO2 mmol/L 25.0   BUN mg/dL 17   CREATININE mg/dL 0.88   CALCIUM mg/dL 10.6*   GLUCOSE mg/dL 95         Results from last 7 days   Lab Units 06/28/24  0410   MAGNESIUM mg/dL 2.0         Cultures:  No results found for: \"BLOODCX\", \"URINECX\", \"WOUNDCX\", \"MRSACX\", \"RESPCX\", \"STOOLCX\"    I have reviewed daily medications and changes in CPOE    Scheduled meds  bisacodyl, 10 mg, Rectal, Daily  cinacalcet, 30 mg, " Oral, Daily With Breakfast  donepezil, 10 mg, Oral, Daily  memantine, 10 mg, Oral, BID  sodium phosphate, 15 mmol, Intravenous, Once           PRN meds    acetaminophen **OR** acetaminophen    aluminum-magnesium hydroxide-simethicone    Calcium Replacement - Follow Nurse / BPA Driven Protocol    dextrose    dextrose    dextrose    glucagon (human recombinant)    Magnesium Standard Dose Replacement - Follow Nurse / BPA Driven Protocol    nitroglycerin    ondansetron ODT **OR** ondansetron    Phosphorus Replacement - Follow Nurse / BPA Driven Protocol    polyethylene glycol    Potassium Replacement - Follow Nurse / BPA Driven Protocol    senna-docusate sodium **AND** [DISCONTINUED] polyethylene glycol **AND** [DISCONTINUED] bisacodyl **AND** [DISCONTINUED] bisacodyl    sodium chloride    sodium chloride    sodium chloride        Right ureteral stone    Acute low back pain    Thrombocytopenia    Lumbar spinal stenosis    CAYLA (dementia of Alzheimer type)    Chronic bilateral low back pain without sciatica    Acute UTI (urinary tract infection)    Hypoglycemia    Abnormal LFTs    Hypercalcemia    Constipation        Assessment/Plan:      Urosepsis secondary to ESBL E. coli pyelonephritis complicated by right hydronephrosis and obstructive ureteric stone and ESBL E. coli septicemia   -status post cystoscopy and right stent placement on 6/2/2024.  -Status post Rodriguez catheter placement.  Removed after successful voiding trial..  -Status post adequate course of Invanz treatment per ID.    -Leukocytosis resolved.  No fever.  Repeat blood culture are negative.  -Urology was reconsulted on 06/20 as patient remains in the hospital, plan remains for outpatient for scheduled ureteroscopy, laser llithotripsy after discharge     Constipation.    Continue bowel regimen     Hypercalcemia.    -Calcium trended back up to 10.6   -Vitamin D and PTH normal  -Initiated on cinacalcet by nephrology  -Calcium slightly elevated  -Cleared to be  discharged from nephrology standpoint with BMP in 1 week   -Nephrology writes may need to increase Sensipar if calcium continues to increase    Metabolic encephalopathy in a patient with a history of Alzheimer's dementia.    Resolved metabolic encephalopathy.  Negative CNS examination.    Continue Aricept and Namenda.     Prediabetes.    A1c 5.7.  -Discontinue SSI 06/20 blood glucose has been stable   -Blood glucose are normal at 90, 95     Hypernatremia/acute kidney.    Resolved     Transaminitis   secondary to sepsis.  LFTs normalized 06/16     Anemia/thrombocytopenia.   Hemoglobin stable  Resolved thrombocytopenia which is mostly secondary to sepsis.     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    Status post neurosurgery consult., conservative treatment.    -Pending placement to rehab     Dysphagia.        Awaiting [facility] statement of insurance    Josue Lazar MD  06/29/24  18:04 EDT

## 2024-06-29 NOTE — PLAN OF CARE
Goal Outcome Evaluation:              Outcome Evaluation: VSS. Pt up to chair with assist/lift. 100% supplement drink at each meal. BM today.

## 2024-06-29 NOTE — PLAN OF CARE
Goal Outcome Evaluation:  Plan of Care Reviewed With: patient        Progress: improving  Outcome Evaluation: VSS> denies pain.  HAd a smear of stool last night.  to monitor electrolytes per protocol.  Falls and isolation precaution maintained.  turned Q 2  hrs,  PUrewick in place.  Still waiting for placement to be arranged.

## 2024-06-30 LAB
ALBUMIN SERPL-MCNC: 3.5 G/DL (ref 3.5–5.2)
ANION GAP SERPL CALCULATED.3IONS-SCNC: 7 MMOL/L (ref 5–15)
BUN SERPL-MCNC: 19 MG/DL (ref 8–23)
BUN/CREAT SERPL: 19.8 (ref 7–25)
CA-I BLD-MCNC: 6 MG/DL (ref 4.6–5.4)
CA-I SERPL ISE-MCNC: 1.51 MMOL/L (ref 1.15–1.35)
CALCIUM SPEC-SCNC: 11 MG/DL (ref 8.6–10.5)
CHLORIDE SERPL-SCNC: 104 MMOL/L (ref 98–107)
CO2 SERPL-SCNC: 25 MMOL/L (ref 22–29)
CREAT SERPL-MCNC: 0.96 MG/DL (ref 0.57–1)
EGFRCR SERPLBLD CKD-EPI 2021: 58.1 ML/MIN/1.73
GLUCOSE SERPL-MCNC: 88 MG/DL (ref 65–99)
MAGNESIUM SERPL-MCNC: 2.1 MG/DL (ref 1.6–2.4)
PHOSPHATE SERPL-MCNC: 2.9 MG/DL (ref 2.5–4.5)
POTASSIUM SERPL-SCNC: 4.4 MMOL/L (ref 3.5–5.2)
SODIUM SERPL-SCNC: 136 MMOL/L (ref 136–145)

## 2024-06-30 PROCEDURE — 82330 ASSAY OF CALCIUM: CPT | Performed by: INTERNAL MEDICINE

## 2024-06-30 PROCEDURE — 25810000003 SODIUM CHLORIDE 0.9 % SOLUTION: Performed by: INTERNAL MEDICINE

## 2024-06-30 PROCEDURE — 83735 ASSAY OF MAGNESIUM: CPT | Performed by: HOSPITALIST

## 2024-06-30 PROCEDURE — 80069 RENAL FUNCTION PANEL: CPT | Performed by: INTERNAL MEDICINE

## 2024-06-30 RX ORDER — CINACALCET 30 MG/1
60 TABLET, FILM COATED ORAL
Status: DISCONTINUED | OUTPATIENT
Start: 2024-06-30 | End: 2024-07-16 | Stop reason: HOSPADM

## 2024-06-30 RX ORDER — SODIUM CHLORIDE 9 MG/ML
100 INJECTION, SOLUTION INTRAVENOUS CONTINUOUS
Status: ACTIVE | OUTPATIENT
Start: 2024-06-30 | End: 2024-06-30

## 2024-06-30 RX ADMIN — MEMANTINE HYDROCHLORIDE 10 MG: 10 TABLET, FILM COATED ORAL at 20:11

## 2024-06-30 RX ADMIN — DONEPEZIL HYDROCHLORIDE 10 MG: 10 TABLET, FILM COATED ORAL at 09:44

## 2024-06-30 RX ADMIN — CINACALCET 60 MG: 30 TABLET ORAL at 09:44

## 2024-06-30 RX ADMIN — SODIUM CHLORIDE 100 ML/HR: 9 INJECTION, SOLUTION INTRAVENOUS at 11:39

## 2024-06-30 RX ADMIN — MEMANTINE HYDROCHLORIDE 10 MG: 10 TABLET, FILM COATED ORAL at 09:44

## 2024-06-30 NOTE — PLAN OF CARE
Goal Outcome Evaluation:  Plan of Care Reviewed With: patient        Progress: improving  Outcome Evaluation: VSS. denies pain. had BM last night.  Purewick in place.  to monitor electolytes per protocol.  Waiting for placement to be arranged.  turned Q 2 hrs.  Falls and isolation precaution maintained

## 2024-06-30 NOTE — PROGRESS NOTES
"RENAL/KCC:     LOS: 28 days    Patient Care Team:  Meche Ji MD as PCP - General (Internal Medicine)  Josue Wilson MD as Consulting Physician (Orthopedic Surgery)  Yash Wilson MD as Consulting Physician (Gastroenterology)  Avi Ferris MD (Ophthalmology)  Tommy French MD (Inactive) as Consulting Physician (Otolaryngology)  Arnaud Lu MD as Consulting Physician (Urology)  Nu Partida MD as Consulting Physician (Pain Medicine)  Ki Small MD as Consulting Physician (Neurology)    Chief Complaint:  Hypercalcemia    Subjective     Interval History:   Chart reviewed  No new complaints    Objective     Vital Sign Min/Max for last 24 hours  Temp  Min: 97.6 °F (36.4 °C)  Max: 98 °F (36.7 °C)   BP  Min: 102/57  Max: 137/72   Pulse  Min: 71  Max: 87   Resp  Min: 14  Max: 16   SpO2  Min: 93 %  Max: 99 %   No data recorded   Weight  Min: 77.3 kg (170 lb 6.7 oz)  Max: 77.3 kg (170 lb 6.7 oz)     Flowsheet Rows      Flowsheet Row First Filed Value   Admission Height 158.8 cm (62.5\") Documented at 06/01/2024 2030   Admission Weight 81 kg (178 lb 9.2 oz) Documented at 06/01/2024 2030            No intake/output data recorded.  I/O last 3 completed shifts:  In: 474 [P.O.:474]  Out: 950 [Urine:950]    Physical Exam:  GEN: Awake, NAD  ENT: PERRL, EOMI, MMM  NECK: Supple, no JVD  CHEST: CTAB, no W/R/C  CV: RRR, no M/G/R  ABD: Soft, NT, +BS  SKIN: Warm and Dry  NEURO: CN's intact      WBC No results found for: \"WBC\"   HGB No results found for: \"HGB\"   HCT No results found for: \"HCT\"   Platlets No results found for: \"LABPLAT\"   MCV No results found for: \"MCV\"       Sodium Sodium   Date Value Ref Range Status   06/30/2024 136 136 - 145 mmol/L Final   06/29/2024 139 136 - 145 mmol/L Final   06/28/2024 139 136 - 145 mmol/L Final      Potassium Potassium   Date Value Ref Range Status   06/30/2024 4.4 3.5 - 5.2 mmol/L Final   06/29/2024 4.4 3.5 - 5.2 mmol/L Final   06/28/2024 4.3 3.5 - " "5.2 mmol/L Final      Chloride Chloride   Date Value Ref Range Status   06/30/2024 104 98 - 107 mmol/L Final   06/29/2024 106 98 - 107 mmol/L Final   06/28/2024 108 (H) 98 - 107 mmol/L Final      CO2 CO2   Date Value Ref Range Status   06/30/2024 25.0 22.0 - 29.0 mmol/L Final   06/29/2024 25.0 22.0 - 29.0 mmol/L Final   06/28/2024 24.0 22.0 - 29.0 mmol/L Final      BUN BUN   Date Value Ref Range Status   06/30/2024 19 8 - 23 mg/dL Final   06/29/2024 17 8 - 23 mg/dL Final   06/28/2024 16 8 - 23 mg/dL Final      Creatinine Creatinine   Date Value Ref Range Status   06/30/2024 0.96 0.57 - 1.00 mg/dL Final   06/29/2024 0.88 0.57 - 1.00 mg/dL Final   06/28/2024 0.86 0.57 - 1.00 mg/dL Final      Calcium Calcium   Date Value Ref Range Status   06/30/2024 11.0 (H) 8.6 - 10.5 mg/dL Final   06/29/2024 10.6 (H) 8.6 - 10.5 mg/dL Final   06/28/2024 9.9 8.6 - 10.5 mg/dL Final      PO4 No results found for: \"CAPO4\"   Albumin Albumin   Date Value Ref Range Status   06/30/2024 3.5 3.5 - 5.2 g/dL Final   06/29/2024 3.2 (L) 3.5 - 5.2 g/dL Final   06/28/2024 3.3 (L) 3.5 - 5.2 g/dL Final      Magnesium Magnesium   Date Value Ref Range Status   06/30/2024 2.1 1.6 - 2.4 mg/dL Final   06/28/2024 2.0 1.6 - 2.4 mg/dL Final      Uric Acid No results found for: \"URICACID\"        Results Review:     I reviewed the patient's new clinical results.    bisacodyl, 10 mg, Rectal, Daily  cinacalcet, 60 mg, Oral, Daily With Breakfast  donepezil, 10 mg, Oral, Daily  memantine, 10 mg, Oral, BID  sodium phosphate, 15 mmol, Intravenous, Once      sodium chloride, 100 mL/hr        Medication Review: Reviewed    Assessment & Plan     Hypercalcemia, likely due to hyperparathyroidism, initially improved on Sensipar but higher again today at 11  CKD stage II, at baseline  Hydronephrosis - resolved after stent, outpatient urology follow-up  Constipation   PRINCE- resolved.   Septic shock - resolved.     Plan: Ca 11.  Titrate Sensipar and give 1L NS today. Discussed " good PO fluid intake.  Will follow.      Easton Richmond MD  [facility]  06/30/24  07:49 EDT

## 2024-06-30 NOTE — PROGRESS NOTES
[facility]    [phone]   LOS: 28 days     Name: Kimberlee Urrutia  Age/Sex: 84 y.o. female  :  1940        PCP: Meche Ji MD  Chief Complaint   Patient presents with    Back Pain      Subjective   She denies new issues or complaints resting in the bed no distress  General: No Fever or Chills, Cardiac: No Chest Pain or Palpitations, Resp: No Cough or SOA, GI: No Nausea, Vomiting, or Diarrhea, and Other: No bleeding    bisacodyl, 10 mg, Rectal, Daily  cinacalcet, 60 mg, Oral, Daily With Breakfast  donepezil, 10 mg, Oral, Daily  memantine, 10 mg, Oral, BID  sodium phosphate, 15 mmol, Intravenous, Once      sodium chloride, 100 mL/hr        Objective   Vital Signs  Temp:  [97.6 °F (36.4 °C)-98 °F (36.7 °C)] 97.7 °F (36.5 °C)  Heart Rate:  [70-87] 70  Resp:  [14-18] 18  BP: (102-127)/(52-71) 127/63  Body mass index is 30.67 kg/m².    Intake/Output Summary (Last 24 hours) at 2024 1059  Last data filed at 2024 0906  Gross per 24 hour   Intake 710 ml   Output 650 ml   Net 60 ml       Physical Exam  Vitals and nursing note reviewed.   Constitutional:       General: She is not in acute distress.     Appearance: She is obese. She is ill-appearing.   Cardiovascular:      Rate and Rhythm: Normal rate and regular rhythm.   Pulmonary:      Effort: No respiratory distress.      Breath sounds: Normal breath sounds.   Abdominal:      General: Bowel sounds are normal.      Palpations: Abdomen is soft.   Neurological:      General: No focal deficit present.      Mental Status: She is alert. Mental status is at baseline.           Results Review:       I reviewed the patient's new clinical results.      Results from last 7 days   Lab Units 24  0549 24  0550 24  0410 24  1707 24  0359 24  1543 24  0338 24  0652 24  0414   SODIUM mmol/L 136 139 139  --  141  --  142 142 139   POTASSIUM mmol/L 4.4 4.4 4.3  --  4.4 4.2 3.6 3.7 3.9    CHLORIDE mmol/L 104 106 108*  --  111*  --  109* 111* 106   CO2 mmol/L 25.0 25.0 24.0  --  22.5  --  25.0 24.0 22.9   BUN mg/dL 19 17 16  --  15  --  17 16 19   CREATININE mg/dL 0.96 0.88 0.86  --  0.84  --  0.80 0.90 0.91   CALCIUM mg/dL 11.0* 10.6* 9.9  --  10.1  --  9.7 10.0 11.1*   MAGNESIUM mg/dL 2.1  --  2.0  --   --   --   --   --   --    PHOSPHORUS mg/dL 2.9 3.4 3.7 3.3 2.1*  --  2.4*  --   --    Estimated Creatinine Clearance: 41.7 mL/min (by C-G formula based on SCr of 0.96 mg/dL).      Assessment & Plan   Active Hospital Problems    Diagnosis  POA    **Right ureteral stone [N20.1]  Yes    Severe malnutrition [E43]  Yes    Hypercalcemia [E83.52]  No    Constipation [K59.00]  Clinically Undetermined    Acute UTI (urinary tract infection) [N39.0]  Yes    Hypoglycemia [E16.2]  Yes    Abnormal LFTs [R79.89]  Yes    Chronic bilateral low back pain without sciatica [M54.50, G89.29]  Yes    CAYLA (dementia of Alzheimer type) [G30.9, F02.80]  Yes    Lumbar spinal stenosis [M48.061]  Yes    Thrombocytopenia [D69.6]  Yes    Acute low back pain [M54.50]  Yes      Resolved Hospital Problems    Diagnosis Date Resolved POA    Shock, septic [A41.9, R65.21] 06/03/2024 Yes    PRINCE (acute kidney injury) [N17.9] 06/06/2024 Yes       PLAN  -Calcium continues to trend up.  Sensipar adjusted today appreciate nephrology assistance  -She is completed antibiotics for urinary tract infection.  -Noted plans for outpatient ureteroscopy and lithotripsy  -At this point she stable to discharge but is waiting on placement and insurance issues  -Mechanical DVT prophylaxis  -DNR    Disposition  Expected Discharge Date: 6/29/2024; Expected Discharge Time:        Josue Mccormick MD  [facility]  06/30/24  10:59 EDT

## 2024-06-30 NOTE — PROGRESS NOTES
"RENAL/KCC:     LOS: 28 days    Patient Care Team:  Meche Ji MD as PCP - General (Internal Medicine)  Josue Wilson MD as Consulting Physician (Orthopedic Surgery)  Yash Wilson MD as Consulting Physician (Gastroenterology)  Avi Ferris MD (Ophthalmology)  Tommy French MD (Inactive) as Consulting Physician (Otolaryngology)  Arnaud Lu MD as Consulting Physician (Urology)  Nu Partida MD as Consulting Physician (Pain Medicine)  Ki Small MD as Consulting Physician (Neurology)    Chief Complaint:  Hypercalcemia    Subjective     Interval History:   Chart reviewed  No new complaints    Objective     Vital Sign Min/Max for last 24 hours  Temp  Min: 97.6 °F (36.4 °C)  Max: 98 °F (36.7 °C)   BP  Min: 102/57  Max: 137/72   Pulse  Min: 71  Max: 87   Resp  Min: 14  Max: 16   SpO2  Min: 93 %  Max: 99 %   No data recorded   Weight  Min: 77.3 kg (170 lb 6.7 oz)  Max: 77.3 kg (170 lb 6.7 oz)     Flowsheet Rows      Flowsheet Row First Filed Value   Admission Height 158.8 cm (62.5\") Documented at 06/01/2024 2030   Admission Weight 81 kg (178 lb 9.2 oz) Documented at 06/01/2024 2030            I/O this shift:  In: -   Out: 350 [Urine:350]  I/O last 3 completed shifts:  In: 834 [P.O.:834]  Out: 600 [Urine:600]    Physical Exam:  GEN: Awake, NAD  ENT: PERRL, EOMI, MMM  NECK: Supple, no JVD  CHEST: CTAB, no W/R/C  CV: RRR, no M/G/R  ABD: Soft, NT, +BS  SKIN: Warm and Dry  NEURO: CN's intact      WBC No results found for: \"WBC\"   HGB No results found for: \"HGB\"   HCT No results found for: \"HCT\"   Platlets No results found for: \"LABPLAT\"   MCV No results found for: \"MCV\"       Sodium Sodium   Date Value Ref Range Status   06/29/2024 139 136 - 145 mmol/L Final   06/28/2024 139 136 - 145 mmol/L Final      Potassium Potassium   Date Value Ref Range Status   06/29/2024 4.4 3.5 - 5.2 mmol/L Final   06/28/2024 4.3 3.5 - 5.2 mmol/L Final      Chloride Chloride   Date Value Ref Range " "Status   06/29/2024 106 98 - 107 mmol/L Final   06/28/2024 108 (H) 98 - 107 mmol/L Final      CO2 CO2   Date Value Ref Range Status   06/29/2024 25.0 22.0 - 29.0 mmol/L Final   06/28/2024 24.0 22.0 - 29.0 mmol/L Final      BUN BUN   Date Value Ref Range Status   06/29/2024 17 8 - 23 mg/dL Final   06/28/2024 16 8 - 23 mg/dL Final      Creatinine Creatinine   Date Value Ref Range Status   06/29/2024 0.88 0.57 - 1.00 mg/dL Final   06/28/2024 0.86 0.57 - 1.00 mg/dL Final      Calcium Calcium   Date Value Ref Range Status   06/29/2024 10.6 (H) 8.6 - 10.5 mg/dL Final   06/28/2024 9.9 8.6 - 10.5 mg/dL Final      PO4 No results found for: \"CAPO4\"   Albumin Albumin   Date Value Ref Range Status   06/29/2024 3.2 (L) 3.5 - 5.2 g/dL Final   06/28/2024 3.3 (L) 3.5 - 5.2 g/dL Final      Magnesium Magnesium   Date Value Ref Range Status   06/28/2024 2.0 1.6 - 2.4 mg/dL Final      Uric Acid No results found for: \"URICACID\"        Results Review:     I reviewed the patient's new clinical results.    bisacodyl, 10 mg, Rectal, Daily  cinacalcet, 30 mg, Oral, Daily With Breakfast  donepezil, 10 mg, Oral, Daily  memantine, 10 mg, Oral, BID  sodium phosphate, 15 mmol, Intravenous, Once           Medication Review: Reviewed    Assessment & Plan     Hypercalcemia, likely due to hyperparathyroidism, improved on Sensipar but slightly higher today at 10.6  CKD stage II, at baseline  Hydronephrosis - resolved after stent, outpatient urology follow-up  Constipation   PRINCE- resolved.   Septic shock - resolved.     Plan: Ca 10.6.  Monitor for now but may need up titration in Sensipar if increases further.  Discussed good PO fluid intake.  Will follow.      Easton Richmond MD  [facility]  06/30/24  06:39 EDT      "

## 2024-07-01 LAB
ALBUMIN SERPL-MCNC: 3.3 G/DL (ref 3.5–5.2)
ANION GAP SERPL CALCULATED.3IONS-SCNC: 8 MMOL/L (ref 5–15)
BUN SERPL-MCNC: 20 MG/DL (ref 8–23)
BUN/CREAT SERPL: 19.8 (ref 7–25)
CALCIUM SPEC-SCNC: 10.5 MG/DL (ref 8.6–10.5)
CHLORIDE SERPL-SCNC: 107 MMOL/L (ref 98–107)
CO2 SERPL-SCNC: 25 MMOL/L (ref 22–29)
CREAT SERPL-MCNC: 1.01 MG/DL (ref 0.57–1)
EGFRCR SERPLBLD CKD-EPI 2021: 54.7 ML/MIN/1.73
GLUCOSE SERPL-MCNC: 97 MG/DL (ref 65–99)
PHOSPHATE SERPL-MCNC: 3.4 MG/DL (ref 2.5–4.5)
POTASSIUM SERPL-SCNC: 4.4 MMOL/L (ref 3.5–5.2)
SODIUM SERPL-SCNC: 140 MMOL/L (ref 136–145)

## 2024-07-01 PROCEDURE — 97530 THERAPEUTIC ACTIVITIES: CPT

## 2024-07-01 PROCEDURE — 80069 RENAL FUNCTION PANEL: CPT | Performed by: INTERNAL MEDICINE

## 2024-07-01 RX ADMIN — DONEPEZIL HYDROCHLORIDE 10 MG: 10 TABLET, FILM COATED ORAL at 09:31

## 2024-07-01 RX ADMIN — MEMANTINE HYDROCHLORIDE 10 MG: 10 TABLET, FILM COATED ORAL at 20:19

## 2024-07-01 RX ADMIN — MEMANTINE HYDROCHLORIDE 10 MG: 10 TABLET, FILM COATED ORAL at 09:31

## 2024-07-01 RX ADMIN — CINACALCET 60 MG: 30 TABLET ORAL at 09:31

## 2024-07-01 NOTE — PROGRESS NOTES
"RENAL/KCC:     LOS: 29 days    Patient Care Team:  Meche Ji MD as PCP - General (Internal Medicine)  Josue Wilson MD as Consulting Physician (Orthopedic Surgery)  Yash Wilson MD as Consulting Physician (Gastroenterology)  Avi Ferris MD (Ophthalmology)  Tommy French MD (Inactive) as Consulting Physician (Otolaryngology)  Arnaud Lu MD as Consulting Physician (Urology)  Nu Partida MD as Consulting Physician (Pain Medicine)  Ki Small MD as Consulting Physician (Neurology)    Chief Complaint:  Hypercalcemia    Subjective     Interval History:   7/1: Pleasant, denies any complaints.  No shortness of breath chest pain fevers chills or edema  Received IV fluids and Sensipar dose was increased over the weekend due to rising calcium levels  She is asymptomatic    Objective     Vital Sign Min/Max for last 24 hours  Temp  Min: 97.2 °F (36.2 °C)  Max: 97.7 °F (36.5 °C)   BP  Min: 110/71  Max: 128/73   Pulse  Min: 68  Max: 84   Resp  Min: 15  Max: 16   SpO2  Min: 95 %  Max: 100 %   No data recorded   Weight  Min: 78 kg (171 lb 15.3 oz)  Max: 78 kg (171 lb 15.3 oz)     Flowsheet Rows      Flowsheet Row First Filed Value   Admission Height 158.8 cm (62.5\") Documented at 06/01/2024 2030   Admission Weight 81 kg (178 lb 9.2 oz) Documented at 06/01/2024 2030            No intake/output data recorded.  I/O last 3 completed shifts:  In: 708 [P.O.:708]  Out: 2400 [Urine:2400]    Physical Exam:  GEN: Awake, NAD  ENT: PERRL, EOMI, MMM  NECK: Supple, no JVD  CHEST: CTAB, no W/R/C  CV: RRR, no M/G/R  ABD: Soft, NT, +BS  SKIN: Warm and Dry  NEURO: CN's intact      WBC No results found for: \"WBC\"   HGB No results found for: \"HGB\"   HCT No results found for: \"HCT\"   Platlets No results found for: \"LABPLAT\"   MCV No results found for: \"MCV\"       Sodium Sodium   Date Value Ref Range Status   07/01/2024 140 136 - 145 mmol/L Final   06/30/2024 136 136 - 145 mmol/L Final   06/29/2024 " "139 136 - 145 mmol/L Final      Potassium Potassium   Date Value Ref Range Status   07/01/2024 4.4 3.5 - 5.2 mmol/L Final   06/30/2024 4.4 3.5 - 5.2 mmol/L Final   06/29/2024 4.4 3.5 - 5.2 mmol/L Final      Chloride Chloride   Date Value Ref Range Status   07/01/2024 107 98 - 107 mmol/L Final   06/30/2024 104 98 - 107 mmol/L Final   06/29/2024 106 98 - 107 mmol/L Final      CO2 CO2   Date Value Ref Range Status   07/01/2024 25.0 22.0 - 29.0 mmol/L Final   06/30/2024 25.0 22.0 - 29.0 mmol/L Final   06/29/2024 25.0 22.0 - 29.0 mmol/L Final      BUN BUN   Date Value Ref Range Status   07/01/2024 20 8 - 23 mg/dL Final   06/30/2024 19 8 - 23 mg/dL Final   06/29/2024 17 8 - 23 mg/dL Final      Creatinine Creatinine   Date Value Ref Range Status   07/01/2024 1.01 (H) 0.57 - 1.00 mg/dL Final   06/30/2024 0.96 0.57 - 1.00 mg/dL Final   06/29/2024 0.88 0.57 - 1.00 mg/dL Final      Calcium Calcium   Date Value Ref Range Status   07/01/2024 10.5 8.6 - 10.5 mg/dL Final   06/30/2024 11.0 (H) 8.6 - 10.5 mg/dL Final   06/29/2024 10.6 (H) 8.6 - 10.5 mg/dL Final      PO4 No results found for: \"CAPO4\"   Albumin Albumin   Date Value Ref Range Status   07/01/2024 3.3 (L) 3.5 - 5.2 g/dL Final   06/30/2024 3.5 3.5 - 5.2 g/dL Final   06/29/2024 3.2 (L) 3.5 - 5.2 g/dL Final      Magnesium Magnesium   Date Value Ref Range Status   06/30/2024 2.1 1.6 - 2.4 mg/dL Final      Uric Acid No results found for: \"URICACID\"        Results Review:     I reviewed the patient's new clinical results.    bisacodyl, 10 mg, Rectal, Daily  cinacalcet, 60 mg, Oral, Daily With Breakfast  donepezil, 10 mg, Oral, Daily  memantine, 10 mg, Oral, BID             Medication Review: Reviewed    Assessment & Plan     Hypercalcemia, likely due to hyperparathyroidism, initially improved on Sensipar but up to 11.0 over the weekend, better today on higher Sensipar dosing  CKD stage II, at baseline  Hydronephrosis - resolved after stent, outpatient urology " follow-up  Constipation   PRINCE- resolved.   Septic shock - resolved.       Plan:   Her calcium is improved after saline bolus and with increase in her Sensipar dosing  Continue Cinacalcet 60 mg daily for now  Renal function remained stable, electrolytes otherwise at goal  Encourage oral fluid intake  Discharge planning, okay from renal standpoint anytime      Frederick Kirkland MD  [facility]  07/01/24  12:53 EDT

## 2024-07-01 NOTE — PLAN OF CARE
Goal Outcome Evaluation:  Plan of Care Reviewed With: patient        Progress: no change  Outcome Evaluation: No issues. Pt sl forgetful but plesant and cooperative. In contact isolation. Bed alarm for safety. Purewick in place. Afebrile and VS stable. Awaiting insurance for discharge

## 2024-07-01 NOTE — PLAN OF CARE
Goal Outcome Evaluation:  Plan of Care Reviewed With: patient        Progress: improving  Outcome Evaluation: VSS, on room air, up in chair with the use of the lift, ate small amounts, patient did vomit one time today but reports she does not remember vomiting. Turns q2h, SCDs on, forgetul but pleasant. Falls and contact precautions maintained.

## 2024-07-01 NOTE — THERAPY TREATMENT NOTE
Patient Name: Kimberlee Urrutia  : 1940    MRN: [id number]                              Today's Date: 2024       Admit Date: 2024    Visit Dx:     ICD-10-CM ICD-9-CM   1. Right ureteral stone  N20.1 592.1   2. Acute UTI  N39.0 599.0   3. Severe sepsis  A41.9 038.9    R65.20 995.92   4. Elevated lactic acid level  R79.89 276.2   5. Osteomyelitis of lumbar spine  M46.26 730.28   6. Elevated AST (SGOT)  R74.01 790.4   7. Elevated ALT measurement  R74.01 790.4   8. Elevated C-reactive protein (CRP)  R79.82 790.95   9. PRINCE (acute kidney injury)  N17.9 584.9     Patient Active Problem List   Diagnosis    Arthritis    HLD (hyperlipidemia)    Primary hypertension    Health care maintenance    Knee pain, right    Hx of total knee arthroplasty    Seasonal allergies    Vitamin D deficiency    Pyuria    Transient alteration of awareness    Migraine without status migrainosus, not intractable    Leukocytosis    Viral pharyngitis    Multinodular thyroid    Osteopenia of multiple sites    Colon cancer screening    Serum calcium elevated    Acute low back pain    Elevated procalcitonin    Sepsis due to Escherichia coli with acute renal failure without septic shock    Thrombocytopenia    Recurrent oral herpes simplex    Lumbar spinal stenosis    Metabolic encephalopathy    Renal stones    Colon cancer screening    Sacroiliac joint pain    Lumbar stenosis without neurogenic claudication    Renal stone    Hyperlipidemia    History of total knee arthroplasty    Spinal stenosis of lumbar region    Multinodular goiter    Osteopenia    Pain in right knee    Sacroiliac joint pain    Hypercalcemia    CAYLA (dementia of Alzheimer type)    Chronic bilateral low back pain without sciatica    Right ureteral stone    Discitis of lumbar region    Acute UTI (urinary tract infection)    Hypoglycemia    Abnormal LFTs    Hypercalcemia    Constipation    Severe malnutrition     Past Medical History:   Diagnosis Date    Allergic      Arthritis     Colon cancer screening 01/29/2020    Colon cancer screening 01/29/2020    Cough     Diarrhea     Hx of migraine headaches     Hyperlipidemia     Irregular heart beat     Osteoarthritis     Renal stones 02/2021    Vitamin D deficiency      Past Surgical History:   Procedure Laterality Date    COLONOSCOPY      CYSTOSCOPY W/ URETERAL STENT PLACEMENT Right 6/2/2024    Procedure: CYSTOSCOPY, RIGHT URETERAL STENT PLACEMENT;  Surgeon: Efra Schofield MD;  Location: [facility] MAIN OR;  Service: Urology;  Laterality: Right;    LUMBAR EPIDURAL INJECTION N/A 8/2/2021    Procedure: LUMBAR EPIDURAL 1ST VISIT 5-1;  Surgeon: Nu Partida MD;  [place] [facility] [place];  Service: Pain Management;  Laterality: N/A;    REPLACEMENT TOTAL KNEE Right 01/2015    Josue Wilson MD    SACROILIAC JOINT INJECTION Left 6/16/2021    Procedure: SACROILIAC INJECTION left;  Surgeon: Nu Partida MD;  [place] [facility] [place];  Service: Pain Management;  Laterality: Left;    TUBAL ABDOMINAL LIGATION      URETEROSCOPY LASER LITHOTRIPSY WITH STENT INSERTION Left 2/23/2021    Procedure: LT.URETEROSCOPY LASER LITHOTRIPSY WITH STENT  FOR STONE;  Surgeon: Arnaud Lu MD;  Location: [facility] OR;  Service: Urology;  Laterality: Left;    WISDOM TOOTH EXTRACTION        General Information       Row Name 07/01/24 1132          Physical Therapy Time and Intention    Document Type therapy note (daily note)  -     Mode of Treatment physical therapy  -       Row Name 07/01/24 1132          General Information    Patient Profile Reviewed yes  -SM     Existing Precautions/Restrictions fall  -SM       Row Name 07/01/24 1132          Cognition    Orientation Status (Cognition) oriented to;person  -SM       Row Name 07/01/24 1132          Safety Issues, Functional Mobility    Safety Issues Affecting Function (Mobility) problem-solving;sequencing abilities;insight into deficits/self-awareness  -     Impairments Affecting  Function (Mobility) balance;cognition;endurance/activity tolerance;strength  -SM     Cognitive Impairments, Mobility Safety/Performance attention;insight into deficits/self-awareness;problem-solving/reasoning;awareness, need for assistance;safety precaution awareness;safety precaution follow-through;sequencing abilities;judgment  -               User Key  (r) = Recorded By, (t) = Taken By, (c) = Cosigned By      Initials Name Provider Type     Nahed Santiago PT Physical Therapist                   Mobility       Row Name 07/01/24 1132          Bed Mobility    Bed Mobility supine-sit  -     Supine-Sit [place] (Bed Mobility) minimum assist (75% patient effort);verbal cues;nonverbal cues (demo/gesture)  -     Assistive Device (Bed Mobility) bed rails;head of bed elevated  -     Comment, (Bed Mobility) Increased time  -       Row Name 07/01/24 1132          Bed-Chair Transfer    Bed-Chair [place] (Transfers) moderate assist (50% patient effort);2 person assist;verbal cues  -     Assistive Device (Bed-Chair Transfers) other (see comments)  A x2  -     Comment, (Bed-Chair Transfer) a few shuffled steps to chair. Cues for sequencing and safety. Patient quick to fatigue requiring increased assistance to maintain balance and sit down and chair.  -       Row Name 07/01/24 1132          Sit-Stand Transfer    Sit-Stand [place] (Transfers) minimum assist (75% patient effort);2 person assist  -     Assistive Device (Sit-Stand Transfers) other (see comments)  HHA x2  -       Row Name 07/01/24 1132          Gait/Stairs (Locomotion)    [place] Level (Gait) moderate assist (50% patient effort);2 person assist;verbal cues  -     Assistive Device (Gait) other (see comments)  A x2  -     Distance in Feet (Gait) 3  -SM               User Key  (r) = Recorded By, (t) = Taken By, (c) = Cosigned By      Initials Name Provider Type    Nahed Alexander PT Physical Therapist                    Obj/Interventions       Row Name 07/01/24 1136          Motor Skills    Therapeutic Exercise other (see comments)  educated on B LE exercises. Patient declined to perform at this time due to fatigue  -       Row Name 07/01/24 1136          Balance    Balance Assessment sitting static balance;sitting dynamic balance;sit to stand dynamic balance;standing static balance;standing dynamic balance  -     Static Sitting Balance standby assist  -     Dynamic Sitting Balance standby assist  -     Position, Sitting Balance sitting edge of bed  -     Sit to Stand Dynamic Balance 2-person assist;verbal cues;minimal assist  -SM     Static Standing Balance minimal assist;2-person assist;verbal cues  -SM     Dynamic Standing Balance moderate assist;2-person assist;verbal cues  -     Position/Device Used, Standing Balance supported  -     Balance Interventions sitting;standing;sit to stand;supported;static;dynamic  -               User Key  (r) = Recorded By, (t) = Taken By, (c) = Cosigned By      Initials Name Provider Type     Nahed Santiago, KALEY Physical Therapist                   Goals/Plan    No documentation.                  Clinical Impression       Row Name 07/01/24 1138          Pain    Pretreatment Pain Rating 0/10 - no pain  -     Posttreatment Pain Rating 0/10 - no pain  -       Row Name 07/01/24 1138          Plan of Care Review    Plan of Care Reviewed With patient  -     Outcome Evaluation Patient seen for PT session this AM. Patient agreeable to participation with some encouragement. Patient sat up to EOB with Gage and increased time. Continuous cues needed for sequencing. Patient performed STS from EOB with minAx2 and HHAx2. Patient took a few shuffled steps to chair. Cues for sequencing and safety. Patient quick to fatigue requiring increased assistance to maintain balance and sit down and chair. Attempted to educate patient on B LE exercises but patient declined to perform  due to fatigue. PT continues to recommend SNF. PT will continue to monitor.  -       Row Name 07/01/24 1138          Vital Signs    Pre Patient Position Supine  -     Intra Patient Position Standing  -SM     Post Patient Position Sitting  -       Row Name 07/01/24 1138          Positioning and Restraints    Pre-Treatment Position in bed  -SM     Post Treatment Position chair  -SM     In Chair notified nsg;reclined;call light within reach;encouraged to call for assist;exit alarm on  -               User Key  (r) = Recorded By, (t) = Taken By, (c) = Cosigned By      Initials Name Provider Type    Nahed Alexander PT Physical Therapist                   Outcome Measures       Row Name 07/01/24 1143 07/01/24 0931       How much help from another person do you currently need...    Turning from your back to your side while in flat bed without using bedrails? 3  -SM 3  -JS    Moving from lying on back to sitting on the side of a flat bed without bedrails? 2  -SM 2  -JS    Moving to and from a bed to a chair (including a wheelchair)? 2  -SM 2  -JS    Standing up from a chair using your arms (e.g., wheelchair, bedside chair)? 2  -SM 2  -JS    Climbing 3-5 steps with a railing? 1  -SM 1  -JS    To walk in hospital room? 2  -SM 1  -JS    AM-PAC 6 Clicks Score (PT) 12  - 11  -JS    Highest Level of Mobility Goal 4 --> Transfer to chair/commode  -SM 4 --> Transfer to chair/commode  -JS              User Key  (r) = Recorded By, (t) = Taken By, (c) = Cosigned By      Initials Name Provider Type    Nova Lao, RN Registered Nurse    Nahed Alexander PT Physical Therapist                                 Physical Therapy Education       Title: PT OT SLP Therapies (In Progress)       Topic: Physical Therapy (Done)       Point: Mobility training (Done)       Learning Progress Summary             Patient Acceptance, E, VU,NR by SHENA at 7/1/2024 1143    Acceptance, E,TB, VU,NR by BREN at 6/28/2024 1406     Acceptance, E,TB,D, NR by CB at 6/26/2024 1139    Acceptance, E,D, VU,NR by EB at 6/21/2024 1122    Acceptance, E,D, NR by EF at 6/19/2024 0931    Acceptance, E, VU,NR by SV at 6/16/2024 1531    Acceptance, E,TB, NR by BK at 6/14/2024 1715    Acceptance, E,D, VU,NR by EB at 6/14/2024 1641    Acceptance, E,D, NR by EB at 6/13/2024 1514    Acceptance, E,D, NR by EB at 6/12/2024 1643    Acceptance, E,TB, VU by RD at 6/11/2024 1011    Acceptance, E,TB, VU,NR by CB at 6/10/2024 1149    Acceptance, E, VU by EM at 6/7/2024 1622    Acceptance, E, NR by EM at 6/6/2024 1605    Acceptance, E,TB, VU by BK at 6/5/2024 1700    Acceptance, E, NR by EM at 6/5/2024 1640                         Point: Home exercise program (Done)       Learning Progress Summary             Patient Acceptance, E, VU,NR by SM at 7/1/2024 1143    Acceptance, E,D, VU,NR by EB at 6/21/2024 1122    Acceptance, E,D, NR by EF at 6/19/2024 0931    Acceptance, E, VU,NR by SV at 6/16/2024 1531    Acceptance, E,TB, NR by BK at 6/14/2024 1715    Acceptance, E,TB, VU by BK at 6/13/2024 1655    Acceptance, E,TB, VU by RD at 6/11/2024 1011    Acceptance, E,TB, VU,NR by CB at 6/10/2024 1149    Acceptance, E, VU by EM at 6/7/2024 1622    Acceptance, E, NR by EM at 6/6/2024 1605                         Point: Body mechanics (Done)       Learning Progress Summary             Patient Acceptance, E, VU,NR by SM at 7/1/2024 1143    Acceptance, E,TB, VU,NR by CB at 6/28/2024 1406    Acceptance, E,TB,D, NR by CB at 6/26/2024 1139    Acceptance, E,D, VU,NR by EB at 6/21/2024 1122    Acceptance, E,D, NR by EF at 6/19/2024 0931    Acceptance, E,TB, NR by BK at 6/14/2024 1715    Acceptance, E,D, VU,NR by EB at 6/14/2024 1641    Acceptance, E,D, NR by EB at 6/13/2024 1514    Acceptance, E,D, NR by EB at 6/12/2024 1643    Acceptance, E,TB, VU by RD at 6/11/2024 1011    Acceptance, E,TB, VU,NR by CB at 6/10/2024 1149                         Point: Precautions (Done)       Learning  Progress Summary             Patient Acceptance, E, VU,NR by  at 7/1/2024 1143    Acceptance, E,TB, VU,NR by CB at 6/28/2024 1406    Acceptance, E,D, NR by EB at 6/13/2024 1514    Acceptance, E,TB, VU by RD at 6/11/2024 1011    Acceptance, E,TB, VU,NR by CB at 6/10/2024 1149                                         User Key       Initials Effective Dates Name Provider Type Discipline    RD 06/16/21 -  Leonela Acuna, PT Physical Therapist PT    EF 05/31/24 -  Marci Mckenzie, PT Physical Therapist PT    EM 06/16/21 -  Yvette Guajardo, PT Physical Therapist PT    SV 07/11/23 -  Velma Georges, PT Physical Therapist PT    EB 02/14/23 -  Nancy Bishop, PTA Physical Therapist Assistant PT    CB 10/22/21 -  Kaitlin Cunningham, PT Physical Therapist PT    SM 05/02/22 -  Nahed Santiago, PT Physical Therapist PT    BK 04/30/24 -  Nu Leon, RN Extern Registered Nurse Nurse                  PT Recommendation and Plan     Plan of Care Reviewed With: patient  Outcome Evaluation: Patient seen for PT session this AM. Patient agreeable to participation with some encouragement. Patient sat up to EOB with Gage and increased time. Continuous cues needed for sequencing. Patient performed STS from EOB with minAx2 and HHAx2. Patient took a few shuffled steps to chair. Cues for sequencing and safety. Patient quick to fatigue requiring increased assistance to maintain balance and sit down and chair. Attempted to educate patient on B LE exercises but patient declined to perform due to fatigue. PT continues to recommend SNF. PT will continue to monitor.     Time Calculation:         PT Charges       Row Name 07/01/24 1144             Time Calculation    Start Time 1018  -      Stop Time 1029  -      Time Calculation (min) 11 min  -      PT Received On 07/01/24  -      PT - Next Appointment 07/02/24  -         Time Calculation- PT    Total Timed Code Minutes- PT 11 minute(s)  -         Timed Charges    46432 -  PT Therapeutic Activity Minutes 11  -SM         Total Minutes    Timed Charges Total Minutes 11  -SM       Total Minutes 11  -SM                User Key  (r) = Recorded By, (t) = Taken By, (c) = Cosigned By      Initials Name Provider Type    Nahed Alexander PT Physical Therapist                  Therapy Charges for Today       Code Description Service Date Service Provider Modifiers Qty    [id number]  PT THERAPEUTIC ACT EA 15 MIN 7/1/2024 Nahed Santiago PT GP 1            PT G-Codes  Outcome Measure Options: AM-PAC 6 Clicks Basic Mobility (PT)  AM-PAC 6 Clicks Score (PT): 12  AM-PAC 6 Clicks Score (OT): 11  Modified Adriana Scale: 4 - Moderately severe disability.  Unable to walk without assistance, and unable to attend to own bodily needs without assistance.       Nahed Santiago PT  7/1/2024

## 2024-07-01 NOTE — PLAN OF CARE
The pt participated in OT this morning. She completed bed mobility with Min A x2. Total A LBD. She stood with Min A x2 and took several shuffled steps to the bedside chair. She was instructed in BUE exercises, however, too fatigued to complete in the moment. She declined any further activity and was resting in her chair post session.       Anticipated Discharge Disposition (OT): skilled nursing facility

## 2024-07-01 NOTE — PROGRESS NOTES
[facility]    [phone]   LOS: 29 days     Name: Kimberlee Urrutia  Age/Sex: 84 y.o. female  :  1940        PCP: Meche Ji MD  Chief Complaint   Patient presents with    Back Pain      Subjective   Resting in bed this morning in no distress no issues overnight    bisacodyl, 10 mg, Rectal, Daily  cinacalcet, 60 mg, Oral, Daily With Breakfast  donepezil, 10 mg, Oral, Daily  memantine, 10 mg, Oral, BID             Objective   Vital Signs  Temp:  [97.2 °F (36.2 °C)-97.7 °F (36.5 °C)] 97.4 °F (36.3 °C)  Heart Rate:  [68-84] 76  Resp:  [15-16] 16  BP: (110-128)/(70-77) 110/71  Body mass index is 30.95 kg/m².    Intake/Output Summary (Last 24 hours) at 2024 1001  Last data filed at 2024 0510  Gross per 24 hour   Intake 472 ml   Output 1950 ml   Net -1478 ml       Physical Exam  Vitals and nursing note reviewed.   Constitutional:       General: She is not in acute distress.     Appearance: She is obese. She is ill-appearing.   Cardiovascular:      Rate and Rhythm: Normal rate and regular rhythm.   Pulmonary:      Effort: No respiratory distress.      Breath sounds: Normal breath sounds.   Abdominal:      General: Bowel sounds are normal.      Palpations: Abdomen is soft.   Neurological:      General: No focal deficit present.      Mental Status: She is alert. Mental status is at baseline.           Results Review:       I reviewed the patient's new clinical results.      Results from last 7 days   Lab Units 24  0324 24  0549 24  0550 24  0410 24  1707 24  0359 24  1543 24  0338 24  0652   SODIUM mmol/L 140 136 139 139  --  141  --  142 142   POTASSIUM mmol/L 4.4 4.4 4.4 4.3  --  4.4 4.2 3.6 3.7   CHLORIDE mmol/L 107 104 106 108*  --  111*  --  109* 111*   CO2 mmol/L 25.0 25.0 25.0 24.0  --  22.5  --  25.0 24.0   BUN mg/dL 20 19 17 16  --  15  --  17 16   CREATININE mg/dL 1.01* 0.96 0.88 0.86  --  0.84  --  0.80 0.90    CALCIUM mg/dL 10.5 11.0* 10.6* 9.9  --  10.1  --  9.7 10.0   MAGNESIUM mg/dL  --  2.1  --  2.0  --   --   --   --   --    PHOSPHORUS mg/dL 3.4 2.9 3.4 3.7 3.3 2.1*  --  2.4*  --    Estimated Creatinine Clearance: 39.9 mL/min (A) (by C-G formula based on SCr of 1.01 mg/dL (H)).      Assessment & Plan   Active Hospital Problems    Diagnosis  POA    **Right ureteral stone [N20.1]  Yes    Severe malnutrition [E43]  Yes    Hypercalcemia [E83.52]  No    Constipation [K59.00]  Clinically Undetermined    Acute UTI (urinary tract infection) [N39.0]  Yes    Hypoglycemia [E16.2]  Yes    Abnormal LFTs [R79.89]  Yes    Chronic bilateral low back pain without sciatica [M54.50, G89.29]  Yes    CAYLA (dementia of Alzheimer type) [G30.9, F02.80]  Yes    Lumbar spinal stenosis [M48.061]  Yes    Thrombocytopenia [D69.6]  Yes    Acute low back pain [M54.50]  Yes      Resolved Hospital Problems    Diagnosis Date Resolved POA    Shock, septic [A41.9, R65.21] 06/03/2024 Yes    PRINCE (acute kidney injury) [N17.9] 06/06/2024 Yes       PLAN  -Calcium continues to trend up.  Sensipar adjusted today appreciate nephrology assistance  -She is completed antibiotics for urinary tract infection.  -Noted plans for outpatient ureteroscopy and lithotripsy  -At this point she stable to discharge but is waiting on placement and insurance issues  -Mechanical DVT prophylaxis  -DNR    Disposition  Expected Discharge Date: 6/29/2024; Expected Discharge Time:    Discussed with CCP.  Plans are to discuss with patient's family regarding private pay for nursing facility.    Josue Mccormikc MD  [facility]  07/01/24  10:01 EDT

## 2024-07-01 NOTE — PROGRESS NOTES
Continued Stay Note  [facility]     Patient Name: Kimberlee Urrutia  MRN: [id number]  Today's Date: 7/1/2024    Admit Date: 6/1/2024    Plan: PENDING   Discharge Plan       Row Name 07/01/24 1524       Plan    Plan PENDING    Plan Comments  left for Gi/JOVI/maren at [phone] to discuss DC plan. Per Kian with [facility], he stated he spoke with JOVI/Gi on Friday and she stated she did not have patient's Medicare number and card is at patient's home. Stated she was hoping to come this week to get it. When he discussed with her pvt pay, she stated that wasn't option as it was to expensive.  Awaiting return call.                   Discharge Codes    No documentation.                 Expected Discharge Date and Time       Expected Discharge Date Expected Discharge Time    Jul 3, 2024               Kavya Gilman RN

## 2024-07-01 NOTE — PROGRESS NOTES
Nutrition Services    Patient Name:  Kimberlee Urrutia  YOB: 1940  MRN: [id number]  Admit Date:  6/1/2024    Assessment Date:  07/01/24    Summary: Follow up    Pt still w/ poor oral intake and poor appetite. Mostly 0% PO intake per chart review. Reports drinking the nutrition supplement. PO intake encouraged. Awaiting insurance for discharge. May benefit in an appetite stimulant. Meds and labs reviewed.     Plan/recs:  Continue Boost plus TID.  Encourage intakes. Assist w/ feeding as needed.  May benefit in an appetite stimulant    RD to follow    CLINICAL NUTRITION ASSESSMENT      Reason for Assessment Chronic Poor Intake     Diagnosis/Problem   Right ureteral stone   Medical/Surgical History Past Medical History:   Diagnosis Date    Allergic     Arthritis     Colon cancer screening 01/29/2020    Colon cancer screening 01/29/2020    Cough     Diarrhea     Hx of migraine headaches     Hyperlipidemia     Irregular heart beat     Osteoarthritis     Renal stones 02/2021    Vitamin D deficiency        Past Surgical History:   Procedure Laterality Date    COLONOSCOPY      CYSTOSCOPY W/ URETERAL STENT PLACEMENT Right 6/2/2024    Procedure: CYSTOSCOPY, RIGHT URETERAL STENT PLACEMENT;  Surgeon: Efra Schofield MD;  Location: [facility] MAIN OR;  Service: Urology;  Laterality: Right;    LUMBAR EPIDURAL INJECTION N/A 8/2/2021    Procedure: LUMBAR EPIDURAL 1ST VISIT 5-1;  Surgeon: Nu Partida MD;  Location: [facility] MAIN OR;  Service: Pain Management;  Laterality: N/A;    REPLACEMENT TOTAL KNEE Right 01/2015    Josue Wilson MD    SACROILIAC JOINT INJECTION Left 6/16/2021    Procedure: SACROILIAC INJECTION left;  Surgeon: Nu aPrtida MD;  Location: [facility] MAIN OR;  Service: Pain Management;  Laterality: Left;    TUBAL ABDOMINAL LIGATION      URETEROSCOPY LASER LITHOTRIPSY WITH STENT INSERTION Left 2/23/2021    Procedure: LT.URETEROSCOPY LASER LITHOTRIPSY WITH STENT  FOR STONE;  Surgeon:  "Arnaud Lu MD;  Location: [facility];  Service: Urology;  Laterality: Left;    WISDOM TOOTH EXTRACTION          Anthropometrics        Current Height  Current Weight  BMI kg/m2 Height: 158.8 cm (62.5\")  Weight: 78 kg (171 lb 15.3 oz) (07/01/24 0510)  Body mass index is 30.95 kg/m².   Adjusted BMI (if applicable)    BMI Category Obese, Class I (30 - 34.9)   Ideal Body Weight (IBW) 112#   Usual Body Weight (UBW) 170-180s   Weight Trend Loss   Weight History Wt Readings from Last 30 Encounters:   07/01/24 0510 78 kg (171 lb 15.3 oz)   06/30/24 0536 77.3 kg (170 lb 6.7 oz)   06/29/24 0541 78.2 kg (172 lb 6.4 oz)   06/28/24 0525 78.2 kg (172 lb 6.4 oz)   06/27/24 0555 80.6 kg (177 lb 11.1 oz)   06/25/24 0500 77 kg (169 lb 12.1 oz)   06/24/24 0507 78.1 kg (172 lb 2.9 oz)   06/23/24 0500 80.7 kg (177 lb 14.6 oz)   06/22/24 0500 80.1 kg (176 lb 9.4 oz)   06/21/24 0500 80.2 kg (176 lb 12.9 oz)   06/20/24 0549 81.6 kg (179 lb 14.3 oz)   06/19/24 0524 82.4 kg (181 lb 10.5 oz)   06/18/24 0500 82.5 kg (181 lb 14.1 oz)   06/17/24 0457 84.2 kg (185 lb 10 oz)   06/16/24 0500 85.4 kg (188 lb 4.4 oz)   06/15/24 0500 85.5 kg (188 lb 7.9 oz)   06/14/24 0300 85 kg (187 lb 6.3 oz)   06/13/24 0300 85.5 kg (188 lb 7.9 oz)   06/12/24 0250 84.5 kg (186 lb 4.6 oz)   06/11/24 0417 86.4 kg (190 lb 7.6 oz)   06/10/24 0534 85.2 kg (187 lb 13.3 oz)   06/09/24 0300 84.8 kg (186 lb 15.2 oz)   06/08/24 0415 85.2 kg (187 lb 12.8 oz)   06/07/24 0546 90.4 kg (199 lb 4.7 oz)   06/06/24 0500 90.1 kg (198 lb 10.2 oz)   06/05/24 0500 85.7 kg (188 lb 14.4 oz)   06/04/24 0452 86 kg (189 lb 9.5 oz)   06/03/24 0514 86.2 kg (190 lb 0.6 oz)   06/02/24 0850 83.9 kg (184 lb 15.5 oz)   06/02/24 0457 85.4 kg (188 lb 4.4 oz)   06/01/24 2030 81 kg (178 lb 9.2 oz)   11/30/23 0947 81 kg (178 lb 9.6 oz)   09/26/23 1015 80.7 kg (178 lb)   04/11/23 1156 75 kg (165 lb 6.4 oz)   01/18/23 1403 73 kg (161 lb)   07/28/22 1410 69.9 kg (154 lb 3.2 oz)   05/03/22 1238 " 69.4 kg (153 lb)   04/04/22 1018 69.4 kg (153 lb)   03/31/22 1336 69.9 kg (154 lb)   03/24/22 0941 69.5 kg (153 lb 3.2 oz)   02/21/22 1303 68.5 kg (151 lb)   10/26/21 0941 67.9 kg (149 lb 9.6 oz)   10/19/21 1612 67.1 kg (148 lb)   08/30/21 0932 66.3 kg (146 lb 3.2 oz)   08/02/21 1036 63.5 kg (140 lb)   07/22/21 1130 64.7 kg (142 lb 9.6 oz)   07/14/21 0923 63.8 kg (140 lb 9.6 oz)   06/16/21 1334 60.3 kg (133 lb)   05/27/21 0859 62.3 kg (137 lb 6.4 oz)   04/07/21 1008 64.8 kg (142 lb 12.8 oz)   03/23/21 1333 66.2 kg (146 lb)   03/15/21 1143 77.6 kg (171 lb)   03/09/21 1354 77.6 kg (171 lb 1.2 oz)   02/21/21 0535 77.6 kg (171 lb 1.2 oz)   02/17/21 2302 71.4 kg (157 lb 6.5 oz)   02/17/21 1831 71.7 kg (158 lb)   12/24/20 0755 71.7 kg (158 lb)   09/02/20 1129 74.4 kg (164 lb)   08/10/20 0843 75.8 kg (167 lb)   09/11/19 1026 72.7 kg (160 lb 3.2 oz)   07/31/19 1025 73.9 kg (163 lb)   10/10/18 0922 76.7 kg (169 lb)        Estimated Requirements         Weight used  77 kg    Calories  1155, 1386 kcal (15-18 kcal/kg)    Protein  77 - 92 g (1.0 - 1.2 gm/kg)    Fluid   (1 mL/kcal)     Labs       Pertinent Labs    Results from last 7 days   Lab Units 07/01/24  0324 06/30/24  0549 06/29/24  0550   SODIUM mmol/L 140 136 139   POTASSIUM mmol/L 4.4 4.4 4.4   CHLORIDE mmol/L 107 104 106   CO2 mmol/L 25.0 25.0 25.0   BUN mg/dL 20 19 17   CREATININE mg/dL 1.01* 0.96 0.88   CALCIUM mg/dL 10.5 11.0* 10.6*   GLUCOSE mg/dL 97 88 95     Results from last 7 days   Lab Units 07/01/24  0324 06/30/24  0549 06/29/24  0550 06/28/24  0410   MAGNESIUM mg/dL  --  2.1  --  2.0   PHOSPHORUS mg/dL 3.4 2.9   < > 3.7   ALBUMIN g/dL 3.3* 3.5   < > 3.3*    < > = values in this interval not displayed.           COVID19   Date Value Ref Range Status   02/23/2021 Not Detected Not Detected - Ref. Range Final     Lab Results   Component Value Date    HGBA1C 5.7 (H) 09/26/2023          Medications           Scheduled Medications bisacodyl, 10 mg, Rectal,  Daily  cinacalcet, 60 mg, Oral, Daily With Breakfast  donepezil, 10 mg, Oral, Daily  memantine, 10 mg, Oral, BID       Infusions     PRN Medications   acetaminophen **OR** acetaminophen    aluminum-magnesium hydroxide-simethicone    Calcium Replacement - Follow Nurse / BPA Driven Protocol    dextrose    dextrose    dextrose    glucagon (human recombinant)    Magnesium Standard Dose Replacement - Follow Nurse / BPA Driven Protocol    nitroglycerin    ondansetron ODT **OR** ondansetron    Phosphorus Replacement - Follow Nurse / BPA Driven Protocol    polyethylene glycol    Potassium Replacement - Follow Nurse / BPA Driven Protocol    senna-docusate sodium **AND** [DISCONTINUED] polyethylene glycol **AND** [DISCONTINUED] bisacodyl **AND** [DISCONTINUED] bisacodyl    sodium chloride    sodium chloride    sodium chloride     Physical Findings          General Findings alert, appeared asleep, obese   Oral/Mouth Cavity tooth or teeth missing   Edema  lower extremity , 1+ (trace)   Gastrointestinal fecal incontinence, last bowel movement: 6/30   Skin  skin intact   Tubes/Drains/Lines none   NFPE See Malnutrition Severity Assessment, Date Completed: 6/26 GR     Malnutrition Severity Assessment      Patient meets criteria for : Severe Malnutrition           Current Nutrition Orders & Evaluation of Intake       Oral Nutrition     Food Allergies NKFA   Current PO Diet Diet: Regular/House; Feeding Assistance - Nursing; Texture: Regular (IDDSI 7); Fluid Consistency: Thin (IDDSI 0)   Supplement Boost Plus, BID   PO Evaluation     % PO Intake 0 - 25%    Factors Affecting Intake: decreased appetite   --  PES STATEMENT / NUTRITION DIAGNOSIS      Nutrition Dx Problem  Problem: Malnutrition (severe)  Etiology: Medical Diagnosis - right ureteral stone    Signs/Symptoms: PO intake and Unintended Weight Change     NUTRITION INTERVENTION / PLAN OF CARE      Intervention Goal(s) Establish goals of care, Meet estimated needs, Disease  management/therapy, Increase intake, Accepts oral nutrition supplement, Appropriate weight loss, and PO intake goal %: 75         RD Intervention/Action Adjusted supplement, Encourage intake, Continue to monitor, Care plan reviewed, and Recommend/order: boost plus TID   --      Prescription/Orders:       PO Diet       Supplements Boost plus TID      Enteral Nutrition       Parenteral Nutrition    New Prescription Ordered? Yes   --      Monitor/Evaluation Per protocol   Discharge Plan/Needs Pending clinical course   --    RD to follow per protocol.      Electronically signed by:  Katerin Dorantes RD  07/01/24 16:16 EDT

## 2024-07-01 NOTE — THERAPY TREATMENT NOTE
Patient Name: Kimberlee Urrutia  : 1940    MRN: [id number]                              Today's Date: 2024       Admit Date: 2024    Visit Dx:     ICD-10-CM ICD-9-CM   1. Right ureteral stone  N20.1 592.1   2. Acute UTI  N39.0 599.0   3. Severe sepsis  A41.9 038.9    R65.20 995.92   4. Elevated lactic acid level  R79.89 276.2   5. Osteomyelitis of lumbar spine  M46.26 730.28   6. Elevated AST (SGOT)  R74.01 790.4   7. Elevated ALT measurement  R74.01 790.4   8. Elevated C-reactive protein (CRP)  R79.82 790.95   9. PRINCE (acute kidney injury)  N17.9 584.9     Patient Active Problem List   Diagnosis    Arthritis    HLD (hyperlipidemia)    Primary hypertension    Health care maintenance    Knee pain, right    Hx of total knee arthroplasty    Seasonal allergies    Vitamin D deficiency    Pyuria    Transient alteration of awareness    Migraine without status migrainosus, not intractable    Leukocytosis    Viral pharyngitis    Multinodular thyroid    Osteopenia of multiple sites    Colon cancer screening    Serum calcium elevated    Acute low back pain    Elevated procalcitonin    Sepsis due to Escherichia coli with acute renal failure without septic shock    Thrombocytopenia    Recurrent oral herpes simplex    Lumbar spinal stenosis    Metabolic encephalopathy    Renal stones    Colon cancer screening    Sacroiliac joint pain    Lumbar stenosis without neurogenic claudication    Renal stone    Hyperlipidemia    History of total knee arthroplasty    Spinal stenosis of lumbar region    Multinodular goiter    Osteopenia    Pain in right knee    Sacroiliac joint pain    Hypercalcemia    CAYLA (dementia of Alzheimer type)    Chronic bilateral low back pain without sciatica    Right ureteral stone    Discitis of lumbar region    Acute UTI (urinary tract infection)    Hypoglycemia    Abnormal LFTs    Hypercalcemia    Constipation    Severe malnutrition     Past Medical History:   Diagnosis Date    Allergic      Arthritis     Colon cancer screening 01/29/2020    Colon cancer screening 01/29/2020    Cough     Diarrhea     Hx of migraine headaches     Hyperlipidemia     Irregular heart beat     Osteoarthritis     Renal stones 02/2021    Vitamin D deficiency      Past Surgical History:   Procedure Laterality Date    COLONOSCOPY      CYSTOSCOPY W/ URETERAL STENT PLACEMENT Right 6/2/2024    Procedure: CYSTOSCOPY, RIGHT URETERAL STENT PLACEMENT;  Surgeon: Efra Schofield MD;  Location: [facility] MAIN OR;  Service: Urology;  Laterality: Right;    LUMBAR EPIDURAL INJECTION N/A 8/2/2021    Procedure: LUMBAR EPIDURAL 1ST VISIT 5-1;  Surgeon: Nu Partida MD;  [place] [facility] [place];  Service: Pain Management;  Laterality: N/A;    REPLACEMENT TOTAL KNEE Right 01/2015    Josue Wilson MD    SACROILIAC JOINT INJECTION Left 6/16/2021    Procedure: SACROILIAC INJECTION left;  Surgeon: Nu Partida MD;  [place] [facility] [place];  Service: Pain Management;  Laterality: Left;    TUBAL ABDOMINAL LIGATION      URETEROSCOPY LASER LITHOTRIPSY WITH STENT INSERTION Left 2/23/2021    Procedure: LT.URETEROSCOPY LASER LITHOTRIPSY WITH STENT  FOR STONE;  Surgeon: Arnaud Lu MD;  Location: [facility] OR;  Service: Urology;  Laterality: Left;    WISDOM TOOTH EXTRACTION        General Information       Row Name 07/01/24 1133          OT Time and Intention    Document Type therapy note (daily note)  -RB     Mode of Treatment occupational therapy;physical therapy;co-treatment  -RB       Row Name 07/01/24 1133          General Information    Patient Profile Reviewed yes  -RB       Row Name 07/01/24 1133          Cognition    Orientation Status (Cognition) oriented to;person;verbal cues/prompts needed for orientation  -RB       Row Name 07/01/24 1133          Safety Issues, Functional Mobility    Comment, Safety Issues/Impairments (Mobility) co-tx medically appropriate to maintain pt and staff safety while advancing mobility  -RB                User Key  (r) = Recorded By, (t) = Taken By, (c) = Cosigned By      Initials Name Provider Type    Janelle Renee OT Occupational Therapist                     Mobility/ADL's       Row Name 07/01/24 1130          Bed Mobility    Bed Mobility supine-sit  -RB     Supine-Sit [place] (Bed Mobility) minimum assist (75% patient effort);moderate assist (50% patient effort);2 person assist;verbal cues  -RB     Assistive Device (Bed Mobility) bed rails  -RB       Row Name 07/01/24 1130          Transfers    Transfers sit-stand transfer;stand-sit transfer;bed-chair transfer  -RB       Row Name 07/01/24 1130          Bed-Chair Transfer    Bed-Chair [place] (Transfers) moderate assist (50% patient effort);2 person assist;verbal cues  -RB     Comment, (Bed-Chair Transfer) hha x 2  -RB       Row Name 07/01/24 1130          Sit-Stand Transfer    Sit-Stand [place] (Transfers) moderate assist (50% patient effort);2 person assist;verbal cues  -RB     Comment, (Sit-Stand Transfer) hha x 2  -RB       Row Name 07/01/24 1130          Stand-Sit Transfer    Stand-Sit [place] (Transfers) moderate assist (50% patient effort);2 person assist;verbal cues  -RB     Comment, (Stand-Sit Transfer) hha x 2  -RB       Row Name 07/01/24 1130          Functional Mobility    Functional Mobility- Ind. Level not tested;unable to perform  -RB       Row Name 07/01/24 1130          Activities of Daily Living    BADL Assessment/Intervention lower body dressing  -RB       Row Name 07/01/24 1130          Lower Body Dressing Assessment/Training    [place] Level (Lower Body Dressing) lower body dressing skills;dependent (less than 25% patient effort)  -RB     Position (Lower Body Dressing) edge of bed sitting  -RB               User Key  (r) = Recorded By, (t) = Taken By, (c) = Cosigned By      Initials Name Provider Type    Janelle Renee OT Occupational Therapist                   Obj/Interventions       Row  Name 07/01/24 1127          Motor Skills    Therapeutic Exercise --  The pt was instructed in BUE exercises - she was unable to complete in the moment due to fatigue but able to verbalize understandig for carryover outside of therapy hours  -RB       Row Name 07/01/24 1127          Balance    Comment, Balance SBA sitting balance, Min-Mod A x2 for standing balance. posterior lean and attempting to sit too soon mid transfer  -RB               User Key  (r) = Recorded By, (t) = Taken By, (c) = Cosigned By      Initials Name Provider Type    Janelle Renee, AB Occupational Therapist                   Goals/Plan    No documentation.                  Clinical Impression       Row Name 07/01/24 1127          Pain Assessment    Pretreatment Pain Rating 0/10 - no pain  -RB     Posttreatment Pain Rating 0/10 - no pain  -RB       Row Name 07/01/24 1127          Plan of Care Review    Plan of Care Reviewed With patient  -RB     Progress improving  -RB     Outcome Evaluation The pt participated in OT this morning. She completed bed mobility with Min A x2. Total A LBD. She stood with Min A x2 and took several shuffled steps to the bedside chair. She was instructed in BUE exercises, however, too fatigued to complete in the moment. She declined any further activity and was resting in her chair post session.       Anticipated Discharge Disposition (OT): skilled nursing facility  -RB       Row Name 07/01/24 1127          Therapy Assessment/Plan (OT)    Rehab Potential (OT) good, to achieve stated therapy goals  -RB     Criteria for Skilled Therapeutic Interventions Met (OT) yes;skilled treatment is necessary  -RB     Therapy Frequency (OT) 3 times/wk  -RB       Row Name 07/01/24 1127          Therapy Plan Review/Discharge Plan (OT)    Anticipated Discharge Disposition (OT) skilled nursing facility  -RB               User Key  (r) = Recorded By, (t) = Taken By, (c) = Cosigned By      Initials Name Provider Type    LOUIS Viramontes  AB Luis Occupational Therapist                   Outcome Measures       Row Name 07/01/24 0931          How much help from another person do you currently need...    Turning from your back to your side while in flat bed without using bedrails? 3  -JS     Moving from lying on back to sitting on the side of a flat bed without bedrails? 2  -JS     Moving to and from a bed to a chair (including a wheelchair)? 2  -JS     Standing up from a chair using your arms (e.g., wheelchair, bedside chair)? 2  -JS     Climbing 3-5 steps with a railing? 1  -JS     To walk in hospital room? 1  -JS     AM-PAC 6 Clicks Score (PT) 11  -JS     Highest Level of Mobility Goal 4 --> Transfer to chair/commode  -JS               User Key  (r) = Recorded By, (t) = Taken By, (c) = Cosigned By      Initials Name Provider Type    Nova Lao, RN Registered Nurse                    Occupational Therapy Education       Title: PT OT SLP Therapies (In Progress)       Topic: Occupational Therapy (In Progress)       Point: ADL training (In Progress)       Description:   Instruct learner(s) on proper safety adaptation and remediation techniques during self care or transfers.   Instruct in proper use of assistive devices.                  Learning Progress Summary             Patient Acceptance, E, NR by  at 6/10/2024 1505                         Point: Home exercise program (In Progress)       Description:   Instruct learner(s) on appropriate technique for monitoring, assisting and/or progressing therapeutic exercises/activities.                  Learning Progress Summary             Patient Acceptance, E, NR by  at 6/10/2024 1505                         Point: Precautions (In Progress)       Description:   Instruct learner(s) on prescribed precautions during self-care and functional transfers.                  Learning Progress Summary             Patient Acceptance, E, NR by  at 6/10/2024 1505                         Point: Body  mechanics (In Progress)       Description:   Instruct learner(s) on proper positioning and spine alignment during self-care, functional mobility activities and/or exercises.                  Learning Progress Summary             Patient Acceptance, E, NR by CAMERON at 6/10/2024 1505                                         User Key       Initials Effective Dates Name Provider Type Discipline    CAMERON 04/30/24 -  Nereyda Rothman, OT Student OT Student OT                  OT Recommendation and Plan  Therapy Frequency (OT): 3 times/wk  Plan of Care Review  Plan of Care Reviewed With: patient  Progress: improving  Outcome Evaluation: The pt participated in OT this morning. She completed bed mobility with Min A x2. Total A LBD. She stood with Min A x2 and took several shuffled steps to the bedside chair. She was instructed in BUE exercises, however, too fatigued to complete in the moment. She declined any further activity and was resting in her chair post session.       Anticipated Discharge Disposition (OT): skilled nursing facility     Time Calculation:   Evaluation Complexity (OT)  Review Occupational Profile/Medical/Therapy History Complexity: expanded/moderate complexity  Assessment, Occupational Performance/Identification of Deficit Complexity: 3-5 performance deficits  Clinical Decision Making Complexity (OT): detailed assessment/moderate complexity  Overall Complexity of Evaluation (OT): moderate complexity     Time Calculation- OT       Row Name 07/01/24 1127             Time Calculation- OT    OT Start Time 1018  -RB      OT Stop Time 1030  -RB      OT Time Calculation (min) 12 min  -RB      Total Timed Code Minutes- OT 12 minute(s)  -RB      OT Received On 07/01/24  -RB      OT - Next Appointment 07/03/24  -RB         Timed Charges    87943 - OT Therapeutic Activity Minutes 12  -RB         Total Minutes    Timed Charges Total Minutes 12  -RB       Total Minutes 12  -RB                User Key  (r) = Recorded By,  (t) = Taken By, (c) = Cosigned By      Initials Name Provider Type    RB Janelle Viramontes OT Occupational Therapist                  Therapy Charges for Today       Code Description Service Date Service Provider Modifiers Qty    [id number]  OT THERAPEUTIC ACT EA 15 MIN 7/1/2024 Janelle Viramontes OT GO 1                 Janelle Viramontes OT  7/1/2024

## 2024-07-01 NOTE — PLAN OF CARE
Goal Outcome Evaluation:  Plan of Care Reviewed With: patient           Outcome Evaluation: Patient seen for PT session this AM. Patient agreeable to participation with some encouragement. Patient sat up to EOB with Gage and increased time. Continuous cues needed for sequencing. Patient performed STS from EOB with minAx2 and HHAx2. Patient took a few shuffled steps to chair. Cues for sequencing and safety. Patient quick to fatigue requiring increased assistance to maintain balance and sit down and chair. Attempted to educate patient on B LE exercises but patient declined to perform due to fatigue. PT continues to recommend SNF. PT will continue to monitor.

## 2024-07-02 LAB
ALBUMIN SERPL-MCNC: 3.4 G/DL (ref 3.5–5.2)
ANION GAP SERPL CALCULATED.3IONS-SCNC: 11 MMOL/L (ref 5–15)
BUN SERPL-MCNC: 21 MG/DL (ref 8–23)
BUN/CREAT SERPL: 20 (ref 7–25)
CALCIUM SPEC-SCNC: 10.4 MG/DL (ref 8.6–10.5)
CHLORIDE SERPL-SCNC: 105 MMOL/L (ref 98–107)
CO2 SERPL-SCNC: 23 MMOL/L (ref 22–29)
CREAT SERPL-MCNC: 1.05 MG/DL (ref 0.57–1)
EGFRCR SERPLBLD CKD-EPI 2021: 52.2 ML/MIN/1.73
GLUCOSE SERPL-MCNC: 94 MG/DL (ref 65–99)
PHOSPHATE SERPL-MCNC: 2.8 MG/DL (ref 2.5–4.5)
POTASSIUM SERPL-SCNC: 4 MMOL/L (ref 3.5–5.2)
SODIUM SERPL-SCNC: 139 MMOL/L (ref 136–145)

## 2024-07-02 PROCEDURE — 97530 THERAPEUTIC ACTIVITIES: CPT

## 2024-07-02 PROCEDURE — 80069 RENAL FUNCTION PANEL: CPT | Performed by: INTERNAL MEDICINE

## 2024-07-02 RX ADMIN — DONEPEZIL HYDROCHLORIDE 10 MG: 10 TABLET, FILM COATED ORAL at 08:55

## 2024-07-02 RX ADMIN — BISACODYL 10 MG: 10 SUPPOSITORY RECTAL at 08:55

## 2024-07-02 RX ADMIN — MEMANTINE HYDROCHLORIDE 10 MG: 10 TABLET, FILM COATED ORAL at 08:55

## 2024-07-02 RX ADMIN — MEMANTINE HYDROCHLORIDE 10 MG: 10 TABLET, FILM COATED ORAL at 20:37

## 2024-07-02 RX ADMIN — CINACALCET 60 MG: 30 TABLET ORAL at 08:55

## 2024-07-02 NOTE — PLAN OF CARE
Goal Outcome Evaluation:  Plan of Care Reviewed With: patient        Progress: no change  Outcome Evaluation: pt confused and unsure of situation at times but pleasant and flat affect. decreased appetite but drinking Boost. inc bowel and bladder, purewick on for freq incontinence and pt unable to ambulate from chair back to bed with asst x3 even though pt was able to ambulate with PT; pt was unable to stand up straight or take a step after standing up from chair with staff. needs freq cues. had small BM after suppository. enc more PO intake. scds on. turning q 2. skin intact, barrier cream to bottom.

## 2024-07-02 NOTE — PROGRESS NOTES
[facility]    [phone]   LOS: 30 days     Name: Kimberlee Urrutia  Age/Sex: 84 y.o. female  :  1940        PCP: Meche Ji MD  Chief Complaint   Patient presents with    Back Pain      Subjective   Resting in the chair this morning in no distress no issues overnight    bisacodyl, 10 mg, Rectal, Daily  cinacalcet, 60 mg, Oral, Daily With Breakfast  donepezil, 10 mg, Oral, Daily  memantine, 10 mg, Oral, BID             Objective   Vital Signs  Temp:  [97 °F (36.1 °C)-97.6 °F (36.4 °C)] 97 °F (36.1 °C)  Heart Rate:  [73-79] 73  Resp:  [16] 16  BP: (111-140)/(63-80) 111/80  Body mass index is 30.24 kg/m².    Intake/Output Summary (Last 24 hours) at 2024 1259  Last data filed at 2024 0800  Gross per 24 hour   Intake 237 ml   Output 650 ml   Net -413 ml       Physical Exam  Vitals and nursing note reviewed.   Constitutional:       General: She is not in acute distress.     Appearance: She is obese. She is ill-appearing.   Cardiovascular:      Rate and Rhythm: Normal rate and regular rhythm.   Pulmonary:      Effort: No respiratory distress.      Breath sounds: Normal breath sounds.   Abdominal:      General: Bowel sounds are normal.      Palpations: Abdomen is soft.   Neurological:      General: No focal deficit present.      Mental Status: She is alert. Mental status is at baseline.           Results Review:       I reviewed the patient's new clinical results.      Results from last 7 days   Lab Units 24  0348 24  0324 24  0549 24  0550 24  0410 24  1707 24  0359 24  1543 24  0338   SODIUM mmol/L 139 140 136 139 139  --  141  --  142   POTASSIUM mmol/L 4.0 4.4 4.4 4.4 4.3  --  4.4 4.2 3.6   CHLORIDE mmol/L 105 107 104 106 108*  --  111*  --  109*   CO2 mmol/L 23.0 25.0 25.0 25.0 24.0  --  22.5  --  25.0   BUN mg/dL 21 20 19 17 16  --  15  --  17   CREATININE mg/dL 1.05* 1.01* 0.96 0.88 0.86  --  0.84  --  0.80   CALCIUM  mg/dL 10.4 10.5 11.0* 10.6* 9.9  --  10.1  --  9.7   MAGNESIUM mg/dL  --   --  2.1  --  2.0  --   --   --   --    PHOSPHORUS mg/dL 2.8 3.4 2.9 3.4 3.7 3.3 2.1*  --  2.4*   Estimated Creatinine Clearance: 37.9 mL/min (A) (by C-G formula based on SCr of 1.05 mg/dL (H)).      Assessment & Plan   Active Hospital Problems    Diagnosis  POA    **Right ureteral stone [N20.1]  Yes    Severe malnutrition [E43]  Yes    Hypercalcemia [E83.52]  No    Constipation [K59.00]  Clinically Undetermined    Acute UTI (urinary tract infection) [N39.0]  Yes    Hypoglycemia [E16.2]  Yes    Abnormal LFTs [R79.89]  Yes    Chronic bilateral low back pain without sciatica [M54.50, G89.29]  Yes    CAYLA (dementia of Alzheimer type) [G30.9, F02.80]  Yes    Lumbar spinal stenosis [M48.061]  Yes    Thrombocytopenia [D69.6]  Yes    Acute low back pain [M54.50]  Yes      Resolved Hospital Problems    Diagnosis Date Resolved POA    Shock, septic [A41.9, R65.21] 06/03/2024 Yes    PRINCE (acute kidney injury) [N17.9] 06/06/2024 Yes       PLAN  -Calcium stabilized.  Sensipar adjusted appreciate nephrology assistance, cleared for DC from their perspective  -She is completed antibiotics for urinary tract infection.  -Noted plans for outpatient ureteroscopy and lithotripsy  -At this point she stable to discharge but is waiting on placement and insurance issues  -Mechanical DVT prophylaxis  -DNR    Disposition  Expected Discharge Date: 7/3/2024; Expected Discharge Time:    Discussed with CCP.  Plans are to discuss with patient's family regarding private pay for nursing facility.    Josue Mccormick MD  [facility]  07/02/24  12:59 EDT

## 2024-07-02 NOTE — PROGRESS NOTES
Continued Stay Note  [facility]     Patient Name: Kimberlee Urrutia  MRN: [id number]  Today's Date: 7/2/2024    Admit Date: 6/1/2024    Plan: PENDING   Discharge Plan       Row Name 07/02/24 1008       Plan    Plan Comments VM left for Gi/JOVI/maren at [phone].  Awaiting return call                   Discharge Codes    No documentation.                 Expected Discharge Date and Time       Expected Discharge Date Expected Discharge Time    Jul 3, 2024               Kavya Gilman RN

## 2024-07-02 NOTE — PLAN OF CARE
Goal Outcome Evaluation:  Plan of Care Reviewed With: patient        Progress: improving  Outcome Evaluation: Afebrile. VS stable.Forgetful/cooperative. Continues in contact isolation. Purewick in place. Turned q2hrs.Denies pain or discomfort

## 2024-07-02 NOTE — THERAPY TREATMENT NOTE
Patient Name: Kimberlee Urrutia  : 1940    MRN: [id number]                              Today's Date: 2024       Admit Date: 2024    Visit Dx:     ICD-10-CM ICD-9-CM   1. Right ureteral stone  N20.1 592.1   2. Acute UTI  N39.0 599.0   3. Severe sepsis  A41.9 038.9    R65.20 995.92   4. Elevated lactic acid level  R79.89 276.2   5. Osteomyelitis of lumbar spine  M46.26 730.28   6. Elevated AST (SGOT)  R74.01 790.4   7. Elevated ALT measurement  R74.01 790.4   8. Elevated C-reactive protein (CRP)  R79.82 790.95   9. PRINCE (acute kidney injury)  N17.9 584.9     Patient Active Problem List   Diagnosis    Arthritis    HLD (hyperlipidemia)    Primary hypertension    Health care maintenance    Knee pain, right    Hx of total knee arthroplasty    Seasonal allergies    Vitamin D deficiency    Pyuria    Transient alteration of awareness    Migraine without status migrainosus, not intractable    Leukocytosis    Viral pharyngitis    Multinodular thyroid    Osteopenia of multiple sites    Colon cancer screening    Serum calcium elevated    Acute low back pain    Elevated procalcitonin    Sepsis due to Escherichia coli with acute renal failure without septic shock    Thrombocytopenia    Recurrent oral herpes simplex    Lumbar spinal stenosis    Metabolic encephalopathy    Renal stones    Colon cancer screening    Sacroiliac joint pain    Lumbar stenosis without neurogenic claudication    Renal stone    Hyperlipidemia    History of total knee arthroplasty    Spinal stenosis of lumbar region    Multinodular goiter    Osteopenia    Pain in right knee    Sacroiliac joint pain    Hypercalcemia    CAYLA (dementia of Alzheimer type)    Chronic bilateral low back pain without sciatica    Right ureteral stone    Discitis of lumbar region    Acute UTI (urinary tract infection)    Hypoglycemia    Abnormal LFTs    Hypercalcemia    Constipation    Severe malnutrition     Past Medical History:   Diagnosis Date    Allergic      Arthritis     Colon cancer screening 01/29/2020    Colon cancer screening 01/29/2020    Cough     Diarrhea     Hx of migraine headaches     Hyperlipidemia     Irregular heart beat     Osteoarthritis     Renal stones 02/2021    Vitamin D deficiency      Past Surgical History:   Procedure Laterality Date    COLONOSCOPY      CYSTOSCOPY W/ URETERAL STENT PLACEMENT Right 6/2/2024    Procedure: CYSTOSCOPY, RIGHT URETERAL STENT PLACEMENT;  Surgeon: Efra Schofield MD;  Location: [facility] MAIN OR;  Service: Urology;  Laterality: Right;    LUMBAR EPIDURAL INJECTION N/A 8/2/2021    Procedure: LUMBAR EPIDURAL 1ST VISIT 5-1;  Surgeon: Nu Partida MD;  Location: [facility] MAIN OR;  Service: Pain Management;  Laterality: N/A;    REPLACEMENT TOTAL KNEE Right 01/2015    Josue Wilson MD    SACROILIAC JOINT INJECTION Left 6/16/2021    Procedure: SACROILIAC INJECTION left;  Surgeon: Nu Partida MD;  Location: [facility] MAIN OR;  Service: Pain Management;  Laterality: Left;    TUBAL ABDOMINAL LIGATION      URETEROSCOPY LASER LITHOTRIPSY WITH STENT INSERTION Left 2/23/2021    Procedure: LT.URETEROSCOPY LASER LITHOTRIPSY WITH STENT  FOR STONE;  Surgeon: Arnaud Lu MD;  Location: [facility] OR;  Service: Urology;  Laterality: Left;    WISDOM TOOTH EXTRACTION        General Information       Row Name 07/02/24 1252          Physical Therapy Time and Intention    Document Type therapy note (daily note)  -CS     Mode of Treatment individual therapy;physical therapy  -CS       Row Name 07/02/24 1252          General Information    Patient Profile Reviewed yes  -CS     Existing Precautions/Restrictions fall  -CS       Row Name 07/02/24 1252          Cognition    Orientation Status (Cognition) oriented to;person;verbal cues/prompts needed for orientation  -CS       Row Name 07/02/24 1252          Safety Issues, Functional Mobility    Safety Issues Affecting Function (Mobility) insight into  deficits/self-awareness;judgment;problem-solving  -CS     Impairments Affecting Function (Mobility) balance;cognition;endurance/activity tolerance;strength  -CS               User Key  (r) = Recorded By, (t) = Taken By, (c) = Cosigned By      Initials Name Provider Type    CS Zena Malone, PT Physical Therapist                   Mobility       Row Name 07/02/24 1254          Bed Mobility    Bed Mobility supine-sit  -CS     Supine-Sit [place] (Bed Mobility) minimum assist (75% patient effort);verbal cues;nonverbal cues (demo/gesture)  -CS     Assistive Device (Bed Mobility) head of bed elevated;bed rails  -CS     Comment, (Bed Mobility) cues for sequencing + increased time to complete; UIC at end of session  -       Row Name 07/02/24 1254          Sit-Stand Transfer    Sit-Stand [place] (Transfers) minimum assist (75% patient effort);2 person assist  -CS     Assistive Device (Sit-Stand Transfers) walker, front-wheeled  -       Row Name 07/02/24 1254          Gait/Stairs (Locomotion)    [place] Level (Gait) minimum assist (75% patient effort);2 person assist  -CS     Assistive Device (Gait) walker, front-wheeled  -CS     Distance in Feet (Gait) 4  -CS     Deviations/Abnormal Patterns (Gait) arely decreased;gait speed decreased;stride length decreased  -CS     Comment, (Gait/Stairs) slow pace, cues for sequencing  -CS               User Key  (r) = Recorded By, (t) = Taken By, (c) = Cosigned By      Initials Name Provider Type    CS Zena Malone, KALEY Physical Therapist                   Obj/Interventions       Row Name 07/02/24 1305          Motor Skills    Therapeutic Exercise other (see comments)  10 reps B LE AP, LAQ, & seated alt marches  -       Row Name 07/02/24 1305          Balance    Balance Assessment sitting static balance;sitting dynamic balance;standing static balance;standing dynamic balance  -CS     Static Sitting Balance standby assist  -CS     Dynamic Sitting Balance standby  assist  -CS     Position, Sitting Balance unsupported;sitting edge of bed  -CS     Static Standing Balance minimal assist  -CS     Dynamic Standing Balance minimal assist;2-person assist  -CS     Position/Device Used, Standing Balance supported;walker, front-wheeled  -CS               User Key  (r) = Recorded By, (t) = Taken By, (c) = Cosigned By      Initials Name Provider Type    Zena Morales, PT Physical Therapist                   Goals/Plan    No documentation.                  Clinical Impression       Row Name 07/02/24 1305          Pain    Pretreatment Pain Rating 0/10 - no pain  -CS     Posttreatment Pain Rating 0/10 - no pain  -CS       Row Name 07/02/24 1305          Plan of Care Review    Plan of Care Reviewed With patient  -CS     Progress improving  -CS     Outcome Evaluation Pt received in bed and agreeable to PT. Pt required min A to reach sitting EOB. Pt stood and ambulated 4' c RW requiring min A x 2. Pt demo's a slow pace with frequent cues for sequencing. Pt sitting UIC and completed B LE ther-ex and tolerated well. RN notified on txf technique as she does not require a lift device. PT will continue to follow to address functional deficits.  -CS       Row Name 07/02/24 1305          Therapy Assessment/Plan (PT)    Criteria for Skilled Interventions Met (PT) yes;meets criteria  -CS     Therapy Frequency (PT) 5 times/wk  -CS       Row Name 07/02/24 1305          Positioning and Restraints    Pre-Treatment Position in bed  -CS     Post Treatment Position chair  -CS     In Chair notified nsg;reclined;call light within reach;encouraged to call for assist;exit alarm on  -CS               User Key  (r) = Recorded By, (t) = Taken By, (c) = Cosigned By      Initials Name Provider Type    Zena Morales, PT Physical Therapist                   Outcome Measures       Row Name 07/02/24 1312 07/02/24 0856       How much help from another person do you currently need...    Turning from your back to  your side while in flat bed without using bedrails? 3  -CS 3  -KK    Moving from lying on back to sitting on the side of a flat bed without bedrails? 3  -CS 2  -KK    Moving to and from a bed to a chair (including a wheelchair)? 2  -CS 2  -KK    Standing up from a chair using your arms (e.g., wheelchair, bedside chair)? 2  -CS 2  -KK    Climbing 3-5 steps with a railing? 2  -CS 1  -KK    To walk in hospital room? 2  -CS 2  -KK    AM-PAC 6 Clicks Score (PT) 14  -CS 12  -KK    Highest Level of Mobility Goal 4 --> Transfer to chair/commode  -CS 4 --> Transfer to chair/commode  -KK      Row Name 07/02/24 1312          Functional Assessment    Outcome Measure Options AM-PAC 6 Clicks Basic Mobility (PT)  -CS               User Key  (r) = Recorded By, (t) = Taken By, (c) = Cosigned By      Initials Name Provider Type    Kayla Alvarez RN Registered Nurse    Zena Morales, PT Physical Therapist                                 Physical Therapy Education       Title: PT OT SLP Therapies (In Progress)       Topic: Physical Therapy (Done)       Point: Mobility training (Done)       Learning Progress Summary             Patient Acceptance, E,TB, VU,DU by CS at 7/2/2024 1312    Acceptance, E, VU,NR by SM at 7/1/2024 1143    Acceptance, E,TB, VU,NR by CB at 6/28/2024 1406    Acceptance, E,TB,D, NR by CB at 6/26/2024 1139    Acceptance, E,D, VU,NR by EB at 6/21/2024 1122    Acceptance, E,D, NR by EF at 6/19/2024 0931    Acceptance, E, VU,NR by SV at 6/16/2024 1531    Acceptance, E,TB, NR by BK at 6/14/2024 1715    Acceptance, E,D, VU,NR by EB at 6/14/2024 1641    Acceptance, E,D, NR by EB at 6/13/2024 1514    Acceptance, E,D, NR by EB at 6/12/2024 1643    Acceptance, E,TB, VU by RD at 6/11/2024 1011    Acceptance, E,TB, VU,NR by CB at 6/10/2024 1149    Acceptance, E, VU by EM at 6/7/2024 1622    Acceptance, E, NR by EM at 6/6/2024 1605    Acceptance, E,TB, VU by BK at 6/5/2024 1700    Acceptance, E, NR by EM at 6/5/2024  1640                         Point: Home exercise program (Done)       Learning Progress Summary             Patient Acceptance, E,TB, VU,DU by CS at 7/2/2024 1312    Acceptance, E, VU,NR by SM at 7/1/2024 1143    Acceptance, E,D, VU,NR by EB at 6/21/2024 1122    Acceptance, E,D, NR by EF at 6/19/2024 0931    Acceptance, E, VU,NR by SV at 6/16/2024 1531    Acceptance, E,TB, NR by BK at 6/14/2024 1715    Acceptance, E,TB, VU by BK at 6/13/2024 1655    Acceptance, E,TB, VU by RD at 6/11/2024 1011    Acceptance, E,TB, VU,NR by CB at 6/10/2024 1149    Acceptance, E, VU by EM at 6/7/2024 1622    Acceptance, E, NR by EM at 6/6/2024 1605                         Point: Body mechanics (Done)       Learning Progress Summary             Patient Acceptance, E,TB, VU,DU by CS at 7/2/2024 1312    Acceptance, E, VU,NR by SM at 7/1/2024 1143    Acceptance, E,TB, VU,NR by CB at 6/28/2024 1406    Acceptance, E,TB,D, NR by CB at 6/26/2024 1139    Acceptance, E,D, VU,NR by EB at 6/21/2024 1122    Acceptance, E,D, NR by EF at 6/19/2024 0931    Acceptance, E,TB, NR by BK at 6/14/2024 1715    Acceptance, E,D, VU,NR by EB at 6/14/2024 1641    Acceptance, E,D, NR by EB at 6/13/2024 1514    Acceptance, E,D, NR by EB at 6/12/2024 1643    Acceptance, E,TB, VU by RD at 6/11/2024 1011    Acceptance, E,TB, VU,NR by CB at 6/10/2024 1149                         Point: Precautions (Done)       Learning Progress Summary             Patient Acceptance, E,TB, VU,DU by CS at 7/2/2024 1312    Acceptance, E, VU,NR by SM at 7/1/2024 1143    Acceptance, E,TB, VU,NR by CB at 6/28/2024 1406    Acceptance, E,D, NR by EB at 6/13/2024 1514    Acceptance, E,TB, VU by RD at 6/11/2024 1011    Acceptance, E,TB, VU,NR by CB at 6/10/2024 1149                                         User Key       Initials Effective Dates Name Provider Type Discipline    RD 06/16/21 -  Leonela Acuna, PT Physical Therapist PT    EF 05/31/24 -  Marci Mckenzie, PT Physical  Therapist PT    EM 06/16/21 -  Yvette Guajardo, PT Physical Therapist PT    SV 07/11/23 -  Velma Georges, PT Physical Therapist PT    EB 02/14/23 -  Nancy Bishop, PTA Physical Therapist Assistant PT    CB 10/22/21 -  Kaitlin Cunningham, PT Physical Therapist PT    SM 05/02/22 -  Nahed Santiago, PT Physical Therapist PT    CS 09/22/22 -  Zena Malone, PT Physical Therapist PT    BK 04/30/24 -  Nu Leon, RN Extern Registered Nurse Nurse                  PT Recommendation and Plan     Plan of Care Reviewed With: patient  Progress: improving  Outcome Evaluation: Pt received in bed and agreeable to PT. Pt required min A to reach sitting EOB. Pt stood and ambulated 4' c RW requiring min A x 2. Pt demo's a slow pace with frequent cues for sequencing. Pt sitting UIC and completed B LE ther-ex and tolerated well. RN notified on txf technique as she does not require a lift device. PT will continue to follow to address functional deficits.     Time Calculation:         PT Charges       Row Name 07/02/24 1312             Time Calculation    Start Time 1046  -CS      Stop Time 1106  -CS      Time Calculation (min) 20 min  -CS      PT Received On 07/02/24  -CS      PT - Next Appointment 07/03/24  -CS         Time Calculation- PT    Total Timed Code Minutes- PT 16 minute(s)  -CS         Timed Charges    00403 - PT Therapeutic Activity Minutes 16  -CS         Total Minutes    Timed Charges Total Minutes 16  -CS       Total Minutes 16  -CS                User Key  (r) = Recorded By, (t) = Taken By, (c) = Cosigned By      Initials Name Provider Type    CS Zena Malone, PT Physical Therapist                  Therapy Charges for Today       Code Description Service Date Service Provider Modifiers Qty    [id number]  PT THERAPEUTIC ACT EA 15 MIN 7/2/2024 eZna Malone, PT GP 1            PT G-Codes  Outcome Measure Options: AM-PAC 6 Clicks Basic Mobility (PT)  AM-PAC 6 Clicks Score (PT): 14  AM-PAC 6 Clicks Score  (OT): 11  Modified [place] Scale: 4 - Moderately severe disability.  Unable to walk without assistance, and unable to attend to own bodily needs without assistance.  PT Discharge Summary  Anticipated Discharge Disposition (PT): skilled nursing facility    Zena Malone, PT  7/2/2024

## 2024-07-02 NOTE — PROGRESS NOTES
"RENAL/KCC:     LOS: 30 days    Patient Care Team:  Meche Ji MD as PCP - General (Internal Medicine)  Josue Wilson MD as Consulting Physician (Orthopedic Surgery)  Yash Wilson MD as Consulting Physician (Gastroenterology)  Avi Ferris MD (Ophthalmology)  Tommy French MD (Inactive) as Consulting Physician (Otolaryngology)  Arnaud Lu MD as Consulting Physician (Urology)  Nu Partida MD as Consulting Physician (Pain Medicine)  Ki Small MD as Consulting Physician (Neurology)    Chief Complaint:  Hypercalcemia    Subjective     Interval History:   7/1: Pleasant, denies any complaints.  No shortness of breath chest pain fevers chills or edema  Received IV fluids and Sensipar dose was increased over the weekend due to rising calcium levels  She is asymptomatic    7/2: No new complaints or events.  She states she has no appetite but denies nausea  No dyspnea or edema    Objective     Vital Sign Min/Max for last 24 hours  Temp  Min: 97 °F (36.1 °C)  Max: 97.6 °F (36.4 °C)   BP  Min: 111/80  Max: 140/75   Pulse  Min: 73  Max: 79   Resp  Min: 16  Max: 16   SpO2  Min: 96 %  Max: 99 %   No data recorded   Weight  Min: 76.2 kg (167 lb 15.9 oz)  Max: 76.2 kg (167 lb 15.9 oz)     Flowsheet Rows      Flowsheet Row First Filed Value   Admission Height 158.8 cm (62.5\") Documented at 06/01/2024 2030   Admission Weight 81 kg (178 lb 9.2 oz) Documented at 06/01/2024 2030            I/O this shift:  In: 237 [P.O.:237]  Out: -   I/O last 3 completed shifts:  In: -   Out: 1150 [Urine:1150]    Physical Exam:  GEN: Awake, NAD  ENT: PERRL, EOMI, MMM  NECK: Supple, no JVD  CHEST: CTAB, no W/R/C  CV: RRR, no M/G/R  ABD: Soft, NT, +BS  SKIN: Warm and Dry  NEURO: CN's intact      WBC No results found for: \"WBC\"   HGB No results found for: \"HGB\"   HCT No results found for: \"HCT\"   Platlets No results found for: \"LABPLAT\"   MCV No results found for: \"MCV\"       Sodium Sodium   Date Value " "Ref Range Status   07/02/2024 139 136 - 145 mmol/L Final   07/01/2024 140 136 - 145 mmol/L Final   06/30/2024 136 136 - 145 mmol/L Final      Potassium Potassium   Date Value Ref Range Status   07/02/2024 4.0 3.5 - 5.2 mmol/L Final   07/01/2024 4.4 3.5 - 5.2 mmol/L Final   06/30/2024 4.4 3.5 - 5.2 mmol/L Final      Chloride Chloride   Date Value Ref Range Status   07/02/2024 105 98 - 107 mmol/L Final   07/01/2024 107 98 - 107 mmol/L Final   06/30/2024 104 98 - 107 mmol/L Final      CO2 CO2   Date Value Ref Range Status   07/02/2024 23.0 22.0 - 29.0 mmol/L Final   07/01/2024 25.0 22.0 - 29.0 mmol/L Final   06/30/2024 25.0 22.0 - 29.0 mmol/L Final      BUN BUN   Date Value Ref Range Status   07/02/2024 21 8 - 23 mg/dL Final   07/01/2024 20 8 - 23 mg/dL Final   06/30/2024 19 8 - 23 mg/dL Final      Creatinine Creatinine   Date Value Ref Range Status   07/02/2024 1.05 (H) 0.57 - 1.00 mg/dL Final   07/01/2024 1.01 (H) 0.57 - 1.00 mg/dL Final   06/30/2024 0.96 0.57 - 1.00 mg/dL Final      Calcium Calcium   Date Value Ref Range Status   07/02/2024 10.4 8.6 - 10.5 mg/dL Final   07/01/2024 10.5 8.6 - 10.5 mg/dL Final   06/30/2024 11.0 (H) 8.6 - 10.5 mg/dL Final      PO4 No results found for: \"CAPO4\"   Albumin Albumin   Date Value Ref Range Status   07/02/2024 3.4 (L) 3.5 - 5.2 g/dL Final   07/01/2024 3.3 (L) 3.5 - 5.2 g/dL Final   06/30/2024 3.5 3.5 - 5.2 g/dL Final      Magnesium Magnesium   Date Value Ref Range Status   06/30/2024 2.1 1.6 - 2.4 mg/dL Final      Uric Acid No results found for: \"URICACID\"        Results Review:     I reviewed the patient's new clinical results.    bisacodyl, 10 mg, Rectal, Daily  cinacalcet, 60 mg, Oral, Daily With Breakfast  donepezil, 10 mg, Oral, Daily  memantine, 10 mg, Oral, BID             Medication Review: Reviewed    Assessment & Plan     Hypercalcemia, likely due to hyperparathyroidism, initially improved on Sensipar but up to 11.0 over the weekend, better today on higher Sensipar " dosing  CKD stage II, at baseline  Hydronephrosis - resolved after stent, outpatient urology follow-up  Constipation on Dulcolax, reports regular BMs  PRINEC- resolved.   Septic shock - resolved.   Dementia on Aricept/Namenda      Plan:   Her calcium remained stable today at 10.4  Continue Cinacalcet 60 mg daily for now  Renal function, volume and electrolytes near baseline/at goal    Discharge planning, okay from renal standpoint anytime      Frederick Kirkland MD  [facility]  07/02/24  11:40 EDT

## 2024-07-02 NOTE — PLAN OF CARE
Goal Outcome Evaluation:  Plan of Care Reviewed With: patient        Progress: improving  Outcome Evaluation: Pt received in bed and agreeable to PT. Pt required min A to reach sitting EOB. Pt stood and ambulated 4' c RW requiring min A x 2. Pt demo's a slow pace with frequent cues for sequencing. Pt sitting UIC and completed B LE ther-ex and tolerated well. RN notified on txf technique as she does not require a lift device. PT will continue to follow to address functional deficits.      Anticipated Discharge Disposition (PT): skilled nursing facility

## 2024-07-03 LAB
ALBUMIN SERPL-MCNC: 3.1 G/DL (ref 3.5–5.2)
ANION GAP SERPL CALCULATED.3IONS-SCNC: 10 MMOL/L (ref 5–15)
BUN SERPL-MCNC: 21 MG/DL (ref 8–23)
BUN/CREAT SERPL: 23.6 (ref 7–25)
CALCIUM SPEC-SCNC: 10.4 MG/DL (ref 8.6–10.5)
CHLORIDE SERPL-SCNC: 106 MMOL/L (ref 98–107)
CO2 SERPL-SCNC: 22 MMOL/L (ref 22–29)
CREAT SERPL-MCNC: 0.89 MG/DL (ref 0.57–1)
EGFRCR SERPLBLD CKD-EPI 2021: 63.6 ML/MIN/1.73
GLUCOSE SERPL-MCNC: 80 MG/DL (ref 65–99)
MAGNESIUM SERPL-MCNC: 2.1 MG/DL (ref 1.6–2.4)
PHOSPHATE SERPL-MCNC: 3.2 MG/DL (ref 2.5–4.5)
POTASSIUM SERPL-SCNC: 4.5 MMOL/L (ref 3.5–5.2)
SODIUM SERPL-SCNC: 138 MMOL/L (ref 136–145)

## 2024-07-03 PROCEDURE — 80069 RENAL FUNCTION PANEL: CPT | Performed by: INTERNAL MEDICINE

## 2024-07-03 PROCEDURE — 83735 ASSAY OF MAGNESIUM: CPT | Performed by: HOSPITALIST

## 2024-07-03 PROCEDURE — 97110 THERAPEUTIC EXERCISES: CPT

## 2024-07-03 PROCEDURE — 97530 THERAPEUTIC ACTIVITIES: CPT

## 2024-07-03 RX ADMIN — MEMANTINE HYDROCHLORIDE 10 MG: 10 TABLET, FILM COATED ORAL at 08:35

## 2024-07-03 RX ADMIN — MEMANTINE HYDROCHLORIDE 10 MG: 10 TABLET, FILM COATED ORAL at 20:39

## 2024-07-03 RX ADMIN — BISACODYL 10 MG: 10 SUPPOSITORY RECTAL at 08:36

## 2024-07-03 RX ADMIN — DONEPEZIL HYDROCHLORIDE 10 MG: 10 TABLET, FILM COATED ORAL at 10:21

## 2024-07-03 RX ADMIN — CINACALCET 60 MG: 30 TABLET ORAL at 08:35

## 2024-07-03 NOTE — PROGRESS NOTES
"Continued Stay Note  [facility]     Patient Name: Kimberlee Urrutia  MRN: [id number]  Today's Date: 7/3/2024    Admit Date: 6/1/2024    Plan: PENDING   Discharge Plan       Row Name 07/03/24 4001       Plan    Plan Comments Spoke with Gi/legal guardian  [phone] regarding d/c planning. Per Gi she states that the social security dept has corrected the \"death date\" and it has been removed from patients Medicare profile on 6/25 and they states it could take 10 business days to generate in the system. Discussed the options to temporarily private pay for SNF and per guardian they do not have the funds to do that at this time. Pt also can not return to [facility] not able to care for herself. PLan will be to f/u with [facility] to see when able to accept when  has updated patient profile. CCP to check with [facility] 7/5am. Plan remains to d/c to [facility] when approved and bed available.                   Discharge Codes    No documentation.                 Expected Discharge Date and Time       Expected Discharge Date Expected Discharge Time    Jul 3, 2024               Rosalina Escobedo RN    "

## 2024-07-03 NOTE — PROGRESS NOTES
"RENAL/KCC:     LOS: 31 days    Patient Care Team:  Meche Ji MD as PCP - General (Internal Medicine)  Josue Wilson MD as Consulting Physician (Orthopedic Surgery)  Yash Wilson MD as Consulting Physician (Gastroenterology)  Avi Ferris MD (Ophthalmology)  Tommy French MD (Inactive) as Consulting Physician (Otolaryngology)  Arnaud Lu MD as Consulting Physician (Urology)  Nu Partida MD as Consulting Physician (Pain Medicine)  Ki Small MD as Consulting Physician (Neurology)    Chief Complaint:  Hypercalcemia    Subjective     Interval History:   7/1: Pleasant, denies any complaints.  No shortness of breath chest pain fevers chills or edema  Received IV fluids and Sensipar dose was increased over the weekend due to rising calcium levels  She is asymptomatic    7/2: No new complaints or events.  She states she has no appetite but denies nausea  No dyspnea or edema    7/3 no acute events overnight. Feeling ok.     Objective     Vital Sign Min/Max for last 24 hours  Temp  Min: 97 °F (36.1 °C)  Max: 98.2 °F (36.8 °C)   BP  Min: 111/80  Max: 133/69   Pulse  Min: 73  Max: 85   Resp  Min: 16  Max: 16   SpO2  Min: 92 %  Max: 99 %   No data recorded   Weight  Min: 73.1 kg (161 lb 2.5 oz)  Max: 73.1 kg (161 lb 2.5 oz)     Flowsheet Rows      Flowsheet Row First Filed Value   Admission Height 158.8 cm (62.5\") Documented at 06/01/2024 2030   Admission Weight 81 kg (178 lb 9.2 oz) Documented at 06/01/2024 2030            No intake/output data recorded.  I/O last 3 completed shifts:  In: 474 [P.O.:474]  Out: 500 [Urine:500]    Physical Exam:  GEN: Awake, NAD  ENT: PERRL, EOMI, MMM  NECK: Supple, no JVD  CHEST: CTAB, no W/R/C  CV: RRR, no M/G/R  ABD: Soft, NT, +BS  SKIN: Warm and Dry  NEURO: CN's intact      WBC No results found for: \"WBC\"   HGB No results found for: \"HGB\"   HCT No results found for: \"HCT\"   Platlets No results found for: \"LABPLAT\"   MCV No results found " "for: \"MCV\"       Sodium Sodium   Date Value Ref Range Status   07/03/2024 138 136 - 145 mmol/L Final   07/02/2024 139 136 - 145 mmol/L Final   07/01/2024 140 136 - 145 mmol/L Final      Potassium Potassium   Date Value Ref Range Status   07/03/2024 4.5 3.5 - 5.2 mmol/L Final     Comment:     Slight hemolysis detected by analyzer. Result may be falsely elevated.   07/02/2024 4.0 3.5 - 5.2 mmol/L Final   07/01/2024 4.4 3.5 - 5.2 mmol/L Final      Chloride Chloride   Date Value Ref Range Status   07/03/2024 106 98 - 107 mmol/L Final   07/02/2024 105 98 - 107 mmol/L Final   07/01/2024 107 98 - 107 mmol/L Final      CO2 CO2   Date Value Ref Range Status   07/03/2024 22.0 22.0 - 29.0 mmol/L Final   07/02/2024 23.0 22.0 - 29.0 mmol/L Final   07/01/2024 25.0 22.0 - 29.0 mmol/L Final      BUN BUN   Date Value Ref Range Status   07/03/2024 21 8 - 23 mg/dL Final   07/02/2024 21 8 - 23 mg/dL Final   07/01/2024 20 8 - 23 mg/dL Final      Creatinine Creatinine   Date Value Ref Range Status   07/03/2024 0.89 0.57 - 1.00 mg/dL Final   07/02/2024 1.05 (H) 0.57 - 1.00 mg/dL Final   07/01/2024 1.01 (H) 0.57 - 1.00 mg/dL Final      Calcium Calcium   Date Value Ref Range Status   07/03/2024 10.4 8.6 - 10.5 mg/dL Final   07/02/2024 10.4 8.6 - 10.5 mg/dL Final   07/01/2024 10.5 8.6 - 10.5 mg/dL Final      PO4 No results found for: \"CAPO4\"   Albumin Albumin   Date Value Ref Range Status   07/03/2024 3.1 (L) 3.5 - 5.2 g/dL Final   07/02/2024 3.4 (L) 3.5 - 5.2 g/dL Final   07/01/2024 3.3 (L) 3.5 - 5.2 g/dL Final      Magnesium Magnesium   Date Value Ref Range Status   07/03/2024 2.1 1.6 - 2.4 mg/dL Final      Uric Acid No results found for: \"URICACID\"        Results Review:     I reviewed the patient's new clinical results.    bisacodyl, 10 mg, Rectal, Daily  cinacalcet, 60 mg, Oral, Daily With Breakfast  donepezil, 10 mg, Oral, Daily  memantine, 10 mg, Oral, BID             Medication Review: Reviewed    Assessment & Plan     Hypercalcemia, " likely due to hyperparathyroidism, initially improved on Sensipar but up to 11.0 over the weekend, better today on higher Sensipar dosing  CKD stage II, at baseline  Hydronephrosis - resolved after stent, outpatient urology follow-up  Constipation on Dulcolax, reports regular BMs  PRINCE- resolved.   Septic shock - resolved.   Dementia on Aricept/Namenda      Plan:   Her calcium remained stable today at 10.4  Continue Cinacalcet 60 mg daily for now. Might need to be titrated as outpatient, BID dosing might be needed.   Renal function, volume and electrolytes near baseline/at goal    Discharge planning, okay from renal standpoint anytime      Tyra Rivero MD  [facility]  07/03/24  07:39 EDT

## 2024-07-03 NOTE — PROGRESS NOTES
[facility]    [phone]   LOS: 31 days     Name: Kimberlee Urrutia  Age/Sex: 84 y.o. female  :  1940        PCP: Meche Ji MD  Chief Complaint   Patient presents with    Back Pain      Subjective   Resting in the chair this morning in no distress no issues overnight    bisacodyl, 10 mg, Rectal, Daily  cinacalcet, 60 mg, Oral, Daily With Breakfast  donepezil, 10 mg, Oral, Daily  memantine, 10 mg, Oral, BID             Objective   Vital Signs  Temp:  [97.3 °F (36.3 °C)-98.2 °F (36.8 °C)] 97.3 °F (36.3 °C)  Heart Rate:  [77-97] 97  Resp:  [16] 16  BP: (114-133)/(62-79) 123/79  Body mass index is 29.01 kg/m².    Intake/Output Summary (Last 24 hours) at 7/3/2024 1203  Last data filed at 7/3/2024 0454  Gross per 24 hour   Intake --   Output 200 ml   Net -200 ml       Physical Exam  Vitals and nursing note reviewed.   Constitutional:       General: She is not in acute distress.     Appearance: She is obese. She is ill-appearing.   Cardiovascular:      Rate and Rhythm: Normal rate and regular rhythm.   Pulmonary:      Effort: No respiratory distress.      Breath sounds: Normal breath sounds.   Abdominal:      General: Bowel sounds are normal.      Palpations: Abdomen is soft.   Neurological:      General: No focal deficit present.      Mental Status: She is alert. Mental status is at baseline.           Results Review:       I reviewed the patient's new clinical results.      Results from last 7 days   Lab Units 24  0442 24  0348 24  0324 24  0549 24  0550 24  0410 24  1707 24  0359   SODIUM mmol/L 138 139 140 136 139 139  --  141   POTASSIUM mmol/L 4.5 4.0 4.4 4.4 4.4 4.3  --  4.4   CHLORIDE mmol/L 106 105 107 104 106 108*  --  111*   CO2 mmol/L 22.0 23.0 25.0 25.0 25.0 24.0  --  22.5   BUN mg/dL 21 21 20 19 17 16  --  15   CREATININE mg/dL 0.89 1.05* 1.01* 0.96 0.88 0.86  --  0.84   CALCIUM mg/dL 10.4 10.4 10.5 11.0* 10.6* 9.9  --   10.1   MAGNESIUM mg/dL 2.1  --   --  2.1  --  2.0  --   --    PHOSPHORUS mg/dL 3.2 2.8 3.4 2.9 3.4 3.7 3.3 2.1*   Estimated Creatinine Clearance: 43.8 mL/min (by C-G formula based on SCr of 0.89 mg/dL).      Assessment & Plan   Active Hospital Problems    Diagnosis  POA    **Right ureteral stone [N20.1]  Yes    Severe malnutrition [E43]  Yes    Hypercalcemia [E83.52]  No    Constipation [K59.00]  Clinically Undetermined    Acute UTI (urinary tract infection) [N39.0]  Yes    Hypoglycemia [E16.2]  Yes    Abnormal LFTs [R79.89]  Yes    Chronic bilateral low back pain without sciatica [M54.50, G89.29]  Yes    CAYLA (dementia of Alzheimer type) [G30.9, F02.80]  Yes    Lumbar spinal stenosis [M48.061]  Yes    Thrombocytopenia [D69.6]  Yes    Acute low back pain [M54.50]  Yes      Resolved Hospital Problems    Diagnosis Date Resolved POA    Shock, septic [A41.9, R65.21] 06/03/2024 Yes    PRINCE (acute kidney injury) [N17.9] 06/06/2024 Yes       PLAN  -Calcium stabilized.  Sensipar adjusted appreciate nephrology assistance, cleared for DC from their perspective  -She is completed antibiotics for urinary tract infection.  -Noted plans for outpatient ureteroscopy and lithotripsy  -At this point she stable to discharge but is waiting on placement and insurance issues  -Mechanical DVT prophylaxis  -DNR    Disposition  Expected Discharge Date: 7/3/2024; Expected Discharge Time:    Discussed with CCP.  Plans are to discuss with patient's family regarding private pay for nursing facility.    Josue Mccormick MD  [facility]  07/03/24  12:03 EDT

## 2024-07-03 NOTE — PLAN OF CARE
Goal Outcome Evaluation:  Plan of Care Reviewed With: patient        Progress: no change  Outcome Evaluation: VSS. flat affect. denies pain. Purewick in place.  Z guard applied to gluteal area. turned Q 2 hrs. Falls and Isolation precaution maintained. to monitor electrolytes per protocol.    waiting for placement to be arranged

## 2024-07-03 NOTE — PLAN OF CARE
Goal Outcome Evaluation:  Plan of Care Reviewed With: patient        Progress: no change  Outcome Evaluation: Pt agreeable to skilled PT, appears pleasantly confused, followed simple commands. Req'ed Mod  A for bed mobility, min/MOd A x 2 sit<->standing w walker, ambulated OOB to chair min/Mod A  x 2 walker, Vcs for improved posturing. Recommend SNU.      Anticipated Discharge Disposition (PT): skilled nursing facility

## 2024-07-03 NOTE — THERAPY TREATMENT NOTE
Acute Care - Physical Therapy Treatment Note  [facility]     Patient Name: Kimberlee Urrutia  : 1940  MRN: [id number]  Today's Date: 7/3/2024      Visit Dx:     ICD-10-CM ICD-9-CM   1. Right ureteral stone  N20.1 592.1   2. Acute UTI  N39.0 599.0   3. Severe sepsis  A41.9 038.9    R65.20 995.92   4. Elevated lactic acid level  R79.89 276.2   5. Osteomyelitis of lumbar spine  M46.26 730.28   6. Elevated AST (SGOT)  R74.01 790.4   7. Elevated ALT measurement  R74.01 790.4   8. Elevated C-reactive protein (CRP)  R79.82 790.95   9. PRINCE (acute kidney injury)  N17.9 584.9     Patient Active Problem List   Diagnosis    Arthritis    HLD (hyperlipidemia)    Primary hypertension    Health care maintenance    Knee pain, right    Hx of total knee arthroplasty    Seasonal allergies    Vitamin D deficiency    Pyuria    Transient alteration of awareness    Migraine without status migrainosus, not intractable    Leukocytosis    Viral pharyngitis    Multinodular thyroid    Osteopenia of multiple sites    Colon cancer screening    Serum calcium elevated    Acute low back pain    Elevated procalcitonin    Sepsis due to Escherichia coli with acute renal failure without septic shock    Thrombocytopenia    Recurrent oral herpes simplex    Lumbar spinal stenosis    Metabolic encephalopathy    Renal stones    Colon cancer screening    Sacroiliac joint pain    Lumbar stenosis without neurogenic claudication    Renal stone    Hyperlipidemia    History of total knee arthroplasty    Spinal stenosis of lumbar region    Multinodular goiter    Osteopenia    Pain in right knee    Sacroiliac joint pain    Hypercalcemia    CAYLA (dementia of Alzheimer type)    Chronic bilateral low back pain without sciatica    Right ureteral stone    Discitis of lumbar region    Acute UTI (urinary tract infection)    Hypoglycemia    Abnormal LFTs    Hypercalcemia    Constipation    Severe malnutrition     Past Medical History:   Diagnosis Date    Allergic      Arthritis     Colon cancer screening 01/29/2020    Colon cancer screening 01/29/2020    Cough     Diarrhea     Hx of migraine headaches     Hyperlipidemia     Irregular heart beat     Osteoarthritis     Renal stones 02/2021    Vitamin D deficiency      Past Surgical History:   Procedure Laterality Date    COLONOSCOPY      CYSTOSCOPY W/ URETERAL STENT PLACEMENT Right 6/2/2024    Procedure: CYSTOSCOPY, RIGHT URETERAL STENT PLACEMENT;  Surgeon: Efra Schofield MD;  Location: [facility] MAIN OR;  Service: Urology;  Laterality: Right;    LUMBAR EPIDURAL INJECTION N/A 8/2/2021    Procedure: LUMBAR EPIDURAL 1ST VISIT 5-1;  Surgeon: Nu Partida MD;  Location: [facility] MAIN OR;  Service: Pain Management;  Laterality: N/A;    REPLACEMENT TOTAL KNEE Right 01/2015    Josue Wilson MD    SACROILIAC JOINT INJECTION Left 6/16/2021    Procedure: SACROILIAC INJECTION left;  Surgeon: Nu Partida MD;  Location: [facility] MAIN OR;  Service: Pain Management;  Laterality: Left;    TUBAL ABDOMINAL LIGATION      URETEROSCOPY LASER LITHOTRIPSY WITH STENT INSERTION Left 2/23/2021    Procedure: LT.URETEROSCOPY LASER LITHOTRIPSY WITH STENT  FOR STONE;  Surgeon: Arnaud Lu MD;  Location: [facility] OR;  Service: Urology;  Laterality: Left;    WISDOM TOOTH EXTRACTION       PT Assessment (Last 12 Hours)       PT Evaluation and Treatment       Row Name 07/03/24 1400          Physical Therapy Time and Intention    Subjective Information no complaints  -     Document Type therapy note (daily note)  -     Mode of Treatment individual therapy;physical therapy  -     Patient Effort good  -       Row Name 07/03/24 1400          General Information    Patient Profile Reviewed yes  -     Existing Precautions/Restrictions fall  -     Barriers to Rehab cognitive status  -       Row Name 07/03/24 1400          Pain    Pretreatment Pain Rating 0/10 - no pain  -     Posttreatment Pain Rating 0/10 - no pain  -        [facility] Name 07/03/24 1400          Cognition    Orientation Status (Cognition) oriented to;person;verbal cues/prompts needed for orientation  -     Cognitive Function executive function deficit;memory deficit  -[facility] Name 07/03/24 1400          Bed Mobility    Supine-Sit T[place] (Bed Mobility) moderate assist (50% patient effort);set up;verbal cues;nonverbal cues (demo/gesture)  -     Sit-Supine [place] (Bed Mobility) not tested  -     Assistive Device (Bed Mobility) bed rails;draw sheet;head of bed elevated  -[facility] Name 07/03/24 1400          Transfers    Transfers stand-sit transfer;sit-stand transfer  -LH       Row Name 07/03/24 1400          Sit-Stand Transfer    Sit-Stand [place] (Transfers) minimum assist (75% patient effort);moderate assist (50% patient effort);set up;verbal cues;nonverbal cues (demo/gesture)  -     Assistive Device (Sit-Stand Transfers) walker, front-wheeled  -[facility] Name 07/03/24 1400          Stand-Sit Transfer    Stand-Sit Taney (Transfers) minimum assist (75% patient effort);moderate assist (50% patient effort);2 person assist;set up;verbal cues;nonverbal cues (demo/gesture)  -[facility] Name 07/03/24 1400          Gait/Stairs (Locomotion)    [place] Level (Gait) minimum assist (75% patient effort);moderate assist (50% patient effort);set up;verbal cues;nonverbal cues (demo/gesture);2 person assist  -     Assistive Device (Gait) walker, front-wheeled  -     Distance in Feet (Gait) 5  -     Pattern (Gait) step-to;step-through  -     Deviations/Abnormal Patterns (Gait) bilateral deviations;weight shifting decreased;stride length decreased;gait speed decreased;festinating/shuffling  -     Bilateral Gait Deviations forward flexed posture;heel strike decreased  -     Comment, (Gait/Stairs) ambulated bed to chair, VCs for improved posturing  -[facility] Name 07/03/24 1400          Balance    Static Sitting Balance contact  guard  -     Position, Sitting Balance supported;sitting edge of bed  -     Static Standing Balance minimal assist;moderate assist;2-person assist;set-up;verbal cues;non-verbal cues (demo/gesture)  -     Position/Device Used, Standing Balance supported;walker, front-wheeled  -       Row Name 07/03/24 1400          Motor Skills    Therapeutic Exercise --  Aps, LAQ s x 10  -       Row Name             Wound 06/22/24 1158 gluteal    Wound - Properties Group Placement Date: 06/22/24  -MB Placement Time: 1158  -MB Location: gluteal  -MB    Retired Wound - Properties Group Placement Date: 06/22/24  -MB Placement Time: 1158  -MB Location: gluteal  -MB    Retired Wound - Properties Group Date first assessed: 06/22/24  -MB Time first assessed: 1158  -MB Location: gluteal  -MB      Row Name 07/03/24 1400          Plan of Care Review    Plan of Care Reviewed With patient  -     Progress no change  -     Outcome Evaluation Pt agreeable to skilled PT, appears pleasantly confused, followed simple commands. Req'ed Mod  A for bed mobility, min/MOd A x 2 sit<->standing w walker, ambulated OOB to chair min/Mod A  x 2 walker, Vcs for improved posturing. Recommend SNU.  -       Row Name 07/03/24 1400          Positioning and Restraints    Pre-Treatment Position in bed  -     Post Treatment Position chair  -     In Chair reclined;call light within reach;encouraged to call for assist;exit alarm on;RUE elevated;LUE elevated;legs elevated  -               User Key  (r) = Recorded By, (t) = Taken By, (c) = Cosigned By      Initials Name Provider Type     Angelita Mcintyre, PT Physical Therapist    Gayatri Velasquez, RN Registered Nurse                    Physical Therapy Education       Title: PT OT SLP Therapies (In Progress)       Topic: Physical Therapy (In Progress)       Point: Mobility training (In Progress)       Learning Progress Summary             Patient Acceptance, E, NR by  at 7/3/2024 1504    Acceptance,  E,TB, VU,DU by CS at 7/2/2024 1312    Acceptance, E, VU,NR by SM at 7/1/2024 1143    Acceptance, E,TB, VU,NR by CB at 6/28/2024 1406    Acceptance, E,TB,D, NR by CB at 6/26/2024 1139    Acceptance, E,D, VU,NR by EB at 6/21/2024 1122    Acceptance, E,D, NR by EF at 6/19/2024 0931    Acceptance, E, VU,NR by SV at 6/16/2024 1531    Acceptance, E,TB, NR by BK at 6/14/2024 1715    Acceptance, E,D, VU,NR by EB at 6/14/2024 1641    Acceptance, E,D, NR by EB at 6/13/2024 1514    Acceptance, E,D, NR by EB at 6/12/2024 1643    Acceptance, E,TB, VU by RD at 6/11/2024 1011    Acceptance, E,TB, VU,NR by CB at 6/10/2024 1149    Acceptance, E, VU by EM at 6/7/2024 1622    Acceptance, E, NR by EM at 6/6/2024 1605    Acceptance, E,TB, VU by BK at 6/5/2024 1700    Acceptance, E, NR by EM at 6/5/2024 1640                         Point: Home exercise program (In Progress)       Learning Progress Summary             Patient Acceptance, E, NR by  at 7/3/2024 1504    Acceptance, E,TB, VU,DU by CS at 7/2/2024 1312    Acceptance, E, VU,NR by SM at 7/1/2024 1143    Acceptance, E,D, VU,NR by EB at 6/21/2024 1122    Acceptance, E,D, NR by EF at 6/19/2024 0931    Acceptance, E, VU,NR by SV at 6/16/2024 1531    Acceptance, E,TB, NR by BK at 6/14/2024 1715    Acceptance, E,TB, VU by BK at 6/13/2024 1655    Acceptance, E,TB, VU by RD at 6/11/2024 1011    Acceptance, E,TB, VU,NR by CB at 6/10/2024 1149    Acceptance, E, VU by EM at 6/7/2024 1622    Acceptance, E, NR by EM at 6/6/2024 1605                         Point: Body mechanics (In Progress)       Learning Progress Summary             Patient Acceptance, E, NR by LH at 7/3/2024 1504    Acceptance, E,TB, VU,DU by CS at 7/2/2024 1312    Acceptance, E, VU,NR by SM at 7/1/2024 1143    Acceptance, E,TB, VU,NR by CB at 6/28/2024 1406    Acceptance, E,TB,D, NR by CB at 6/26/2024 1139    Acceptance, E,D, VU,NR by EB at 6/21/2024 1122    Acceptance, E,D, NR by EF at 6/19/2024 0931    Acceptance,  E,TB, NR by BK at 6/14/2024 1715    Acceptance, E,D, VU,NR by EB at 6/14/2024 1641    Acceptance, E,D, NR by EB at 6/13/2024 1514    Acceptance, E,D, NR by EB at 6/12/2024 1643    Acceptance, E,TB, VU by RD at 6/11/2024 1011    Acceptance, E,TB, VU,NR by CB at 6/10/2024 1149                         Point: Precautions (In Progress)       Learning Progress Summary             Patient Acceptance, E, NR by LH at 7/3/2024 1504    Acceptance, E,TB, VU,DU by CS at 7/2/2024 1312    Acceptance, E, VU,NR by SM at 7/1/2024 1143    Acceptance, E,TB, VU,NR by CB at 6/28/2024 1406    Acceptance, E,D, NR by EB at 6/13/2024 1514    Acceptance, E,TB, VU by RD at 6/11/2024 1011    Acceptance, E,TB, VU,NR by CB at 6/10/2024 1149                                         User Key       Initials Effective Dates Name Provider Type Discipline    RD 06/16/21 -  Leonela Acuna, PT Physical Therapist PT    LH 06/16/21 -  Angelita Mcintyre, PT Physical Therapist PT    EF 05/31/24 -  Marci Mckenzie, PT Physical Therapist PT    EM 06/16/21 -  Yvette Guajardo, PT Physical Therapist PT    SV 07/11/23 -  Velma Georges, PT Physical Therapist PT    EB 02/14/23 -  Nancy Bishop PTA Physical Therapist Assistant PT    CB 10/22/21 -  Kaitlin Cunningham, PT Physical Therapist PT    SM 05/02/22 -  Nahed Santiago, PT Physical Therapist PT    CS 09/22/22 -  Zena Malone, PT Physical Therapist PT    BK 04/30/24 -  Nu Leon RN Extern Registered Nurse Nurse                  PT Recommendation and Plan  Anticipated Discharge Disposition (PT): skilled nursing facility  Plan of Care Reviewed With: patient  Progress: no change  Outcome Evaluation: Pt agreeable to skilled PT, appears pleasantly confused, followed simple commands. Req'ed Mod  A for bed mobility, min/MOd A x 2 sit<->standing w walker, ambulated OOB to chair min/Mod A  x 2 walker, Vcs for improved posturing. Recommend SNU.   Outcome Measures       Row Name 07/03/24 1500              How much help from another person do you currently need...    Turning from your back to your side while in flat bed without using bedrails? 2  -LH      Moving from lying on back to sitting on the side of a flat bed without bedrails? 2  -LH      Moving to and from a bed to a chair (including a wheelchair)? 2  -LH      Standing up from a chair using your arms (e.g., wheelchair, bedside chair)? 2  -LH      Climbing 3-5 steps with a railing? 2  -LH      To walk in hospital room? 2  -      AM-PAC 6 Clicks Score (PT) 12  -      Highest Level of Mobility Goal 4 --> Transfer to chair/commode  -                User Key  (r) = Recorded By, (t) = Taken By, (c) = Cosigned By      Initials Name Provider Type     Angelita Mcintyre, PT Physical Therapist                     Time Calculation:    PT Charges       Row Name 07/03/24 1505             Time Calculation    Start Time 1335  -      Stop Time 1400  -      Time Calculation (min) 25 min  -      PT Received On 07/03/24  -      PT - Next Appointment 07/05/24  -                User Key  (r) = Recorded By, (t) = Taken By, (c) = Cosigned By      Initials Name Provider Type     Angelita Mcintyre PT Physical Therapist                  Therapy Charges for Today       Code Description Service Date Service Provider Modifiers Qty    19438661168  PT THER PROC EA 15 MIN 7/3/2024 Angelita Mcintyre, PT GP 1    [id number]  PT THERAPEUTIC ACT EA 15 MIN 7/3/2024 Angelita Mcintyre, PT GP 1    [id number]  PT THER SUPP EA 15 MIN 7/3/2024 Angelita Mcintyre, PT GP 1            PT G-Codes  Outcome Measure Options: AM-PAC 6 Clicks Basic Mobility (PT)  AM-PAC 6 Clicks Score (PT): 12  AM-PAC 6 Clicks Score (OT): 11  Modified [place] Scale: 4 - Moderately severe disability.  Unable to walk without assistance, and unable to attend to own bodily needs without assistance.    Angelita Mcintyre PT  7/3/2024

## 2024-07-03 NOTE — PLAN OF CARE
Goal Outcome Evaluation:  Flat affect, pleasant, cooperative, woked with PT up in chair for long times, back to bed with assist of 2, no c/o pain, purewick in use, contact isolation maintained, falls protocol, bed/chair alarm in use, awaiting placement once insurance is reinstated  Plan of Care Reviewed With: patient

## 2024-07-04 LAB
ALBUMIN SERPL-MCNC: 3.3 G/DL (ref 3.5–5.2)
ANION GAP SERPL CALCULATED.3IONS-SCNC: 17.2 MMOL/L (ref 5–15)
BUN SERPL-MCNC: 23 MG/DL (ref 8–23)
BUN/CREAT SERPL: 23.7 (ref 7–25)
CALCIUM SPEC-SCNC: 10.1 MG/DL (ref 8.6–10.5)
CHLORIDE SERPL-SCNC: 106 MMOL/L (ref 98–107)
CO2 SERPL-SCNC: 15.8 MMOL/L (ref 22–29)
CREAT SERPL-MCNC: 0.97 MG/DL (ref 0.57–1)
EGFRCR SERPLBLD CKD-EPI 2021: 57.4 ML/MIN/1.73
GLUCOSE SERPL-MCNC: 90 MG/DL (ref 65–99)
MAGNESIUM SERPL-MCNC: 1.9 MG/DL (ref 1.6–2.4)
PHOSPHATE SERPL-MCNC: 3.2 MG/DL (ref 2.5–4.5)
POTASSIUM SERPL-SCNC: 4 MMOL/L (ref 3.5–5.2)
SODIUM SERPL-SCNC: 139 MMOL/L (ref 136–145)

## 2024-07-04 PROCEDURE — 83735 ASSAY OF MAGNESIUM: CPT | Performed by: HOSPITALIST

## 2024-07-04 PROCEDURE — 80069 RENAL FUNCTION PANEL: CPT | Performed by: INTERNAL MEDICINE

## 2024-07-04 RX ADMIN — CINACALCET 60 MG: 30 TABLET ORAL at 12:45

## 2024-07-04 RX ADMIN — ACETAMINOPHEN 325MG 650 MG: 325 TABLET ORAL at 21:22

## 2024-07-04 RX ADMIN — ACETAMINOPHEN 325MG 650 MG: 325 TABLET ORAL at 08:24

## 2024-07-04 RX ADMIN — MEMANTINE HYDROCHLORIDE 10 MG: 10 TABLET, FILM COATED ORAL at 21:22

## 2024-07-04 RX ADMIN — BISACODYL 10 MG: 10 SUPPOSITORY RECTAL at 08:24

## 2024-07-04 RX ADMIN — MEMANTINE HYDROCHLORIDE 10 MG: 10 TABLET, FILM COATED ORAL at 08:24

## 2024-07-04 RX ADMIN — DONEPEZIL HYDROCHLORIDE 10 MG: 10 TABLET, FILM COATED ORAL at 08:24

## 2024-07-04 NOTE — PLAN OF CARE
Goal Outcome Evaluation:  VSS, confused, disoriented to place, time, & situation, purewick in use, bottom pink, cream applied, turned q 2 hr, up with assist of 2, gait belt & walker, did well with several ques, reminders to stand tall, BM x 1, pure wik changed after rivka care performed, not eating much, multiple suggestions of food choices, agreed to a peanut butter & jelly sandwich but then only took a bite or 2, will drink the boost, falls protocol maintained, bed alarm in use, did not want to sit up in chair today, enc activity and nutrition  Plan of Care Reviewed With: patient

## 2024-07-04 NOTE — PLAN OF CARE
Goal Outcome Evaluation:  Plan of Care Reviewed With: patient        Progress: no change  Outcome Evaluation: VSS. denies pain.  Purewick in place. to monitor electrolytes per protocol.  falls and Isolation precaution maintained.  Waiting for placement to be arranged

## 2024-07-04 NOTE — PROGRESS NOTES
[facility] Care    [phone]   LOS: 32 days     Name: Kimberlee Urrutia  Age/Sex: 84 y.o. female  :  1940        PCP: Meche Ji MD  Chief Complaint   Patient presents with    Back Pain      Subjective   Resting in the bed this morning in no distress no issues overnight    bisacodyl, 10 mg, Rectal, Daily  cinacalcet, 60 mg, Oral, Daily With Breakfast  donepezil, 10 mg, Oral, Daily  memantine, 10 mg, Oral, BID             Objective   Vital Signs  Temp:  [96.7 °F (35.9 °C)-98.4 °F (36.9 °C)] 98.4 °F (36.9 °C)  Heart Rate:  [78-88] 88  Resp:  [16] 16  BP: (116-122)/(64-76) 116/64  Body mass index is 29.09 kg/m².    Intake/Output Summary (Last 24 hours) at 2024 1149  Last data filed at 2024 1131  Gross per 24 hour   Intake 476 ml   Output 1300 ml   Net -824 ml       Physical Exam  Vitals and nursing note reviewed.   Constitutional:       General: She is not in acute distress.     Appearance: She is obese. She is ill-appearing.   Cardiovascular:      Rate and Rhythm: Normal rate and regular rhythm.   Pulmonary:      Effort: No respiratory distress.      Breath sounds: Normal breath sounds.   Abdominal:      General: Bowel sounds are normal.      Palpations: Abdomen is soft.   Neurological:      General: No focal deficit present.      Mental Status: She is alert. Mental status is at baseline.           Results Review:       I reviewed the patient's new clinical results.      Results from last 7 days   Lab Units 24  0445 24  0442 24  0348 24  0324 24  0549 24  0550 24  0410   SODIUM mmol/L 139 138 139 140 136 139 139   POTASSIUM mmol/L 4.0 4.5 4.0 4.4 4.4 4.4 4.3   CHLORIDE mmol/L 106 106 105 107 104 106 108*   CO2 mmol/L 15.8* 22.0 23.0 25.0 25.0 25.0 24.0   BUN mg/dL 23 21 21 20 19 17 16   CREATININE mg/dL 0.97 0.89 1.05* 1.01* 0.96 0.88 0.86   CALCIUM mg/dL 10.1 10.4 10.4 10.5 11.0* 10.6* 9.9   MAGNESIUM mg/dL 1.9 2.1  --   --  2.1  --   2.0   PHOSPHORUS mg/dL 3.2 3.2 2.8 3.4 2.9 3.4 3.7   Estimated Creatinine Clearance: 40.2 mL/min (by C-G formula based on SCr of 0.97 mg/dL).      Assessment & Plan   Active Hospital Problems    Diagnosis  POA    **Right ureteral stone [N20.1]  Yes    Severe malnutrition [E43]  Yes    Hypercalcemia [E83.52]  No    Constipation [K59.00]  Clinically Undetermined    Acute UTI (urinary tract infection) [N39.0]  Yes    Hypoglycemia [E16.2]  Yes    Abnormal LFTs [R79.89]  Yes    Chronic bilateral low back pain without sciatica [M54.50, G89.29]  Yes    CAYLA (dementia of Alzheimer type) [G30.9, F02.80]  Yes    Lumbar spinal stenosis [M48.061]  Yes    Thrombocytopenia [D69.6]  Yes    Acute low back pain [M54.50]  Yes      Resolved Hospital Problems    Diagnosis Date Resolved POA    Shock, septic [A41.9, R65.21] 06/03/2024 Yes    PRINCE (acute kidney injury) [N17.9] 06/06/2024 Yes       PLAN  -Calcium stabilized.  Sensipar adjusted appreciate nephrology assistance, cleared for DC from their perspective  -She is completed antibiotics for urinary tract infection.  -Noted plans for outpatient ureteroscopy and lithotripsy  -At this point she stable to discharge but is waiting on placement and insurance issues  -Mechanical DVT prophylaxis  -DNR    Disposition  Expected Discharge Date: 7/3/2024; Expected Discharge Time:    Waiting on the government to fix her situation    Josue Mccormick MD  [facility]  07/04/24  11:49 EDT

## 2024-07-05 LAB
ALBUMIN SERPL-MCNC: 3.2 G/DL (ref 3.5–5.2)
ANION GAP SERPL CALCULATED.3IONS-SCNC: 10.6 MMOL/L (ref 5–15)
BUN SERPL-MCNC: 20 MG/DL (ref 8–23)
BUN/CREAT SERPL: 20.6 (ref 7–25)
CALCIUM SPEC-SCNC: 9.7 MG/DL (ref 8.6–10.5)
CHLORIDE SERPL-SCNC: 106 MMOL/L (ref 98–107)
CO2 SERPL-SCNC: 21.4 MMOL/L (ref 22–29)
CREAT SERPL-MCNC: 0.97 MG/DL (ref 0.57–1)
EGFRCR SERPLBLD CKD-EPI 2021: 57.4 ML/MIN/1.73
GLUCOSE SERPL-MCNC: 89 MG/DL (ref 65–99)
PHOSPHATE SERPL-MCNC: 3.3 MG/DL (ref 2.5–4.5)
POTASSIUM SERPL-SCNC: 4.2 MMOL/L (ref 3.5–5.2)
SODIUM SERPL-SCNC: 138 MMOL/L (ref 136–145)

## 2024-07-05 PROCEDURE — 97535 SELF CARE MNGMENT TRAINING: CPT

## 2024-07-05 PROCEDURE — 97530 THERAPEUTIC ACTIVITIES: CPT

## 2024-07-05 PROCEDURE — 80069 RENAL FUNCTION PANEL: CPT | Performed by: INTERNAL MEDICINE

## 2024-07-05 RX ADMIN — MEMANTINE HYDROCHLORIDE 10 MG: 10 TABLET, FILM COATED ORAL at 09:40

## 2024-07-05 RX ADMIN — BISACODYL 10 MG: 10 SUPPOSITORY RECTAL at 09:40

## 2024-07-05 RX ADMIN — CINACALCET 60 MG: 30 TABLET ORAL at 09:40

## 2024-07-05 RX ADMIN — DONEPEZIL HYDROCHLORIDE 10 MG: 10 TABLET, FILM COATED ORAL at 09:40

## 2024-07-05 RX ADMIN — MEMANTINE HYDROCHLORIDE 10 MG: 10 TABLET, FILM COATED ORAL at 21:27

## 2024-07-05 NOTE — PLAN OF CARE
Goal Outcome Evaluation:  Pleasant and cooperative, oriented to self only, less of a flat affect, smiling occasionally and conversing a little more, inc of small BM, rivka care performed pure wick in use, up with assist of 2 & gait belt, up in chair, poor appetite, will drink Boost supplements, falls protocol, bed/chair alarm in use  Plan of Care Reviewed With: patient

## 2024-07-05 NOTE — THERAPY TREATMENT NOTE
Patient Name: Kimberlee Urrutia  : 1940    MRN: [id number]                              Today's Date: 2024       Admit Date: 2024    Visit Dx:     ICD-10-CM ICD-9-CM   1. Right ureteral stone  N20.1 592.1   2. Acute UTI  N39.0 599.0   3. Severe sepsis  A41.9 038.9    R65.20 995.92   4. Elevated lactic acid level  R79.89 276.2   5. Osteomyelitis of lumbar spine  M46.26 730.28   6. Elevated AST (SGOT)  R74.01 790.4   7. Elevated ALT measurement  R74.01 790.4   8. Elevated C-reactive protein (CRP)  R79.82 790.95   9. PRINCE (acute kidney injury)  N17.9 584.9     Patient Active Problem List   Diagnosis    Arthritis    HLD (hyperlipidemia)    Primary hypertension    Health care maintenance    Knee pain, right    Hx of total knee arthroplasty    Seasonal allergies    Vitamin D deficiency    Pyuria    Transient alteration of awareness    Migraine without status migrainosus, not intractable    Leukocytosis    Viral pharyngitis    Multinodular thyroid    Osteopenia of multiple sites    Colon cancer screening    Serum calcium elevated    Acute low back pain    Elevated procalcitonin    Sepsis due to Escherichia coli with acute renal failure without septic shock    Thrombocytopenia    Recurrent oral herpes simplex    Lumbar spinal stenosis    Metabolic encephalopathy    Renal stones    Colon cancer screening    Sacroiliac joint pain    Lumbar stenosis without neurogenic claudication    Renal stone    Hyperlipidemia    History of total knee arthroplasty    Spinal stenosis of lumbar region    Multinodular goiter    Osteopenia    Pain in right knee    Sacroiliac joint pain    Hypercalcemia    CAYLA (dementia of Alzheimer type)    Chronic bilateral low back pain without sciatica    Right ureteral stone    Discitis of lumbar region    Acute UTI (urinary tract infection)    Hypoglycemia    Abnormal LFTs    Hypercalcemia    Constipation    Severe malnutrition     Past Medical History:   Diagnosis Date    Allergic      Arthritis     Colon cancer screening 01/29/2020    Colon cancer screening 01/29/2020    Cough     Diarrhea     Hx of migraine headaches     Hyperlipidemia     Irregular heart beat     Osteoarthritis     Renal stones 02/2021    Vitamin D deficiency      Past Surgical History:   Procedure Laterality Date    COLONOSCOPY      CYSTOSCOPY W/ URETERAL STENT PLACEMENT Right 6/2/2024    Procedure: CYSTOSCOPY, RIGHT URETERAL STENT PLACEMENT;  Surgeon: Efra Schofield MD;  Location: [facility] OR;  Service: Urology;  Laterality: Right;    LUMBAR EPIDURAL INJECTION N/A 8/2/2021    Procedure: LUMBAR EPIDURAL 1ST VISIT 5-1;  Surgeon: Nu Partida MD;  [place] [facility]  Service: Pain Management;  Laterality: N/A;    REPLACEMENT TOTAL KNEE Right 01/2015    Josue Wilson MD    SACROILIAC JOINT INJECTION Left 6/16/2021    Procedure: SACROILIAC INJECTION left;  Surgeon: Nu Partida MD;  [place] [facility] [place];  Service: Pain Management;  Laterality: Left;    TUBAL ABDOMINAL LIGATION      URETEROSCOPY LASER LITHOTRIPSY WITH STENT INSERTION Left 2/23/2021    Procedure: LT.URETEROSCOPY LASER LITHOTRIPSY WITH STENT  FOR STONE;  Surgeon: Arnaud Lu MD;  Location: [facility] OR;  Service: Urology;  Laterality: Left;    WISDOM TOOTH EXTRACTION        General Information       Row Name 07/05/24 1405          Physical Therapy Time and Intention    Document Type therapy note (daily note)  -     Mode of Treatment individual therapy;physical therapy  -       Row Name 07/05/24 1405          General Information    Existing Precautions/Restrictions fall  -               User Key  (r) = Recorded By, (t) = Taken By, (c) = Cosigned By      Initials Name Provider Type    EJ Sharee Cali, PT Physical Therapist                   Mobility       Row Name 07/05/24 1405          Bed Mobility    Comment, (Bed Mobility) up in chair  -       Row Name 07/05/24 1405          Sit-Stand Transfer    Sit-Stand  [place] (Transfers) verbal cues;minimum assist (75% patient effort)  -EJ     Assistive Device (Sit-Stand Transfers) walker, front-wheeled  -EJ     Comment, (Sit-Stand Transfer) cues for hand placement and upright posture in standing.  -       Row Name 07/05/24 1403          Gait/Stairs (Locomotion)    [place] Level (Gait) verbal cues;minimum assist (75% patient effort)  -EJ     Assistive Device (Gait) walker, front-wheeled  -EJ     Distance in Feet (Gait) 15  -EJ     Deviations/Abnormal Patterns (Gait) arely decreased;stride length decreased  -EJ     Bilateral Gait Deviations forward flexed posture;heel strike decreased  -EJ     Comment, (Gait/Stairs) slow pace, cues for upright posture, no overt unsteadiness; limited by weakness/decreased activity tolerance  -EJ               User Key  (r) = Recorded By, (t) = Taken By, (c) = Cosigned By      Initials Name Provider Type    EJ Sharee Cali, PT Physical Therapist                   Obj/Interventions    No documentation.                  Goals/Plan    No documentation.                  Clinical Impression       Row Name 07/05/24 1406          Pain    Pretreatment Pain Rating 0/10 - no pain  -       Row Name 07/05/24 1402          Plan of Care Review    Plan of Care Reviewed With patient  -EJ     Progress improving  -EJ     Outcome Evaluation Pt agreeable to PT this afternoon. She is up in chair upon entry to room. Pt showing progress w mobility and requiring less assistance today. She was able to sand w min A and rwx. She does require cues for hand placement and upright posture. Pt able to ambulate approx 15 ft w Rwx and Min A. No unsteadiness noted, but she does exhibit slow pace. Limited by fatigue and weakness. Pt back in chair at end of session w all needs in reach. Plans for SNU at [place]. Will contin ue to progress as tolerated.  -       Row Name 07/05/24 1408          Positioning and Restraints    Pre-Treatment Position sitting in  chair/recliner  -EJ     Post Treatment Position chair  -EJ     In Chair notified nsg;reclined;call light within reach;encouraged to call for assist;exit alarm on  -EJ               User Key  (r) = Recorded By, (t) = Taken By, (c) = Cosigned By      Initials Name Provider Type    Sharee Barajas, PT Physical Therapist                   Outcome Measures       Row Name 07/05/24 1408          How much help from another person do you currently need...    Turning from your back to your side while in flat bed without using bedrails? 3  -EJ     Moving from lying on back to sitting on the side of a flat bed without bedrails? 2  -EJ     Moving to and from a bed to a chair (including a wheelchair)? 3  -EJ     Standing up from a chair using your arms (e.g., wheelchair, bedside chair)? 3  -EJ     Climbing 3-5 steps with a railing? 2  -EJ     To walk in hospital room? 3  -EJ     AM-PAC 6 Clicks Score (PT) 16  -EJ     Highest Level of Mobility Goal 5 --> Static standing  -EJ               User Key  (r) = Recorded By, (t) = Taken By, (c) = Cosigned By      Initials Name Provider Type    Sharee Baarjas, PT Physical Therapist                                 Physical Therapy Education       Title: PT OT SLP Therapies (In Progress)       Topic: Physical Therapy (In Progress)       Point: Mobility training (In Progress)       Learning Progress Summary             Patient Acceptance, E, NR by LH at 7/3/2024 1504    Acceptance, E,TB, VU,DU by CS at 7/2/2024 1312    Acceptance, E, VU,NR by SM at 7/1/2024 1143    Acceptance, E,TB, VU,NR by CB at 6/28/2024 1406    Acceptance, E,TB,D, NR by CB at 6/26/2024 1139    Acceptance, E,D, VU,NR by EB at 6/21/2024 1122    Acceptance, E,D, NR by EF at 6/19/2024 0931    Acceptance, E, VU,NR by SV at 6/16/2024 1531    Acceptance, E,TB, NR by BK at 6/14/2024 1715    Acceptance, E,D, VU,NR by EB at 6/14/2024 1641    Acceptance, E,D, NR by EB at 6/13/2024 1514    Acceptance, E,D, NR by EB at  6/12/2024 1643    Acceptance, E,TB, VU by RD at 6/11/2024 1011    Acceptance, E,TB, VU,NR by CB at 6/10/2024 1149    Acceptance, E, VU by EM at 6/7/2024 1622    Acceptance, E, NR by EM at 6/6/2024 1605    Acceptance, E,TB, VU by BK at 6/5/2024 1700    Acceptance, E, NR by EM at 6/5/2024 1640                         Point: Home exercise program (In Progress)       Learning Progress Summary             Patient Acceptance, E, NR by LH at 7/3/2024 1504    Acceptance, E,TB, VU,DU by CS at 7/2/2024 1312    Acceptance, E, VU,NR by SM at 7/1/2024 1143    Acceptance, E,D, VU,NR by EB at 6/21/2024 1122    Acceptance, E,D, NR by EF at 6/19/2024 0931    Acceptance, E, VU,NR by SV at 6/16/2024 1531    Acceptance, E,TB, NR by BK at 6/14/2024 1715    Acceptance, E,TB, VU by BK at 6/13/2024 1655    Acceptance, E,TB, VU by RD at 6/11/2024 1011    Acceptance, E,TB, VU,NR by CB at 6/10/2024 1149    Acceptance, E, VU by EM at 6/7/2024 1622    Acceptance, E, NR by EM at 6/6/2024 1605                         Point: Body mechanics (In Progress)       Learning Progress Summary             Patient Acceptance, E, NR by LH at 7/3/2024 1504    Acceptance, E,TB, VU,DU by CS at 7/2/2024 1312    Acceptance, E, VU,NR by SM at 7/1/2024 1143    Acceptance, E,TB, VU,NR by CB at 6/28/2024 1406    Acceptance, E,TB,D, NR by CB at 6/26/2024 1139    Acceptance, E,D, VU,NR by EB at 6/21/2024 1122    Acceptance, E,D, NR by EF at 6/19/2024 0931    Acceptance, E,TB, NR by BK at 6/14/2024 1715    Acceptance, E,D, VU,NR by EB at 6/14/2024 1641    Acceptance, E,D, NR by EB at 6/13/2024 1514    Acceptance, E,D, NR by EB at 6/12/2024 1643    Acceptance, E,TB, VU by RD at 6/11/2024 1011    Acceptance, E,TB, VU,NR by CB at 6/10/2024 1149                         Point: Precautions (In Progress)       Learning Progress Summary             Patient Acceptance, E, NR by LH at 7/3/2024 1504    Acceptance, E,TB, VU,DU by CS at 7/2/2024 1312    Acceptance, E, VU,NR by SM at  7/1/2024 1143    Acceptance, E,TB, VU,NR by CB at 6/28/2024 1406    Acceptance, E,D, NR by EB at 6/13/2024 1514    Acceptance, E,TB, VU by RD at 6/11/2024 1011    Acceptance, E,TB, VU,NR by CB at 6/10/2024 1149                                         User Key       Initials Effective Dates Name Provider Type Discipline    RD 06/16/21 -  Leonela Acuna, PT Physical Therapist PT    LH 06/16/21 -  Angelita Mcintyre, PT Physical Therapist PT    EF 05/31/24 -  Marci Mckenzie, PT Physical Therapist PT    EM 06/16/21 -  Yvette Guajardo, PT Physical Therapist PT    SV 07/11/23 -  eVlma Georges, PT Physical Therapist PT    EB 02/14/23 -  Nancy Bishop, PTA Physical Therapist Assistant PT    CB 10/22/21 -  Kaitlin Cunningham, PT Physical Therapist PT    SM 05/02/22 -  Nahed Santiago, PT Physical Therapist PT    CS 09/22/22 -  Zena Malone, PT Physical Therapist PT    BK 04/30/24 -  Nu Leon, RN Extern Registered Nurse Nurse                  PT Recommendation and Plan     Plan of Care Reviewed With: patient  Progress: improving  Outcome Evaluation: Pt agreeable to PT this afternoon. She is up in chair upon entry to room. Pt showing progress w mobility and requiring less assistance today. She was able to sand w min A and rwx. She does require cues for hand placement and upright posture. Pt able to ambulate approx 15 ft w Rwx and Min A. No unsteadiness noted, but she does exhibit slow pace. Limited by fatigue and weakness. Pt back in chair at end of session w all needs in reach. Plans for SNU at [place]. Will contin ue to progress as tolerated.     Time Calculation:         PT Charges       Row Name 07/05/24 1408             Time Calculation    Start Time 1345  -EJ      Stop Time 1403  -EJ      Time Calculation (min) 18 min  -EJ      PT Received On 07/05/24  -EJ      PT - Next Appointment 07/08/24  -EJ                User Key  (r) = Recorded By, (t) = Taken By, (c) = Cosigned By      Initials Name Provider Type     Sharee Barajas, PT Physical Therapist                  Therapy Charges for Today       Code Description Service Date Service Provider Modifiers Qty    86090450896  PT THERAPEUTIC ACT EA 15 MIN 7/5/2024 Sharee Cali, PT GP 1            PT G-Codes  Outcome Measure Options: AM-PAC 6 Clicks Basic Mobility (PT)  AM-PAC 6 Clicks Score (PT): 16  AM-PAC 6 Clicks Score (OT): 11  Modified Adriana Scale: 4 - Moderately severe disability.  Unable to walk without assistance, and unable to attend to own bodily needs without assistance.       Sharee Cali, PT  7/5/2024

## 2024-07-05 NOTE — PLAN OF CARE
Goal Outcome Evaluation:  Plan of Care Reviewed With: patient        Progress: no change  Outcome Evaluation: vss, no c/o pain, turned to sides, bed alarm, purewick in place, awaiting for placement, continue to monitor the ptl

## 2024-07-05 NOTE — PROGRESS NOTES
Continued Stay Note  [facility]     Patient Name: Kimberlee Urrutia  MRN: [id number]  Today's Date: 7/5/2024    Admit Date: 6/1/2024    Plan: PENDING   Discharge Plan       Row Name 07/05/24 1424       Plan    Plan PENDING    Plan Comments Msg sent to Kian with [employer] to check on if insurance/medicare updated                   Discharge Codes    No documentation.                 Expected Discharge Date and Time       Expected Discharge Date Expected Discharge Time    Jul 6, 2024               Kavya Gilman RN

## 2024-07-05 NOTE — PLAN OF CARE
Goal Outcome Evaluation:  Plan of Care Reviewed With: patient        Progress: improving  Outcome Evaluation: Pt agrees to OT tx on this date. Pt sitting in recliner when this OT entered room. Pt attempts to don/doff socks but unsuccessful in performing tasks. After SETUP A, Pt washed face/hands needing VC for task initiation and for thoroughness. Pt performed sit>stand to FWW needing MOD A to clear hips, balance, and safety. Pt able to ambulate ~3 feet needing MOD A for balance, safety, and FWW management. Pt returned to sitting in recliner at the end of the session.      Anticipated Discharge Disposition (OT): skilled nursing facility

## 2024-07-05 NOTE — PROGRESS NOTES
Dedicated to Hospital Care    [phone]   LOS: 33 days     Name: Kimberlee Urrutia  Age/Sex: 84 y.o. female  :  1940        PCP: Meche Ji MD  Chief Complaint   Patient presents with    Back Pain      Subjective   Resting in the bed this morning in no distress no issues overnight, frustrated with ongoing hospital stay    bisacodyl, 10 mg, Rectal, Daily  cinacalcet, 60 mg, Oral, Daily With Breakfast  donepezil, 10 mg, Oral, Daily  memantine, 10 mg, Oral, BID             Objective   Vital Signs  Temp:  [97.3 °F (36.3 °C)-98.8 °F (37.1 °C)] 97.4 °F (36.3 °C)  Heart Rate:  [74-83] 74  Resp:  [16] 16  BP: ()/(55-68) 108/58  Body mass index is 29.84 kg/m².    Intake/Output Summary (Last 24 hours) at 2024 1220  Last data filed at 2024 0928  Gross per 24 hour   Intake 456 ml   Output 750 ml   Net -294 ml       Physical Exam  Vitals and nursing note reviewed.   Constitutional:       General: She is not in acute distress.     Appearance: She is obese. She is ill-appearing.   Cardiovascular:      Rate and Rhythm: Normal rate and regular rhythm.   Pulmonary:      Effort: No respiratory distress.      Breath sounds: Normal breath sounds.   Abdominal:      General: Bowel sounds are normal.      Palpations: Abdomen is soft.   Neurological:      General: No focal deficit present.      Mental Status: She is alert. Mental status is at baseline.           Results Review:       I reviewed the patient's new clinical results.      Results from last 7 days   Lab Units 24  0436 24  0445 24  0442 24  0348 24  0324 24  0549 24  0550   SODIUM mmol/L 138 139 138 139 140 136 139   POTASSIUM mmol/L 4.2 4.0 4.5 4.0 4.4 4.4 4.4   CHLORIDE mmol/L 106 106 106 105 107 104 106   CO2 mmol/L 21.4* 15.8* 22.0 23.0 25.0 25.0 25.0   BUN mg/dL 20 23 21 21 20 19 17   CREATININE mg/dL 0.97 0.97 0.89 1.05* 1.01* 0.96 0.88   CALCIUM mg/dL 9.7 10.1 10.4 10.4 10.5 11.0* 10.6*    MAGNESIUM mg/dL  --  1.9 2.1  --   --  2.1  --    PHOSPHORUS mg/dL 3.3 3.2 3.2 2.8 3.4 2.9 3.4   Estimated Creatinine Clearance: 40.8 mL/min (by C-G formula based on SCr of 0.97 mg/dL).      Assessment & Plan   Active Hospital Problems    Diagnosis  POA    **Right ureteral stone [N20.1]  Yes    Severe malnutrition [E43]  Yes    Hypercalcemia [E83.52]  No    Constipation [K59.00]  Clinically Undetermined    Acute UTI (urinary tract infection) [N39.0]  Yes    Hypoglycemia [E16.2]  Yes    Abnormal LFTs [R79.89]  Yes    Chronic bilateral low back pain without sciatica [M54.50, G89.29]  Yes    CAYLA (dementia of Alzheimer type) [G30.9, F02.80]  Yes    Lumbar spinal stenosis [M48.061]  Yes    Thrombocytopenia [D69.6]  Yes    Acute low back pain [M54.50]  Yes      Resolved Hospital Problems    Diagnosis Date Resolved POA    Shock, septic [A41.9, R65.21] 06/03/2024 Yes    PRINCE (acute kidney injury) [N17.9] 06/06/2024 Yes       PLAN  -Calcium stabilized.  Sensipar adjusted appreciate nephrology assistance, cleared for DC from their perspective  -She is completed antibiotics for urinary tract infection.  -Noted plans for outpatient ureteroscopy and lithotripsy  -At this point she stable to discharge but is waiting on placement and insurance issues  -Mechanical DVT prophylaxis  -DNR    Disposition  Expected Discharge Date: 7/3/2024; Expected Discharge Time:    Waiting on the government to fix her situation    Josue Mccormick MD  [facility]  07/05/24  12:20 EDT

## 2024-07-05 NOTE — PROGRESS NOTES
"RENAL/KCC:     LOS: 33 days    Patient Care Team:  Meche Ji MD as PCP - General (Internal Medicine)  Josue Wilson MD as Consulting Physician (Orthopedic Surgery)  Yash Wilson MD as Consulting Physician (Gastroenterology)  Avi Ferris MD (Ophthalmology)  Tommy French MD (Inactive) as Consulting Physician (Otolaryngology)  Arnaud Lu MD as Consulting Physician (Urology)  Nu Partida MD as Consulting Physician (Pain Medicine)  Ki Small MD as Consulting Physician (Neurology)    Chief Complaint:  Hypercalcemia    Subjective     Interval History:   7/1: Pleasant, denies any complaints.  No shortness of breath chest pain fevers chills or edema  Received IV fluids and Sensipar dose was increased over the weekend due to rising calcium levels  She is asymptomatic    7/2: No new complaints or events.  She states she has no appetite but denies nausea  No dyspnea or edema    7/3 no acute events overnight. Feeling ok.     7/5: She is doing well and has no complaints states appetite is poor but she attributes that to the hospital food.    Objective     Vital Sign Min/Max for last 24 hours  Temp  Min: 97.3 °F (36.3 °C)  Max: 98.8 °F (37.1 °C)   BP  Min: 94/55  Max: 118/62   Pulse  Min: 74  Max: 83   Resp  Min: 16  Max: 16   SpO2  Min: 97 %  Max: 98 %   No data recorded   Weight  Min: 75.2 kg (165 lb 12.6 oz)  Max: 75.2 kg (165 lb 12.6 oz)     Flowsheet Rows      Flowsheet Row First Filed Value   Admission Height 158.8 cm (62.5\") Documented at 06/01/2024 2030   Admission Weight 81 kg (178 lb 9.2 oz) Documented at 06/01/2024 2030            I/O this shift:  In: 100 [P.O.:100]  Out: -   I/O last 3 completed shifts:  In: 592 [P.O.:592]  Out: 1250 [Urine:1250]    Physical Exam:  GEN: Awake, NAD  ENT: PERRL, EOMI, MMM  NECK: Supple, no JVD  CHEST: CTAB, no W/R/C  CV: RRR, no M/G/R  ABD: Soft, NT, +BS  SKIN: Warm and Dry  NEURO: CN's intact      WBC No results found for: \"WBC\" " "  HGB No results found for: \"HGB\"   HCT No results found for: \"HCT\"   Platlets No results found for: \"LABPLAT\"   MCV No results found for: \"MCV\"       Sodium Sodium   Date Value Ref Range Status   07/05/2024 138 136 - 145 mmol/L Final   07/04/2024 139 136 - 145 mmol/L Final   07/03/2024 138 136 - 145 mmol/L Final      Potassium Potassium   Date Value Ref Range Status   07/05/2024 4.2 3.5 - 5.2 mmol/L Final   07/04/2024 4.0 3.5 - 5.2 mmol/L Final   07/03/2024 4.5 3.5 - 5.2 mmol/L Final     Comment:     Slight hemolysis detected by analyzer. Result may be falsely elevated.      Chloride Chloride   Date Value Ref Range Status   07/05/2024 106 98 - 107 mmol/L Final   07/04/2024 106 98 - 107 mmol/L Final   07/03/2024 106 98 - 107 mmol/L Final      CO2 CO2   Date Value Ref Range Status   07/05/2024 21.4 (L) 22.0 - 29.0 mmol/L Final   07/04/2024 15.8 (L) 22.0 - 29.0 mmol/L Final   07/03/2024 22.0 22.0 - 29.0 mmol/L Final      BUN BUN   Date Value Ref Range Status   07/05/2024 20 8 - 23 mg/dL Final   07/04/2024 23 8 - 23 mg/dL Final   07/03/2024 21 8 - 23 mg/dL Final      Creatinine Creatinine   Date Value Ref Range Status   07/05/2024 0.97 0.57 - 1.00 mg/dL Final   07/04/2024 0.97 0.57 - 1.00 mg/dL Final   07/03/2024 0.89 0.57 - 1.00 mg/dL Final      Calcium Calcium   Date Value Ref Range Status   07/05/2024 9.7 8.6 - 10.5 mg/dL Final   07/04/2024 10.1 8.6 - 10.5 mg/dL Final   07/03/2024 10.4 8.6 - 10.5 mg/dL Final      PO4 No results found for: \"CAPO4\"   Albumin Albumin   Date Value Ref Range Status   07/05/2024 3.2 (L) 3.5 - 5.2 g/dL Final   07/04/2024 3.3 (L) 3.5 - 5.2 g/dL Final   07/03/2024 3.1 (L) 3.5 - 5.2 g/dL Final      Magnesium Magnesium   Date Value Ref Range Status   07/04/2024 1.9 1.6 - 2.4 mg/dL Final   07/03/2024 2.1 1.6 - 2.4 mg/dL Final      Uric Acid No results found for: \"URICACID\"        Results Review:     I reviewed the patient's new clinical results.    bisacodyl, 10 mg, Rectal, Daily  cinacalcet, " 60 mg, Oral, Daily With Breakfast  donepezil, 10 mg, Oral, Daily  memantine, 10 mg, Oral, BID             Medication Review: Reviewed    Assessment & Plan   Hypercalcemia, likely due to hyperparathyroidism, stabilizing with oral Sensipar  CKD stage II, at baseline  Hydronephrosis - resolved after stent, outpatient urology follow-up  Constipation on Dulcolax, reports regular BMs  PRINCE- resolved.   Septic shock - resolved.   Dementia on Aricept/Namenda      Plan:   Her calcium has improved down to 9.7 today  Renal function stable, acidosis improved  Continue Cinacalcet 60 mg daily for now.   Renal function, volume and electrolytes near baseline/at goal    Discharge planning, okay from renal standpoint anytime  No need for daily labs while waiting on a rehab bed.    Will check a renal panel again on Monday and plan to see her again at that time if she remains in the hospital      Frederick Kirkland MD  [facility]  07/05/24  13:25 EDT

## 2024-07-05 NOTE — PLAN OF CARE
Goal Outcome Evaluation:  Plan of Care Reviewed With: patient        Progress: improving  Outcome Evaluation: Pt agreeable to PT this afternoon. She is up in chair upon entry to room. Pt showing progress w mobility and requiring less assistance today. She was able to sand w min A and rwx. She does require cues for hand placement and upright posture. Pt able to ambulate approx 15 ft w Rwx and Min A. No unsteadiness noted, but she does exhibit slow pace. Limited by fatigue and weakness. Pt back in chair at end of session w all needs in reach. Plans for SNU at [place]. Will contin ue to progress as tolerated.

## 2024-07-05 NOTE — THERAPY TREATMENT NOTE
Patient Name: Kimberlee Urrutia  : 1940    MRN: [id number]                              Today's Date: 2024       Admit Date: 2024    Visit Dx:     ICD-10-CM ICD-9-CM   1. Right ureteral stone  N20.1 592.1   2. Acute UTI  N39.0 599.0   3. Severe sepsis  A41.9 038.9    R65.20 995.92   4. Elevated lactic acid level  R79.89 276.2   5. Osteomyelitis of lumbar spine  M46.26 730.28   6. Elevated AST (SGOT)  R74.01 790.4   7. Elevated ALT measurement  R74.01 790.4   8. Elevated C-reactive protein (CRP)  R79.82 790.95   9. PRINCE (acute kidney injury)  N17.9 584.9     Patient Active Problem List   Diagnosis    Arthritis    HLD (hyperlipidemia)    Primary hypertension    Health care maintenance    Knee pain, right    Hx of total knee arthroplasty    Seasonal allergies    Vitamin D deficiency    Pyuria    Transient alteration of awareness    Migraine without status migrainosus, not intractable    Leukocytosis    Viral pharyngitis    Multinodular thyroid    Osteopenia of multiple sites    Colon cancer screening    Serum calcium elevated    Acute low back pain    Elevated procalcitonin    Sepsis due to Escherichia coli with acute renal failure without septic shock    Thrombocytopenia    Recurrent oral herpes simplex    Lumbar spinal stenosis    Metabolic encephalopathy    Renal stones    Colon cancer screening    Sacroiliac joint pain    Lumbar stenosis without neurogenic claudication    Renal stone    Hyperlipidemia    History of total knee arthroplasty    Spinal stenosis of lumbar region    Multinodular goiter    Osteopenia    Pain in right knee    Sacroiliac joint pain    Hypercalcemia    CAYLA (dementia of Alzheimer type)    Chronic bilateral low back pain without sciatica    Right ureteral stone    Discitis of lumbar region    Acute UTI (urinary tract infection)    Hypoglycemia    Abnormal LFTs    Hypercalcemia    Constipation    Severe malnutrition     Past Medical History:   Diagnosis Date    Allergic      Arthritis     Colon cancer screening 01/29/2020    Colon cancer screening 01/29/2020    Cough     Diarrhea     Hx of migraine headaches     Hyperlipidemia     Irregular heart beat     Osteoarthritis     Renal stones 02/2021    Vitamin D deficiency      Past Surgical History:   Procedure Laterality Date    COLONOSCOPY      CYSTOSCOPY W/ URETERAL STENT PLACEMENT Right 6/2/2024    Procedure: CYSTOSCOPY, RIGHT URETERAL STENT PLACEMENT;  Surgeon: Efra Schofield MD;  Location: [facility] MAIN OR;  Service: Urology;  Laterality: Right;    LUMBAR EPIDURAL INJECTION N/A 8/2/2021    Procedure: LUMBAR EPIDURAL 1ST VISIT 5-1;  Surgeon: Nu Partida MD;  [place] [facility] [place];  Service: Pain Management;  Laterality: N/A;    REPLACEMENT TOTAL KNEE Right 01/2015    Josue Wilson MD    SACROILIAC JOINT INJECTION Left 6/16/2021    Procedure: SACROILIAC INJECTION left;  Surgeon: Nu Partida MD;  Location: [facility] [place] [place];  Service: Pain Management;  Laterality: Left;    TUBAL ABDOMINAL LIGATION      URETEROSCOPY LASER LITHOTRIPSY WITH STENT INSERTION Left 2/23/2021    Procedure: LT.URETEROSCOPY LASER LITHOTRIPSY WITH STENT  FOR STONE;  Surgeon: Arnaud Lu MD;  Location: [facility] OR;  Service: Urology;  Laterality: Left;    WISDOM TOOTH EXTRACTION        General Information       Row Name 07/05/24 1425          OT Time and Intention    Document Type therapy note (daily note)  -     Mode of Treatment individual therapy  -       Row Name 07/05/24 1425          General Information    Patient Profile Reviewed yes  -     Existing Precautions/Restrictions fall  -     Barriers to Rehab cognitive status  -       Row Name 07/05/24 1425          Cognition    Orientation Status (Cognition) oriented to;person;verbal cues/prompts needed for orientation  -       Row Name 07/05/24 1425          Safety Issues, Functional Mobility    Safety Issues Affecting Function (Mobility) awareness of need for  assistance;insight into deficits/self-awareness;judgment;sequencing abilities;safety precaution awareness  -     Impairments Affecting Function (Mobility) balance;cognition;endurance/activity tolerance;strength  -     Cognitive Impairments, Mobility Safety/Performance attention;awareness, need for assistance;insight into deficits/self-awareness;judgment;problem-solving/reasoning;safety precaution awareness  -               User Key  (r) = Recorded By, (t) = Taken By, (c) = Cosigned By      Initials Name Provider Type     Avi Carroll, OT Occupational Therapist                     Mobility/ADL's       Row Name 07/05/24 1426          Bed Mobility    Comment, (Bed Mobility) Pt in recliner at the start of OT session  -       Row Name 07/05/24 1426          Transfers    Transfers sit-stand transfer;stand-sit transfer  -     Comment, (Transfers) Pt required VC and TC for hand placement and body mechanics when performing sit>stand transfer  -       Row Name 07/05/24 1426          Bed-Chair Transfer    Bed-Chair [place] (Transfers) moderate assist (50% patient effort);verbal cues;1 person assist  -     Assistive Device (Bed-Chair Transfers) walker, front-wheeled  -     Comment, (Bed-Chair Transfer) Pt took 5 steps forwards and backwards using a FWW needing MOD and VC and TC for hand placement and body mechanics  -[facility] Name 07/05/24 1426          Sit-Stand Transfer    Sit-Stand [place] (Transfers) moderate assist (50% patient effort);verbal cues;1 person assist  -     Assistive Device (Sit-Stand Transfers) walker, front-wheeled  -[facility] Name 07/05/24 1426          Stand-Sit Transfer    Stand-Sit [place] (Transfers) moderate assist (50% patient effort);verbal cues;1 person assist  -     Assistive Device (Stand-Sit Transfers) walker, front-wheeled  -[facility] Name 07/05/24 1426          Functional Mobility    Functional Mobility- Ind. Level moderate assist (50% patient  effort);verbal cues required  -     Functional Mobility- Device walker, front-wheeled  -     Functional Mobility-Distance (Feet) 6  -     Patient was able to Ambulate yes  -       Row Name 07/05/24 1426          Activities of Daily Living    BADL Assessment/Intervention lower body dressing;grooming  -       Row Name 07/05/24 1426          Lower Body Dressing Assessment/Training    [place] Level (Lower Body Dressing) lower body dressing skills;doff;don;socks;dependent (less than 25% patient effort)  -     Position (Lower Body Dressing) supported sitting  -     Comment, (Lower Body Dressing) TOTAL A to don/doff socks sitting in recliner  -       Row Name 07/05/24 1426          Grooming Assessment/Training    [place] Level (Grooming) set up;minimum assist (75% patient effort);wash face, hands  -     Position (Grooming) supported sitting  -     Comment, (Grooming) After SETUP A, Pt washed face/hands sitting in recliner. Pt required VC for task initiation and for thoroughness  -               User Key  (r) = Recorded By, (t) = Taken By, (c) = Cosigned By      Initials Name Provider Type    Avi Gonzalez, OT Occupational Therapist                   Obj/Interventions    No documentation.                  Goals/Plan    No documentation.                  Clinical Impression       Row Name 07/05/24 1430          Plan of Care Review    Plan of Care Reviewed With patient  -     Progress improving  -     Outcome Evaluation Pt agrees to OT tx on this date. Pt sitting in recliner when this OT entered room. Pt attempts to don/doff socks but unsuccessful in performing tasks. After SETUP A, Pt washed face/hands needing VC for task initiation and for thoroughness. Pt performed sit>stand to FWW needing MOD A to clear hips, balance, and safety. Pt able to ambulate ~3 feet needing MOD A for balance, safety, and FWW management. Pt returned to sitting in recliner at the end of the session.  -        Row Name 07/05/24 1430          Therapy Assessment/Plan (OT)    Rehab Potential (OT) good, to achieve stated therapy goals  -     Criteria for Skilled Therapeutic Interventions Met (OT) yes;skilled treatment is necessary  -     Therapy Frequency (OT) 5 times/wk  -       Row Name 07/05/24 1430          Therapy Plan Review/Discharge Plan (OT)    Anticipated Discharge Disposition (OT) [facility] nursing facility  -[facility] Name 07/05/24 1430          Vital Signs    O2 Delivery Pre Treatment room air  -     O2 Delivery Intra Treatment room air  -     O2 Delivery Post Treatment room air  -     Pre Patient Position Sitting  -     Intra Patient Position Sitting  -     Post Patient Position Sitting  -       Row Name 07/05/24 1430          Positioning and Restraints    Pre-Treatment Position sitting in chair/recliner  -     Post Treatment Position chair  -MK     In Chair notified nsg;reclined;sitting;call light within reach;encouraged to call for assist;exit alarm on  -               User Key  (r) = Recorded By, (t) = Taken By, (c) = Cosigned By      Initials Name Provider Type    Avi Gonzalez, OT Occupational Therapist                   Outcome Measures       Row Name 07/05/24 1435          How much help from another is currently needed...    Putting on and taking off regular lower body clothing? 2  -MK     Bathing (including washing, rinsing, and drying) 2  -MK     Toileting (which includes using toilet bed pan or urinal) 2  -MK     Putting on and taking off regular upper body clothing 3  -MK     Taking care of personal grooming (such as brushing teeth) 3  -MK     Eating meals 4  -MK     AM-PAC 6 Clicks Score (OT) 16  -[facility] Name 07/05/24 1408          How much help from another person do you currently need...    Turning from your back to your side while in flat bed without using bedrails? 3  -EJ     Moving from lying on back to sitting on the side of a flat bed without bedrails? 2  -EJ      Moving to and from a bed to a chair (including a wheelchair)? 3  -EJ     Standing up from a chair using your arms (e.g., wheelchair, bedside chair)? 3  -EJ     Climbing 3-5 steps with a railing? 2  -EJ     To walk in hospital room? 3  -EJ     AM-PAC 6 Clicks Score (PT) 16  -EJ     Highest Level of Mobility Goal 5 --> Static standing  -EJ       Row Name 07/05/24 1435          Functional Assessment    Outcome Measure Options AM-PAC 6 Clicks Daily Activity (OT)  -VINCENT               User Key  (r) = Recorded By, (t) = Taken By, (c) = Cosigned By      Initials Name Provider Type    EJ Sharee Cali, PT Physical Therapist    Avi Gonzalez, OT Occupational Therapist                    Occupational Therapy Education       Title: PT OT SLP Therapies (In Progress)       Topic: Occupational Therapy (In Progress)       Point: ADL training (In Progress)       Description:   Instruct learner(s) on proper safety adaptation and remediation techniques during self care or transfers.   Instruct in proper use of assistive devices.                  Learning Progress Summary             Patient Acceptance, E, NR by  at 6/10/2024 1505                         Point: Home exercise program (In Progress)       Description:   Instruct learner(s) on appropriate technique for monitoring, assisting and/or progressing therapeutic exercises/activities.                  Learning Progress Summary             Patient Acceptance, E, NR by  at 6/10/2024 1505                         Point: Precautions (In Progress)       Description:   Instruct learner(s) on prescribed precautions during self-care and functional transfers.                  Learning Progress Summary             Patient Acceptance, E, NR by  at 6/10/2024 1505                         Point: Body mechanics (In Progress)       Description:   Instruct learner(s) on proper positioning and spine alignment during self-care, functional mobility activities and/or exercises.                   Learning Progress Summary             Patient Acceptance, E, NR by  at 6/10/2024 1505                                         User Key       Initials Effective Dates Name Provider Type Discipline     04/30/24 -  Nereyda Rothman OT Student OT Student OT                  OT Recommendation and Plan  Therapy Frequency (OT): 5 times/wk  Plan of Care Review  Plan of Care Reviewed With: patient  Progress: improving  Outcome Evaluation: Pt agrees to OT tx on this date. Pt sitting in recliner when this OT entered room. Pt attempts to don/doff socks but unsuccessful in performing tasks. After SETUP A, Pt washed face/hands needing VC for task initiation and for thoroughness. Pt performed sit>stand to FWW needing MOD A to clear hips, balance, and safety. Pt able to ambulate ~3 feet needing MOD A for balance, safety, and FWW management. Pt returned to sitting in recliner at the end of the session.     Time Calculation:         Time Calculation- OT       Row Name 07/05/24 1436             Time Calculation- OT    OT Start Time 1125  -MK      OT Stop Time 1159  -MK      OT Time Calculation (min) 34 min  -MK      OT Received On 07/05/24  -MK      OT - Next Appointment 07/06/24  -MK         Timed Charges    29335 - OT Self Care/Mgmt Minutes 34  -MK         Total Minutes    Timed Charges Total Minutes 34  -MK       Total Minutes 34  -MK                User Key  (r) = Recorded By, (t) = Taken By, (c) = Cosigned By      Initials Name Provider Type    Avi Gonzalez OT Occupational Therapist                  Therapy Charges for Today       Code Description Service Date Service Provider Modifiers Qty    [id number] HC OT SELF CARE/MGMT/TRAIN EA 15 MIN 7/5/2024 Avi Carroll OT GO 2                 Avi Carroll OT  7/5/2024

## 2024-07-05 NOTE — PROGRESS NOTES
Continued Stay Note  [facility]     Patient Name: Kimberlee Urrutia  MRN: [id number]  Today's Date: 2024    Admit Date: 2024    Plan: PENDING   Discharge Plan       Row Name 24 1639       Plan    Plan PENDING    Plan Comments Per Kian with [employer], still showing as       Row Name 24 1424       Plan    Plan PENDING    Plan Comments Msg sent to Kian with [employer] to check on if insurance/medicare updated                   Discharge Codes    No documentation.                 Expected Discharge Date and Time       Expected Discharge Date Expected Discharge Time    2024               Kavya Gilman RN

## 2024-07-06 LAB
CALCIUM SPEC-SCNC: 9.9 MG/DL (ref 8.6–10.5)
MAGNESIUM SERPL-MCNC: 2.1 MG/DL (ref 1.6–2.4)
PHOSPHATE SERPL-MCNC: 3.2 MG/DL (ref 2.5–4.5)
POTASSIUM SERPL-SCNC: 4.4 MMOL/L (ref 3.5–5.2)

## 2024-07-06 PROCEDURE — 84132 ASSAY OF SERUM POTASSIUM: CPT | Performed by: HOSPITALIST

## 2024-07-06 PROCEDURE — 83735 ASSAY OF MAGNESIUM: CPT | Performed by: HOSPITALIST

## 2024-07-06 PROCEDURE — 82310 ASSAY OF CALCIUM: CPT | Performed by: HOSPITALIST

## 2024-07-06 PROCEDURE — 84100 ASSAY OF PHOSPHORUS: CPT | Performed by: HOSPITALIST

## 2024-07-06 RX ADMIN — BISACODYL 10 MG: 10 SUPPOSITORY RECTAL at 08:36

## 2024-07-06 RX ADMIN — CINACALCET 60 MG: 30 TABLET ORAL at 08:36

## 2024-07-06 RX ADMIN — DONEPEZIL HYDROCHLORIDE 10 MG: 10 TABLET, FILM COATED ORAL at 08:36

## 2024-07-06 RX ADMIN — MEMANTINE HYDROCHLORIDE 10 MG: 10 TABLET, FILM COATED ORAL at 08:36

## 2024-07-06 NOTE — PLAN OF CARE
Goal Outcome Evaluation: Patient oriented to self only. Vss. Pt up in chair part of the day. Pt had a poor appetite today. Falls precaution maintained. Plan of care will be continued.

## 2024-07-06 NOTE — PROGRESS NOTES
[facility]    [phone]   LOS: 34 days     Name: Kimberlee Urrutia  Age/Sex: 84 y.o. female  :  1940        PCP: Meche Ji MD  Chief Complaint   Patient presents with    Back Pain      Subjective   Resting in the chair this morning in no distress no issues overnight, frustrated with ongoing hospital stay    bisacodyl, 10 mg, Rectal, Daily  cinacalcet, 60 mg, Oral, Daily With Breakfast  donepezil, 10 mg, Oral, Daily  memantine, 10 mg, Oral, BID             Objective   Vital Signs  Temp:  [97 °F (36.1 °C)-98.3 °F (36.8 °C)] 97 °F (36.1 °C)  Heart Rate:  [74-87] 74  Resp:  [16] 16  BP: (109-133)/(58-78) 124/78  Body mass index is 30.12 kg/m².    Intake/Output Summary (Last 24 hours) at 2024 1122  Last data filed at 2024 0543  Gross per 24 hour   Intake 120 ml   Output 850 ml   Net -730 ml       Physical Exam  Vitals and nursing note reviewed.   Constitutional:       General: She is not in acute distress.     Appearance: She is obese. She is ill-appearing.   Cardiovascular:      Rate and Rhythm: Normal rate and regular rhythm.   Pulmonary:      Effort: No respiratory distress.      Breath sounds: Normal breath sounds.   Abdominal:      General: Bowel sounds are normal.      Palpations: Abdomen is soft.   Neurological:      General: No focal deficit present.      Mental Status: She is alert. Mental status is at baseline.           Results Review:       I reviewed the patient's new clinical results.      Results from last 7 days   Lab Units 24  0512 24  0436 24  0445 24  0442 24  0348 24  0324 24  0549   SODIUM mmol/L  --  138 139 138 139 140 136   POTASSIUM mmol/L 4.4 4.2 4.0 4.5 4.0 4.4 4.4   CHLORIDE mmol/L  --  106 106 106 105 107 104   CO2 mmol/L  --  21.4* 15.8* 22.0 23.0 25.0 25.0   BUN mg/dL  --  20 23 21 21 20 19   CREATININE mg/dL  --  0.97 0.97 0.89 1.05* 1.01* 0.96   CALCIUM mg/dL 9.9 9.7 10.1 10.4 10.4 10.5 11.0*    MAGNESIUM mg/dL 2.1  --  1.9 2.1  --   --  2.1   PHOSPHORUS mg/dL 3.2 3.3 3.2 3.2 2.8 3.4 2.9   Estimated Creatinine Clearance: 40.9 mL/min (by C-G formula based on SCr of 0.97 mg/dL).      Assessment & Plan   Active Hospital Problems    Diagnosis  POA    **Right ureteral stone [N20.1]  Yes    Severe malnutrition [E43]  Yes    Hypercalcemia [E83.52]  No    Constipation [K59.00]  Clinically Undetermined    Acute UTI (urinary tract infection) [N39.0]  Yes    Hypoglycemia [E16.2]  Yes    Abnormal LFTs [R79.89]  Yes    Chronic bilateral low back pain without sciatica [M54.50, G89.29]  Yes    CAYLA (dementia of Alzheimer type) [G30.9, F02.80]  Yes    Lumbar spinal stenosis [M48.061]  Yes    Thrombocytopenia [D69.6]  Yes    Acute low back pain [M54.50]  Yes      Resolved Hospital Problems    Diagnosis Date Resolved POA    Shock, septic [A41.9, R65.21] 06/03/2024 Yes    PRINCE (acute kidney injury) [N17.9] 06/06/2024 Yes       PLAN  -Calcium stabilized.  Sensipar adjusted appreciate nephrology assistance, cleared for DC from their perspective  -She is completed antibiotics for urinary tract infection.  -Noted plans for outpatient ureteroscopy and lithotripsy  -At this point she stable to discharge but is waiting on placement and insurance issues  -Mechanical DVT prophylaxis  -DNR    Disposition  Expected Discharge Date: 7/7/2024; Expected Discharge Time:    Waiting on the government to fix her situation    Josue Mccormick MD  [facility]  07/06/24  11:22 EDT

## 2024-07-06 NOTE — PROGRESS NOTES
: We Reviewed.  Patient feels better and no labs today we will check one tomorrow    Vital Signs  Vitals:    07/06/24 0900   BP: 118/68   Pulse: 84   Resp: 16   Temp: 97.5 °F (36.4 °C)   SpO2: 95%     I/O this shift:  In: 440 [P.O.:440]  Out: -   I/O last 3 completed shifts:  In: 340 [P.O.:340]  Out: 1300 [Urine:1300]      Physical Exam:    General:   HEENT:  Normocephalic, Atraumatic, EOMI, PERRLA, NO icterus,  Neck:  Supple, No JVD, No Carotid artery bruit, No lymphadenopathy  Chest: Clear to auscultation bilaterally,   Cardiovascular: Regular rate and rhythm. Positive S1 & S2, No rub, murmur or gallop.  Abdominal: Soft, nontender, no palpable organomegaly, no abdominal bruit, positive bowel sounds  Musculoskeletal: No joint tenderness or swelling, good range of motion, Adequate muscle strength on both sides, no cyanosis, clubbing or edema on lower extremities.  Neuro: Alert oriented x3, CN1 - 12 intact, No focal sensory or motor deficit.      Review of Systems:   CNS: Denied headaches, blurred vision, tingling or numbness in any part of body. No weakness or any impaired speech.  CVS: No chest pain or shortness of breath, No orthopnea or PND or exertional dyspnea,  Pulmonary: Denies shortness of breath, coughs, hematemesis, night sweats or weight loss.  GI: Denies diarrhea, nausea, vomiting. Denies weight loss, hematemesis, melena.  : Denies dysuria, frequency or hesitancy of urination  Vascular: Denies claudication, resting pain, tingling numbness or weakness in any part of the body.  Musculoskeletal: Denies Joint tenderness or pain, denies stiffness in joints, denies fever or weight loss, or skin rashes.    Current Labs  [unfilled]  Lab Results (last 24 hours)       Procedure Component Value Units Date/Time    Calcium [[id number]]  (Normal) Collected: 07/06/24 0512    Specimen: Blood Updated: 07/06/24 0558     Calcium 9.9 mg/dL     Potassium [[id number]]  (Normal) Collected: 07/06/24 0512    Specimen: Blood  Updated: 07/06/24 0558     Potassium 4.4 mmol/L     Magnesium [[id number]]  (Normal) Collected: 07/06/24 0512    Specimen: Blood Updated: 07/06/24 0558     Magnesium 2.1 mg/dL     Phosphorus [[id number]]  (Normal) Collected: 07/06/24 0512    Specimen: Blood Updated: 07/06/24 0558     Phosphorus 3.2 mg/dL           Current Radiology  Imaging Results (Last 24 Hours)       ** No results found for the last 24 hours. **          Current Meds    Current Facility-Administered Medications:     acetaminophen (TYLENOL) tablet 650 mg, 650 mg, Oral, Q4H PRN, 650 mg at 07/04/24 2122 **OR** acetaminophen (TYLENOL) suppository 650 mg, 650 mg, Rectal, Q4H PRN, Gabe Guevara MD    aluminum-magnesium hydroxide-simethicone (MAALOX MAX) 400-400-40 MG/5ML suspension 15 mL, 15 mL, Oral, Q6H PRN, Gabe Guevara MD    bisacodyl (DULCOLAX) suppository 10 mg, 10 mg, Rectal, Daily, Robert Almeida MD, 10 mg at 07/06/24 0836    Calcium Replacement - Follow Nurse / BPA Driven Protocol, , Does not apply, PRN, Gabe Guevara MD    cinacalcet (SENSIPAR) tablet 60 mg, 60 mg, Oral, Daily With Breakfast, Easton Richmond MD, 60 mg at 07/06/24 0836    dextrose (D50W) (25 g/50 mL) IV injection 25 g, 25 g, Intravenous, Q15 Min PRN, Gabe Guevara MD, 25 g at 06/04/24 0706    dextrose (D50W) (25 g/50 mL) IV injection 50 mL, 50 mL, Intravenous, Q1H PRN, Gabe Guevara MD, 50 mL at 06/04/24 1934    dextrose (GLUTOSE) oral gel 15 g, 15 g, Oral, Q15 Min PRN, Gabe Guevara MD    donepezil (ARICEPT) tablet 10 mg, 10 mg, Oral, Daily, Gabe Guevara MD, 10 mg at 07/06/24 0836    glucagon (GLUCAGEN) injection 1 mg, 1 mg, Subcutaneous, Q15 Min PRN, Gabe Guevara MD, 1 mg at 06/04/24 0748    Magnesium Standard Dose Replacement - Follow Nurse / BPA Driven Protocol, , Does not apply, PRN, Gabe Guevara MD    memantine (NAMENDA) tablet 10 mg, 10 mg, Oral, BID, Gabe Guevara MD, 10 mg at 07/06/24 0836     nitroglycerin (NITROSTAT) SL tablet 0.4 mg, 0.4 mg, Sublingual, Q5 Min PRN, Gabe Guevara MD    ondansetron ODT (ZOFRAN-ODT) disintegrating tablet 4 mg, 4 mg, Oral, Q6H PRN **OR** ondansetron (ZOFRAN) injection 4 mg, 4 mg, Intravenous, Q6H PRN, Gabe Guevara MD    Phosphorus Replacement - Follow Nurse / BPA Driven Protocol, , Does not apply, PRANY, Gabe Guevara MD    polyethylene glycol (MIRALAX) packet 17 g, 17 g, Oral, BID PRN, oJsue Lazar MD    Potassium Replacement - Follow Nurse / BPA Driven Protocol, , Does not apply, PRIsmael AGARWAL Sandeep K, MD    sennosides-docusate (PERICOLACE) 8.6-50 MG per tablet 2 tablet, 2 tablet, Oral, BID PRN **AND** [DISCONTINUED] polyethylene glycol (MIRALAX) packet 17 g, 17 g, Oral, Daily PRN **AND** [DISCONTINUED] bisacodyl (DULCOLAX) EC tablet 5 mg, 5 mg, Oral, Daily PRN **AND** [DISCONTINUED] bisacodyl (DULCOLAX) suppository 10 mg, 10 mg, Rectal, Daily PRN, Gabe Guevara MD    sodium chloride 0.9 % flush 10 mL, 10 mL, Intravenous, PRN, Gabe Guevara MD    sodium chloride 0.9 % flush 20 mL, 20 mL, Intravenous, PRN, Gabe Guevara MD    sodium chloride 0.9 % flush 20 mL, 20 mL, Intravenous, PRN, Mark Alvarez MD      Assessment and plan:          Right ureteral stone    Acute low back pain    Thrombocytopenia    Lumbar spinal stenosis    CAYLA (dementia of Alzheimer type)    Chronic bilateral low back pain without sciatica    Acute UTI (urinary tract infection)    Hypoglycemia    Abnormal LFTs    Hypercalcemia    Constipation    Severe malnutrition  Acute kidney injury has resolved will repeat the BMP tomorrow  Hypercalcemia secondary to primary hyperparathyroidism being controlled with Sensipar continue          Johnathan Franco MD FACP, FASN.  07/06/24  15:34 EDT

## 2024-07-07 LAB
ANION GAP SERPL CALCULATED.3IONS-SCNC: 8 MMOL/L (ref 5–15)
BUN SERPL-MCNC: 23 MG/DL (ref 8–23)
BUN/CREAT SERPL: 25 (ref 7–25)
CALCIUM SPEC-SCNC: 10.2 MG/DL (ref 8.6–10.5)
CHLORIDE SERPL-SCNC: 106 MMOL/L (ref 98–107)
CO2 SERPL-SCNC: 25 MMOL/L (ref 22–29)
CREAT SERPL-MCNC: 0.92 MG/DL (ref 0.57–1)
EGFRCR SERPLBLD CKD-EPI 2021: 61.1 ML/MIN/1.73
GLUCOSE SERPL-MCNC: 87 MG/DL (ref 65–99)
PHOSPHATE SERPL-MCNC: 3.2 MG/DL (ref 2.5–4.5)
POTASSIUM SERPL-SCNC: 4.5 MMOL/L (ref 3.5–5.2)
SODIUM SERPL-SCNC: 139 MMOL/L (ref 136–145)

## 2024-07-07 PROCEDURE — 84100 ASSAY OF PHOSPHORUS: CPT | Performed by: INTERNAL MEDICINE

## 2024-07-07 PROCEDURE — 80048 BASIC METABOLIC PNL TOTAL CA: CPT | Performed by: INTERNAL MEDICINE

## 2024-07-07 RX ADMIN — MEMANTINE HYDROCHLORIDE 10 MG: 10 TABLET, FILM COATED ORAL at 21:28

## 2024-07-07 RX ADMIN — CINACALCET 60 MG: 30 TABLET ORAL at 08:53

## 2024-07-07 RX ADMIN — BISACODYL 10 MG: 10 SUPPOSITORY RECTAL at 08:53

## 2024-07-07 RX ADMIN — MEMANTINE HYDROCHLORIDE 10 MG: 10 TABLET, FILM COATED ORAL at 08:53

## 2024-07-07 RX ADMIN — MEMANTINE HYDROCHLORIDE 10 MG: 10 TABLET, FILM COATED ORAL at 00:14

## 2024-07-07 RX ADMIN — DONEPEZIL HYDROCHLORIDE 10 MG: 10 TABLET, FILM COATED ORAL at 08:53

## 2024-07-07 NOTE — PLAN OF CARE
Goal Outcome Evaluation:  Plan of Care Reviewed With: patient        Progress: no change  Outcome Evaluation: Cooperative/ pleasant. Bed alarm for safety. Purewick in place for incontinence. Swallowed pill whole without difficulty. Plan is for [facility].

## 2024-07-07 NOTE — PROGRESS NOTES
: The patient is Sitting in chair denies complaints,   Vital Signs  Vitals:    07/07/24 0900   BP: 122/63   Pulse: 88   Resp: 16   Temp: 98.3 °F (36.8 °C)   SpO2: 98%     I/O this shift:  In: 220 [P.O.:220]  Out: -   I/O last 3 completed shifts:  In: 440 [P.O.:440]  Out: 1350 [Urine:1350]      Physical Exam:    General:   HEENT:  Normocephalic, Atraumatic, EOMI, PERRLA, NO icterus,  Neck:  Supple, No JVD, No Carotid artery bruit, No lymphadenopathy  Chest: Clear to auscultation bilaterally,   Cardiovascular: Regular rate and rhythm. Positive S1 & S2, No rub, murmur or gallop.  Abdominal: Soft, nontender, no palpable organomegaly, no abdominal bruit, positive bowel sounds  Musculoskeletal: No joint tenderness or swelling, good range of motion, Adequate muscle strength on both sides, no cyanosis, clubbing or edema on lower extremities.  Neuro: Alert oriented x3, CN1 - 12 intact, No focal sensory or motor deficit.      Review of Systems:   CNS: Denied headaches, blurred vision, tingling or numbness in any part of body. No weakness or any impaired speech.  CVS: No chest pain or shortness of breath, No orthopnea or PND or exertional dyspnea,  Pulmonary: Denies shortness of breath, coughs, hematemesis, night sweats or weight loss.  GI: Denies diarrhea, nausea, vomiting. Denies weight loss, hematemesis, melena.  : Denies dysuria, frequency or hesitancy of urination  Vascular: Denies claudication, resting pain, tingling numbness or weakness in any part of the body.  Musculoskeletal: Denies Joint tenderness or pain, denies stiffness in joints, denies fever or weight loss, or skin rashes.    Current Labs  [unfilled]  Lab Results (last 24 hours)       Procedure Component Value Units Date/Time    Basic Metabolic Panel [[id number]]  (Normal) Collected: 07/07/24 0442    Specimen: Blood Updated: 07/07/24 0533     Glucose 87 mg/dL      BUN 23 mg/dL      Creatinine 0.92 mg/dL      Sodium 139 mmol/L      Potassium 4.5 mmol/L       Chloride 106 mmol/L      CO2 25.0 mmol/L      Calcium 10.2 mg/dL      BUN/Creatinine Ratio 25.0     Anion Gap 8.0 mmol/L      eGFR 61.1 mL/min/1.73     Narrative:      GFR Normal >60  Chronic Kidney Disease <60  Kidney Failure <15    The GFR formula is only valid for adults with stable renal function between ages 18 and 70.    Phosphorus [[id number]]  (Normal) Collected: 07/07/24 0442    Specimen: Blood Updated: 07/07/24 0533     Phosphorus 3.2 mg/dL           Current Radiology  Imaging Results (Last 24 Hours)       ** No results found for the last 24 hours. **          Current Meds    Current Facility-Administered Medications:     acetaminophen (TYLENOL) tablet 650 mg, 650 mg, Oral, Q4H PRN, 650 mg at 07/04/24 2122 **OR** acetaminophen (TYLENOL) suppository 650 mg, 650 mg, Rectal, Q4H PRN, Gabe Guevara MD    aluminum-magnesium hydroxide-simethicone (MAALOX MAX) 400-400-40 MG/5ML suspension 15 mL, 15 mL, Oral, Q6H PRN, Gabe Guevara MD    bisacodyl (DULCOLAX) suppository 10 mg, 10 mg, Rectal, Daily, Robert Almeida MD, 10 mg at 07/07/24 0853    Calcium Replacement - Follow Nurse / BPA Driven Protocol, , Does not apply, PRN, Gabe Guevara MD    cinacalcet (SENSIPAR) tablet 60 mg, 60 mg, Oral, Daily With Breakfast, Easton Richmond MD, 60 mg at 07/07/24 0853    dextrose (D50W) (25 g/50 mL) IV injection 25 g, 25 g, Intravenous, Q15 Min PRN, Gabe Guevara MD, 25 g at 06/04/24 0706    dextrose (D50W) (25 g/50 mL) IV injection 50 mL, 50 mL, Intravenous, Q1H PRN, Gabe Guevara MD, 50 mL at 06/04/24 1934    dextrose (GLUTOSE) oral gel 15 g, 15 g, Oral, Q15 Min PRN, Gabe Guevara MD    donepezil (ARICEPT) tablet 10 mg, 10 mg, Oral, Daily, Gabe Guevara MD, 10 mg at 07/07/24 0853    glucagon (GLUCAGEN) injection 1 mg, 1 mg, Subcutaneous, Q15 Min PRN, Gabe Guevara MD, 1 mg at 06/04/24 0748    Magnesium Standard Dose Replacement - Follow Nurse / BPA Driven Protocol, , Does  not apply, PRNIsmael Sandeep K, MD    memantine (NAMENDA) tablet 10 mg, 10 mg, Oral, BID, Gabe Guevara MD, 10 mg at 07/07/24 0853    nitroglycerin (NITROSTAT) SL tablet 0.4 mg, 0.4 mg, Sublingual, Q5 Min PRN, Gabe Guevara MD    ondansetron ODT (ZOFRAN-ODT) disintegrating tablet 4 mg, 4 mg, Oral, Q6H PRN **OR** ondansetron (ZOFRAN) injection 4 mg, 4 mg, Intravenous, Q6H PRN, Gabe Guevara MD    Phosphorus Replacement - Follow Nurse / BPA Driven Protocol, , Does not apply, Ismael IVERSON Sandeep K, MD    polyethylene glycol (MIRALAX) packet 17 g, 17 g, Oral, BID PRN, Josue Lazar MD    Potassium Replacement - Follow Nurse / BPA Driven Protocol, , Does not apply, PRNIsmael Sandeep K, MD    sennosides-docusate (PERICOLACE) 8.6-50 MG per tablet 2 tablet, 2 tablet, Oral, BID PRN **AND** [DISCONTINUED] polyethylene glycol (MIRALAX) packet 17 g, 17 g, Oral, Daily PRN **AND** [DISCONTINUED] bisacodyl (DULCOLAX) EC tablet 5 mg, 5 mg, Oral, Daily PRN **AND** [DISCONTINUED] bisacodyl (DULCOLAX) suppository 10 mg, 10 mg, Rectal, Daily PRN, Gabe Guevara MD    sodium chloride 0.9 % flush 10 mL, 10 mL, Intravenous, PRN, Gabe Guevara MD    sodium chloride 0.9 % flush 20 mL, 20 mL, Intravenous, PRNIsmael Sandeep K, MD    sodium chloride 0.9 % flush 20 mL, 20 mL, Intravenous, PRN, Mark Alvarez MD          Assessment and plan ;acute kidney injury has since resolved          Right ureteral stone    Acute low back pain    Thrombocytopenia    Lumbar spinal stenosis    CAYLA (dementia of Alzheimer type)    Chronic bilateral low back pain without sciatica    Acute UTI (urinary tract infection)    Hypoglycemia    Abnormal LFTs    Hypercalcemia    Constipation    Severe malnutrition  Hypercalcemia improving with Sensipar continue current dose          Johnathan Franco MD FACP, FASN.  07/07/24  12:20 EDT

## 2024-07-07 NOTE — PLAN OF CARE
Goal Outcome Evaluation:  VSS, contact isolation maintained, pleasant & cooperative forgetful at times, up in chair with assist of 1 & gait belt, pure wick in use, inc of large BM, falls protocol, bed/chair alarm in use  Plan of Care Reviewed With: patient

## 2024-07-07 NOTE — PROGRESS NOTES
[facility]    [phone]   LOS: 35 days     Name: Kimberlee Urrutia  Age/Sex: 84 y.o. female  :  1940        PCP: Meche Ji MD  Chief Complaint   Patient presents with    Back Pain      Subjective   Resting in the chair this morning in no distress no issues overnight, frustrated with ongoing hospital stay    bisacodyl, 10 mg, Rectal, Daily  cinacalcet, 60 mg, Oral, Daily With Breakfast  donepezil, 10 mg, Oral, Daily  memantine, 10 mg, Oral, BID             Objective   Vital Signs  Temp:  [97.4 °F (36.3 °C)-97.5 °F (36.4 °C)] 97.5 °F (36.4 °C)  Heart Rate:  [70-84] 72  Resp:  [16] 16  BP: (118-140)/(65-81) 140/81  Body mass index is 29.52 kg/m².    Intake/Output Summary (Last 24 hours) at 2024 0734  Last data filed at 2024 0449  Gross per 24 hour   Intake 440 ml   Output 900 ml   Net -460 ml       Physical Exam  Vitals and nursing note reviewed.   Constitutional:       General: She is not in acute distress.     Appearance: She is obese. She is ill-appearing.   Cardiovascular:      Rate and Rhythm: Normal rate and regular rhythm.   Pulmonary:      Effort: Pulmonary effort is normal. No respiratory distress.   Neurological:      General: No focal deficit present.      Mental Status: She is alert. Mental status is at baseline.           Results Review:       I reviewed the patient's new clinical results.      Results from last 7 days   Lab Units 24  0442 24  0512 24  0436 24  0445 24  0442 24  0348 24  0324   SODIUM mmol/L 139  --  138 139 138 139 140   POTASSIUM mmol/L 4.5 4.4 4.2 4.0 4.5 4.0 4.4   CHLORIDE mmol/L 106  --  106 106 106 105 107   CO2 mmol/L 25.0  --  21.4* 15.8* 22.0 23.0 25.0   BUN mg/dL 23  --  20 23 21 21 20   CREATININE mg/dL 0.92  --  0.97 0.97 0.89 1.05* 1.01*   CALCIUM mg/dL 10.2 9.9 9.7 10.1 10.4 10.4 10.5   MAGNESIUM mg/dL  --  2.1  --  1.9 2.1  --   --    PHOSPHORUS mg/dL 3.2 3.2 3.3 3.2 3.2 2.8 3.4    Estimated Creatinine Clearance: 42.7 mL/min (by C-G formula based on SCr of 0.92 mg/dL).      Assessment & Plan   Active Hospital Problems    Diagnosis  POA    **Right ureteral stone [N20.1]  Yes    Severe malnutrition [E43]  Yes    Hypercalcemia [E83.52]  No    Constipation [K59.00]  Clinically Undetermined    Acute UTI (urinary tract infection) [N39.0]  Yes    Hypoglycemia [E16.2]  Yes    Abnormal LFTs [R79.89]  Yes    Chronic bilateral low back pain without sciatica [M54.50, G89.29]  Yes    CAYLA (dementia of Alzheimer type) [G30.9, F02.80]  Yes    Lumbar spinal stenosis [M48.061]  Yes    Thrombocytopenia [D69.6]  Yes    Acute low back pain [M54.50]  Yes      Resolved Hospital Problems    Diagnosis Date Resolved POA    Shock, septic [A41.9, R65.21] 06/03/2024 Yes    PRINCE (acute kidney injury) [N17.9] 06/06/2024 Yes       PLAN  -Calcium stabilized.  Sensipar adjusted appreciate nephrology assistance, cleared for DC from their perspective  -She is completed antibiotics for urinary tract infection.  -Noted plans for outpatient ureteroscopy and lithotripsy  -At this point she stable to discharge but is waiting on placement and insurance issues  -Mechanical DVT prophylaxis  -DNR    Disposition  Expected Discharge Date: 7/7/2024; Expected Discharge Time:    Waiting on the government to fix her situation    Josue Mccormick MD  [facility]  07/07/24  07:34 EDT

## 2024-07-08 LAB
ALBUMIN SERPL-MCNC: 3.4 G/DL (ref 3.5–5.2)
ANION GAP SERPL CALCULATED.3IONS-SCNC: 11.7 MMOL/L (ref 5–15)
BUN SERPL-MCNC: 19 MG/DL (ref 8–23)
BUN/CREAT SERPL: 20.4 (ref 7–25)
CALCIUM SPEC-SCNC: 9.9 MG/DL (ref 8.6–10.5)
CHLORIDE SERPL-SCNC: 103 MMOL/L (ref 98–107)
CO2 SERPL-SCNC: 24.3 MMOL/L (ref 22–29)
CREAT SERPL-MCNC: 0.93 MG/DL (ref 0.57–1)
EGFRCR SERPLBLD CKD-EPI 2021: 60.4 ML/MIN/1.73
GLUCOSE SERPL-MCNC: 91 MG/DL (ref 65–99)
PHOSPHATE SERPL-MCNC: 3.3 MG/DL (ref 2.5–4.5)
POTASSIUM SERPL-SCNC: 4 MMOL/L (ref 3.5–5.2)
SODIUM SERPL-SCNC: 139 MMOL/L (ref 136–145)

## 2024-07-08 PROCEDURE — 97530 THERAPEUTIC ACTIVITIES: CPT

## 2024-07-08 PROCEDURE — 80069 RENAL FUNCTION PANEL: CPT | Performed by: INTERNAL MEDICINE

## 2024-07-08 RX ORDER — ACETAMINOPHEN 325 MG/1
650 TABLET ORAL EVERY 4 HOURS PRN
Start: 2024-07-08

## 2024-07-08 RX ORDER — CINACALCET 60 MG/1
60 TABLET, FILM COATED ORAL
Start: 2024-07-08

## 2024-07-08 RX ADMIN — DONEPEZIL HYDROCHLORIDE 10 MG: 10 TABLET, FILM COATED ORAL at 10:04

## 2024-07-08 RX ADMIN — BISACODYL 10 MG: 10 SUPPOSITORY RECTAL at 10:05

## 2024-07-08 RX ADMIN — CINACALCET 60 MG: 30 TABLET ORAL at 10:04

## 2024-07-08 RX ADMIN — MEMANTINE HYDROCHLORIDE 10 MG: 10 TABLET, FILM COATED ORAL at 10:04

## 2024-07-08 RX ADMIN — MEMANTINE HYDROCHLORIDE 10 MG: 10 TABLET, FILM COATED ORAL at 20:24

## 2024-07-08 NOTE — PLAN OF CARE
Goal Outcome Evaluation:  Plan of Care Reviewed With: patient        Progress: no change  Outcome Evaluation: vss, bed alarm for safety, turned to sides, awaiting for placement, continue to monitor the pt.

## 2024-07-08 NOTE — PROGRESS NOTES
"RENAL/[facility]:     LOS: 36 days    Patient Care Team:  Meche Ji MD as PCP - General (Internal Medicine)  Josue Wilson MD as Consulting Physician (Orthopedic Surgery)  Yash Wilson MD as Consulting Physician (Gastroenterology)  Avi Ferris MD (Ophthalmology)  Tommy French MD (Inactive) as Consulting Physician (Otolaryngology)  Arnaud Lu MD as Consulting Physician (Urology)  Nu Partida MD as Consulting Physician (Pain Medicine)  Ki Small MD as Consulting Physician (Neurology)    Chief Complaint:  Hypercalcemia    Subjective     Interval History:   7/1: Pleasant, denies any complaints.  No shortness of breath chest pain fevers chills or edema  Received IV fluids and Sensipar dose was increased over the weekend due to rising calcium levels  She is asymptomatic    7/2: No new complaints or events.  She states she has no appetite but denies nausea  No dyspnea or edema    7/3 no acute events overnight. Feeling ok.     7/5: She is doing well and has no complaints states appetite is poor but she attributes that to the hospital food.    7/8: She looks and feels well denies complaints, possible rehab bed today    Objective     Vital Sign Min/Max for last 24 hours  Temp  Min: 97.2 °F (36.2 °C)  Max: 98.6 °F (37 °C)   BP  Min: 108/69  Max: 130/69   Pulse  Min: 71  Max: 88   Resp  Min: 16  Max: 16   SpO2  Min: 94 %  Max: 96 %   No data recorded   Weight  Min: 78.3 kg (172 lb 9.9 oz)  Max: 78.3 kg (172 lb 9.9 oz)     Flowsheet Rows      Flowsheet Row First Filed Value   Admission Height 158.8 cm (62.5\") Documented at 06/01/2024 2030   Admission Weight 81 kg (178 lb 9.2 oz) Documented at 06/01/2024 2030            I/O this shift:  In: -   Out: 300 [Urine:300]  I/O last 3 completed shifts:  In: 720 [P.O.:720]  Out: 950 [Urine:950]    Physical Exam:  GEN: Awake, NAD  ENT: PERRL, EOMI, MMM  NECK: Supple, no JVD  CHEST: CTAB, no W/R/C  CV: RRR, no M/G/R  ABD: Soft, NT, " "+BS  SKIN: Warm and Dry  NEURO: CN's intact      WBC No results found for: \"WBC\"   HGB No results found for: \"HGB\"   HCT No results found for: \"HCT\"   Platlets No results found for: \"LABPLAT\"   MCV No results found for: \"MCV\"       Sodium Sodium   Date Value Ref Range Status   07/08/2024 139 136 - 145 mmol/L Final   07/07/2024 139 136 - 145 mmol/L Final      Potassium Potassium   Date Value Ref Range Status   07/08/2024 4.0 3.5 - 5.2 mmol/L Final   07/07/2024 4.5 3.5 - 5.2 mmol/L Final   07/06/2024 4.4 3.5 - 5.2 mmol/L Final      Chloride Chloride   Date Value Ref Range Status   07/08/2024 103 98 - 107 mmol/L Final   07/07/2024 106 98 - 107 mmol/L Final      CO2 CO2   Date Value Ref Range Status   07/08/2024 24.3 22.0 - 29.0 mmol/L Final   07/07/2024 25.0 22.0 - 29.0 mmol/L Final      BUN BUN   Date Value Ref Range Status   07/08/2024 19 8 - 23 mg/dL Final   07/07/2024 23 8 - 23 mg/dL Final      Creatinine Creatinine   Date Value Ref Range Status   07/08/2024 0.93 0.57 - 1.00 mg/dL Final   07/07/2024 0.92 0.57 - 1.00 mg/dL Final      Calcium Calcium   Date Value Ref Range Status   07/08/2024 9.9 8.6 - 10.5 mg/dL Final   07/07/2024 10.2 8.6 - 10.5 mg/dL Final   07/06/2024 9.9 8.6 - 10.5 mg/dL Final      PO4 No results found for: \"CAPO4\"   Albumin Albumin   Date Value Ref Range Status   07/08/2024 3.4 (L) 3.5 - 5.2 g/dL Final      Magnesium Magnesium   Date Value Ref Range Status   07/06/2024 2.1 1.6 - 2.4 mg/dL Final      Uric Acid No results found for: \"URICACID\"        Results Review:     I reviewed the patient's new clinical results.    bisacodyl, 10 mg, Rectal, Daily  cinacalcet, 60 mg, Oral, Daily With Breakfast  donepezil, 10 mg, Oral, Daily  memantine, 10 mg, Oral, BID             Medication Review: Reviewed    Assessment & Plan   Hypercalcemia, likely due to hyperparathyroidism, stabilizing with oral Sensipar.  Calcium today is 9.9  CKD stage II, at baseline  Hydronephrosis - resolved after stent, outpatient " urology follow-up  Constipation on Dulcolax, reports regular BMs  PRINCE- resolved.   Septic shock - resolved.   Dementia on Aricept/Namenda      Plan:   Her calcium level has been very stable between 9.7 and 10.4 on the Sensipar  Renal function stable, acidosis improved, euvolemic  Continue Cinacalcet 60 mg daily for now.   Renal function, volume and electrolytes near baseline/at goal    She has a discharge order so hopefully her insurance issues have been worked out.  If she does have to stay no need for daily labs, recheck later in the week      Frederick Kirkland MD  [facility]  07/08/24  15:09 EDT

## 2024-07-08 NOTE — PROGRESS NOTES
Continued Stay Note  [facility]     Patient Name: Kimberlee Urrutia  MRN: [id number]  Today's Date: 7/8/2024    Admit Date: 6/1/2024    Plan: PENDING   Discharge Plan       Row Name 07/08/24 0951       Plan    Plan PENDING    Plan Comments Msg sent to [place] with [facility] regarding insurance.                   Discharge Codes    No documentation.                 Expected Discharge Date and Time       Expected Discharge Date Expected Discharge Time    Jul 8, 2024               Kavya Gilman RN

## 2024-07-08 NOTE — PLAN OF CARE
The pt was able to participate in OT this morning. She was poorly positioned in the chair. Mod A to stand with a walker. Min-Mod A x1 to take steps back to the EOB. She reported high fatigue after her short transfer. Total A provided for LBD. Mod A to transition back to supine. HOB raised. She reported that she had began a bowel movement in her brief and was going to continue to go - pt reported that she would call her NA when completed. Declined a [facility] transfer. Call light in hand. SNF recommended.     Anticipated Discharge Disposition (OT): skilled nursing facility

## 2024-07-08 NOTE — THERAPY TREATMENT NOTE
Patient Name: Kimberlee Urrutia  : 1940    MRN: [id number]                              Today's Date: 2024       Admit Date: 2024    Visit Dx:     ICD-10-CM ICD-9-CM   1. Right ureteral stone  N20.1 592.1   2. Acute UTI  N39.0 599.0   3. Severe sepsis  A41.9 038.9    R65.20 995.92   4. Elevated lactic acid level  R79.89 276.2   5. Osteomyelitis of lumbar spine  M46.26 730.28   6. Elevated AST (SGOT)  R74.01 790.4   7. Elevated ALT measurement  R74.01 790.4   8. Elevated C-reactive protein (CRP)  R79.82 790.95   9. PRINCE (acute kidney injury)  N17.9 584.9     Patient Active Problem List   Diagnosis    Arthritis    HLD (hyperlipidemia)    Primary hypertension    Health care maintenance    Knee pain, right    Hx of total knee arthroplasty    Seasonal allergies    Vitamin D deficiency    Pyuria    Transient alteration of awareness    Migraine without status migrainosus, not intractable    Leukocytosis    Viral pharyngitis    Multinodular thyroid    Osteopenia of multiple sites    Colon cancer screening    Serum calcium elevated    Acute low back pain    Elevated procalcitonin    Sepsis due to Escherichia coli with acute renal failure without septic shock    Thrombocytopenia    Recurrent oral herpes simplex    Lumbar spinal stenosis    Metabolic encephalopathy    Renal stones    Colon cancer screening    Sacroiliac joint pain    Lumbar stenosis without neurogenic claudication    Renal stone    Hyperlipidemia    History of total knee arthroplasty    Spinal stenosis of lumbar region    Multinodular goiter    Osteopenia    Pain in right knee    Sacroiliac joint pain    Hypercalcemia    CAYLA (dementia of Alzheimer type)    Chronic bilateral low back pain without sciatica    Right ureteral stone    Discitis of lumbar region    Acute UTI (urinary tract infection)    Hypoglycemia    Abnormal LFTs    Hypercalcemia    Constipation    Severe malnutrition     Past Medical History:   Diagnosis Date    Allergic      Arthritis     Colon cancer screening 01/29/2020    Colon cancer screening 01/29/2020    Cough     Diarrhea     Hx of migraine headaches     Hyperlipidemia     Irregular heart beat     Osteoarthritis     Renal stones 02/2021    Vitamin D deficiency      Past Surgical History:   Procedure Laterality Date    COLONOSCOPY      CYSTOSCOPY W/ URETERAL STENT PLACEMENT Right 6/2/2024    Procedure: CYSTOSCOPY, RIGHT URETERAL STENT PLACEMENT;  Surgeon: Efra Schofield MD;  Location: [facility] MAIN OR;  Service: Urology;  Laterality: Right;    LUMBAR EPIDURAL INJECTION N/A 8/2/2021    Procedure: LUMBAR EPIDURAL 1ST VISIT 5-1;  Surgeon: Nu Partida MD;  Location: [facility] [place] MAIN OR;  Service: Pain Management;  Laterality: N/A;    REPLACEMENT TOTAL KNEE Right 01/2015    Josue Wilson MD    SACROILIAC JOINT INJECTION Left 6/16/2021    Procedure: SACROILIAC INJECTION left;  Surgeon: Nu Partida MD;  Location: [facility] [place] MAIN OR;  Service: Pain Management;  Laterality: Left;    TUBAL ABDOMINAL LIGATION      URETEROSCOPY LASER LITHOTRIPSY WITH STENT INSERTION Left 2/23/2021    Procedure: LT.URETEROSCOPY LASER LITHOTRIPSY WITH STENT  FOR STONE;  Surgeon: Arnaud Lu MD;  Location: [facility] OR;  Service: Urology;  Laterality: Left;    WISDOM TOOTH EXTRACTION        General Information       Row Name 07/08/24 1036          OT Time and Intention    Document Type therapy note (daily note)  -RB     Mode of Treatment individual therapy;occupational therapy  -RB       Row Name 07/08/24 1036          General Information    Patient Profile Reviewed yes  -RB     Existing Precautions/Restrictions fall  -RB       Row Name 07/08/24 1036          Cognition    Orientation Status (Cognition) oriented to;person;place;situation  -RB               User Key  (r) = Recorded By, (t) = Taken By, (c) = Cosigned By      Initials Name Provider Type    RB Janelle Viramontes OT Occupational Therapist                     Mobility/ADL's        Row Name 07/08/24 1036          Bed Mobility    Bed Mobility sit-supine  -RB     Supine-Sit [place] (Bed Mobility) moderate assist (50% patient effort);verbal cues;1 person assist  -RB     Assistive Device (Bed Mobility) bed rails  -RB       Row Name 07/08/24 1036          Transfers    Transfers sit-stand transfer;stand-sit transfer;bed-chair transfer  -RB     Comment, (Transfers) the pt stood from her recliner and took steps back to the EOB with a walker.  -RB       Row Name 07/08/24 1036          Bed-Chair Transfer    Bed-Chair [place] (Transfers) minimum assist (75% patient effort);verbal cues;moderate assist (50% patient effort);1 person assist  -RB     Assistive Device (Bed-Chair Transfers) walker, front-wheeled  -RB       Row Name 07/08/24 1036          Sit-Stand Transfer    Sit-Stand [place] (Transfers) moderate assist (50% patient effort);1 person assist;verbal cues  -RB     Assistive Device (Sit-Stand Transfers) walker, front-wheeled  -RB       Row Name 07/08/24 1036          Stand-Sit Transfer    Stand-Sit [place] (Transfers) moderate assist (50% patient effort);verbal cues;1 person assist  -RB     Assistive Device (Stand-Sit Transfers) walker, front-wheeled  -RB       Row Name 07/08/24 1036          Activities of Daily Living    BADL Assessment/Intervention lower body dressing  total A as the pt began a bowel movement in her brief and was going to continue to go in supine  -RB       Row Name 07/08/24 1036          Lower Body Dressing Assessment/Training    [place] Level (Lower Body Dressing) lower body dressing skills;dependent (less than 25% patient effort)  -RB     Position (Lower Body Dressing) edge of bed sitting  -RB               User Key  (r) = Recorded By, (t) = Taken By, (c) = Cosigned By      Initials Name Provider Type    RB Janelle Viramontes OT Occupational Therapist                   Obj/Interventions       Row Name 07/08/24 1038          Balance    Comment, Balance  CGA sitting balance at the EOB. Min-Mod A for standing balance pending fatigue  -RB               User Key  (r) = Recorded By, (t) = Taken By, (c) = Cosigned By      Initials Name Provider Type    RB Janelle Viramontes, AB Occupational Therapist                   Goals/Plan    No documentation.                  Clinical Impression       Row Name 07/08/24 1039          Pain Assessment    Pretreatment Pain Rating 0/10 - no pain  -RB     Posttreatment Pain Rating 0/10 - no pain  -RB       Row Name 07/08/24 1039          Plan of Care Review    Plan of Care Reviewed With patient  -RB     Progress improving  -RB     Outcome Evaluation The pt was able to participate in OT this morning. She was poorly positioned in the chair. Mod A to stand with a walker. Min-Mod A x1 to take steps back to the EOB. She reported high fatigue after her short transfer. Total A provided for LBD. Mod A to transition back to supine. HOB raised. She reported that she had began a bowel movement in her brief and was going to continue to go - pt reported that she would call her NA when completed. Declined a BSC transfer. Call light in hand. SNF recommended.     Anticipated Discharge Disposition (OT): skilled nursing facility  -       Row Name 07/08/24 1039          Therapy Assessment/Plan (OT)    Rehab Potential (OT) good, to achieve stated therapy goals  -RB     Criteria for Skilled Therapeutic Interventions Met (OT) yes;skilled treatment is necessary  -RB     Therapy Frequency (OT) 5 times/wk  -RB       Row Name 07/08/24 1039          Therapy Plan Review/Discharge Plan (OT)    Anticipated Discharge Disposition (OT) skilled nursing facility  -       Row Name 07/08/24 1039          Positioning and Restraints    Pre-Treatment Position sitting in chair/recliner  -RB     Post Treatment Position bed  -RB     In Chair notified nsg;reclined;sitting;call light within reach;encouraged to call for assist;exit alarm on  -RB               User Key  (r) =  Recorded By, (t) = Taken By, (c) = Cosigned By      Initials Name Provider Type    Janelle Renee OT Occupational Therapist                   Outcome Measures       Row Name 07/08/24 0900          How much help from another person do you currently need...    Turning from your back to your side while in flat bed without using bedrails? 3  -MM     Moving from lying on back to sitting on the side of a flat bed without bedrails? 2  -MM     Moving to and from a bed to a chair (including a wheelchair)? 2  -MM     Standing up from a chair using your arms (e.g., wheelchair, bedside chair)? 2  -MM     Climbing 3-5 steps with a railing? 1  -MM     To walk in hospital room? 2  -MM     AM-PAC 6 Clicks Score (PT) 12  -MM     Highest Level of Mobility Goal 4 --> Transfer to chair/commode  -MM               User Key  (r) = Recorded By, (t) = Taken By, (c) = Cosigned By      Initials Name Provider Type    Amalia Rondon RN Registered Nurse                    Occupational Therapy Education       Title: PT OT SLP Therapies (Done)       Topic: Occupational Therapy (Done)       Point: ADL training (Done)       Description:   Instruct learner(s) on proper safety adaptation and remediation techniques during self care or transfers.   Instruct in proper use of assistive devices.                  Learning Progress Summary             Patient Acceptance, E,TB, VU,NR by MT at 7/8/2024 0551    Acceptance, E, NR by  at 6/10/2024 1505                         Point: Home exercise program (Done)       Description:   Instruct learner(s) on appropriate technique for monitoring, assisting and/or progressing therapeutic exercises/activities.                  Learning Progress Summary             Patient Acceptance, E,TB, VU,NR by MT at 7/8/2024 0551    Acceptance, E, NR by  at 6/10/2024 1505                         Point: Precautions (Done)       Description:   Instruct learner(s) on prescribed precautions during self-care and functional  transfers.                  Learning Progress Summary             Patient Acceptance, E,TB, VU,NR by MT at 7/8/2024 0551    Acceptance, E, NR by  at 6/10/2024 1505                         Point: Body mechanics (Done)       Description:   Instruct learner(s) on proper positioning and spine alignment during self-care, functional mobility activities and/or exercises.                  Learning Progress Summary             Patient Acceptance, E,TB, VU,NR by MT at 7/8/2024 0551    Acceptance, E, NR by  at 6/10/2024 1505                                         User Key       Initials Effective Dates Name Provider Type Discipline    MT 06/16/21 -  Jose Powell RN Registered Nurse Nurse     04/30/24 -  Nereyda Rothman OT Student OT Student OT                  OT Recommendation and Plan  Therapy Frequency (OT): 5 times/wk  Plan of Care Review  Plan of Care Reviewed With: patient  Progress: improving  Outcome Evaluation: The pt was able to participate in OT this morning. She was poorly positioned in the chair. Mod A to stand with a walker. Min-Mod A x1 to take steps back to the EOB. She reported high fatigue after her short transfer. Total A provided for LBD. Mod A to transition back to supine. HOB raised. She reported that she had began a bowel movement in her brief and was going to continue to go - pt reported that she would call her NA when completed. Declined a BSC transfer. Call light in hand. SNF recommended.     Anticipated Discharge Disposition (OT): skilled nursing facility     Time Calculation:   Evaluation Complexity (OT)  Review Occupational Profile/Medical/Therapy History Complexity: expanded/moderate complexity  Assessment, Occupational Performance/Identification of Deficit Complexity: 3-5 performance deficits  Clinical Decision Making Complexity (OT): detailed assessment/moderate complexity  Overall Complexity of Evaluation (OT): moderate complexity     Time Calculation- OT       Row Name  07/08/24 1036             Time Calculation- OT    OT Start Time 0942  -RB      OT Stop Time 0954  -RB      OT Time Calculation (min) 12 min  -RB      OT Received On 07/08/24  -RB      OT - Next Appointment 07/09/24  -RB         Timed Charges    46432 - OT Therapeutic Activity Minutes 12  -RB         Total Minutes    Timed Charges Total Minutes 12  -RB       Total Minutes 12  -RB                User Key  (r) = Recorded By, (t) = Taken By, (c) = Cosigned By      Initials Name Provider Type    RB Janelle Viramontes OT Occupational Therapist                  Therapy Charges for Today       Code Description Service Date Service Provider Modifiers Qty    [id number]  OT THERAPEUTIC ACT EA 15 MIN 7/8/2024 Janelle Viramontes OT GO 1                 Janelle Viramontes OT  7/8/2024

## 2024-07-08 NOTE — PLAN OF CARE
Goal Outcome Evaluation:  Plan of Care Reviewed With: patient        Progress: no change  Outcome Evaluation: Pt agreeable to PT and was able to maintain activity level during therapy today. Pt up in chair upon PT arrival. Pt performed STS transfer with min A x2. Pt then ambulated 15' with min A and rwx. 2nd person following pt with chair during ambulation for safety. Pt continues to fatigue very quickly with upright mobility and requires cues for safety and sequencing of mobility. Pt will continue to benefit from PT to address impairments. Rec DC to SNF.      Anticipated Discharge Disposition (PT): skilled nursing facility

## 2024-07-08 NOTE — PROGRESS NOTES
Continued Stay Note  [facility]     Patient Name: Kimberlee Urrutia  MRN: [id number]  Today's Date: 2024    Admit Date: 2024    Plan: PENDING   Discharge Plan       Row Name 24 1012       Plan    Plan PENDING    Plan Comments Per Kian, insurance si still showing .      Row Name 24 0951       Plan    Plan PENDING    Plan Comments Msg sent to Kian with [employer] regarding insurance.                   Discharge Codes    No documentation.                 Expected Discharge Date and Time       Expected Discharge Date Expected Discharge Time    2024               Kavya Gilman RN

## 2024-07-08 NOTE — THERAPY TREATMENT NOTE
Patient Name: Kimberlee Urrutia  : 1940    MRN: [id number]                              Today's Date: 2024       Admit Date: 2024    Visit Dx:     ICD-10-CM ICD-9-CM   1. Right ureteral stone  N20.1 592.1   2. Acute UTI  N39.0 599.0   3. Severe sepsis  A41.9 038.9    R65.20 995.92   4. Elevated lactic acid level  R79.89 276.2   5. Osteomyelitis of lumbar spine  M46.26 730.28   6. Elevated AST (SGOT)  R74.01 790.4   7. Elevated ALT measurement  R74.01 790.4   8. Elevated C-reactive protein (CRP)  R79.82 790.95   9. PRINCE (acute kidney injury)  N17.9 584.9     Patient Active Problem List   Diagnosis    Arthritis    HLD (hyperlipidemia)    Primary hypertension    Health care maintenance    Knee pain, right    Hx of total knee arthroplasty    Seasonal allergies    Vitamin D deficiency    Pyuria    Transient alteration of awareness    Migraine without status migrainosus, not intractable    Leukocytosis    Viral pharyngitis    Multinodular thyroid    Osteopenia of multiple sites    Colon cancer screening    Serum calcium elevated    Acute low back pain    Elevated procalcitonin    Sepsis due to Escherichia coli with acute renal failure without septic shock    Thrombocytopenia    Recurrent oral herpes simplex    Lumbar spinal stenosis    Metabolic encephalopathy    Renal stones    Colon cancer screening    Sacroiliac joint pain    Lumbar stenosis without neurogenic claudication    Renal stone    Hyperlipidemia    History of total knee arthroplasty    Spinal stenosis of lumbar region    Multinodular goiter    Osteopenia    Pain in right knee    Sacroiliac joint pain    Hypercalcemia    CAYLA (dementia of Alzheimer type)    Chronic bilateral low back pain without sciatica    Right ureteral stone    Discitis of lumbar region    Acute UTI (urinary tract infection)    Hypoglycemia    Abnormal LFTs    Hypercalcemia    Constipation    Severe malnutrition     Past Medical History:   Diagnosis Date    Allergic      Arthritis     Colon cancer screening 01/29/2020    Colon cancer screening 01/29/2020    Cough     Diarrhea     Hx of migraine headaches     Hyperlipidemia     Irregular heart beat     Osteoarthritis     Renal stones 02/2021    Vitamin D deficiency      Past Surgical History:   Procedure Laterality Date    COLONOSCOPY      CYSTOSCOPY W/ URETERAL STENT PLACEMENT Right 6/2/2024    Procedure: CYSTOSCOPY, RIGHT URETERAL STENT PLACEMENT;  Surgeon: Efra Schofield MD;  Location: [facility] MAIN OR;  Service: Urology;  Laterality: Right;    LUMBAR EPIDURAL INJECTION N/A 8/2/2021    Procedure: LUMBAR EPIDURAL 1ST VISIT 5-1;  Surgeon: Nu Partida MD;  Location: [facility] MAIN OR;  Service: Pain Management;  Laterality: N/A;    REPLACEMENT TOTAL KNEE Right 01/2015    Josue Wilson MD    SACROILIAC JOINT INJECTION Left 6/16/2021    Procedure: SACROILIAC INJECTION left;  Surgeon: Nu Partida MD;  Location: [facility] MAIN OR;  Service: Pain Management;  Laterality: Left;    TUBAL ABDOMINAL LIGATION      URETEROSCOPY LASER LITHOTRIPSY WITH STENT INSERTION Left 2/23/2021    Procedure: LT.URETEROSCOPY LASER LITHOTRIPSY WITH STENT  FOR STONE;  Surgeon: Arnaud Lu MD;  Location: [facility] OR;  Service: Urology;  Laterality: Left;    WISDOM TOOTH EXTRACTION        General Information       Row Name 07/08/24 1610          Physical Therapy Time and Intention    Document Type therapy note (daily note)  -EF     Mode of Treatment individual therapy;physical therapy  -EF       Row Name 07/08/24 1610          General Information    Existing Precautions/Restrictions fall  -EF     Barriers to Rehab none identified  -EF       Row Name 07/08/24 1610          Cognition    Orientation Status (Cognition) oriented to;person;place;situation  -EF       Row Name 07/08/24 1610          Safety Issues, Functional Mobility    Impairments Affecting Function (Mobility) balance;endurance/activity tolerance;strength;postural/trunk control   -EF               User Key  (r) = Recorded By, (t) = Taken By, (c) = Cosigned By      Initials Name Provider Type    EF Marci Mckenzie, PT Physical Therapist                   Mobility       Row Name 07/08/24 1610          Bed Mobility    Comment, (Bed Mobility) not tested; pt up in chair  -EF       Row Name 07/08/24 1610          Sit-Stand Transfer    Sit-Stand [place] (Transfers) minimum assist (75% patient effort);2 person assist;verbal cues  -EF     Assistive Device (Sit-Stand Transfers) walker, front-wheeled  -EF     Comment, (Sit-Stand Transfer) cues for hand placement and sequencing  -EF       Row Name 07/08/24 1610          Gait/Stairs (Locomotion)    [place] Level (Gait) minimum assist (75% patient effort);verbal cues;1 person to manage equipment  2nd person following closely with chair for safety  -EF     Assistive Device (Gait) walker, front-wheeled  -EF     Distance in Feet (Gait) 15  -EF     Deviations/Abnormal Patterns (Gait) arely decreased;stride length decreased  -EF     Bilateral Gait Deviations forward flexed posture;heel strike decreased  -EF     Comment, (Gait/Stairs) Cues for upright posture  -EF               User Key  (r) = Recorded By, (t) = Taken By, (c) = Cosigned By      Initials Name Provider Type    Marci Haro, PT Physical Therapist                   Obj/Interventions    No documentation.                  Goals/Plan    No documentation.                  Clinical Impression       Row Name 07/08/24 1611          Pain    Pretreatment Pain Rating 0/10 - no pain  -EF     Posttreatment Pain Rating 0/10 - no pain  -EF       Row Name 07/08/24 1611          Plan of Care Review    Plan of Care Reviewed With patient  -EF     Progress no change  -EF     Outcome Evaluation Pt agreeable to PT and was able to maintain activity level during therapy today. Pt up in chair upon PT arrival. Pt performed STS transfer with min A x2. Pt then ambulated 15' with min A and rwx. 2nd  person following pt with chair during ambulation for safety. Pt continues to fatigue very quickly with upright mobility and requires cues for safety and sequencing of mobility. Pt will continue to benefit from PT to address impairments. Rec DC to SNF.  -EF       Row Name 07/08/24 1611          Positioning and Restraints    Pre-Treatment Position sitting in chair/recliner  -EF     Post Treatment Position chair  -EF     In Chair reclined;call light within reach;encouraged to call for assist;exit alarm on;legs elevated  -EF               User Key  (r) = Recorded By, (t) = Taken By, (c) = Cosigned By      Initials Name Provider Type    Marci Haro, PT Physical Therapist                   Outcome Measures       Row Name 07/08/24 1613 07/08/24 0900       How much help from another person do you currently need...    Turning from your back to your side while in flat bed without using bedrails? 3  -EF 3  -MM    Moving from lying on back to sitting on the side of a flat bed without bedrails? 2  -EF 2  -MM    Moving to and from a bed to a chair (including a wheelchair)? 2  -EF 2  -MM    Standing up from a chair using your arms (e.g., wheelchair, bedside chair)? 2  -EF 2  -MM    Climbing 3-5 steps with a railing? 1  -EF 1  -MM    To walk in hospital room? 2  -EF 2  -MM    AM-PAC 6 Clicks Score (PT) 12  -EF 12  -MM    Highest Level of Mobility Goal 4 --> Transfer to chair/commode  -EF 4 --> Transfer to chair/commode  -MM              User Key  (r) = Recorded By, (t) = Taken By, (c) = Cosigned By      Initials Name Provider Type    Amalia Rondon RN Registered Nurse    Marci Haro, PT Physical Therapist                                 Physical Therapy Education       Title: PT OT SLP Therapies (Done)       Topic: Physical Therapy (Done)       Point: Mobility training (Done)       Learning Progress Summary             Patient Acceptance, E,TB, VU,NR by PETER at 7/8/2024 1613    Acceptance, E,TB, VU,NR by MT at  7/8/2024 0551    Acceptance, E, NR by LH at 7/3/2024 1504    Acceptance, E,TB, VU,DU by CS at 7/2/2024 1312    Acceptance, E, VU,NR by SM at 7/1/2024 1143    Acceptance, E,TB, VU,NR by CB at 6/28/2024 1406    Acceptance, E,TB,D, NR by CB at 6/26/2024 1139    Acceptance, E,D, VU,NR by EB at 6/21/2024 1122    Acceptance, E,D, NR by EF at 6/19/2024 0931    Acceptance, E, VU,NR by SV at 6/16/2024 1531    Acceptance, E,TB, NR by BK at 6/14/2024 1715    Acceptance, E,D, VU,NR by EB at 6/14/2024 1641    Acceptance, E,D, NR by EB at 6/13/2024 1514    Acceptance, E,D, NR by EB at 6/12/2024 1643    Acceptance, E,TB, VU by RD at 6/11/2024 1011    Acceptance, E,TB, VU,NR by CB at 6/10/2024 1149    Acceptance, E, VU by EM at 6/7/2024 1622    Acceptance, E, NR by EM at 6/6/2024 1605    Acceptance, E,TB, VU by BK at 6/5/2024 1700    Acceptance, E, NR by EM at 6/5/2024 1640                         Point: Home exercise program (Done)       Learning Progress Summary             Patient Acceptance, E,TB, VU,NR by MT at 7/8/2024 0551    Acceptance, E, NR by LH at 7/3/2024 1504    Acceptance, E,TB, VU,DU by CS at 7/2/2024 1312    Acceptance, E, VU,NR by SM at 7/1/2024 1143    Acceptance, E,D, VU,NR by EB at 6/21/2024 1122    Acceptance, E,D, NR by EF at 6/19/2024 0931    Acceptance, E, VU,NR by SV at 6/16/2024 1531    Acceptance, E,TB, NR by BK at 6/14/2024 1715    Acceptance, E,TB, VU by BK at 6/13/2024 1655    Acceptance, E,TB, VU by RD at 6/11/2024 1011    Acceptance, E,TB, VU,NR by CB at 6/10/2024 1149    Acceptance, E, VU by EM at 6/7/2024 1622    Acceptance, E, NR by EM at 6/6/2024 1605                         Point: Body mechanics (Done)       Learning Progress Summary             Patient Acceptance, E,TB, VU,NR by EF at 7/8/2024 1613    Acceptance, E,TB, VU,NR by MT at 7/8/2024 0551    Acceptance, E, NR by LH at 7/3/2024 1504    Acceptance, E,TB, VU,DU by CS at 7/2/2024 1312    Acceptance, E, VU,NR by SM at 7/1/2024 1143     Acceptance, E,TB, VU,NR by CB at 6/28/2024 1406    Acceptance, E,TB,D, NR by CB at 6/26/2024 1139    Acceptance, E,D, VU,NR by EB at 6/21/2024 1122    Acceptance, E,D, NR by EF at 6/19/2024 0931    Acceptance, E,TB, NR by BK at 6/14/2024 1715    Acceptance, E,D, VU,NR by EB at 6/14/2024 1641    Acceptance, E,D, NR by EB at 6/13/2024 1514    Acceptance, E,D, NR by EB at 6/12/2024 1643    Acceptance, E,TB, VU by RD at 6/11/2024 1011    Acceptance, E,TB, VU,NR by CB at 6/10/2024 1149                         Point: Precautions (Done)       Learning Progress Summary             Patient Acceptance, E,TB, VU,NR by MT at 7/8/2024 0551    Acceptance, E, NR by LH at 7/3/2024 1504    Acceptance, E,TB, VU,DU by CS at 7/2/2024 1312    Acceptance, E, VU,NR by SM at 7/1/2024 1143    Acceptance, E,TB, VU,NR by CB at 6/28/2024 1406    Acceptance, E,D, NR by EB at 6/13/2024 1514    Acceptance, E,TB, VU by RD at 6/11/2024 1011    Acceptance, E,TB, VU,NR by CB at 6/10/2024 1149                                         User Key       Initials Effective Dates Name Provider Type Discipline    RD 06/16/21 -  Leonela Acuna, PT Physical Therapist PT    LH 06/16/21 -  Angelita Mcintyre, PT Physical Therapist PT    EF 05/31/24 -  Marci Mckenzie, PT Physical Therapist PT    EM 06/16/21 -  Yvette Guajardo, PT Physical Therapist PT    MT 06/16/21 -  Jose Powell, RN Registered Nurse Nurse     07/11/23 -  Velma Georges, PT Physical Therapist PT    EB 02/14/23 -  Nancy Bishop, PTA Physical Therapist Assistant PT    CB 10/22/21 -  Kaitlin Cunningham, PT Physical Therapist PT    SM 05/02/22 -  Nahed Santiago, PT Physical Therapist PT    CS 09/22/22 -  Zena Malone, PT Physical Therapist PT    BK 04/30/24 -  Nu Leon RN Extern Registered Nurse Nurse                  PT Recommendation and Plan  Planned Therapy Interventions (PT): balance training, bed mobility training, gait training, home exercise program, patient/family  education, strengthening, ROM (range of motion), transfer training, postural re-education  Plan of Care Reviewed With: patient  Progress: no change  Outcome Evaluation: Pt agreeable to PT and was able to maintain activity level during therapy today. Pt up in chair upon PT arrival. Pt performed STS transfer with min A x2. Pt then ambulated 15' with min A and rwx. 2nd person following pt with chair during ambulation for safety. Pt continues to fatigue very quickly with upright mobility and requires cues for safety and sequencing of mobility. Pt will continue to benefit from PT to address impairments. Rec DC to SNF.     Time Calculation:         PT Charges       Row Name 07/08/24 1613             Time Calculation    Start Time 1600  -EF      Stop Time 1610  -EF      Time Calculation (min) 10 min  -EF      PT Received On 07/08/24  -EF      PT - Next Appointment 07/09/24  -EF         Time Calculation- PT    Total Timed Code Minutes- PT 10 minute(s)  -EF         Timed Charges    28660 - PT Therapeutic Activity Minutes 10  -EF         Total Minutes    Timed Charges Total Minutes 10  -EF       Total Minutes 10  -EF                User Key  (r) = Recorded By, (t) = Taken By, (c) = Cosigned By      Initials Name Provider Type    EF Marci Mckenzie, PT Physical Therapist                  Therapy Charges for Today       Code Description Service Date Service Provider Modifiers Qty    37860071430 HC PT THERAPEUTIC ACT EA 15 MIN 7/8/2024 Marci Mckenzie, PT GP 1    03299549959 HC PT THER SUPP EA 15 MIN 7/8/2024 Marci Mckenzie, PT GP 1            PT G-Codes  Outcome Measure Options: AM-PAC 6 Clicks Daily Activity (OT)  AM-PAC 6 Clicks Score (PT): 12  AM-PAC 6 Clicks Score (OT): 16  Modified Adriana Scale: 4 - Moderately severe disability.  Unable to walk without assistance, and unable to attend to own bodily needs without assistance.  PT Discharge Summary  Anticipated Discharge Disposition (PT): skilled nursing  facility    Marci Mckenzie, PT  7/8/2024

## 2024-07-08 NOTE — DISCHARGE SUMMARY
Patient Name: Kimberlee Urrutia  : 1940  MRN: [id number]    Date of Admission: 2024  Date of Discharge:  2024  Primary Care Physician: Meche Ji MD      Chief Complaint:   Back Pain      Discharge Diagnoses     Active Hospital Problems    Diagnosis  POA   • **Right ureteral stone [N20.1]  Yes   • Severe malnutrition [E43]  Yes   • Hypercalcemia [E83.52]  No   • Constipation [K59.00]  Clinically Undetermined   • Acute UTI (urinary tract infection) [N39.0]  Yes   • Hypoglycemia [E16.2]  Yes   • Abnormal LFTs [R79.89]  Yes   • Chronic bilateral low back pain without sciatica [M54.50, G89.29]  Yes   • CAYLA (dementia of Alzheimer type) [G30.9, F02.80]  Yes   • Lumbar spinal stenosis [M48.061]  Yes   • Thrombocytopenia [D69.6]  Yes   • Acute low back pain [M54.50]  Yes      Resolved Hospital Problems    Diagnosis Date Resolved POA   • Shock, septic [A41.9, R65.21] 2024 Yes   • PRINCE (acute kidney injury) [N17.9] 2024 Yes        Hospital Course     Ms. Urrutia is a 84 y.o. female with a history of dementia spinal stenosis with prior epidurals and kidney stones who presented to [facility] initially complaining of back pain.  Please see the admitting history and physical for further details.  She was found to have sepsis from urinary source with obstructing right ureteral stone and was admitted to the hospital for further evaluation and treatment.  She was admitted to the hospital and taken urgently for stent placement.  She tolerated cystoscopy and stent placement well with plans for outpatient urology follow-up.  At that time we will do stent removal and lithotripsy.  She was treated aggressively with IV antibiotics.  She had some mild shock on presentation and cultures grew ESBL.  Initial CT scan suggested possible discitis but neurosurgery evaluated rule this out and signed off.  Her white blood cell count normalized her platelets normalized and metabolically things  improved.  She does have a history of dementia and was living in a facility prior to coming to the hospital.  Nephrology assisted with management of some electrolyte issues throughout the hospitalization and is discharged on Sensipar.  She has been medically stable for a while now.  Unfortunately there are some issues with her Medicare.  There was a clerical error and she was listed as  but clearly she is still with us.  Hopefully this clerical error gets fixed and were able to discharge to skilled nursing facility.  She has a bed available.      Day of Discharge     Subjective:  No new issues today she has been ready to leave the hospital for more than for 5 days now.    Physical Exam:  Temp:  [97.2 °F (36.2 °C)-98.6 °F (37 °C)] 97.6 °F (36.4 °C)  Heart Rate:  [71-78] 75  Resp:  [16] 16  BP: (108-130)/(60-69) 130/69  Body mass index is 31.07 kg/m².  Physical Exam  Vitals and nursing note reviewed.   Constitutional:       General: She is not in acute distress.     Appearance: She is ill-appearing.   Cardiovascular:      Rate and Rhythm: Normal rate and regular rhythm.   Pulmonary:      Effort: Pulmonary effort is normal. No respiratory distress.   Neurological:      General: No focal deficit present.      Mental Status: She is alert. Mental status is at baseline.         Consultants     Consult Orders (all) (From admission, onward)       Start     Ordered    24 1816  Inpatient Nutrition Consult  Once        Comments: Pt is not eating well, only bites of food or 25% of meals.   Provider:  (Not yet assigned)    24 1816    24 1246  Inpatient Nephrology Consult  Once        Specialty:  Nephrology  Provider:  Frederick Kirkland MD    24 1245    24 1330  Inpatient Urology Consult  Once        Specialty:  Urology  Provider:  Efra Schofield MD    24 1330    24 0957  Inpatient IV Team Consult Midline 1 Lumen  Once        Provider:  (Not yet assigned)     06/06/24 0958    06/03/24 1452  Inpatient Infectious Diseases Consult  Once        Specialty:  Infectious Diseases  Provider:  Dorothy Atwood MD    06/03/24 1451    06/02/24 1357  Inpatient IV Team Consult PICC 3 Lumens  Once        Provider:  (Not yet assigned)    06/02/24 1357    06/02/24 0905  Inpatient Consult to Case Management   Once        Provider:  (Not yet assigned)    06/02/24 0905    06/02/24 0749  Inpatient Consult to Intensivist  Once        Specialty:  Intensive Care  Provider:  Poli Weller MD    06/02/24 0749    06/02/24 0702  Inpatient Urology Consult  IN AM        Specialty:  Urology  Provider:  Efra Schofield MD    06/02/24 0503    06/02/24 0702  Inpatient Neurosurgery Consult  IN AM        Specialty:  Neurosurgery  Provider:  Gray Littlejohn MD    06/02/24 0503    06/02/24 0251  [facility] (on-call MD unless specified) Details  Once        Specialty:  Hospitalist  Provider:  (Not yet assigned)    06/02/24 0250    06/02/24 0218  Urology (on-call MD unless specified)  Once        Specialty:  Urology  Provider:  (Not yet assigned)    06/02/24 0217                  Procedures     CYSTOSCOPY, RIGHT URETERAL STENT PLACEMENT    Imaging Results (All)       Procedure Component Value Units Date/Time    CT Abdomen Pelvis With Contrast [[id number]] Collected: 06/06/24 1030     Updated: 06/06/24 1044    Narrative:      CT ABDOMEN PELVIS W CONTRAST-     INDICATION: Sepsis, ureteral stent     COMPARISON: CT abdomen pelvis June 1, 2024     TECHNIQUE:  Routine CT abdomen/pelvis with IV contrast. Coronal and sagittal  reformats. Radiation dose reduction techniques were utilized, including  automated exposure control and exposure modulation based on body size.     FINDINGS:      Small right pleural effusion. Small loculated left pleural effusion with  pleural calcification and pleural thickening. Suspect subsegmental  atelectasis at the bases.     ABDOMEN: Beam hardening and streak artifact  from patient's arms at side  and body wall against the gantry. Normal liver, gallbladder, spleen,  pancreas and adrenal glands. Symmetric perinephric edema. Right ureteral  stent resolution of right hydronephrosis. Fluid attenuating left renal  cyst. Lobular left renal contour with some cortical loss, suspect  scarring. No solid-appearing renal mass or hydronephrosis.     Pelvis: Rodriguez in the bladder. Bladder is collapsed. Anteverted uterus.  Calcifications in the uterus may represent small calcified leiomyomas.  No adnexal mass. Trace fluid in the cul-de-sac.     Bowel: No obstruction. Colonic diverticulosis. Normal appendix.     Abdominal wall: Rectus diastases. Periumbilical abdominal wall scarring.  Small fat-containing umbilical hernia. Small fat-containing inguinal  hernias. Body wall edema.     Retroperitoneum: No lymphadenopathy.     Vasculature: Patent. No abdominal aortic aneurysm.     Osseous structures: No destructive osseous lesions. Lumbar facet  degenerative arthropathy with grade 1/2 anterolisthesis of L3 on L4.  Decreased bone mineralization..       Impression:         1. New third spacing with small right greater than left pleural  effusions and body wall edema.  2. Interval placement of right ureteral stent with resolution of  hydronephrosis. Ureterolithiasis no longer seen  3. Colonic diverticulosis     This report was finalized on 6/6/2024 10:41 AM by Dr. Diony Lam M.D on Workstation: [id number]       FL C Arm During Surgery [[id number]] Resulted: 06/04/24 0942     Updated: 06/04/24 0942    Narrative:      This procedure was auto-finalized with no dictation required.    MRI Lumbar Spine With & Without Contrast [[id number]] Collected: 06/02/24 1259     Updated: 06/02/24 1357    Narrative:      MRI LUMBAR SPINE WITH AND WITHOUT CONTRAST     History: Dementia. Possible osteomyelitis at L4-5 on CT 06/01/2024     TECHNIQUE: MRI lumbar spine includes sagittal PD, T1, T2, STIR as well  as axial  T1, T2 weighted sequences.  Contrast was administered IV  followed by axial and sagittal T1 weighted sequences.     COMPARISON: CT abdomen and pelvis 06/01/2024, CT lumbar spine  06/01/2024.     FINDINGS: There is transitional lumbosacral anatomy with partial  sacralization of what is termed L5. Conus medullaris tip terminates at  L1-2 level.     At T12-L1, there is advanced degenerative disc disease with disc space  narrowing and there is a broad disc osteophyte narrowing of the central  canal. Disc bulge eccentric to the left and extends into the inferior  aspect of the left neural foramen in conjunction with facet arthritis  contributes to moderate to severe left and moderate right foraminal  narrowing. Disc appears to contact the undersurface exiting left T12  nerve.     At L1-2, there is a broad disc bulge and there is mild facet arthritis.  Central canal and neural foramina are patent.     At L2-3, there is a broad disc bulge and there is bilateral facet  arthritis with ligamentous thickening and very mild narrowing of the  central canal. Patent neural foramina.     At L3-4, there is disc space narrowing with 8 mm anterolisthesis L3 on  L4. Uncovered disc bulge is present and there is advanced bilateral  facet arthritis facet joint fluid. There is severe central canal and  lateral recess narrowing. There is also diminished femoral height with  moderate right and mild left foraminal narrowing and uncovered disc  bulge contacts undersurface the exiting L3 nerves.     At L4-5, there is partial bony fusion of the vertebral bodies. Posterior  end plate spurring is present and there is moderate facet arthritis  though the central canal is patent. Mild foraminal narrowing.     At L5-S1, there is transitional anatomy with partial sacralization of  L5. Patent central canal and neural foramina.       Impression:      1. No evidence for disc space infection or osteomyelitis.  2. Multilevel degenerative disc disease with  degree of canal stenosis  greatest at L3-4 where there is 8 mm anterolisthesis L3 on L4. Uncovered  disc bulge is present and there is bilateral facet arthritis with severe  central canal lateral recess narrowing as well as moderate right and  mild left foraminal narrowing.  3. Partial bony fusion of the vertebral bodies at L4-5.  4. At T12-L1, there is degenerative disc disease with disc space  narrowing and there is a disc bulge eccentric to the left extending into  the inferior aspect of the left neural foramen contributing to moderate  to severe left foraminal narrowing and disc appears to contact the  undersurface exiting left T12 nerve.     This report was finalized on 6/2/2024 1:54 PM by Dr. Ki Roque M.D on Workstation: [id number]       CT Abdomen Pelvis Without Contrast [[id number]] Collected: 06/01/24 2340     Updated: 06/01/24 2348    Narrative:      CT ABDOMEN PELVIS WO CONTRAST-     HISTORY: 84 years of age, Female.  Rule out ureteral stone     TECHNIQUE:  CT includes axial imaging from the lung bases to the  trochanters without intravenous contrast and without use of oral  contrast. Data reconstructed in coronal and sagittal planes. Radiation  dose reduction techniques were utilized, including automated exposure  control and exposure modulation based on body size.     COMPARISON: CT abdomen and pelvis 02/22/2021.     FINDINGS: There is a 3 mm distal right ureteral stone at the horizontal  level of the upper sacrum. Moderate to severe right hydronephrosis is  present and there is right perinephric stranding.     Left lateral renal cyst measures 1.8 cm. No left renal or ureteral stone  is evident.     Heart size is enlarged. Calcified left basilar pleural plaques.  Dependent basilar atelectasis. Liver, spleen, pancreas appear within  normal limits. Adrenal glands appear normal. Gallbladder partially  decompressed.     Sigmoid diverticulosis without evidence for diverticulitis. CT lumbar  spine has  been completed same date and has been reported separately.       Impression:      1. 3 mm distal right ureteral stone at the horizontal level of the upper  sacrum with moderate to severe right hydronephrosis and right  perinephric stranding.  2. Cardiomegaly. Basilar atelectasis. Sigmoid diverticulosis without  evidence for diverticulitis.     Radiation dose reduction techniques were utilized, including automated  exposure control and exposure modulation based on body size.        This report was finalized on 6/1/2024 11:45 PM by Dr. Ki Roque M.D on Workstation: [id number]       CT Lumbar Spine Without Contrast [[id number]] Collected: 06/01/24 2252     Updated: 06/01/24 2305    Addenda:        ADDENDUM:  06 01 24 23:04 Call Doctor Regarding Osteomyelitis (if unsuspected),   called SHY Ashby on 06 01 23:04 (-04:00)      Signed: 06/01/24 2251 by Ivan Oro MD    Narrative:      CR  Patient: ABDIRASHID TA  Time Out: 22:51  Exam(s): CT L SPINE     EXAM:    CT Lumbar Spine Without Intravenous Contrast    CLINICAL HISTORY:    Low back pain    TECHNIQUE:    Axial computed tomography images of the lumbar spine without   intravenous contrast.  CTDI is 30.33 mGy and DLP is 826.4 mGy-cm.  This   CT exam was performed according to the principle of ALARA (As Low As   Reasonably Achievable) by using one or more of the following dose   reduction techniques: automated exposure control, adjustment of the mA   and or kV according to patient size, and or use of iterative   reconstruction technique.    COMPARISON:    MRI dated 2 18 21    FINDINGS:    Vertebrae:  Irregularity of the inferior endplate of L5 and superior   endplate of S1.  Multilevel facet arthropathy.  8 mm anterolisthesis of   L4-L5.  There is transitional anatomy.  The L5 vertebral body is   sacralized.  No acute fracture.    Discs spinal canal neural foramina:  Multilevel disc height loss.    There is significant irregularity of the disc space at  L4-L5.  Vacuum   disc phenomenon at multiple levels in the lumbar spine.  No spinal canal   stenosis.    Soft tissues: Mild right hydroureteronephrosis.  Possible nephroliths   in the distal ureter..    Vasculature:  The distal thecal sac is ectatic, unchanged from prior   MRI.    IMPRESSION:       There is transitional anatomy with sacralization of the L5 vertebral body.    Findings are suspicious for discitis osteomyelitis at the L4-L5 level.    Recommend contrast enhanced MRI to further evaluate.    Multilevel degenerative changes.  No evidence of high-grade spinal canal   narrowing.    Mild to moderate right hydroureteronephrosis with possible distal   ureteral stone, not ideally evaluated on this study..    Impression:            Communications:     Call Doctor Osteomyelitis (if unsuspected)    Electronically signed by Ivan Oro MD on 06-01-24 at 2251          Results for orders placed during the hospital encounter of 06/01/24    Duplex Venous Lower Extremity - Bilateral CAR    Interpretation Summary  •  Normal bilateral lower extremity venous duplex scan.      Pertinent Labs         Results from last 7 days   Lab Units 07/08/24  0509 07/07/24 0442 07/06/24 0512 07/05/24  0436 07/04/24  0445   SODIUM mmol/L 139 139  --  138 139   POTASSIUM mmol/L 4.0 4.5 4.4 4.2 4.0   CHLORIDE mmol/L 103 106  --  106 106   CO2 mmol/L 24.3 25.0  --  21.4* 15.8*   BUN mg/dL 19 23  --  20 23   CREATININE mg/dL 0.93 0.92  --  0.97 0.97   GLUCOSE mg/dL 91 87  --  89 90   EGFR mL/min/1.73 60.4 61.1  --  57.4* 57.4*     Results from last 7 days   Lab Units 07/08/24  0509 07/05/24  0436 07/04/24  0445 07/03/24  0442   ALBUMIN g/dL 3.4* 3.2* 3.3* 3.1*     Results from last 7 days   Lab Units 07/08/24  0509 07/07/24  0442 07/06/24  0512 07/05/24  0436 07/04/24  0445 07/03/24  0442   CALCIUM mg/dL 9.9 10.2 9.9 9.7 10.1 10.4   ALBUMIN g/dL 3.4*  --   --  3.2* 3.3* 3.1*   MAGNESIUM mg/dL  --   --  2.1  --  1.9 2.1   PHOSPHORUS mg/dL  "3.3 3.2 3.2 3.3 3.2 3.2               Invalid input(s): \"LDLCALC\"          Test Results Pending at Discharge       Discharge Details        Discharge Medications        New Medications        Instructions Start Date   cinacalcet 60 MG tablet  Commonly known as: SENSIPAR   60 mg, Oral, Daily With Breakfast             Changes to Medications        Instructions Start Date   acetaminophen 325 MG tablet  Commonly known as: TYLENOL  What changed:   when to take this  reasons to take this   650 mg, Oral, Every 4 Hours PRN             Continue These Medications        Instructions Start Date   cholecalciferol 25 MCG (1000 UT) tablet  Commonly known as: VITAMIN D3   2,000 Units, Oral, Daily      donepezil 10 MG tablet  Commonly known as: ARICEPT   1 tablet, Oral, Daily      memantine 10 MG tablet  Commonly known as: NAMENDA   10 mg, Oral, 2 Times Daily      MULTI COMPLETE PO   Oral, Daily             Stop These Medications      amLODIPine 2.5 MG tablet  Commonly known as: NORVASC     meloxicam 15 MG tablet  Commonly known as: MOBIC     rosuvastatin 20 MG tablet  Commonly known as: CRESTOR              Allergies   Allergen Reactions   • Penicillins Other (See Comments) and Unknown (See Comments)     Tolerates cephalosporins   • Sulfa Antibiotics Rash       Discharge Disposition:  Skilled Nursing Facility (DC - External)      Discharge Diet:  Diet Order   Procedures   • Diet: Regular/House; Feeding Assistance - Nursing; Texture: Regular (IDDSI 7); Fluid Consistency: Thin (IDDSI 0)       Discharge Activity:   Activity Instructions       Activity as Tolerated              CODE STATUS:    Code Status and Medical Interventions:   Ordered at: 06/03/24 7847     Medical Intervention Limits:    NO intubation (DNI)     Code Status (Patient has no pulse and is not breathing):    No CPR (Do Not Attempt to Resuscitate)     Medical Interventions (Patient has pulse or is breathing):    Limited Support       No future " appointments.  Additional Instructions for the Follow-ups that You Need to Schedule       Discharge Follow-up with PCP   As directed       Currently Documented PCP:    Meche Ji MD    PCP Phone Number:    [phone]     Follow Up Details: 3-4 weeks        Discharge Follow-up with Specified Provider: Urology; 2 Weeks   As directed      To: Urology   Follow Up: 2 Weeks   Follow Up Details: outpatient for scheduled ureteroscopy, laser llithotripsy after discharge               Follow-up Information       Meche Ji MD .    Specialty: Internal Medicine  Why: 3-4 weeks  Contact information:  [address] [place] [address]  [phone]                             Additional Instructions for the Follow-ups that You Need to Schedule       Discharge Follow-up with PCP   As directed       Currently Documented PCP:    Meche Ji MD    PCP Phone Number:    [phone]     Follow Up Details: 3-4 weeks        Discharge Follow-up with Specified Provider: Urology; 2 Weeks   As directed      To: Urology   Follow Up: 2 Weeks   Follow Up Details: outpatient for scheduled ureteroscopy, laser llithotripsy after discharge            Time Spent on Discharge:  Greater than 30 minutes      Josue Mccormick MD  [facility]  07/08/24  09:44 EDT

## 2024-07-09 PROCEDURE — 97530 THERAPEUTIC ACTIVITIES: CPT

## 2024-07-09 RX ADMIN — MEMANTINE HYDROCHLORIDE 10 MG: 10 TABLET, FILM COATED ORAL at 20:37

## 2024-07-09 RX ADMIN — MEMANTINE HYDROCHLORIDE 10 MG: 10 TABLET, FILM COATED ORAL at 10:58

## 2024-07-09 RX ADMIN — BISACODYL 10 MG: 10 SUPPOSITORY RECTAL at 11:00

## 2024-07-09 RX ADMIN — CINACALCET 60 MG: 30 TABLET ORAL at 10:58

## 2024-07-09 RX ADMIN — DONEPEZIL HYDROCHLORIDE 10 MG: 10 TABLET, FILM COATED ORAL at 10:58

## 2024-07-09 NOTE — PROGRESS NOTES
Name: Kimberlee Urrutia ADMIT: 2024   : 1940  PCP: Meche Ji MD    MRN: [id number] LOS: 37 days   AGE/SEX: 84 y.o. female  ROOM: [facility]     Subjective   Subjective   No acute events overnight.  Patient states that she is feeling well this morning.  No chest pain or shortness of breath.  Spoke with CCP and unfortunately her insurance is still showing her as .    Objective   Objective     Vital Signs  Temp:  [97 °F (36.1 °C)-97.5 °F (36.4 °C)] 97 °F (36.1 °C)  Heart Rate:  [66-88] 87  Resp:  [16] 16  BP: (108-132)/(62-75) 132/75  SpO2:  [94 %-98 %] 98 %  on   ;   Device (Oxygen Therapy): room air  Body mass index is 29.36 kg/m².    Physical Exam  General: Alert, no acute distress.  Sitting up in chair at bedside.  Answers questions appropriately.  ENT: No conjunctival injection or scleral icterus. Moist mucous membranes.   Neuro: Eyes open and moving in all directions, strength normal in all extremities, no focal deficits.   Lungs: Clear to auscultation bilaterally. No wheeze or crackles. No distress.   Heart: RRR, no murmurs. No edema.  Abdomen: Soft, non-tender, non-distended. Normal bowel sounds.  Ext: Warm and well-perfused. No edema.   Skin: No rashes or lesions. IV site without swelling or erythema.     Results Review     I reviewed the patient's new clinical results:      Results from last 7 days   Lab Units 24  0509 24  0442 24  0512 24  0436 24  0445   SODIUM mmol/L 139 139  --  138 139   POTASSIUM mmol/L 4.0 4.5 4.4 4.2 4.0   CHLORIDE mmol/L 103 106  --  106 106   CO2 mmol/L 24.3 25.0  --  21.4* 15.8*   BUN mg/dL 19 23  --  20 23   CREATININE mg/dL 0.93 0.92  --  0.97 0.97   GLUCOSE mg/dL 91 87  --  89 90   EGFR mL/min/1.73 60.4 61.1  --  57.4* 57.4*     Results from last 7 days   Lab Units 24  0509 24  0436 24  0445 24  0442   ALBUMIN g/dL 3.4* 3.2* 3.3* 3.1*     Results from last 7 days   Lab Units 24  0509  "24  0442 24  0512 24  0436 24  0445 24  0442   CALCIUM mg/dL 9.9 10.2 9.9 9.7 10.1 10.4   ALBUMIN g/dL 3.4*  --   --  3.2* 3.3* 3.1*   MAGNESIUM mg/dL  --   --  2.1  --  1.9 2.1   PHOSPHORUS mg/dL 3.3 3.2 3.2 3.3 3.2 3.2       No results found for: \"HGBA1C\", \"POCGLU\"    No radiology results for the last day    I have personally reviewed all medications:  Scheduled Medications  bisacodyl, 10 mg, Rectal, Daily  cinacalcet, 60 mg, Oral, Daily With Breakfast  donepezil, 10 mg, Oral, Daily  memantine, 10 mg, Oral, BID    Infusions   Diet  Diet: Regular/House; Feeding Assistance - Nursing; Texture: Regular (IDDSI 7); Fluid Consistency: Thin (IDDSI 0)      Intake/Output Summary (Last 24 hours) at 2024 1255  Last data filed at 2024 0613  Gross per 24 hour   Intake 360 ml   Output 900 ml   Net -540 ml       Assessment/Plan     Active Hospital Problems    Diagnosis  POA   • **Right ureteral stone [N20.1]  Yes   • Severe malnutrition [E43]  Yes   • Hypercalcemia [E83.52]  No   • Constipation [K59.00]  Clinically Undetermined   • Acute UTI (urinary tract infection) [N39.0]  Yes   • Hypoglycemia [E16.2]  Yes   • Abnormal LFTs [R79.89]  Yes   • Chronic bilateral low back pain without sciatica [M54.50, G89.29]  Yes   • 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 female with Right ureteral stone.    Patient was discharged yesterday, however insurance issues have not been resolved.  Spoke with [facility] this morning and the patient's insurance is still showing her as .  She continues to be stable and medically ready for discharge. Patient was septic from urinary source with obstructing right " ureteral stone on admission.  S/p cystoscopy and stent placement.  She will follow-up with urology as an outpatient for stent removal and lithotripsy.  Discussed with urology APRN today.  Stents can remain in place for up to 3 months, so plan will still be for her to follow-up as an outpatient.  She completed course of IV antibiotics for ESBL. There was initial concern for discitis on imaging, but neurosurgery evaluated.  They did not feel presentation was consistent with discitis.  She also was found to have hypercalcemia.  Nephrology was consulted to assist with this.  Patient is being discharged with Sensipar.  Labs from yesterday reviewed and stable.  Can check later this week if patient remains admitted.  Once again, patient is medically ready for discharge home pending insurance arrangements.    Amaya Hernandez MD  [facility]  07/09/24  12:55 EDT

## 2024-07-09 NOTE — PLAN OF CARE
Goal Outcome Evaluation:  VSS, pure wick in use, no BM this shift, pleasant, cooperative, forgetful, dementia, oriented to self and place, bored , waiting for insurance to be reinstated, falls protocol, up with assist of 1-2 & gait belt, bed/chair alarm in use, up in chair for long period  Plan of Care Reviewed With: patient

## 2024-07-09 NOTE — PLAN OF CARE
Goal Outcome Evaluation:  Plan of Care Reviewed With: patient        Progress: no change  Outcome Evaluation: vss, turned to sides, bed alarm for safety, slept on and off. encourage to increase fluid intake, waiting for response from [facility], continue to monitor the pt.

## 2024-07-09 NOTE — PLAN OF CARE
Goal Outcome Evaluation:  Plan of Care Reviewed With: patient        Progress: improving  Outcome Evaluation: Vss, afebrile, oriented to self and place, turning q2 hours, sat up in chair, purewick in place, had bowel movement today, falls and contact precautions maintained.

## 2024-07-09 NOTE — PLAN OF CARE
Goal Outcome Evaluation:  Plan of Care Reviewed With: patient        Progress: improving  Outcome Evaluation: Pt continues to demo slow and steady progress with strength and endurance, as evidenced by tolerance of increased activity today. Pt up in chair upon PT arrival. Pt performed STS transfer with min A and ambulated 20' with min A and rwx. Pt continues to require assist x1 and cues for sequencing to ensure safety with mobility. Pt will continue to benefit from PT to address impairments and increase independence with mobility. Rec DC to SNF.      Anticipated Discharge Disposition (PT): skilled nursing facility

## 2024-07-09 NOTE — PROGRESS NOTES
Continued Stay Note  [facility]     Patient Name: Kimberlee Urrutia  MRN: [id number]  Today's Date: 2024    Admit Date: 2024    Plan: PENDING   Discharge Plan       Row Name 24 1245       Plan    Plan PENDING    Plan Comments Per Kian with [facility], insurance is still showing .                   Discharge Codes    No documentation.                 Expected Discharge Date and Time       Expected Discharge Date Expected Discharge Time    2024               Kavya Gilman RN

## 2024-07-09 NOTE — THERAPY TREATMENT NOTE
Patient Name: Kimberlee Urrutia  : 1940    MRN: [id number]                              Today's Date: 2024       Admit Date: 2024    Visit Dx:     ICD-10-CM ICD-9-CM   1. Right ureteral stone  N20.1 592.1   2. Acute UTI  N39.0 599.0   3. Severe sepsis  A41.9 038.9    R65.20 995.92   4. Elevated lactic acid level  R79.89 276.2   5. Osteomyelitis of lumbar spine  M46.26 730.28   6. Elevated AST (SGOT)  R74.01 790.4   7. Elevated ALT measurement  R74.01 790.4   8. Elevated C-reactive protein (CRP)  R79.82 790.95   9. PRINCE (acute kidney injury)  N17.9 584.9     Patient Active Problem List   Diagnosis    Arthritis    HLD (hyperlipidemia)    Primary hypertension    Health care maintenance    Knee pain, right    Hx of total knee arthroplasty    Seasonal allergies    Vitamin D deficiency    Pyuria    Transient alteration of awareness    Migraine without status migrainosus, not intractable    Leukocytosis    Viral pharyngitis    Multinodular thyroid    Osteopenia of multiple sites    Colon cancer screening    Serum calcium elevated    Acute low back pain    Elevated procalcitonin    Sepsis due to Escherichia coli with acute renal failure without septic shock    Thrombocytopenia    Recurrent oral herpes simplex    Lumbar spinal stenosis    Metabolic encephalopathy    Renal stones    Colon cancer screening    Sacroiliac joint pain    Lumbar stenosis without neurogenic claudication    Renal stone    Hyperlipidemia    History of total knee arthroplasty    Spinal stenosis of lumbar region    Multinodular goiter    Osteopenia    Pain in right knee    Sacroiliac joint pain    Hypercalcemia    CAYLA (dementia of Alzheimer type)    Chronic bilateral low back pain without sciatica    Right ureteral stone    Discitis of lumbar region    Acute UTI (urinary tract infection)    Hypoglycemia    Abnormal LFTs    Hypercalcemia    Constipation    Severe malnutrition     Past Medical History:   Diagnosis Date    Allergic      Arthritis     Colon cancer screening 01/29/2020    Colon cancer screening 01/29/2020    Cough     Diarrhea     Hx of migraine headaches     Hyperlipidemia     Irregular heart beat     Osteoarthritis     Renal stones 02/2021    Vitamin D deficiency      Past Surgical History:   Procedure Laterality Date    COLONOSCOPY      CYSTOSCOPY W/ URETERAL STENT PLACEMENT Right 6/2/2024    Procedure: CYSTOSCOPY, RIGHT URETERAL STENT PLACEMENT;  Surgeon: Efra Schofield MD;  Location: [facility] MAIN OR;  Service: Urology;  Laterality: Right;    LUMBAR EPIDURAL INJECTION N/A 8/2/2021    Procedure: LUMBAR EPIDURAL 1ST VISIT 5-1;  Surgeon: Nu Partida MD;  Location: [facility] MAIN OR;  Service: Pain Management;  Laterality: N/A;    REPLACEMENT TOTAL KNEE Right 01/2015    Josue Wilson MD    SACROILIAC JOINT INJECTION Left 6/16/2021    Procedure: SACROILIAC INJECTION left;  Surgeon: Nu Partida MD;  Location: [facility] MAIN OR;  Service: Pain Management;  Laterality: Left;    TUBAL ABDOMINAL LIGATION      URETEROSCOPY LASER LITHOTRIPSY WITH STENT INSERTION Left 2/23/2021    Procedure: LT.URETEROSCOPY LASER LITHOTRIPSY WITH STENT  FOR STONE;  Surgeon: Arnaud Lu MD;  Location: [facility] OR;  Service: Urology;  Laterality: Left;    WISDOM TOOTH EXTRACTION        General Information       Row Name 07/09/24 1530          Physical Therapy Time and Intention    Document Type therapy note (daily note)  -EF     Mode of Treatment individual therapy;physical therapy  -EF       Row Name 07/09/24 1530          General Information    Existing Precautions/Restrictions fall  -EF     Barriers to Rehab none identified  -EF       Row Name 07/09/24 1530          Cognition    Orientation Status (Cognition) oriented to;person;place;situation  -EF       Row Name 07/09/24 153          Safety Issues, Functional Mobility    Impairments Affecting Function (Mobility) balance;endurance/activity tolerance;strength  -EF                User Key  (r) = Recorded By, (t) = Taken By, (c) = Cosigned By      Initials Name Provider Type    Marci Haro, PT Physical Therapist                   Mobility       Row Name 07/09/24 1531          Bed Mobility    Comment, (Bed Mobility) not tested; pt up in chair  -EF       Row Name 07/09/24 1531          Sit-Stand Transfer    Sit-Stand [place] (Transfers) minimum assist (75% patient effort);verbal cues  -EF     Assistive Device (Sit-Stand Transfers) walker, front-wheeled  -EF     Comment, (Sit-Stand Transfer) cues for sequencing and hand placement  -EF       Row Name 07/09/24 1531          Gait/Stairs (Locomotion)    [place] Level (Gait) contact guard;minimum assist (75% patient effort);verbal cues;1 person to manage equipment  2nd person following closely with chair for safety due to decreased endurance  -EF     Assistive Device (Gait) walker, front-wheeled  -EF     Distance in Feet (Gait) 20  -EF     Deviations/Abnormal Patterns (Gait) arely decreased;stride length decreased  -EF     Bilateral Gait Deviations forward flexed posture;heel strike decreased  -EF     Comment, (Gait/Stairs) Cues for upright posture and gait sequencing with rwx. Min A to guide rwx at times, particularly around turns. Chair follow during gait for safety due to decreased endurance.  -EF               User Key  (r) = Recorded By, (t) = Taken By, (c) = Cosigned By      Initials Name Provider Type    Marci Haro, PT Physical Therapist                   Obj/Interventions       Row Name 07/09/24 1532          Motor Skills    Therapeutic Exercise --  Seated B ankle pumps, LAQ x10 reps  -EF               User Key  (r) = Recorded By, (t) = Taken By, (c) = Cosigned By      Initials Name Provider Type    Marci Haro, PT Physical Therapist                   Goals/Plan    No documentation.                  Clinical Impression       Row Name 07/09/24 1532          Pain    Pretreatment Pain Rating 0/10 - no  pain  -EF     Posttreatment Pain Rating 0/10 - no pain  -EF       Row Name 07/09/24 1532          Plan of Care Review    Plan of Care Reviewed With patient  -EF     Progress improving  -EF     Outcome Evaluation Pt continues to demo slow and steady progress with strength and endurance, as evidenced by tolerance of increased activity today. Pt up in chair upon PT arrival. Pt performed STS transfer with min A and ambulated 20' with min A and rwx. Pt continues to require assist x1 and cues for sequencing to ensure safety with mobility. Pt will continue to benefit from PT to address impairments and increase independence with mobility. Rec DC to SNF.  -EF       Row Name 07/09/24 1532          Positioning and Restraints    Pre-Treatment Position sitting in chair/recliner  -EF     Post Treatment Position chair  -EF     In Chair reclined;call light within reach;encouraged to call for assist;exit alarm on;legs elevated  -EF               User Key  (r) = Recorded By, (t) = Taken By, (c) = Cosigned By      Initials Name Provider Type    Marci Haro PT Physical Therapist                   Outcome Measures       Row Name 07/09/24 1534          How much help from another person do you currently need...    Turning from your back to your side while in flat bed without using bedrails? 3  -EF     Moving from lying on back to sitting on the side of a flat bed without bedrails? 2  -EF     Moving to and from a bed to a chair (including a wheelchair)? 3  -EF     Standing up from a chair using your arms (e.g., wheelchair, bedside chair)? 3  -EF     Climbing 3-5 steps with a railing? 2  -EF     To walk in hospital room? 3  -EF     AM-PAC 6 Clicks Score (PT) 16  -EF     Highest Level of Mobility Goal 5 --> Static standing  -EF               User Key  (r) = Recorded By, (t) = Taken By, (c) = Cosigned By      Initials Name Provider Type    Marci Haro PT Physical Therapist                                 Physical Therapy  Education       Title: PT OT SLP Therapies (Done)       Topic: Physical Therapy (Done)       Point: Mobility training (Done)       Learning Progress Summary             Patient Acceptance, E,TB, VU,NR by EF at 7/9/2024 1534    Acceptance, E,TB, VU,NR by EF at 7/8/2024 1613    Acceptance, E,TB, VU,NR by MT at 7/8/2024 0551    Acceptance, E, NR by LH at 7/3/2024 1504    Acceptance, E,TB, VU,DU by CS at 7/2/2024 1312    Acceptance, E, VU,NR by SM at 7/1/2024 1143    Acceptance, E,TB, VU,NR by CB at 6/28/2024 1406    Acceptance, E,TB,D, NR by CB at 6/26/2024 1139    Acceptance, E,D, VU,NR by EB at 6/21/2024 1122    Acceptance, E,D, NR by EF at 6/19/2024 0931    Acceptance, E, VU,NR by SV at 6/16/2024 1531    Acceptance, E,TB, NR by BK at 6/14/2024 1715    Acceptance, E,D, VU,NR by EB at 6/14/2024 1641    Acceptance, E,D, NR by EB at 6/13/2024 1514    Acceptance, E,D, NR by EB at 6/12/2024 1643    Acceptance, E,TB, VU by RD at 6/11/2024 1011    Acceptance, E,TB, VU,NR by CB at 6/10/2024 1149    Acceptance, E, VU by EM at 6/7/2024 1622    Acceptance, E, NR by EM at 6/6/2024 1605    Acceptance, E,TB, VU by BK at 6/5/2024 1700    Acceptance, E, NR by EM at 6/5/2024 1640                         Point: Home exercise program (Done)       Learning Progress Summary             Patient Acceptance, E,TB, VU,NR by EF at 7/9/2024 1534    Acceptance, E,TB, VU,NR by MT at 7/8/2024 0551    Acceptance, E, NR by LH at 7/3/2024 1504    Acceptance, E,TB, VU,DU by CS at 7/2/2024 1312    Acceptance, E, VU,NR by SM at 7/1/2024 1143    Acceptance, E,D, VU,NR by EB at 6/21/2024 1122    Acceptance, E,D, NR by EF at 6/19/2024 0931    Acceptance, E, VU,NR by SV at 6/16/2024 1531    Acceptance, E,TB, NR by BK at 6/14/2024 1715    Acceptance, E,TB, VU by BK at 6/13/2024 1655    Acceptance, E,TB, VU by RD at 6/11/2024 1011    Acceptance, E,TB, VU,NR by CB at 6/10/2024 1149    Acceptance, E, VU by EM at 6/7/2024 1622    Acceptance, E, NR by EM at  6/6/2024 1605                         Point: Body mechanics (Done)       Learning Progress Summary             Patient Acceptance, E,TB, VU,NR by EF at 7/9/2024 1534    Acceptance, E,TB, VU,NR by EF at 7/8/2024 1613    Acceptance, E,TB, VU,NR by MT at 7/8/2024 0551    Acceptance, E, NR by  at 7/3/2024 1504    Acceptance, E,TB, VU,DU by CS at 7/2/2024 1312    Acceptance, E, VU,NR by SM at 7/1/2024 1143    Acceptance, E,TB, VU,NR by CB at 6/28/2024 1406    Acceptance, E,TB,D, NR by CB at 6/26/2024 1139    Acceptance, E,D, VU,NR by EB at 6/21/2024 1122    Acceptance, E,D, NR by EF at 6/19/2024 0931    Acceptance, E,TB, NR by BK at 6/14/2024 1715    Acceptance, E,D, VU,NR by EB at 6/14/2024 1641    Acceptance, E,D, NR by EB at 6/13/2024 1514    Acceptance, E,D, NR by EB at 6/12/2024 1643    Acceptance, E,TB, VU by RD at 6/11/2024 1011    Acceptance, E,TB, VU,NR by CB at 6/10/2024 1149                         Point: Precautions (Done)       Learning Progress Summary             Patient Acceptance, E,TB, VU,NR by MT at 7/8/2024 0551    Acceptance, E, NR by  at 7/3/2024 1504    Acceptance, E,TB, VU,DU by CS at 7/2/2024 1312    Acceptance, E, VU,NR by SM at 7/1/2024 1143    Acceptance, E,TB, VU,NR by CB at 6/28/2024 1406    Acceptance, E,D, NR by EB at 6/13/2024 1514    Acceptance, E,TB, VU by RD at 6/11/2024 1011    Acceptance, E,TB, VU,NR by CB at 6/10/2024 1149                                         User Key       Initials Effective Dates Name Provider Type Discipline    RD 06/16/21 -  Armand, Leonela M, PT Physical Therapist PT    LH 06/16/21 -  Angelita Mcintyre, PT Physical Therapist PT    EF 05/31/24 -  Jonah, Marci, PT Physical Therapist PT    EM 06/16/21 -  Yvette Guajardo, PT Physical Therapist PT    MT 06/16/21 -  Jose Powell, RN Registered Nurse Nurse    SV 07/11/23 -  Velma Georges, PT Physical Therapist PT    EB 02/14/23 -  Nancy Bishop, PTA Physical Therapist Assistant PT    CB 10/22/21  -  Kaitlin Cunningham, PT Physical Therapist PT    SM 05/02/22 -  Nahed Santiago, PT Physical Therapist PT    CS 09/22/22 -  Zena Malone, PT Physical Therapist PT    BK 04/30/24 -  Nu Leon, RN Extern Registered Nurse Nurse                  PT Recommendation and Plan  Planned Therapy Interventions (PT): balance training, bed mobility training, gait training, home exercise program, patient/family education, strengthening, ROM (range of motion), transfer training, postural re-education  Plan of Care Reviewed With: patient  Progress: improving  Outcome Evaluation: Pt continues to demo slow and steady progress with strength and endurance, as evidenced by tolerance of increased activity today. Pt up in chair upon PT arrival. Pt performed STS transfer with min A and ambulated 20' with min A and rwx. Pt continues to require assist x1 and cues for sequencing to ensure safety with mobility. Pt will continue to benefit from PT to address impairments and increase independence with mobility. Rec DC to SNF.     Time Calculation:         PT Charges       Row Name 07/09/24 1534             Time Calculation    Start Time 1505  -EF      Stop Time 1520  -EF      Time Calculation (min) 15 min  -EF      PT Received On 07/09/24  -EF      PT - Next Appointment 07/10/24  -EF         Time Calculation- PT    Total Timed Code Minutes- PT 15 minute(s)  -EF         Timed Charges    33145 - PT Therapeutic Activity Minutes 15  -EF         Total Minutes    Timed Charges Total Minutes 15  -EF       Total Minutes 15  -EF                User Key  (r) = Recorded By, (t) = Taken By, (c) = Cosigned By      Initials Name Provider Type    EF Marci Mckenzie, PT Physical Therapist                  Therapy Charges for Today       Code Description Service Date Service Provider Modifiers Qty    58992511860  PT THERAPEUTIC ACT EA 15 MIN 7/8/2024 Marci Mckenzie, PT GP 1    46102796621 HC PT THER SUPP EA 15 MIN 7/8/2024 Marci Mckenzie PT  GP 1    98340911744 HC PT THERAPEUTIC ACT EA 15 MIN 7/9/2024 Marci Mckenzie, PT GP 1    60643616746 HC PT THER SUPP EA 15 MIN 7/9/2024 Marci Mckenzie, PT GP 1            PT G-Codes  Outcome Measure Options: AM-PAC 6 Clicks Daily Activity (OT)  AM-PAC 6 Clicks Score (PT): 16  AM-PAC 6 Clicks Score (OT): 16  Modified Campbell Scale: 4 - Moderately severe disability.  Unable to walk without assistance, and unable to attend to own bodily needs without assistance.  PT Discharge Summary  Anticipated Discharge Disposition (PT): skilled nursing facility    Marci Mckenzie, KALEY  7/9/2024

## 2024-07-10 RX ORDER — ROSUVASTATIN CALCIUM 20 MG/1
20 TABLET, COATED ORAL DAILY
Status: DISCONTINUED | OUTPATIENT
Start: 2024-07-10 | End: 2024-07-16 | Stop reason: HOSPADM

## 2024-07-10 RX ADMIN — DONEPEZIL HYDROCHLORIDE 10 MG: 10 TABLET, FILM COATED ORAL at 09:36

## 2024-07-10 RX ADMIN — BISACODYL 10 MG: 10 SUPPOSITORY RECTAL at 09:37

## 2024-07-10 RX ADMIN — MEMANTINE HYDROCHLORIDE 10 MG: 10 TABLET, FILM COATED ORAL at 09:36

## 2024-07-10 RX ADMIN — MEMANTINE HYDROCHLORIDE 10 MG: 10 TABLET, FILM COATED ORAL at 20:07

## 2024-07-10 RX ADMIN — ROSUVASTATIN CALCIUM 20 MG: 20 TABLET, FILM COATED ORAL at 10:28

## 2024-07-10 RX ADMIN — CINACALCET 60 MG: 30 TABLET ORAL at 09:37

## 2024-07-10 NOTE — PROGRESS NOTES
Name: Kimberlee Urrutia ADMIT: 2024   : 1940  PCP: Meche Ji MD    MRN: [id number] LOS: 38 days   AGE/SEX: 84 y.o. female  ROOM: [facility]     Subjective   Subjective   No acute events overnight.  Patient sleeping this morning but awakes easily.  States that she is feeling well.  Denies chest pain or shortness of breath.  Has not had a super robust appetite but is eating and drinking well with no issues.    Objective   Objective     Vital Signs  Temp:  [97 °F (36.1 °C)-98.2 °F (36.8 °C)] 97.8 °F (36.6 °C)  Heart Rate:  [69-87] 69  Resp:  [16] 16  BP: (113-132)/(67-79) 125/68  SpO2:  [96 %-98 %] 98 %  on   ;   Device (Oxygen Therapy): room air  Body mass index is 30.12 kg/m².    Physical Exam  General: Sleeping but arouses easily.  No distress.  Answers questions appropriately.  ENT: No conjunctival injection or scleral icterus. Moist mucous membranes.   Neuro: Eyes open and moving in all directions, strength normal in all extremities, no focal deficits.   Lungs: Clear to auscultation bilaterally. No wheeze or crackles. No distress.   Heart: RRR, no murmurs. No edema.  Abdomen: Soft, non-tender, non-distended. Normal bowel sounds.  Ext: Warm and well-perfused. No edema.   Skin: No rashes or lesions. IV site without swelling or erythema.     Results Review     I reviewed the patient's new clinical results:      Results from last 7 days   Lab Units 24  0509 24  0442 24  0512 24  0436 24  0445   SODIUM mmol/L 139 139  --  138 139   POTASSIUM mmol/L 4.0 4.5 4.4 4.2 4.0   CHLORIDE mmol/L 103 106  --  106 106   CO2 mmol/L 24.3 25.0  --  21.4* 15.8*   BUN mg/dL 19 23  --  20 23   CREATININE mg/dL 0.93 0.92  --  0.97 0.97   GLUCOSE mg/dL 91 87  --  89 90   EGFR mL/min/1.73 60.4 61.1  --  57.4* 57.4*     Results from last 7 days   Lab Units 24  0509 24  0436 24  0445   ALBUMIN g/dL 3.4* 3.2* 3.3*     Results from last 7 days   Lab Units 24  0508  "24  0442 24  0512 24  0436 24  0445   CALCIUM mg/dL 9.9 10.2 9.9 9.7 10.1   ALBUMIN g/dL 3.4*  --   --  3.2* 3.3*   MAGNESIUM mg/dL  --   --  2.1  --  1.9   PHOSPHORUS mg/dL 3.3 3.2 3.2 3.3 3.2       No results found for: \"HGBA1C\", \"POCGLU\"    No radiology results for the last day    I have personally reviewed all medications:  Scheduled Medications  bisacodyl, 10 mg, Rectal, Daily  cinacalcet, 60 mg, Oral, Daily With Breakfast  donepezil, 10 mg, Oral, Daily  memantine, 10 mg, Oral, BID    Infusions   Diet  Diet: Regular/House; Feeding Assistance - Nursing; Texture: Regular (IDDSI 7); Fluid Consistency: Thin (IDDSI 0)      Intake/Output Summary (Last 24 hours) at 7/10/2024 0725  Last data filed at 7/10/2024 0555  Gross per 24 hour   Intake --   Output 1000 ml   Net -1000 ml       Assessment/Plan     Active Hospital Problems    Diagnosis  POA   • **Right ureteral stone [N20.1]  Yes   • Severe malnutrition [E43]  Yes   • Hypercalcemia [E83.52]  No   • Constipation [K59.00]  Clinically Undetermined   • Acute UTI (urinary tract infection) [N39.0]  Yes   • Hypoglycemia [E16.2]  Yes   • Abnormal LFTs [R79.89]  Yes   • Chronic bilateral low back pain without sciatica [M54.50, G89.29]  Yes   • 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 female with Right ureteral stone.    Patient was discharged , however insurance issues have not been resolved.  Insurance company is still showing her as .  She continues to be stable and medically ready for discharge. Patient was septic from urinary source with obstructing right ureteral stone on admission.  S/p cystoscopy and stent placement.  She will " follow-up with urology as an outpatient for stent removal and lithotripsy.  Discussed with urology APRN.  Stents can remain in place for up to 3 months, so plan will still be for her to follow-up as an outpatient.  Urology  has been messaged to ensure patient gets appointment scheduled.  She completed course of IV antibiotics for ESBL. There was initial concern for discitis on imaging, but neurosurgery evaluated.  They did not feel presentation was consistent with discitis.  She also was found to have hypercalcemia.  Nephrology was consulted to assist with this.  Patient is being discharged with Sensipar.  Most recent labs reviewed and stable.  Can check later this week if patient remains admitted.  Home medications reviewed today.  Patient's statin was held on admission due to elevated transaminases.  These were normal on most recent check, so will restart statin today.  Can recheck CMP later this week.  Amlodipine also being held, but blood pressure stable without this medication.  Will continue to monitor.  Once again, patient is medically ready for discharge home pending insurance arrangements.    Amaya Hernandez MD  [facility]  07/10/24  07:25 EDT

## 2024-07-10 NOTE — PLAN OF CARE
Goal Outcome Evaluation:  Plan of Care Reviewed With: patient        Progress: improving  Outcome Evaluation: pt pleasant, oriented to self place and year, turning q2h, encouraged pt to eat and offered to help feed her but pt would take only bites, fall precautions, phillipck, BM today, denies pain

## 2024-07-10 NOTE — PLAN OF CARE
Goal Outcome Evaluation:  Plan of Care Reviewed With: patient        Progress: improving  Outcome Evaluation: VSS. Oriented  to self and place only. Turning q2. Purewick in place. Falls and contact precautions in place.

## 2024-07-11 PROCEDURE — 97530 THERAPEUTIC ACTIVITIES: CPT

## 2024-07-11 RX ADMIN — MEMANTINE HYDROCHLORIDE 10 MG: 10 TABLET, FILM COATED ORAL at 11:28

## 2024-07-11 RX ADMIN — ROSUVASTATIN CALCIUM 20 MG: 20 TABLET, FILM COATED ORAL at 11:28

## 2024-07-11 RX ADMIN — MEMANTINE HYDROCHLORIDE 10 MG: 10 TABLET, FILM COATED ORAL at 20:54

## 2024-07-11 RX ADMIN — DONEPEZIL HYDROCHLORIDE 10 MG: 10 TABLET, FILM COATED ORAL at 11:28

## 2024-07-11 RX ADMIN — CINACALCET 60 MG: 30 TABLET ORAL at 11:28

## 2024-07-11 NOTE — PROGRESS NOTES
Continued Stay Note  [facility]     Patient Name: Kimberlee Urrutia  MRN: [id number]  Today's Date: 2024    Admit Date: 2024    Plan: PENDING   Discharge Plan       Row Name 24 1140       Plan    Plan PENDING    Plan Comments Per Kian with [facility], insurance is still showing .                   Discharge Codes    No documentation.                 Expected Discharge Date and Time       Expected Discharge Date Expected Discharge Time    2024               Kavya Gilman RN

## 2024-07-11 NOTE — PLAN OF CARE
Goal Outcome Evaluation:  Plan of Care Reviewed With: patient        Progress: improving  Outcome Evaluation: VSS, on room air, up in chair, falls and contact precautions maintained, purewick in place, patient napped this afternoon, awaiting rehab

## 2024-07-11 NOTE — PLAN OF CARE
Goal Outcome Evaluation:  Plan of Care Reviewed With: patient        Progress: improving  Outcome Evaluation: VSS. Purewick in place. Turning q2. Falls and contact precautions in place. No c/o pain.

## 2024-07-11 NOTE — PLAN OF CARE
Goal Outcome Evaluation:  Plan of Care Reviewed With: patient        Progress: improving  Outcome Evaluation: Pt seen for PT this afternoon. She is doing well and progressing w mobility today. Pt up in chair upon entry to room. SHe is able to stand w min A using Rwx. She did exhibit slight posterior lean initially but improved after standing for several seconds. Pt able to increase ambulation distance, ambulating approx 30 ft w CGA/Gage and Rwx. SHe does ambulate w slow pace w cues for upright posture. SOme assistance required for walker management, especially w turns. No overt unsteadiness noted today. Pt back in chair at end of session w all needs in reach. Awaiting rehab. Will continue to progress as tolerated.

## 2024-07-11 NOTE — PROGRESS NOTES
Name: Kimberlee Urrutia ADMIT: 2024   : 1940  PCP: Meche Ji MD    MRN: [id number] LOS: 39 days   AGE/SEX: 84 y.o. female  ROOM: [facility]     Subjective   Subjective   No acute events overnight.  Patient awake and alert this morning.  She is fully oriented.  Denies any pain.  Ready to eat breakfast.    Objective   Objective     Vital Signs  Temp:  [97.4 °F (36.3 °C)-97.9 °F (36.6 °C)] 97.9 °F (36.6 °C)  Heart Rate:  [67-93] 67  Resp:  [16] 16  BP: ()/(52-73) 97/52  SpO2:  [94 %-98 %] 94 %  on   ;   Device (Oxygen Therapy): room air  Body mass index is 30.04 kg/m².    Physical Exam  General: Awake and alert, lying in bed.  Fully oriented.  No distress.  Answers questions appropriately.  ENT: No conjunctival injection or scleral icterus. Moist mucous membranes.   Neuro: Eyes open and moving in all directions, generalized weakness, no focal deficits.  Face symmetric.  Lungs: Clear to auscultation bilaterally. No wheeze or crackles. No distress.   Heart: RRR, no murmurs. No edema.  Abdomen: Soft, non-tender, non-distended. Normal bowel sounds.  Ext: Warm and well-perfused. No edema.   Skin: No rashes or lesions. IV site without swelling or erythema.     Results Review     I reviewed the patient's new clinical results:      Results from last 7 days   Lab Units 24  0509 24  0442 24  0512 24  0436   SODIUM mmol/L 139 139  --  138   POTASSIUM mmol/L 4.0 4.5 4.4 4.2   CHLORIDE mmol/L 103 106  --  106   CO2 mmol/L 24.3 25.0  --  21.4*   BUN mg/dL 19 23  --  20   CREATININE mg/dL 0.93 0.92  --  0.97   GLUCOSE mg/dL 91 87  --  89   EGFR mL/min/1.73 60.4 61.1  --  57.4*     Results from last 7 days   Lab Units 24  0509 24  0436   ALBUMIN g/dL 3.4* 3.2*     Results from last 7 days   Lab Units 24  0509 24  0442 24  0512 24  0436   CALCIUM mg/dL 9.9 10.2 9.9 9.7   ALBUMIN g/dL 3.4*  --   --  3.2*   MAGNESIUM mg/dL  --   --  2.1  --   "  PHOSPHORUS mg/dL 3.3 3.2 3.2 3.3       No results found for: \"HGBA1C\", \"POCGLU\"    No radiology results for the last day    I have personally reviewed all medications:  Scheduled Medications  bisacodyl, 10 mg, Rectal, Daily  cinacalcet, 60 mg, Oral, Daily With Breakfast  donepezil, 10 mg, Oral, Daily  memantine, 10 mg, Oral, BID  rosuvastatin, 20 mg, Oral, Daily    Infusions   Diet  Diet: Regular/House; Feeding Assistance - Nursing; Texture: Regular (IDDSI 7); Fluid Consistency: Thin (IDDSI 0)      Intake/Output Summary (Last 24 hours) at 2024 1004  Last data filed at 7/10/2024 2225  Gross per 24 hour   Intake 180 ml   Output --   Net 180 ml       Assessment/Plan     Active Hospital Problems    Diagnosis  POA   • **Right ureteral stone [N20.1]  Yes   • Severe malnutrition [E43]  Yes   • Hypercalcemia [E83.52]  No   • Constipation [K59.00]  Clinically Undetermined   • Acute UTI (urinary tract infection) [N39.0]  Yes   • Hypoglycemia [E16.2]  Yes   • Abnormal LFTs [R79.89]  Yes   • Chronic bilateral low back pain without sciatica [M54.50, G89.29]  Yes   • 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 female with Right ureteral stone.    Patient was discharged , however insurance issues have not been resolved.  Insurance company is still showing her as .  She continues to be stable and medically ready for discharge. Patient was septic from urinary source with obstructing right ureteral stone on admission.  S/p cystoscopy and stent placement.  She will follow-up with urology as an outpatient for stent removal and lithotripsy.  Discussed with urology APRN.  Stents can remain in place for up to 3 months, so " plan will still be for her to follow-up as an outpatient.  Urology  has been messaged to ensure patient gets appointment scheduled.  She completed course of IV antibiotics for ESBL. There was initial concern for discitis on imaging, but neurosurgery evaluated.  They did not feel presentation was consistent with discitis.  She also was found to have hypercalcemia.  Nephrology was consulted to assist with this.  Patient is being discharged with Sensipar.  Most recent labs reviewed and stable.  Since patient remains admitted, will recheck labs tomorrow morning to ensure stability.  Home medications reviewed today.  Patient's statin was held on admission due to elevated transaminases.  These were normal on most recent check, so statin was restarted.  Will recheck CMP tomorrow.  Amlodipine also being held, but blood pressure stable without this medication.  Will continue to monitor.  Once again, patient is medically ready for discharge home pending insurance arrangements.    Amaya Hernandez MD  [facility]  07/11/24  10:04 EDT

## 2024-07-11 NOTE — THERAPY TREATMENT NOTE
Patient Name: Kimberlee Urrutia  : 1940    MRN: [id number]                              Today's Date: 2024       Admit Date: 2024    Visit Dx:     ICD-10-CM ICD-9-CM   1. Right ureteral stone  N20.1 592.1   2. Acute UTI  N39.0 599.0   3. Severe sepsis  A41.9 038.9    R65.20 995.92   4. Elevated lactic acid level  R79.89 276.2   5. Osteomyelitis of lumbar spine  M46.26 730.28   6. Elevated AST (SGOT)  R74.01 790.4   7. Elevated ALT measurement  R74.01 790.4   8. Elevated C-reactive protein (CRP)  R79.82 790.95   9. PRINCE (acute kidney injury)  N17.9 584.9     Patient Active Problem List   Diagnosis    Arthritis    HLD (hyperlipidemia)    Primary hypertension    Health care maintenance    Knee pain, right    Hx of total knee arthroplasty    Seasonal allergies    Vitamin D deficiency    Pyuria    Transient alteration of awareness    Migraine without status migrainosus, not intractable    Leukocytosis    Viral pharyngitis    Multinodular thyroid    Osteopenia of multiple sites    Colon cancer screening    Serum calcium elevated    Acute low back pain    Elevated procalcitonin    Sepsis due to Escherichia coli with acute renal failure without septic shock    Thrombocytopenia    Recurrent oral herpes simplex    Lumbar spinal stenosis    Metabolic encephalopathy    Renal stones    Colon cancer screening    Sacroiliac joint pain    Lumbar stenosis without neurogenic claudication    Renal stone    Hyperlipidemia    History of total knee arthroplasty    Spinal stenosis of lumbar region    Multinodular goiter    Osteopenia    Pain in right knee    Sacroiliac joint pain    Hypercalcemia    CAYLA (dementia of Alzheimer type)    Chronic bilateral low back pain without sciatica    Right ureteral stone    Discitis of lumbar region    Acute UTI (urinary tract infection)    Hypoglycemia    Abnormal LFTs    Hypercalcemia    Constipation    Severe malnutrition     Past Medical History:   Diagnosis Date    Allergic      Arthritis     Colon cancer screening 01/29/2020    Colon cancer screening 01/29/2020    Cough     Diarrhea     Hx of migraine headaches     Hyperlipidemia     Irregular heart beat     Osteoarthritis     Renal stones 02/2021    Vitamin D deficiency      Past Surgical History:   Procedure Laterality Date    COLONOSCOPY      CYSTOSCOPY W/ URETERAL STENT PLACEMENT Right 6/2/2024    Procedure: CYSTOSCOPY, RIGHT URETERAL STENT PLACEMENT;  Surgeon: Efra Schofield MD;  Location: [facility] OR;  Service: Urology;  Laterality: Right;    LUMBAR EPIDURAL INJECTION N/A 8/2/2021    Procedure: LUMBAR EPIDURAL 1ST VISIT 5-1;  Surgeon: Nu Partida MD;  Location: [facility] MAIN OR;  Service: Pain Management;  Laterality: N/A;    REPLACEMENT TOTAL KNEE Right 01/2015    Josue Wilson MD    SACROILIAC JOINT INJECTION Left 6/16/2021    Procedure: SACROILIAC INJECTION left;  Surgeon: Nu Partida MD;  Location: [facility] MAIN OR;  Service: Pain Management;  Laterality: Left;    TUBAL ABDOMINAL LIGATION      URETEROSCOPY LASER LITHOTRIPSY WITH STENT INSERTION Left 2/23/2021    Procedure: LT.URETEROSCOPY LASER LITHOTRIPSY WITH STENT  FOR STONE;  Surgeon: Arnaud Lu MD;  Location: [facility] OR;  Service: Urology;  Laterality: Left;    WISDOM TOOTH EXTRACTION        General Information       Row Name 07/11/24 1212          Physical Therapy Time and Intention    Document Type therapy note (daily note)  -     Mode of Treatment physical therapy  -       Row Name 07/11/24 1212          General Information    Existing Precautions/Restrictions fall  -               User Key  (r) = Recorded By, (t) = Taken By, (c) = Cosigned By      Initials Name Provider Type    EJ Sharee Cali, PT Physical Therapist                   Mobility       Row Name 07/11/24 1213          Bed Mobility    Comment, (Bed Mobility) up in chair  -       Row Name 07/11/24 1213          Sit-Stand Transfer    Sit-Stand [place]  (Transfers) verbal cues;minimum assist (75% patient effort)  -EJ     Assistive Device (Sit-Stand Transfers) walker, front-wheeled  -EJ     Comment, (Sit-Stand Transfer) cues for hand placement, slight posterior lean initially upon standing  -EJ       Row Name 07/11/24 1213          Gait/Stairs (Locomotion)    H[place] Level (Gait) verbal cues;contact guard;minimum assist (75% patient effort)  -EJ     Assistive Device (Gait) walker, front-wheeled  -EJ     Distance in Feet (Gait) 30  -EJ     Deviations/Abnormal Patterns (Gait) arely decreased;stride length decreased  -EJ     Bilateral Gait Deviations forward flexed posture;heel strike decreased  -EJ     Comment, (Gait/Stairs) slow pace, cues for upright posture and sequencing w Rwx. some assist for walker management at times particularly with turns. No overt unsteadiness noted today.  -EJ               User Key  (r) = Recorded By, (t) = Taken By, (c) = Cosigned By      Initials Name Provider Type    EJ Sharee Cali, PT Physical Therapist                   Obj/Interventions    No documentation.                  Goals/Plan    No documentation.                  Clinical Impression       Row Name 07/11/24 1214          Pain    Pretreatment Pain Rating 0/10 - no pain  -EJ       Row Name 07/11/24 1214          Plan of Care Review    Plan of Care Reviewed With patient  -EJ     Outcome Evaluation Pt seen for PT this afternoon. She is doing well and progressing w mobility today. Pt up in chair upon entry to room. SHe is able to stand w min A using Rwx. She did exhibit slight posterior lean initially but improved after standing for several seconds. Pt able to increase ambulation distance, ambulating approx 30 ft w CGA/Gage and Rwx. SHe does ambulate w slow pace w cues for upright posture. SOme assistance required for walker management, especially w turns. No overt unsteadiness noted today. Pt back in chair at end of session w all needs in reach. Awaiting rehab. Will  continue to progress as tolerated.  -EJ       Row Name 07/11/24 1214          Positioning and Restraints    Pre-Treatment Position sitting in chair/recliner  -EJ     Post Treatment Position chair  -EJ     In Chair notified nsg;reclined;call light within reach;encouraged to call for assist;exit alarm on  -EJ               User Key  (r) = Recorded By, (t) = Taken By, (c) = Cosigned By      Initials Name Provider Type    Sharee Barajas, PT Physical Therapist                   Outcome Measures       Row Name 07/11/24 1217          How much help from another person do you currently need...    Turning from your back to your side while in flat bed without using bedrails? 3  -EJ     Moving from lying on back to sitting on the side of a flat bed without bedrails? 3  -EJ     Moving to and from a bed to a chair (including a wheelchair)? 3  -EJ     Standing up from a chair using your arms (e.g., wheelchair, bedside chair)? 3  -EJ     Climbing 3-5 steps with a railing? 2  -EJ     To walk in hospital room? 3  -EJ     AM-PAC 6 Clicks Score (PT) 17  -EJ     Highest Level of Mobility Goal 5 --> Static standing  -EJ               User Key  (r) = Recorded By, (t) = Taken By, (c) = Cosigned By      Initials Name Provider Type    Sharee Barajas, PT Physical Therapist                                 Physical Therapy Education       Title: PT OT SLP Therapies (Done)       Topic: Physical Therapy (Done)       Point: Mobility training (Done)       Learning Progress Summary             Patient Acceptance, E,TB, VU,NR by EF at 7/9/2024 1534    Acceptance, E,TB, VU,NR by EF at 7/8/2024 1613    Acceptance, E,TB, VU,NR by MT at 7/8/2024 0551    Acceptance, E, NR by LH at 7/3/2024 1504    Acceptance, E,TB, VU,DU by CS at 7/2/2024 1312    Acceptance, E, VU,NR by SM at 7/1/2024 1143    Acceptance, E,TB, VU,NR by CB at 6/28/2024 1406    Acceptance, E,TB,D, NR by CB at 6/26/2024 1139    Acceptance, E,D, VU,NR by EB at 6/21/2024 1122     Acceptance, E,D, NR by EF at 6/19/2024 0931    Acceptance, E, VU,NR by SV at 6/16/2024 1531    Acceptance, E,TB, NR by BK at 6/14/2024 1715    Acceptance, E,D, VU,NR by EB at 6/14/2024 1641    Acceptance, E,D, NR by EB at 6/13/2024 1514    Acceptance, E,D, NR by EB at 6/12/2024 1643    Acceptance, E,TB, VU by RD at 6/11/2024 1011    Acceptance, E,TB, VU,NR by CB at 6/10/2024 1149    Acceptance, E, VU by EM at 6/7/2024 1622    Acceptance, E, NR by EM at 6/6/2024 1605    Acceptance, E,TB, VU by BK at 6/5/2024 1700    Acceptance, E, NR by EM at 6/5/2024 1640                         Point: Home exercise program (Done)       Learning Progress Summary             Patient Acceptance, E,TB, VU,NR by EF at 7/9/2024 1534    Acceptance, E,TB, VU,NR by MT at 7/8/2024 0551    Acceptance, E, NR by LH at 7/3/2024 1504    Acceptance, E,TB, VU,DU by CS at 7/2/2024 1312    Acceptance, E, VU,NR by SM at 7/1/2024 1143    Acceptance, E,D, VU,NR by EB at 6/21/2024 1122    Acceptance, E,D, NR by EF at 6/19/2024 0931    Acceptance, E, VU,NR by SV at 6/16/2024 1531    Acceptance, E,TB, NR by BK at 6/14/2024 1715    Acceptance, E,TB, VU by BK at 6/13/2024 1655    Acceptance, E,TB, VU by RD at 6/11/2024 1011    Acceptance, E,TB, VU,NR by CB at 6/10/2024 1149    Acceptance, E, VU by EM at 6/7/2024 1622    Acceptance, E, NR by EM at 6/6/2024 1605                         Point: Body mechanics (Done)       Learning Progress Summary             Patient Acceptance, E,TB, VU,NR by EF at 7/9/2024 1534    Acceptance, E,TB, VU,NR by EF at 7/8/2024 1613    Acceptance, E,TB, VU,NR by MT at 7/8/2024 0551    Acceptance, E, NR by LH at 7/3/2024 1504    Acceptance, E,TB, VU,DU by CS at 7/2/2024 1312    Acceptance, E, VU,NR by SM at 7/1/2024 1143    Acceptance, E,TB, VU,NR by CB at 6/28/2024 1406    Acceptance, E,TB,D, NR by CB at 6/26/2024 1139    Acceptance, E,D, VU,NR by EB at 6/21/2024 1122    Acceptance, E,D, NR by EF at 6/19/2024 0931    Acceptance,  E,TB, NR by BK at 6/14/2024 1715    Acceptance, E,D, VU,NR by EB at 6/14/2024 1641    Acceptance, E,D, NR by EB at 6/13/2024 1514    Acceptance, E,D, NR by EB at 6/12/2024 1643    Acceptance, E,TB, VU by RD at 6/11/2024 1011    Acceptance, E,TB, VU,NR by CB at 6/10/2024 1149                         Point: Precautions (Done)       Learning Progress Summary             Patient Acceptance, E,TB, VU,NR by MT at 7/8/2024 0551    Acceptance, E, NR by  at 7/3/2024 1504    Acceptance, E,TB, VU,DU by CS at 7/2/2024 1312    Acceptance, E, VU,NR by SM at 7/1/2024 1143    Acceptance, E,TB, VU,NR by CB at 6/28/2024 1406    Acceptance, E,D, NR by EB at 6/13/2024 1514    Acceptance, E,TB, VU by RD at 6/11/2024 1011    Acceptance, E,TB, VU,NR by CB at 6/10/2024 1149                                         User Key       Initials Effective Dates Name Provider Type Discipline    RD 06/16/21 -  Leonela Acuna, PT Physical Therapist PT     06/16/21 -  Angelita Mcintyre, PT Physical Therapist PT    EF 05/31/24 -  Marci Mckenzie, PT Physical Therapist PT    EM 06/16/21 -  Yvette Guajardo, PT Physical Therapist PT    MT 06/16/21 -  Jose Powell, RN Registered Nurse Nurse    SV 07/11/23 -  Velma Georges, PT Physical Therapist PT    EB 02/14/23 -  Nancy Bishop, JOEY Physical Therapist Assistant PT    CB 10/22/21 -  Kaitlin Cunningham, PT Physical Therapist PT    SM 05/02/22 -  Nahed Santiago, PT Physical Therapist PT    CS 09/22/22 -  Zena Malone, PT Physical Therapist PT    BK 04/30/24 -  Nu Leon, RN Extern Registered Nurse Nurse                  PT Recommendation and Plan     Plan of Care Reviewed With: patient  Progress: improving  Outcome Evaluation: Pt seen for PT this afternoon. She is doing well and progressing w mobility today. Pt up in chair upon entry to room. SHe is able to stand w min A using Rwx. She did exhibit slight posterior lean initially but improved after standing for several seconds. Pt  able to increase ambulation distance, ambulating approx 30 ft w CGA/Gage and Rwx. SHe does ambulate w slow pace w cues for upright posture. SOme assistance required for walker management, especially w turns. No overt unsteadiness noted today. Pt back in chair at end of session w all needs in reach. Awaiting rehab. Will continue to progress as tolerated.     Time Calculation:         PT Charges       Row Name 07/11/24 1218             Time Calculation    Start Time 1143  -EJ      Stop Time 1159  -EJ      Time Calculation (min) 16 min  -EJ      PT Received On 07/11/24  -EJ      PT - Next Appointment 07/12/24  -EJ                User Key  (r) = Recorded By, (t) = Taken By, (c) = Cosigned By      Initials Name Provider Type    Sharee Barajas, PT Physical Therapist                  Therapy Charges for Today       Code Description Service Date Service Provider Modifiers Qty    [id number]  PT THERAPEUTIC ACT EA 15 MIN 7/11/2024 Sharee Cali, PT GP 1            PT G-Codes  Outcome Measure Options: AM-PAC 6 Clicks Daily Activity (OT)  AM-PAC 6 Clicks Score (PT): 17  AM-PAC 6 Clicks Score (OT): 16  Modified [place] Scale: 4 - Moderately severe disability.  Unable to walk without assistance, and unable to attend to own bodily needs without assistance.       Sharee Cali, PT  7/11/2024

## 2024-07-12 LAB
ALBUMIN SERPL-MCNC: 3.5 G/DL (ref 3.5–5.2)
ALBUMIN/GLOB SERPL: 1.1 G/DL
ALP SERPL-CCNC: 79 U/L (ref 39–117)
ALT SERPL W P-5'-P-CCNC: 12 U/L (ref 1–33)
ANION GAP SERPL CALCULATED.3IONS-SCNC: 10 MMOL/L (ref 5–15)
AST SERPL-CCNC: 13 U/L (ref 1–32)
BILIRUB SERPL-MCNC: 0.4 MG/DL (ref 0–1.2)
BUN SERPL-MCNC: 18 MG/DL (ref 8–23)
BUN/CREAT SERPL: 16.4 (ref 7–25)
CALCIUM SPEC-SCNC: 9.9 MG/DL (ref 8.6–10.5)
CHLORIDE SERPL-SCNC: 106 MMOL/L (ref 98–107)
CO2 SERPL-SCNC: 23 MMOL/L (ref 22–29)
CREAT SERPL-MCNC: 1.1 MG/DL (ref 0.57–1)
DEPRECATED RDW RBC AUTO: 40.4 FL (ref 37–54)
EGFRCR SERPLBLD CKD-EPI 2021: 49.3 ML/MIN/1.73
ERYTHROCYTE [DISTWIDTH] IN BLOOD BY AUTOMATED COUNT: 12.5 % (ref 12.3–15.4)
GLOBULIN UR ELPH-MCNC: 3.3 GM/DL
GLUCOSE SERPL-MCNC: 97 MG/DL (ref 65–99)
HCT VFR BLD AUTO: 34.7 % (ref 34–46.6)
HGB BLD-MCNC: 11.1 G/DL (ref 12–15.9)
MAGNESIUM SERPL-MCNC: 1.8 MG/DL (ref 1.6–2.4)
MCH RBC QN AUTO: 28.3 PG (ref 26.6–33)
MCHC RBC AUTO-ENTMCNC: 32 G/DL (ref 31.5–35.7)
MCV RBC AUTO: 88.5 FL (ref 79–97)
PHOSPHATE SERPL-MCNC: 3.2 MG/DL (ref 2.5–4.5)
PLATELET # BLD AUTO: 287 10*3/MM3 (ref 140–450)
PMV BLD AUTO: 10 FL (ref 6–12)
POTASSIUM SERPL-SCNC: 3.9 MMOL/L (ref 3.5–5.2)
PROT SERPL-MCNC: 6.8 G/DL (ref 6–8.5)
RBC # BLD AUTO: 3.92 10*6/MM3 (ref 3.77–5.28)
SODIUM SERPL-SCNC: 139 MMOL/L (ref 136–145)
WBC NRBC COR # BLD AUTO: 9.21 10*3/MM3 (ref 3.4–10.8)

## 2024-07-12 PROCEDURE — 83735 ASSAY OF MAGNESIUM: CPT | Performed by: INTERNAL MEDICINE

## 2024-07-12 PROCEDURE — 80053 COMPREHEN METABOLIC PANEL: CPT | Performed by: STUDENT IN AN ORGANIZED HEALTH CARE EDUCATION/TRAINING PROGRAM

## 2024-07-12 PROCEDURE — 85027 COMPLETE CBC AUTOMATED: CPT | Performed by: STUDENT IN AN ORGANIZED HEALTH CARE EDUCATION/TRAINING PROGRAM

## 2024-07-12 PROCEDURE — 97530 THERAPEUTIC ACTIVITIES: CPT

## 2024-07-12 PROCEDURE — 84100 ASSAY OF PHOSPHORUS: CPT | Performed by: INTERNAL MEDICINE

## 2024-07-12 RX ADMIN — DONEPEZIL HYDROCHLORIDE 10 MG: 10 TABLET, FILM COATED ORAL at 12:43

## 2024-07-12 RX ADMIN — ACETAMINOPHEN 325MG 650 MG: 325 TABLET ORAL at 02:05

## 2024-07-12 RX ADMIN — MEMANTINE HYDROCHLORIDE 10 MG: 10 TABLET, FILM COATED ORAL at 20:18

## 2024-07-12 RX ADMIN — MEMANTINE HYDROCHLORIDE 10 MG: 10 TABLET, FILM COATED ORAL at 12:43

## 2024-07-12 RX ADMIN — ROSUVASTATIN CALCIUM 20 MG: 20 TABLET, FILM COATED ORAL at 12:43

## 2024-07-12 RX ADMIN — CINACALCET 60 MG: 30 TABLET ORAL at 12:43

## 2024-07-12 NOTE — PLAN OF CARE
Goal Outcome Evaluation:  Plan of Care Reviewed With: patient        Progress: declining  Outcome Evaluation: Pt resting in bed in NAD, pleasant and cooperative, irritated by insurance issues. Pt req min A and vc to sit EOB with increased time. She performed seated LE ther ex with vc at EOB. She stood from EOB x 2 trials with mod A using r wx. Pt unable to take forward steps today, great difficulty MIP. Only able to take 2-3 small shuffled side steps with r wx and mod A - post lean and unsteady balance. Pt fatigeud quickly adn requested to return to bed. Pt req min A to return supine and she was able to assist with repositioning. Overall, decline in mobility today due to fatigue and unsteady balance. Cont PT to address functional deficits and prepare for d/c as appropriate.      Anticipated Discharge Disposition (PT): skilled nursing facility

## 2024-07-12 NOTE — PLAN OF CARE
Goal Outcome Evaluation:  VSS, no c/o pain, up to BSC, BM, up in chair, not eating but a few bites, will drink Boost, pure wick in use, falls protocol, contact isolation maintained, insurance now active, awaiting pre-cert.  Took patient out in a wheelchair for a stroll around the unit, ok'd per infection control, sat in waiting area for about 15 minutes and strolled around the unit, enjoyed getting a change of scenery  Plan of Care Reviewed With: patient

## 2024-07-12 NOTE — PLAN OF CARE
"Goal Outcome Evaluation:  Plan of Care Reviewed With: patient        Progress: no change  Outcome Evaluation: Pt restless tongiht and feels like \"she's in a box\". Complains to nurse that she just wants to leave. She never gets out. can answer several orientation questions appropriately but states but, \"why do I feel so confused\". Reoriented/tylenol given/and tried repositioning for restlessness. Continue to wait for insurance for Rehab placement                               "

## 2024-07-12 NOTE — PROGRESS NOTES
"RENAL/KCC:     LOS: 40 days    Patient Care Team:  Meche Ji MD as PCP - General (Internal Medicine)  Josue Wilson MD as Consulting Physician (Orthopedic Surgery)  Yash Wilson MD as Consulting Physician (Gastroenterology)  Avi Ferris MD (Ophthalmology)  Tommy French MD (Inactive) as Consulting Physician (Otolaryngology)  Arnaud Lu MD as Consulting Physician (Urology)  Nu Partida MD as Consulting Physician (Pain Medicine)  Ki Small MD as Consulting Physician (Neurology)    Chief Complaint:  Hypercalcemia    Subjective     Interval History:   7/1: Pleasant, denies any complaints.  No shortness of breath chest pain fevers chills or edema  Received IV fluids and Sensipar dose was increased over the weekend due to rising calcium levels  She is asymptomatic    7/2: No new complaints or events.  She states she has no appetite but denies nausea  No dyspnea or edema    7/3 no acute events overnight. Feeling ok.     7/5: She is doing well and has no complaints states appetite is poor but she attributes that to the hospital food.    7/8: She looks and feels well denies complaints, possible rehab bed today    7/12: Came by to check on the patient today since she is still admitted.  Still problems with insurance, awaiting discharge, medically stable for discharge    Objective     Vital Sign Min/Max for last 24 hours  Temp  Min: 96.3 °F (35.7 °C)  Max: 97.3 °F (36.3 °C)   BP  Min: 111/71  Max: 120/63   Pulse  Min: 79  Max: 88   Resp  Min: 16  Max: 16   SpO2  Min: 95 %  Max: 98 %   No data recorded   Weight  Min: 75.4 kg (166 lb 3.6 oz)  Max: 75.4 kg (166 lb 3.6 oz)     Flowsheet Rows      Flowsheet Row First Filed Value   Admission Height 158.8 cm (62.5\") Documented at 06/01/2024 2030   Admission Weight 81 kg (178 lb 9.2 oz) Documented at 06/01/2024 2030            I/O this shift:  In: 220 [P.O.:220]  Out: -   I/O last 3 completed shifts:  In: 60 [P.O.:60]  Out: 800 " "[Urine:800]    Physical Exam:  GEN: Awake, NAD  ENT: PERRL, EOMI, MMM  NECK: Supple, no JVD  CHEST: CTAB, no W/R/C  CV: RRR, no M/G/R  ABD: Soft, NT, +BS  SKIN: Warm and Dry  NEURO: CN's intact      WBC WBC   Date Value Ref Range Status   07/12/2024 9.21 3.40 - 10.80 10*3/mm3 Final      HGB Hemoglobin   Date Value Ref Range Status   07/12/2024 11.1 (L) 12.0 - 15.9 g/dL Final      HCT Hematocrit   Date Value Ref Range Status   07/12/2024 34.7 34.0 - 46.6 % Final      Platlets No results found for: \"LABPLAT\"   MCV MCV   Date Value Ref Range Status   07/12/2024 88.5 79.0 - 97.0 fL Final          Sodium Sodium   Date Value Ref Range Status   07/12/2024 139 136 - 145 mmol/L Final      Potassium Potassium   Date Value Ref Range Status   07/12/2024 3.9 3.5 - 5.2 mmol/L Final      Chloride Chloride   Date Value Ref Range Status   07/12/2024 106 98 - 107 mmol/L Final      CO2 CO2   Date Value Ref Range Status   07/12/2024 23.0 22.0 - 29.0 mmol/L Final      BUN BUN   Date Value Ref Range Status   07/12/2024 18 8 - 23 mg/dL Final      Creatinine Creatinine   Date Value Ref Range Status   07/12/2024 1.10 (H) 0.57 - 1.00 mg/dL Final      Calcium Calcium   Date Value Ref Range Status   07/12/2024 9.9 8.6 - 10.5 mg/dL Final      PO4 No results found for: \"CAPO4\"   Albumin Albumin   Date Value Ref Range Status   07/12/2024 3.5 3.5 - 5.2 g/dL Final      Magnesium Magnesium   Date Value Ref Range Status   07/12/2024 1.8 1.6 - 2.4 mg/dL Final      Uric Acid No results found for: \"URICACID\"        Results Review:     I reviewed the patient's new clinical results.    bisacodyl, 10 mg, Rectal, Daily  cinacalcet, 60 mg, Oral, Daily With Breakfast  donepezil, 10 mg, Oral, Daily  memantine, 10 mg, Oral, BID  rosuvastatin, 20 mg, Oral, Daily             Medication Review: Reviewed    Assessment & Plan   Hypercalcemia, likely due to hyperparathyroidism, stabilizing with oral Sensipar.  Calcium today is 9.9  CKD stage II, at " baseline  Hydronephrosis - resolved after stent, outpatient urology follow-up  Constipation on Dulcolax, reports regular BMs  PRINCE- resolved.   Septic shock - resolved.   Dementia on Aricept/Namenda      Plan:   I came by to check on her today since she had some lab work done.  Her vitals remained stable, BP at goal, euvolemic on exam  Creatinine up slightly but not far from her baseline of 0.9-1.0  I encouraged oral fluid intake  Her nutrition is a little better  Calcium and electrolytes at goal      Continue Cinacalcet 60 mg daily for now.   Medically stable for discharge but problems with insurance  Can check chemistries weekly while she is here and I will plan to see her again whenever she has labs done, otherwise will see as needed      Frederick Kirkland MD  [facility]  07/12/24  10:33 EDT

## 2024-07-12 NOTE — PROGRESS NOTES
Continued Stay Note  [facility]     Patient Name: Kimberlee Urrutia  MRN: [id number]  Today's Date: 7/12/2024    Admit Date: 6/1/2024    Plan: [facility] PENDING Pre-cert   Discharge Plan       Row Name 07/12/24 1202       Plan    Plan [facility] PENDING Pre-cert    Plan Comments Msg sent to [facility] Post Acute Authorizations [email or web address]> to begin pre-cert. Updated RN and MD      Row Name 07/12/24 1139       Plan    Plan PENDING    Plan Comments Per Kian with [facility], [employer] is showing ACTIVE. Okay to start Pre-cert.                   Discharge Codes    No documentation.                 Expected Discharge Date and Time       Expected Discharge Date Expected Discharge Time    Jul 8, 2024               Kavya Gilman RN

## 2024-07-12 NOTE — PROGRESS NOTES
Nutrition Services    Patient Name:  Kimberlee Urrutia  YOB: 1940  MRN: [id number]  Admit Date:  6/1/2024    Assessment Date:  07/12/24    Summary: Follow up    Pt confused and forgetful during visit. Not eating as much per chart review. Mostly 0% PO intake but she is drinking her boost supplement per RN. Pt continuing to lose wt per wt hx. Previously met criteria for acute malnutrition r/t chronic poor intake. Meals intake encouraged with the pt during visit.     Plan/recs:  Continue Boost plus TID.  Encourage intakes. Assist w/ feeding as needed.  May benefit in an appetite stimulant    RD to follow    CLINICAL NUTRITION ASSESSMENT      Reason for Assessment Chronic Poor Intake, Follow-up Protocol     Diagnosis/Problem   Right ureteral stone   Medical/Surgical History Past Medical History:   Diagnosis Date    Allergic     Arthritis     Colon cancer screening 01/29/2020    Colon cancer screening 01/29/2020    Cough     Diarrhea     Hx of migraine headaches     Hyperlipidemia     Irregular heart beat     Osteoarthritis     Renal stones 02/2021    Vitamin D deficiency        Past Surgical History:   Procedure Laterality Date    COLONOSCOPY      CYSTOSCOPY W/ URETERAL STENT PLACEMENT Right 6/2/2024    Procedure: CYSTOSCOPY, RIGHT URETERAL STENT PLACEMENT;  Surgeon: Efra Schofield MD;  Location: [facility] OR;  Service: Urology;  Laterality: Right;    LUMBAR EPIDURAL INJECTION N/A 8/2/2021    Procedure: LUMBAR EPIDURAL 1ST VISIT 5-1;  Surgeon: Nu Partida MD;  Location: [facility] MAIN OR;  Service: Pain Management;  Laterality: N/A;    REPLACEMENT TOTAL KNEE Right 01/2015    Josue Wilson MD    SACROILIAC JOINT INJECTION Left 6/16/2021    Procedure: SACROILIAC INJECTION left;  Surgeon: Nu Partida MD;  Location: [facility] MAIN OR;  Service: Pain Management;  Laterality: Left;    TUBAL ABDOMINAL LIGATION      URETEROSCOPY LASER LITHOTRIPSY WITH STENT INSERTION Left 2/23/2021    Procedure:  "LT.URETEROSCOPY LASER LITHOTRIPSY WITH STENT  FOR STONE;  Surgeon: Arnaud Lu MD;  Location: [facility] OR;  Service: Urology;  Laterality: Left;    WISDOM TOOTH EXTRACTION          Anthropometrics        Current Height  Current Weight  BMI kg/m2 Height: 158.8 cm (62.5\")  Weight: 75.4 kg (166 lb 3.6 oz) (07/12/24 0500)  Body mass index is 29.92 kg/m².   Adjusted BMI (if applicable)    BMI Category Obese, Class I (30 - 34.9)   Ideal Body Weight (IBW) 112#   Usual Body Weight (UBW) 170-180s   Weight Trend Loss   Weight History Wt Readings from Last 30 Encounters:   07/12/24 0500 75.4 kg (166 lb 3.6 oz)   07/11/24 0703 75.7 kg (166 lb 14.2 oz)   07/10/24 0556 75.9 kg (167 lb 5.3 oz)   07/09/24 0500 74 kg (163 lb 2.3 oz)   07/08/24 0600 78.3 kg (172 lb 9.9 oz)   07/07/24 0449 74.4 kg (164 lb 0.4 oz)   07/06/24 0543 75.9 kg (167 lb 5.3 oz)   07/05/24 0512 75.2 kg (165 lb 12.6 oz)   07/04/24 0500 73.3 kg (161 lb 9.6 oz)   07/03/24 0454 73.1 kg (161 lb 2.5 oz)   07/02/24 0516 76.2 kg (167 lb 15.9 oz)   07/01/24 0510 78 kg (171 lb 15.3 oz)   06/30/24 0536 77.3 kg (170 lb 6.7 oz)   06/29/24 0541 78.2 kg (172 lb 6.4 oz)   06/28/24 0525 78.2 kg (172 lb 6.4 oz)   06/27/24 0555 80.6 kg (177 lb 11.1 oz)   06/25/24 0500 77 kg (169 lb 12.1 oz)   06/24/24 0507 78.1 kg (172 lb 2.9 oz)   06/23/24 0500 80.7 kg (177 lb 14.6 oz)   06/22/24 0500 80.1 kg (176 lb 9.4 oz)   06/21/24 0500 80.2 kg (176 lb 12.9 oz)   06/20/24 0549 81.6 kg (179 lb 14.3 oz)   06/19/24 0524 82.4 kg (181 lb 10.5 oz)   06/18/24 0500 82.5 kg (181 lb 14.1 oz)   06/17/24 0457 84.2 kg (185 lb 10 oz)   06/16/24 0500 85.4 kg (188 lb 4.4 oz)   06/15/24 0500 85.5 kg (188 lb 7.9 oz)   06/14/24 0300 85 kg (187 lb 6.3 oz)   06/13/24 0300 85.5 kg (188 lb 7.9 oz)   06/12/24 0250 84.5 kg (186 lb 4.6 oz)   06/11/24 0417 86.4 kg (190 lb 7.6 oz)   06/10/24 0534 85.2 kg (187 lb 13.3 oz)   06/09/24 0300 84.8 kg (186 lb 15.2 oz)   06/08/24 0415 85.2 kg (187 lb 12.8 oz) "   06/07/24 0546 90.4 kg (199 lb 4.7 oz)   06/06/24 0500 90.1 kg (198 lb 10.2 oz)   06/05/24 0500 85.7 kg (188 lb 14.4 oz)   06/04/24 0452 86 kg (189 lb 9.5 oz)   06/03/24 0514 86.2 kg (190 lb 0.6 oz)   06/02/24 0850 83.9 kg (184 lb 15.5 oz)   06/02/24 0457 85.4 kg (188 lb 4.4 oz)   06/01/24 2030 81 kg (178 lb 9.2 oz)   11/30/23 0947 81 kg (178 lb 9.6 oz)   09/26/23 1015 80.7 kg (178 lb)   04/11/23 1156 75 kg (165 lb 6.4 oz)   01/18/23 1403 73 kg (161 lb)   07/28/22 1410 69.9 kg (154 lb 3.2 oz)   05/03/22 1238 69.4 kg (153 lb)   04/04/22 1018 69.4 kg (153 lb)   03/31/22 1336 69.9 kg (154 lb)   03/24/22 0941 69.5 kg (153 lb 3.2 oz)   02/21/22 1303 68.5 kg (151 lb)   10/26/21 0941 67.9 kg (149 lb 9.6 oz)   10/19/21 1612 67.1 kg (148 lb)   08/30/21 0932 66.3 kg (146 lb 3.2 oz)   08/02/21 1036 63.5 kg (140 lb)   07/22/21 1130 64.7 kg (142 lb 9.6 oz)   07/14/21 0923 63.8 kg (140 lb 9.6 oz)   06/16/21 1334 60.3 kg (133 lb)   05/27/21 0859 62.3 kg (137 lb 6.4 oz)   04/07/21 1008 64.8 kg (142 lb 12.8 oz)   03/23/21 1333 66.2 kg (146 lb)   03/15/21 1143 77.6 kg (171 lb)   03/09/21 1354 77.6 kg (171 lb 1.2 oz)   02/21/21 0535 77.6 kg (171 lb 1.2 oz)   02/17/21 2302 71.4 kg (157 lb 6.5 oz)   02/17/21 1831 71.7 kg (158 lb)   12/24/20 0755 71.7 kg (158 lb)   09/02/20 1129 74.4 kg (164 lb)   08/10/20 0843 75.8 kg (167 lb)   09/11/19 1026 72.7 kg (160 lb 3.2 oz)   07/31/19 1025 73.9 kg (163 lb)   10/10/18 0922 76.7 kg (169 lb)        Estimated Requirements         Weight used  77 kg    Calories  1155, 1386 kcal (15-18 kcal/kg)    Protein  77 - 92 g (1.0 - 1.2 gm/kg)    Fluid   (1 mL/kcal)     Labs       Pertinent Labs    Results from last 7 days   Lab Units 07/12/24  0511 07/08/24  0509 07/07/24  0442   SODIUM mmol/L 139 139 139   POTASSIUM mmol/L 3.9 4.0 4.5   CHLORIDE mmol/L 106 103 106   CO2 mmol/L 23.0 24.3 25.0   BUN mg/dL 18 19 23   CREATININE mg/dL 1.10* 0.93 0.92   CALCIUM mg/dL 9.9 9.9 10.2   BILIRUBIN mg/dL 0.4  --   --     ALK PHOS U/L 79  --   --    ALT (SGPT) U/L 12  --   --    AST (SGOT) U/L 13  --   --    GLUCOSE mg/dL 97 91 87     Results from last 7 days   Lab Units 07/12/24  0511 07/07/24  0442 07/06/24  0512   MAGNESIUM mg/dL 1.8  --  2.1   PHOSPHORUS mg/dL 3.2   < > 3.2   HEMOGLOBIN g/dL 11.1*  --   --    HEMATOCRIT % 34.7  --   --    WBC 10*3/mm3 9.21  --   --    ALBUMIN g/dL 3.5   < >  --     < > = values in this interval not displayed.     Results from last 7 days   Lab Units 07/12/24  0511   PLATELETS 10*3/mm3 287       COVID19   Date Value Ref Range Status   02/23/2021 Not Detected Not Detected - Ref. Range Final     Lab Results   Component Value Date    HGBA1C 5.7 (H) 09/26/2023          Medications           Scheduled Medications bisacodyl, 10 mg, Rectal, Daily  cinacalcet, 60 mg, Oral, Daily With Breakfast  donepezil, 10 mg, Oral, Daily  memantine, 10 mg, Oral, BID  rosuvastatin, 20 mg, Oral, Daily       Infusions     PRN Medications   acetaminophen **OR** acetaminophen    aluminum-magnesium hydroxide-simethicone    Calcium Replacement - Follow Nurse / BPA Driven Protocol    dextrose    dextrose    dextrose    glucagon (human recombinant)    Magnesium Standard Dose Replacement - Follow Nurse / BPA Driven Protocol    nitroglycerin    ondansetron ODT **OR** ondansetron    Phosphorus Replacement - Follow Nurse / BPA Driven Protocol    polyethylene glycol    Potassium Replacement - Follow Nurse / BPA Driven Protocol    senna-docusate sodium **AND** [DISCONTINUED] polyethylene glycol **AND** [DISCONTINUED] bisacodyl **AND** [DISCONTINUED] bisacodyl     Physical Findings          General Findings alert, appeared asleep, obese   Oral/Mouth Cavity tooth or teeth missing   Edema  lower extremity , 1+ (trace)   Gastrointestinal fecal incontinence, last bowel movement: 7/10   Skin  skin intact   Tubes/Drains/Lines none   NFPE See Malnutrition Severity Assessment, Date Completed: 6/26 GR     Malnutrition Severity Assessment       Patient meets criteria for : Severe Malnutrition           Current Nutrition Orders & Evaluation of Intake       Oral Nutrition     Food Allergies NKFA   Current PO Diet Diet: Regular/House; Feeding Assistance - Nursing; Texture: Regular (IDDSI 7); Fluid Consistency: Thin (IDDSI 0)   Supplement Boost Plus, BID   PO Evaluation     % PO Intake 0 - 25%    Factors Affecting Intake: decreased appetite   --  PES STATEMENT / NUTRITION DIAGNOSIS      Nutrition Dx Problem  Problem: Malnutrition (severe)  Etiology: Medical Diagnosis - right ureteral stone    Signs/Symptoms: PO intake and Unintended Weight Change     NUTRITION INTERVENTION / PLAN OF CARE      Intervention Goal(s) Establish goals of care, Meet estimated needs, Disease management/therapy, Increase intake, Accepts oral nutrition supplement, Appropriate weight loss, and PO intake goal %: 75         RD Intervention/Action Adjusted supplement, Encourage intake, Continue to monitor, Care plan reviewed, and Recommend/order: boost plus TID   --      Prescription/Orders:       PO Diet       Supplements Boost plus TID      Enteral Nutrition       Parenteral Nutrition    New Prescription Ordered? Yes   --      Monitor/Evaluation Per protocol   Discharge Plan/Needs Pending clinical course   --    RD to follow per protocol.      Electronically signed by:  Katerin Dorantes RD  07/12/24 13:00 EDT

## 2024-07-12 NOTE — PROGRESS NOTES
Continued Stay Note  [facility]     Patient Name: Kimberlee Urrutia  MRN: [id number]  Today's Date: 7/12/2024    Admit Date: 6/1/2024    Plan: [facility] PENDING Pre-cert   Discharge Plan       Row Name 07/12/24 1654       Plan    Plan [facility] PENDING Pre-cert    Plan Comments Per Otilio, she is having trouble with starting Pre-cert. Spoke to Kian with [facility] who stated they will start Pre-cert.                   Discharge Codes    No documentation.                 Expected Discharge Date and Time       Expected Discharge Date Expected Discharge Time    Jul 8, 2024               Kavya Gilman RN

## 2024-07-12 NOTE — PROGRESS NOTES
Name: Kimberlee Urrutia ADMIT: 2024   : 1940  PCP: Meche Ji MD    MRN: [id number] LOS: 40 days   AGE/SEX: 84 y.o. female  ROOM: [facility]     Subjective   Subjective   No acute events overnight.  Patient awake and alert this morning.  Sitting up in a chair at bedside.  Continues to have no complaints.    Objective   Objective     Vital Signs  Temp:  [96.3 °F (35.7 °C)-97.9 °F (36.6 °C)] 97.2 °F (36.2 °C)  Heart Rate:  [79-88] 83  Resp:  [16] 16  BP: (111-120)/(63-71) 119/68  SpO2:  [95 %-98 %] 96 %  on   ;   Device (Oxygen Therapy): room air  Body mass index is 29.92 kg/m².    Physical Exam  General: Awake and alert, sitting up in a chair at bedside.  Fully oriented.  No distress.  Answers questions appropriately.  ENT: No conjunctival injection or scleral icterus. Moist mucous membranes.   Neuro: Eyes open and moving in all directions, generalized weakness, no focal deficits.  Face symmetric.  Lungs: Clear to auscultation bilaterally. No wheeze or crackles. No distress.   Heart: RRR, no murmurs. No edema.  Abdomen: Soft, non-tender, non-distended. Normal bowel sounds.  Ext: Warm and well-perfused. No edema.   Skin: No rashes or lesions. IV site without swelling or erythema.     Results Review     I reviewed the patient's new clinical results:  Results from last 7 days   Lab Units 24  0511   WBC 10*3/mm3 9.21   HEMOGLOBIN g/dL 11.1*   PLATELETS 10*3/mm3 287     Results from last 7 days   Lab Units 24  0511 24  0509 24  0442 24  0512   SODIUM mmol/L 139 139 139  --    POTASSIUM mmol/L 3.9 4.0 4.5 4.4   CHLORIDE mmol/L 106 103 106  --    CO2 mmol/L 23.0 24.3 25.0  --    BUN mg/dL 18 19 23  --    CREATININE mg/dL 1.10* 0.93 0.92  --    GLUCOSE mg/dL 97 91 87  --    EGFR mL/min/1.73 49.3* 60.4 61.1  --      Results from last 7 days   Lab Units 24  0511 24  0509   ALBUMIN g/dL 3.5 3.4*   BILIRUBIN mg/dL 0.4  --    ALK PHOS U/L 79  --    AST (SGOT) U/L 13  " --    ALT (SGPT) U/L 12  --      Results from last 7 days   Lab Units 24  0511 24  0509 24  0442 24  0512   CALCIUM mg/dL 9.9 9.9 10.2 9.9   ALBUMIN g/dL 3.5 3.4*  --   --    MAGNESIUM mg/dL 1.8  --   --  2.1   PHOSPHORUS mg/dL 3.2 3.3 3.2 3.2       No results found for: \"HGBA1C\", \"POCGLU\"    No radiology results for the last day    I have personally reviewed all medications:  Scheduled Medications  bisacodyl, 10 mg, Rectal, Daily  cinacalcet, 60 mg, Oral, Daily With Breakfast  donepezil, 10 mg, Oral, Daily  memantine, 10 mg, Oral, BID  rosuvastatin, 20 mg, Oral, Daily    Infusions   Diet  Diet: Regular/House; Feeding Assistance - Nursing; Texture: Regular (IDDSI 7); Fluid Consistency: Thin (IDDSI 0)      Intake/Output Summary (Last 24 hours) at 2024 0906  Last data filed at 2024 0900  Gross per 24 hour   Intake 220 ml   Output 800 ml   Net -580 ml       Assessment/Plan     Active Hospital Problems    Diagnosis  POA   • **Right ureteral stone [N20.1]  Yes   • Severe malnutrition [E43]  Yes   • Hypercalcemia [E83.52]  No   • Constipation [K59.00]  Clinically Undetermined   • Acute UTI (urinary tract infection) [N39.0]  Yes   • Hypoglycemia [E16.2]  Yes   • Abnormal LFTs [R79.89]  Yes   • Chronic bilateral low back pain without sciatica [M54.50, G89.29]  Yes   • 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 female with Right ureteral stone.    Patient was discharged , however insurance issues have not been resolved.  Insurance company is still showing her as .  She continues to be stable and medically ready for discharge. Patient was septic from urinary source with obstructing " right ureteral stone on admission.  S/p cystoscopy and stent placement.  She will follow-up with urology as an outpatient for stent removal and lithotripsy.  Discussed with urology APRN.  Stents can remain in place for up to 3 months, so plan will still be for her to follow-up as an outpatient.  Urology  has been messaged to ensure patient gets appointment scheduled.  She completed course of IV antibiotics for ESBL. There was initial concern for discitis on imaging, but neurosurgery evaluated.  They did not feel presentation was consistent with discitis.      She also was found to have hypercalcemia.  Nephrology was consulted to assist with this.  Patient is being discharged with Sensipar.  Labs checked this morning and calcium stable.  Can recheck later next week if she remains admitted.  Home medications reviewed today.  Patient's statin was held on admission due to elevated transaminases.  These were normal on most recent check, so statin was restarted.  CMP checked today and LFTs normal.  Amlodipine also being held, but blood pressure stable without this medication.  Will continue to monitor.  Once again, patient is medically ready for discharge home pending insurance arrangements.  Plan to recheck labs sometime next week if patient remains admitted.    Amaya Hernandez MD  [facility]  07/12/24  09:06 EDT

## 2024-07-12 NOTE — THERAPY TREATMENT NOTE
Patient Name: Kimberlee Urrutia  : 1940    MRN: [id number]                              Today's Date: 2024       Admit Date: 2024    Visit Dx:     ICD-10-CM ICD-9-CM   1. Right ureteral stone  N20.1 592.1   2. Acute UTI  N39.0 599.0   3. Severe sepsis  A41.9 038.9    R65.20 995.92   4. Elevated lactic acid level  R79.89 276.2   5. Osteomyelitis of lumbar spine  M46.26 730.28   6. Elevated AST (SGOT)  R74.01 790.4   7. Elevated ALT measurement  R74.01 790.4   8. Elevated C-reactive protein (CRP)  R79.82 790.95   9. PRINCE (acute kidney injury)  N17.9 584.9     Patient Active Problem List   Diagnosis    Arthritis    HLD (hyperlipidemia)    Primary hypertension    Health care maintenance    Knee pain, right    Hx of total knee arthroplasty    Seasonal allergies    Vitamin D deficiency    Pyuria    Transient alteration of awareness    Migraine without status migrainosus, not intractable    Leukocytosis    Viral pharyngitis    Multinodular thyroid    Osteopenia of multiple sites    Colon cancer screening    Serum calcium elevated    Acute low back pain    Elevated procalcitonin    Sepsis due to Escherichia coli with acute renal failure without septic shock    Thrombocytopenia    Recurrent oral herpes simplex    Lumbar spinal stenosis    Metabolic encephalopathy    Renal stones    Colon cancer screening    Sacroiliac joint pain    Lumbar stenosis without neurogenic claudication    Renal stone    Hyperlipidemia    History of total knee arthroplasty    Spinal stenosis of lumbar region    Multinodular goiter    Osteopenia    Pain in right knee    Sacroiliac joint pain    Hypercalcemia    CAYLA (dementia of Alzheimer type)    Chronic bilateral low back pain without sciatica    Right ureteral stone    Discitis of lumbar region    Acute UTI (urinary tract infection)    Hypoglycemia    Abnormal LFTs    Hypercalcemia    Constipation    Severe malnutrition     Past Medical History:   Diagnosis Date    Allergic      Arthritis     Colon cancer screening 01/29/2020    Colon cancer screening 01/29/2020    Cough     Diarrhea     Hx of migraine headaches     Hyperlipidemia     Irregular heart beat     Osteoarthritis     Renal stones 02/2021    Vitamin D deficiency      Past Surgical History:   Procedure Laterality Date    COLONOSCOPY      CYSTOSCOPY W/ URETERAL STENT PLACEMENT Right 6/2/2024    Procedure: CYSTOSCOPY, RIGHT URETERAL STENT PLACEMENT;  Surgeon: Efra Schofield MD;  Location: [facility] MAIN OR;  Service: Urology;  Laterality: Right;    LUMBAR EPIDURAL INJECTION N/A 8/2/2021    Procedure: LUMBAR EPIDURAL 1ST VISIT 5-1;  Surgeon: Nu Partida MD;  Location: [facility] MAIN OR;  Service: Pain Management;  Laterality: N/A;    REPLACEMENT TOTAL KNEE Right 01/2015    Josue Wilson MD    SACROILIAC JOINT INJECTION Left 6/16/2021    Procedure: SACROILIAC INJECTION left;  Surgeon: Nu Partida MD;  Location: [facility] MAIN OR;  Service: Pain Management;  Laterality: Left;    TUBAL ABDOMINAL LIGATION      URETEROSCOPY LASER LITHOTRIPSY WITH STENT INSERTION Left 2/23/2021    Procedure: LT.URETEROSCOPY LASER LITHOTRIPSY WITH STENT  FOR STONE;  Surgeon: Arnaud Lu MD;  Location: [facility] OR;  Service: Urology;  Laterality: Left;    WISDOM TOOTH EXTRACTION        General Information       Row Name 07/12/24 1519          Physical Therapy Time and Intention    Document Type therapy note (daily note)  -DJ     Mode of Treatment individual therapy;physical therapy  -DJ       Row Name 07/12/24 1519          General Information    Patient Profile Reviewed yes  -DJ     Existing Precautions/Restrictions fall  -DJ     Barriers to Rehab previous functional deficit;cognitive status  -DJ       Row Name 07/12/24 1519          Cognition    Orientation Status (Cognition) oriented to;place;situation;other (see comments)  misidentified her uvaldo-in-la  -DJ       Row Name 07/12/24 1519          Safety Issues, Functional Mobility     Comment, Safety Issues/Impairments (Mobility) gt belt, nonskid socks  -DJ               User Key  (r) = Recorded By, (t) = Taken By, (c) = Cosigned By      Initials Name Provider Type    DJ Jennifer Schneider, PT Physical Therapist                   Mobility       Row Name 07/12/24 1520          Bed Mobility    Bed Mobility supine-sit;sit-supine  -DJ     Supine-Sit Travis (Bed Mobility) minimum assist (75% patient effort);moderate assist (50% patient effort);verbal cues  -DJ     Sit-Supine Travis (Bed Mobility) minimum assist (75% patient effort);verbal cues  -DJ     Assistive Device (Bed Mobility) bed rails;head of bed elevated  -DJ     Comment, (Bed Mobility) able to assist some with repositioning  -DJ       Row Name 07/12/24 1520          Transfers    Comment, (Transfers) sit/stand from EOB x 2 attempts  -DJ       Row Name 07/12/24 1520          Bed-Chair Transfer    Bed-Chair Travis (Transfers) not tested  -DJ       Row Name 07/12/24 1520          Sit-Stand Transfer    Sit-Stand Travis (Transfers) moderate assist (50% patient effort);verbal cues  -DJ     Assistive Device (Sit-Stand Transfers) walker, front-wheeled  -DJ     Comment, (Sit-Stand Transfer) vc for hand placement  -DJ       Row Name 07/12/24 1520          Gait/Stairs (Locomotion)    Travis Level (Gait) moderate assist (50% patient effort);verbal cues  -DJ     Assistive Device (Gait) walker, front-wheeled  -DJ     Distance in Feet (Gait) 2  -DJ     Deviations/Abnormal Patterns (Gait) arely decreased;stride length decreased;festinating/shuffling  -DJ     Bilateral Gait Deviations forward flexed posture;heel strike decreased  -DJ     Travis Level (Stairs) not tested  -DJ     Comment, (Gait/Stairs) Pt unable to take forward steps today, great difficulty MIP. Only able to take 2-3 small shuffled side steps with r wx and mod A - post lean and unsteady balance.  -DJ               User Key  (r) = Recorded By, (t) = Taken By,  (c) = Cosigned By      Initials Name Provider Type    Jennifer Robles, KALEY Physical Therapist                   Obj/Interventions       Row Name 07/12/24 1524          Motor Skills    Motor Skills functional endurance  -DJ     Functional Endurance poor - fatigues quicky today, unable to progress amb  -DJ     Therapeutic Exercise other (see comments)  seated hip flex, AP, LAQ  -DJ       Row Name 07/12/24 1524          Balance    Balance Assessment standing static balance;standing dynamic balance  -DJ     Static Standing Balance moderate assist;verbal cues  -DJ     Dynamic Standing Balance moderate assist;verbal cues  -DJ     Position/Device Used, Standing Balance walker, front-wheeled;supported  -DJ     Balance Interventions sitting;standing;supported;sit to stand;weight shifting activity  -DJ     Comment, Balance unsteady  -DJ               User Key  (r) = Recorded By, (t) = Taken By, (c) = Cosigned By      Initials Name Provider Type    Jennifer Robles, KALEY Physical Therapist                   Goals/Plan    No documentation.                  Clinical Impression       Row Name 07/12/24 1525          Pain    Pretreatment Pain Rating 0/10 - no pain  -DJ       Row Name 07/12/24 1525          Plan of Care Review    Plan of Care Reviewed With patient  -DJ     Progress declining  -DJ     Outcome Evaluation Pt resting in bed in NAD, pleasant and cooperative, irritated by insurance issues. Pt req min A and vc to sit EOB with increased time. She performed seated LE ther ex with vc at EOB. She stood from EOB x 2 trials with mod A using r wx. Pt unable to take forward steps today, great difficulty MIP. Only able to take 2-3 small shuffled side steps with r wx and mod A - post lean and unsteady balance. Pt fatigeud quickly adn requested to return to bed. Pt req min A to return supine and she was able to assist with repositioning. Overall, decline in mobility today due to fatigue and unsteady balance. Cont PT to address functional  deficits and prepare for d/c as appropriate.  -DJ       Row Name 07/12/24 1525          Therapy Assessment/Plan (PT)    Criteria for Skilled Interventions Met (PT) skilled treatment is necessary  -DJ       Row Name 07/12/24 1525          Vital Signs    O2 Delivery Pre Treatment room air  -DJ     O2 Delivery Intra Treatment room air  -DJ     O2 Delivery Post Treatment room air  -DJ     Pre Patient Position Supine  -DJ     Intra Patient Position Standing  -DJ     Post Patient Position Supine  -DJ       Row Name 07/12/24 1525          Positioning and Restraints    Pre-Treatment Position in bed  -DJ     Post Treatment Position bed  -DJ     In Bed notified nsg;supine;call light within reach;encouraged to call for assist;exit alarm on;with family/caregiver  -DJ               User Key  (r) = Recorded By, (t) = Taken By, (c) = Cosigned By      Initials Name Provider Type    Jennifer Robles PT Physical Therapist                   Outcome Measures       Row Name 07/12/24 1529          How much help from another person do you currently need...    Turning from your back to your side while in flat bed without using bedrails? 3  -DJ     Moving from lying on back to sitting on the side of a flat bed without bedrails? 3  -DJ     Moving to and from a bed to a chair (including a wheelchair)? 2  -DJ     Standing up from a chair using your arms (e.g., wheelchair, bedside chair)? 2  -DJ     Climbing 3-5 steps with a railing? 1  -DJ     To walk in hospital room? 2  -DJ     AM-PAC 6 Clicks Score (PT) 13  -DJ     Highest Level of Mobility Goal 4 --> Transfer to chair/commode  -DJ               User Key  (r) = Recorded By, (t) = Taken By, (c) = Cosigned By      Initials Name Provider Type    Jennifer Robles PT Physical Therapist                                 Physical Therapy Education       Title: PT OT SLP Therapies (Done)       Topic: Physical Therapy (Done)       Point: Mobility training (Done)       Learning Progress Summary              Patient Acceptance, E, VU,NR by DJ at 7/12/2024 1529    Acceptance, E,TB, VU,NR by EF at 7/9/2024 1534    Acceptance, E,TB, VU,NR by EF at 7/8/2024 1613    Acceptance, E,TB, VU,NR by MT at 7/8/2024 0551    Acceptance, E, NR by LH at 7/3/2024 1504    Acceptance, E,TB, VU,DU by CS at 7/2/2024 1312    Acceptance, E, VU,NR by SM at 7/1/2024 1143    Acceptance, E,TB, VU,NR by CB at 6/28/2024 1406    Acceptance, E,TB,D, NR by CB at 6/26/2024 1139    Acceptance, E,D, VU,NR by EB at 6/21/2024 1122    Acceptance, E,D, NR by EF at 6/19/2024 0931    Acceptance, E, VU,NR by SV at 6/16/2024 1531    Acceptance, E,TB, NR by BK at 6/14/2024 1715    Acceptance, E,D, VU,NR by EB at 6/14/2024 1641    Acceptance, E,D, NR by EB at 6/13/2024 1514    Acceptance, E,D, NR by EB at 6/12/2024 1643    Acceptance, E,TB, VU by RD at 6/11/2024 1011    Acceptance, E,TB, VU,NR by CB at 6/10/2024 1149    Acceptance, E, VU by EM at 6/7/2024 1622    Acceptance, E, NR by EM at 6/6/2024 1605    Acceptance, E,TB, VU by BK at 6/5/2024 1700    Acceptance, E, NR by EM at 6/5/2024 1640   Family Acceptance, E, VU,NR by DJ at 7/12/2024 1529                         Point: Home exercise program (Done)       Learning Progress Summary             Patient Acceptance, E, VU,NR by DJ at 7/12/2024 1529    Acceptance, E,TB, VU,NR by EF at 7/9/2024 1534    Acceptance, E,TB, VU,NR by MT at 7/8/2024 0551    Acceptance, E, NR by LH at 7/3/2024 1504    Acceptance, E,TB, VU,DU by CS at 7/2/2024 1312    Acceptance, E, VU,NR by SM at 7/1/2024 1143    Acceptance, E,D, VU,NR by EB at 6/21/2024 1122    Acceptance, E,D, NR by EF at 6/19/2024 0931    Acceptance, E, VU,NR by SV at 6/16/2024 1531    Acceptance, E,TB, NR by BK at 6/14/2024 1715    Acceptance, E,TB, VU by BK at 6/13/2024 1655    Acceptance, E,TB, VU by RD at 6/11/2024 1011    Acceptance, E,TB, VU,NR by CB at 6/10/2024 1149    Acceptance, E, VU by EM at 6/7/2024 1622    Acceptance, E, NR by EM at 6/6/2024 1605    Family Acceptance, E, VU,NR by DJ at 7/12/2024 1529                         Point: Body mechanics (Done)       Learning Progress Summary             Patient Acceptance, E, VU,NR by DJ at 7/12/2024 1529    Acceptance, E,TB, VU,NR by EF at 7/9/2024 1534    Acceptance, E,TB, VU,NR by EF at 7/8/2024 1613    Acceptance, E,TB, VU,NR by MT at 7/8/2024 0551    Acceptance, E, NR by LH at 7/3/2024 1504    Acceptance, E,TB, VU,DU by CS at 7/2/2024 1312    Acceptance, E, VU,NR by SM at 7/1/2024 1143    Acceptance, E,TB, VU,NR by CB at 6/28/2024 1406    Acceptance, E,TB,D, NR by CB at 6/26/2024 1139    Acceptance, E,D, VU,NR by EB at 6/21/2024 1122    Acceptance, E,D, NR by EF at 6/19/2024 0931    Acceptance, E,TB, NR by BK at 6/14/2024 1715    Acceptance, E,D, VU,NR by EB at 6/14/2024 1641    Acceptance, E,D, NR by EB at 6/13/2024 1514    Acceptance, E,D, NR by EB at 6/12/2024 1643    Acceptance, E,TB, VU by RD at 6/11/2024 1011    Acceptance, E,TB, VU,NR by CB at 6/10/2024 1149   Family Acceptance, E, VU,NR by DJ at 7/12/2024 1529                         Point: Precautions (Done)       Learning Progress Summary             Patient Acceptance, E, VU,NR by DJ at 7/12/2024 1529    Acceptance, E,TB, VU,NR by MT at 7/8/2024 0551    Acceptance, E, NR by LH at 7/3/2024 1504    Acceptance, E,TB, VU,DU by CS at 7/2/2024 1312    Acceptance, E, VU,NR by SM at 7/1/2024 1143    Acceptance, E,TB, VU,NR by CB at 6/28/2024 1406    Acceptance, E,D, NR by EB at 6/13/2024 1514    Acceptance, E,TB, VU by FORD at 6/11/2024 1011    Acceptance, E,TB, VU,NR by CB at 6/10/2024 1149   Family Acceptance, E, VU,NR by FLOYD at 7/12/2024 1529                                         User Key       Initials Effective Dates Name Provider Type Discipline    RD 06/16/21 -  Leonela Acuna, PT Physical Therapist PT    LH 06/16/21 -  Angelita Mcintyre, PT Physical Therapist PT    EF 05/31/24 -  Marci Mckenzie, PT Physical Therapist PT    EM 06/16/21 -  Casandra  Yvette YOUNGBLOOD, PT Physical Therapist PT    MT 06/16/21 -  Jose Powell, RN Registered Nurse Nurse    SV 07/11/23 -  Velma Georges, PT Physical Therapist PT    EB 02/14/23 -  Nancy Bishop, PTA Physical Therapist Assistant PT    DJ 10/25/19 -  Jennifer Schneider, PT Physical Therapist PT    CB 10/22/21 -  Kaitlin Cunningham, PT Physical Therapist PT    SM 05/02/22 -  Nahed Santiago, PT Physical Therapist PT    CS 09/22/22 -  Zena Malone, PT Physical Therapist PT    BK 04/30/24 -  Nu Leon, RN Extern Registered Nurse Nurse                  PT Recommendation and Plan     Plan of Care Reviewed With: patient  Progress: declining  Outcome Evaluation: Pt resting in bed in NAD, pleasant and cooperative, irritated by insurance issues. Pt req min A and vc to sit EOB with increased time. She performed seated LE ther ex with vc at EOB. She stood from EOB x 2 trials with mod A using r wx. Pt unable to take forward steps today, great difficulty MIP. Only able to take 2-3 small shuffled side steps with r wx and mod A - post lean and unsteady balance. Pt fatigeud quickly adn requested to return to bed. Pt req min A to return supine and she was able to assist with repositioning. Overall, decline in mobility today due to fatigue and unsteady balance. Cont PT to address functional deficits and prepare for d/c as appropriate.     Time Calculation:         PT Charges       Row Name 07/12/24 1530             Time Calculation    Start Time 1434  -DJ      Stop Time 1453  -DJ      Time Calculation (min) 19 min  -DJ      PT Non-Billable Time (min) 10 min  -DJ      PT Received On 07/12/24  -DJ      PT - Next Appointment 07/15/24  -DJ                User Key  (r) = Recorded By, (t) = Taken By, (c) = Cosigned By      Initials Name Provider Type    Jennifer Robles, PT Physical Therapist                  Therapy Charges for Today       Code Description Service Date Service Provider Modifiers Qty    17497087034  PT THERAPEUTIC ACT EA 15  MIN 7/12/2024 Jennifer Schneider, PT GP 1            PT G-Codes  Outcome Measure Options: AM-PAC 6 Clicks Daily Activity (OT)  AM-PAC 6 Clicks Score (PT): 13  AM-PAC 6 Clicks Score (OT): 16  Modified [place] Scale: 4 - Moderately severe disability.  Unable to walk without assistance, and unable to attend to own bodily needs without assistance.  PT Discharge Summary  Anticipated Discharge Disposition (PT): skilled nursing facility    Jennifer Schneider, KALEY  7/12/2024

## 2024-07-13 RX ADMIN — MEMANTINE HYDROCHLORIDE 10 MG: 10 TABLET, FILM COATED ORAL at 10:00

## 2024-07-13 RX ADMIN — ROSUVASTATIN CALCIUM 20 MG: 20 TABLET, FILM COATED ORAL at 10:00

## 2024-07-13 RX ADMIN — MEMANTINE HYDROCHLORIDE 10 MG: 10 TABLET, FILM COATED ORAL at 20:13

## 2024-07-13 RX ADMIN — DONEPEZIL HYDROCHLORIDE 10 MG: 10 TABLET, FILM COATED ORAL at 10:00

## 2024-07-13 RX ADMIN — CINACALCET 60 MG: 30 TABLET ORAL at 09:59

## 2024-07-13 NOTE — PROGRESS NOTES
Name: Kimberlee Urrutia ADMIT: 2024   : 1940  PCP: Meche Ji MD    MRN: [id number] LOS: 41 days   AGE/SEX: 84 y.o. female  ROOM: [facility]     Subjective   Subjective   No acute events. Patient denies new complaints. No family at bedside.    Objective   Objective   Vital Signs  Temp:  [97.4 °F (36.3 °C)-98.3 °F (36.8 °C)] 97.8 °F (36.6 °C)  Heart Rate:  [76-83] 76  Resp:  [16-18] 16  BP: (117-136)/(63-75) 117/63  SpO2:  [96 %-98 %] 96 %  on   ;   Device (Oxygen Therapy): room air  Body mass index is 29.09 kg/m².  Physical Exam  Vitals and nursing note reviewed.   Constitutional:       General: She is not in acute distress.     Appearance: She is not toxic-appearing or diaphoretic.   HENT:      Head: Normocephalic and atraumatic.      Nose: Nose normal.      Mouth/Throat:      Mouth: Mucous membranes are moist.      Pharynx: Oropharynx is clear.   Eyes:      Conjunctiva/sclera: Conjunctivae normal.      Pupils: Pupils are equal, round, and reactive to light.   Cardiovascular:      Rate and Rhythm: Normal rate and regular rhythm.      Pulses: Normal pulses.   Pulmonary:      Effort: Pulmonary effort is normal.      Breath sounds: Normal breath sounds.   Abdominal:      General: Bowel sounds are normal.      Palpations: Abdomen is soft.      Tenderness: There is no abdominal tenderness.   Musculoskeletal:         General: No swelling or tenderness.      Cervical back: Neck supple.   Skin:     General: Skin is warm and dry.      Capillary Refill: Capillary refill takes less than 2 seconds.   Neurological:      General: No focal deficit present.      Mental Status: She is alert.   Psychiatric:         Mood and Affect: Affect is flat.         Cognition and Memory: Cognition is impaired.       Results Review     I reviewed the patient's new clinical results.  Results from last 7 days   Lab Units 24  0511   WBC 10*3/mm3 9.21   HEMOGLOBIN g/dL 11.1*   PLATELETS 10*3/mm3 287     Results from  "last 7 days   Lab Units 07/12/24  0511 07/08/24  0509 07/07/24  0442   SODIUM mmol/L 139 139 139   POTASSIUM mmol/L 3.9 4.0 4.5   CHLORIDE mmol/L 106 103 106   CO2 mmol/L 23.0 24.3 25.0   BUN mg/dL 18 19 23   CREATININE mg/dL 1.10* 0.93 0.92   GLUCOSE mg/dL 97 91 87   EGFR mL/min/1.73 49.3* 60.4 61.1     Results from last 7 days   Lab Units 07/12/24  0511 07/08/24  0509   ALBUMIN g/dL 3.5 3.4*   BILIRUBIN mg/dL 0.4  --    ALK PHOS U/L 79  --    AST (SGOT) U/L 13  --    ALT (SGPT) U/L 12  --      Results from last 7 days   Lab Units 07/12/24  0511 07/08/24  0509 07/07/24  0442   CALCIUM mg/dL 9.9 9.9 10.2   ALBUMIN g/dL 3.5 3.4*  --    MAGNESIUM mg/dL 1.8  --   --    PHOSPHORUS mg/dL 3.2 3.3 3.2       No results found for: \"HGBA1C\", \"POCGLU\"    No radiology results for the last day    I have personally reviewed all medications:  Scheduled Medications  bisacodyl, 10 mg, Rectal, Daily  cinacalcet, 60 mg, Oral, Daily With Breakfast  donepezil, 10 mg, Oral, Daily  memantine, 10 mg, Oral, BID  rosuvastatin, 20 mg, Oral, Daily    Infusions   Diet  Diet: Regular/House; Feeding Assistance - Nursing; Texture: Regular (IDDSI 7); Fluid Consistency: Thin (IDDSI 0)    I have personally reviewed:  [x]  Laboratory   [x]  Microbiology   [x]  Radiology   []  EKG/Telemetry  [x]  Cardiology/Vascular   []  Pathology    []  Records       Assessment/Plan     Active Hospital Problems    Diagnosis  POA    **Right ureteral stone [N20.1]  Yes    Severe malnutrition [E43]  Yes    Hypercalcemia [E83.52]  No    Constipation [K59.00]  Clinically Undetermined    Acute UTI (urinary tract infection) [N39.0]  Yes    Hypoglycemia [E16.2]  Yes    Abnormal LFTs [R79.89]  Yes    Chronic bilateral low back pain without sciatica [M54.50, G89.29]  Yes    CAYLA (dementia of Alzheimer type) [G30.9, F02.80]  Yes    Lumbar spinal stenosis [M48.061]  Yes    Thrombocytopenia [D69.6]  Yes    Acute low back pain [M54.50]  Yes      Resolved Hospital Problems    " Diagnosis Date Resolved POA    Shock, septic [A41.9, R65.21] 2024 Yes    PRINCE (acute kidney injury) [N17.9] 2024 Yes   ESBL E. coli pyelonephritis complicated by right hydronephrosis and obstructive ureteric stone and Sepsis, present on admission  - s/p cystoscopy and right stent placement on 2024.  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    - JESSE has evaluated, recommended conservative management     Dysphagia  - SLP has seen, upgraded to regular/thin diet      Patient at this time requires rehab but pre-cert is pending because she is listed in Medicare records as . She will remain here until this clerical error is corrected or another discharge plan is found.     SCDs for DVT prophylaxis.  DNR.  Discussed with patient and nursing staff.  Anticipate discharge to SNU facility once arrangements have been made.  Expected Discharge Date: 2024; Expected Discharge Time:       Mark Coy MD  [facility]  24  16:18 EDT

## 2024-07-13 NOTE — PROGRESS NOTES
"RENAL/KCC:     LOS: 41 days    Patient Care Team:  Meche Ji MD as PCP - General (Internal Medicine)  Josue Wilson MD as Consulting Physician (Orthopedic Surgery)  Yash Wilson MD as Consulting Physician (Gastroenterology)  Avi Ferris MD (Ophthalmology)  Tommy French MD (Inactive) as Consulting Physician (Otolaryngology)  Arnaud Lu MD as Consulting Physician (Urology)  Nu Partida MD as Consulting Physician (Pain Medicine)  Ki Small MD as Consulting Physician (Neurology)    Chief Complaint:  Hypercalcemia    Subjective     Interval History:   7/1: Pleasant, denies any complaints.  No shortness of breath chest pain fevers chills or edema  Received IV fluids and Sensipar dose was increased over the weekend due to rising calcium levels  She is asymptomatic    7/2: No new complaints or events.  She states she has no appetite but denies nausea  No dyspnea or edema    7/3 no acute events overnight. Feeling ok.     7/5: She is doing well and has no complaints states appetite is poor but she attributes that to the hospital food.    7/8: She looks and feels well denies complaints, possible rehab bed today    7/12: Came by to check on the patient today since she is still admitted.  Still problems with insurance, awaiting discharge, medically stable for discharge    7/13 eating small oly,     Objective     Vital Sign Min/Max for last 24 hours  Temp  Min: 97.4 °F (36.3 °C)  Max: 98.3 °F (36.8 °C)   BP  Min: 117/63  Max: 136/75   Pulse  Min: 76  Max: 83   Resp  Min: 16  Max: 18   SpO2  Min: 96 %  Max: 98 %   No data recorded   Weight  Min: 73.3 kg (161 lb 9.6 oz)  Max: 73.3 kg (161 lb 9.6 oz)     Flowsheet Rows      Flowsheet Row First Filed Value   Admission Height 158.8 cm (62.5\") Documented at 06/01/2024 2030   Admission Weight 81 kg (178 lb 9.2 oz) Documented at 06/01/2024 2030            I/O this shift:  In: 240 [P.O.:240]  Out: -   I/O last 3 completed " "shifts:  In: 700 [P.O.:700]  Out: 650 [Urine:650]    Physical Exam:  Alert confused at times  NAD   eomi no icterus   Moist mucus membranes   No jvd reg s1s2 no murmur   Clear bilaterally no rales/rhonchi/wheeze   Soft NTND + bs   No edema   Warm dry no rash            WBC WBC   Date Value Ref Range Status   07/12/2024 9.21 3.40 - 10.80 10*3/mm3 Final      HGB Hemoglobin   Date Value Ref Range Status   07/12/2024 11.1 (L) 12.0 - 15.9 g/dL Final      HCT Hematocrit   Date Value Ref Range Status   07/12/2024 34.7 34.0 - 46.6 % Final      Platlets No results found for: \"LABPLAT\"   MCV MCV   Date Value Ref Range Status   07/12/2024 88.5 79.0 - 97.0 fL Final          Sodium Sodium   Date Value Ref Range Status   07/12/2024 139 136 - 145 mmol/L Final      Potassium Potassium   Date Value Ref Range Status   07/12/2024 3.9 3.5 - 5.2 mmol/L Final      Chloride Chloride   Date Value Ref Range Status   07/12/2024 106 98 - 107 mmol/L Final      CO2 CO2   Date Value Ref Range Status   07/12/2024 23.0 22.0 - 29.0 mmol/L Final      BUN BUN   Date Value Ref Range Status   07/12/2024 18 8 - 23 mg/dL Final      Creatinine Creatinine   Date Value Ref Range Status   07/12/2024 1.10 (H) 0.57 - 1.00 mg/dL Final      Calcium Calcium   Date Value Ref Range Status   07/12/2024 9.9 8.6 - 10.5 mg/dL Final      PO4 No results found for: \"CAPO4\"   Albumin Albumin   Date Value Ref Range Status   07/12/2024 3.5 3.5 - 5.2 g/dL Final      Magnesium Magnesium   Date Value Ref Range Status   07/12/2024 1.8 1.6 - 2.4 mg/dL Final      Uric Acid No results found for: \"URICACID\"        Results Review:     I reviewed the patient's new clinical results.    bisacodyl, 10 mg, Rectal, Daily  cinacalcet, 60 mg, Oral, Daily With Breakfast  donepezil, 10 mg, Oral, Daily  memantine, 10 mg, Oral, BID  rosuvastatin, 20 mg, Oral, Daily             Medication Review: Reviewed    Assessment & Plan   Hypercalcemia, likely due to hyperparathyroidism, stabilizing with " oral Sensipar.  Calcium today is 9.9  CKD stage II, at baseline  Hydronephrosis - resolved after stent, outpatient urology follow-up  Constipation on Dulcolax, reports regular BMs  PRINCE- resolved.   Septic shock - resolved.   Dementia on Aricept/Namenda      Plan:   Cr stable today   BP at goal, euvolemic on exam  Continue to encourage oral fluid intake    Continue Cinacalcet 60 mg daily for now.   Medically stable for discharge but problems with insurance  We Can check chemistries weekly while she is here and I will plan to see her again whenever she has labs done, otherwise will see as needed      Courtney Kirkland MD  [facility]  07/13/24  14:09 EDT

## 2024-07-13 NOTE — PLAN OF CARE
Goal Outcome Evaluation:  Plan of Care Reviewed With: patient        Progress: improving  Outcome Evaluation: Alert, calm and cooperative. VSS. Turned to sides. External catheter in place, urine output adequate. Z guard cream to coccyx area. Slept well.

## 2024-07-13 NOTE — PLAN OF CARE
Goal Outcome Evaluation:  Plan of Care Reviewed With: patient        Progress: no change  Outcome Evaluation: pt confused but cooperative and calm, able to follow commands, assisted her to shower today, sat in chair, i will take her outside tonight via w/c to get some fresh air, vss, falls precautions maintained, low PO intake but is drinking the Boost shakes, incontinent of urine today

## 2024-07-14 RX ADMIN — MEMANTINE HYDROCHLORIDE 10 MG: 10 TABLET, FILM COATED ORAL at 22:50

## 2024-07-14 RX ADMIN — SENNOSIDES AND DOCUSATE SODIUM 2 TABLET: 50; 8.6 TABLET ORAL at 09:23

## 2024-07-14 RX ADMIN — MEMANTINE HYDROCHLORIDE 10 MG: 10 TABLET, FILM COATED ORAL at 09:24

## 2024-07-14 RX ADMIN — DONEPEZIL HYDROCHLORIDE 10 MG: 10 TABLET, FILM COATED ORAL at 09:24

## 2024-07-14 RX ADMIN — CINACALCET 60 MG: 30 TABLET ORAL at 09:24

## 2024-07-14 RX ADMIN — ROSUVASTATIN CALCIUM 20 MG: 20 TABLET, FILM COATED ORAL at 09:23

## 2024-07-14 NOTE — PROGRESS NOTES
Name: Kimberlee Urrutia ADMIT: 2024   : 1940  PCP: Meche Ji MD    MRN: [id number] LOS: 42 days   AGE/SEX: 84 y.o. female  ROOM: [facility]     Subjective   Subjective   No acute events. Patient denies new complaints. No family at bedside.    Objective   Objective   Vital Signs  Temp:  [97.3 °F (36.3 °C)-97.7 °F (36.5 °C)] 97.5 °F (36.4 °C)  Heart Rate:  [77-82] 77  Resp:  [15-18] 15  BP: (116-119)/(69-75) 119/69  SpO2:  [96 %-97 %] 96 %  on   ;   Device (Oxygen Therapy): room air  Body mass index is 29.68 kg/m².  Physical Exam  Vitals and nursing note reviewed.   Constitutional:       General: She is not in acute distress.     Appearance: She is not toxic-appearing or diaphoretic.   HENT:      Head: Normocephalic and atraumatic.      Nose: Nose normal.      Mouth/Throat:      Mouth: Mucous membranes are moist.      Pharynx: Oropharynx is clear.   Eyes:      Conjunctiva/sclera: Conjunctivae normal.      Pupils: Pupils are equal, round, and reactive to light.   Cardiovascular:      Rate and Rhythm: Normal rate and regular rhythm.      Pulses: Normal pulses.   Pulmonary:      Effort: Pulmonary effort is normal.      Breath sounds: Normal breath sounds.   Abdominal:      General: Bowel sounds are normal.      Palpations: Abdomen is soft.      Tenderness: There is no abdominal tenderness.   Musculoskeletal:         General: No swelling or tenderness.      Cervical back: Neck supple.   Skin:     General: Skin is warm and dry.      Capillary Refill: Capillary refill takes less than 2 seconds.   Neurological:      General: No focal deficit present.      Mental Status: She is alert.   Psychiatric:         Mood and Affect: Affect is flat.         Cognition and Memory: Cognition is impaired.       Results Review     I reviewed the patient's new clinical results.  Results from last 7 days   Lab Units 24  0511   WBC 10*3/mm3 9.21   HEMOGLOBIN g/dL 11.1*   PLATELETS 10*3/mm3 287     Results from  "last 7 days   Lab Units 07/12/24  0511 07/08/24  0509   SODIUM mmol/L 139 139   POTASSIUM mmol/L 3.9 4.0   CHLORIDE mmol/L 106 103   CO2 mmol/L 23.0 24.3   BUN mg/dL 18 19   CREATININE mg/dL 1.10* 0.93   GLUCOSE mg/dL 97 91   EGFR mL/min/1.73 49.3* 60.4     Results from last 7 days   Lab Units 07/12/24  0511 07/08/24  0509   ALBUMIN g/dL 3.5 3.4*   BILIRUBIN mg/dL 0.4  --    ALK PHOS U/L 79  --    AST (SGOT) U/L 13  --    ALT (SGPT) U/L 12  --      Results from last 7 days   Lab Units 07/12/24  0511 07/08/24  0509   CALCIUM mg/dL 9.9 9.9   ALBUMIN g/dL 3.5 3.4*   MAGNESIUM mg/dL 1.8  --    PHOSPHORUS mg/dL 3.2 3.3       No results found for: \"HGBA1C\", \"POCGLU\"    No radiology results for the last day    I have personally reviewed all medications:  Scheduled Medications  bisacodyl, 10 mg, Rectal, Daily  cinacalcet, 60 mg, Oral, Daily With Breakfast  donepezil, 10 mg, Oral, Daily  memantine, 10 mg, Oral, BID  rosuvastatin, 20 mg, Oral, Daily    Infusions   Diet  Diet: Regular/House; Feeding Assistance - Nursing; Texture: Regular (IDDSI 7); Fluid Consistency: Thin (IDDSI 0)    I have personally reviewed:  []  Laboratory   []  Microbiology   []  Radiology   []  EKG/Telemetry  []  Cardiology/Vascular   []  Pathology    []  Records      Assessment/Plan     Active Hospital Problems    Diagnosis  POA    **Right ureteral stone [N20.1]  Yes    Severe malnutrition [E43]  Yes    Hypercalcemia [E83.52]  No    Constipation [K59.00]  Clinically Undetermined    Acute UTI (urinary tract infection) [N39.0]  Yes    Hypoglycemia [E16.2]  Yes    Abnormal LFTs [R79.89]  Yes    Chronic bilateral low back pain without sciatica [M54.50, G89.29]  Yes    CAYLA (dementia of Alzheimer type) [G30.9, F02.80]  Yes    Lumbar spinal stenosis [M48.061]  Yes    Thrombocytopenia [D69.6]  Yes    Acute low back pain [M54.50]  Yes      Resolved Hospital Problems    Diagnosis Date Resolved POA    Shock, septic [A41.9, R65.21] 06/03/2024 Yes    PRINCE (acute " kidney injury) [N17.9] 2024 Yes   ESBL E. coli pyelonephritis complicated by right hydronephrosis and obstructive ureteric stone and Sepsis, present on admission  - s/p cystoscopy and right stent placement on 2024.  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    - J[facility]SE has evaluated, recommended conservative management     Dysphagia  - SLP has seen, upgraded to regular/thin diet      Patient at this time requires rehab but pre-cert is pending because she is listed in Medicare records as . She will remain here until this clerical error is corrected or another discharge plan is found.     SCDs for DVT prophylaxis.  DNR.  Discussed with patient and nursing staff.  Anticipate discharge to SNU facility once arrangements have been made.  Expected Discharge Date: 2024; Expected Discharge Time:       Mark Coy MD  [facility]  24  12:21 EDT  Portions of this text have been copied and I have reviewed them. They are accurate as of 2024

## 2024-07-14 NOTE — PLAN OF CARE
Goal Outcome Evaluation:  Plan of Care Reviewed With: patient        Progress: improving  Outcome Evaluation: pt alert, cooperative and calm, confused but able to be reoriented, takes medications whole with water without difficulty, encouraged pt to eat today - she ate more when i told her she needed to eat a certain amount of bites - otherwise she just looks at it and doesn't eat at all, drinking her Boost, assisted to chair today and bathroom, pt had good BM continently, incontinent of bladder

## 2024-07-14 NOTE — PLAN OF CARE
Goal Outcome Evaluation:  Plan of Care Reviewed With: patient        Progress: improving  Outcome Evaluation: Pleasant and cooperative. External catheter placed during the night, urine output adequate. Skin intact, barrier cream applied to coccyx area. Turned to sides. Slept well.

## 2024-07-15 PROCEDURE — 97530 THERAPEUTIC ACTIVITIES: CPT

## 2024-07-15 RX ADMIN — MEMANTINE HYDROCHLORIDE 10 MG: 10 TABLET, FILM COATED ORAL at 09:28

## 2024-07-15 RX ADMIN — MEMANTINE HYDROCHLORIDE 10 MG: 10 TABLET, FILM COATED ORAL at 20:42

## 2024-07-15 RX ADMIN — CINACALCET 60 MG: 30 TABLET ORAL at 09:28

## 2024-07-15 RX ADMIN — ROSUVASTATIN CALCIUM 20 MG: 20 TABLET, FILM COATED ORAL at 09:28

## 2024-07-15 RX ADMIN — DONEPEZIL HYDROCHLORIDE 10 MG: 10 TABLET, FILM COATED ORAL at 09:28

## 2024-07-15 NOTE — PLAN OF CARE
Goal Outcome Evaluation:  Plan of Care Reviewed With: patient        Progress: improving  Outcome Evaluation: Pt demos improved strength and endurance, as evidenced by tolerance of increased activity today. Pt up in chair upon PT arrival. Pt performed STS transfer with mod A, then ambulated 20' with min A and rwx. Pt continues to fatigue quickly with upright mobility and will continue to benefit from PT for continued strengthening. Rec DC to SNF.      Anticipated Discharge Disposition (PT): skilled nursing facility

## 2024-07-15 NOTE — THERAPY TREATMENT NOTE
Patient Name: Kimberlee Urrutia  : 1940    MRN: [id number]                              Today's Date: 7/15/2024       Admit Date: 2024    Visit Dx:     ICD-10-CM ICD-9-CM   1. Right ureteral stone  N20.1 592.1   2. Acute UTI  N39.0 599.0   3. Severe sepsis  A41.9 038.9    R65.20 995.92   4. Elevated lactic acid level  R79.89 276.2   5. Osteomyelitis of lumbar spine  M46.26 730.28   6. Elevated AST (SGOT)  R74.01 790.4   7. Elevated ALT measurement  R74.01 790.4   8. Elevated C-reactive protein (CRP)  R79.82 790.95   9. PRINCE (acute kidney injury)  N17.9 584.9     Patient Active Problem List   Diagnosis    Arthritis    HLD (hyperlipidemia)    Primary hypertension    Health care maintenance    Knee pain, right    Hx of total knee arthroplasty    Seasonal allergies    Vitamin D deficiency    Pyuria    Transient alteration of awareness    Migraine without status migrainosus, not intractable    Leukocytosis    Viral pharyngitis    Multinodular thyroid    Osteopenia of multiple sites    Colon cancer screening    Serum calcium elevated    Acute low back pain    Elevated procalcitonin    Sepsis due to Escherichia coli with acute renal failure without septic shock    Thrombocytopenia    Recurrent oral herpes simplex    Lumbar spinal stenosis    Metabolic encephalopathy    Renal stones    Colon cancer screening    Sacroiliac joint pain    Lumbar stenosis without neurogenic claudication    Renal stone    Hyperlipidemia    History of total knee arthroplasty    Spinal stenosis of lumbar region    Multinodular goiter    Osteopenia    Pain in right knee    Sacroiliac joint pain    Hypercalcemia    CAYLA (dementia of Alzheimer type)    Chronic bilateral low back pain without sciatica    Right ureteral stone    Discitis of lumbar region    Acute UTI (urinary tract infection)    Hypoglycemia    Abnormal LFTs    Hypercalcemia    Constipation    Severe malnutrition     Past Medical History:   Diagnosis Date    Allergic      Arthritis     Colon cancer screening 01/29/2020    Colon cancer screening 01/29/2020    Cough     Diarrhea     Hx of migraine headaches     Hyperlipidemia     Irregular heart beat     Osteoarthritis     Renal stones 02/2021    Vitamin D deficiency      Past Surgical History:   Procedure Laterality Date    COLONOSCOPY      CYSTOSCOPY W/ URETERAL STENT PLACEMENT Right 6/2/2024    Procedure: CYSTOSCOPY, RIGHT URETERAL STENT PLACEMENT;  Surgeon: Efra Schofield MD;  Location: [facility] MAIN OR;  Service: Urology;  Laterality: Right;    LUMBAR EPIDURAL INJECTION N/A 8/2/2021    Procedure: LUMBAR EPIDURAL 1ST VISIT 5-1;  Surgeon: Nu Partida MD;  Location: [facility] MAIN OR;  Service: Pain Management;  Laterality: N/A;    REPLACEMENT TOTAL KNEE Right 01/2015    Josue Wilson MD    SACROILIAC JOINT INJECTION Left 6/16/2021    Procedure: SACROILIAC INJECTION left;  Surgeon: Nu Partida MD;  Location: [facility] MAIN OR;  Service: Pain Management;  Laterality: Left;    TUBAL ABDOMINAL LIGATION      URETEROSCOPY LASER LITHOTRIPSY WITH STENT INSERTION Left 2/23/2021    Procedure: LT.URETEROSCOPY LASER LITHOTRIPSY WITH STENT  FOR STONE;  Surgeon: Arnaud Lu MD;  Location: [facility] OR;  Service: Urology;  Laterality: Left;    WISDOM TOOTH EXTRACTION        General Information       Row Name 07/15/24 8909          Physical Therapy Time and Intention    Document Type therapy note (daily note)  -EF     Mode of Treatment individual therapy;physical therapy  -EF       Row Name 07/15/24 1319          General Information    Existing Precautions/Restrictions fall  -EF     Barriers to Rehab previous functional deficit;cognitive status  -EF       Row Name 07/15/24 1319          Cognition    Orientation Status (Cognition) oriented x 3  -EF       Row Name 07/15/24 1319          Safety Issues, Functional Mobility    Impairments Affecting Function (Mobility) balance;endurance/activity tolerance;strength  -EF                User Key  (r) = Recorded By, (t) = Taken By, (c) = Cosigned By      Initials Name Provider Type    EF Marci Mckenzie, KALEY Physical Therapist                   Mobility       Row Name 07/15/24 1320          Bed Mobility    Comment, (Bed Mobility) not tested; pt sitting up in chair  -EF       Row Name 07/15/24 1320          Sit-Stand Transfer    Sit-Stand [place] (Transfers) moderate assist (50% patient effort);verbal cues  -EF     Assistive Device (Sit-Stand Transfers) walker, front-wheeled  -EF     Comment, (Sit-Stand Transfer) cues for hand placement and sequencing  -EF       Row Name 07/15/24 1320          Gait/Stairs (Locomotion)    [place] Level (Gait) minimum assist (75% patient effort);contact guard;verbal cues  -EF     Assistive Device (Gait) walker, front-wheeled  -EF     Distance in Feet (Gait) 20  -EF     Deviations/Abnormal Patterns (Gait) arely decreased;stride length decreased;festinating/shuffling  -EF     Bilateral Gait Deviations forward flexed posture;heel strike decreased  -EF     Comment, (Gait/Stairs) Cues for upright posture and increased step length.  -EF               User Key  (r) = Recorded By, (t) = Taken By, (c) = Cosigned By      Initials Name Provider Type    EF Marci Mckenzie, PT Physical Therapist                   Obj/Interventions       Row Name 07/15/24 1321          Motor Skills    Therapeutic Exercise --  Seated B LAQ, marching while seated x10 reps  -EF               User Key  (r) = Recorded By, (t) = Taken By, (c) = Cosigned By      Initials Name Provider Type    EF Marci Mckenzie, PT Physical Therapist                   Goals/Plan    No documentation.                  Clinical Impression       Row Name 07/15/24 1321          Pain    Pretreatment Pain Rating 0/10 - no pain  -EF     Posttreatment Pain Rating 0/10 - no pain  -EF       Row Name 07/15/24 1321          Plan of Care Review    Plan of Care Reviewed With patient  -EF     Progress improving   -EF     Outcome Evaluation Pt demos improved strength and endurance, as evidenced by tolerance of increased activity today. Pt up in chair upon PT arrival. Pt performed STS transfer with mod A, then ambulated 20' with min A and rwx. Pt continues to fatigue quickly with upright mobility and will continue to benefit from PT for continued strengthening. Rec DC to SNF.  -EF       Row Name 07/15/24 1321          Positioning and Restraints    Pre-Treatment Position sitting in chair/recliner  -EF     Post Treatment Position chair  -EF     In Chair reclined;call light within reach;encouraged to call for assist;exit alarm on;legs elevated  -EF               User Key  (r) = Recorded By, (t) = Taken By, (c) = Cosigned By      Initials Name Provider Type    EF Marci Mckenzie, PT Physical Therapist                   Outcome Measures       Row Name 07/15/24 1322 07/15/24 0928       How much help from another person do you currently need...    Turning from your back to your side while in flat bed without using bedrails? 3  -EF 3 (P)   -AXEL    Moving from lying on back to sitting on the side of a flat bed without bedrails? 2  -EF 3 (P)   -AXEL    Moving to and from a bed to a chair (including a wheelchair)? 3  -EF --    Standing up from a chair using your arms (e.g., wheelchair, bedside chair)? 2  -EF 2 (P)   -AXEL    Climbing 3-5 steps with a railing? 1  -EF 1 (P)   -AXEL    To walk in hospital room? 3  -EF 2 (P)   -AXEL    AM-PAC 6 Clicks Score (PT) 14  -EF --    Highest Level of Mobility Goal 4 --> Transfer to chair/commode  -EF --      Row Name 07/15/24 0900          How much help from another person do you currently need...    Turning from your back to your side while in flat bed without using bedrails? 3  -MM     Moving from lying on back to sitting on the side of a flat bed without bedrails? 3  -MM     Moving to and from a bed to a chair (including a wheelchair)? 2  -MM     Standing up from a chair using your arms (e.g.,  wheelchair, bedside chair)? 2  -MM     Climbing 3-5 steps with a railing? 1  -MM     To walk in hospital room? 2  -MM     AM-PAC 6 Clicks Score (PT) 13  -MM     Highest Level of Mobility Goal 4 --> Transfer to chair/commode  -MM               User Key  (r) = Recorded By, (t) = Taken By, (c) = Cosigned By      Initials Name Provider Type    Amalia Rondon, RN Registered Nurse    Marci Haro, KALEY Physical Therapist    Keyona Tanner RN Extern Registered Nurse                                 Physical Therapy Education       Title: PT OT SLP Therapies (Done)       Topic: Physical Therapy (Done)       Point: Mobility training (Done)       Learning Progress Summary             Patient Acceptance, E,D, VU,NR by EF at 7/15/2024 1322    Acceptance, E, VU,NR by DJ at 7/12/2024 1529    Acceptance, E,TB, VU,NR by EF at 7/9/2024 1534    Acceptance, E,TB, VU,NR by EF at 7/8/2024 1613    Acceptance, E,TB, VU,NR by MT at 7/8/2024 0551    Acceptance, E, NR by LH at 7/3/2024 1504    Acceptance, E,TB, VU,DU by CS at 7/2/2024 1312    Acceptance, E, VU,NR by SM at 7/1/2024 1143    Acceptance, E,TB, VU,NR by CB at 6/28/2024 1406    Acceptance, E,TB,D, NR by CB at 6/26/2024 1139    Acceptance, E,D, VU,NR by EB at 6/21/2024 1122    Acceptance, E,D, NR by EF at 6/19/2024 0931    Acceptance, E, VU,NR by SV at 6/16/2024 1531    Acceptance, E,TB, NR by BK at 6/14/2024 1715    Acceptance, E,D, VU,NR by EB at 6/14/2024 1641    Acceptance, E,D, NR by EB at 6/13/2024 1514    Acceptance, E,D, NR by EB at 6/12/2024 1643    Acceptance, E,TB, VU by RD at 6/11/2024 1011    Acceptance, E,TB, VU,NR by CB at 6/10/2024 1149    Acceptance, E, VU by EM at 6/7/2024 1622    Acceptance, E, NR by EM at 6/6/2024 1605    Acceptance, E,TB, VU by BK at 6/5/2024 1700    Acceptance, E, NR by EM at 6/5/2024 1640   Family Acceptance, E, VU,NR by DJ at 7/12/2024 1529                         Point: Home exercise program (Done)       Learning Progress  Summary             Patient Acceptance, E,D, VU,NR by EF at 7/15/2024 1322    Acceptance, E, VU,NR by DJ at 7/12/2024 1529    Acceptance, E,TB, VU,NR by EF at 7/9/2024 1534    Acceptance, E,TB, VU,NR by MT at 7/8/2024 0551    Acceptance, E, NR by LH at 7/3/2024 1504    Acceptance, E,TB, VU,DU by CS at 7/2/2024 1312    Acceptance, E, VU,NR by SM at 7/1/2024 1143    Acceptance, E,D, VU,NR by EB at 6/21/2024 1122    Acceptance, E,D, NR by EF at 6/19/2024 0931    Acceptance, E, VU,NR by SV at 6/16/2024 1531    Acceptance, E,TB, NR by BK at 6/14/2024 1715    Acceptance, E,TB, VU by BK at 6/13/2024 1655    Acceptance, E,TB, VU by RD at 6/11/2024 1011    Acceptance, E,TB, VU,NR by CB at 6/10/2024 1149    Acceptance, E, VU by EM at 6/7/2024 1622    Acceptance, E, NR by EM at 6/6/2024 1605   Family Acceptance, E, VU,NR by DJ at 7/12/2024 1529                         Point: Body mechanics (Done)       Learning Progress Summary             Patient Acceptance, E, VU,NR by DJ at 7/12/2024 1529    Acceptance, E,TB, VU,NR by EF at 7/9/2024 1534    Acceptance, E,TB, VU,NR by EF at 7/8/2024 1613    Acceptance, E,TB, VU,NR by MT at 7/8/2024 0551    Acceptance, E, NR by LH at 7/3/2024 1504    Acceptance, E,TB, VU,DU by CS at 7/2/2024 1312    Acceptance, E, VU,NR by SM at 7/1/2024 1143    Acceptance, E,TB, VU,NR by CB at 6/28/2024 1406    Acceptance, E,TB,D, NR by CB at 6/26/2024 1139    Acceptance, E,D, VU,NR by EB at 6/21/2024 1122    Acceptance, E,D, NR by EF at 6/19/2024 0931    Acceptance, E,TB, NR by BK at 6/14/2024 1715    Acceptance, E,D, VU,NR by EB at 6/14/2024 1641    Acceptance, E,D, NR by EB at 6/13/2024 1514    Acceptance, E,D, NR by EB at 6/12/2024 1643    Acceptance, E,TB, VU by RD at 6/11/2024 1011    Acceptance, E,TB, VU,NR by CB at 6/10/2024 1149   Family Acceptance, E, VU,NR by DJ at 7/12/2024 1529                         Point: Precautions (Done)       Learning Progress Summary             Patient Acceptance, E,  VU,NR by DJ at 7/12/2024 1529    Acceptance, E,TB, VU,NR by MT at 7/8/2024 0551    Acceptance, E, NR by LH at 7/3/2024 1504    Acceptance, E,TB, VU,DU by CS at 7/2/2024 1312    Acceptance, E, VU,NR by SM at 7/1/2024 1143    Acceptance, E,TB, VU,NR by CB at 6/28/2024 1406    Acceptance, E,D, NR by EB at 6/13/2024 1514    Acceptance, E,TB, VU by RD at 6/11/2024 1011    Acceptance, E,TB, VU,NR by CB at 6/10/2024 1149   Family Acceptance, E, VU,NR by DJ at 7/12/2024 1529                                         User Key       Initials Effective Dates Name Provider Type Discipline    RD 06/16/21 -  Leonela Acuna, PT Physical Therapist PT    LH 06/16/21 -  Angelita Mcintyre, PT Physical Therapist PT    EF 05/31/24 -  Marci Mckenzie, PT Physical Therapist PT    EM 06/16/21 -  Yvette Guajardo, PT Physical Therapist PT    MT 06/16/21 -  Jose Powell, RN Registered Nurse Nurse    SV 07/11/23 -  Velma Georges, PT Physical Therapist PT    EB 02/14/23 -  Nancy Bishop, PTA Physical Therapist Assistant PT    DJ 10/25/19 -  Jennifer Schneider, PT Physical Therapist PT    CB 10/22/21 -  Kaitlin Cunningham, PT Physical Therapist PT    SM 05/02/22 -  Nahed Santiago, PT Physical Therapist PT    CS 09/22/22 -  Zena Malone, PT Physical Therapist PT    BK 04/30/24 -  Nu Leon, RN Extern Registered Nurse Nurse                  PT Recommendation and Plan  Planned Therapy Interventions (PT): balance training, bed mobility training, gait training, home exercise program, patient/family education, strengthening, ROM (range of motion), transfer training, postural re-education  Plan of Care Reviewed With: patient  Progress: improving  Outcome Evaluation: Pt demos improved strength and endurance, as evidenced by tolerance of increased activity today. Pt up in chair upon PT arrival. Pt performed STS transfer with mod A, then ambulated 20' with min A and rwx. Pt continues to fatigue quickly with upright mobility and will continue  to benefit from PT for continued strengthening. Rec DC to SNF.     Time Calculation:         PT Charges       Row Name 07/15/24 1323             Time Calculation    Start Time 1102  -EF      Stop Time 1114  -EF      Time Calculation (min) 12 min  -EF      PT Received On 07/15/24  -EF      PT - Next Appointment 07/16/24  -EF         Time Calculation- PT    Total Timed Code Minutes- PT 12 minute(s)  -EF         Timed Charges    98960 - PT Therapeutic Activity Minutes 12  -EF         Total Minutes    Timed Charges Total Minutes 12  -EF       Total Minutes 12  -EF                User Key  (r) = Recorded By, (t) = Taken By, (c) = Cosigned By      Initials Name Provider Type    EF Marci Mckenzie, PT Physical Therapist                  Therapy Charges for Today       Code Description Service Date Service Provider Modifiers Qty    [id number]  PT THERAPEUTIC ACT EA 15 MIN 7/15/2024 Marci Mckenzie, PT GP 1            PT G-Codes  Outcome Measure Options: AM-PAC 6 Clicks Daily Activity (OT)  AM-PAC 6 Clicks Score (PT): 14  AM-PAC 6 Clicks Score (OT): 16  Modified [place] Scale: 4 - Moderately severe disability.  Unable to walk without assistance, and unable to attend to own bodily needs without assistance.  PT Discharge Summary  Anticipated Discharge Disposition (PT): skilled nursing facility    Marci Mckenzie PT  7/15/2024

## 2024-07-15 NOTE — PROGRESS NOTES
Name: Kimberlee Urrutia ADMIT: 2024   : 1940  PCP: Meche Ji MD    MRN: [id number] LOS: 43 days   AGE/SEX: 84 y.o. female  ROOM: [facility], [facility]     Subjective   Subjective   No acute events. Patient denies new complaints. No family at bedside.    Objective   Objective   Vital Signs  Temp:  [96.3 °F (35.7 °C)-97.6 °F (36.4 °C)] 97.6 °F (36.4 °C)  Heart Rate:  [73-90] 87  Resp:  [16] 16  BP: (112-126)/(63-80) 115/70  SpO2:  [94 %-97 %] 95 %  on   ;   Device (Oxygen Therapy): room air  Body mass index is 29.68 kg/m².  Physical Exam  Vitals and nursing note reviewed.   Constitutional:       General: She is not in acute distress.     Appearance: She is not toxic-appearing or diaphoretic.   HENT:      Head: Normocephalic and atraumatic.      Nose: Nose normal.      Mouth/Throat:      Mouth: Mucous membranes are moist.      Pharynx: Oropharynx is clear.   Eyes:      Conjunctiva/sclera: Conjunctivae normal.      Pupils: Pupils are equal, round, and reactive to light.   Cardiovascular:      Rate and Rhythm: Normal rate and regular rhythm.      Pulses: Normal pulses.   Pulmonary:      Effort: Pulmonary effort is normal.      Breath sounds: Normal breath sounds.   Abdominal:      General: Bowel sounds are normal.      Palpations: Abdomen is soft.      Tenderness: There is no abdominal tenderness.   Musculoskeletal:         General: No swelling or tenderness.      Cervical back: Neck supple.   Skin:     General: Skin is warm and dry.      Capillary Refill: Capillary refill takes less than 2 seconds.   Neurological:      General: No focal deficit present.      Mental Status: She is alert.   Psychiatric:         Mood and Affect: Affect is flat.         Cognition and Memory: Cognition is impaired.       Results Review     I reviewed the patient's new clinical results.  Results from last 7 days   Lab Units 24  0511   WBC 10*3/mm3 9.21   HEMOGLOBIN g/dL 11.1*   PLATELETS 10*3/mm3 287     Results from last 7  "days   Lab Units 07/12/24  0511   SODIUM mmol/L 139   POTASSIUM mmol/L 3.9   CHLORIDE mmol/L 106   CO2 mmol/L 23.0   BUN mg/dL 18   CREATININE mg/dL 1.10*   GLUCOSE mg/dL 97   EGFR mL/min/1.73 49.3*     Results from last 7 days   Lab Units 07/12/24  0511   ALBUMIN g/dL 3.5   BILIRUBIN mg/dL 0.4   ALK PHOS U/L 79   AST (SGOT) U/L 13   ALT (SGPT) U/L 12     Results from last 7 days   Lab Units 07/12/24  0511   CALCIUM mg/dL 9.9   ALBUMIN g/dL 3.5   MAGNESIUM mg/dL 1.8   PHOSPHORUS mg/dL 3.2       No results found for: \"HGBA1C\", \"POCGLU\"    No radiology results for the last day    I have personally reviewed all medications:  Scheduled Medications  bisacodyl, 10 mg, Rectal, Daily  cinacalcet, 60 mg, Oral, Daily With Breakfast  donepezil, 10 mg, Oral, Daily  memantine, 10 mg, Oral, BID  rosuvastatin, 20 mg, Oral, Daily    Infusions   Diet  Diet: Regular/House; Feeding Assistance - Nursing; Texture: Regular (IDDSI 7); Fluid Consistency: Thin (IDDSI 0)    I have personally reviewed:  []  Laboratory   []  Microbiology   []  Radiology   []  EKG/Telemetry  []  Cardiology/Vascular   []  Pathology    []  Records      Assessment/Plan     Active Hospital Problems    Diagnosis  POA    **Right ureteral stone [N20.1]  Yes    Severe malnutrition [E43]  Yes    Hypercalcemia [E83.52]  No    Constipation [K59.00]  Clinically Undetermined    Acute UTI (urinary tract infection) [N39.0]  Yes    Hypoglycemia [E16.2]  Yes    Abnormal LFTs [R79.89]  Yes    Chronic bilateral low back pain without sciatica [M54.50, G89.29]  Yes    CAYLA (dementia of Alzheimer type) [G30.9, F02.80]  Yes    Lumbar spinal stenosis [M48.061]  Yes    Thrombocytopenia [D69.6]  Yes    Acute low back pain [M54.50]  Yes      Resolved Hospital Problems    Diagnosis Date Resolved POA    Shock, septic [A41.9, R65.21] 06/03/2024 Yes    PRINCE (acute kidney injury) [N17.9] 06/06/2024 Yes   ESBL E. coli pyelonephritis complicated by right hydronephrosis and obstructive ureteric " stone and Sepsis, present on admission  - s/p cystoscopy and right stent placement on 6/2/2024.  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    - JESSE has evaluated, recommended conservative management     Dysphagia  - SLP has seen, upgraded to regular/thin diet      SCDs for DVT prophylaxis.  DNR.  Discussed with patient and nursing staff.  Anticipate discharge to SNU facility once precert has been obtained.  Expected Discharge Date: 7/16/2024; Expected Discharge Time:       Mark Coy MD  [facility]  07/15/24  15:13 EDT  Portions of this text have been copied and I have reviewed them. They are accurate as of 7/15/2024

## 2024-07-15 NOTE — PLAN OF CARE
Problem: Adult Inpatient Plan of Care  Goal: Plan of Care Review  Outcome: Ongoing, Not Progressing  Flowsheets (Taken 7/15/2024 0402)  Progress: improving  Plan of Care Reviewed With: patient  Outcome Evaluation: Pt alert, calm and cooperative, no c/o pain and was just watching TV, takes her pills whole, repositioned frequently, checked for incontinence, falls precaution maintained, VSS, still waiting for placement   Goal Outcome Evaluation:  Plan of Care Reviewed With: patient        Progress: improving  Outcome Evaluation: Pt alert, calm and cooperative, no c/o pain and was just watching TV, takes her pills whole, repositioned frequently, checked for incontinence, falls precaution maintained, VSS, still waiting for placement

## 2024-07-15 NOTE — PROGRESS NOTES
Continued Stay Note  [facility]     Patient Name: Kimberlee Urrutia  MRN: [id number]  Today's Date: 7/15/2024    Admit Date: 6/1/2024    Plan: [facility] PENDING Pre-cert   Discharge Plan       Row Name 07/15/24 1040       Plan    Plan [facility] PENDING Pre-cert    Plan Comments Msg sent to Kian with [facility] regarding pre-cert. to [facility]                   Discharge Codes    No documentation.                 Expected Discharge Date and Time       Expected Discharge Date Expected Discharge Time    Jul 8, 2024               Kavya Gilman RN

## 2024-07-15 NOTE — PROGRESS NOTES
Continued Stay Note  [facility]     Patient Name: Kimberlee Urrutia  MRN: [id number]  Today's Date: 7/15/2024    Admit Date: 6/1/2024    Plan: [facility] PENDING Pre-cert   Discharge Plan       Row Name 07/15/24 1130       Plan    Plan [facility] PENDING Pre-cert    Plan Comments Pre Kian with [facility], pre-cert is still PENDING      Row Name 07/15/24 1040       Plan    Plan [facility] PENDING Pre-cert    Plan Comments Msg sent to Kian with [facility] regarding pre-cert. to [facility]                   Discharge Codes    No documentation.                 Expected Discharge Date and Time       Expected Discharge Date Expected Discharge Time    Jul 8, 2024               Kavya Gilman RN

## 2024-07-15 NOTE — PLAN OF CARE
Goal Outcome Evaluation:  VSS, resting quietly in room, up with assist, gait belt, walker, up in chair, no c/o pain, inc of urine, awaiting pre-cert, falls protocol, bed/chair alarm in use  Plan of Care Reviewed With: patient

## 2024-07-16 VITALS
SYSTOLIC BLOOD PRESSURE: 119 MMHG | HEIGHT: 63 IN | OXYGEN SATURATION: 95 % | RESPIRATION RATE: 16 BRPM | TEMPERATURE: 97.6 F | WEIGHT: 159.61 LBS | HEART RATE: 73 BPM | DIASTOLIC BLOOD PRESSURE: 57 MMHG | BODY MASS INDEX: 28.28 KG/M2

## 2024-07-16 RX ADMIN — BISACODYL 10 MG: 10 SUPPOSITORY RECTAL at 11:03

## 2024-07-16 RX ADMIN — DONEPEZIL HYDROCHLORIDE 10 MG: 10 TABLET, FILM COATED ORAL at 11:03

## 2024-07-16 RX ADMIN — ROSUVASTATIN CALCIUM 20 MG: 20 TABLET, FILM COATED ORAL at 11:03

## 2024-07-16 RX ADMIN — MEMANTINE HYDROCHLORIDE 10 MG: 10 TABLET, FILM COATED ORAL at 11:03

## 2024-07-16 RX ADMIN — CINACALCET 60 MG: 30 TABLET ORAL at 11:03

## 2024-07-16 NOTE — PLAN OF CARE
Goal Outcome Evaluation:  Plan of Care Reviewed With: patient        Progress: improving  Outcome Evaluation: Calm and cooperative. VSS. Turned to sides. Exqternal catheter in place , urilne output adequate. Slept well.

## 2024-07-16 NOTE — PLAN OF CARE
Goal Outcome Evaluation:  VSS, up with assist of 1, gait belt, walker, no c/o pain, up in chair, purewick on this am, taken off while awake, inc of dark colored urine, falls protocol, bed/chair alarm, d/c to [facility]  Plan of Care Reviewed With: patient

## 2024-07-16 NOTE — PROGRESS NOTES
Continued Stay Note  [facility]     Patient Name: Kimberlee Urrutia  MRN: [id number]  Today's Date: 7/16/2024    Admit Date: 6/1/2024    Plan: [facility] Pre-cert obtained   Discharge Plan       Row Name 07/16/24 1442       Plan    Plan [facility] Pre-cert obtained    Plan Comments Spoke to Gi Yahaira ESPANA/Bruce at [phone] to discusse pre-cert obtained for [facility]. Gi is in agreement to plan. Informed her of DC today and time of transportation. Verbalized understanding. Discharge summary faxed. Packet given to RN.      Row Name 07/16/24 1437       Plan    Plan --      Row Name 07/16/24 1312       Plan    Plan [facility] Pre-cert obtained    Plan Comments Per Kian with [facility], Pre-cert obtained for [facility]. Transportation arranged with [facility] [employer] for 4pm today. Confirmation # [id number]. Updated RN, MD and Kian with [facility].                   Discharge Codes    No documentation.                 Expected Discharge Date and Time       Expected Discharge Date Expected Discharge Time    Jul 16, 2024               Kavya Gilman, CIPRIANO

## 2024-07-16 NOTE — PROGRESS NOTES
Continued Stay Note  [facility]     Patient Name: Kimberlee Urrutia  MRN: [id number]  Today's Date: 7/16/2024    Admit Date: 6/1/2024    Plan: [facility] Pre-cert obtained   Discharge Plan       Row Name 07/16/24 1312       Plan    Plan [facility] Pre-cert obtained    Plan Comments Per Kian with [facility], Pre-cert obtained for [facility]. Transportation arranged with [employer] for 4pm today. Confirmation # [id number]. Updated RN, MD and Kian with [facility].                   Discharge Codes    No documentation.                 Expected Discharge Date and Time       Expected Discharge Date Expected Discharge Time    Jul 16, 2024               Kavya Gilman, RN

## (undated) DEVICE — TIDISHIELD UROLOGY DRAIN BAGS FROSTY VINYL STERILE FITS SIEMENS UROSKOP ACCESS 20 PER CASE: Brand: TIDISHIELD

## (undated) DEVICE — URETERAL DILATATION SYSTEM

## (undated) DEVICE — EXTRCT STN NCIRCLE TIPLSS 2.2F1X115CM

## (undated) DEVICE — Device: Brand: PORTEX

## (undated) DEVICE — NDL SPINE 22G 31/2IN BLK

## (undated) DEVICE — NDL EPID TUOHY 18G 31/2IN

## (undated) DEVICE — CVR JUMPSUIT STA DRI IMPERV 2MIL REG

## (undated) DEVICE — GLV SURG TRIUMPH PF LTX 7 STRL

## (undated) DEVICE — GLV SURG BIOGEL LTX PF 8

## (undated) DEVICE — GLV SURG BIOGEL LTX PF 7 1/2

## (undated) DEVICE — PRT BIOP SEALS

## (undated) DEVICE — LOU CYSTO: Brand: MEDLINE INDUSTRIES, INC.

## (undated) DEVICE — GLV SURG TRIUMPH PF LTX 7.5 STRL

## (undated) DEVICE — EPIDURAL TRAY: Brand: MEDLINE INDUSTRIES, INC.

## (undated) DEVICE — PK URETSCP 40

## (undated) DEVICE — NITINOL WIRE WITH HYDROPHILIC TIP: Brand: SENSOR

## (undated) DEVICE — SYR LUERLOK 20CC BX/50

## (undated) DEVICE — TOTAL TRAY, 16FR 10ML SIL FOLEY, URN: Brand: MEDLINE

## (undated) DEVICE — FIBR LASR HOLMIUM ACCUMAX 365 1PT USE